# Patient Record
Sex: FEMALE | Race: WHITE | NOT HISPANIC OR LATINO | Employment: UNEMPLOYED | URBAN - NONMETROPOLITAN AREA
[De-identification: names, ages, dates, MRNs, and addresses within clinical notes are randomized per-mention and may not be internally consistent; named-entity substitution may affect disease eponyms.]

---

## 2017-01-12 ENCOUNTER — TRANSCRIBE ORDERS (OUTPATIENT)
Dept: LAB | Facility: MEDICAL CENTER | Age: 52
End: 2017-01-12

## 2017-01-12 ENCOUNTER — APPOINTMENT (OUTPATIENT)
Dept: LAB | Facility: MEDICAL CENTER | Age: 52
End: 2017-01-12
Payer: COMMERCIAL

## 2017-01-12 DIAGNOSIS — E11.9 DIABETES MELLITUS WITHOUT COMPLICATION (HCC): Primary | ICD-10-CM

## 2017-01-12 DIAGNOSIS — E11.9 TYPE 2 DIABETES MELLITUS WITHOUT COMPLICATIONS (HCC): ICD-10-CM

## 2017-01-12 DIAGNOSIS — F50.89 OTHER SPECIFIED EATING DISORDER: ICD-10-CM

## 2017-01-12 DIAGNOSIS — I10 ESSENTIAL (PRIMARY) HYPERTENSION: ICD-10-CM

## 2017-01-12 LAB
ALBUMIN SERPL BCP-MCNC: 3.2 G/DL (ref 3.5–5)
ALP SERPL-CCNC: 90 U/L (ref 46–116)
ALT SERPL W P-5'-P-CCNC: 22 U/L (ref 12–78)
ANION GAP SERPL CALCULATED.3IONS-SCNC: 6 MMOL/L (ref 4–13)
AST SERPL W P-5'-P-CCNC: 12 U/L (ref 5–45)
BASOPHILS # BLD AUTO: 0.04 THOUSANDS/ΜL (ref 0–0.1)
BASOPHILS NFR BLD AUTO: 1 % (ref 0–1)
BILIRUB SERPL-MCNC: 0.62 MG/DL (ref 0.2–1)
BUN SERPL-MCNC: 14 MG/DL (ref 5–25)
CALCIUM SERPL-MCNC: 8.8 MG/DL (ref 8.3–10.1)
CHLORIDE SERPL-SCNC: 100 MMOL/L (ref 100–108)
CHOLEST SERPL-MCNC: 274 MG/DL (ref 50–200)
CO2 SERPL-SCNC: 30 MMOL/L (ref 21–32)
CREAT SERPL-MCNC: 0.85 MG/DL (ref 0.6–1.3)
CREAT UR-MCNC: 226 MG/DL
EOSINOPHIL # BLD AUTO: 0.06 THOUSAND/ΜL (ref 0–0.61)
EOSINOPHIL NFR BLD AUTO: 1 % (ref 0–6)
ERYTHROCYTE [DISTWIDTH] IN BLOOD BY AUTOMATED COUNT: 16.7 % (ref 11.6–15.1)
GFR SERPL CREATININE-BSD FRML MDRD: >60 ML/MIN/1.73SQ M
GLUCOSE SERPL-MCNC: 173 MG/DL (ref 65–140)
HCT VFR BLD AUTO: 34.5 % (ref 34.8–46.1)
HDLC SERPL-MCNC: 74 MG/DL (ref 40–60)
HGB BLD-MCNC: 10 G/DL (ref 11.5–15.4)
IRON SERPL-MCNC: 24 UG/DL (ref 50–170)
LDLC SERPL CALC-MCNC: 170 MG/DL (ref 0–100)
LYMPHOCYTES # BLD AUTO: 1.41 THOUSANDS/ΜL (ref 0.6–4.47)
LYMPHOCYTES NFR BLD AUTO: 24 % (ref 14–44)
MCH RBC QN AUTO: 20.3 PG (ref 26.8–34.3)
MCHC RBC AUTO-ENTMCNC: 29 G/DL (ref 31.4–37.4)
MCV RBC AUTO: 70 FL (ref 82–98)
MICROALBUMIN UR-MCNC: 23 MG/L (ref 0–20)
MICROALBUMIN/CREAT 24H UR: 10 MG/G CREATININE (ref 0–30)
MONOCYTES # BLD AUTO: 0.38 THOUSAND/ΜL (ref 0.17–1.22)
MONOCYTES NFR BLD AUTO: 6 % (ref 4–12)
NEUTROPHILS # BLD AUTO: 4.02 THOUSANDS/ΜL (ref 1.85–7.62)
NEUTS SEG NFR BLD AUTO: 68 % (ref 43–75)
NRBC BLD AUTO-RTO: 0 /100 WBCS
PLATELET # BLD AUTO: 346 THOUSANDS/UL (ref 149–390)
PMV BLD AUTO: 9.1 FL (ref 8.9–12.7)
POTASSIUM SERPL-SCNC: 4.3 MMOL/L (ref 3.5–5.3)
PROT SERPL-MCNC: 7.2 G/DL (ref 6.4–8.2)
RBC # BLD AUTO: 4.93 MILLION/UL (ref 3.81–5.12)
SODIUM SERPL-SCNC: 136 MMOL/L (ref 136–145)
TRIGL SERPL-MCNC: 150 MG/DL
TSH SERPL DL<=0.05 MIU/L-ACNC: 1.45 UIU/ML (ref 0.36–3.74)
WBC # BLD AUTO: 5.92 THOUSAND/UL (ref 4.31–10.16)

## 2017-01-12 PROCEDURE — 36415 COLL VENOUS BLD VENIPUNCTURE: CPT

## 2017-01-12 PROCEDURE — 80053 COMPREHEN METABOLIC PANEL: CPT

## 2017-01-12 PROCEDURE — 85025 COMPLETE CBC W/AUTO DIFF WBC: CPT

## 2017-01-12 PROCEDURE — 82043 UR ALBUMIN QUANTITATIVE: CPT | Performed by: PHYSICIAN ASSISTANT

## 2017-01-12 PROCEDURE — 82570 ASSAY OF URINE CREATININE: CPT | Performed by: PHYSICIAN ASSISTANT

## 2017-01-12 PROCEDURE — 83540 ASSAY OF IRON: CPT

## 2017-01-12 PROCEDURE — 84443 ASSAY THYROID STIM HORMONE: CPT

## 2017-01-12 PROCEDURE — 80061 LIPID PANEL: CPT

## 2017-01-24 ENCOUNTER — APPOINTMENT (OUTPATIENT)
Dept: LAB | Facility: MEDICAL CENTER | Age: 52
End: 2017-01-24
Payer: COMMERCIAL

## 2017-01-24 DIAGNOSIS — D64.9 ANEMIA: ICD-10-CM

## 2017-01-24 LAB
IRON SERPL-MCNC: 28 UG/DL (ref 50–170)
VIT B12 SERPL-MCNC: 563 PG/ML (ref 100–900)

## 2017-01-24 PROCEDURE — 36415 COLL VENOUS BLD VENIPUNCTURE: CPT

## 2017-01-24 PROCEDURE — 82607 VITAMIN B-12: CPT

## 2017-01-24 PROCEDURE — 83540 ASSAY OF IRON: CPT

## 2017-02-01 ENCOUNTER — ALLSCRIPTS OFFICE VISIT (OUTPATIENT)
Dept: FAMILY MEDICINE CLINIC | Facility: CLINIC | Age: 52
End: 2017-02-01
Payer: COMMERCIAL

## 2017-02-01 LAB — HBA1C MFR BLD HPLC: 7.8 %

## 2017-02-01 PROCEDURE — 83036 HEMOGLOBIN GLYCOSYLATED A1C: CPT | Performed by: PHYSICIAN ASSISTANT

## 2017-02-01 PROCEDURE — 99214 OFFICE O/P EST MOD 30 MIN: CPT | Performed by: PHYSICIAN ASSISTANT

## 2017-05-04 ENCOUNTER — ALLSCRIPTS OFFICE VISIT (OUTPATIENT)
Dept: FAMILY MEDICINE CLINIC | Facility: CLINIC | Age: 52
End: 2017-05-04
Payer: COMMERCIAL

## 2017-05-04 LAB
GLUCOSE SERPL-MCNC: 191 MG/DL
GLUCOSE SERPL-MCNC: 191 MG/DL (ref 65–140)
HBA1C MFR BLD HPLC: 8.4 %

## 2017-05-04 PROCEDURE — 82948 REAGENT STRIP/BLOOD GLUCOSE: CPT | Performed by: PHYSICIAN ASSISTANT

## 2017-05-04 PROCEDURE — 83036 HEMOGLOBIN GLYCOSYLATED A1C: CPT | Performed by: FAMILY MEDICINE

## 2017-05-04 PROCEDURE — 99214 OFFICE O/P EST MOD 30 MIN: CPT | Performed by: FAMILY MEDICINE

## 2017-05-11 ENCOUNTER — GENERIC CONVERSION - ENCOUNTER (OUTPATIENT)
Dept: OTHER | Facility: OTHER | Age: 52
End: 2017-05-11

## 2017-05-11 ENCOUNTER — APPOINTMENT (OUTPATIENT)
Dept: FAMILY MEDICINE CLINIC | Facility: CLINIC | Age: 52
End: 2017-05-11
Payer: COMMERCIAL

## 2017-05-11 LAB — GLUCOSE SERPL-MCNC: 218 MG/DL

## 2017-05-11 PROCEDURE — 99213 OFFICE O/P EST LOW 20 MIN: CPT | Performed by: FAMILY MEDICINE

## 2017-05-11 PROCEDURE — 82948 REAGENT STRIP/BLOOD GLUCOSE: CPT | Performed by: PHYSICIAN ASSISTANT

## 2017-05-12 LAB — GLUCOSE SERPL-MCNC: 218 MG/DL (ref 65–140)

## 2017-07-07 ENCOUNTER — HOSPITAL ENCOUNTER (EMERGENCY)
Facility: HOSPITAL | Age: 52
Discharge: HOME | End: 2017-07-07
Attending: EMERGENCY MEDICINE | Admitting: EMERGENCY MEDICINE
Payer: COMMERCIAL

## 2017-07-07 ENCOUNTER — APPOINTMENT (EMERGENCY)
Dept: ULTRASOUND IMAGING | Facility: HOSPITAL | Age: 52
End: 2017-07-07
Payer: COMMERCIAL

## 2017-07-07 VITALS
TEMPERATURE: 97.5 F | SYSTOLIC BLOOD PRESSURE: 171 MMHG | DIASTOLIC BLOOD PRESSURE: 85 MMHG | OXYGEN SATURATION: 99 % | RESPIRATION RATE: 16 BRPM | HEART RATE: 67 BPM | HEIGHT: 65 IN | BODY MASS INDEX: 35.34 KG/M2 | WEIGHT: 212.08 LBS

## 2017-07-07 DIAGNOSIS — R10.2 PAIN, PELVIC, FEMALE: ICD-10-CM

## 2017-07-07 DIAGNOSIS — R10.2 PELVIC PAIN: Primary | ICD-10-CM

## 2017-07-07 LAB
BILIRUB UR QL STRIP: NEGATIVE
CLARITY UR: CLEAR
COLOR UR: YELLOW
GLUCOSE UR STRIP-MCNC: NEGATIVE MG/DL
HGB UR QL STRIP.AUTO: NEGATIVE
KETONES UR STRIP-MCNC: NEGATIVE MG/DL
LEUKOCYTE ESTERASE UR QL STRIP: NEGATIVE
NITRITE UR QL STRIP: NEGATIVE
PH UR STRIP.AUTO: 6 [PH] (ref 4.5–8)
PROT UR STRIP-MCNC: NEGATIVE MG/DL
SP GR UR STRIP.AUTO: 1.02 (ref 1–1.03)
UROBILINOGEN UR QL STRIP.AUTO: 0.2 E.U./DL

## 2017-07-07 PROCEDURE — 96372 THER/PROPH/DIAG INJ SC/IM: CPT

## 2017-07-07 PROCEDURE — 99284 EMERGENCY DEPT VISIT MOD MDM: CPT

## 2017-07-07 PROCEDURE — 76830 TRANSVAGINAL US NON-OB: CPT

## 2017-07-07 PROCEDURE — 81003 URINALYSIS AUTO W/O SCOPE: CPT | Performed by: EMERGENCY MEDICINE

## 2017-07-07 PROCEDURE — 76856 US EXAM PELVIC COMPLETE: CPT

## 2017-07-07 RX ORDER — KETOROLAC TROMETHAMINE 30 MG/ML
60 INJECTION, SOLUTION INTRAMUSCULAR; INTRAVENOUS ONCE
Status: COMPLETED | OUTPATIENT
Start: 2017-07-07 | End: 2017-07-07

## 2017-07-07 RX ORDER — NAPROXEN 500 MG/1
500 TABLET ORAL EVERY 8 HOURS PRN
Qty: 20 TABLET | Refills: 0 | Status: SHIPPED | OUTPATIENT
Start: 2017-07-07 | End: 2018-04-17

## 2017-07-07 RX ADMIN — KETOROLAC TROMETHAMINE 60 MG: 30 INJECTION, SOLUTION INTRAMUSCULAR at 17:09

## 2017-07-12 ENCOUNTER — ALLSCRIPTS OFFICE VISIT (OUTPATIENT)
Dept: FAMILY MEDICINE CLINIC | Facility: CLINIC | Age: 52
End: 2017-07-12
Payer: COMMERCIAL

## 2017-07-12 PROCEDURE — 99212 OFFICE O/P EST SF 10 MIN: CPT | Performed by: FAMILY MEDICINE

## 2017-08-10 ENCOUNTER — ALLSCRIPTS OFFICE VISIT (OUTPATIENT)
Dept: FAMILY MEDICINE CLINIC | Facility: CLINIC | Age: 52
End: 2017-08-10
Payer: COMMERCIAL

## 2017-08-10 LAB
GLUCOSE SERPL-MCNC: 265 MG/DL
GLUCOSE SERPL-MCNC: 265 MG/DL (ref 65–140)
HBA1C MFR BLD HPLC: 8.3 %

## 2017-08-10 PROCEDURE — 82948 REAGENT STRIP/BLOOD GLUCOSE: CPT | Performed by: PHYSICIAN ASSISTANT

## 2017-08-10 PROCEDURE — 83036 HEMOGLOBIN GLYCOSYLATED A1C: CPT | Performed by: FAMILY MEDICINE

## 2017-08-10 PROCEDURE — 99214 OFFICE O/P EST MOD 30 MIN: CPT | Performed by: FAMILY MEDICINE

## 2017-10-27 NOTE — PROGRESS NOTES
Assessment  1  Skin rash (782 1) (R21)    Plan   Skin rash    · Follow Up if Not Better Evaluation and Treatment  Follow-up  Status: Complete  Done:  [id number]    MethylPREDNISolone 4 MG Oral Tablet Therapy Pack; take as directed daily for 5 days; Therapy: 18MGA9989 to (Last Rx:99Azy0920)  Requested for: 38Ewq5394; Status: ACTIVE Ordered  Rx By: Chadwick Hudson; Dispense: 0 Days ; #:1 Tablet Therapy Pack; Refill: 0;   For: Skin rash; NELLY = N; Verified Transmission to [facility]; Last Updated By: [email or web address]; 8/10/2017 12:57:54 PM     Discussion/Summary    Doubt zoster at this time  Medrol dose pack x 5 days  She will call office in the AM to let us know if any changes  The treatment plan was reviewed with the patient/guardian  The patient/guardian understands and agrees with the treatment plan      Chief Complaint  Patient is here because since yesterday she has a rash on back and chest that she says itch and hurt  History of Present Illness  HPI: 55 yo female presents for above  Started with rash yesterday  Painful and itchy  She has had zoster in the past  Yesterday had rash on back but now gone  reports it maybe her nerves  There is a lot going on on her life right now  Review of Systems    Constitutional: No fever, no chills, feels well, no tiredness, no recent weight gain or loss  ENT: no ear ache, no loss of hearing, no nosebleeds or nasal discharge, no sore throat or hoarseness  Cardiovascular: no complaints of slow or fast heart rate, no chest pain, no palpitations, no leg claudication or lower extremity edema  Respiratory: no complaints of shortness of breath, no wheezing, no dyspnea on exertion, no orthopnea or PND  Integumentary: as noted in HPI  Active Problems   1  Ache in joint (719 40) (M25 50)   2  Acid reflux (530 81) (K21 9)   3  Allergic cough (786 2) (R05)   4  Breast cancer screening (V76 10) (Z12 39)   5  Depression (311) (F32 9)   6   Hydrocephalus (331 4) (G91 9)   7  Low hemoglobin (285 9) (D64 9)   8  Pain, foot, left, chronic (729 5,338 29) (M79 672,G89 29)   9  Pica in adults (307 52) (F50 89)    Diabetes mellitus (250 00) (E11 9)       Hypertension (401 9) (I10)       Iron deficiency (280 9) (E61 1)          Past Medical History  1  History of Asthmatic bronchitis (493 90) (J45 909)   2  History of Flank pain, acute (789 09,338 19) (R10 9)   3  History of acute sinusitis (V12 69) (Z87 09)   4  History of urinary tract infection (V13 02) (Z87 440)   5  History of Left ankle pain (719 47) (M25 572)   6  History of Sprain of left foot, initial encounter (845 10) (S93 602A)   7  History of Sprain of other ligament of left ankle, subsequent encounter (V58 89,845 09)   (G25 740L)  Active Problems And Past Medical History Reviewed: The active problems and past medical history were reviewed and updated today  Family History  Father    1  Family history of diabetes mellitus (V18 0) (Z83 3)  Family History Reviewed: The family history was reviewed and updated today  Social History   · No caffeine use   · Non-smoker (V49 89) (Z78 9)   · Social alcohol use (Z78 9)  The social history was reviewed and updated today  The social history was reviewed and is unchanged  Surgical History  Surgical History Reviewed: The surgical history was reviewed and updated today  Current Meds   1  Albuterol Sulfate (2 5 MG/3ML) 0 083% Inhalation Nebulization Solution; USE 1 UNIT   DOSE EVERY 4-6 HOURS AS NEEDED FOR WHEEZING ; Therapy: 91FAL6752 to (Last Rx:14Mar2016)  Requested for: 85UPZ9329 Ordered   2  FLUoxetine HCl - 40 MG Oral Capsule; take 1 capsule daily; Therapy: 32LGB4085 to (Last Rx:69Haq2113)  Requested for: 84GQF1542 Ordered   3  Glimepiride 4 MG Oral Tablet; take one tablet by mouth twice daily  Requested for:   10UZD0820; Last Rx:64Ffx8578 Ordered   4  HydroCHLOROthiazide 25 MG Oral Tablet; TAKE ONE TABLET BY MOUTH ONCE   DAILY;    Therapy: 97KUN7065 to (Evaluate:78Jla2079)  Requested for: 23LWK5211; Last   Rx:12Mar2017 Ordered   5  MetFORMIN HCl  MG Oral Tablet Extended Release 24 Hour; take one tablet by   mouth twice daily; Therapy: 83FVD0333 to (Evaluate:84Iso3919)  Requested for: 41Wku1446; Last   Rx:03Zdu0310 Ordered   6  Montelukast Sodium 10 MG Oral Tablet; TAKE 1 TABLET DAILY AS DIRECTED; Therapy: 47TTT9048 to (96 903574)  Requested for: 13KXZ6213; Last   Rx:88Hmc7478 Ordered   7  Toujeo SoloStar 300 UNIT/ML Subcutaneous Solution Pen-injector; INJECT 37 UNITS   DAILY  Requested for: 66TQQ2844; Last Rx:23Awt5475 Ordered    The medication list was reviewed and updated today  Allergies  1  No Known Drug Allergies  2  Chocolate   3  Peanuts   4  Seasonal   5  Shellfish   6  TOMATO    Vitals   Recorded: 12Jul2017 09:23AM   Temperature 97 6 F   Heart Rate 001   Systolic 095   Diastolic 84   Height 5 ft 5 in   Weight 214 lb    BMI Calculated 35 61   BSA Calculated 2 04   O2 Saturation 98     Physical Exam    Constitutional   General appearance: No acute distress, well appearing and well nourished  Eyes   Conjunctiva and lids: No swelling, erythema or discharge  Pupils and irises: Equal, round and reactive to light  Ears, Nose, Mouth, and Throat   External inspection of ears and nose: Normal     Skin   Skin and subcutaneous tissue: Normal without rashes or lesions  Examination of the skin for lesions: Abnormal  -- 3 red round lesions noted on R breast  No rash or lesions noted over the area of pain and itch  Future Appointments    Date/Time Provider Specialty Site   11/15/2017 03:00 PM [facility] [address]     Signatures   Electronically signed by :  Sheila Pantoja [facility]; Jul 12 2017  9:47AM EST                       (Author)    Electronically signed by : Rodrick Nunes MD; Oct  1 2017 10:43PM EST                       (Author)

## 2017-11-10 NOTE — PROGRESS NOTES
Assessment    1  Diabetes mellitus (250 00) (E11 9)  2  Hypertension (401 9) (I10)  3  Iron deficiency (280 9) (E61 1)  4  Anxiety (300 00) (F41 9)    Plan  Allergic cough    · Renew: Montelukast Sodium 10 MG Oral Tablet (Singulair); TAKE 1 TABLET DAILY ASDIRECTED  Anxiety    · Start: Promethazine HCl - 25 MG Oral Tablet; TAKE 1 TABLET 3 TIMES DAILY ASNEEDED  for nausea and anxiety  Depression    · Renew: FLUoxetine HCl - 40 MG Oral Capsule; take 1 capsule daily  Diabetes mellitus    · Renew: Glimepiride 4 MG Oral Tablet; take one tablet by mouth twice daily   · Renew: MetFORMIN HCl  MG Oral Tablet Extended Release 24 Hour(Glucophage XR); take one tablet by mouth twice daily   · Blood Glucose- POC; Status:Complete;   Done: 47VQL1024 12:58PM  () Fasting (Y/N) : No - N   · Hemoglobin A1c- POC; Status:Complete;   Done: 03LZW4229 01:01PM  Diabetes mellitus, Hypertension    · Renew: TraMADol HCl - 50 MG Oral Tablet; Take 1 tablet twice daily  Diabetes mellitus, Pain, foot, left, chronic, Skin rash    · Administered: Ketorolac Tromethamine 60 MG/2ML Injection Solution  Hypertension    · Renew: HydroCHLOROthiazide 25 MG Oral Tablet; TAKE ONE TABLET BY MOUTH ONCEDAILY    Discussion/Summary    Will add promethazine 25 mg q 4-6 hrs as needed discussed need for new job and stress relief  The patient was counseled regarding  Chief Complaint  Patient is here for a check up for her diabetes  She states her sugars are running high  Patient states she is very stressed, she feels nauseated, has migraines, and every time she smells food she feels like she is going to vomit  Patient states this has been going on since Sunday, but the stress has been within the past 3 months        History of Present Illness  pt is here and having a lot of stress and issues at work Appeite is ok and sleeps up and down and b are ok Nauseated will be having hysterectoy in few weeks      Review of Systems   Constitutional: No fever, no chills, feels well, no tiredness, no recent weight gain or weight loss  Eyes: No complaints of eye pain, no red eyes, no eyesight problems, no discharge, no dry eyes, no itching of eyes  ENT: no complaints of earache, no loss of hearing, no nose bleeds, no nasal discharge, no sore throat, no hoarseness  Cardiovascular: No complaints of slow heart rate, no fast heart rate, no chest pain, no palpitations, no leg claudication, no lower extremity edema  Respiratory: No complaints of shortness of breath, no wheezing, no cough, no SOB on exertion, no orthopnea, no PND  Gastrointestinal: nausea, but-- No complaints of abdominal pain, no constipation, no nausea or vomiting, no diarrhea, no bloody stools  Genitourinary: No complaints of dysuria, no incontinence, no pelvic pain, no dysmenorrhea, no vaginal discharge or bleeding  Musculoskeletal: No complaints of arthralgias, no myalgias, no joint swelling or stiffness, no limb pain or swelling  Integumentary: No complaints of skin rash or lesions, no itching, no skin wounds, no breast pain or lump  Neurological: headache, but-- No complaints of headache, no confusion, no convulsions, no numbness, no dizziness or fainting, no tingling, no limb weakness, no difficulty walking  Psychiatric: anxiety-- and-- depression, but-- Not suicidal, no sleep disturbance, no anxiety or depression, no change in personality, no emotional problems  Endocrine: No complaints of proptosis, no hot flashes, no muscle weakness, no deepening of the voice, no feelings of weakness  Hematologic/Lymphatic: No complaints of swollen glands, no swollen glands in the neck, does not bleed easily, does not bruise easily  ROS reviewed  Active Problems  1  Ache in joint (719 40) (M25 50)  2  Acid reflux (530 81) (K21 9)  3  Allergic cough (786 2) (R05)  4  Breast cancer screening (V76 10) (Z12 39)  5  Depression (311) (F32 9)  6  Diabetes mellitus (250 00) (E11 9)  7   Hydrocephalus (331 4) (G91 9)  8  Hypertension (401 9) (I10)  9  Iron deficiency (280 9) (E61 1)  10  Low hemoglobin (285 9) (D64 9)  11  Pain, foot, left, chronic (729 5,338 29) (M79 672,G89 29)  12  Pica in adults (307 52) (F50 89)  13  Skin rash (782 1) (R21)    Past Medical History    1  History of Asthmatic bronchitis (493 90) (J45 909)  2  History of Flank pain, acute (789 09,338 19) (R10 9)  3  History of acute sinusitis (V12 69) (Z87 09)  4  History of urinary tract infection (V13 02) (Z87 440)  5  History of Left ankle pain (719 47) (M25 572)  6  History of Sprain of left foot, initial encounter (845 10) (S93 602A)  7  History of Sprain of other ligament of left ankle, subsequent encounter (V58 89,845 09) (M49 470J)    The active problems and past medical history were reviewed and updated today  Family History  Father   1  Family history of diabetes mellitus (V18 0) (Z83 3)    The family history was reviewed and updated today  Social History     · No caffeine use   · Non-smoker (V49 89) (Z78 9)   · Social alcohol use (Z78 9)  The social history was reviewed and updated today  Current Meds  1  Albuterol Sulfate (2 5 MG/3ML) 0 083% Inhalation Nebulization Solution; USE 1 UNIT DOSE EVERY 4-6 HOURS AS NEEDED FOR WHEEZING ; Therapy: 28UPO1877 to (Last Rx:14Mar2016)  Requested for: 42AXD5546 Ordered  2  FLUoxetine HCl - 40 MG Oral Capsule; take 1 capsule daily; Therapy: 63SYB5519 to (Last Rx:62Vdm8132)  Requested for: 84ZCW6365 Ordered  3  Glimepiride 4 MG Oral Tablet; take one tablet by mouth twice daily  Requested for: 84ZLF8783; Last Rx:02Nqz4413 Ordered  4  HydroCHLOROthiazide 25 MG Oral Tablet; TAKE ONE TABLET BY MOUTH ONCE DAILY; Therapy: 27IQC0107 to (Otis Gonzalez)  Requested for: 06Lti5642; Last Rx:88Kwd1349 Ordered  5  MetFORMIN HCl  MG Oral Tablet Extended Release 24 Hour; take one tablet by mouth twice daily;  Therapy: 82KUI0266 to (Evaluate:48Vau2911)  Requested for: 12Dec2016; Last Rx:12Dec2016 Ordered  6  MethylPREDNISolone 4 MG Oral Tablet Therapy Pack; take as directed daily for 5 days; Therapy: 83GSN0837 to (Last Rx:01Iin9465)  Requested for: 87Gml7160 Ordered  7  Montelukast Sodium 10 MG Oral Tablet; TAKE 1 TABLET DAILY AS DIRECTED; Therapy: 81TOY3598 to (77 876 135)  Requested for: 43DVF1107; Last Rx:52Fcx8408 Ordered  8  Toujeo SoloStar 300 UNIT/ML Subcutaneous Solution Pen-injector; INJECT 37 UNITS DAILY  Requested for: 91AVD1924; Last Rx:08Pkj0997 Ordered  9  TraMADol HCl - 50 MG Oral Tablet; Take 1 tablet twice daily; Therapy: (Recorded:10Aug2017) to Recorded    The medication list was reviewed and updated today  Allergies  1  No Known Drug Allergies  2  Chocolate  3  Peanuts  4  Seasonal  5  Shellfish  6  TOMATO    Vitals  Vital Signs    Recorded: 10Aug2017 12:59PM   Temperature 99 3 F   Heart Rate 118   Respiration 17   Systolic 050   Diastolic 068   Height 5 ft 5 in   Weight 216 lb    BMI Calculated 35 94   BSA Calculated 2 04   O2 Saturation 95       Physical Exam   Constitutional  General appearance: No acute distress, well appearing and well nourished  Eyes  Conjunctiva and lids: No swelling, erythema or discharge  Pupils and irises: Equal, round and reactive to light  Ears, Nose, Mouth, and Throat  External inspection of ears and nose: Normal    Otoscopic examination: Tympanic membranes translucent with normal light reflex  Canals patent without erythema  Nasal mucosa, septum, and turbinates: Normal without edema or erythema  Oropharynx: Normal with no erythema, edema, exudate or lesions  Pulmonary  Respiratory effort: No increased work of breathing or signs of respiratory distress  Auscultation of lungs: Clear to auscultation  Cardiovascular  Palpation of heart: Normal PMI, no thrills  Auscultation of heart: Normal rate and rhythm, normal S1 and S2, without murmurs     Examination of extremities for edema and/or varicosities: Normal    Carotid pulses: Normal  Abdomen  Abdomen: Non-tender, no masses  Liver and spleen: No hepatomegaly or splenomegaly  Lymphatic  Palpation of lymph nodes in neck: No lymphadenopathy  Musculoskeletal  Gait and station: Normal    Digits and nails: Normal without clubbing or cyanosis  Inspection/palpation of joints, bones, and muscles: Normal    Skin  Skin and subcutaneous tissue: Normal without rashes or lesions  Neurologic  Cranial nerves: Cranial nerves 2-12 intact  Reflexes: 2+ and symmetric  Sensation: No sensory loss  Psychiatric  Orientation to person, place, and time: Normal    Mood and affect: Normal          Results/Data  Hemoglobin A1c- POC 22Jyk9738 01:01PM Corrinne Daubs     Test Name Result Flag Reference   HEMOGLOBIN A1C 8 3       Blood Glucose- POC 31UIR4127 12:58PM Corrinne Daubs     Test Name Result Flag Reference   Glucose Finger Stick 265         Future Appointments    Date/Time Provider Specialty Site   12/13/2017 10:45 AM Corrinne Daubs, [address] [place] [address]       Signatures   Electronically signed by : Cee Dela Cruz [facility]; Aug 10 2017  1:23PM EST                       (Author)    Electronically signed by : Cee Dela Cruz [facility]; Aug 10 2017  1:25PM EST                       (Author)    Electronically signed by : Cee Dela Cruz [facility];  Aug 10 2017  1:31PM EST                       (Author)    Electronically signed by : Patricia Layton MD; Nov 8 2017  7:15PM EST                       (Author)    Electronically signed by : Patricia Layton MD; Nov 8 2017  7:16PM EST                       (Author)

## 2017-12-13 ENCOUNTER — ALLSCRIPTS OFFICE VISIT (OUTPATIENT)
Dept: FAMILY MEDICINE CLINIC | Facility: CLINIC | Age: 52
End: 2017-12-13
Payer: COMMERCIAL

## 2017-12-13 DIAGNOSIS — E11.9 TYPE 2 DIABETES MELLITUS WITHOUT COMPLICATIONS (HCC): ICD-10-CM

## 2017-12-13 DIAGNOSIS — I10 ESSENTIAL (PRIMARY) HYPERTENSION: ICD-10-CM

## 2017-12-13 DIAGNOSIS — Z12.31 ENCOUNTER FOR SCREENING MAMMOGRAM FOR MALIGNANT NEOPLASM OF BREAST: ICD-10-CM

## 2017-12-13 DIAGNOSIS — Z12.11 ENCOUNTER FOR SCREENING FOR MALIGNANT NEOPLASM OF COLON: ICD-10-CM

## 2017-12-13 DIAGNOSIS — F41.9 ANXIETY DISORDER: ICD-10-CM

## 2017-12-13 LAB
GLUCOSE SERPL-MCNC: 218 MG/DL
GLUCOSE SERPL-MCNC: 218 MG/DL (ref 65–140)
HBA1C MFR BLD HPLC: 8.8 %

## 2017-12-13 PROCEDURE — 82948 REAGENT STRIP/BLOOD GLUCOSE: CPT | Performed by: PHYSICIAN ASSISTANT

## 2017-12-13 PROCEDURE — 99214 OFFICE O/P EST MOD 30 MIN: CPT | Performed by: FAMILY MEDICINE

## 2017-12-13 PROCEDURE — 83036 HEMOGLOBIN GLYCOSYLATED A1C: CPT | Performed by: FAMILY MEDICINE

## 2018-01-13 VITALS
HEART RATE: 113 BPM | BODY MASS INDEX: 35.65 KG/M2 | WEIGHT: 214 LBS | HEIGHT: 65 IN | DIASTOLIC BLOOD PRESSURE: 84 MMHG | OXYGEN SATURATION: 98 % | TEMPERATURE: 97.6 F | SYSTOLIC BLOOD PRESSURE: 120 MMHG

## 2018-01-13 VITALS
HEIGHT: 65 IN | RESPIRATION RATE: 16 BRPM | DIASTOLIC BLOOD PRESSURE: 88 MMHG | TEMPERATURE: 98.9 F | HEART RATE: 99 BPM | OXYGEN SATURATION: 97 % | BODY MASS INDEX: 35.16 KG/M2 | WEIGHT: 211 LBS | SYSTOLIC BLOOD PRESSURE: 130 MMHG

## 2018-01-14 VITALS
BODY MASS INDEX: 35.16 KG/M2 | HEART RATE: 115 BPM | RESPIRATION RATE: 18 BRPM | OXYGEN SATURATION: 98 % | WEIGHT: 211 LBS | DIASTOLIC BLOOD PRESSURE: 82 MMHG | HEIGHT: 65 IN | SYSTOLIC BLOOD PRESSURE: 162 MMHG

## 2018-01-14 VITALS
RESPIRATION RATE: 17 BRPM | SYSTOLIC BLOOD PRESSURE: 122 MMHG | TEMPERATURE: 98.8 F | BODY MASS INDEX: 35.65 KG/M2 | HEART RATE: 106 BPM | HEIGHT: 65 IN | OXYGEN SATURATION: 96 % | DIASTOLIC BLOOD PRESSURE: 84 MMHG | WEIGHT: 214 LBS

## 2018-01-14 VITALS
HEART RATE: 118 BPM | HEIGHT: 65 IN | DIASTOLIC BLOOD PRESSURE: 102 MMHG | OXYGEN SATURATION: 95 % | SYSTOLIC BLOOD PRESSURE: 142 MMHG | BODY MASS INDEX: 35.99 KG/M2 | WEIGHT: 216 LBS | RESPIRATION RATE: 17 BRPM | TEMPERATURE: 99.3 F

## 2018-01-23 VITALS
DIASTOLIC BLOOD PRESSURE: 64 MMHG | WEIGHT: 222 LBS | BODY MASS INDEX: 36.94 KG/M2 | SYSTOLIC BLOOD PRESSURE: 116 MMHG | HEART RATE: 118 BPM | TEMPERATURE: 98.9 F | OXYGEN SATURATION: 97 % | RESPIRATION RATE: 18 BRPM

## 2018-02-26 ENCOUNTER — TRANSCRIBE ORDERS (OUTPATIENT)
Dept: RADIOLOGY | Facility: MEDICAL CENTER | Age: 53
End: 2018-02-26

## 2018-02-26 ENCOUNTER — APPOINTMENT (OUTPATIENT)
Dept: LAB | Facility: MEDICAL CENTER | Age: 53
End: 2018-02-26
Payer: COMMERCIAL

## 2018-02-26 DIAGNOSIS — I10 ESSENTIAL (PRIMARY) HYPERTENSION: ICD-10-CM

## 2018-02-26 DIAGNOSIS — F41.9 ANXIETY DISORDER: ICD-10-CM

## 2018-02-26 DIAGNOSIS — E11.9 TYPE 2 DIABETES MELLITUS WITHOUT COMPLICATIONS (HCC): ICD-10-CM

## 2018-02-26 LAB
ALBUMIN SERPL BCP-MCNC: 3.4 G/DL (ref 3.5–5)
ALP SERPL-CCNC: 131 U/L (ref 46–116)
ALT SERPL W P-5'-P-CCNC: 29 U/L (ref 12–78)
ANION GAP SERPL CALCULATED.3IONS-SCNC: 10 MMOL/L (ref 4–13)
AST SERPL W P-5'-P-CCNC: 15 U/L (ref 5–45)
BASOPHILS # BLD AUTO: 0.04 THOUSANDS/ΜL (ref 0–0.1)
BASOPHILS NFR BLD AUTO: 1 % (ref 0–1)
BILIRUB SERPL-MCNC: 0.95 MG/DL (ref 0.2–1)
BUN SERPL-MCNC: 15 MG/DL (ref 5–25)
CALCIUM SERPL-MCNC: 8.9 MG/DL (ref 8.3–10.1)
CHLORIDE SERPL-SCNC: 94 MMOL/L (ref 100–108)
CHOLEST SERPL-MCNC: 354 MG/DL (ref 50–200)
CO2 SERPL-SCNC: 27 MMOL/L (ref 21–32)
CREAT SERPL-MCNC: 0.84 MG/DL (ref 0.6–1.3)
CREAT UR-MCNC: 89.5 MG/DL
EOSINOPHIL # BLD AUTO: 0.07 THOUSAND/ΜL (ref 0–0.61)
EOSINOPHIL NFR BLD AUTO: 1 % (ref 0–6)
ERYTHROCYTE [DISTWIDTH] IN BLOOD BY AUTOMATED COUNT: 13.5 % (ref 11.6–15.1)
GFR SERPL CREATININE-BSD FRML MDRD: 81 ML/MIN/1.73SQ M
GLUCOSE P FAST SERPL-MCNC: 331 MG/DL (ref 65–99)
HCT VFR BLD AUTO: 40 % (ref 34.8–46.1)
HDLC SERPL-MCNC: 59 MG/DL (ref 40–60)
HGB BLD-MCNC: 12.7 G/DL (ref 11.5–15.4)
LDLC SERPL CALC-MCNC: 246 MG/DL (ref 0–100)
LYMPHOCYTES # BLD AUTO: 1.33 THOUSANDS/ΜL (ref 0.6–4.47)
LYMPHOCYTES NFR BLD AUTO: 19 % (ref 14–44)
MCH RBC QN AUTO: 25 PG (ref 26.8–34.3)
MCHC RBC AUTO-ENTMCNC: 31.8 G/DL (ref 31.4–37.4)
MCV RBC AUTO: 79 FL (ref 82–98)
MICROALBUMIN UR-MCNC: 65.1 MG/L (ref 0–20)
MICROALBUMIN/CREAT 24H UR: 73 MG/G CREATININE (ref 0–30)
MONOCYTES # BLD AUTO: 0.52 THOUSAND/ΜL (ref 0.17–1.22)
MONOCYTES NFR BLD AUTO: 7 % (ref 4–12)
NEUTROPHILS # BLD AUTO: 5.08 THOUSANDS/ΜL (ref 1.85–7.62)
NEUTS SEG NFR BLD AUTO: 72 % (ref 43–75)
NRBC BLD AUTO-RTO: 0 /100 WBCS
PLATELET # BLD AUTO: 365 THOUSANDS/UL (ref 149–390)
PMV BLD AUTO: 9.1 FL (ref 8.9–12.7)
POTASSIUM SERPL-SCNC: 3.5 MMOL/L (ref 3.5–5.3)
PROT SERPL-MCNC: 8 G/DL (ref 6.4–8.2)
RBC # BLD AUTO: 5.07 MILLION/UL (ref 3.81–5.12)
SODIUM SERPL-SCNC: 131 MMOL/L (ref 136–145)
TRIGL SERPL-MCNC: 243 MG/DL
TSH SERPL DL<=0.05 MIU/L-ACNC: 1.68 UIU/ML (ref 0.36–3.74)
WBC # BLD AUTO: 7.06 THOUSAND/UL (ref 4.31–10.16)

## 2018-02-26 PROCEDURE — 82043 UR ALBUMIN QUANTITATIVE: CPT

## 2018-02-26 PROCEDURE — 80061 LIPID PANEL: CPT

## 2018-02-26 PROCEDURE — 84443 ASSAY THYROID STIM HORMONE: CPT

## 2018-02-26 PROCEDURE — 36415 COLL VENOUS BLD VENIPUNCTURE: CPT

## 2018-02-26 PROCEDURE — 85025 COMPLETE CBC W/AUTO DIFF WBC: CPT

## 2018-02-26 PROCEDURE — 82570 ASSAY OF URINE CREATININE: CPT

## 2018-02-26 PROCEDURE — 80053 COMPREHEN METABOLIC PANEL: CPT

## 2018-04-17 ENCOUNTER — HOSPITAL ENCOUNTER (EMERGENCY)
Facility: HOSPITAL | Age: 53
Discharge: HOME | End: 2018-04-17
Attending: EMERGENCY MEDICINE | Admitting: EMERGENCY MEDICINE
Payer: COMMERCIAL

## 2018-04-17 VITALS
HEIGHT: 65 IN | WEIGHT: 219.4 LBS | HEART RATE: 113 BPM | BODY MASS INDEX: 36.55 KG/M2 | DIASTOLIC BLOOD PRESSURE: 76 MMHG | TEMPERATURE: 97.6 F | RESPIRATION RATE: 18 BRPM | OXYGEN SATURATION: 99 % | SYSTOLIC BLOOD PRESSURE: 147 MMHG

## 2018-04-17 DIAGNOSIS — E11.65 HYPERGLYCEMIA DUE TO TYPE 2 DIABETES MELLITUS (HCC): ICD-10-CM

## 2018-04-17 DIAGNOSIS — G43.909 MIGRAINE HEADACHE: Primary | ICD-10-CM

## 2018-04-17 LAB
ALBUMIN SERPL BCP-MCNC: 3 G/DL (ref 3.5–5)
ALP SERPL-CCNC: 102 U/L (ref 46–116)
ALT SERPL W P-5'-P-CCNC: 39 U/L (ref 12–78)
ANION GAP BLD CALC-SCNC: 13 MMOL/L (ref 4–13)
ANION GAP SERPL CALCULATED.3IONS-SCNC: 11 MMOL/L (ref 4–13)
AST SERPL W P-5'-P-CCNC: 52 U/L (ref 5–45)
BASOPHILS # BLD AUTO: 0.04 THOUSANDS/ΜL (ref 0–0.1)
BASOPHILS NFR BLD AUTO: 1 % (ref 0–1)
BILIRUB SERPL-MCNC: 0.7 MG/DL (ref 0.2–1)
BILIRUB UR QL STRIP: NEGATIVE
BUN BLD-MCNC: 15 MG/DL (ref 5–25)
BUN SERPL-MCNC: 15 MG/DL (ref 5–25)
CA-I BLD-SCNC: 0.89 MMOL/L (ref 1.12–1.32)
CALCIUM SERPL-MCNC: 8.9 MG/DL (ref 8.3–10.1)
CHLORIDE BLD-SCNC: 99 MMOL/L (ref 100–108)
CHLORIDE SERPL-SCNC: 94 MMOL/L (ref 100–108)
CLARITY UR: CLEAR
CO2 SERPL-SCNC: 26 MMOL/L (ref 21–32)
COLOR UR: YELLOW
CREAT BLD-MCNC: 0.6 MG/DL (ref 0.6–1.3)
CREAT SERPL-MCNC: 0.79 MG/DL (ref 0.6–1.3)
EOSINOPHIL # BLD AUTO: 0.12 THOUSAND/ΜL (ref 0–0.61)
EOSINOPHIL NFR BLD AUTO: 2 % (ref 0–6)
ERYTHROCYTE [DISTWIDTH] IN BLOOD BY AUTOMATED COUNT: 15.1 % (ref 11.6–15.1)
GFR SERPL CREATININE-BSD FRML MDRD: 107 ML/MIN/1.73SQ M
GFR SERPL CREATININE-BSD FRML MDRD: 88 ML/MIN/1.73SQ M
GLUCOSE SERPL-MCNC: 177 MG/DL (ref 65–140)
GLUCOSE SERPL-MCNC: 221 MG/DL (ref 65–140)
GLUCOSE UR STRIP-MCNC: ABNORMAL MG/DL
HCT VFR BLD AUTO: 36.8 % (ref 34.8–46.1)
HCT VFR BLD CALC: 35 % (ref 34.8–46.1)
HGB BLD-MCNC: 11.8 G/DL (ref 11.5–15.4)
HGB BLDA-MCNC: 11.9 G/DL (ref 11.5–15.4)
HGB UR QL STRIP.AUTO: NEGATIVE
KETONES UR STRIP-MCNC: NEGATIVE MG/DL
LEUKOCYTE ESTERASE UR QL STRIP: NEGATIVE
LYMPHOCYTES # BLD AUTO: 1.72 THOUSANDS/ΜL (ref 0.6–4.47)
LYMPHOCYTES NFR BLD AUTO: 22 % (ref 14–44)
MCH RBC QN AUTO: 23.6 PG (ref 26.8–34.3)
MCHC RBC AUTO-ENTMCNC: 32.1 G/DL (ref 31.4–37.4)
MCV RBC AUTO: 74 FL (ref 82–98)
MONOCYTES # BLD AUTO: 0.51 THOUSAND/ΜL (ref 0.17–1.22)
MONOCYTES NFR BLD AUTO: 7 % (ref 4–12)
NEUTROPHILS # BLD AUTO: 5.41 THOUSANDS/ΜL (ref 1.85–7.62)
NEUTS SEG NFR BLD AUTO: 68 % (ref 43–75)
NITRITE UR QL STRIP: NEGATIVE
PCO2 BLD: 25 MMOL/L (ref 21–32)
PH UR STRIP.AUTO: 5.5 [PH] (ref 4.5–8)
PLATELET # BLD AUTO: 291 THOUSANDS/UL (ref 149–390)
PMV BLD AUTO: 9.5 FL (ref 8.9–12.7)
POTASSIUM BLD-SCNC: 3.3 MMOL/L (ref 3.5–5.3)
POTASSIUM SERPL-SCNC: 4.2 MMOL/L (ref 3.5–5.3)
PROT SERPL-MCNC: 7.2 G/DL (ref 6.4–8.2)
PROT UR STRIP-MCNC: NEGATIVE MG/DL
RBC # BLD AUTO: 5 MILLION/UL (ref 3.81–5.12)
SODIUM BLD-SCNC: 133 MMOL/L (ref 136–145)
SODIUM SERPL-SCNC: 131 MMOL/L (ref 136–145)
SP GR UR STRIP.AUTO: 1.01 (ref 1–1.03)
SPECIMEN SOURCE: ABNORMAL
UROBILINOGEN UR QL STRIP.AUTO: 0.2 E.U./DL
WBC # BLD AUTO: 7.8 THOUSAND/UL (ref 4.31–10.16)

## 2018-04-17 PROCEDURE — 99283 EMERGENCY DEPT VISIT LOW MDM: CPT

## 2018-04-17 PROCEDURE — 96361 HYDRATE IV INFUSION ADD-ON: CPT

## 2018-04-17 PROCEDURE — 85014 HEMATOCRIT: CPT

## 2018-04-17 PROCEDURE — 80053 COMPREHEN METABOLIC PANEL: CPT | Performed by: EMERGENCY MEDICINE

## 2018-04-17 PROCEDURE — 96375 TX/PRO/DX INJ NEW DRUG ADDON: CPT

## 2018-04-17 PROCEDURE — 36415 COLL VENOUS BLD VENIPUNCTURE: CPT | Performed by: EMERGENCY MEDICINE

## 2018-04-17 PROCEDURE — 81003 URINALYSIS AUTO W/O SCOPE: CPT | Performed by: EMERGENCY MEDICINE

## 2018-04-17 PROCEDURE — 85025 COMPLETE CBC W/AUTO DIFF WBC: CPT | Performed by: EMERGENCY MEDICINE

## 2018-04-17 PROCEDURE — 80047 BASIC METABLC PNL IONIZED CA: CPT

## 2018-04-17 PROCEDURE — 96374 THER/PROPH/DIAG INJ IV PUSH: CPT

## 2018-04-17 RX ORDER — METOCLOPRAMIDE HYDROCHLORIDE 5 MG/ML
10 INJECTION INTRAMUSCULAR; INTRAVENOUS ONCE
Status: COMPLETED | OUTPATIENT
Start: 2018-04-17 | End: 2018-04-17

## 2018-04-17 RX ORDER — FLUOXETINE HYDROCHLORIDE 40 MG/1
1 CAPSULE ORAL DAILY
COMMUNITY
Start: 2016-03-02 | End: 2018-08-09 | Stop reason: ALTCHOICE

## 2018-04-17 RX ORDER — GLIMEPIRIDE 4 MG/1
2 TABLET ORAL DAILY
COMMUNITY

## 2018-04-17 RX ORDER — METFORMIN HYDROCHLORIDE 750 MG/1
1 TABLET, EXTENDED RELEASE ORAL 2 TIMES DAILY
COMMUNITY
Start: 2016-05-16 | End: 2018-09-06

## 2018-04-17 RX ORDER — POTASSIUM CHLORIDE 20 MEQ/1
40 TABLET, EXTENDED RELEASE ORAL ONCE
Status: COMPLETED | OUTPATIENT
Start: 2018-04-17 | End: 2018-04-17

## 2018-04-17 RX ORDER — ONDANSETRON 2 MG/ML
4 INJECTION INTRAMUSCULAR; INTRAVENOUS ONCE
Status: DISCONTINUED | OUTPATIENT
Start: 2018-04-17 | End: 2018-04-17

## 2018-04-17 RX ORDER — DIPHENHYDRAMINE HYDROCHLORIDE 50 MG/ML
25 INJECTION INTRAMUSCULAR; INTRAVENOUS ONCE
Status: COMPLETED | OUTPATIENT
Start: 2018-04-17 | End: 2018-04-17

## 2018-04-17 RX ORDER — KETOROLAC TROMETHAMINE 30 MG/ML
30 INJECTION, SOLUTION INTRAMUSCULAR; INTRAVENOUS ONCE
Status: COMPLETED | OUTPATIENT
Start: 2018-04-17 | End: 2018-04-17

## 2018-04-17 RX ORDER — PROMETHAZINE HYDROCHLORIDE 25 MG/1
1 TABLET ORAL 3 TIMES DAILY PRN
COMMUNITY
Start: 2017-08-10 | End: 2018-08-09 | Stop reason: SDUPTHER

## 2018-04-17 RX ORDER — ACETAMINOPHEN 325 MG/1
975 TABLET ORAL ONCE
Status: COMPLETED | OUTPATIENT
Start: 2018-04-17 | End: 2018-04-17

## 2018-04-17 RX ADMIN — ACETAMINOPHEN 975 MG: 325 TABLET, FILM COATED ORAL at 20:34

## 2018-04-17 RX ADMIN — KETOROLAC TROMETHAMINE 30 MG: 30 INJECTION, SOLUTION INTRAMUSCULAR at 18:42

## 2018-04-17 RX ADMIN — DIPHENHYDRAMINE HYDROCHLORIDE 25 MG: 50 INJECTION, SOLUTION INTRAMUSCULAR; INTRAVENOUS at 18:42

## 2018-04-17 RX ADMIN — POTASSIUM CHLORIDE 40 MEQ: 1500 TABLET, EXTENDED RELEASE ORAL at 20:34

## 2018-04-17 RX ADMIN — METOCLOPRAMIDE 10 MG: 5 INJECTION, SOLUTION INTRAMUSCULAR; INTRAVENOUS at 18:41

## 2018-04-17 RX ADMIN — SODIUM CHLORIDE 2000 ML: 0.9 INJECTION, SOLUTION INTRAVENOUS at 18:38

## 2018-04-17 NOTE — ED PROVIDER NOTES
History  Chief Complaint   Patient presents with    Headache     Pt reports headache started last evening and is centralized  Pt reports she took Tylenol 1000mg at 2pm with no relief  Nausea and photophobia present  Hx of migraines     Pt gives hx of right top of head headache starting last PM-continues today  HAs assoc nausea and photophobia  Throbbing in nature  Tried tylenol at 2 PM with no help  Pt denies trauma  HA is similar to her migraines  Pt also relating increased thirst/urination and BS in 300s  Her PCP has been monitoring and increased her Metformin and Toujeo 3 weeks ago - pt to recheck next week  Pt denies CP or SOB  No abd pain  No vomiting or diarrhea  No perf edema  No fever/chills        History provided by:  Patient  Headache - Recurrent or Known Dx Migraines   Pain location:  R parietal  Radiates to:  Does not radiate  Timing:  Constant  Progression:  Unchanged  Chronicity:  Recurrent  Similar to prior headaches: yes    Context: activity and bright light    Ineffective treatments:  Acetaminophen  Associated symptoms: nausea and photophobia    Associated symptoms: no abdominal pain, no back pain, no blurred vision, no congestion, no cough, no diarrhea, no dizziness, no ear pain, no eye pain, no facial pain, no fever, no focal weakness, no hearing loss, no loss of balance, no near-syncope, no neck pain, no neck stiffness, no numbness, no paresthesias, no seizures, no sore throat, no syncope, no vomiting and no weakness        Prior to Admission Medications   Prescriptions Last Dose Informant Patient Reported? Taking?    FLUoxetine (PROzac) 40 MG capsule   Yes Yes   Sig: Take 1 capsule by mouth daily   Insulin Glargine (TOUJEO SOLOSTAR SC)   Yes Yes   Sig: Inject 60 Units under the skin daily at bedtime     glimepiride (AMARYL) 4 mg tablet   Yes Yes   Sig: Take 1 tablet by mouth 2 (two) times a day   hydrochlorothiazide (HYDRODIURIL) 25 mg tablet   Yes Yes   Sig: Take 25 mg by mouth daily metFORMIN (GLUCOPHAGE-XR) 750 mg 24 hr tablet   Yes Yes   Sig: Take 1 tablet by mouth 2 (two) times a day   promethazine (PHENERGAN) 25 mg tablet   Yes Yes   Sig: Take 1 tablet by mouth 3 (three) times a day as needed      Facility-Administered Medications: None       Past Medical History:   Diagnosis Date    Diabetes mellitus ([facility] [address] )     Hypertension     Migraine        Past Surgical History:   Procedure Laterality Date    TUBAL LIGATION         History reviewed  No pertinent family history  I have reviewed and agree with the history as documented  Social History   Substance Use Topics    Smoking status: Never Smoker    Smokeless tobacco: Never Used    Alcohol use No        Review of Systems   Constitutional: Positive for activity change  Negative for chills, diaphoresis, fever and unexpected weight change  HENT: Negative  Negative for congestion, drooling, ear pain, facial swelling, hearing loss, mouth sores, sore throat, trouble swallowing and voice change  Eyes: Positive for photophobia  Negative for blurred vision, pain and discharge  Respiratory: Negative  Negative for cough, choking, chest tightness, shortness of breath, wheezing and stridor  Cardiovascular: Negative for chest pain, palpitations, leg swelling, syncope and near-syncope  Gastrointestinal: Positive for nausea  Negative for abdominal distention, abdominal pain, blood in stool, diarrhea and vomiting  Endocrine: Positive for polydipsia and polyuria  Genitourinary: Negative  Negative for dysuria, flank pain, hematuria and pelvic pain  Musculoskeletal: Negative  Negative for back pain, gait problem, joint swelling, neck pain and neck stiffness  Skin: Negative  Negative for pallor, rash and wound  Neurological: Positive for headaches  Negative for dizziness, tremors, focal weakness, seizures, syncope, facial asymmetry, speech difficulty, weakness, numbness, paresthesias and loss of balance  Psychiatric/Behavioral: Negative  Negative for confusion, hallucinations and self-injury  The patient is not nervous/anxious  All other systems reviewed and are negative  Physical Exam  ED Triage Vitals [04/17/18 1728]   Temperature Pulse Respirations Blood Pressure SpO2   97 6 °F (36 4 °C) (!) 113 18 147/76 99 %      Temp Source Heart Rate Source Patient Position - Orthostatic VS BP Location FiO2 (%)   Temporal Monitor Sitting Left arm --      Pain Score       8           Orthostatic Vital Signs  Vitals:    04/17/18 1728   BP: 147/76   Pulse: (!) 113   Patient Position - Orthostatic VS: Sitting       Physical Exam   Constitutional: She is oriented to person, place, and time  She appears well-developed  She is active and cooperative  Non-toxic appearance  She does not have a sickly appearance  She does not appear ill  No distress  Obese, NAD   HENT:   Head: Normocephalic and atraumatic  Right Ear: Hearing, tympanic membrane and ear canal normal    Left Ear: Hearing, tympanic membrane and ear canal normal    Mouth/Throat: Oropharynx is clear and moist  Mucous membranes are not dry and not cyanotic  No oropharyngeal exudate, posterior oropharyngeal edema or posterior oropharyngeal erythema  Eyes: Conjunctivae and EOM are normal  Pupils are equal, round, and reactive to light  Neck: Normal range of motion and phonation normal  Neck supple  No JVD present  No spinous process tenderness and no muscular tenderness present  Cardiovascular: Regular rhythm, intact distal pulses and normal pulses  No extrasystoles are present  Tachycardia present  No perf edema or calf tenderness   Pulmonary/Chest: Effort normal  No accessory muscle usage or stridor  No tachypnea  No respiratory distress  She has no decreased breath sounds  She has no wheezes  She has no rhonchi  She has no rales  Abdominal: Soft  Bowel sounds are normal  She exhibits no distension  There is no tenderness   There is no rigidity, no guarding and no CVA tenderness  Neurological: She is alert and oriented to person, place, and time  She has normal strength and normal reflexes  She displays no tremor  No cranial nerve deficit or sensory deficit  She displays a negative Romberg sign  She displays no seizure activity  Skin: Skin is warm and dry  Capillary refill takes less than 2 seconds  No petechiae and no rash noted  She is not diaphoretic  No cyanosis  No pallor  Psychiatric: She has a normal mood and affect  Her speech is normal and behavior is normal  Judgment and thought content normal  Cognition and memory are normal    Vitals reviewed        ED Medications  Medications   sodium chloride 0 9 % bolus 2,000 mL (0 mL Intravenous Stopped 4/17/18 2034)   ketorolac (TORADOL) injection 30 mg (30 mg Intravenous Given 4/17/18 1842)   diphenhydrAMINE (BENADRYL) injection 25 mg (25 mg Intravenous Given 4/17/18 1842)   metoclopramide (REGLAN) injection 10 mg (10 mg Intravenous Given 4/17/18 1841)   potassium chloride (K-DUR,KLOR-CON) CR tablet 40 mEq (40 mEq Oral Given 4/17/18 2034)   acetaminophen (TYLENOL) tablet 975 mg (975 mg Oral Given 4/17/18 2034)       Diagnostic Studies  Results Reviewed     Procedure Component Value Units Date/Time    POCT Chem 8+ [[id number]]  (Abnormal) Collected:  04/17/18 2006    Lab Status:  Final result Updated:  04/17/18 2011     SODIUM, I-STAT 133 (L) mmol/l      Potassium, i-STAT 3 3 (L) mmol/L      Chloride, istat 99 (L) mmol/L      CO2, i-STAT 25 mmol/L      Anion Gap, Istat 13 mmol/L      Calcium, Ionized i-STAT 0 89 (L) mmol/L      BUN, I-STAT 15 mg/dl      Creatinine, i-STAT 0 6 mg/dl      eGFR 107 ml/min/1 73sq m      Glucose, i-STAT 177 (H) mg/dl      Hct, i-STAT 35 %      Hgb, i-STAT 11 9 g/dl      Specimen Type VENOUS    UA w Reflex to Microscopic w Reflex to Culture [[id number]]  (Abnormal) Collected:  04/17/18 2003    Lab Status:  Final result Specimen:  Urine from Urine, Clean Catch Updated:  04/17/18 2010     Color, UA Yellow     Clarity, UA Clear     Specific Gravity, UA 1 015     pH, UA 5 5     Leukocytes, UA Negative     Nitrite, UA Negative     Protein, UA Negative mg/dl      Glucose,  (1/2%) (A) mg/dl      Ketones, UA Negative mg/dl      Urobilinogen, UA 0 2 E U /dl      Bilirubin, UA Negative     Blood, UA Negative    Comprehensive metabolic panel [[id number]]  (Abnormal) Collected:  04/17/18 1838    Lab Status:  Final result Specimen:  Blood from Arm, Right Updated:  04/17/18 1910     Sodium 131 (L) mmol/L      Potassium 4 2 mmol/L      Chloride 94 (L) mmol/L      CO2 26 mmol/L      Anion Gap 11 mmol/L      BUN 15 mg/dL      Creatinine 0 79 mg/dL      Glucose 221 (H) mg/dL      Calcium 8 9 mg/dL      AST 52 (H) U/L      ALT 39 U/L      Alkaline Phosphatase 102 U/L      Total Protein 7 2 g/dL      Albumin 3 0 (L) g/dL      Total Bilirubin 0 70 mg/dL      eGFR 88 ml/min/1 73sq m     Narrative:         National Kidney Disease Education Program recommendations are as follows:  GFR calculation is accurate only with a steady state creatinine  Chronic Kidney disease less than 60 ml/min/1 73 sq  meters  Kidney failure less than 15 ml/min/1 73 sq  meters      CBC and differential [[id number]]  (Abnormal) Collected:  04/17/18 1838    Lab Status:  Final result Specimen:  Blood from Arm, Right Updated:  04/17/18 1852     WBC 7 80 Thousand/uL      RBC 5 00 Million/uL      Hemoglobin 11 8 g/dL      Hematocrit 36 8 %      MCV 74 (L) fL      MCH 23 6 (L) pg      MCHC 32 1 g/dL      RDW 15 1 %      MPV 9 5 fL      Platelets 773 Thousands/uL      Neutrophils Relative 68 %      Lymphocytes Relative 22 %      Monocytes Relative 7 %      Eosinophils Relative 2 %      Basophils Relative 1 %      Neutrophils Absolute 5 41 Thousands/µL      Lymphocytes Absolute 1 72 Thousands/µL      Monocytes Absolute 0 51 Thousand/µL      Eosinophils Absolute 0 12 Thousand/µL      Basophils Absolute 0 04 Thousands/µL                  No orders to display              Procedures  Procedures       Phone Contacts  ED Phone Contact    ED Course  ED Course as of Apr 18 1525   Tue Apr 17, 2018   1854 WBC: 7 80   1854 Hemoglobin: 11 8   1854 Hematocrit: 36 8   1854 Headache and nausea improved- waiting labs    1917 Sodium: (!) 131   1917 Glucose: (!) 221   1917 Discussed glucose and Na  Pt to follow closely with [facility] office for recheck  1940 Waiting UA and recheck     2015 Leukocytes, UA: Negative   2015 Nitrite, UA: Negative   2016 POTASSIUM,I-STAT: (!) 3 3   2016 SODIUM, I-STAT: (!) 133   2016 Again discussed follow up   Glucose, i-STAT: (!) 177             NIH Stroke Scale    Flowsheet Row Most Recent Value   Level of Consciousness (1a )  0 Filed at: 04/17/2018 1728   LOC Questions (1b )  0 Filed at: 04/17/2018 1728   LOC Commands (1c )  0 Filed at: 04/17/2018 1728   Best Gaze (2 )  0 Filed at: 04/17/2018 1728   Visual (3 )  0 Filed at: 04/17/2018 1728   Facial Palsy (4 )  0 Filed at: 04/17/2018 1728   Motor Arm, Left (5a )  0 Filed at: 04/17/2018 1728   Motor Arm, Right (5b )  0 Filed at: 04/17/2018 1728   Motor Leg, Left (6a )  0 Filed at: 04/17/2018 1728   Motor Leg, Right (6b )  0 Filed at: 04/17/2018 1728   Limb Ataxia (7 )  0 Filed at: 04/17/2018 1728   Sensory (8 )  0 Filed at: 04/17/2018 1728   Best Language (9 )  0 Filed at: 04/17/2018 1728   Dysarthria (10 )  0 Filed at: 04/17/2018 1728   Extinction and Inattention (11 ) (Formerly Neglect)  0 Filed at: 04/17/2018 1728   Total  0 Filed at: 04/17/2018 1728                        MDM  CritCare Time    Disposition  Final diagnoses:   Migraine headache   Hyperglycemia due to type 2 diabetes mellitus ([facility] [address] )     Time reflects when diagnosis was documented in both MDM as applicable and the Disposition within this note     Time User Action Codes Description Comment    4/17/2018  8:33 PM Leny Marks Add [G43 909] Migraine headache     4/17/2018  8:33 PM Leny Marks Add [E11 65] Hyperglycemia due to type 2 diabetes mellitus [facility])       ED Disposition     ED Disposition Condition Comment    Discharge  Candida Santa discharge to home/self care  Condition at discharge: Stable        Follow-up Information     Follow up With Specialties Details Why Camron Abebe [id number] Physician Assistant In 1 day  [address],  [place] [address] [phone]          Discharge Medication List as of 4/17/2018  8:35 PM      CONTINUE these medications which have NOT CHANGED    Details   FLUoxetine (PROzac) 40 MG capsule Take 1 capsule by mouth daily, Starting Wed 3/2/2016, Historical Med      glimepiride (AMARYL) 4 mg tablet Take 1 tablet by mouth 2 (two) times a day, Historical Med      hydrochlorothiazide (HYDRODIURIL) 25 mg tablet Take 25 mg by mouth daily  , Historical Med      Insulin Glargine (TOUJEO SOLOSTAR SC) Inject 60 Units under the skin daily at bedtime  , Historical Med      metFORMIN (GLUCOPHAGE-XR) 750 mg 24 hr tablet Take 1 tablet by mouth 2 (two) times a day, Starting Mon 5/16/2016, Historical Med      promethazine (PHENERGAN) 25 mg tablet Take 1 tablet by mouth 3 (three) times a day as needed, Starting Thu 8/10/2017, Historical Med           No discharge procedures on file      ED Provider  Electronically Signed by           Marco A Gates DO  04/18/18 3890

## 2018-04-18 NOTE — DISCHARGE INSTRUCTIONS
10% - bad control"> 10% - bad control,Hemoglobin A1c (HbA1c) greater than 10% indicating poor diabetic control,Haemoglobin A1c greater than 10% indicating poor diabetic control">   Diabetes Mellitus Type 2 in Adults, Ambulatory Care   GENERAL INFORMATION:   Diabetes mellitus type 2  is a disease that affects how your body uses glucose (sugar)  Insulin helps move sugar out of the blood so it can be used for energy  Normally, when the blood sugar level increases, the pancreas makes more insulin  Type 2 diabetes develops because either the body cannot make enough insulin, or it cannot use the insulin correctly  After many years, your pancreas may stop making insulin  Common symptoms include the following:   · More hunger or thirst than usual     · Frequent urination     · Weight loss without trying     · Blurred vision  Seek immediate care for the following symptoms:   · Severe abdominal pain, or pain that spreads to your back  You may also be vomiting  · Trouble staying awake or focusing    · Shaking or sweating    · Blurred or double vision    · Breath has a fruity, sweet smell    · Breathing is deep and labored, or rapid and shallow    · Heartbeat is fast and weak  Treatment for diabetes mellitus type 2  includes keeping your blood sugar at a normal level  You must eat the right foods, and exercise regularly  You may also need medicine if you cannot control your blood sugar level with nutrition and exercise  Manage diabetes mellitus type 2:   · Check your blood sugar level  You will be taught how to check a small drop of blood in a glucose monitor  Ask your healthcare provider when and how often to check during the day  Ask your healthcare provider what your blood sugar levels should be when you check them  · Keep track of carbohydrates (sugar and starchy foods)  Your blood sugar level can get too high if you eat too many carbohydrates   Your dietitian will help you plan meals and snacks that have the right amount of carbohydrates  · Eat low-fat foods  Some examples are skinless chicken and low-fat milk  · Eat less sodium (salt)  Some examples of high-sodium foods to limit are soy sauce, potato chips, and soup  Do not add salt to food you cook  Limit your use of table salt  · Eat high-fiber foods  Foods that are a good source of fiber include vegetables, whole grain bread, and beans  · Limit alcohol  Alcohol affects your blood sugar level and can make it harder to manage your diabetes  Women should limit alcohol to 1 drink a day  Men should limit alcohol to 2 drinks a day  A drink of alcohol is 12 ounces of beer, 5 ounces of wine, or 1½ ounces of liquor  · Get regular exercise  Exercise can help keep your blood sugar level steady, decrease your risk of heart disease, and help you lose weight  Exercise for at least 30 minutes, 5 days a week  Include muscle strengthening activities 2 days each week  Work with your healthcare provider to create an exercise plan  · Check your feet each day  for injuries or open sores  Ask your healthcare provider for activities you can do if you have an open sore  · Quit smoking  If you smoke, it is never too late to quit  Smoking can worsen the problems that may occur with diabetes  Ask your healthcare provider for information about how to stop smoking if you are having trouble quitting  · Ask about your weight:  Ask healthcare providers if you need to lose weight, and how much to lose  Ask them to help you with a weight loss program  Even a 10 to 15 pound weight loss can help you manage your blood sugar level  · Carry medical alert identification  Wear medical alert jewelry or carry a card that says you have diabetes  Ask your healthcare provider where to get these items  · Ask about vaccines  Diabetes puts you at risk of serious illness if you get the flu, pneumonia, or hepatitis   Ask your healthcare provider if you should get a flu, pneumonia, or hepatitis B vaccine, and when to get the vaccine  Follow up with your healthcare provider as directed:  Write down your questions so you remember to ask them during your visits  CARE AGREEMENT:   You have the right to help plan your care  Learn about your health condition and how it may be treated  Discuss treatment options with your caregivers to decide what care you want to receive  You always have the right to refuse treatment  The above information is an  only  It is not intended as medical advice for individual conditions or treatments  Talk to your doctor, nurse or pharmacist before following any medical regimen to see if it is safe and effective for you  © 2014 [address] is for End User's use only and may not be sold, redistributed or otherwise used for commercial purposes  All illustrations and images included in CareNotes® are the copyrighted property of [employer]  or [employer]  Migraine Headache   WHAT YOU SHOULD KNOW:   A migraine is a severe headache  The pain can be so severe that it interferes with your daily activities  A migraine can last a few hours up to several days  The exact cause of migraines is not known  It may be caused by changes in your body chemicals and extra sensitive nerves in your brain  AFTER YOU LEAVE:   Medicines:  Take medicine as soon as you feel a migraine begin  · Pain medicine: You may need medicine to take away or decrease pain  You may need a doctor's order for this medicine  Do not wait until the pain is severe before you take your medicine  · Migraine medicines: These are used to help prevent a migraine or stop it once it starts  · Antinausea medicine: This medicine may be given to calm your stomach and to help prevent vomiting  They can also help relieve pain  · Take your medicine as directed    Call your healthcare provider if you think your medicine is not helping or if you have side effects  Tell him if you are allergic to any medicine  Keep a list of the medicines, vitamins, and herbs you take  Include the amounts, and when and why you take them  Bring the list or the pill bottles to follow-up visits  Carry your medicine list with you in case of an emergency  Manage your symptoms:   · Rest:  Rest in a dark, quiet room  This will help decrease your pain  · Ice:  Ice helps decrease pain  Use an ice pack or put crushed ice in a plastic bag  Cover the ice pack with a towel and place it on your head where it hurts for 15 to 20 minutes every hour  · Heat:  Heat helps decrease pain and muscle spasms  Use a small towel dampened with warm water or a heating pad, or sit in a warm bath  Apply heat on the area for 20 to 30 minutes every 2 hours  You may alternate heat and ice  Keep a headache diary:  Write down when your migraines start and stop  Include your symptoms and what you were doing when a migraine began  Record what you ate or drank for 24 hours before the migraine started  Describe the pain and where it hurts  Keep track of what you did to treat your migraine and whether it worked  Follow up with your primary healthcare provider or neurologist as directed:  Bring your headache diary with you when you see your primary healthcare provider  Write down your questions so you remember to ask them during your visits  Prevent another migraine:   · Do not smoke: If you smoke, it is never too late to quit  Tobacco smoke can trigger a migraine  It can also cause heart disease, lung disease, cancer, and other health problems  Quitting smoking will improve your health and the health of those around you  If you smoke, ask for information about how to stop  · Do not drink alcohol:  Alcohol can trigger a migraine  It can also interfere with the medicines used to treat your migraine  · Get regular exercise:  Exercise may help prevent migraines   Talk to your primary healthcare provider about the best exercise plan for you  · Manage stress:  Stress may trigger a migraine  Learn new ways to relax, such as deep breathing  · Stick to a sleep schedule:  Go to bed and get up at the same time each day  · Eat regular meals:  Include healthy foods such as include fruit, vegetables, whole-grain breads, low-fat dairy products, beans, lean meat, and fish  Avoid trigger foods like chocolate, hard cheese, and red wine  Foods that contain gluten, nitrates, MSG, or artificial sweeteners may also trigger migraines  Caffeine, which is often used to treat migraines, can also trigger them  Contact your primary healthcare provider or neurologist if:   · You have a fever  · Your migraines interfere with your daily activities  · Your medicines or treatments stop working  · You have questions about your condition or care  Seek care immediately or call 911 if:   · You have a headache that seems different or much worse than your usual migraine headache  · You have a severe headache with a fever or a stiff neck  · You have new problems with speech, vision, balance, or movement  · You feel like you are going to faint, you become confused, or you have a seizure  © 2014 [address] is for End User's use only and may not be sold, redistributed or otherwise used for commercial purposes  All illustrations and images included in CareNotes® are the copyrighted property of [employer]  or [employer]  The above information is an  only  It is not intended as medical advice for individual conditions or treatments  Talk to your doctor, nurse or pharmacist before following any medical regimen to see if it is safe and effective for you  Diabetic Hyperglycemia, Ambulatory Care   GENERAL INFORMATION:   Diabetic hyperglycemia  is a blood glucose (sugar) level that is higher than your healthcare provider recommends   You may not have any signs and symptoms  You may have increased thirst and urinate more often than usual   Seek immediate care for the following symptoms:   · Shortness of breath    · Breath that smells fruity    · Nausea and vomiting    · Symptoms of dehydration, such as dark yellow urine, dry mouth and lips, and dry skin  Manage diabetic hyperglycemia:   · If you take diabetes medicine or insulin, take it as directed  Missed or wrong doses can cause your blood sugar to go up  · Tell your healthcare provider if you continue to have trouble managing your blood sugar  He may change the type, amount, or timing of your diabetes medicine or insulin  If you do not take diabetes medicine or insulin, you may need to start  · Work with your healthcare provider to develop a sick day plan  Illness can cause your blood sugar to rise  A sick day plan helps you control your blood sugar level when you are sick  Prevent diabetic hyperglycemia:   · Check your blood sugar levels regularly  Ask your healthcare provider how often to check your blood sugar and what your levels should be  · Follow your meal plan  Your blood sugar can go up if you eat a large meal or you eat more carbohydrates than recommended  Work with a dietitian to develop a meal plan that is right for you  · Exercise  can help lower your blood sugar when it is high  It can also keep your blood sugar levels steady over time  Exercise for at least 30 minutes, 5 days a week  Include muscle strengthening activities 2 days each week  Work with your healthcare provider to create an exercise plan  Children should get at least 60 minutes of physical activity each day  · Check your ketones before exercise  if your blood sugar level is above 240 mg/dl  Do not exercise if you have ketones in your urine, because your blood sugar level may rise even more  Ask your healthcare provider how to lower your blood sugar when you have ketones    Follow up with your healthcare provider as directed:  Write down your questions so you remember to ask them during your visits  CARE AGREEMENT:   You have the right to help plan your care  Learn about your health condition and how it may be treated  Discuss treatment options with your caregivers to decide what care you want to receive  You always have the right to refuse treatment  The above information is an  only  It is not intended as medical advice for individual conditions or treatments  Talk to your doctor, nurse or pharmacist before following any medical regimen to see if it is safe and effective for you  © 2014 [address] is for End User's use only and may not be sold, redistributed or otherwise used for commercial purposes  All illustrations and images included in CareNotes® are the copyrighted property of [employer]  or [employer]

## 2018-04-18 NOTE — ED NOTES
Pt laying in bed comfortably  No complaints at this time   Call bell in raquel Mayorga, MERLY  04/17/18 2041

## 2018-05-08 ENCOUNTER — OFFICE VISIT (OUTPATIENT)
Dept: FAMILY MEDICINE CLINIC | Facility: CLINIC | Age: 53
End: 2018-05-08
Payer: COMMERCIAL

## 2018-05-08 VITALS
TEMPERATURE: 97.9 F | WEIGHT: 216 LBS | HEART RATE: 113 BPM | BODY MASS INDEX: 35.99 KG/M2 | HEIGHT: 65 IN | OXYGEN SATURATION: 98 % | DIASTOLIC BLOOD PRESSURE: 90 MMHG | SYSTOLIC BLOOD PRESSURE: 150 MMHG

## 2018-05-08 DIAGNOSIS — Z12.11 SCREENING FOR COLON CANCER: ICD-10-CM

## 2018-05-08 DIAGNOSIS — R13.10 DYSPHAGIA, UNSPECIFIED TYPE: ICD-10-CM

## 2018-05-08 DIAGNOSIS — M54.16 ACUTE LEFT LUMBAR RADICULOPATHY: Primary | ICD-10-CM

## 2018-05-08 PROBLEM — F41.9 ANXIETY: Status: ACTIVE | Noted: 2017-08-10

## 2018-05-08 PROBLEM — E61.1 IRON DEFICIENCY: Status: ACTIVE | Noted: 2017-02-01

## 2018-05-08 PROCEDURE — 99213 OFFICE O/P EST LOW 20 MIN: CPT | Performed by: FAMILY MEDICINE

## 2018-05-08 RX ORDER — DOXEPIN HYDROCHLORIDE 50 MG/G
CREAM TOPICAL
Refills: 2 | COMMUNITY
Start: 2018-03-22 | End: 2018-05-08 | Stop reason: ALTCHOICE

## 2018-05-08 RX ORDER — NAPROXEN 500 MG/1
500 TABLET ORAL 2 TIMES DAILY WITH MEALS
Qty: 60 TABLET | Refills: 0 | Status: SHIPPED | OUTPATIENT
Start: 2018-05-08 | End: 2018-05-08 | Stop reason: SDUPTHER

## 2018-05-08 RX ORDER — FLUOCINONIDE 1 MG/G
CREAM TOPICAL
Refills: 2 | COMMUNITY
Start: 2018-03-22 | End: 2018-05-08 | Stop reason: ALTCHOICE

## 2018-05-08 RX ORDER — GABAPENTIN 100 MG/1
100 CAPSULE ORAL 2 TIMES DAILY
Qty: 60 CAPSULE | Refills: 0 | Status: SHIPPED | OUTPATIENT
Start: 2018-05-08 | End: 2018-06-07 | Stop reason: SDUPTHER

## 2018-05-08 RX ORDER — NAPROXEN 500 MG/1
500 TABLET ORAL 2 TIMES DAILY WITH MEALS
Qty: 60 TABLET | Refills: 0 | Status: ON HOLD | OUTPATIENT
Start: 2018-05-08 | End: 2018-10-23 | Stop reason: ALTCHOICE

## 2018-05-08 NOTE — PROGRESS NOTES
Assessment/Plan:     Diagnoses and all orders for this visit:    Acute left lumbar radiculopathy  -     MRI lumbar spine wo contrast; Future  -     Discontinue: naproxen (NAPROSYN) 500 mg tablet; Take 1 tablet (500 mg total) by mouth 2 (two) times a day with meals  -     naproxen (NAPROSYN) 500 mg tablet; Take 1 tablet (500 mg total) by mouth 2 (two) times a day with meals  -     gabapentin (NEURONTIN) 100 mg capsule; Take 1 capsule (100 mg total) by mouth 2 (two) times a day    Dysphagia, unspecified type  -     Ambulatory referral to Gastroenterology; Future    Screening for colon cancer  -     Ambulatory referral to Gastroenterology; Future    Other orders  -     Discontinue: Diclofenac Sodium 3 % GEL; APPLY 2-3 GRAMS TO AFFECTED AREA(S) 3-4 TIMES DAILY (1 GRAM = 1 DIME SIZE AMOUNT)  -     Discontinue: Doxepin HCl 5 % CREA; APPLY A THIN FILM (1-2 GRAMS) TO PAINFUL AREA THREE TIMES DAILY AS DIRECTED BY PHYSICIAN  WAIT AT LEAST 3-4 HOURS BETWEEN APPLICATIONS  (1 G  -     Discontinue: Fluocinonide 0 1 % CREA; APPLY A THIN LAYER (2-3 GRAMS) TO AFFECTED AREA(S) 3-4 TIMES DAILY AS DIRECTED  (1 GRAM = 1 DIME SIZE AMOUNT)       Discussion/Plan:    Acute lumbar radiculopathy - MRI lumbar spine ordered to r/o disc herniation/abnormality due to abnormal neuro exam  Naproxen and gabapentin sent to pharmacy  Advised to take NSAIDs with food, side effects reviewed  Continue heat/ice therapy of low back  Will consider PT/ specialist refer after MRI results available  Seek ED if symptoms acutely worsen  Dysphagia/Screening for colon cancer - referral to GI given  F/U in 1 month for evaluation or sooner if needed  Subjective:      Patient ID: Radha Ramirez is a 50 y o  female  Chief Complaint   Patient presents with    Numbness     left leg numbness from hip to toes, numb all the time, started about 1 week ago       Patient presents to the office today for acute visit   She reports that her left leg from hip to toes "feels numb," started 1 week ago  She does report weakness, feeling like she is going to fall/off balance  She states when she lays down at night to go to sleep, gets a "thumping pain" in the entire leg pain  She describes the numbness as pins and needles  Patient denies any falls, injuries, trauma  She reports mild left low back pain that occurs intermittently  Patient states symptoms are worse with prolonged sitting  Patient tried ice/heat of leg without relief  Patient denies neck pain/stiffness, head injury, dizziness, syncope, loss of bowel/bladder function  No ambulatory dysfunction/limp  She reports dysphagia x 1 month, states she chokes on foods easily  She has not had a colonoscopy  The following portions of the patient's history were reviewed and updated as appropriate: allergies, current medications, past family history, past medical history, past social history, past surgical history and problem list     Review of Systems   Constitutional: Negative for activity change, appetite change, chills, diaphoresis, fatigue, fever and unexpected weight change  Respiratory: Negative  Cardiovascular: Negative  Gastrointestinal: Negative  Genitourinary: Negative  Musculoskeletal: Positive for back pain  Negative for arthralgias, gait problem, myalgias, neck pain and neck stiffness  Neurological: Positive for weakness and numbness  Negative for dizziness, tremors, seizures, syncope, facial asymmetry, speech difficulty, light-headedness and headaches  Objective:      /90 (BP Location: Left arm, Patient Position: Sitting, Cuff Size: Standard)   Pulse (!) 113   Temp 97 9 °F (36 6 °C) (Tympanic)   Ht 5' 5" (1 651 m)   Wt 98 kg (216 lb)   LMP 04/10/2018   SpO2 98%   BMI 35 94 kg/m²          Physical Exam   Constitutional: She is oriented to person, place, and time  She appears well-developed and well-nourished  HENT:   Head: Normocephalic and atraumatic     Right Ear: External ear normal    Left Ear: External ear normal    Nose: Nose normal    Mouth/Throat: Oropharynx is clear and moist    Eyes: Conjunctivae and EOM are normal  Pupils are equal, round, and reactive to light  Neck: Normal range of motion  Neck supple  No thyromegaly present  Cardiovascular: Normal rate, regular rhythm, normal heart sounds and intact distal pulses  Pulmonary/Chest: Effort normal and breath sounds normal    Abdominal: Bowel sounds are normal  She exhibits no distension and no mass  There is no tenderness  There is no rebound and no guarding  Musculoskeletal:        Lumbar back: She exhibits tenderness  She exhibits normal range of motion and no bony tenderness  Back:    SLR (+) on left  Betsey (+) on left   Lymphadenopathy:     She has no cervical adenopathy  Neurological: She is alert and oriented to person, place, and time  She has normal strength  No cranial nerve deficit or sensory deficit  She displays a negative Romberg sign  Coordination and gait normal    Reflex Scores:       Patellar reflexes are 2+ on the right side and 3+ on the left side

## 2018-05-14 ENCOUNTER — HOSPITAL ENCOUNTER (OUTPATIENT)
Dept: MRI IMAGING | Facility: HOSPITAL | Age: 53
Discharge: HOME | End: 2018-05-14
Payer: COMMERCIAL

## 2018-05-14 DIAGNOSIS — M54.16 ACUTE LEFT LUMBAR RADICULOPATHY: ICD-10-CM

## 2018-05-14 PROCEDURE — 72148 MRI LUMBAR SPINE W/O DYE: CPT

## 2018-06-07 ENCOUNTER — OFFICE VISIT (OUTPATIENT)
Dept: FAMILY MEDICINE CLINIC | Facility: CLINIC | Age: 53
End: 2018-06-07
Payer: COMMERCIAL

## 2018-06-07 VITALS
TEMPERATURE: 98.3 F | SYSTOLIC BLOOD PRESSURE: 130 MMHG | DIASTOLIC BLOOD PRESSURE: 82 MMHG | OXYGEN SATURATION: 97 % | RESPIRATION RATE: 14 BRPM | WEIGHT: 213 LBS | HEIGHT: 65 IN | BODY MASS INDEX: 35.49 KG/M2 | HEART RATE: 108 BPM

## 2018-06-07 DIAGNOSIS — K21.9 GASTROESOPHAGEAL REFLUX DISEASE, ESOPHAGITIS PRESENCE NOT SPECIFIED: ICD-10-CM

## 2018-06-07 DIAGNOSIS — M54.16 ACUTE LEFT LUMBAR RADICULOPATHY: ICD-10-CM

## 2018-06-07 DIAGNOSIS — M51.16 HERNIATION OF LUMBAR INTERVERTEBRAL DISC WITH RADICULOPATHY: Primary | ICD-10-CM

## 2018-06-07 PROCEDURE — 99213 OFFICE O/P EST LOW 20 MIN: CPT | Performed by: FAMILY MEDICINE

## 2018-06-07 RX ORDER — PANTOPRAZOLE SODIUM 20 MG/1
20 TABLET, DELAYED RELEASE ORAL DAILY
Qty: 30 TABLET | Refills: 0 | Status: SHIPPED | OUTPATIENT
Start: 2018-06-07 | End: 2018-11-06 | Stop reason: SDUPTHER

## 2018-06-07 RX ORDER — GABAPENTIN 300 MG/1
300 CAPSULE ORAL 2 TIMES DAILY
Qty: 60 CAPSULE | Refills: 0 | Status: SHIPPED | OUTPATIENT
Start: 2018-06-07 | End: 2019-01-31

## 2018-06-07 NOTE — PROGRESS NOTES
Assessment/Plan:     Diagnoses and all orders for this visit:    Herniation of lumbar intervertebral disc with radiculopathy  -     Ambulatory referral to Orthopedic Surgery; Future    Gastroesophageal reflux disease, esophagitis presence not specified  -     pantoprazole (PROTONIX) 20 mg tablet; Take 1 tablet (20 mg total) by mouth daily    Acute left lumbar radiculopathy  -     gabapentin (NEURONTIN) 300 mg capsule; Take 1 capsule (300 mg total) by mouth 2 (two) times a day      Discussion/Plan:  Lumbar disc herniation with radiculopathy - will increase gabapentin to 300mg BID to help with pain  Referral to ortho for evaluation given  GERD/dysphagia - protonix sent to pharmacy to help with symptoms until she sees GI  F/U with GI as scheduled in July  Diabetes - pt has scheduled endocrinology appointment for diabetic management  F/U in 2 months for evaluation or sooner if needed  Subjective:      Patient ID: Jed Villalta is a 50 y o  female  Chief Complaint   Patient presents with    Follow-up     follow up from MRI of lumbar spine    Back Pain     gabapentin not helping       Patient presents to the office today for f/u from MRI lumbar spine and back pain  She states symptoms are unchanged from last visit  Patient states naproxen and gabapentin not helping  She has an appointment with GI in July for dysphagia  She states everything gets stuck  She states it took her 5 minutes to swallow a bite of a tootsie roll  Patient reports heartburn daily  She reports nausea/vomiting at times  She reports abdominal pain  She states she has had stomach issues all her life           The following portions of the patient's history were reviewed and updated as appropriate: allergies, current medications, past family history, past medical history, past social history, past surgical history and problem list     Patient Active Problem List   Diagnosis    Acid reflux    Allergic cough    Anxiety    Depression  Diabetes mellitus ([facility] [address] )    Hydrocephalus    Hypertension    Iron deficiency    Pain, foot, left, chronic    Pica in adults    Herniation of lumbar intervertebral disc with radiculopathy     Current Outpatient Prescriptions on File Prior to Visit   Medication Sig Dispense Refill    FLUoxetine (PROzac) 40 MG capsule Take 1 capsule by mouth daily      glimepiride (AMARYL) 4 mg tablet Take 1 tablet by mouth 2 (two) times a day      hydrochlorothiazide (HYDRODIURIL) 25 mg tablet Take 25 mg by mouth daily        Insulin Glargine (TOUJEO SOLOSTAR SC) Inject 60 Units under the skin daily at bedtime        metFORMIN (GLUCOPHAGE-XR) 750 mg 24 hr tablet Take 1 tablet by mouth 2 (two) times a day      naproxen (NAPROSYN) 500 mg tablet Take 1 tablet (500 mg total) by mouth 2 (two) times a day with meals 60 tablet 0    promethazine (PHENERGAN) 25 mg tablet Take 1 tablet by mouth 3 (three) times a day as needed      [DISCONTINUED] gabapentin (NEURONTIN) 100 mg capsule Take 1 capsule (100 mg total) by mouth 2 (two) times a day 60 capsule 0     No current facility-administered medications on file prior to visit  Review of Systems   HENT: Positive for trouble swallowing  Negative for sore throat  Respiratory: Negative  Cardiovascular: Negative  Gastrointestinal: Positive for abdominal pain, nausea and vomiting  Negative for blood in stool and diarrhea  Musculoskeletal: Positive for arthralgias, back pain and myalgias  Neurological: Positive for weakness and numbness  Objective:      /82 (BP Location: Left arm, Patient Position: Sitting, Cuff Size: Standard)   Pulse (!) 108   Temp 98 3 °F (36 8 °C) (Tympanic)   Resp 14   Ht 5' 5" (1 651 m)   Wt 96 6 kg (213 lb)   SpO2 97%   BMI 35 45 kg/m²          Physical Exam   Constitutional: She is oriented to person, place, and time  She appears well-developed and well-nourished  HENT:   Head: Normocephalic and atraumatic     Nose: Nose normal    Mouth/Throat: Oropharynx is clear and moist    Eyes: Pupils are equal, round, and reactive to light  Neck: Neck supple  Cardiovascular: Normal rate and regular rhythm  Pulmonary/Chest: Effort normal and breath sounds normal    Abdominal: Soft  Bowel sounds are normal  There is no tenderness  Musculoskeletal:        Lumbar back: She exhibits tenderness  She exhibits normal range of motion  Neurological: She is alert and oriented to person, place, and time  She has normal strength and normal reflexes  No sensory deficit  Skin: Skin is warm and dry  Psychiatric: She has a normal mood and affect   Her behavior is normal

## 2018-06-08 ENCOUNTER — OFFICE VISIT (OUTPATIENT)
Dept: OBGYN CLINIC | Facility: CLINIC | Age: 53
End: 2018-06-08
Payer: COMMERCIAL

## 2018-06-08 VITALS
SYSTOLIC BLOOD PRESSURE: 118 MMHG | BODY MASS INDEX: 35.32 KG/M2 | WEIGHT: 212 LBS | HEIGHT: 65 IN | DIASTOLIC BLOOD PRESSURE: 80 MMHG | HEART RATE: 81 BPM

## 2018-06-08 DIAGNOSIS — M48.061 NEUROFORAMINAL STENOSIS OF LUMBAR SPINE: Primary | ICD-10-CM

## 2018-06-08 DIAGNOSIS — G57.02 PIRIFORMIS SYNDROME OF LEFT SIDE: ICD-10-CM

## 2018-06-08 DIAGNOSIS — M53.3 CHRONIC LEFT SI JOINT PAIN: ICD-10-CM

## 2018-06-08 DIAGNOSIS — G89.29 CHRONIC LEFT SI JOINT PAIN: ICD-10-CM

## 2018-06-08 PROCEDURE — 99214 OFFICE O/P EST MOD 30 MIN: CPT | Performed by: FAMILY MEDICINE

## 2018-06-08 NOTE — PROGRESS NOTES
Assessment/Plan:  Assessment/Plan   Diagnoses and all orders for this visit:    Neuroforaminal stenosis of lumbar spine  -     Ambulatory referral to Physical Therapy; Future  -     Nerve Stimulator (TENS THERAPY PAIN RELIEF) BILLY; 1 Device by Does not apply route 2 (two) times a day as needed (pain)    Chronic left SI joint pain  -     Ambulatory referral to Physical Therapy; Future  -     Nerve Stimulator (TENS THERAPY PAIN RELIEF) BILLY; 1 Device by Does not apply route 2 (two) times a day as needed (pain)    Piriformis syndrome of left side  -     Ambulatory referral to Physical Therapy; Future  -     Nerve Stimulator (TENS THERAPY PAIN RELIEF) BILLY; 1 Device by Does not apply route 2 (two) times a day as needed (pain)         65-year-old female with low back pain  Discussed with patient physical exam, MRI findings, impression, and plan  MRI of the lumbar spine is noted for L4-L5 mild facet hypertrophy, tiny right foraminal protrusion disc herniation contacting the right L4 nerve root without nerve root displacement  Her physical exam is noted for left-sided SI and piriformis tenderness with decreased sensation on the left lower extremity in dermatomes of L3-L4, with normal strength and reflexes bilaterally  All I will refer her to formal physical therapy and pain management  I will also provide a prescription for TENS unit which she is to use twice daily as directed, and take with her to physical therapy for further instruction on proper use  Subjective:   Patient ID: Pa Russ is a 50 y o  female  Chief Complaint   Patient presents with    Spine - Pain       65-year-old female presents for evaluation of low back pain  Of more than 1 year duration  Pain is described as localized to the left low back, constant and fluctuating intensity, achy and sometimes sharp, and associated radiation of numbness/tingling into left lower extremity    She states that in the past few months numbness/ tingling has progressed and become more persistent  She denies any weakness or any bowel or bladder incontinence  She has been placed on gabapentin by primary care provider  She has not had any prior treatment for low back  She was sent for MRI lumbar spine  Back Pain   This is a chronic problem  The current episode started more than 1 year ago  The problem occurs constantly  The problem has been gradually worsening  Associated symptoms include myalgias and numbness  Pertinent negatives include no abdominal pain, chest pain, chills, fever, rash, sore throat or weakness  The symptoms are aggravated by standing and walking  The following portions of the patient's history were reviewed and updated as appropriate: She  has a past medical history of Diabetes mellitus ([facility] [address] ); Hypertension; and Migraine  She  has a past surgical history that includes Tubal ligation  Her family history is not on file  She  reports that she has never smoked  She has never used smokeless tobacco  She reports that she does not drink alcohol or use drugs  She is allergic to chocolate; nuts; shellfish allergy; and tomato       Review of Systems   Constitutional: Negative for chills and fever  HENT: Negative for sore throat  Eyes: Negative for visual disturbance  Respiratory: Negative for shortness of breath  Cardiovascular: Negative for chest pain  Gastrointestinal: Negative for abdominal pain  Genitourinary: Negative for difficulty urinating  Musculoskeletal: Positive for back pain and myalgias  Skin: Negative for rash and wound  Neurological: Positive for numbness  Negative for weakness  Hematological: Does not bruise/bleed easily  Psychiatric/Behavioral: Negative for self-injury  Objective:  Vitals:    06/08/18 0953   BP: 118/80   Pulse: 81   Weight: 96 2 kg (212 lb)   Height: 5' 5" (1 651 m)     Right Hip Exam     Range of Motion   The patient has normal right hip ROM      Tests   KRISHNA: negative    Comments:  Negative FADDIR      Left Hip Exam     Tenderness   Left hip tenderness location: Piriformis  Range of Motion   The patient has normal left hip ROM  Tests   KRISHNA: negative    Comments:  Negative FADDIR      Back Exam     Tenderness   The patient is experiencing tenderness in the sacroiliac  Range of Motion   The patient has normal back ROM  Muscle Strength   The patient has normal back strength  Tests   Straight leg raise right: negative  Straight leg raise left: negative    Reflexes   Patellar: 2/4    Other   Sensation: decreased  Gait: normal     Comments:    Decreased sensation left lower extremity L3-L4 dermatomes            Physical Exam   Constitutional: She is oriented to person, place, and time  She appears well-developed  No distress  HENT:   Head: Normocephalic  Eyes: Conjunctivae are normal    Neck: No tracheal deviation present  Cardiovascular: Normal rate  Pulmonary/Chest: Effort normal  No respiratory distress  Abdominal: She exhibits no distension  Neurological: She is alert and oriented to person, place, and time  Skin: Skin is warm and dry  Psychiatric: She has a normal mood and affect  Her behavior is normal    Nursing note and vitals reviewed  I have personally reviewed pertinent films in PACS

## 2018-06-11 ENCOUNTER — EVALUATION (OUTPATIENT)
Dept: PHYSICAL THERAPY | Facility: CLINIC | Age: 53
End: 2018-06-11
Payer: COMMERCIAL

## 2018-06-11 DIAGNOSIS — G57.02 PIRIFORMIS SYNDROME OF LEFT SIDE: ICD-10-CM

## 2018-06-11 DIAGNOSIS — M53.3 CHRONIC LEFT SI JOINT PAIN: ICD-10-CM

## 2018-06-11 DIAGNOSIS — M48.061 NEUROFORAMINAL STENOSIS OF LUMBAR SPINE: Primary | ICD-10-CM

## 2018-06-11 DIAGNOSIS — G89.29 CHRONIC LEFT SI JOINT PAIN: ICD-10-CM

## 2018-06-11 PROCEDURE — 97535 SELF CARE MNGMENT TRAINING: CPT | Performed by: PHYSICAL THERAPIST

## 2018-06-11 PROCEDURE — G8990 OTHER PT/OT CURRENT STATUS: HCPCS | Performed by: PHYSICAL THERAPIST

## 2018-06-11 PROCEDURE — 97014 ELECTRIC STIMULATION THERAPY: CPT | Performed by: PHYSICAL THERAPIST

## 2018-06-11 PROCEDURE — G8991 OTHER PT/OT GOAL STATUS: HCPCS | Performed by: PHYSICAL THERAPIST

## 2018-06-11 PROCEDURE — 97162 PT EVAL MOD COMPLEX 30 MIN: CPT | Performed by: PHYSICAL THERAPIST

## 2018-06-11 PROCEDURE — G0283 ELEC STIM OTHER THAN WOUND: HCPCS | Performed by: PHYSICAL THERAPIST

## 2018-06-11 NOTE — PROGRESS NOTES
PT Evaluation     Today's date: 2018  Patient name: Jed Villalta  : 1965  MRN: [id number]  Referring provider: Rupa Benavides DO  Dx:   Encounter Diagnosis     ICD-10-CM    1  Neuroforaminal stenosis of lumbar spine M99 83 Ambulatory referral to Physical Therapy   2  Chronic left SI joint pain M53 3 Ambulatory referral to Physical Therapy    G89 29    3  Piriformis syndrome of left side G57 02 Ambulatory referral to Physical Therapy                  Assessment  Impairments: abnormal or restricted ROM, activity intolerance, impaired balance, impaired physical strength, lacks appropriate home exercise program and pain with function    Assessment details: Patient demonstrates decreased strength, rom, flexibility, balance/gait and increased pain t/o LB and left LE limiting functional ability  Patient would benefit from PT intervention to address limitations in order to maximize functional independence  Understanding of Dx/Px/POC: good   Prognosis: good    Goals  ST  Initiate HEP  2  Patient to improve lumbar flexion rom by 20 degrees in 4 weeks  3  Patient to report decreased pain by 50% in 4 weeks    LT  Patient to increase lumbar rom to [facility] in 6 weeks  2  Patient to increase LE strength to 5/5 in 6 weeks  3  Patient to report decreased pain by 75% at worst in 6 weeks     4  Patient to be discharged to [facility] in 6 weeks    Plan  Patient would benefit from: skilled physical therapy and PT eval  Planned modality interventions: cryotherapy, electrical stimulation/Russian stimulation, thermotherapy: hydrocollator packs and ultrasound  Planned therapy interventions: abdominal trunk stabilization, joint mobilization, manual therapy, massage, balance, balance/weight bearing training, neuromuscular re-education, patient education, postural training, strengthening, stretching, therapeutic activities, therapeutic exercise, therapeutic training, functional ROM exercises, flexibility and home exercise program  Frequency: 2x week  Duration in visits: 8  Duration in weeks: 4  Treatment plan discussed with: patient        Subjective Evaluation    History of Present Illness  Date of onset: 2018  Mechanism of injury: Patient presents to PT with c/o pain in LB and left LE along with numbness in left LE  Patient reports her symptoms began about 2 months ago without known injury  Patient states if she is active and moving a lot, at night she gets "thumping" in her leg  Patient reports the numbness is intermittent but reports having the numbness 75% of the time compared to 25% not having it  She states bending causes increased pain  Patient had an MRI done which showed a disc bulge and pinched nerve on her right  She states the doctor thinks it is coming from her hip  Patient is now referred to oppt  Pain  Current pain ratin  At best pain ratin  At worst pain rating: 10  Quality: sharp and throbbing  Relieving factors: medications, ice and heat  Aggravating factors: lifting, sitting, stair climbing, standing and walking      Diagnostic Tests  MRI studies: abnormal  Patient Goals  Patient goals for therapy: decreased pain          Objective     Palpation     Additional Palpation Details  TTP noted B PSIS and left piriformis   No tenderness or muscle spasm noted in lumbar paraspinals or musculature    Neurological Testing     Sensation     Lumbar   Left   Intact: light touch    Right   Intact: light touch    Active Range of Motion     Lumbar   Flexion: 50 degrees   Extension: 25 degrees   Left lateral flexion: 30 degrees   Right lateral flexion: 30 degrees     Strength/Myotome Testing     Left Hip   Planes of Motion   Flexion: 4  Abduction: 4+  Adduction: 4+    Right Hip   Planes of Motion   Flexion: 4  Abduction: 4+  Adduction: 4+    Left Knee   Flexion: 4+  Extension: 4    Right Knee   Flexion: 4+  Extension: 4+    Left Ankle/Foot   Dorsiflexion: 4+    Right Ankle/Foot   Dorsiflexion: 4+    Tests     Lumbar Positive repeated flexion  Negative repeated extension  Left   Positive passive SLR  Right   Negative passive SLR  Left Pelvic Girdle/Sacrum   Negative: sacrum compression and gapping  Right Pelvic Girdle/Sacrum   Negative: sacrum compression and gapping       Additional Tests Details  (+) Piriformis test          Precautions: None    Daily Treatment Diary     Manual  6/11            B LE stretch                                                                     Exercise Diary  6/11            Nustep             TR/HR             Standing Trunk Extension             MTP/LTP             LTR             PPT             SLR             S/L SLr             Bridges                                                                                                                                                                Modalities  6/11            IFC 15 min

## 2018-06-11 NOTE — LETTER
2018    Timi Taptu, DO   [facility]  [address]    Patient: Mindy Cardona   YOB: 1965   Date of Visit: 2018     Encounter Diagnosis     ICD-10-CM    1  Neuroforaminal stenosis of lumbar spine M99 83 Ambulatory referral to Physical Therapy   2  Chronic left SI joint pain M53 3 Ambulatory referral to Physical Therapy    G89 29    3  Piriformis syndrome of left side G57 02 Ambulatory referral to Physical Therapy       Dear Dr Epperson Middleton:    Please review the attached Plan of Care from [facility] recent visit  Please verify that you agree therapy should continue by signing the attached document and sending it back to our office  If you have any questions or concerns, please don't hesitate to call  Sincerely,    Kim Connelly, PT      Referring Provider:      I certify that I have read the below Plan of Care and certify the need for these services furnished under this plan of treatment while under my care  [place] Taptu DO   [address]: [phone]          PT Evaluation     Today's date: 2018  Patient name: Mindy Cardona  : 1965  MRN: [id number]  Referring provider: Palak Otero DO  Dx:   Encounter Diagnosis     ICD-10-CM    1  Neuroforaminal stenosis of lumbar spine M99 83 Ambulatory referral to Physical Therapy   2  Chronic left SI joint pain M53 3 Ambulatory referral to Physical Therapy    G89 29    3  Piriformis syndrome of left side G57 02 Ambulatory referral to Physical Therapy                  Assessment  Impairments: abnormal or restricted ROM, activity intolerance, impaired balance, impaired physical strength, lacks appropriate home exercise program and pain with function    Assessment details: Patient demonstrates decreased strength, rom, flexibility, balance/gait and increased pain t/o LB and left LE limiting functional ability  Patient would benefit from PT intervention to address limitations in order to maximize functional independence  Understanding of Dx/Px/POC: good   Prognosis: good    Goals  ST  Initiate HEP  2  Patient to improve lumbar flexion rom by 20 degrees in 4 weeks  3  Patient to report decreased pain by 50% in 4 weeks    LT  Patient to increase lumbar rom to [facility] in 6 weeks  2  Patient to increase LE strength to 5/5 in 6 weeks  3  Patient to report decreased pain by 75% at worst in 6 weeks  4  Patient to be discharged to [facility] in 6 weeks    Plan  Patient would benefit from: skilled physical therapy and PT eval  Planned modality interventions: cryotherapy, electrical stimulation/Russian stimulation, thermotherapy: hydrocollator packs and ultrasound  Planned therapy interventions: abdominal trunk stabilization, joint mobilization, manual therapy, massage, balance, balance/weight bearing training, neuromuscular re-education, patient education, postural training, strengthening, stretching, therapeutic activities, therapeutic exercise, therapeutic training, functional ROM exercises, flexibility and home exercise program  Frequency: 2x week  Duration in visits: 8  Duration in weeks: 4  Treatment plan discussed with: patient        Subjective Evaluation    History of Present Illness  Date of onset: 2018  Mechanism of injury: Patient presents to PT with c/o pain in LB and left LE along with numbness in left LE  Patient reports her symptoms began about 2 months ago without known injury  Patient states if she is active and moving a lot, at night she gets "thumping" in her leg  Patient reports the numbness is intermittent but reports having the numbness 75% of the time compared to 25% not having it  She states bending causes increased pain  Patient had an MRI done which showed a disc bulge and pinched nerve on her right  She states the doctor thinks it is coming from her hip  Patient is now referred to oppt  Pain  Current pain ratin  At best pain ratin  At worst pain rating: 10  Quality: sharp and throbbing  Relieving factors: medications, ice and heat  Aggravating factors: lifting, sitting, stair climbing, standing and walking      Diagnostic Tests  MRI studies: abnormal  Patient Goals  Patient goals for therapy: decreased pain          Objective     Palpation     Additional Palpation Details  TTP noted B PSIS and left piriformis  No tenderness or muscle spasm noted in lumbar paraspinals or musculature    Neurological Testing     Sensation     Lumbar   Left   Intact: light touch    Right   Intact: light touch    Active Range of Motion     Lumbar   Flexion: 50 degrees   Extension: 25 degrees   Left lateral flexion: 30 degrees   Right lateral flexion: 30 degrees     Strength/Myotome Testing     Left Hip   Planes of Motion   Flexion: 4  Abduction: 4+  Adduction: 4+    Right Hip   Planes of Motion   Flexion: 4  Abduction: 4+  Adduction: 4+    Left Knee   Flexion: 4+  Extension: 4    Right Knee   Flexion: 4+  Extension: 4+    Left Ankle/Foot   Dorsiflexion: 4+    Right Ankle/Foot   Dorsiflexion: 4+    Tests     Lumbar   Positive repeated flexion  Negative repeated extension  Left   Positive passive SLR  Right   Negative passive SLR  Left Pelvic Girdle/Sacrum   Negative: sacrum compression and gapping  Right Pelvic Girdle/Sacrum   Negative: sacrum compression and gapping       Additional Tests Details  (+) Piriformis test          Precautions: None    Daily Treatment Diary     Manual              B LE stretch                                                                     Exercise Diary              Nustep             TR/HR             Standing Trunk Extension             MTP/LTP             LTR             PPT             SLR             S/L SLr             Integrate Modalities  6/11            IFC 15 min

## 2018-06-13 ENCOUNTER — TRANSCRIBE ORDERS (OUTPATIENT)
Dept: PHYSICAL THERAPY | Facility: CLINIC | Age: 53
End: 2018-06-13

## 2018-06-13 DIAGNOSIS — M48.061 NEURAL FORAMINAL STENOSIS OF LUMBAR SPINE: Primary | ICD-10-CM

## 2018-06-14 ENCOUNTER — OFFICE VISIT (OUTPATIENT)
Dept: PHYSICAL THERAPY | Facility: CLINIC | Age: 53
End: 2018-06-14
Payer: COMMERCIAL

## 2018-06-14 DIAGNOSIS — M48.061 NEUROFORAMINAL STENOSIS OF LUMBAR SPINE: Primary | ICD-10-CM

## 2018-06-14 DIAGNOSIS — M51.16 HERNIATION OF LUMBAR INTERVERTEBRAL DISC WITH RADICULOPATHY: ICD-10-CM

## 2018-06-14 PROCEDURE — G0283 ELEC STIM OTHER THAN WOUND: HCPCS

## 2018-06-14 PROCEDURE — 97530 THERAPEUTIC ACTIVITIES: CPT

## 2018-06-14 PROCEDURE — G8991 OTHER PT/OT GOAL STATUS: HCPCS | Performed by: PHYSICAL THERAPIST

## 2018-06-14 PROCEDURE — G8992 OTHER PT/OT  D/C STATUS: HCPCS | Performed by: PHYSICAL THERAPIST

## 2018-06-14 PROCEDURE — 97014 ELECTRIC STIMULATION THERAPY: CPT

## 2018-06-14 PROCEDURE — 64550 HB APPLY NEUROSTIMULATOR: CPT

## 2018-06-14 PROCEDURE — 97140 MANUAL THERAPY 1/> REGIONS: CPT

## 2018-06-14 NOTE — PROGRESS NOTES
Daily Note     Today's date: 2018  Patient name: Jagdish Agee  : 1965  MRN: [id number]  Referring provider: Kylie Ann DO  Dx:   Encounter Diagnosis     ICD-10-CM    1  Neuroforaminal stenosis of lumbar spine M99 83                   Subjective: Patient reports copay is too high to continue PT  Patient reports some improvement in pain post treatment  Objective: See treatment diary below  Precautions: None     Daily Treatment Diary      Manual                     B LE stretch    15 min                                                                                                                         Exercise Diary                     Nustep    10 min L3                   TR/HR                       Standing Trunk Extension                       MTP/LTP                       LTR                       PPT                       SLR                       S/L SLr                       Bridges                                                                                                                                                                                                                                                                                                     Modalities                       IFC 15 min                                                                           Assessment: Patient tolerated treatment well  Reviewed HEP and TENS setup/instruction today  MT to bilateral LE's  Tightness noted during MT in le's  Pain levels decreased post treatment  Plan: D/C to HEP and TENs secondary to insurance copay

## 2018-06-15 ENCOUNTER — APPOINTMENT (OUTPATIENT)
Dept: LAB | Facility: MEDICAL CENTER | Age: 53
End: 2018-06-15
Payer: COMMERCIAL

## 2018-06-15 ENCOUNTER — TRANSCRIBE ORDERS (OUTPATIENT)
Dept: RADIOLOGY | Facility: MEDICAL CENTER | Age: 53
End: 2018-06-15

## 2018-06-15 DIAGNOSIS — E13.8 DIABETES MELLITUS OF OTHER TYPE WITH COMPLICATION, UNSPECIFIED WHETHER LONG TERM INSULIN USE: ICD-10-CM

## 2018-06-15 DIAGNOSIS — Z79.4 ENCOUNTER FOR LONG-TERM (CURRENT) USE OF INSULIN (HCC): ICD-10-CM

## 2018-06-15 DIAGNOSIS — E11.8 DIABETIC COMPLICATION (HCC): ICD-10-CM

## 2018-06-15 DIAGNOSIS — E13.8 DIABETES MELLITUS OF OTHER TYPE WITH COMPLICATION, UNSPECIFIED WHETHER LONG TERM INSULIN USE: Primary | ICD-10-CM

## 2018-06-15 LAB
CHOLEST SERPL-MCNC: 337 MG/DL (ref 50–200)
ERYTHROCYTE [DISTWIDTH] IN BLOOD BY AUTOMATED COUNT: 15.9 % (ref 11.6–15.1)
EST. AVERAGE GLUCOSE BLD GHB EST-MCNC: 283 MG/DL
HBA1C MFR BLD: 11.5 % (ref 4.2–6.3)
HCT VFR BLD AUTO: 38.9 % (ref 34.8–46.1)
HDLC SERPL-MCNC: 64 MG/DL (ref 40–60)
HGB BLD-MCNC: 11.5 G/DL (ref 11.5–15.4)
LDLC SERPL CALC-MCNC: 233 MG/DL (ref 0–100)
MCH RBC QN AUTO: 22.1 PG (ref 26.8–34.3)
MCHC RBC AUTO-ENTMCNC: 29.6 G/DL (ref 31.4–37.4)
MCV RBC AUTO: 75 FL (ref 82–98)
NONHDLC SERPL-MCNC: 273 MG/DL
PLATELET # BLD AUTO: 274 THOUSANDS/UL (ref 149–390)
PMV BLD AUTO: 9.5 FL (ref 8.9–12.7)
RBC # BLD AUTO: 5.2 MILLION/UL (ref 3.81–5.12)
TRIGL SERPL-MCNC: 200 MG/DL
WBC # BLD AUTO: 4.86 THOUSAND/UL (ref 4.31–10.16)

## 2018-06-15 PROCEDURE — 80061 LIPID PANEL: CPT

## 2018-06-15 PROCEDURE — 36415 COLL VENOUS BLD VENIPUNCTURE: CPT

## 2018-06-15 PROCEDURE — 85027 COMPLETE CBC AUTOMATED: CPT

## 2018-06-15 PROCEDURE — 83036 HEMOGLOBIN GLYCOSYLATED A1C: CPT

## 2018-06-23 ENCOUNTER — HOSPITAL ENCOUNTER (EMERGENCY)
Facility: HOSPITAL | Age: 53
Discharge: HOME | End: 2018-06-23
Attending: EMERGENCY MEDICINE | Admitting: EMERGENCY MEDICINE
Payer: COMMERCIAL

## 2018-06-23 VITALS
BODY MASS INDEX: 35.34 KG/M2 | DIASTOLIC BLOOD PRESSURE: 94 MMHG | TEMPERATURE: 98.6 F | HEART RATE: 99 BPM | RESPIRATION RATE: 18 BRPM | HEIGHT: 65 IN | SYSTOLIC BLOOD PRESSURE: 175 MMHG | OXYGEN SATURATION: 99 % | WEIGHT: 212.08 LBS

## 2018-06-23 DIAGNOSIS — H65.90 SEROUS OTITIS MEDIA: Primary | ICD-10-CM

## 2018-06-23 DIAGNOSIS — H60.90 OTITIS EXTERNA: ICD-10-CM

## 2018-06-23 PROCEDURE — 99282 EMERGENCY DEPT VISIT SF MDM: CPT

## 2018-06-23 RX ORDER — OFLOXACIN 3 MG/ML
10 SOLUTION AURICULAR (OTIC) 2 TIMES DAILY
Qty: 5 ML | Refills: 0 | Status: SHIPPED | OUTPATIENT
Start: 2018-06-23 | End: 2018-06-30

## 2018-06-23 RX ORDER — METHYLPREDNISOLONE 4 MG/1
TABLET ORAL
Qty: 21 TABLET | Refills: 0 | Status: SHIPPED | OUTPATIENT
Start: 2018-06-23 | End: 2018-09-06

## 2018-06-23 RX ORDER — CIPROFLOXACIN 500 MG/1
500 TABLET, FILM COATED ORAL 2 TIMES DAILY
Qty: 14 TABLET | Refills: 0 | Status: SHIPPED | OUTPATIENT
Start: 2018-06-23 | End: 2018-06-30

## 2018-06-23 NOTE — DISCHARGE INSTRUCTIONS
Otitis Externa   WHAT YOU NEED TO KNOW:   Otitis externa, or swimmer's ear, is an infection in the outer ear canal  This canal goes from the outside of the ear to the eardrum  DISCHARGE INSTRUCTIONS:   Return to the emergency department if:   · You have severe ear pain  · You are suddenly unable to hear at all  · You have new swelling in your face, behind your ears, or in your neck  · You suddenly cannot move part of your face  · Your face suddenly feels numb  Contact your healthcare provider if:   · You have a fever  · Your signs and symptoms do not get better after 2 days of treatment  · Your signs and symptoms go away for a time, but then come back  · You have questions or concerns about your condition or care  Medicines:   · NSAIDs , such as ibuprofen, help decrease swelling, pain, and fever  This medicine is available with or without a doctor's order  NSAIDs can cause stomach bleeding or kidney problems in certain people  If you take blood thinner medicine, always ask if NSAIDs are safe for you  Always read the medicine label and follow directions  Do not give these medicines to children under 10months of age without direction from your child's healthcare provider  · Acetaminophen  decreases pain and fever  It is available without a doctor's order  Ask how much to take and how often to take it  Follow directions  Acetaminophen can cause liver damage if not taken correctly  · Ear drops  that contain an antibiotic may be given  The antibiotic helps treat a bacterial infection  You may also be given steroid medicine  The steroid helps decrease redness, swelling, and pain  · Take your medicine as directed  Contact your healthcare provider if you think your medicine is not helping or if you have side effects  Tell him or her if you are allergic to any medicine  Keep a list of the medicines, vitamins, and herbs you take  Include the amounts, and when and why you take them  Bring the list or the pill bottles to follow-up visits  Carry your medicine list with you in case of an emergency  Follow up with your healthcare provider as directed:  Write down your questions so you remember to ask them during your visits  How to use eardrops:   · Lie down on your side with your infected ear facing up  · Carefully drip the correct number of eardrops into your ear  Have another person help you if possible  · Gently move the outside part of your ear back and forth to help the medicine reach your ear canal      · Stay lying down in the same position (with your ear facing up) for 3 to 5 minutes  Prevent otitis externa:   · Do not put cotton swabs or foreign objects in your ears  · Wrap a clean moist washcloth around your finger, and use it to clean your outer ear and remove extra ear wax  · Use ear plugs when you swim  Dry your outer ears completely after you swim or bathe  © 2017 [address] [employer] Information is for End User's use only and may not be sold, redistributed or otherwise used for commercial purposes  All illustrations and images included in CareNotes® are the copyrighted property of [employer]  or [employer]  The above information is an  only  It is not intended as medical advice for individual conditions or treatments  Talk to your doctor, nurse or pharmacist before following any medical regimen to see if it is safe and effective for you  Serous Otitis Media   WHAT YOU NEED TO KNOW:   Serous otitis media is fluid trapped behind your tympanic membrane (eardrum), without an ear infection  Your eardrum is in your middle ear  Serous otitis media is also called otitis media with effusion  You may have fluid in your ear for months, but it usually goes away on its own  The fluid may be in one or both ears  The fluid may cause muffled sounds, and you may feel like your ears are full   Serous otitis media may be caused by an upper respiratory infection or allergies  It is most common in the fall and early spring  DISCHARGE INSTRUCTIONS:   Return to the emergency department if:   · You have a fever  · You have a sudden loss of hearing in your affected ear  · You develop a severe headache and stiff neck  · You have a seizure  Contact your healthcare provider if:   · You have fluid draining from your ear  · You have new symptoms  · You have questions or concerns about your condition or care  Follow up with your healthcare provider as directed: Your ears will need to be checked regularly  You may need to see a specialist  Write down your questions so you remember to ask them during your visits  © 2017 [address] [employer]  Information is for End User's use only and may not be sold, redistributed or otherwise used for commercial purposes  All illustrations and images included in CareNotes® are the copyrighted property of [employer]  or [employer]  The above information is an  only  It is not intended as medical advice for individual conditions or treatments  Talk to your doctor, nurse or pharmacist before following any medical regimen to see if it is safe and effective for you  Eustachian Tube Dysfunction   WHAT YOU NEED TO KNOW:   What is eustachian tube dysfunction? Eustachian tube dysfunction (ETD) is a condition that prevents your eustachian tubes from opening properly  It can also cause them to become blocked  Eustachian tubes connect your middle ear to the back of your nose and throat  These tubes open and allow air to flow in and out when you sneeze, swallow, or yawn  What causes or increases my risk of ETD? ETD may be caused by swelling or buildup of mucus in your eustachian tubes  Allergies, a cold, or sinus infection can increase your risk for ETD  Smoking also increases your risk for ETD  What are the signs and symptoms of ETD?    · Fullness or pressure in your ears    · Muffled hearing    · Pain in one or both ears    · Ringing in your ears    · Popping or clicking feeling in your ears    · Trouble keeping your balance  How is ETD diagnosed? Your healthcare provider will ask about your symptoms  He will examine your ears, your nose, and the back of your throat  He may also do a hearing test    How is ETD treated? Your ETD may get better on its own without any treatment  You may need any of the following:  · Exercises  such as swallowing, yawning, or chewing gum may help to open your eustachian tubes  Your healthcare provider may also recommend that you take a deep breath and then blow with your mouth shut and your nostrils pinched closed  · Air pressure devices  push air into your nose and eustachian tubes to help relieve air pressure in your ear  · Treatment for allergies  such as decongestants, antihistamines, and nasal steroids may improve ETD  They may help decrease swelling of the eustachian tubes  · Ear tubes  may help to keep your middle ear open  During this procedure, your healthcare provider will cut a small hole in your eardrum  · A myringotomy  is procedure to make a small cut in your eardrum and suction out fluid from your middle ear  · Tuboplasty  is a procedure to widen your eustachian tubes  When should I contact my healthcare provider? · Your symptoms do not improve or get worse  · You have a fever  · You have any hearing loss  · You have questions or concerns about your condition or care  CARE AGREEMENT:   You have the right to help plan your care  Learn about your health condition and how it may be treated  Discuss treatment options with your caregivers to decide what care you want to receive  You always have the right to refuse treatment  The above information is an  only  It is not intended as medical advice for individual conditions or treatments   Talk to your doctor, nurse or pharmacist before following any medical regimen to see if it is safe and effective for you  © 2017 [address] [employer]  Information is for End User's use only and may not be sold, redistributed or otherwise used for commercial purposes  All illustrations and images included in CareNotes® are the copyrighted property of [employer]  or [employer]  Try mucinex or coracidin OTC to help open eustachian tube  If does not help in 2-3 days start steriods  May increase blood sugars  Medrol Dose Kleber: take entire row of steriods for a particular day at one sitting  take with food  finish entire course    Ear drops twice daily if improved after 3 days may discontinue      Eat yogurt, take pro-biotic over the counter, or acidophilus tablet (in vitamin aisle) to prevent diarrhea from antibiotic    Return with swelling shut of ear canal, redness (like sunburn) of ear lobe or any new or worsening symptoms  Fever or any new or worsening symptoms

## 2018-06-23 NOTE — ED PROVIDER NOTES
History  Chief Complaint   Patient presents with   Cristal Newman     left sided earache since yesterday, woke up this morning and cannot hear out of left ear     29-year-old female presents with a left earache that started yesterday  She has noted no drainage from the ear she has had no upper respiratory complaints such as congestion fever sore throat no difficulties with balance no headache she has had no prior history of ear problems  She denies any chest pain shortness of breath no numbness paresthesias no nausea or vomiting no abdominal pain  Today she woke up and has noted diminished hearing from that side and presents for evaluation her blood sugars have been doing well on today they were 170 yesterday they were 141  Prior to Admission Medications   Prescriptions Last Dose Informant Patient Reported? Taking?    FLUoxetine (PROzac) 40 MG capsule   Yes No   Sig: Take 1 capsule by mouth daily   Insulin Glargine (TOUJEO SOLOSTAR SC)   Yes No   Sig: Inject 60 Units under the skin daily at bedtime     Nerve Stimulator (TENS THERAPY PAIN RELIEF) BILLY   No No   Si Device by Does not apply route 2 (two) times a day as needed (pain)   gabapentin (NEURONTIN) 300 mg capsule   No No   Sig: Take 1 capsule (300 mg total) by mouth 2 (two) times a day   glimepiride (AMARYL) 4 mg tablet   Yes No   Sig: Take 1 tablet by mouth 2 (two) times a day   hydrochlorothiazide (HYDRODIURIL) 25 mg tablet   Yes No   Sig: Take 25 mg by mouth daily     metFORMIN (GLUCOPHAGE-XR) 750 mg 24 hr tablet   Yes No   Sig: Take 1 tablet by mouth 2 (two) times a day   naproxen (NAPROSYN) 500 mg tablet   No No   Sig: Take 1 tablet (500 mg total) by mouth 2 (two) times a day with meals   pantoprazole (PROTONIX) 20 mg tablet   No No   Sig: Take 1 tablet (20 mg total) by mouth daily   promethazine (PHENERGAN) 25 mg tablet   Yes No   Sig: Take 1 tablet by mouth 3 (three) times a day as needed      Facility-Administered Medications: None Past Medical History:   Diagnosis Date    Diabetes mellitus ([facility] [address] )     Hypertension     Migraine        Past Surgical History:   Procedure Laterality Date    TUBAL LIGATION         History reviewed  No pertinent family history  I have reviewed and agree with the history as documented  Social History   Substance Use Topics    Smoking status: Never Smoker    Smokeless tobacco: Never Used    Alcohol use No        Review of Systems   Constitutional: Negative for activity change, appetite change, chills, diaphoresis and fever  HENT: Positive for ear pain (left) and hearing loss (left)  Negative for congestion, dental problem, ear discharge, facial swelling, mouth sores, nosebleeds, postnasal drip, rhinorrhea, sinus pain, sinus pressure, sneezing, sore throat, tinnitus, trouble swallowing and voice change  Eyes: Negative for discharge and visual disturbance  Respiratory: Negative for cough and shortness of breath  Cardiovascular: Negative for chest pain  Gastrointestinal: Negative for abdominal pain, diarrhea, nausea and vomiting  Genitourinary: Negative for difficulty urinating  Musculoskeletal: Negative for neck pain  Skin: Negative for rash  Neurological: Negative for dizziness, weakness, light-headedness, numbness and headaches  Psychiatric/Behavioral: Negative for confusion  All other systems reviewed and are negative  Physical Exam  Physical Exam   Constitutional: She is oriented to person, place, and time  She appears well-developed and well-nourished  No distress  HENT:   Head: Normocephalic and atraumatic  Right Ear: External ear normal    Left Ear: External ear normal    Nose: Nose normal    Mouth/Throat: Oropharynx is clear and moist  No oropharyngeal exudate  Rt TM pale with good light reflex  Left ear:  Left TM dull not bulging  No perf  Left auditory meatus widely patient but tenderness with traction on pinna  Use of speculum causes discomfort   No erythema of pinna or auditory canal  Mild tenderness to left mastoid region but no erythema o/w nml in appearance; no preauricular lymphadenopathy; no tenderness to sinuses  Posterior pharynx normal   Eyes: Conjunctivae and EOM are normal  Pupils are equal, round, and reactive to light  Right eye exhibits no discharge  Left eye exhibits discharge  Neck: Normal range of motion  Neck supple  Cardiovascular: Normal rate, regular rhythm and normal heart sounds  Pulmonary/Chest: Effort normal and breath sounds normal  No respiratory distress  She has no wheezes  Abdominal: Soft  Bowel sounds are normal  She exhibits no distension and no mass  There is no tenderness  There is no guarding  Musculoskeletal: Normal range of motion  She exhibits no edema or tenderness  Lymphadenopathy:     She has no cervical adenopathy  Neurological: She is alert and oriented to person, place, and time  She displays normal reflexes  No cranial nerve deficit or sensory deficit  She exhibits normal muscle tone  Coordination normal    Gait steady   Skin: Skin is warm and dry  Capillary refill takes less than 2 seconds  No rash noted  She is not diaphoretic  No facial erythema or cellulitis  Psychiatric: She has a normal mood and affect  Vitals reviewed        Vital Signs  ED Triage Vitals [06/23/18 0709]   Temperature Pulse Respirations Blood Pressure SpO2   98 6 °F (37 °C) 99 18 (!) 175/94 99 %      Temp Source Heart Rate Source Patient Position - Orthostatic VS BP Location FiO2 (%)   Temporal Monitor Sitting Left arm --      Pain Score       6           Vitals:    06/23/18 0709   BP: (!) 175/94   Pulse: 99   Patient Position - Orthostatic VS: Sitting       Visual Acuity      ED Medications  Medications - No data to display    Diagnostic Studies  Results Reviewed     None                 No orders to display              Procedures  Procedures       Phone Contacts  ED Phone Contact    ED Course MDM  Number of Diagnoses or Management Options  Otitis externa:   Serous otitis media:   Diagnosis management comments: Mdm: not c/w referred pain eg  Acs; reproducible sx traction on pinna c/w mild otitis externa will initate cipro and floxacin gtt  Recommending graduated approach to serous om/eustaian tube dysfx with OTC mucinex and coricidin decongestant may iniate medrol dosepak if no relief but reviewed may worsen blood sugars  Reviewed x/sx for return to recheck bp with PMD and any persistant sx f/u with ENT  CritCare Time    Disposition  Final diagnoses:   Serous otitis media - Left   Otitis externa - left, mild     Time reflects when diagnosis was documented in both MDM as applicable and the Disposition within this note     Time User Action Codes Description Comment    6/23/2018  7:32 AM Amparo Fareed Add [H65 90] Serous otitis media     6/23/2018  7:32 AM Amparo [place] Modify [H65 90] Serous otitis media Left    6/23/2018  7:33 AM Amparo Fareed Add [H60 90] Otitis externa     6/23/2018  7:33 AM Amparo Fareed Modify [H60 90] Otitis externa left, mild      ED Disposition     ED Disposition Condition Comment    Discharge  Jayla Couch discharge to home/self care  Condition at discharge: Good        Follow-up Information     Follow up With Specialties Details Why [id number] Physician Assistant  If symptoms worsen and to recheck blood pressure [address] [facility] [address] [address])      Vanessa Barbosa MD Otolaryngology, Plastic Surgery  persistant hearing or ear problems or contact provider for other referral to ear nose and throat [address] [place] [id number] [phone]            Discharge Medication List as of 6/23/2018  7:41 AM      START taking these medications    Details   ciprofloxacin (CIPRO) 500 mg tablet Take 1 tablet (500 mg total) by mouth 2 (two) times a day for 7 days, Starting Sat 6/23/2018, Until Sat 6/30/2018, Print      methylprednisolone (MEDROL) 4 mg tablet Take as directed with food, Print      ofloxacin (FLOXIN) 0 3 % otic solution Administer 10 drops into the left ear 2 (two) times a day for 7 days, Starting Sat 6/23/2018, Until Sat 6/30/2018, Print         CONTINUE these medications which have NOT CHANGED    Details   FLUoxetine (PROzac) 40 MG capsule Take 1 capsule by mouth daily, Starting Wed 3/2/2016, Historical Med      gabapentin (NEURONTIN) 300 mg capsule Take 1 capsule (300 mg total) by mouth 2 (two) times a day, Starting Thu 6/7/2018, Normal      glimepiride (AMARYL) 4 mg tablet Take 1 tablet by mouth 2 (two) times a day, Historical Med      hydrochlorothiazide (HYDRODIURIL) 25 mg tablet Take 25 mg by mouth daily  , Historical Med      Insulin Glargine (TOUJEO SOLOSTAR SC) Inject 60 Units under the skin daily at bedtime  , Historical Med      metFORMIN (GLUCOPHAGE-XR) 750 mg 24 hr tablet Take 1 tablet by mouth 2 (two) times a day, Starting Mon 5/16/2016, Historical Med      naproxen (NAPROSYN) 500 mg tablet Take 1 tablet (500 mg total) by mouth 2 (two) times a day with meals, Starting Tue 5/8/2018, Normal      Nerve Stimulator (TENS THERAPY PAIN RELIEF) BILLY 1 Device by Does not apply route 2 (two) times a day as needed (pain), Starting Fri 6/8/2018, Print      pantoprazole (PROTONIX) 20 mg tablet Take 1 tablet (20 mg total) by mouth daily, Starting Thu 6/7/2018, Normal      promethazine (PHENERGAN) 25 mg tablet Take 1 tablet by mouth 3 (three) times a day as needed, Starting Thu 8/10/2017, Historical Med           No discharge procedures on file      ED Provider  Electronically Signed by           Jessica Green MD  06/23/18 0800

## 2018-07-16 NOTE — PROGRESS NOTES
Patient seen for 2 total visits with last visit being 6/14/18  Patient was given HEP at last visit for patient to continue with treatment at home  Patient was given a TENS unit for pain relief at home  Patient was instructed to call PT clinic if symptoms continued  No further contact from patient  Patient to be discharged at this time due to script expiration

## 2018-08-09 ENCOUNTER — OFFICE VISIT (OUTPATIENT)
Dept: FAMILY MEDICINE CLINIC | Facility: CLINIC | Age: 53
End: 2018-08-09
Payer: COMMERCIAL

## 2018-08-09 VITALS
TEMPERATURE: 98 F | OXYGEN SATURATION: 98 % | WEIGHT: 207 LBS | HEART RATE: 110 BPM | DIASTOLIC BLOOD PRESSURE: 81 MMHG | HEIGHT: 65 IN | RESPIRATION RATE: 16 BRPM | BODY MASS INDEX: 34.49 KG/M2 | SYSTOLIC BLOOD PRESSURE: 126 MMHG

## 2018-08-09 DIAGNOSIS — E78.49 OTHER HYPERLIPIDEMIA: ICD-10-CM

## 2018-08-09 DIAGNOSIS — K21.9 GASTROESOPHAGEAL REFLUX DISEASE WITHOUT ESOPHAGITIS: ICD-10-CM

## 2018-08-09 DIAGNOSIS — F32.A DEPRESSION, UNSPECIFIED DEPRESSION TYPE: Primary | ICD-10-CM

## 2018-08-09 DIAGNOSIS — I10 HYPERTENSION, UNSPECIFIED TYPE: ICD-10-CM

## 2018-08-09 PROCEDURE — 99213 OFFICE O/P EST LOW 20 MIN: CPT | Performed by: FAMILY MEDICINE

## 2018-08-09 RX ORDER — HYDROCHLOROTHIAZIDE 25 MG/1
25 TABLET ORAL DAILY
Qty: 90 TABLET | Refills: 3 | Status: SHIPPED | OUTPATIENT
Start: 2018-08-09 | End: 2019-07-29 | Stop reason: ALTCHOICE

## 2018-08-09 RX ORDER — MIRTAZAPINE 7.5 MG/1
7.5 TABLET, FILM COATED ORAL
Qty: 30 TABLET | Refills: 0 | Status: SHIPPED | OUTPATIENT
Start: 2018-08-09 | End: 2018-09-06

## 2018-08-09 RX ORDER — FLUOXETINE 20 MG/1
20 TABLET, FILM COATED ORAL DAILY
Qty: 30 TABLET | Refills: 0 | Status: SHIPPED | OUTPATIENT
Start: 2018-08-09 | End: 2018-09-06 | Stop reason: ALTCHOICE

## 2018-08-09 RX ORDER — PROMETHAZINE HYDROCHLORIDE 25 MG/1
25 TABLET ORAL 3 TIMES DAILY PRN
Qty: 90 TABLET | Refills: 2 | Status: SHIPPED | OUTPATIENT
Start: 2018-08-09 | End: 2019-01-10 | Stop reason: SDUPTHER

## 2018-08-09 RX ORDER — ATORVASTATIN CALCIUM 20 MG/1
20 TABLET, FILM COATED ORAL DAILY
Qty: 90 TABLET | Refills: 2 | Status: SHIPPED | OUTPATIENT
Start: 2018-08-09 | End: 2019-01-31

## 2018-08-09 NOTE — PROGRESS NOTES
Assessment/Plan:     Diagnoses and all orders for this visit:    Depression, unspecified depression type  -     FLUoxetine (PROzac) 20 MG tablet; Take 1 tablet (20 mg total) by mouth daily TAKE 1 and 1/2 TABLETS DAILY X 1 WEEK, THEN 1 TABLET DAILY X 2 WEEKS, THEN 1/2 TABLET DAILY X 1 WEEK, THEN STOP  -     mirtazapine (REMERON) 7 5 MG tablet; Take 1 tablet (7 5 mg total) by mouth daily at bedtime    Other hyperlipidemia  -     atorvastatin (LIPITOR) 20 mg tablet; Take 1 tablet (20 mg total) by mouth daily      Discussion/Plan:    MDD - prozac tapering instructions given  Start low dose remeron while transitioning  Seek ED if SI/HI/AH/VH/manic sx  Hyperlipidemia - start lipitor 20 mg daily  RTC 1 month for evaluation  Subjective:      Patient ID: Roma Fiore is a 50 y o  female  Chief Complaint   Patient presents with    Well Check     routine check up    Medication Refill    Depression       Patient is a 50year old female who presents to the office today for routine f/u and depression  She is on prozac 40mg daily, feels as though this is no longer working for her  She states she has had trouble concentrating, feeling down, feeling irritable/anxious  She reports trouble falling and staying asleep, decreased appetite  She reports anhedonia, denies SI/HI/AH/VH/manic sx  She states she was on celexa in the past without relief  She has seen therapy in the past, which she states she had a bad experience with  She was scheduled to see GI in July, has to reschedule  She states lower back pain has greatly improved since last visit, seeing sports medicine and physicla therapy  She has stopped gabapentin and is using a tens unit  She reports back pain about once weekly  She had diabetic f/u with endocrinology, states sugars are getting better  She saw eye doctor is December, sees podiatry and checks feet once weekly            The following portions of the patient's history were reviewed and updated as appropriate: allergies, current medications, past family history, past medical history, past social history, past surgical history and problem list     Patient Active Problem List   Diagnosis    Acid reflux    Allergic cough    Anxiety    Depression    Diabetes mellitus ([address] )    Hydrocephalus    Hypertension    Iron deficiency    Pain, foot, left, chronic    Pica in adults    Herniation of lumbar intervertebral disc with radiculopathy    Other hyperlipidemia     Current Outpatient Prescriptions on File Prior to Visit   Medication Sig Dispense Refill    gabapentin (NEURONTIN) 300 mg capsule Take 1 capsule (300 mg total) by mouth 2 (two) times a day 60 capsule 0    glimepiride (AMARYL) 4 mg tablet Take 1 tablet by mouth 2 (two) times a day      hydrochlorothiazide (HYDRODIURIL) 25 mg tablet Take 25 mg by mouth daily        Insulin Glargine (TOUJEO SOLOSTAR SC) Inject 60 Units under the skin daily at bedtime        metFORMIN (GLUCOPHAGE-XR) 750 mg 24 hr tablet Take 1 tablet by mouth 2 (two) times a day      methylprednisolone (MEDROL) 4 mg tablet Take as directed with food 21 tablet 0    naproxen (NAPROSYN) 500 mg tablet Take 1 tablet (500 mg total) by mouth 2 (two) times a day with meals 60 tablet 0    Nerve Stimulator (TENS THERAPY PAIN RELIEF) BILLY 1 Device by Does not apply route 2 (two) times a day as needed (pain) 1 Device 0    pantoprazole (PROTONIX) 20 mg tablet Take 1 tablet (20 mg total) by mouth daily 30 tablet 0    promethazine (PHENERGAN) 25 mg tablet Take 1 tablet by mouth 3 (three) times a day as needed      [DISCONTINUED] FLUoxetine (PROzac) 40 MG capsule Take 1 capsule by mouth daily       No current facility-administered medications on file prior to visit  Review of Systems   Constitutional: Negative  Respiratory: Negative  Cardiovascular: Negative  Gastrointestinal: Negative  Genitourinary: Negative  Neurological: Negative  Psychiatric/Behavioral: Positive for dysphoric mood  Objective:      /81 (BP Location: Left arm, Patient Position: Sitting, Cuff Size: Large)   Pulse (!) 110   Temp 98 °F (36 7 °C) (Tympanic)   Resp 16   Ht 5' 5" (1 651 m)   Wt 93 9 kg (207 lb)   SpO2 98%   BMI 34 45 kg/m²          Physical Exam   Constitutional: She is oriented to person, place, and time  She appears well-developed and well-nourished  obese   HENT:   Head: Normocephalic and atraumatic  Right Ear: Tympanic membrane, external ear and ear canal normal    Left Ear: Tympanic membrane, external ear and ear canal normal    Nose: Nose normal    Mouth/Throat: Oropharynx is clear and moist    Eyes: Conjunctivae and EOM are normal  Pupils are equal, round, and reactive to light  Neck: Neck supple  No thyromegaly present  Cardiovascular: Normal rate, regular rhythm and normal heart sounds  Pulmonary/Chest: Effort normal and breath sounds normal    Abdominal: Soft  Bowel sounds are normal  She exhibits no distension and no mass  There is no tenderness  There is no rebound and no guarding  Lymphadenopathy:     She has no cervical adenopathy  Neurological: She is alert and oriented to person, place, and time  Skin: Skin is warm and dry  Psychiatric: Her behavior is normal  Judgment and thought content normal  She exhibits a depressed mood

## 2018-09-06 ENCOUNTER — OFFICE VISIT (OUTPATIENT)
Dept: FAMILY MEDICINE CLINIC | Facility: CLINIC | Age: 53
End: 2018-09-06
Payer: COMMERCIAL

## 2018-09-06 VITALS
SYSTOLIC BLOOD PRESSURE: 122 MMHG | DIASTOLIC BLOOD PRESSURE: 68 MMHG | BODY MASS INDEX: 33.82 KG/M2 | RESPIRATION RATE: 17 BRPM | HEART RATE: 110 BPM | WEIGHT: 203 LBS | TEMPERATURE: 96 F | HEIGHT: 65 IN | OXYGEN SATURATION: 98 %

## 2018-09-06 DIAGNOSIS — F32.A DEPRESSION, UNSPECIFIED DEPRESSION TYPE: Primary | ICD-10-CM

## 2018-09-06 DIAGNOSIS — G47.00 INSOMNIA, UNSPECIFIED TYPE: ICD-10-CM

## 2018-09-06 PROCEDURE — 99213 OFFICE O/P EST LOW 20 MIN: CPT | Performed by: FAMILY MEDICINE

## 2018-09-06 RX ORDER — VENLAFAXINE 37.5 MG/1
37.5 TABLET ORAL 2 TIMES DAILY
Qty: 60 TABLET | Refills: 0 | Status: ON HOLD | OUTPATIENT
Start: 2018-09-06 | End: 2018-10-23 | Stop reason: ALTCHOICE

## 2018-09-06 RX ORDER — HYDROXYZINE PAMOATE 25 MG/1
25 CAPSULE ORAL
Qty: 30 CAPSULE | Refills: 0 | Status: ON HOLD | OUTPATIENT
Start: 2018-09-06 | End: 2018-10-23 | Stop reason: ALTCHOICE

## 2018-09-06 NOTE — PROGRESS NOTES
Assessment/Plan:     Diagnoses and all orders for this visit:    Depression, unspecified depression type  -     venlafaxine (EFFEXOR) 37 5 mg tablet; Take 1 tablet (37 5 mg total) by mouth 2 (two) times a day    Insomnia, unspecified type  -     hydrOXYzine pamoate (VISTARIL) 25 mg capsule; Take 1 capsule (25 mg total) by mouth daily at bedtime as needed for itching    Other orders  -     Canagliflozin-Metformin HCl (INVOKAMET PO); Take by mouth        Discussion/Plan:  Depression/Anxiety - start effexor 37 5mg BID  She has been on celexa and prozac without relief  Insomnia - will try hydroxyzine 25mg at bedtime to help with insomnia  She has been on  trazodone without relief and experienced side effects with remeron Sleep hygiene, supportive measures discussed  Seek ED if SI/HI/AH/VH/manic sx  F/U with GI in October as scheduled  F/U with endocrinology for diabetes management as scheduled  RTC 1 month for evaluation or sooner if needed  Subjective:      Patient ID: Hever Lozoya is a 50 y o  female  Chief Complaint   Patient presents with    Follow-up    Insomnia       Patient is a 50year old female who presents to the office today for depression f/u  She is off of prozac currently, tapered off  She states remeron gave her chest pains, stopped medications  Currently, she reports trouble falling and staying asleep  She is sleeping about 3-4 hours/night  She is feeling depressed, no SI/HI/AH/VH/manic sx  She states she was on trazodone in the past without help in insomnia           The following portions of the patient's history were reviewed and updated as appropriate: allergies, current medications, past family history, past medical history, past social history, past surgical history and problem list     Patient Active Problem List   Diagnosis    Acid reflux    Allergic cough    Anxiety    Depression    Diabetes mellitus ([address] )    Hydrocephalus    Hypertension    Iron deficiency    Pain, foot, left, chronic    Pica in adults    Herniation of lumbar intervertebral disc with radiculopathy    Other hyperlipidemia     Current Outpatient Prescriptions on File Prior to Visit   Medication Sig Dispense Refill    atorvastatin (LIPITOR) 20 mg tablet Take 1 tablet (20 mg total) by mouth daily 90 tablet 2    gabapentin (NEURONTIN) 300 mg capsule Take 1 capsule (300 mg total) by mouth 2 (two) times a day 60 capsule 0    glimepiride (AMARYL) 4 mg tablet Take 1 tablet by mouth 2 (two) times a day      hydrochlorothiazide (HYDRODIURIL) 25 mg tablet Take 1 tablet (25 mg total) by mouth daily 90 tablet 3    Insulin Glargine (TOUJEO SOLOSTAR SC) Inject 60 Units under the skin daily at bedtime        naproxen (NAPROSYN) 500 mg tablet Take 1 tablet (500 mg total) by mouth 2 (two) times a day with meals 60 tablet 0    Nerve Stimulator (TENS THERAPY PAIN RELIEF) BILLY 1 Device by Does not apply route 2 (two) times a day as needed (pain) 1 Device 0    pantoprazole (PROTONIX) 20 mg tablet Take 1 tablet (20 mg total) by mouth daily 30 tablet 0    promethazine (PHENERGAN) 25 mg tablet Take 1 tablet (25 mg total) by mouth 3 (three) times a day as needed for nausea 90 tablet 2    [DISCONTINUED] FLUoxetine (PROzac) 20 MG tablet Take 1 tablet (20 mg total) by mouth daily TAKE 1 and 1/2 TABLETS DAILY X 1 WEEK, THEN 1 TABLET DAILY X 2 WEEKS, THEN 1/2 TABLET DAILY X 1 WEEK, THEN STOP 30 tablet 0    [DISCONTINUED] metFORMIN (GLUCOPHAGE-XR) 750 mg 24 hr tablet Take 1 tablet by mouth 2 (two) times a day      [DISCONTINUED] methylprednisolone (MEDROL) 4 mg tablet Take as directed with food 21 tablet 0    [DISCONTINUED] mirtazapine (REMERON) 7 5 MG tablet Take 1 tablet (7 5 mg total) by mouth daily at bedtime 30 tablet 0     No current facility-administered medications on file prior to visit  Review of Systems   Constitutional: Negative  Respiratory: Negative  Cardiovascular: Negative  Gastrointestinal: Negative  Genitourinary: Negative  Neurological: Negative  Hematological: Negative  Psychiatric/Behavioral: Positive for dysphoric mood and sleep disturbance  Negative for agitation, behavioral problems, confusion, decreased concentration, hallucinations, self-injury and suicidal ideas  The patient is nervous/anxious  The patient is not hyperactive  Objective:      /68   Pulse (!) 110   Temp (!) 96 °F (35 6 °C)   Resp 17   Ht 5' 5" (1 651 m)   Wt 92 1 kg (203 lb)   SpO2 98%   BMI 33 78 kg/m²          Physical Exam   Constitutional: She is oriented to person, place, and time  She appears well-developed and well-nourished  obese   HENT:   Mouth/Throat: Oropharynx is clear and moist    Eyes: Conjunctivae are normal  Pupils are equal, round, and reactive to light  Neck: Neck supple  Cardiovascular: Normal rate, regular rhythm and normal heart sounds  Pulmonary/Chest: Effort normal    Abdominal: Soft  Bowel sounds are normal  There is no tenderness  Neurological: She is alert and oriented to person, place, and time  Skin: Skin is warm and dry     Psychiatric: Her behavior is normal  Judgment and thought content normal

## 2018-10-10 ENCOUNTER — OFFICE VISIT (OUTPATIENT)
Dept: GASTROENTEROLOGY | Facility: HOSPITAL | Age: 53
End: 2018-10-10
Payer: COMMERCIAL

## 2018-10-10 VITALS
HEART RATE: 111 BPM | SYSTOLIC BLOOD PRESSURE: 160 MMHG | HEIGHT: 65 IN | DIASTOLIC BLOOD PRESSURE: 80 MMHG | TEMPERATURE: 96.9 F | WEIGHT: 209.2 LBS | BODY MASS INDEX: 34.85 KG/M2

## 2018-10-10 DIAGNOSIS — K21.9 GASTROESOPHAGEAL REFLUX DISEASE, ESOPHAGITIS PRESENCE NOT SPECIFIED: Primary | ICD-10-CM

## 2018-10-10 DIAGNOSIS — Z12.11 SCREENING FOR COLON CANCER: ICD-10-CM

## 2018-10-10 DIAGNOSIS — R79.89 ELEVATED LFTS: ICD-10-CM

## 2018-10-10 DIAGNOSIS — Z12.11 COLON CANCER SCREENING: ICD-10-CM

## 2018-10-10 DIAGNOSIS — K76.0 HEPATIC STEATOSIS: ICD-10-CM

## 2018-10-10 DIAGNOSIS — R13.12 OROPHARYNGEAL DYSPHAGIA: ICD-10-CM

## 2018-10-10 DIAGNOSIS — R13.10 DYSPHAGIA, UNSPECIFIED TYPE: ICD-10-CM

## 2018-10-10 DIAGNOSIS — E61.1 IRON DEFICIENCY: ICD-10-CM

## 2018-10-10 PROCEDURE — 99244 OFF/OP CNSLTJ NEW/EST MOD 40: CPT | Performed by: INTERNAL MEDICINE

## 2018-10-10 RX ORDER — SODIUM, POTASSIUM,MAG SULFATES 17.5-3.13G
1 SOLUTION, RECONSTITUTED, ORAL ORAL ONCE
Qty: 2 BOTTLE | Refills: 0 | Status: SHIPPED | OUTPATIENT
Start: 2018-10-10 | End: 2018-11-06

## 2018-10-10 NOTE — ASSESSMENT & PLAN NOTE
She does have occasional symptoms despite being on the protonix (she does not take it every day)  I would recommend doing an EGD for evaluation for esophagitis and Miles's esophagus

## 2018-10-10 NOTE — PATIENT INSTRUCTIONS
EGD Colonoscopy scheduled 10/23/18 with Dr Staci Sanderson at [facility] and Diabetic instructions given in office

## 2018-10-10 NOTE — PROGRESS NOTES
Assessment/Plan:    Acid reflux  She does have occasional symptoms despite being on the protonix (she does not take it every day)  I would recommend doing an EGD for evaluation for esophagitis and Miles's esophagus  Iron deficiency  Likely due to menorrhagia  Will evaluate for celiac as well with serology and duodenal biopsies at time of EGD  Colon cancer screening  Average risk for colon cancer screening  REcommend colonoscopy  The procedure was discussed with the patient at length and she is agreeable to proceed  Oropharyngeal dysphagia  She has some symptoms of oropharyngeal dysphagia  This is intermittent  Possible web? / Lonell Holes? Recommend EGD with possible dilation  Speech and swallow evaluation  Hepatic steatosis  Recommend weight loss of about 10% body weight  Exercise  LFT's are likely elevated due to the fatty liver  Elevated LFTs  Recheck levels  Diet and exercise for hepatic steatosis  Check viral hepatitis serologies          Diagnoses and all orders for this visit:    Gastroesophageal reflux disease, esophagitis presence not specified  -     Case request operating room: EGD AND COLONOSCOPY; Standing  -     Case request operating room: EGD AND COLONOSCOPY    Dysphagia, unspecified type  -     Ambulatory referral to Gastroenterology  -     Case request operating room: EGD AND COLONOSCOPY; Standing  -     Case request operating room: EGD AND COLONOSCOPY    Screening for colon cancer  -     Ambulatory referral to Gastroenterology  -     [facility] BOWEL PREP KIT 17 5-3 13-1 6 GM/180ML SOLN; Take 1 kit by mouth once for 1 dose Please follow instructions as per the office   -     Case request operating room: EGD AND COLONOSCOPY; Standing  -     Case request operating room: EGD AND COLONOSCOPY    Iron deficiency  -     Case request operating room: EGD AND COLONOSCOPY; Standing  -     Case request operating room: EGD AND COLONOSCOPY    Colon cancer screening    Oropharyngeal dysphagia  -     Case request operating room: EGD AND COLONOSCOPY; Standing  -     Case request operating room: EGD AND COLONOSCOPY  -     FL barium swallow video w speech; Future    Hepatic steatosis  -     Chronic Hepatitis Panel; Future  -     Hepatic function panel; Future  -     US right upper quadrant; Future    Elevated LFTs  -     Chronic Hepatitis Panel; Future  -     Hepatic function panel; Future  -     US right upper quadrant; Future    Other orders  -     Diet NPO; Sips with meds; Standing  -     Void on call to OR; Standing  -     Insert peripheral IV; Standing          Subjective:      Patient ID: Ramona Medina is a 50 y o  female  HPI    She has had symptoms of choking with food for many years and over the past 2 months has been worse  It could happen with anything including water  It is intermittent  She has had to cough up the food and has had a Heimlich maneuver done as well  She has had heartburn for about a year  She is on protonix OTC  It does not help as much  She has symptoms once a week  She has never had an EGD done  She has not had a colonoscopy done  She has issues with diarrhea / constipation and intermittent bleeding  There are times that she is normal and other times she may have the above symptoms  No changes in weight  She has lactose intolerant  No family history of colon polyps or colon cancer  Her father had cholangiocarcinoma  She does not smoke or drink  She did have anemia with low iron levels  Now Hb is normal  She has menorrhagia  US in 2015 which showed fatty liver  AST is 52/ ALT 39 and albumin 3 0  Plts are normal      The following portions of the patient's history were reviewed and updated as appropriate: allergies, current medications, past family history, past medical history, past social history, past surgical history and problem list     Review of Systems   Constitutional: Negative  HENT: Negative  Eyes: Negative  Respiratory: Negative  Cardiovascular: Negative  Gastrointestinal:        See HPI   Endocrine: Negative  Genitourinary: Negative  Musculoskeletal: Negative  Skin: Negative  Allergic/Immunologic: Negative  Neurological: Negative  Hematological: Negative  Psychiatric/Behavioral: Negative  All other systems reviewed and are negative  Objective:      /80 (BP Location: Left arm, Patient Position: Sitting, Cuff Size: Standard)   Pulse (!) 111   Temp (!) 96 9 °F (36 1 °C) (Tympanic)   Ht 5' 5" (1 651 m)   Wt 94 9 kg (209 lb 3 2 oz)   BMI 34 81 kg/m²          Physical Exam   Constitutional: She is oriented to person, place, and time  Vital signs are normal  She appears well-developed and well-nourished  HENT:   Head: Normocephalic and atraumatic  Eyes: Pupils are equal, round, and reactive to light  Conjunctivae are normal  No scleral icterus  Neck: Normal range of motion  Cardiovascular: Normal rate, regular rhythm and normal heart sounds  Pulmonary/Chest: Effort normal and breath sounds normal  No respiratory distress  Abdominal: Soft  Normal appearance and bowel sounds are normal  She exhibits no distension, no ascites and no mass  There is no hepatosplenomegaly  There is no tenderness  No hernia  Musculoskeletal: Normal range of motion  Lymphadenopathy:     She has no cervical adenopathy  Neurological: She is alert and oriented to person, place, and time  Skin: Skin is warm  Psychiatric: She has a normal mood and affect   Her behavior is normal  Thought content normal

## 2018-10-10 NOTE — ASSESSMENT & PLAN NOTE
She has some symptoms of oropharyngeal dysphagia  This is intermittent  Possible web? / Prudencio Estrada? Recommend EGD with possible dilation  Speech and swallow evaluation

## 2018-10-10 NOTE — ASSESSMENT & PLAN NOTE
Likely due to menorrhagia  Will evaluate for celiac as well with serology and duodenal biopsies at time of EGD

## 2018-10-10 NOTE — LETTER
October 10, 2018     Jolene Larios PA-C  [phone] [facility] [id number]    Patient: Gwendolyn Huitron   YOB: 1965   Date of Visit: 10/10/2018       Dear Dr Neda Vernon: Thank you for referring Gwendolyn Huitron to me for evaluation  Below are my notes for this consultation  If you have questions, please do not hesitate to call me  I look forward to following your patient along with you  Sincerely,        Jazmyn Andrade MD        CC: No Recipients  Jazmyn Andrade MD  10/10/2018  9:54 AM  Sign at close encounter  Assessment/Plan:    Acid reflux  She does have occasional symptoms despite being on the protonix (she does not take it every day)  I would recommend doing an EGD for evaluation for esophagitis and Miles's esophagus  Iron deficiency  Likely due to menorrhagia  Will evaluate for celiac as well with serology and duodenal biopsies at time of EGD  Colon cancer screening  Average risk for colon cancer screening  REcommend colonoscopy  The procedure was discussed with the patient at length and she is agreeable to proceed  Oropharyngeal dysphagia  She has some symptoms of oropharyngeal dysphagia  This is intermittent  Possible web? / Anne Marie Black? Recommend EGD with possible dilation  Speech and swallow evaluation  Hepatic steatosis  Recommend weight loss of about 10% body weight  Exercise  LFT's are likely elevated due to the fatty liver  Elevated LFTs  Recheck levels  Diet and exercise for hepatic steatosis  Check viral hepatitis serologies          Diagnoses and all orders for this visit:    Gastroesophageal reflux disease, esophagitis presence not specified  -     Case request operating room: EGD AND COLONOSCOPY; Standing    Dysphagia, unspecified type  -     Ambulatory referral to Gastroenterology  -     Case request operating room: EGD AND COLONOSCOPY; Standing    Screening for colon cancer  -     Ambulatory referral to Gastroenterology  - SUPREP BOWEL PREP KIT 17 5-3 13-1 6 GM/180ML SOLN; Take 1 kit by mouth once for 1 dose Please follow instructions as per the office   -     Case request operating room: EGD AND COLONOSCOPY; Standing    Iron deficiency  -     Case request operating room: EGD AND COLONOSCOPY; Standing    Colon cancer screening    Oropharyngeal dysphagia  -     Case request operating room: EGD AND COLONOSCOPY; Standing    Hepatic steatosis  -     Chronic Hepatitis Panel; Future  -     Hepatic function panel; Future  -     US right upper quadrant; Future    Elevated LFTs  -     Chronic Hepatitis Panel; Future  -     Hepatic function panel; Future  -     US right upper quadrant; Future    Other orders  -     Diet NPO; Sips with meds; Standing  -     Void on call to OR; Standing  -     Insert peripheral IV; Standing          Subjective:      Patient ID: Luis Teague is a 50 y o  female  HPI    She has had symptoms of choking with food for many years and over the past 2 months has been worse  It could happen with anything including water  It is intermittent  She has had to cough up the food and has had a Heimlich maneuver done as well  She has had heartburn for about a year  She is on protonix OTC  It does not help as much  She has symptoms once a week  She has never had an EGD done  She has not had a colonoscopy done  She has issues with diarrhea / constipation and intermittent bleeding  There are times that she is normal and other times she may have the above symptoms  No changes in weight  She has lactose intolerant  No family history of colon polyps or colon cancer  Her father had cholangiocarcinoma  She does not smoke or drink  She did have anemia with low iron levels  Now Hb is normal  She has menorrhagia  US in 2015 which showed fatty liver  AST is 52/ ALT 39 and albumin 3 0   Plts are normal      The following portions of the patient's history were reviewed and updated as appropriate: allergies, current medications, past family history, past medical history, past social history, past surgical history and problem list     Review of Systems   Constitutional: Negative  HENT: Negative  Eyes: Negative  Respiratory: Negative  Cardiovascular: Negative  Gastrointestinal:        See HPI   Endocrine: Negative  Genitourinary: Negative  Musculoskeletal: Negative  Skin: Negative  Allergic/Immunologic: Negative  Neurological: Negative  Hematological: Negative  Psychiatric/Behavioral: Negative  All other systems reviewed and are negative  Objective:      /80 (BP Location: Left arm, Patient Position: Sitting, Cuff Size: Standard)   Pulse (!) 111   Temp (!) 96 9 °F (36 1 °C) (Tympanic)   Ht 5' 5" (1 651 m)   Wt 94 9 kg (209 lb 3 2 oz)   BMI 34 81 kg/m²           Physical Exam   Constitutional: She is oriented to person, place, and time  Vital signs are normal  She appears well-developed and well-nourished  HENT:   Head: Normocephalic and atraumatic  Eyes: Pupils are equal, round, and reactive to light  Conjunctivae are normal  No scleral icterus  Neck: Normal range of motion  Cardiovascular: Normal rate, regular rhythm and normal heart sounds  Pulmonary/Chest: Effort normal and breath sounds normal  No respiratory distress  Abdominal: Soft  Normal appearance and bowel sounds are normal  She exhibits no distension, no ascites and no mass  There is no hepatosplenomegaly  There is no tenderness  No hernia  Musculoskeletal: Normal range of motion  Lymphadenopathy:     She has no cervical adenopathy  Neurological: She is alert and oriented to person, place, and time  Skin: Skin is warm  Psychiatric: She has a normal mood and affect   Her behavior is normal  Thought content normal

## 2018-10-10 NOTE — ASSESSMENT & PLAN NOTE
Average risk for colon cancer screening  REcommend colonoscopy  The procedure was discussed with the patient at length and she is agreeable to proceed

## 2018-10-10 NOTE — ASSESSMENT & PLAN NOTE
Recommend weight loss of about 10% body weight  Exercise  LFT's are likely elevated due to the fatty liver

## 2018-10-18 ENCOUNTER — HOSPITAL ENCOUNTER (OUTPATIENT)
Dept: ULTRASOUND IMAGING | Facility: HOSPITAL | Age: 53
Discharge: HOME | End: 2018-10-18
Attending: INTERNAL MEDICINE
Payer: COMMERCIAL

## 2018-10-18 ENCOUNTER — APPOINTMENT (OUTPATIENT)
Dept: LAB | Facility: HOSPITAL | Age: 53
End: 2018-10-18
Attending: INTERNAL MEDICINE
Payer: COMMERCIAL

## 2018-10-18 ENCOUNTER — HOSPITAL ENCOUNTER (OUTPATIENT)
Dept: RADIOLOGY | Facility: HOSPITAL | Age: 53
Discharge: HOME | End: 2018-10-18
Attending: INTERNAL MEDICINE
Payer: COMMERCIAL

## 2018-10-18 DIAGNOSIS — R79.89 ELEVATED LFTS: ICD-10-CM

## 2018-10-18 DIAGNOSIS — R13.12 OROPHARYNGEAL DYSPHAGIA: ICD-10-CM

## 2018-10-18 DIAGNOSIS — K76.0 HEPATIC STEATOSIS: ICD-10-CM

## 2018-10-18 LAB
ALBUMIN SERPL BCP-MCNC: 3.3 G/DL (ref 3.5–5)
ALP SERPL-CCNC: 114 U/L (ref 46–116)
ALT SERPL W P-5'-P-CCNC: 27 U/L (ref 12–78)
AST SERPL W P-5'-P-CCNC: 18 U/L (ref 5–45)
BILIRUB DIRECT SERPL-MCNC: 0.14 MG/DL (ref 0–0.2)
BILIRUB SERPL-MCNC: 0.7 MG/DL (ref 0.2–1)
HBV CORE AB SER QL: NORMAL
HBV CORE IGM SER QL: NORMAL
HBV SURFACE AG SER QL: NORMAL
HCV AB SER QL: NORMAL
PROT SERPL-MCNC: 7.4 G/DL (ref 6.4–8.2)

## 2018-10-18 PROCEDURE — 87340 HEPATITIS B SURFACE AG IA: CPT

## 2018-10-18 PROCEDURE — G8997 SWALLOW GOAL STATUS: HCPCS

## 2018-10-18 PROCEDURE — 36415 COLL VENOUS BLD VENIPUNCTURE: CPT

## 2018-10-18 PROCEDURE — 80076 HEPATIC FUNCTION PANEL: CPT

## 2018-10-18 PROCEDURE — 86803 HEPATITIS C AB TEST: CPT

## 2018-10-18 PROCEDURE — G8998 SWALLOW D/C STATUS: HCPCS

## 2018-10-18 PROCEDURE — 86705 HEP B CORE ANTIBODY IGM: CPT

## 2018-10-18 PROCEDURE — G8996 SWALLOW CURRENT STATUS: HCPCS

## 2018-10-18 PROCEDURE — 74230 X-RAY XM SWLNG FUNCJ C+: CPT

## 2018-10-18 PROCEDURE — 92611 MOTION FLUOROSCOPY/SWALLOW: CPT

## 2018-10-18 PROCEDURE — 76705 ECHO EXAM OF ABDOMEN: CPT

## 2018-10-18 PROCEDURE — 86704 HEP B CORE ANTIBODY TOTAL: CPT

## 2018-10-18 NOTE — PROCEDURES
Video Swallow Study      Patient Name: Bree Chen  Today's Date: 10/18/2018        Past Medical History  Past Medical History:   Diagnosis Date    Diabetes mellitus ([address] )     Hypertension     Migraine         Past Surgical History  Past Surgical History:   Procedure Laterality Date    TUBAL LIGATION           General Information:      Pt is a 51 y/o female referred for VBS d/t intermittent c/o choking on foods or liquids  She reports her symptome have progressed since the summer and that she has recently choked on popcorn chicken as well as potato chips and can even choke on water  She reports multiple food allergies  PMH  Per medicine: She has had heartburn for about a year  She is on protonix OTC  It does not help as much  She has symptoms once a week  She has never had an EGD done  She has not had a colonoscopy done  She has issues with diarrhea / constipation and intermittent bleeding  There are times that she is normal and other times she may have the above symptoms  No changes in weight  She has lactose intolerant  She does not smoke or drink  She did have anemia with low iron levels  Now Hb is normal  She has menorrhagia  US in 2015 which showed fatty liver  AST is 52/ ALT 39 and albumin 3 0  Plts are normal  Additional hx  Includes: anxiety/depression, DM,HTN, PICA, hydrocephalus  Pt was assessed in the lateral/upright position with  Moist puree, thick puree, soft and solid foods, thin liquids via cup and straw and a 13mm barium tablet    Oral Stage: WNL           Pharyngeal Stage: WNL                  Esophageal Stage: The esophagus was informally scanned while swallowing a barium tablet with thin water  The tablet passed into the stomach after two sips of liquid  Assessment Summary:  No evidence for oral-pharyngeal dysphagia today  Diagnosis/Prognosis: Normal oral-pharyngeal swallowing function              Recommendations:  1  Regular diet with thin liquids as tolerated  2  Ongoing medical management  for described symptoms/compliants                 Sariah Carvalho MA, CCC/SLP  [id number]  [email or web address]  Available via tiger text

## 2018-10-23 ENCOUNTER — ANESTHESIA EVENT (OUTPATIENT)
Dept: PERIOP | Facility: HOSPITAL | Age: 53
End: 2018-10-23
Payer: COMMERCIAL

## 2018-10-23 ENCOUNTER — HOSPITAL ENCOUNTER (OUTPATIENT)
Facility: HOSPITAL | Age: 53
Setting detail: OUTPATIENT SURGERY
Discharge: HOME | End: 2018-10-23
Attending: INTERNAL MEDICINE | Admitting: INTERNAL MEDICINE
Payer: COMMERCIAL

## 2018-10-23 ENCOUNTER — ANESTHESIA (OUTPATIENT)
Dept: PERIOP | Facility: HOSPITAL | Age: 53
End: 2018-10-23
Payer: COMMERCIAL

## 2018-10-23 VITALS
RESPIRATION RATE: 18 BRPM | WEIGHT: 200 LBS | HEIGHT: 65 IN | DIASTOLIC BLOOD PRESSURE: 77 MMHG | HEART RATE: 89 BPM | OXYGEN SATURATION: 99 % | SYSTOLIC BLOOD PRESSURE: 154 MMHG | TEMPERATURE: 98.4 F | BODY MASS INDEX: 33.32 KG/M2

## 2018-10-23 DIAGNOSIS — Z12.11 SCREENING FOR COLON CANCER: ICD-10-CM

## 2018-10-23 DIAGNOSIS — R13.10 DYSPHAGIA, UNSPECIFIED TYPE: ICD-10-CM

## 2018-10-23 DIAGNOSIS — E61.1 IRON DEFICIENCY: ICD-10-CM

## 2018-10-23 DIAGNOSIS — R13.12 OROPHARYNGEAL DYSPHAGIA: ICD-10-CM

## 2018-10-23 DIAGNOSIS — K21.9 GASTROESOPHAGEAL REFLUX DISEASE, ESOPHAGITIS PRESENCE NOT SPECIFIED: ICD-10-CM

## 2018-10-23 LAB — GLUCOSE SERPL-MCNC: 87 MG/DL (ref 65–140)

## 2018-10-23 PROCEDURE — 45385 COLONOSCOPY W/LESION REMOVAL: CPT | Performed by: INTERNAL MEDICINE

## 2018-10-23 PROCEDURE — 88342 IMHCHEM/IMCYTCHM 1ST ANTB: CPT | Performed by: PATHOLOGY

## 2018-10-23 PROCEDURE — 88305 TISSUE EXAM BY PATHOLOGIST: CPT | Performed by: PATHOLOGY

## 2018-10-23 PROCEDURE — 43239 EGD BIOPSY SINGLE/MULTIPLE: CPT | Performed by: INTERNAL MEDICINE

## 2018-10-23 PROCEDURE — 82948 REAGENT STRIP/BLOOD GLUCOSE: CPT

## 2018-10-23 RX ORDER — SODIUM CHLORIDE, SODIUM LACTATE, POTASSIUM CHLORIDE, CALCIUM CHLORIDE 600; 310; 30; 20 MG/100ML; MG/100ML; MG/100ML; MG/100ML
125 INJECTION, SOLUTION INTRAVENOUS CONTINUOUS
Status: DISCONTINUED | OUTPATIENT
Start: 2018-10-23 | End: 2018-10-23 | Stop reason: HOSPADM

## 2018-10-23 RX ORDER — PROPOFOL 10 MG/ML
INJECTION, EMULSION INTRAVENOUS CONTINUOUS PRN
Status: DISCONTINUED | OUTPATIENT
Start: 2018-10-23 | End: 2018-10-23 | Stop reason: SURG

## 2018-10-23 RX ORDER — ONDANSETRON 2 MG/ML
4 INJECTION INTRAMUSCULAR; INTRAVENOUS ONCE AS NEEDED
Status: DISCONTINUED | OUTPATIENT
Start: 2018-10-23 | End: 2018-10-23 | Stop reason: HOSPADM

## 2018-10-23 RX ORDER — LIDOCAINE HYDROCHLORIDE 10 MG/ML
0.5 INJECTION, SOLUTION INFILTRATION; PERINEURAL ONCE AS NEEDED
Status: DISCONTINUED | OUTPATIENT
Start: 2018-10-23 | End: 2018-10-23 | Stop reason: HOSPADM

## 2018-10-23 RX ORDER — SODIUM CHLORIDE, SODIUM LACTATE, POTASSIUM CHLORIDE, CALCIUM CHLORIDE 600; 310; 30; 20 MG/100ML; MG/100ML; MG/100ML; MG/100ML
50 INJECTION, SOLUTION INTRAVENOUS CONTINUOUS
Status: DISCONTINUED | OUTPATIENT
Start: 2018-10-23 | End: 2018-10-23 | Stop reason: HOSPADM

## 2018-10-23 RX ORDER — PROPOFOL 10 MG/ML
INJECTION, EMULSION INTRAVENOUS AS NEEDED
Status: DISCONTINUED | OUTPATIENT
Start: 2018-10-23 | End: 2018-10-23 | Stop reason: SURG

## 2018-10-23 RX ADMIN — LIDOCAINE HYDROCHLORIDE 100 MG: 20 INJECTION, SOLUTION INTRAVENOUS at 13:48

## 2018-10-23 RX ADMIN — PROPOFOL 50 MG: 10 INJECTION, EMULSION INTRAVENOUS at 13:50

## 2018-10-23 RX ADMIN — PROPOFOL 150 MCG/KG/MIN: 10 INJECTION, EMULSION INTRAVENOUS at 13:48

## 2018-10-23 RX ADMIN — PROPOFOL 100 MG: 10 INJECTION, EMULSION INTRAVENOUS at 13:48

## 2018-10-23 RX ADMIN — PROPOFOL 50 MG: 10 INJECTION, EMULSION INTRAVENOUS at 14:09

## 2018-10-23 RX ADMIN — SODIUM CHLORIDE, SODIUM LACTATE, POTASSIUM CHLORIDE, AND CALCIUM CHLORIDE: .6; .31; .03; .02 INJECTION, SOLUTION INTRAVENOUS at 14:31

## 2018-10-23 RX ADMIN — PROPOFOL 50 MG: 10 INJECTION, EMULSION INTRAVENOUS at 13:54

## 2018-10-23 RX ADMIN — PROPOFOL 50 MG: 10 INJECTION, EMULSION INTRAVENOUS at 14:00

## 2018-10-23 RX ADMIN — SODIUM CHLORIDE, SODIUM LACTATE, POTASSIUM CHLORIDE, AND CALCIUM CHLORIDE 50 ML/HR: .6; .31; .03; .02 INJECTION, SOLUTION INTRAVENOUS at 12:20

## 2018-10-23 NOTE — DISCHARGE INSTR - AVS FIRST PAGE
OPERATIVE REPORT  PATIENT NAME: Ventura Mcdonald    :  1965  MRN: [id number]  Pt Location: [facility] ROOM 03    SURGERY DATE: 10/23/2018    Surgeon(s) and Role:     Lacinda Denver, MD - Primary    Preop Diagnosis:  Dysphagia, unspecified type [R13 10]  Screening for colon cancer [Z12 11]  Gastroesophageal reflux disease, esophagitis presence not specified [K21 9]  Iron deficiency [E61 1]  Oropharyngeal dysphagia [R13 12]    Post-Op Diagnosis Codes: * Dysphagia, unspecified type [R13 10]     * Screening for colon cancer [Z12 11]     * Gastroesophageal reflux disease, esophagitis presence not specified [K21 9]     * Iron deficiency [E61 1]     * Oropharyngeal dysphagia [R13 12]    Procedure(s) (LRB):  EGD AND COLONOSCOPY (N/A)    Specimen(s):  ID Type Source Tests Collected by Time Destination   1 : biopsy retrieved by cold biopsy forceps, DX: anemia Tissue Duodenum TISSUE EXAM Sherwin Huitron MD 10/23/2018 1356    2 : gastric body biopsy, retrieved by cold biopsy forceps, R/O H  pylori Tissue Stomach TISSUE EXAM Sherwin Huitron MD 10/23/2018 1356    3 : random biopsies, retrieved by cold biopsy forceps,  R/O EOE Tissue Esophagus TISSUE EXAM Sherwin Huitron MD 10/23/2018 1408        ESOPHAGOGASTRODUODENOSCOPY    PROCEDURE: EGD/ Biopsy, Esophageal Dilation (Over Guidewire), Esophageal Dilation (Bougie)    INDICATIONS: Dysphagia and GERD    POST-OP DIAGNOSIS: See the impression below    SEDATION: Monitored anesthesia care, check anesthesia records    PHYSICAL EXAM:    Vitals:    10/23/18 1208   BP: 107/52   Pulse: 95   Resp: 18   Temp: 97 8 °F (36 6 °C)   SpO2: 98%    Body mass index is 33 28 kg/m²  General: NAD  Heart: S1 & S2 normal, RRR  Lungs: CTA, No rales or rhonchi  Abdomen: Soft, nontender, nondistended, good bowel sounds    CONSENT:  Informed consent was obtained for the procedure, including sedation after explaining the risks and benefits of the procedure   Risks including but not limited to bleeding, perforation, infection, aspiration were discussed in detail  Also explained about less than 100% sensitivity with the exam and other alternatives  PREPARATION:   EKG tracing, pulse oximetry, blood pressure were monitored throughout the procedure  Patient was identified by myself both verbally and by visual inspection of ID band  DESCRIPTION:   Patient was placed in the left lateral decubitus position and was sedated with the above medication  The gastroscope was introduced in to the oropharynx and the esophagus was intubated under direct visualization  Scope was passed down the esophagus up to 2nd part of the duodenum  A careful inspection was made as the gastroscope was withdrawn, including a retroflexed view of the stomach; findings and interventions are described below  FINDINGS:    #1  Esophagus and GEJ- normal esophagus  Dilation was done using a Savary dilator up to 60 [address]  No significant mucosal tear was seen  Random biopsies were done in the esophagus to rule out eosinophilic esophagitis  The GE junction was noted at 39 cm  #2  Stomach- mild erosive gastritis  Biopsies were done in the antrum and the body  #3  Duodenum- the duodenal bulb and the 2nd portion were normal   Random biopsies were done to rule out celiac disease due to her iron deficiency  IMPRESSIONS:      1  Normal esophagus  Biopsies were done  Dilation was done to 61 [address]  No mucosal tear was seen  2  Mild gastritis  Biopsies were done  3  Normal duodenum  Biopsies were done  RECOMMENDATIONS:     1  Follow up with the results of the biopsies with Dr Reyes Simas in 2 weeks  COMPLICATIONS:  None; patient tolerated the procedure well            DISPOSITION: PACU           CONDITION: Stable      OPERATIVE REPORT  PATIENT NAME: Tam Oh    :  1965  MRN: [id number]  Pt Location: [facility]    SURGERY DATE: 10/23/2018    Surgeon(s) and Role:     Laura Michaels MD - Primary    Preop Diagnosis:  Dysphagia, unspecified type [R13 10]  Screening for colon cancer [Z12 11]  Gastroesophageal reflux disease, esophagitis presence not specified [K21 9]  Iron deficiency [E61 1]  Oropharyngeal dysphagia [R13 12]    Post-Op Diagnosis Codes: * Dysphagia, unspecified type [R13 10]     * Screening for colon cancer [Z12 11]     * Gastroesophageal reflux disease, esophagitis presence not specified [K21 9]     * Iron deficiency [E61 1]     * Oropharyngeal dysphagia [R13 12]    Procedure(s) (LRB):  EGD with Savary dilation AND COLONOSCOPY (N/A)    Specimen(s):  ID Type Source Tests Collected by Time Destination   1 : biopsy retrieved by cold biopsy forceps, DX: anemia Tissue Duodenum TISSUE EXAM Chema Worthy MD 10/23/2018 1356    2 : gastric body biopsy, retrieved by cold biopsy forceps, R/O H  pylori Tissue Stomach TISSUE EXAM Chema Worthy MD 10/23/2018 1356    3 : random biopsies, retrieved by cold biopsy forceps,  R/O EOE Tissue Esophagus TISSUE EXAM Chema Worthy MD 10/23/2018 1408    4 : polyp retrieved by cold snare Tissue Large Intestine, Cecum TISSUE EXAM Chema Worthy MD 10/23/2018 1423        COLONOSCOPY    PROCEDURE: Colonoscopy/ Polypectomy (Cold Snare)    INDICATIONS: Screening for Colon Cancer    POST-OP DIAGNOSIS: See the impression below    SEDATION: Monitored anesthesia care, check anesthesia records    PHYSICAL EXAM:    /52   Pulse 95   Temp 97 8 °F (36 6 °C) (Tympanic)   Resp 18   Ht 5' 5" (1 651 m)   Wt 90 7 kg (200 lb)   LMP 09/23/2018 (Approximate)   SpO2 98%   BMI 33 28 kg/m²    Body mass index is 33 28 kg/m²  General: NAD  Heart: S1 & S2 normal, RRR  Lungs: CTA, No rales or rhonchi  Abdomen: Soft, nontender, nondistended, good bowel sounds    CONSENT:  Informed consent was obtained for the procedure, including sedation after explaining the risks and benefits of the procedure   Risks including but not limited to bleeding, perforation, infection, aspiration were discussed in detail  Also explained about less than 100%$ sensitivity with the exam and other alternatives  PREPARATION:   EKG tracing, pulse oximetry, blood pressure were monitored throughout the procedure  Patient was identified by myself both verbally and by visual inspection of ID band  DESCRIPTION:   Patient was placed in the left lateral decubitus position and was sedated with the above medication  Digital rectal examination was performed  The colonoscope was introduced in to the anal canal and advanced up to cecum, which was identified by the appendiceal orifice and IC valve  A careful inspection was made as the colonoscope was withdrawn, including a retroflexed view of the rectum; findings and interventions are described below  Appropriate photodocumentation was obtained  The quality of the colonic preparation was good  FINDINGS:    A small polyp was seen in the cecum  Removed by cold snare polypectomy  Mild diverticulosis  Otherwise normal colonoscopy  IMPRESSIONS:      1  Small polyp seen in the cecum  Removed by cold snare polypectomy  2  Mild diverticulosis  RECOMMENDATIONS:    1  Follow up with the results of the biopsies with Dr Mariposa Patel in 2 weeks  2  Repeat colonoscopy in 5 years  COMPLICATIONS:  None; patient tolerated the procedure well      DISPOSITION: PACU           CONDITION: Stable

## 2018-10-23 NOTE — OP NOTE
OPERATIVE REPORT  PATIENT NAME: Kassie Still    :  1965  MRN: [id number]  Pt Location: [facility]    SURGERY DATE: 10/23/2018    Surgeon(s) and Role:     Anais Zambrano MD - Primary    Preop Diagnosis:  Dysphagia, unspecified type [R13 10]  Screening for colon cancer [Z12 11]  Gastroesophageal reflux disease, esophagitis presence not specified [K21 9]  Iron deficiency [E61 1]  Oropharyngeal dysphagia [R13 12]    Post-Op Diagnosis Codes: * Dysphagia, unspecified type [R13 10]     * Screening for colon cancer [Z12 11]     * Gastroesophageal reflux disease, esophagitis presence not specified [K21 9]     * Iron deficiency [E61 1]     * Oropharyngeal dysphagia [R13 12]    Procedure(s) (LRB):  EGD with Savary dilation AND COLONOSCOPY (N/A)    Specimen(s):  ID Type Source Tests Collected by Time Destination   1 : biopsy retrieved by cold biopsy forceps, DX: anemia Tissue Duodenum TISSUE EXAM Brittney Belcher MD 10/23/2018 1356    2 : gastric body biopsy, retrieved by cold biopsy forceps, R/O H  pylori Tissue Stomach TISSUE EXAM Brittney Belcher MD 10/23/2018 1356    3 : random biopsies, retrieved by cold biopsy forceps,  R/O EOE Tissue Esophagus TISSUE EXAM Brittney Belcher MD 10/23/2018 1408    4 : polyp retrieved by cold snare Tissue Large Intestine, Cecum TISSUE EXAM Brittney Belcher MD 10/23/2018 1423        COLONOSCOPY    PROCEDURE: Colonoscopy/ Polypectomy (Cold Snare)    INDICATIONS: Screening for Colon Cancer    POST-OP DIAGNOSIS: See the impression below    SEDATION: Monitored anesthesia care, check anesthesia records    PHYSICAL EXAM:    /52   Pulse 95   Temp 97 8 °F (36 6 °C) (Tympanic)   Resp 18   Ht 5' 5" (1 651 m)   Wt 90 7 kg (200 lb)   LMP 2018 (Approximate)   SpO2 98%   BMI 33 28 kg/m²   Body mass index is 33 28 kg/m²      General: NAD  Heart: S1 & S2 normal, RRR  Lungs: CTA, No rales or rhonchi  Abdomen: Soft, nontender, nondistended, good bowel sounds    CONSENT:  Informed consent was obtained for the procedure, including sedation after explaining the risks and benefits of the procedure  Risks including but not limited to bleeding, perforation, infection, aspiration were discussed in detail  Also explained about less than 100%$ sensitivity with the exam and other alternatives  PREPARATION:   EKG tracing, pulse oximetry, blood pressure were monitored throughout the procedure  Patient was identified by myself both verbally and by visual inspection of ID band  DESCRIPTION:   Patient was placed in the left lateral decubitus position and was sedated with the above medication  Digital rectal examination was performed  The colonoscope was introduced in to the anal canal and advanced up to cecum, which was identified by the appendiceal orifice and IC valve  A careful inspection was made as the colonoscope was withdrawn, including a retroflexed view of the rectum; findings and interventions are described below  Appropriate photodocumentation was obtained  The quality of the colonic preparation was good  FINDINGS:    A small polyp was seen in the cecum  Removed by cold snare polypectomy  Mild diverticulosis  Otherwise normal colonoscopy  IMPRESSIONS:      1  Small polyp seen in the cecum  Removed by cold snare polypectomy  2  Mild diverticulosis  RECOMMENDATIONS:    1  Follow up with the results of the biopsies with Dr Jeovany Weston in 2 weeks  2  Repeat colonoscopy in 5 years  COMPLICATIONS:  None; patient tolerated the procedure well      DISPOSITION: PACU           CONDITION: Stable

## 2018-10-23 NOTE — OP NOTE
OPERATIVE REPORT  PATIENT NAME: Bere Chen    :  1965  MRN: [id number]  Pt Location: [facility]    SURGERY DATE: 10/23/2018    Surgeon(s) and Role:     Carmen Cano MD - Primary    Preop Diagnosis:  Dysphagia, unspecified type [R13 10]  Screening for colon cancer [Z12 11]  Gastroesophageal reflux disease, esophagitis presence not specified [K21 9]  Iron deficiency [E61 1]  Oropharyngeal dysphagia [R13 12]    Post-Op Diagnosis Codes: * Dysphagia, unspecified type [R13 10]     * Screening for colon cancer [Z12 11]     * Gastroesophageal reflux disease, esophagitis presence not specified [K21 9]     * Iron deficiency [E61 1]     * Oropharyngeal dysphagia [R13 12]    Procedure(s) (LRB):  EGD AND COLONOSCOPY (N/A)    Specimen(s):  ID Type Source Tests Collected by Time Destination   1 : biopsy retrieved by cold biopsy forceps, DX: anemia Tissue Duodenum TISSUE EXAM Roxanna Conteh MD 10/23/2018 1356    2 : gastric body biopsy, retrieved by cold biopsy forceps, R/O H  pylori Tissue Stomach TISSUE EXAM Roxanna Conteh MD 10/23/2018 1356    3 : random biopsies, retrieved by cold biopsy forceps,  R/O EOE Tissue Esophagus TISSUE EXAM Roxanna Conteh MD 10/23/2018 1408        ESOPHAGOGASTRODUODENOSCOPY    PROCEDURE: EGD/ Biopsy, Esophageal Dilation (Over Guidewire), Esophageal Dilation (Bougie)    INDICATIONS: Dysphagia and GERD    POST-OP DIAGNOSIS: See the impression below    SEDATION: Monitored anesthesia care, check anesthesia records    PHYSICAL EXAM:    Vitals:    10/23/18 1208   BP: 107/52   Pulse: 95   Resp: 18   Temp: 97 8 °F (36 6 °C)   SpO2: 98%    Body mass index is 33 28 kg/m²  General: NAD  Heart: S1 & S2 normal, RRR  Lungs: CTA, No rales or rhonchi  Abdomen: Soft, nontender, nondistended, good bowel sounds    CONSENT:  Informed consent was obtained for the procedure, including sedation after explaining the risks and benefits of the procedure   Risks including but not limited to bleeding, perforation, infection, aspiration were discussed in detail  Also explained about less than 100% sensitivity with the exam and other alternatives  PREPARATION:   EKG tracing, pulse oximetry, blood pressure were monitored throughout the procedure  Patient was identified by myself both verbally and by visual inspection of ID band  DESCRIPTION:   Patient was placed in the left lateral decubitus position and was sedated with the above medication  The gastroscope was introduced in to the oropharynx and the esophagus was intubated under direct visualization  Scope was passed down the esophagus up to 2nd part of the duodenum  A careful inspection was made as the gastroscope was withdrawn, including a retroflexed view of the stomach; findings and interventions are described below  FINDINGS:    #1  Esophagus and GEJ- normal esophagus  Dilation was done using a Savary dilator up to [address]  No significant mucosal tear was seen  Random biopsies were done in the esophagus to rule out eosinophilic esophagitis  The GE junction was noted at 39 cm  #2  Stomach- mild erosive gastritis  Biopsies were done in the antrum and the body  #3  Duodenum- the duodenal bulb and the 2nd portion were normal   Random biopsies were done to rule out celiac disease due to her iron deficiency  IMPRESSIONS:      1  Normal esophagus  Biopsies were done  Dilation was done to [address]  No mucosal tear was seen  2  Mild gastritis  Biopsies were done  3  Normal duodenum  Biopsies were done  RECOMMENDATIONS:     1  Follow up with the results of the biopsies with Dr Ralf Traylor in 2 weeks  COMPLICATIONS:  None; patient tolerated the procedure well            DISPOSITION: PACU           CONDITION: Stable

## 2018-10-23 NOTE — H&P (VIEW-ONLY)
Assessment/Plan:    Acid reflux  She does have occasional symptoms despite being on the protonix (she does not take it every day)  I would recommend doing an EGD for evaluation for esophagitis and Miles's esophagus  Iron deficiency  Likely due to menorrhagia  Will evaluate for celiac as well with serology and duodenal biopsies at time of EGD  Colon cancer screening  Average risk for colon cancer screening  REcommend colonoscopy  The procedure was discussed with the patient at length and she is agreeable to proceed  Oropharyngeal dysphagia  She has some symptoms of oropharyngeal dysphagia  This is intermittent  Possible web? / Rand Moment? Recommend EGD with possible dilation  Speech and swallow evaluation  Hepatic steatosis  Recommend weight loss of about 10% body weight  Exercise  LFT's are likely elevated due to the fatty liver  Elevated LFTs  Recheck levels  Diet and exercise for hepatic steatosis  Check viral hepatitis serologies          Diagnoses and all orders for this visit:    Gastroesophageal reflux disease, esophagitis presence not specified  -     Case request operating room: EGD AND COLONOSCOPY; Standing  -     Case request operating room: EGD AND COLONOSCOPY    Dysphagia, unspecified type  -     Ambulatory referral to Gastroenterology  -     Case request operating room: EGD AND COLONOSCOPY; Standing  -     Case request operating room: EGD AND COLONOSCOPY    Screening for colon cancer  -     Ambulatory referral to Gastroenterology  -     [facility] BOWEL PREP KIT 17 5-3 13-1 6 GM/180ML SOLN; Take 1 kit by mouth once for 1 dose Please follow instructions as per the office   -     Case request operating room: EGD AND COLONOSCOPY; Standing  -     Case request operating room: EGD AND COLONOSCOPY    Iron deficiency  -     Case request operating room: EGD AND COLONOSCOPY; Standing  -     Case request operating room: EGD AND COLONOSCOPY    Colon cancer screening    Oropharyngeal dysphagia  -     Case request operating room: EGD AND COLONOSCOPY; Standing  -     Case request operating room: EGD AND COLONOSCOPY  -     FL barium swallow video w speech; Future    Hepatic steatosis  -     Chronic Hepatitis Panel; Future  -     Hepatic function panel; Future  -     US right upper quadrant; Future    Elevated LFTs  -     Chronic Hepatitis Panel; Future  -     Hepatic function panel; Future  -     US right upper quadrant; Future    Other orders  -     Diet NPO; Sips with meds; Standing  -     Void on call to OR; Standing  -     Insert peripheral IV; Standing          Subjective:      Patient ID: Aj Stearns is a 50 y o  female  HPI    She has had symptoms of choking with food for many years and over the past 2 months has been worse  It could happen with anything including water  It is intermittent  She has had to cough up the food and has had a Heimlich maneuver done as well  She has had heartburn for about a year  She is on protonix OTC  It does not help as much  She has symptoms once a week  She has never had an EGD done  She has not had a colonoscopy done  She has issues with diarrhea / constipation and intermittent bleeding  There are times that she is normal and other times she may have the above symptoms  No changes in weight  She has lactose intolerant  No family history of colon polyps or colon cancer  Her father had cholangiocarcinoma  She does not smoke or drink  She did have anemia with low iron levels  Now Hb is normal  She has menorrhagia  US in 2015 which showed fatty liver  AST is 52/ ALT 39 and albumin 3 0  Plts are normal      The following portions of the patient's history were reviewed and updated as appropriate: allergies, current medications, past family history, past medical history, past social history, past surgical history and problem list     Review of Systems   Constitutional: Negative  HENT: Negative  Eyes: Negative  Respiratory: Negative  Cardiovascular: Negative  Gastrointestinal:        See HPI   Endocrine: Negative  Genitourinary: Negative  Musculoskeletal: Negative  Skin: Negative  Allergic/Immunologic: Negative  Neurological: Negative  Hematological: Negative  Psychiatric/Behavioral: Negative  All other systems reviewed and are negative  Objective:      /80 (BP Location: Left arm, Patient Position: Sitting, Cuff Size: Standard)   Pulse (!) 111   Temp (!) 96 9 °F (36 1 °C) (Tympanic)   Ht 5' 5" (1 651 m)   Wt 94 9 kg (209 lb 3 2 oz)   BMI 34 81 kg/m²          Physical Exam   Constitutional: She is oriented to person, place, and time  Vital signs are normal  She appears well-developed and well-nourished  HENT:   Head: Normocephalic and atraumatic  Eyes: Pupils are equal, round, and reactive to light  Conjunctivae are normal  No scleral icterus  Neck: Normal range of motion  Cardiovascular: Normal rate, regular rhythm and normal heart sounds  Pulmonary/Chest: Effort normal and breath sounds normal  No respiratory distress  Abdominal: Soft  Normal appearance and bowel sounds are normal  She exhibits no distension, no ascites and no mass  There is no hepatosplenomegaly  There is no tenderness  No hernia  Musculoskeletal: Normal range of motion  Lymphadenopathy:     She has no cervical adenopathy  Neurological: She is alert and oriented to person, place, and time  Skin: Skin is warm  Psychiatric: She has a normal mood and affect   Her behavior is normal  Thought content normal

## 2018-10-23 NOTE — ANESTHESIA PREPROCEDURE EVALUATION
Review of Systems/Medical History  Patient summary reviewed  Chart reviewed  No history of anesthetic complications     Cardiovascular  Exercise tolerance (METS): >4,  Hyperlipidemia, Hypertension ,    Pulmonary  Negative pulmonary ROS        GI/Hepatic    GERD ,        Negative  ROS        Endo/Other  Diabetes type 2 Insulin,      GYN       Hematology  Negative hematology ROS      Musculoskeletal  Negative musculoskeletal ROS        Neurology    Headaches (migraine),    Psychology   Anxiety, Depression ,              Physical Exam    Airway    Mallampati score: III  TM Distance: >3 FB  Neck ROM: full     Dental   No notable dental hx     Cardiovascular  Cardiovascular exam normal    Pulmonary  Pulmonary exam normal     Other Findings        Anesthesia Plan  ASA Score- 2     Anesthesia Type- IV sedation with anesthesia with ASA Monitors  Additional Monitors:   Airway Plan:     Comment: Discussed with pt the possibility under sedation to have recall or mild discomfort        Plan Factors-    Induction- intravenous  Postoperative Plan-     Informed Consent- Anesthetic plan and risks discussed with patient and spouse  I personally reviewed this patient with the CRNA  Discussed and agreed on the Anesthesia Plan with the CRNA  Debbie Smith

## 2018-10-26 ENCOUNTER — TELEPHONE (OUTPATIENT)
Dept: GASTROENTEROLOGY | Facility: CLINIC | Age: 53
End: 2018-10-26

## 2018-10-26 NOTE — TELEPHONE ENCOUNTER
martha patient      She had egd / colon on 10-23-18 and has tenderness  on her left jaw right below her ear and wants to know if this is normal     Call back # [phone]

## 2018-10-26 NOTE — TELEPHONE ENCOUNTER
I called her back, she states that she has pain and tenderness along her neck below her ear, she states the pain is not in her jaw  She feels that the area is swollen  Upon review of the chart, she did have dilation of her esophagus and I let her know that this could be a muscle strain or very rarely perforation of the esophagus and I recommended that she go to the ED for imaging to rule out the latter however she states that she is feeling better than she was after the procedure and she will consider going to the ED but right now she does feel improved  I let her know that going to the ED is the safest thing to do

## 2018-11-06 ENCOUNTER — TELEPHONE (OUTPATIENT)
Dept: GASTROENTEROLOGY | Facility: AMBULARY SURGERY CENTER | Age: 53
End: 2018-11-06

## 2018-11-06 ENCOUNTER — OFFICE VISIT (OUTPATIENT)
Dept: FAMILY MEDICINE CLINIC | Facility: CLINIC | Age: 53
End: 2018-11-06
Payer: COMMERCIAL

## 2018-11-06 VITALS
BODY MASS INDEX: 34.82 KG/M2 | TEMPERATURE: 98.1 F | HEART RATE: 113 BPM | SYSTOLIC BLOOD PRESSURE: 150 MMHG | RESPIRATION RATE: 16 BRPM | OXYGEN SATURATION: 98 % | WEIGHT: 209 LBS | HEIGHT: 65 IN | DIASTOLIC BLOOD PRESSURE: 80 MMHG

## 2018-11-06 DIAGNOSIS — F43.10 PTSD (POST-TRAUMATIC STRESS DISORDER): ICD-10-CM

## 2018-11-06 DIAGNOSIS — A04.8 H. PYLORI INFECTION: Primary | ICD-10-CM

## 2018-11-06 DIAGNOSIS — F32.A DEPRESSION, UNSPECIFIED DEPRESSION TYPE: ICD-10-CM

## 2018-11-06 DIAGNOSIS — J06.9 ACUTE URI: Primary | ICD-10-CM

## 2018-11-06 DIAGNOSIS — K21.9 GASTROESOPHAGEAL REFLUX DISEASE, ESOPHAGITIS PRESENCE NOT SPECIFIED: ICD-10-CM

## 2018-11-06 PROCEDURE — 99213 OFFICE O/P EST LOW 20 MIN: CPT | Performed by: FAMILY MEDICINE

## 2018-11-06 RX ORDER — PANTOPRAZOLE SODIUM 20 MG/1
20 TABLET, DELAYED RELEASE ORAL 2 TIMES DAILY
Qty: 28 TABLET | Refills: 0 | Status: SHIPPED | OUTPATIENT
Start: 2018-11-06 | End: 2018-11-06 | Stop reason: SDUPTHER

## 2018-11-06 RX ORDER — CLARITHROMYCIN 500 MG/1
500 TABLET, COATED ORAL EVERY 12 HOURS SCHEDULED
Qty: 28 TABLET | Refills: 0 | Status: SHIPPED | OUTPATIENT
Start: 2018-11-06 | End: 2018-11-20

## 2018-11-06 RX ORDER — FLUTICASONE PROPIONATE 50 MCG
1 SPRAY, SUSPENSION (ML) NASAL DAILY
Qty: 16 G | Refills: 0 | Status: SHIPPED | OUTPATIENT
Start: 2018-11-06 | End: 2018-11-06 | Stop reason: SDUPTHER

## 2018-11-06 RX ORDER — PANTOPRAZOLE SODIUM 20 MG/1
20 TABLET, DELAYED RELEASE ORAL 2 TIMES DAILY
Qty: 28 TABLET | Refills: 0 | Status: SHIPPED | OUTPATIENT
Start: 2018-11-06 | End: 2019-07-29

## 2018-11-06 RX ORDER — FLUTICASONE PROPIONATE 50 MCG
2 SPRAY, SUSPENSION (ML) NASAL DAILY
Qty: 16 G | Refills: 0 | Status: SHIPPED | OUTPATIENT
Start: 2018-11-06 | End: 2019-07-29

## 2018-11-06 RX ORDER — CLARITHROMYCIN 500 MG/1
500 TABLET, COATED ORAL EVERY 12 HOURS SCHEDULED
Qty: 28 TABLET | Refills: 0 | Status: SHIPPED | OUTPATIENT
Start: 2018-11-06 | End: 2018-11-06 | Stop reason: SDUPTHER

## 2018-11-06 RX ORDER — BUPROPION HYDROCHLORIDE 100 MG/1
100 TABLET ORAL 2 TIMES DAILY
Qty: 60 TABLET | Refills: 0 | Status: SHIPPED | OUTPATIENT
Start: 2018-11-06 | End: 2018-12-04 | Stop reason: SDUPTHER

## 2018-11-06 RX ORDER — AMOXICILLIN 500 MG/1
1000 CAPSULE ORAL EVERY 12 HOURS SCHEDULED
Qty: 56 CAPSULE | Refills: 0 | Status: SHIPPED | OUTPATIENT
Start: 2018-11-06 | End: 2018-11-06 | Stop reason: SDUPTHER

## 2018-11-06 RX ORDER — AMOXICILLIN 500 MG/1
1000 CAPSULE ORAL EVERY 12 HOURS SCHEDULED
Qty: 56 CAPSULE | Refills: 0 | Status: SHIPPED | OUTPATIENT
Start: 2018-11-06 | End: 2018-11-20

## 2018-11-06 NOTE — TELEPHONE ENCOUNTER
I placed the orders, please make sure she knows to get the h  Pylori stool test done and to hold her atorvastatin while taking the antibiotics  Thanks!

## 2018-11-06 NOTE — PROGRESS NOTES
Assessment/Plan:      Diagnoses and all orders for this visit:    Acute URI  -     Discontinue: fluticasone (FLONASE) 50 mcg/act nasal spray; 1 spray into each nostril daily  -     fluticasone (FLONASE) 50 mcg/act nasal spray; 2 sprays into each nostril daily    PTSD (post-traumatic stress disorder)  -     Ambulatory referral to [facility]; [facility]  -     buPROPion (WELLBUTRIN) 100 mg tablet; Take 1 tablet (100 mg total) by mouth 2 (two) times a day    Depression, unspecified depression type  -     buPROPion (WELLBUTRIN) 100 mg tablet; Take 1 tablet (100 mg total) by mouth 2 (two) times a day        Discussion/Plan:  Acute URI - rest, fluids, supportive measures  flonase for congestion  PTSD/Depression - will try wellbutrin 100mg BID  Referral for psychotherapy  Seek ED if SI/HI/AH/VH/manic sx  RTC 1 month or sooner if needed  Subjective:       Patient ID: Ramona Mdeina is a 50 y o  female  Chief Complaint   Patient presents with    Sore Throat    Cough    Earache    Abdominal Pain    Heartburn       Patient is a 50year old female who presents to the office today for acute visit  Patient reports sore throat, ear ache, and cough x 2 days  She reports feeling achy all over  Patient has been taking tylenol for symptoms             The following portions of the patient's history were reviewed and updated as appropriate: allergies, current medications, past family history, past medical history, past social history, past surgical history and problem list     Patient Active Problem List   Diagnosis    Acid reflux    Allergic cough    Anxiety    Depression    Diabetes mellitus ([facility] [address] )    Hydrocephalus    Hypertension    Iron deficiency    Pain, foot, left, chronic    Pica in adults    Herniation of lumbar intervertebral disc with radiculopathy    Other hyperlipidemia    Colon cancer screening    Oropharyngeal dysphagia    Hepatic steatosis    Elevated LFTs    Dysphagia    Screening for colon cancer    Gastroesophageal reflux disease     Current Outpatient Prescriptions on File Prior to Visit   Medication Sig Dispense Refill    atorvastatin (LIPITOR) 20 mg tablet Take 1 tablet (20 mg total) by mouth daily 90 tablet 2    gabapentin (NEURONTIN) 300 mg capsule Take 1 capsule (300 mg total) by mouth 2 (two) times a day 60 capsule 0    glimepiride (AMARYL) 4 mg tablet Take 1 tablet by mouth 2 (two) times a day      hydrochlorothiazide (HYDRODIURIL) 25 mg tablet Take 1 tablet (25 mg total) by mouth daily 90 tablet 3    Insulin Glargine-Lixisenatide (SOLIQUA) 100 units-33 mcg/mL injection pen Inject 35 Units under the skin daily      Nerve Stimulator (TENS THERAPY PAIN RELIEF) BILLY 1 Device by Does not apply route 2 (two) times a day as needed (pain) 1 Device 0    pantoprazole (PROTONIX) 20 mg tablet Take 1 tablet (20 mg total) by mouth daily 30 tablet 0    promethazine (PHENERGAN) 25 mg tablet Take 1 tablet (25 mg total) by mouth 3 (three) times a day as needed for nausea 90 tablet 2    [DISCONTINUED] SUPREP BOWEL PREP KIT 17 5-3 13-1 6 GM/180ML SOLN Take 1 kit by mouth once for 1 dose Please follow instructions as per the office  2 Bottle 0     No current facility-administered medications on file prior to visit  Review of Systems   Constitutional: Positive for fatigue  HENT: Positive for ear pain and sore throat  Negative for congestion, dental problem, drooling, ear discharge, facial swelling, hearing loss, mouth sores, nosebleeds, postnasal drip, rhinorrhea, sinus pain, sinus pressure, sneezing, tinnitus, trouble swallowing and voice change  Respiratory: Positive for cough  Negative for apnea, choking, chest tightness, shortness of breath, wheezing and stridor  Cardiovascular: Negative  Musculoskeletal: Positive for myalgias  Neurological: Negative            Objective:      /80 (BP Location: Right arm, Patient Position: Sitting, Cuff Size: Large) Pulse (!) 113   Temp 98 1 °F (36 7 °C) (Tympanic)   Resp 16   Ht 5' 5" (1 651 m)   Wt 94 8 kg (209 lb)   SpO2 98%   BMI 34 78 kg/m²           Physical Exam   Constitutional: She is oriented to person, place, and time  She appears well-developed and well-nourished  obese   HENT:   Head: Normocephalic and atraumatic  Right Ear: External ear normal    Left Ear: External ear normal    Nose: Nose normal    Mouth/Throat: Oropharynx is clear and moist    Eyes: Pupils are equal, round, and reactive to light  Conjunctivae are normal    Neck: Neck supple  No thyromegaly present  Cardiovascular: Normal rate, regular rhythm and normal heart sounds  Pulmonary/Chest: Effort normal and breath sounds normal    Abdominal: Soft  Bowel sounds are normal  There is no tenderness  Neurological: She is alert and oriented to person, place, and time  Skin: Skin is warm and dry  Psychiatric: Her speech is normal and behavior is normal  Her mood appears anxious  She exhibits a depressed mood     Tearful at times

## 2018-11-06 NOTE — TELEPHONE ENCOUNTER
Dr Schneider's pt called requesting to have her lab results that were done on 10/23/18  Please call pt to relay results

## 2018-11-06 NOTE — TELEPHONE ENCOUNTER
LMOM for patient to call office so I can relay this information to her and answer any questions she may have

## 2018-11-06 NOTE — TELEPHONE ENCOUNTER
Patient called back, relayed this information to her  Patient is requesting we said the antibiotics to [facility]oya in [facility]

## 2018-11-26 ENCOUNTER — TELEPHONE (OUTPATIENT)
Dept: GASTROENTEROLOGY | Facility: CLINIC | Age: 53
End: 2018-11-26

## 2018-11-26 NOTE — TELEPHONE ENCOUNTER
martha patient      She just finished her antibiotics for h pylori and now she has constipation---would like a call back

## 2018-11-26 NOTE — TELEPHONE ENCOUNTER
Dr Naik Minor patient -Hx reflux, esophagitis, positive H  Pylori    Symptom call for constipation after completing H  Pylori medication  Increasing periods of constipation with Last BM 11/24- hard stool  Used OTC stool softener without results  Denies pain, N/V, GI bleeding  Recommended Miralax twice a day until having normal bowel movements and then decrease to maintain normal BM and increase PO fluids  Last dose Protonix today  She is aware she needs to go for repeat stool study two weeks after stopping Protonix  Instructed to call if symptoms continue and to go to ER for severe pain/bleeding

## 2018-12-04 ENCOUNTER — OFFICE VISIT (OUTPATIENT)
Dept: FAMILY MEDICINE CLINIC | Facility: CLINIC | Age: 53
End: 2018-12-04
Payer: COMMERCIAL

## 2018-12-04 VITALS
HEART RATE: 87 BPM | BODY MASS INDEX: 35.32 KG/M2 | OXYGEN SATURATION: 98 % | WEIGHT: 212 LBS | RESPIRATION RATE: 16 BRPM | DIASTOLIC BLOOD PRESSURE: 62 MMHG | SYSTOLIC BLOOD PRESSURE: 144 MMHG | HEIGHT: 65 IN | TEMPERATURE: 98.6 F

## 2018-12-04 DIAGNOSIS — F32.A DEPRESSION, UNSPECIFIED DEPRESSION TYPE: ICD-10-CM

## 2018-12-04 DIAGNOSIS — M79.10 MYALGIA: ICD-10-CM

## 2018-12-04 DIAGNOSIS — F43.10 PTSD (POST-TRAUMATIC STRESS DISORDER): Primary | ICD-10-CM

## 2018-12-04 PROCEDURE — 99213 OFFICE O/P EST LOW 20 MIN: CPT | Performed by: FAMILY MEDICINE

## 2018-12-04 RX ORDER — BUPROPION HYDROCHLORIDE 100 MG/1
100 TABLET ORAL 2 TIMES DAILY
Qty: 60 TABLET | Refills: 2 | Status: SHIPPED | OUTPATIENT
Start: 2018-12-04 | End: 2019-01-31 | Stop reason: ALTCHOICE

## 2018-12-04 NOTE — PROGRESS NOTES
Assessment/Plan:     Diagnoses and all orders for this visit:    PTSD (post-traumatic stress disorder)  -     buPROPion (WELLBUTRIN) 100 mg tablet; Take 1 tablet (100 mg total) by mouth 2 (two) times a day    Depression, unspecified depression type  -     buPROPion (WELLBUTRIN) 100 mg tablet; Take 1 tablet (100 mg total) by mouth 2 (two) times a day    Myalgia  -     CBC and differential; Future  -     Comprehensive metabolic panel; Future  -     Sedimentation rate, automated; Future  -     Vitamin D 1,25 dihydroxy; Future  -     Vitamin B12/Folate, Serum Panel; Future  -     MARIA M Screen w/ Reflex to Titer/Pattern; Future  -     RF Screen w/ Reflex to Titer; Future  -     Lyme Antibody Profile with reflex to WB; Future      Discussion/Plan:  PTSD/Depression - symptoms improving  Continue wellbutrin BID  Continue psychotherapy  Myalgias - likely due to increase in physical activity  Advised heating pad, massage, gentle stretching, adequate hydration  Will check labs  RTC 1 month or sooner if needed  Subjective:      Patient ID: Adrian Rivera is a 50 y o  female  Chief Complaint   Patient presents with    Medication Refill    Generalized Body Aches       Patient is a 50year old female who presents to the office today for 1 month follow up  She states wellbutrin is helping her, feeling good  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, physical labor, and is overall feeling sore from the increase in physical activity  She also reports history of fibromyalgia, has generalized myalgias  She has tried OTC NSAID without relief            The following portions of the patient's history were reviewed and updated as appropriate: allergies, current medications, past family history, past medical history, past social history, past surgical history and problem list     Patient Active Problem List   Diagnosis    Acid reflux    Allergic cough    Anxiety    Depression    Diabetes mellitus ([facility] [address] )    Hydrocephalus    Hypertension    Iron deficiency    Pain, foot, left, chronic    Pica in adults    Herniation of lumbar intervertebral disc with radiculopathy    Other hyperlipidemia    Colon cancer screening    Oropharyngeal dysphagia    Hepatic steatosis    Elevated LFTs    Dysphagia    Screening for colon cancer    Gastroesophageal reflux disease     Current Outpatient Prescriptions on File Prior to Visit   Medication Sig Dispense Refill    atorvastatin (LIPITOR) 20 mg tablet Take 1 tablet (20 mg total) by mouth daily 90 tablet 2    fluticasone (FLONASE) 50 mcg/act nasal spray 2 sprays into each nostril daily 16 g 0    gabapentin (NEURONTIN) 300 mg capsule Take 1 capsule (300 mg total) by mouth 2 (two) times a day 60 capsule 0    glimepiride (AMARYL) 4 mg tablet Take 1 tablet by mouth 2 (two) times a day      hydrochlorothiazide (HYDRODIURIL) 25 mg tablet Take 1 tablet (25 mg total) by mouth daily 90 tablet 3    Insulin Glargine-Lixisenatide (SOLIQUA) 100 units-33 mcg/mL injection pen Inject 35 Units under the skin daily      Nerve Stimulator (TENS THERAPY PAIN RELIEF) BILLY 1 Device by Does not apply route 2 (two) times a day as needed (pain) 1 Device 0    pantoprazole (PROTONIX) 20 mg tablet Take 1 tablet (20 mg total) by mouth 2 (two) times a day 28 tablet 0    promethazine (PHENERGAN) 25 mg tablet Take 1 tablet (25 mg total) by mouth 3 (three) times a day as needed for nausea 90 tablet 2    [DISCONTINUED] buPROPion (WELLBUTRIN) 100 mg tablet Take 1 tablet (100 mg total) by mouth 2 (two) times a day 60 tablet 0     No current facility-administered medications on file prior to visit  Review of Systems   Constitutional: Negative  Respiratory: Negative  Cardiovascular: Negative  Gastrointestinal: Negative  Musculoskeletal: Positive for myalgias  Psychiatric/Behavioral: Negative           Symptoms improving         Objective:      /62 (BP Location: Left arm, Patient Position: Sitting, Cuff Size: Large)   Pulse 87   Temp 98 6 °F (37 °C) (Tympanic)   Resp 16   Ht 5' 5" (1 651 m)   Wt 96 2 kg (212 lb)   SpO2 98%   BMI 35 28 kg/m²          Physical Exam   Constitutional: She is oriented to person, place, and time  She appears well-developed and well-nourished  obese   HENT:   Head: Normocephalic and atraumatic  Right Ear: Tympanic membrane, external ear and ear canal normal    Left Ear: Tympanic membrane, external ear and ear canal normal    Nose: Nose normal    Mouth/Throat: Oropharynx is clear and moist    Eyes: Pupils are equal, round, and reactive to light  Neck: Neck supple  Cardiovascular: Normal rate, regular rhythm and normal heart sounds  Pulmonary/Chest: Effort normal and breath sounds normal    Abdominal: Soft  Bowel sounds are normal    Neurological: She is alert and oriented to person, place, and time  Skin: Skin is warm and dry  Psychiatric: She has a normal mood and affect   Her behavior is normal

## 2018-12-09 ENCOUNTER — APPOINTMENT (EMERGENCY)
Dept: RADIOLOGY | Facility: HOSPITAL | Age: 53
End: 2018-12-09
Payer: COMMERCIAL

## 2018-12-09 ENCOUNTER — HOSPITAL ENCOUNTER (EMERGENCY)
Facility: HOSPITAL | Age: 53
Discharge: HOME | End: 2018-12-09
Attending: EMERGENCY MEDICINE | Admitting: EMERGENCY MEDICINE
Payer: COMMERCIAL

## 2018-12-09 VITALS
DIASTOLIC BLOOD PRESSURE: 79 MMHG | HEART RATE: 98 BPM | SYSTOLIC BLOOD PRESSURE: 171 MMHG | TEMPERATURE: 99.2 F | RESPIRATION RATE: 18 BRPM | BODY MASS INDEX: 35.05 KG/M2 | OXYGEN SATURATION: 98 % | WEIGHT: 210.6 LBS

## 2018-12-09 DIAGNOSIS — S66.911A: ICD-10-CM

## 2018-12-09 DIAGNOSIS — S59.801A: Primary | ICD-10-CM

## 2018-12-09 LAB
ATRIAL RATE: 120 BPM
P AXIS: 52 DEGREES
PR INTERVAL: 150 MS
QRS AXIS: 49 DEGREES
QRSD INTERVAL: 90 MS
QT INTERVAL: 336 MS
QTC INTERVAL: 474 MS
T WAVE AXIS: 51 DEGREES
VENTRICULAR RATE: 120 BPM

## 2018-12-09 PROCEDURE — 99284 EMERGENCY DEPT VISIT MOD MDM: CPT

## 2018-12-09 PROCEDURE — 93010 ELECTROCARDIOGRAM REPORT: CPT | Performed by: INTERNAL MEDICINE

## 2018-12-09 PROCEDURE — 73080 X-RAY EXAM OF ELBOW: CPT

## 2018-12-09 PROCEDURE — 73110 X-RAY EXAM OF WRIST: CPT

## 2018-12-09 PROCEDURE — 93005 ELECTROCARDIOGRAM TRACING: CPT

## 2018-12-09 RX ORDER — ACETAMINOPHEN 325 MG/1
975 TABLET ORAL ONCE
Status: COMPLETED | OUTPATIENT
Start: 2018-12-09 | End: 2018-12-09

## 2018-12-09 RX ADMIN — ACETAMINOPHEN 975 MG: 325 TABLET, FILM COATED ORAL at 19:25

## 2018-12-10 NOTE — DISCHARGE INSTRUCTIONS
Elbow Sprain   WHAT YOU NEED TO KNOW:   An elbow sprain is caused by a stretched or torn ligament in the elbow joint  Ligaments are the strong tissues that connect bones  DISCHARGE INSTRUCTIONS:   Return to the emergency department if:   · The skin of your injured arm looks bluish or pale (less color than normal)  · You have new or increased numbness in your injured arm  Contact your healthcare provider if:   · You have increased swelling and pain in your elbow  · You have new or increased stiffness or trouble moving your injured arm  · You have questions or concerns about your condition or care  Medicines:   · Prescription pain medicine  may be given  Ask how to take this medicine safely  · Take your medicine as directed  Contact your healthcare provider if you think your medicine is not helping or if you have side effects  Tell him or her if you are allergic to any medicine  Keep a list of the medicines, vitamins, and herbs you take  Include the amounts, and when and why you take them  Bring the list or the pill bottles to follow-up visits  Carry your medicine list with you in case of an emergency  Rest your elbow: You will need to rest your elbow for 1 to 2 days after your injury  This will help decrease the risk of more damage to your elbow  Ice your elbow:  Apply ice on your elbow for 15 to 20 minutes every hour or as directed  Use an ice pack, or put crushed ice in a plastic bag  Cover it with a towel  Ice helps prevent tissue damage and decreases swelling and pain  Compress your elbow:  Compression provides support and helps decrease swelling and movement so your elbow can heal  You may be told to keep your elbow wrapped with a tight elastic bandage  Follow instructions about how to apply your bandage  Elevate your elbow:  Elevate your elbow above the level of your heart as often as you can  This will help decrease swelling and pain   Prop your elbow on pillows or blankets to keep it elevated comfortably  Exercise your elbow: You should begin to exercise your arm in a few days, once you are able to move your elbow without pain  Exercises will help decrease stiffness and improve the strength of your arm  Ask your healthcare provider what kind of exercises you should do  Prevent another elbow sprain:   · Make sure you warm up and stretch before you exercise  · Do not exercise when you feel pain or you are tired  · Wear equipment to protect yourself when you play sports  · Stop exercising and playing sports if your symptoms from a past injury return  Follow up with your healthcare provider within 1 week:  Write down any questions you have so you remember to ask them in your follow-up visits  © 2017 [employer]  Information is for End User's use only and may not be sold, redistributed or otherwise used for commercial purposes  All illustrations and images included in CareNotes® are the copyrighted property of [employer]  or [employer]  The above information is an  only  It is not intended as medical advice for individual conditions or treatments  Talk to your doctor, nurse or pharmacist before following any medical regimen to see if it is safe and effective for you  Wrist Sprain   WHAT YOU NEED TO KNOW:   A wrist sprain happens when one or more ligaments in your wrist stretch or tear  Ligaments are tough tissues that connect bones and keep them in place, and support your joints  DISCHARGE INSTRUCTIONS:   Return to the emergency department if:   · You have severe pain or swelling  · Your injured wrist is red or has red streaks spreading from the injured area  · You have new trouble moving your hands, fingers, or wrist     · Your wrist, hand, or fingers feel cold or numb  · Your fingernails turn blue or gray  Contact your healthcare provider if:   · Your symptoms get worse       · You have pain and swelling for more than 48 hours  · You have questions or concerns about your condition or care  Medicines:   · NSAIDs , such as ibuprofen, help decrease swelling, pain, and fever  NSAIDs can cause stomach bleeding or kidney problems in certain people  If you take blood thinner medicine, always ask your healthcare provider if NSAIDs are safe for you  Always read the medicine label and follow directions  · Acetaminophen  decreases pain and fever  It is available without a doctor's order  Ask how much to take and how often to take it  Follow directions  Read the labels of all other medicines you are using to see if they also contain acetaminophen, or ask your doctor or pharmacist  Acetaminophen can cause liver damage if not taken correctly  Do not use more than 4 grams (4,000 milligrams) total of acetaminophen in one day  · Take your medicine as directed  Contact your healthcare provider if you think your medicine is not helping or if you have side effects  Tell him or her if you are allergic to any medicine  Keep a list of the medicines, vitamins, and herbs you take  Include the amounts, and when and why you take them  Bring the list or the pill bottles to follow-up visits  Carry your medicine list with you in case of an emergency  Self-care:   · Rest  your wrist for at least 48 hours  Avoid activities that cause pain  · Ice  your wrist for 15 to 20 minutes every hour or as directed  Use an ice pack, or put crushed ice in a plastic bag  Cover it with a towel before you put it on your wrist  Ice helps prevent tissue damage and decreases swelling and pain  · Compress  your wrist with an elastic bandage  This will help decrease swelling, support your wrist, and help it heal  Wear your wrist wrap as directed  The elastic bandage should be snug but not tight  · Elevate  your wrist above the level of your heart as often as you can  This will help decrease swelling and pain   Prop your wrist on pillows or blankets to keep it elevated comfortably  Wrist support: You may need to wear a splint or cast to support your wrist and prevent more damage  Wear your splint as directed  Ask for instructions on how to bathe while you are wearing a splint or cast    Physical therapy:  Your healthcare provider may recommend that you go to physical therapy  A physical therapist teaches you exercises to help improve movement and strength, and to decrease pain  Follow up with your healthcare provider as directed:  Write down your questions so you remember to ask them during your visits  © 2017 [facility] [facility] Information is for End User's use only and may not be sold, redistributed or otherwise used for commercial purposes  All illustrations and images included in CareNotes® are the copyrighted property of [employer]  or [employer]  The above information is an  only  It is not intended as medical advice for individual conditions or treatments  Talk to your doctor, nurse or pharmacist before following any medical regimen to see if it is safe and effective for you

## 2018-12-10 NOTE — ED PROVIDER NOTES
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; she states that a 5 lb bag of sugar fell out of the cupboard above which she was working and that she caught it with the sugar landing in the antecubital fossa of her right elbow  She did not strike her elbow or and fact any portion of the right upper extremity against any other surface during this episode  She had some degree of mild pain in the right elbow at the time but noted significant worsening over this morning upon awakening with AROM of the elbow  Pain is present to a degree at rest and significantly worse with pronation/supination of the elbow as well as direct palpation of the medial/lateral epicondyles  She also notes pain to a lesser degree in the lateral aspect of the right wrist proximal to the joint itself  She denies any trauma or injury to this area in particular from the episode yesterday  Denies any other trauma or injury from this episode  Denies any paresthesias/weakness in the right upper extremity  Does not have any history of fracture/surgery to the right upper extremity  Incidentally during her triage, note was made of the patient's sinus tachycardia  She denies any associated chest pain/dyspnea/palpitations/lightheadedness/presyncope  She states that she has had ongoing tachycardia of unclear etiology for several years  Had previous hospital admission with cardiac stress testing in this facility in 2016  She has not had any cardiology follow-up since that time  She states she is not having any symptoms related to this and is apparently unchanged from her baseline          History provided by:  Patient and spouse  Arm Pain   Associated symptoms: no fatigue, no fever, no myalgias and no rash        Prior to Admission Medications   Prescriptions Last Dose Informant Patient Reported? Taking? Insulin Glargine-Lixisenatide (SOLIQUA) 100 units-33 mcg/mL injection pen   Yes No   Sig: Inject 35 Units under the skin daily   Nerve Stimulator (TENS THERAPY PAIN RELIEF) BILLY  Self No No   Si Device by Does not apply route 2 (two) times a day as needed (pain)   atorvastatin (LIPITOR) 20 mg tablet  Self No No   Sig: Take 1 tablet (20 mg total) by mouth daily   buPROPion (WELLBUTRIN) 100 mg tablet   No No   Sig: Take 1 tablet (100 mg total) by mouth 2 (two) times a day   fluticasone (FLONASE) 50 mcg/act nasal spray   No No   Si sprays into each nostril daily   gabapentin (NEURONTIN) 300 mg capsule  Self No No   Sig: Take 1 capsule (300 mg total) by mouth 2 (two) times a day   glimepiride (AMARYL) 4 mg tablet  Self Yes No   Sig: Take 1 tablet by mouth 2 (two) times a day   hydrochlorothiazide (HYDRODIURIL) 25 mg tablet  Self No No   Sig: Take 1 tablet (25 mg total) by mouth daily   pantoprazole (PROTONIX) 20 mg tablet   No No   Sig: Take 1 tablet (20 mg total) by mouth 2 (two) times a day   promethazine (PHENERGAN) 25 mg tablet  Self No No   Sig: Take 1 tablet (25 mg total) by mouth 3 (three) times a day as needed for nausea      Facility-Administered Medications: None       Past Medical History:   Diagnosis Date    Diabetes mellitus ([facility] [address] )     Hyperlipidemia     Hypertension     Migraine        Past Surgical History:   Procedure Laterality Date    CA ESOPHAGOGASTRODUODENOSCOPY TRANSORAL DIAGNOSTIC N/A 10/23/2018    Procedure: EGD with Savary dilation AND COLONOSCOPY;  Surgeon: Kev Valencia MD;  Location: [facility];  Service: Gastroenterology    TUBAL LIGATION         History reviewed  No pertinent family history  I have reviewed and agree with the history as documented      Social History   Substance Use Topics    Smoking status: Never Smoker    Smokeless tobacco: Never Used    Alcohol use No        Review of Systems   Constitutional: Negative for chills, diaphoresis, fatigue and fever  Musculoskeletal: Positive for arthralgias  Negative for joint swelling and myalgias  Skin: Negative for color change, pallor, rash and wound  Neurological: Negative for weakness and numbness  Hematological: Negative for adenopathy  Does not bruise/bleed easily  Physical Exam  Physical Exam   Constitutional: She is oriented to person, place, and time  Vital signs are normal  She appears well-developed and well-nourished  She is active and cooperative  No distress  HENT:   Head: Normocephalic and atraumatic  Neck: Trachea normal and phonation normal    Cardiovascular: Normal rate, regular rhythm, intact distal pulses and normal pulses  Pulses:       Radial pulses are 2+ on the right side, and 2+ on the left side  Ulnar pulses 2+ bilaterally   Pulmonary/Chest: Effort normal  No respiratory distress  Musculoskeletal:        Right shoulder: Normal         Left shoulder: Normal         Right elbow: She exhibits normal range of motion, no swelling, no effusion and no deformity  Tenderness (See comments) found  Medial epicondyle and lateral epicondyle tenderness noted  No radial head and no olecranon process tenderness noted  Left elbow: Normal         Right wrist: She exhibits tenderness (mild ttp of lateral aspect of wrist 1 5 cm proximal to anatomic snuffbox without crepitus/deformity/overlying skin changes  No snuffbox ttp or pain upon axial loading of thumb)  She exhibits normal range of motion, no bony tenderness, no swelling, no effusion, no crepitus, no deformity and no laceration          Left wrist: Normal         Right upper arm: Normal         Left upper arm: Normal         Right forearm: Normal         Left forearm: Normal         Arms:       Right hand: Normal         Left hand: Normal    R elbow:  No obvious external bony or skin abnormality  Moderate tenderness to palpation of both the medial and lateral epicondyles with particular exacerbation of pain in these locations with hyperpronation and to a lesser degree hypersupination  Patient is able to actively range the elbow in all planes of motion without difficulty  Neurological: She is alert and oriented to person, place, and time  She has normal strength  No sensory deficit  GCS eye subscore is 4  GCS verbal subscore is 5  GCS motor subscore is 6  Sensation is intact in sharp/dull in bilateral upper extremity in the C3-T2 distribution  Strength 5/5 bilateral upper extremity at shoulder/elbow/wrist in all planes of motion and in all MCP/PIP/DIP jointsbilaterally  Skin: Skin is warm, dry and intact  Capillary refill takes less than 2 seconds  Cap refill less than 2 seconds in all digits of bilateral upper extremity  She is not diaphoretic  Nursing note and vitals reviewed  Vital Signs  ED Triage Vitals [12/09/18 1806]   Temperature Pulse Respirations Blood Pressure SpO2   99 2 °F (37 3 °C) (!) 118 22 (!) 171/79 98 %      Temp Source Heart Rate Source Patient Position - Orthostatic VS BP Location FiO2 (%)   Temporal Monitor Lying Left arm --      Pain Score       5           Vitals:    12/09/18 1806 12/09/18 1931   BP: (!) 171/79    Pulse: (!) 118 98   Patient Position - Orthostatic VS: Lying        Visual Acuity      ED Medications  Medications   acetaminophen (TYLENOL) tablet 975 mg (975 mg Oral Given 12/9/18 1925)       Diagnostic Studies  Results Reviewed     None                 XR elbow 3+ views RIGHT   ED Interpretation by Teetee Novak DO (12/09 1858)   No acute fracture/dislocation  Joint spaces appear normal      XR wrist 3+ views RIGHT   ED Interpretation by Teetee Novak DO (12/09 1858)   No acute fracture/dislocation    Joint spaces appear normal                  Procedures  ECG 12 Lead Documentation  Date/Time: 12/9/2018 6:22 PM  Performed by: Mohsen Solorzano T  Authorized by: Da Mark     Indications / Diagnosis:  Tachycardia  ECG reviewed by me, the ED Provider: yes    Patient location:  ED  Previous ECG:     Previous ECG:  Compared to current    Comparison ECG info:  10 March 2016    Similarity:  No change    Comparison to cardiac monitor: Yes    Interpretation:     Interpretation: abnormal    Rate:     ECG rate:  120    ECG rate assessment: tachycardic    Rhythm:     Rhythm: sinus tachycardia    Ectopy:     Ectopy: none    QRS:     QRS axis:  Normal    QRS intervals:  Normal  Conduction:     Conduction: normal    ST segments:     ST segments:  Normal  T waves:     T waves: normal    Comments:      Pr 150 qrs 90 qtc 474           Phone Contacts  ED Phone Contact    ED Course       MDM  Number of Diagnoses or Management Options  Hyperextension injury of elbow, right, initial encounter: new and requires workup  Strain of wrist, right: new and requires workup     Amount and/or Complexity of Data Reviewed  Tests in the radiology section of CPT®: ordered and reviewed  Decide to obtain previous medical records or to obtain history from someone other than the patient: yes  Obtain history from someone other than the patient: yes  Review and summarize past medical records: yes  Independent visualization of images, tracings, or specimens: yes    Risk of Complications, Morbidity, and/or Mortality  Presenting problems: moderate  Diagnostic procedures: moderate  Management options: moderate  General comments: No evidence of radiographic abnormality and no clinical examination evidence of neurovascular injury  Patient has full use of the right upper extremity with exacerbation of symptoms upon direct palpation of affected areas and hyper supination/hyperpronation  The mechanism of injury is such that a hyperextension injury of the elbow is suspected with concomitant ligamentous strain of both the medial/lateral aspects of the elbow    I reviewed the expected course of such an injury with the patient and recommended consistent use of Ace wrap at the elbow as well as ice and acetaminophen/NSAID for symptom control  Should use the right upper extremity as tolerated  Re-evaluation by primary physician 1 week if she continues to have significant symptoms  All questions were answered prior to discharge  The patient and her  expressed understanding agreed to plan  Sinus tachycardia which is asymptomatic and not associated with clinical symptoms or EKG changes  Unchanged and c/w presentation 2 yr prior  Advised follow-up with primary physician versus Cardiology for non-emergent further evaluation  Patient Progress  Patient progress: improved    CritCare Time    Disposition  Final diagnoses:   Hyperextension injury of elbow, right, initial encounter   Strain of wrist, right     Time reflects when diagnosis was documented in both MDM as applicable and the Disposition within this note     Time User Action Codes Description Comment    12/9/2018  7:16 PM Cori Eng Add [S59 801A] Hyperextension injury of elbow, right, initial encounter     12/9/2018  7:16 PM Cori Eng Add [[id number] [id number]] Strain of wrist, right       ED Disposition     ED Disposition Condition Comment    Discharge  Simona Marissa discharge to home/self care  Condition at discharge: Good        Follow-up Information     Follow up With Specialties Details Why [id number] [facility] You Physician Assistant Schedule an appointment as soon as possible for a visit in 1 week If you continue to have significant pain in your elbow or wrist [address] [address]  [phone]            Discharge Medication List as of 12/9/2018  7:17 PM      CONTINUE these medications which have NOT CHANGED    Details   atorvastatin (LIPITOR) 20 mg tablet Take 1 tablet (20 mg total) by mouth daily, Starting u 8/9/2018, Normal      buPROPion (WELLBUTRIN) 100 mg tablet Take 1 tablet (100 mg total) by mouth 2 (two) times a day, Starting Tue 12/4/2018, Normal      fluticasone (FLONASE) 50 mcg/act nasal spray 2 sprays into each nostril daily, Starting Tue 11/6/2018, Normal      gabapentin (NEURONTIN) 300 mg capsule Take 1 capsule (300 mg total) by mouth 2 (two) times a day, Starting Thu 6/7/2018, Normal      glimepiride (AMARYL) 4 mg tablet Take 1 tablet by mouth 2 (two) times a day, Historical Med      hydrochlorothiazide (HYDRODIURIL) 25 mg tablet Take 1 tablet (25 mg total) by mouth daily, Starting Thu 8/9/2018, Normal      Insulin Glargine-Lixisenatide (SOLIQUA) 100 units-33 mcg/mL injection pen Inject 35 Units under the skin daily, Historical Med      Nerve Stimulator (TENS THERAPY PAIN RELIEF) BILLY 1 Device by Does not apply route 2 (two) times a day as needed (pain), Starting Fri 6/8/2018, Print      pantoprazole (PROTONIX) 20 mg tablet Take 1 tablet (20 mg total) by mouth 2 (two) times a day, Starting Tue 11/6/2018, Normal      promethazine (PHENERGAN) 25 mg tablet Take 1 tablet (25 mg total) by mouth 3 (three) times a day as needed for nausea, Starting Thu 8/9/2018, Normal           No discharge procedures on file      ED Provider  Electronically Signed by           Tonny Carrizales DO  12/09/18 2001

## 2018-12-11 ENCOUNTER — VBI (OUTPATIENT)
Dept: ADMINISTRATIVE | Facility: OTHER | Age: 53
End: 2018-12-11

## 2018-12-11 NOTE — TELEPHONE ENCOUNTER
Dheeraj Celaya    ED Visit Information     Ed visit date:12/9/18  Diagnosis Description: Hyperextension injury of elbow, right, initial encounter; Strain of wrist, right  In Network? Yes [address]  Discharge status: Home  Discharged with meds ? No  Number of ED visits to date: 2  ED Severity:3     Outreach Information    Outreach successful: Yes 1  Date letter mailed:0  Date Finalized:12/11/18    Care Coordination    Follow up appointment with pcp: no declined  Transportation issues ? No    [employer] type:  3 Day Outreach  Emergent necessity warranted by diagnosis:  No  [facility] PCP:  Yes  Transportation:  Friend/Family Transport  Called PCP first?:  Yes  Feels able to call PCP for urgent problems ?:  Yes  Understands what emergencies can be handled by PCP ?:  Yes  Ever any problems getting appointment with PCP for minor emergency/urgency problems?:  No  Practice Contacted Patient ?:  No  Pt had ED follow up with pcp/staff ?:  No    Reason Patient went to ED instead of Urgent Care or PCP?:  Perceived Severity of Illness  Urgent care Education?:  Yes  Spoke with Nori Pandey today she stated she did call the PCP prior to going to the ED but couldn't wait for the call back- she was in to much pain  Nori Pandey stated she is aware of [facility] urgent care facilities nearest to her but it was Sunday and they wouldn't have been open -( [facility] is closed on Sunday)    Pt did not receive any prescriptions told to take tylenol and rest

## 2018-12-15 ENCOUNTER — TRANSCRIBE ORDERS (OUTPATIENT)
Dept: ADMINISTRATIVE | Facility: HOSPITAL | Age: 53
End: 2018-12-15

## 2018-12-15 ENCOUNTER — APPOINTMENT (OUTPATIENT)
Dept: LAB | Facility: HOSPITAL | Age: 53
End: 2018-12-15
Payer: COMMERCIAL

## 2018-12-15 DIAGNOSIS — M79.10 MYALGIA: Primary | ICD-10-CM

## 2018-12-15 DIAGNOSIS — M79.10 MYALGIA: ICD-10-CM

## 2018-12-15 LAB
ALBUMIN SERPL BCP-MCNC: 3.8 G/DL (ref 3.5–5)
ALP SERPL-CCNC: 125 U/L (ref 46–116)
ALT SERPL W P-5'-P-CCNC: 28 U/L (ref 12–78)
ANION GAP SERPL CALCULATED.3IONS-SCNC: 11 MMOL/L (ref 4–13)
AST SERPL W P-5'-P-CCNC: 20 U/L (ref 5–45)
BASOPHILS # BLD AUTO: 0.04 THOUSANDS/ΜL (ref 0–0.1)
BASOPHILS NFR BLD AUTO: 1 % (ref 0–1)
BILIRUB SERPL-MCNC: 0.9 MG/DL (ref 0.2–1)
BUN SERPL-MCNC: 14 MG/DL (ref 5–25)
CALCIUM SERPL-MCNC: 8.9 MG/DL (ref 8.3–10.1)
CHLORIDE SERPL-SCNC: 98 MMOL/L (ref 100–108)
CO2 SERPL-SCNC: 29 MMOL/L (ref 21–32)
CREAT SERPL-MCNC: 0.83 MG/DL (ref 0.6–1.3)
EOSINOPHIL # BLD AUTO: 0.1 THOUSAND/ΜL (ref 0–0.61)
EOSINOPHIL NFR BLD AUTO: 2 % (ref 0–6)
ERYTHROCYTE [DISTWIDTH] IN BLOOD BY AUTOMATED COUNT: 17.6 % (ref 11.6–15.1)
ERYTHROCYTE [SEDIMENTATION RATE] IN BLOOD: 27 MM/HOUR (ref 0–20)
FOLATE SERPL-MCNC: 19.7 NG/ML (ref 3.1–17.5)
GFR SERPL CREATININE-BSD FRML MDRD: 82 ML/MIN/1.73SQ M
GLUCOSE P FAST SERPL-MCNC: 222 MG/DL (ref 65–99)
HCT VFR BLD AUTO: 33.3 % (ref 34.8–46.1)
HGB BLD-MCNC: 9.2 G/DL (ref 11.5–15.4)
IMM GRANULOCYTES # BLD AUTO: 0.01 THOUSAND/UL (ref 0–0.2)
IMM GRANULOCYTES NFR BLD AUTO: 0 % (ref 0–2)
LYMPHOCYTES # BLD AUTO: 1.21 THOUSANDS/ΜL (ref 0.6–4.47)
LYMPHOCYTES NFR BLD AUTO: 28 % (ref 14–44)
MCH RBC QN AUTO: 18.2 PG (ref 26.8–34.3)
MCHC RBC AUTO-ENTMCNC: 27.6 G/DL (ref 31.4–37.4)
MCV RBC AUTO: 66 FL (ref 82–98)
MONOCYTES # BLD AUTO: 0.36 THOUSAND/ΜL (ref 0.17–1.22)
MONOCYTES NFR BLD AUTO: 8 % (ref 4–12)
NEUTROPHILS # BLD AUTO: 2.61 THOUSANDS/ΜL (ref 1.85–7.62)
NEUTS SEG NFR BLD AUTO: 61 % (ref 43–75)
NRBC BLD AUTO-RTO: 0 /100 WBCS
PLATELET # BLD AUTO: 406 THOUSANDS/UL (ref 149–390)
PMV BLD AUTO: 8.4 FL (ref 8.9–12.7)
POTASSIUM SERPL-SCNC: 3.7 MMOL/L (ref 3.5–5.3)
PROT SERPL-MCNC: 7.5 G/DL (ref 6.4–8.2)
RBC # BLD AUTO: 5.05 MILLION/UL (ref 3.81–5.12)
RHEUMATOID FACT SER QL LA: NEGATIVE
SODIUM SERPL-SCNC: 138 MMOL/L (ref 136–145)
VIT B12 SERPL-MCNC: 371 PG/ML (ref 100–900)
WBC # BLD AUTO: 4.33 THOUSAND/UL (ref 4.31–10.16)

## 2018-12-15 PROCEDURE — 82746 ASSAY OF FOLIC ACID SERUM: CPT

## 2018-12-15 PROCEDURE — 82607 VITAMIN B-12: CPT

## 2018-12-15 PROCEDURE — 85025 COMPLETE CBC W/AUTO DIFF WBC: CPT

## 2018-12-15 PROCEDURE — 82652 VIT D 1 25-DIHYDROXY: CPT

## 2018-12-15 PROCEDURE — 36415 COLL VENOUS BLD VENIPUNCTURE: CPT

## 2018-12-15 PROCEDURE — 85652 RBC SED RATE AUTOMATED: CPT

## 2018-12-15 PROCEDURE — 86618 LYME DISEASE ANTIBODY: CPT

## 2018-12-15 PROCEDURE — 86430 RHEUMATOID FACTOR TEST QUAL: CPT

## 2018-12-15 PROCEDURE — 80053 COMPREHEN METABOLIC PANEL: CPT

## 2018-12-15 PROCEDURE — 86038 ANTINUCLEAR ANTIBODIES: CPT

## 2018-12-16 LAB
B BURGDOR IGG SER IA-ACNC: 0.14
B BURGDOR IGM SER IA-ACNC: 0.28

## 2018-12-17 LAB
1,25(OH)2D3 SERPL-MCNC: 54.2 PG/ML (ref 19.9–79.3)
RYE IGE QN: NEGATIVE

## 2018-12-21 DIAGNOSIS — D50.9 IRON DEFICIENCY ANEMIA, UNSPECIFIED IRON DEFICIENCY ANEMIA TYPE: Primary | ICD-10-CM

## 2018-12-27 ENCOUNTER — TELEPHONE (OUTPATIENT)
Dept: FAMILY MEDICINE CLINIC | Facility: CLINIC | Age: 53
End: 2018-12-27

## 2019-01-08 ENCOUNTER — OFFICE VISIT (OUTPATIENT)
Dept: FAMILY MEDICINE CLINIC | Facility: CLINIC | Age: 54
End: 2019-01-08
Payer: COMMERCIAL

## 2019-01-08 DIAGNOSIS — F41.9 ANXIETY: Primary | ICD-10-CM

## 2019-01-08 DIAGNOSIS — F43.10 POST-TRAUMATIC STRESS: ICD-10-CM

## 2019-01-08 DIAGNOSIS — F32.A DEPRESSION, UNSPECIFIED DEPRESSION TYPE: ICD-10-CM

## 2019-01-08 PROCEDURE — 90837 PSYTX W PT 60 MINUTES: CPT | Performed by: SOCIAL WORKER

## 2019-01-10 DIAGNOSIS — K21.9 GASTROESOPHAGEAL REFLUX DISEASE WITHOUT ESOPHAGITIS: ICD-10-CM
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
## 2019-01-11 RX ORDER — PROMETHAZINE HYDROCHLORIDE 25 MG/1
TABLET ORAL
Qty: 90 TABLET | Refills: 2 | Status: SHIPPED | OUTPATIENT
Start: 2019-01-11 | End: 2019-01-31

## 2019-01-12 PROBLEM — F40.01 AGORAPHOBIA WITH PANIC ATTACKS: Status: ACTIVE | Noted: 2019-01-12

## 2019-01-15 ENCOUNTER — OFFICE VISIT (OUTPATIENT)
Dept: FAMILY MEDICINE CLINIC | Facility: CLINIC | Age: 54
End: 2019-01-15
Payer: COMMERCIAL

## 2019-01-15 DIAGNOSIS — F43.10 POST-TRAUMATIC STRESS: Primary | ICD-10-CM

## 2019-01-15 DIAGNOSIS — F32.A DEPRESSION, UNSPECIFIED DEPRESSION TYPE: ICD-10-CM

## 2019-01-15 DIAGNOSIS — F40.01 AGORAPHOBIA WITH PANIC ATTACKS: ICD-10-CM

## 2019-01-15 PROCEDURE — 90837 PSYTX W PT 60 MINUTES: CPT | Performed by: SOCIAL WORKER
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
## 2019-01-22 ENCOUNTER — OFFICE VISIT (OUTPATIENT)
Dept: FAMILY MEDICINE CLINIC | Facility: CLINIC | Age: 54
End: 2019-01-22
Payer: COMMERCIAL

## 2019-01-22 DIAGNOSIS — F41.1 GENERALIZED ANXIETY DISORDER: ICD-10-CM

## 2019-01-22 DIAGNOSIS — F43.10 POST-TRAUMATIC STRESS: Primary | ICD-10-CM

## 2019-01-22 DIAGNOSIS — F33.2 SEVERE EPISODE OF RECURRENT MAJOR DEPRESSIVE DISORDER, WITHOUT PSYCHOTIC FEATURES (HCC): ICD-10-CM

## 2019-01-22 PROCEDURE — 90837 PSYTX W PT 60 MINUTES: CPT | Performed by: SOCIAL WORKER

## 2019-01-22 NOTE — PROGRESS NOTES
NAME:  Red Kussmaul  : 1965    Date of Initial Treatment Plan: 2019  Date of Current Treatment Plan: 2019      Treatment Plan Number : 1    Strengths/Personal Resources for Self Care: ABILITY TO MAKE OTHERS LAUGH, GREAT SENSE OF HUMOUR, FRIENDLY, KIND, RESOURCEFUL, SURVIVOR,  PERSEVERANT, STRONG PERSON, LOVE AND COMMITMENT TO FAMILY, GREAT COOK/RAMIREZ, NEGRITO, ALWAYS TRY TO SEE THE GOOD IN THINGS, LOVE TO HELP OTHERS    Diagnosis: PTSD, MAJOR DEPRESSIVE DISORDER, RECURRENT, SEVERE, GENERALIZED ANXIETY DISORDER WITH PANIC ATTACKS    Area of Needs:,  DEPRESSION, ANXIETY, INSOMNIA, FLASHBACKS, UNRESOLVED TRAUMA    LONG TERM GOALS:     GOAL 1:  FIND WAYS TO HAVE PEACE WHILE STAYING     Target Date:  2020  Completion Date:   ____________________________________________________________________    GOAL 2: DEPRESSION AND ANXIETY ARE MANAGEABLE    Target Date: 2020  Completion Date:   ____________________________________________________________________    GOAL 3:  "TO LEARN TO LIVE WITH MY PAST WITHOUT RELIVING IT"    Target Date: 2020  Completion Date:   ____________________________________________________________________    SHORT OBJECTIVES:     GOAL 1: "FIND HEALTHY WAYS TO STAND UP FOR MYSELF"    Target Date:2019  Completion Date:   Modality: Individual  4 x per month                             Person (s) responsible for carrying out plan: Sushila Guajardo Therapist    ____________________________________________________________________    GOAL 2: WORK WITH THERAPIST TO IMPROVE ON COPING SKILLS                  DEPRESSION AND ANXIETY SCALES REDUCE TO MODERATE LEVEL                 CONTINUE TO MEET WITH PCP FOR MEDICATION MANAGEMENT    Target Date: 2020  Completion Date:     Modality: Individual   4  x per month                             Person (s) responsible for carrying out plan:  Sushila Guajardo Therapist, PCP  ____________________________________________________________________    GOAL 3: LEARNING HOW TO COPE WITH TRIGGERS SO "I'M NOT RELIVING IT"  PREPARE FOR TRAUMA PROCESSING    Target Date: 5/18/2020  Completion Date:     Modality: Individual   4  x per month                             Person (s) responsible for carrying out plan: Jonathan Tellez, Therapist    The first scheduled review date is 5/18/2020    The expected length of service is     Behavioral Health Treatment Plan [facility]: Diagnosis and Treatment Plan explained to client  Client relates understanding diagnosis and is agreeable to Treatment Plan         CLIENT COMMENTS / Please share your thoughts, feelings, need and/or experiences regarding your treatment plan:       __________________________________________________________________    __________________________________________________________________      Patient Signature: _________________________________     Date/Time: ______________
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
## 2019-01-31 ENCOUNTER — OFFICE VISIT (OUTPATIENT)
Dept: FAMILY MEDICINE CLINIC | Facility: CLINIC | Age: 54
End: 2019-01-31
Payer: COMMERCIAL

## 2019-01-31 VITALS
WEIGHT: 212 LBS | BODY MASS INDEX: 35.32 KG/M2 | OXYGEN SATURATION: 99 % | HEIGHT: 65 IN | HEART RATE: 109 BPM | SYSTOLIC BLOOD PRESSURE: 148 MMHG | TEMPERATURE: 97.9 F | DIASTOLIC BLOOD PRESSURE: 86 MMHG | RESPIRATION RATE: 16 BRPM

## 2019-01-31 DIAGNOSIS — E78.49 OTHER HYPERLIPIDEMIA: ICD-10-CM

## 2019-01-31 DIAGNOSIS — F32.A DEPRESSION, UNSPECIFIED DEPRESSION TYPE: ICD-10-CM

## 2019-01-31 DIAGNOSIS — I10 ESSENTIAL HYPERTENSION: Primary | ICD-10-CM

## 2019-01-31 DIAGNOSIS — E61.1 IRON DEFICIENCY: ICD-10-CM

## 2019-01-31 DIAGNOSIS — F43.10 PTSD (POST-TRAUMATIC STRESS DISORDER): ICD-10-CM

## 2019-01-31 DIAGNOSIS — M79.601 MUSCULOSKELETAL PAIN OF RIGHT UPPER EXTREMITY: ICD-10-CM

## 2019-01-31 PROCEDURE — 99213 OFFICE O/P EST LOW 20 MIN: CPT | Performed by: FAMILY MEDICINE

## 2019-01-31 RX ORDER — METFORMIN HYDROCHLORIDE 500 MG/1
TABLET, EXTENDED RELEASE ORAL 2 TIMES DAILY
COMMUNITY
Start: 2019-01-15 | End: 2021-06-25 | Stop reason: ALTCHOICE

## 2019-01-31 RX ORDER — BUPROPION HYDROCHLORIDE 150 MG/1
150 TABLET, EXTENDED RELEASE ORAL 2 TIMES DAILY
Qty: 60 TABLET | Refills: 2 | Status: SHIPPED | OUTPATIENT
Start: 2019-01-31 | End: 2019-05-01 | Stop reason: SDUPTHER

## 2019-01-31 RX ORDER — ROSUVASTATIN CALCIUM 10 MG/1
10 TABLET, COATED ORAL DAILY
Qty: 30 TABLET | Refills: 2 | Status: SHIPPED | OUTPATIENT
Start: 2019-01-31 | End: 2019-07-29

## 2019-01-31 RX ORDER — LOSARTAN POTASSIUM 25 MG/1
25 TABLET ORAL DAILY
Qty: 30 TABLET | Refills: 1 | Status: SHIPPED | OUTPATIENT
Start: 2019-01-31 | End: 2019-07-29
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
## 2019-02-06 ENCOUNTER — OFFICE VISIT (OUTPATIENT)
Dept: FAMILY MEDICINE CLINIC | Facility: CLINIC | Age: 54
End: 2019-02-06
Payer: COMMERCIAL

## 2019-02-06 VITALS
SYSTOLIC BLOOD PRESSURE: 124 MMHG | TEMPERATURE: 97.8 F | RESPIRATION RATE: 16 BRPM | DIASTOLIC BLOOD PRESSURE: 78 MMHG | HEART RATE: 103 BPM | BODY MASS INDEX: 35.16 KG/M2 | WEIGHT: 211 LBS | HEIGHT: 65 IN | OXYGEN SATURATION: 99 %

## 2019-02-06 DIAGNOSIS — L23.9 ALLERGIC DERMATITIS: Primary | ICD-10-CM

## 2019-02-06 PROCEDURE — 99213 OFFICE O/P EST LOW 20 MIN: CPT | Performed by: FAMILY MEDICINE

## 2019-02-06 RX ORDER — LORATADINE 10 MG/1
10 TABLET ORAL DAILY
Qty: 30 TABLET | Refills: 0 | Status: SHIPPED | OUTPATIENT
Start: 2019-02-06 | End: 2019-07-29 | Stop reason: SDUPTHER

## 2019-02-06 RX ORDER — DIAPER,BRIEF,INFANT-TODD,DISP
EACH MISCELLANEOUS 4 TIMES DAILY PRN
Qty: 30 G | Refills: 0 | Status: SHIPPED | OUTPATIENT
Start: 2019-02-06 | End: 2019-07-29

## 2019-02-06 NOTE — LETTER
February 6, 2019     Patient: Ventura Mcdonald   YOB: 1965   Date of Visit: 2/6/2019       To Whom it May Concern:    Ventura Mcdonald is under my professional care  She was seen in my office on 2/6/2019  She may return to work on 2/7/19  If you have any questions or concerns, please don't hesitate to call           Sincerely,          Diann Hilton PA-C        CC: No Recipients

## 2019-02-06 NOTE — PROGRESS NOTES
Assessment/Plan:     Diagnoses and all orders for this visit:    Allergic dermatitis  -     hydrocortisone 1 % cream; Apply topically 4 (four) times a day as needed for rash  -     loratadine (CLARITIN) 10 mg tablet; Take 1 tablet (10 mg total) by mouth daily        Discussion/Plan:  Allergic/Contact dermatitis - claritin daily and hydrocortisone cream as needed  May use OTC benadryl PRN itching  Advised daily moisturizer without fragrances  Avoid skin irritants  Call or f/u if not improving  RTC for follow up as scheduled or sooner if needed  Subjective:      Patient ID: Yusef Ramirez is a 48 y o  female  Chief Complaint   Patient presents with    Rash     on right ankle- itchy & burns, just started today  had rash on right forearm two days ago but that went away  Patient is a 48year old female who presents to the office today for acute visit  She reports rash on right ankle that started today, itching and burning  She states she had rash on right forearm 2 days ago but resolved  She denies swelling of face, lips, tongue, throat, cough, sob, wheezing, chest pain, N/V/D, abdominal pain  She does have history of allergies, eczema  She denies any new foods, soaps, lotions, detergents           The following portions of the patient's history were reviewed and updated as appropriate: allergies, current medications, past family history, past medical history, past social history, past surgical history and problem list     Patient Active Problem List   Diagnosis    Acid reflux    Allergic cough    Anxiety    Severe episode of recurrent major depressive disorder ([address]    Diabetes mellitus ([address]    Hydrocephalus    Hypertension    Iron deficiency    Pica in adults    Herniation of lumbar intervertebral disc with radiculopathy    Other hyperlipidemia    Colon cancer screening    Oropharyngeal dysphagia    Hepatic steatosis    Elevated LFTs    Dysphagia    Screening for colon cancer    Gastroesophageal reflux disease    Post-traumatic stress    Agoraphobia with panic attacks    Generalized anxiety disorder     Current Outpatient Prescriptions on File Prior to Visit   Medication Sig Dispense Refill    buPROPion (WELLBUTRIN SR) 150 mg 12 hr tablet Take 1 tablet (150 mg total) by mouth 2 (two) times a day 60 tablet 2    fluticasone (FLONASE) 50 mcg/act nasal spray 2 sprays into each nostril daily 16 g 0    glimepiride (AMARYL) 4 mg tablet Take 1 tablet by mouth 2 (two) times a day      hydrochlorothiazide (HYDRODIURIL) 25 mg tablet Take 1 tablet (25 mg total) by mouth daily 90 tablet 3    Insulin Glargine-Lixisenatide (SOLIQUA) 100 units-33 mcg/mL injection pen Inject 45 Units under the skin daily        losartan (COZAAR) 25 mg tablet Take 1 tablet (25 mg total) by mouth daily 30 tablet 1    metFORMIN (GLUCOPHAGE-XR) 500 mg 24 hr tablet 2 (two) times a day      Nerve Stimulator (TENS THERAPY PAIN RELIEF) BILLY 1 Device by Does not apply route 2 (two) times a day as needed (pain) 1 Device 0    pantoprazole (PROTONIX) 20 mg tablet Take 1 tablet (20 mg total) by mouth 2 (two) times a day 28 tablet 0    rosuvastatin (CRESTOR) 10 MG tablet Take 1 tablet (10 mg total) by mouth daily 30 tablet 2     No current facility-administered medications on file prior to visit  Review of Systems   Constitutional: Negative  HENT: Negative for sore throat, trouble swallowing and voice change  Eyes: Negative for photophobia and visual disturbance  Respiratory: Negative for apnea, choking, chest tightness, shortness of breath, wheezing and stridor  Cardiovascular: Negative for chest pain, palpitations and leg swelling  Gastrointestinal: Negative  Genitourinary: Negative  Musculoskeletal: Negative  Skin: Positive for rash  Neurological: Negative  Hematological: Negative for adenopathy           Objective:      /78   Pulse 103   Temp 97 8 °F (36 6 °C)   Resp 16   Ht 5' 5" (1 651 m)   Wt 95 7 kg (211 lb)   SpO2 99%   BMI 35 11 kg/m²          Physical Exam   Constitutional: She is oriented to person, place, and time  She appears well-developed and well-nourished  HENT:   Head: Normocephalic and atraumatic  Mouth/Throat: Oropharynx is clear and moist    Eyes: Conjunctivae are normal    Neck: Neck supple  Cardiovascular: Normal rate, regular rhythm and normal heart sounds  Pulmonary/Chest: Effort normal and breath sounds normal    Neurological: She is alert and oriented to person, place, and time  Skin: Skin is warm and dry

## 2019-02-06 NOTE — PATIENT INSTRUCTIONS
Dermatitis   WHAT YOU NEED TO KNOW:   What is dermatitis? Dermatitis is skin inflammation  You may have an itchy rash, redness, or swelling  You may also have bumps or blisters that crust over or ooze clear fluid  What causes dermatitis? Dermatitis may be caused by allergens such as dust mites, pet dander, pollen, and certain foods  Dermatitis can also develop when something touches your skin and irritates it or causes an allergic reaction  Examples include soaps, chemicals, latex, and poison ivy  How is dermatitis diagnosed? Your healthcare provider will examine your skin  He will ask questions about your rash and any other symptoms you have  Tell him if you noticed anything trigger your rash, such as a certain food or activity  Tell him about any medicines you are taking or any allergies or medical conditions you have  How is dermatitis treated? Treatment depends on the cause of your rash  You may need medicines to help decrease itching and inflammation or treat a bacterial infection  They may be given as a topical cream, shot, or a pill  How can I manage my symptoms? · Apply a cool compress to your rash  This will help soothe your skin  · Keep your skin moist   Rub unscented cream or lotion on your skin to prevent dryness and itching  Do this right after a lukewarm bath or shower when your skin is still damp  · Avoid skin irritants  Do not use skin irritants, such as makeup, hair products, soaps, and cleansers  Use products that do not contain fragrances or dye  Call 911 if you have any of the following symptoms of anaphylaxis:   · Sudden trouble breathing    · Throat swelling and tightness    · Dizziness, lightheadedness, fainting, or confusion  When should I seek immediate care? · You develop a fever or have red streaks going up your arm or leg  · Your rash gets more swollen, red, or hot  When should I contact my healthcare provider?    · Your skin blisters, oozes white or yellow pus, or has a foul-smelling discharge  · Your rash spreads or does not get better, even after treatment  · You have questions or concerns about your condition or care  CARE AGREEMENT:   You have the right to help plan your care  Learn about your health condition and how it may be treated  Discuss treatment options with your caregivers to decide what care you want to receive  You always have the right to refuse treatment  The above information is an  only  It is not intended as medical advice for individual conditions or treatments  Talk to your doctor, nurse or pharmacist before following any medical regimen to see if it is safe and effective for you  © 2017 [employer]  Information is for End User's use only and may not be sold, redistributed or otherwise used for commercial purposes  All illustrations and images included in CareNotes® are the copyrighted property of [employer]  or [employer]

## 2019-02-07 ENCOUNTER — OFFICE VISIT (OUTPATIENT)
Dept: FAMILY MEDICINE CLINIC | Facility: CLINIC | Age: 54
End: 2019-02-07
Payer: COMMERCIAL

## 2019-02-07 DIAGNOSIS — F41.1 GENERALIZED ANXIETY DISORDER: ICD-10-CM

## 2019-02-07 DIAGNOSIS — F33.2 SEVERE EPISODE OF RECURRENT MAJOR DEPRESSIVE DISORDER, WITHOUT PSYCHOTIC FEATURES (HCC): ICD-10-CM

## 2019-02-07 DIAGNOSIS — F43.10 POST-TRAUMATIC STRESS: Primary | ICD-10-CM

## 2019-02-07 PROCEDURE — 90837 PSYTX W PT 60 MINUTES: CPT | Performed by: SOCIAL WORKER
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
## 2019-02-26 ENCOUNTER — TRANSCRIBE ORDERS (OUTPATIENT)
Dept: RADIOLOGY | Facility: MEDICAL CENTER | Age: 54
End: 2019-02-26

## 2019-02-26 ENCOUNTER — APPOINTMENT (OUTPATIENT)
Dept: LAB | Facility: MEDICAL CENTER | Age: 54
End: 2019-02-26
Payer: COMMERCIAL

## 2019-02-26 ENCOUNTER — OFFICE VISIT (OUTPATIENT)
Dept: FAMILY MEDICINE CLINIC | Facility: CLINIC | Age: 54
End: 2019-02-26
Payer: COMMERCIAL

## 2019-02-26 DIAGNOSIS — E78.49 OTHER HYPERLIPIDEMIA: ICD-10-CM

## 2019-02-26 DIAGNOSIS — M79.10 MYALGIA: Primary | ICD-10-CM

## 2019-02-26 DIAGNOSIS — M79.10 MYALGIA: ICD-10-CM

## 2019-02-26 DIAGNOSIS — F41.1 GENERALIZED ANXIETY DISORDER: Primary | ICD-10-CM

## 2019-02-26 DIAGNOSIS — I10 ESSENTIAL HYPERTENSION: ICD-10-CM

## 2019-02-26 DIAGNOSIS — E61.1 IRON DEFICIENCY: ICD-10-CM

## 2019-02-26 DIAGNOSIS — F43.10 POST-TRAUMATIC STRESS: ICD-10-CM

## 2019-02-26 LAB
BACTERIA UR QL AUTO: ABNORMAL /HPF
BASOPHILS # BLD AUTO: 0.04 THOUSANDS/ΜL (ref 0–0.1)
BASOPHILS NFR BLD AUTO: 1 % (ref 0–1)
BILIRUB UR QL STRIP: NEGATIVE
CHOLEST SERPL-MCNC: 151 MG/DL (ref 50–200)
CLARITY UR: CLEAR
COLOR UR: YELLOW
CREAT UR-MCNC: 61 MG/DL
EOSINOPHIL # BLD AUTO: 0.13 THOUSAND/ΜL (ref 0–0.61)
EOSINOPHIL NFR BLD AUTO: 2 % (ref 0–6)
ERYTHROCYTE [DISTWIDTH] IN BLOOD BY AUTOMATED COUNT: 22.9 % (ref 11.6–15.1)
FERRITIN SERPL-MCNC: 6 NG/ML (ref 8–388)
FOLATE SERPL-MCNC: 14.9 NG/ML (ref 3.1–17.5)
GLUCOSE UR STRIP-MCNC: ABNORMAL MG/DL
HCT VFR BLD AUTO: 37 % (ref 34.8–46.1)
HDLC SERPL-MCNC: 68 MG/DL (ref 40–60)
HGB BLD-MCNC: 11 G/DL (ref 11.5–15.4)
HGB UR QL STRIP.AUTO: NEGATIVE
HYALINE CASTS #/AREA URNS LPF: ABNORMAL /LPF
IMM GRANULOCYTES # BLD AUTO: 0.01 THOUSAND/UL (ref 0–0.2)
IMM GRANULOCYTES NFR BLD AUTO: 0 % (ref 0–2)
IRON SATN MFR SERPL: 22 %
IRON SERPL-MCNC: 92 UG/DL (ref 50–170)
KETONES UR STRIP-MCNC: NEGATIVE MG/DL
LDLC SERPL CALC-MCNC: 53 MG/DL (ref 0–100)
LEUKOCYTE ESTERASE UR QL STRIP: ABNORMAL
LYMPHOCYTES # BLD AUTO: 1.36 THOUSANDS/ΜL (ref 0.6–4.47)
LYMPHOCYTES NFR BLD AUTO: 23 % (ref 14–44)
MCH RBC QN AUTO: 22.9 PG (ref 26.8–34.3)
MCHC RBC AUTO-ENTMCNC: 29.7 G/DL (ref 31.4–37.4)
MCV RBC AUTO: 77 FL (ref 82–98)
MICROALBUMIN UR-MCNC: 9.1 MG/L (ref 0–20)
MICROALBUMIN/CREAT 24H UR: 15 MG/G CREATININE (ref 0–30)
MONOCYTES # BLD AUTO: 0.39 THOUSAND/ΜL (ref 0.17–1.22)
MONOCYTES NFR BLD AUTO: 7 % (ref 4–12)
NEUTROPHILS # BLD AUTO: 3.89 THOUSANDS/ΜL (ref 1.85–7.62)
NEUTS SEG NFR BLD AUTO: 67 % (ref 43–75)
NITRITE UR QL STRIP: NEGATIVE
NON-SQ EPI CELLS URNS QL MICRO: ABNORMAL /HPF
NRBC BLD AUTO-RTO: 0 /100 WBCS
PH UR STRIP.AUTO: 5.5 [PH] (ref 4.5–8)
PLATELET # BLD AUTO: 278 THOUSANDS/UL (ref 149–390)
PMV BLD AUTO: 9.5 FL (ref 8.9–12.7)
PROT UR STRIP-MCNC: NEGATIVE MG/DL
RBC # BLD AUTO: 4.81 MILLION/UL (ref 3.81–5.12)
RBC #/AREA URNS AUTO: ABNORMAL /HPF
SP GR UR STRIP.AUTO: 1.03 (ref 1–1.03)
TIBC SERPL-MCNC: 422 UG/DL (ref 250–450)
TRIGL SERPL-MCNC: 148 MG/DL
UROBILINOGEN UR QL STRIP.AUTO: 0.2 E.U./DL
VIT B12 SERPL-MCNC: 313 PG/ML (ref 100–900)
WBC # BLD AUTO: 5.82 THOUSAND/UL (ref 4.31–10.16)
WBC #/AREA URNS AUTO: ABNORMAL /HPF

## 2019-02-26 PROCEDURE — 36415 COLL VENOUS BLD VENIPUNCTURE: CPT

## 2019-02-26 PROCEDURE — 83540 ASSAY OF IRON: CPT

## 2019-02-26 PROCEDURE — 82728 ASSAY OF FERRITIN: CPT

## 2019-02-26 PROCEDURE — 81001 URINALYSIS AUTO W/SCOPE: CPT

## 2019-02-26 PROCEDURE — 82570 ASSAY OF URINE CREATININE: CPT

## 2019-02-26 PROCEDURE — 85025 COMPLETE CBC W/AUTO DIFF WBC: CPT

## 2019-02-26 PROCEDURE — 82746 ASSAY OF FOLIC ACID SERUM: CPT

## 2019-02-26 PROCEDURE — 82043 UR ALBUMIN QUANTITATIVE: CPT

## 2019-02-26 PROCEDURE — 82607 VITAMIN B-12: CPT

## 2019-02-26 PROCEDURE — 80061 LIPID PANEL: CPT

## 2019-02-26 PROCEDURE — 90837 PSYTX W PT 60 MINUTES: CPT | Performed by: SOCIAL WORKER

## 2019-02-26 PROCEDURE — 83550 IRON BINDING TEST: CPT
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
## 2019-03-04 ENCOUNTER — OFFICE VISIT (OUTPATIENT)
Dept: FAMILY MEDICINE CLINIC | Facility: CLINIC | Age: 54
End: 2019-03-04
Payer: COMMERCIAL

## 2019-03-04 VITALS
HEIGHT: 65 IN | DIASTOLIC BLOOD PRESSURE: 84 MMHG | OXYGEN SATURATION: 99 % | BODY MASS INDEX: 35.49 KG/M2 | RESPIRATION RATE: 16 BRPM | WEIGHT: 213 LBS | HEART RATE: 107 BPM | TEMPERATURE: 97.3 F | SYSTOLIC BLOOD PRESSURE: 122 MMHG

## 2019-03-04 DIAGNOSIS — R20.2 NUMBNESS AND TINGLING OF RIGHT ARM: ICD-10-CM

## 2019-03-04 DIAGNOSIS — R10.9 FLANK PAIN: Primary | ICD-10-CM

## 2019-03-04 DIAGNOSIS — R20.0 NUMBNESS AND TINGLING OF RIGHT ARM: ICD-10-CM

## 2019-03-04 LAB
SL AMB  POCT GLUCOSE, UA: ABNORMAL
SL AMB LEUKOCYTE ESTERASE,UA: 2
SL AMB POCT BILIRUBIN,UA: ABNORMAL
SL AMB POCT BLOOD,UA: 2
SL AMB POCT CLARITY,UA: ABNORMAL
SL AMB POCT COLOR,UA: ABNORMAL
SL AMB POCT KETONES,UA: ABNORMAL
SL AMB POCT NITRITE,UA: ABNORMAL
SL AMB POCT PH,UA: 5
SL AMB POCT SPECIFIC GRAVITY,UA: 1
SL AMB POCT URINE PROTEIN: ABNORMAL
SL AMB POCT UROBILINOGEN: 0.2

## 2019-03-04 PROCEDURE — 99213 OFFICE O/P EST LOW 20 MIN: CPT | Performed by: FAMILY MEDICINE

## 2019-03-04 PROCEDURE — 81001 URINALYSIS AUTO W/SCOPE: CPT | Performed by: PHYSICIAN ASSISTANT

## 2019-03-04 RX ORDER — METHOCARBAMOL 500 MG/1
500 TABLET, FILM COATED ORAL 3 TIMES DAILY
Qty: 90 TABLET | Refills: 0 | Status: SHIPPED | OUTPATIENT
Start: 2019-03-04 | End: 2019-07-29 | Stop reason: SDUPTHER

## 2019-03-04 NOTE — PROGRESS NOTES
History and Physical  Joseph Cagle 48 y o  female MRN: [id number]      Assessment:   RUE pain and numbness  R back pain    Plan:  Check EMG RUE  UA in office today  Robaxin  For back pain  RTC as scheduled        Chief Complaint   Patient presents with    Tingling     whole right side started friday   Pain     right side  HPI:  Joseph Cagle is a 48 y o  female who presents with above  It has been going on for several weeks  She was ordered compression sleeves but they have not yet come in  She has tingling sensation in back of the neck that radiates into the fingers  It becomes worse with any type of repetitive motions  Last night her R hand began to move for no reason  Today also c/o pain in R back that started on Saturday  It is stabbing and nonradiating  Area is tender to the touch  Denies trauma  Historical Information   Past Medical History:   Diagnosis Date    Diabetes mellitus [facility] [address] [facility]     Hyperlipidemia     Hypertension     Migraine      Past Surgical History:   Procedure Laterality Date    WI ESOPHAGOGASTRODUODENOSCOPY TRANSORAL DIAGNOSTIC N/A 10/23/2018    Procedure: EGD with Savary dilation AND COLONOSCOPY;  Surgeon: Mario Blevins MD;  Location: [facility];  Service: Gastroenterology    TUBAL LIGATION       Social History   Social History     Substance and Sexual Activity   Alcohol Use No     Social History     Substance and Sexual Activity   Drug Use No     Social History     Tobacco Use   Smoking Status Never Smoker   Smokeless Tobacco Never Used     No family history on file      Meds/Allergies   Allergies   Allergen Reactions    Nuts Anaphylaxis    Chocolate Cough    Other      LETTUCE    Shellfish Allergy Other (See Comments)     unknown    Tomato Cough       Meds:    Current Outpatient Medications:     buPROPion (WELLBUTRIN SR) 150 mg 12 hr tablet, Take 1 tablet (150 mg total) by mouth 2 (two) times a day, Disp: 60 tablet, Rfl: 2    fluticasone (FLONASE) 50 mcg/act nasal spray, 2 sprays into each nostril daily, Disp: 16 g, Rfl: 0    glimepiride (AMARYL) 4 mg tablet, Take 1 tablet by mouth 2 (two) times a day, Disp: , Rfl:     hydrochlorothiazide (HYDRODIURIL) 25 mg tablet, Take 1 tablet (25 mg total) by mouth daily, Disp: 90 tablet, Rfl: 3    hydrocortisone 1 % cream, Apply topically 4 (four) times a day as needed for rash, Disp: 30 g, Rfl: 0    Insulin Glargine-Lixisenatide (SOLIQUA) 100 units-33 mcg/mL injection pen, Inject 45 Units under the skin daily  , Disp: , Rfl:     loratadine (CLARITIN) 10 mg tablet, Take 1 tablet (10 mg total) by mouth daily, Disp: 30 tablet, Rfl: 0    losartan (COZAAR) 25 mg tablet, Take 1 tablet (25 mg total) by mouth daily, Disp: 30 tablet, Rfl: 1    metFORMIN (GLUCOPHAGE-XR) 500 mg 24 hr tablet, 2 (two) times a day, Disp: , Rfl:     Nerve Stimulator (TENS THERAPY PAIN RELIEF) BILLY, 1 Device by Does not apply route 2 (two) times a day as needed (pain), Disp: 1 Device, Rfl: 0    pantoprazole (PROTONIX) 20 mg tablet, Take 1 tablet (20 mg total) by mouth 2 (two) times a day, Disp: 28 tablet, Rfl: 0    rosuvastatin (CRESTOR) 10 MG tablet, Take 1 tablet (10 mg total) by mouth daily, Disp: 30 tablet, Rfl: 2      REVIEW OF SYSTEMS  Review of Systems   Constitutional: Negative  HENT: Negative  Eyes: Negative  Respiratory: Negative  Cardiovascular: Negative  Musculoskeletal: Positive for back pain and neck pain  Skin: Negative  Neurological: Positive for numbness  Hematological: Negative  Current Vitals:   Blood Pressure: 122/84 (03/04/19 1620)  Pulse: (!) 107 (03/04/19 1620)  Temperature: (!) 97 3 °F (36 3 °C) (03/04/19 1620)  Respirations: 16 (03/04/19 1620)  Height: 5' 5" (165 1 cm) (03/04/19 1620)  Weight - Scale: 96 6 kg (213 lb) (03/04/19 1620)  SpO2: 99 % (03/04/19 1620)  Body mass index is 35 45 kg/m²  PHYSICAL EXAMS:  Physical Exam   Constitutional: She is oriented to person, place, and time   She appears well-developed and well-nourished  HENT:   Head: Normocephalic and atraumatic  Right Ear: External ear normal    Left Ear: External ear normal    Eyes: Pupils are equal, round, and reactive to light  Conjunctivae and EOM are normal    Neck: Normal range of motion  Neck supple  No thyromegaly present  Cardiovascular: Normal rate and regular rhythm  Pulmonary/Chest: Effort normal and breath sounds normal    Musculoskeletal: She exhibits no edema  Point tenderness over r mid para thoracic area  No rash/lesions or ecchymosis noted  Lymphadenopathy:     She has no cervical adenopathy  Neurological: She is alert and oriented to person, place, and time  Skin: Skin is warm and dry  No rash noted  Psychiatric: She has a normal mood and affect  Nursing note and vitals reviewed  Lab Results:          Dmitri Capone PA-C  3/4/2019, 4:32 PM

## 2019-03-04 NOTE — LETTER
March 4, 2019     Patient: Luis Teague   YOB: 1965   Date of Visit: 3/4/2019       To Whom it May Concern:          Luis Teague is under my professional care  She was seen in my office on 3/4/2019  She may return to  work 3/6/19  If you have any questions or concerns, please don't hesitate to call           Sincerely,              Robi Rubalcava PA-C        CC: No Recipients

## 2019-03-06 LAB
BACTERIA UR QL AUTO: ABNORMAL /HPF
BILIRUB UR QL STRIP: NEGATIVE
CLARITY UR: ABNORMAL
COLOR UR: YELLOW
GLUCOSE UR STRIP-MCNC: ABNORMAL MG/DL
HGB UR QL STRIP.AUTO: ABNORMAL
HYALINE CASTS #/AREA URNS LPF: ABNORMAL /LPF
KETONES UR STRIP-MCNC: NEGATIVE MG/DL
LEUKOCYTE ESTERASE UR QL STRIP: ABNORMAL
NITRITE UR QL STRIP: NEGATIVE
NON-SQ EPI CELLS URNS QL MICRO: ABNORMAL /HPF
PH UR STRIP.AUTO: 5 [PH] (ref 4.5–8)
PROT UR STRIP-MCNC: NEGATIVE MG/DL
RBC #/AREA URNS AUTO: ABNORMAL /HPF
SP GR UR STRIP.AUTO: 1.01 (ref 1–1.03)
UROBILINOGEN UR QL STRIP.AUTO: 0.2 E.U./DL
WBC #/AREA URNS AUTO: ABNORMAL /HPF

## 2019-03-08 ENCOUNTER — OFFICE VISIT (OUTPATIENT)
Dept: FAMILY MEDICINE CLINIC | Facility: CLINIC | Age: 54
End: 2019-03-08
Payer: COMMERCIAL

## 2019-03-08 DIAGNOSIS — F43.10 POST-TRAUMATIC STRESS: ICD-10-CM

## 2019-03-08 DIAGNOSIS — F33.1 MODERATE EPISODE OF RECURRENT MAJOR DEPRESSIVE DISORDER (HCC): Primary | ICD-10-CM

## 2019-03-08 PROCEDURE — 90837 PSYTX W PT 60 MINUTES: CPT | Performed by: SOCIAL WORKER

## 2019-03-10 ENCOUNTER — APPOINTMENT (EMERGENCY)
Dept: CT IMAGING | Facility: HOSPITAL | Age: 54
End: 2019-03-10
Payer: COMMERCIAL

## 2019-03-10 ENCOUNTER — HOSPITAL ENCOUNTER (EMERGENCY)
Facility: HOSPITAL | Age: 54
Discharge: HOME | End: 2019-03-10
Attending: EMERGENCY MEDICINE | Admitting: EMERGENCY MEDICINE
Payer: COMMERCIAL

## 2019-03-10 VITALS
TEMPERATURE: 97.3 F | BODY MASS INDEX: 35.34 KG/M2 | WEIGHT: 212.08 LBS | SYSTOLIC BLOOD PRESSURE: 169 MMHG | OXYGEN SATURATION: 99 % | HEART RATE: 96 BPM | DIASTOLIC BLOOD PRESSURE: 85 MMHG | HEIGHT: 65 IN | RESPIRATION RATE: 16 BRPM

## 2019-03-10 DIAGNOSIS — R10.9 RIGHT FLANK PAIN: Primary | ICD-10-CM

## 2019-03-10 LAB
ALBUMIN SERPL BCP-MCNC: 3.2 G/DL (ref 3.5–5)
ALP SERPL-CCNC: 100 U/L (ref 46–116)
ALT SERPL W P-5'-P-CCNC: 26 U/L (ref 12–78)
ANION GAP SERPL CALCULATED.3IONS-SCNC: 7 MMOL/L (ref 4–13)
AST SERPL W P-5'-P-CCNC: 27 U/L (ref 5–45)
BACTERIA UR QL AUTO: ABNORMAL /HPF
BASOPHILS # BLD AUTO: 0.05 THOUSANDS/ΜL (ref 0–0.1)
BASOPHILS NFR BLD AUTO: 1 % (ref 0–1)
BILIRUB SERPL-MCNC: 0.6 MG/DL (ref 0.2–1)
BILIRUB UR QL STRIP: NEGATIVE
BUN SERPL-MCNC: 8 MG/DL (ref 5–25)
CALCIUM SERPL-MCNC: 9.4 MG/DL (ref 8.3–10.1)
CHLORIDE SERPL-SCNC: 99 MMOL/L (ref 100–108)
CLARITY UR: ABNORMAL
CO2 SERPL-SCNC: 26 MMOL/L (ref 21–32)
COLOR UR: YELLOW
CREAT SERPL-MCNC: 0.74 MG/DL (ref 0.6–1.3)
EOSINOPHIL # BLD AUTO: 0.15 THOUSAND/ΜL (ref 0–0.61)
EOSINOPHIL NFR BLD AUTO: 3 % (ref 0–6)
ERYTHROCYTE [DISTWIDTH] IN BLOOD BY AUTOMATED COUNT: 19.8 % (ref 11.6–15.1)
GFR SERPL CREATININE-BSD FRML MDRD: 94 ML/MIN/1.73SQ M
GLUCOSE SERPL-MCNC: 242 MG/DL (ref 65–140)
GLUCOSE UR STRIP-MCNC: ABNORMAL MG/DL
HCT VFR BLD AUTO: 36.4 % (ref 34.8–46.1)
HGB BLD-MCNC: 11.2 G/DL (ref 11.5–15.4)
HGB UR QL STRIP.AUTO: ABNORMAL
HOLD SPECIMEN: NORMAL
IMM GRANULOCYTES # BLD AUTO: 0.02 THOUSAND/UL (ref 0–0.2)
IMM GRANULOCYTES NFR BLD AUTO: 0 % (ref 0–2)
KETONES UR STRIP-MCNC: NEGATIVE MG/DL
LEUKOCYTE ESTERASE UR QL STRIP: ABNORMAL
LIPASE SERPL-CCNC: 171 U/L (ref 73–393)
LYMPHOCYTES # BLD AUTO: 1.35 THOUSANDS/ΜL (ref 0.6–4.47)
LYMPHOCYTES NFR BLD AUTO: 26 % (ref 14–44)
MCH RBC QN AUTO: 23.5 PG (ref 26.8–34.3)
MCHC RBC AUTO-ENTMCNC: 30.8 G/DL (ref 31.4–37.4)
MCV RBC AUTO: 76 FL (ref 82–98)
MONOCYTES # BLD AUTO: 0.33 THOUSAND/ΜL (ref 0.17–1.22)
MONOCYTES NFR BLD AUTO: 6 % (ref 4–12)
NEUTROPHILS # BLD AUTO: 3.26 THOUSANDS/ΜL (ref 1.85–7.62)
NEUTS SEG NFR BLD AUTO: 64 % (ref 43–75)
NITRITE UR QL STRIP: NEGATIVE
NON-SQ EPI CELLS URNS QL MICRO: ABNORMAL /HPF
NRBC BLD AUTO-RTO: 0 /100 WBCS
PH UR STRIP.AUTO: 6 [PH]
PLATELET # BLD AUTO: 279 THOUSANDS/UL (ref 149–390)
PMV BLD AUTO: 9.2 FL (ref 8.9–12.7)
POTASSIUM SERPL-SCNC: 4.5 MMOL/L (ref 3.5–5.3)
PROT SERPL-MCNC: 7.1 G/DL (ref 6.4–8.2)
PROT UR STRIP-MCNC: NEGATIVE MG/DL
RBC # BLD AUTO: 4.77 MILLION/UL (ref 3.81–5.12)
RBC #/AREA URNS AUTO: ABNORMAL /HPF
SODIUM SERPL-SCNC: 132 MMOL/L (ref 136–145)
SP GR UR STRIP.AUTO: 1.02 (ref 1–1.03)
UROBILINOGEN UR QL STRIP.AUTO: 0.2 E.U./DL
WBC # BLD AUTO: 5.16 THOUSAND/UL (ref 4.31–10.16)
WBC #/AREA URNS AUTO: ABNORMAL /HPF

## 2019-03-10 PROCEDURE — 96374 THER/PROPH/DIAG INJ IV PUSH: CPT

## 2019-03-10 PROCEDURE — 85025 COMPLETE CBC W/AUTO DIFF WBC: CPT | Performed by: PHYSICIAN ASSISTANT

## 2019-03-10 PROCEDURE — 36415 COLL VENOUS BLD VENIPUNCTURE: CPT | Performed by: PHYSICIAN ASSISTANT

## 2019-03-10 PROCEDURE — 99284 EMERGENCY DEPT VISIT MOD MDM: CPT

## 2019-03-10 PROCEDURE — 74177 CT ABD & PELVIS W/CONTRAST: CPT

## 2019-03-10 PROCEDURE — 81001 URINALYSIS AUTO W/SCOPE: CPT | Performed by: PHYSICIAN ASSISTANT

## 2019-03-10 PROCEDURE — 80053 COMPREHEN METABOLIC PANEL: CPT | Performed by: PHYSICIAN ASSISTANT

## 2019-03-10 PROCEDURE — 83690 ASSAY OF LIPASE: CPT | Performed by: PHYSICIAN ASSISTANT

## 2019-03-10 PROCEDURE — 96361 HYDRATE IV INFUSION ADD-ON: CPT

## 2019-03-10 RX ORDER — KETOROLAC TROMETHAMINE 30 MG/ML
15 INJECTION, SOLUTION INTRAMUSCULAR; INTRAVENOUS ONCE
Status: COMPLETED | OUTPATIENT
Start: 2019-03-10 | End: 2019-03-10

## 2019-03-10 RX ADMIN — KETOROLAC TROMETHAMINE 15 MG: 30 INJECTION, SOLUTION INTRAMUSCULAR at 13:17

## 2019-03-10 RX ADMIN — IOHEXOL 100 ML: 350 INJECTION, SOLUTION INTRAVENOUS at 11:59

## 2019-03-10 RX ADMIN — SODIUM CHLORIDE 1000 ML: 0.9 INJECTION, SOLUTION INTRAVENOUS at 11:14
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
## 2019-03-10 NOTE — ED PROVIDER NOTES
History  Chief Complaint   Patient presents with    Back Pain     right flank pain started 2 days ago  Patient presents to the emergency department today for evaluation of right lower back pain that she states been present for 2 days  She denies any lifting straining events  She denies any injury  She states pain radiates occasionally into the right lower abdomen  She states the pain does not radiate into the right leg  She denies weakness of the lower extremities  Denies incontinence of urine or stool  Denies radiculopathies of the lower extremities  She denies central back pain  Denies rash  Denies nausea vomiting or diarrhea  Prior to Admission Medications   Prescriptions Last Dose Informant Patient Reported? Taking?    Insulin Glargine-Lixisenatide (SOLIQUA) 100 units-33 mcg/mL injection pen   Yes No   Sig: Inject 45 Units under the skin daily     Nerve Stimulator (TENS THERAPY PAIN RELIEF) BILLY  Self No No   Si Device by Does not apply route 2 (two) times a day as needed (pain)   buPROPion (WELLBUTRIN SR) 150 mg 12 hr tablet   No No   Sig: Take 1 tablet (150 mg total) by mouth 2 (two) times a day   fluticasone (FLONASE) 50 mcg/act nasal spray   No No   Si sprays into each nostril daily   glimepiride (AMARYL) 4 mg tablet  Self Yes No   Sig: Take 1 tablet by mouth 2 (two) times a day   hydrochlorothiazide (HYDRODIURIL) 25 mg tablet  Self No No   Sig: Take 1 tablet (25 mg total) by mouth daily   hydrocortisone 1 % cream   No No   Sig: Apply topically 4 (four) times a day as needed for rash   loratadine (CLARITIN) 10 mg tablet   No No   Sig: Take 1 tablet (10 mg total) by mouth daily   losartan (COZAAR) 25 mg tablet   No No   Sig: Take 1 tablet (25 mg total) by mouth daily   metFORMIN (GLUCOPHAGE-XR) 500 mg 24 hr tablet   Yes No   Si (two) times a day   methocarbamol (ROBAXIN) 500 mg tablet   No No   Sig: Take 1 tablet (500 mg total) by mouth 3 (three) times a day   pantoprazole (PROTONIX) 20 mg tablet   No No   Sig: Take 1 tablet (20 mg total) by mouth 2 (two) times a day   rosuvastatin (CRESTOR) 10 MG tablet   No No   Sig: Take 1 tablet (10 mg total) by mouth daily      Facility-Administered Medications: None       Past Medical History:   Diagnosis Date    Diabetes mellitus ([facility] [address] )     Hyperlipidemia     Hypertension     Migraine        Past Surgical History:   Procedure Laterality Date    [place] ESOPHAGOGASTRODUODENOSCOPY TRANSORAL DIAGNOSTIC N/A 10/23/2018    Procedure: EGD with Savary dilation AND COLONOSCOPY;  Surgeon: Jose Kate MD;  Location: [facility];  Service: Gastroenterology    TUBAL LIGATION         History reviewed  No pertinent family history  I have reviewed and agree with the history as documented  Social History     Tobacco Use    Smoking status: Never Smoker    Smokeless tobacco: Never Used   Substance Use Topics    Alcohol use: No    Drug use: No        Review of Systems   Constitutional: Negative  HENT: Negative  Eyes: Negative  Respiratory: Negative  Cardiovascular: Negative  Gastrointestinal: Positive for abdominal pain and nausea  Negative for abdominal distention, anal bleeding, blood in stool, constipation, diarrhea, rectal pain and vomiting  Endocrine: Negative  Genitourinary: Positive for flank pain  Negative for decreased urine volume, difficulty urinating, dyspareunia, dysuria, enuresis, frequency, genital sores, hematuria, menstrual problem, pelvic pain, urgency, vaginal bleeding, vaginal discharge and vaginal pain  Musculoskeletal: Positive for back pain  Negative for arthralgias, gait problem, joint swelling, myalgias, neck pain and neck stiffness  Allergic/Immunologic: Negative  Neurological: Negative  Hematological: Negative  Psychiatric/Behavioral: Negative  All other systems reviewed and are negative  Physical Exam  Physical Exam   Constitutional: She is oriented to person, place, and time   She appears well-developed and well-nourished  No distress  HENT:   Head: Normocephalic  Right Ear: External ear normal    Left Ear: External ear normal    Nose: Nose normal    Mouth/Throat: Oropharynx is clear and moist  No oropharyngeal exudate  Eyes: Pupils are equal, round, and reactive to light  Conjunctivae are normal    Neck: Normal range of motion  Cardiovascular: Normal rate, regular rhythm, normal heart sounds and intact distal pulses  Exam reveals no gallop and no friction rub  No murmur heard  Pulmonary/Chest: Effort normal and breath sounds normal  No stridor  No respiratory distress  She has no wheezes  She has no rales  She exhibits no tenderness  Abdominal: Soft  Bowel sounds are normal  She exhibits no distension and no mass  There is tenderness  There is no rebound and no guarding  No hernia  Right lower quadrant tenderness to palpation   Musculoskeletal: Normal range of motion  Reproducible right lateral muscular point tenderness in the upper lumbar region  No evidence of rash swelling contusion abrasion  No central spinal tenderness   Neurological: She is alert and oriented to person, place, and time  Skin: Skin is warm  Capillary refill takes less than 2 seconds  She is not diaphoretic  Psychiatric: She has a normal mood and affect  Vitals reviewed        Vital Signs  ED Triage Vitals   Temperature Pulse Respirations Blood Pressure SpO2   03/10/19 1037 03/10/19 1039 03/10/19 1037 03/10/19 1039 03/10/19 1037   (!) 97 3 °F (36 3 °C) (!) 107 18 (!) 196/90 95 %      Temp Source Heart Rate Source Patient Position - Orthostatic VS BP Location FiO2 (%)   03/10/19 1037 03/10/19 1037 03/10/19 1037 03/10/19 1037 --   Temporal Monitor Lying Right arm       Pain Score       03/10/19 1037       Worst Possible Pain           Vitals:    03/10/19 1037 03/10/19 1039 03/10/19 1130   BP:  (!) 196/90 (!) 178/84   Pulse:  (!) 107 96   Patient Position - Orthostatic VS: Lying  Sitting       Visual Acuity      ED Medications  Medications   ketorolac (TORADOL) injection 15 mg (has no administration in time range)   sodium chloride 0 9 % bolus 1,000 mL (0 mL Intravenous Stopped 3/10/19 1204)   iohexol (OMNIPAQUE) 350 MG/ML injection (MULTI-DOSE) 100 mL (100 mL Intravenous Given 3/10/19 1159)       Diagnostic Studies  Results Reviewed     Procedure Component Value Units Date/Time    [place] draw [[id number]] Collected:  03/10/19 1104    Lab Status:  Final result Specimen:  Blood from Arm, Right Updated:  03/10/19 1301    Narrative: The following orders were created for panel order [place] draw  Procedure                               Abnormality         Status                     ---------                               -----------         ------                     Saldaña Fan Top on XQNW[[id number]]                           Final result               Green / Black tube on NOVP[[id number]]                       Final result                 Please view results for these tests on the individual orders  Comprehensive metabolic panel [[id number]]  (Abnormal) Collected:  03/10/19 1104    Lab Status:  Final result Specimen:  Blood from Arm, Right Updated:  03/10/19 1132     Sodium 132 mmol/L      Potassium 4 5 mmol/L      Chloride 99 mmol/L      CO2 26 mmol/L      ANION GAP 7 mmol/L      BUN 8 mg/dL      Creatinine 0 74 mg/dL      Glucose 242 mg/dL      Calcium 9 4 mg/dL      AST 27 U/L      ALT 26 U/L      Alkaline Phosphatase 100 U/L      Total Protein 7 1 g/dL      Albumin 3 2 g/dL      Total Bilirubin 0 60 mg/dL      eGFR 94 ml/min/1 73sq m     Narrative:       National Kidney Disease Education Program recommendations are as follows:  GFR calculation is accurate only with a steady state creatinine  Chronic Kidney disease less than 60 ml/min/1 73 sq  meters  Kidney failure less than 15 ml/min/1 73 sq  meters      Urine Microscopic [[id number]]  (Abnormal) Collected:  03/10/19 1104    Lab Status:  Final result Specimen:  Urine, Clean Catch Updated:  03/10/19 1132     RBC, UA 1-2 /hpf      WBC, UA 2-4 /hpf      Epithelial Cells Moderate /hpf      Bacteria, UA Occasional /hpf     Lipase [[id number]]  (Normal) Collected:  03/10/19 1104    Lab Status:  Final result Specimen:  Blood from Arm, Right Updated:  03/10/19 1126     Lipase 171 u/L     UA w Reflex to Microscopic [[id number]]  (Abnormal) Collected:  03/10/19 1104    Lab Status:  Final result Specimen:  Urine, Clean Catch Updated:  03/10/19 1119     Color, UA Yellow     Clarity, UA Slightly Cloudy     Specific Hurricane, UA 1 020     pH, UA 6 0     Leukocytes, UA Trace     Nitrite, UA Negative     Protein, UA Negative mg/dl      Glucose,  (1/2%) mg/dl      Ketones, UA Negative mg/dl      Urobilinogen, UA 0 2 E U /dl      Bilirubin, UA Negative     Blood, UA Large    CBC and differential [[id number]]  (Abnormal) Collected:  03/10/19 1104    Lab Status:  Final result Specimen:  Blood from Arm, Right Updated:  03/10/19 1115     WBC 5 16 Thousand/uL      RBC 4 77 Million/uL      Hemoglobin 11 2 g/dL      Hematocrit 36 4 %      MCV 76 fL      MCH 23 5 pg      MCHC 30 8 g/dL      RDW 19 8 %      MPV 9 2 fL      Platelets 819 Thousands/uL      nRBC 0 /100 WBCs      Neutrophils Relative 64 %      Immat GRANS % 0 %      Lymphocytes Relative 26 %      Monocytes Relative 6 %      Eosinophils Relative 3 %      Basophils Relative 1 %      Neutrophils Absolute 3 26 Thousands/µL      Immature Grans Absolute 0 02 Thousand/uL      Lymphocytes Absolute 1 35 Thousands/µL      Monocytes Absolute 0 33 Thousand/µL      Eosinophils Absolute 0 15 Thousand/µL      Basophils Absolute 0 05 Thousands/µL                  CT abdomen pelvis with contrast   Final Result by Nolan Fan MD (03/10 1219)         1  Dilated right ureter along its mid to distal portion to the level of the right external iliac vessels with normal caliber more distally  No ureteral or renal calculi identified  Findings could indicate recent passage of calculus  No calculi are    identified within the bladder  2   Uterine fibroid  Workstation performed: [id number]                    Procedures  Procedures       Phone Contacts  ED Phone Contact    ED Course  ED Course as of Mar 10 1311   Sun Mar 10, 2019   1050 Blood Pressure(!): 196/90   1050 Temperature(!): 97 3 °F (36 3 °C)   1050 Pulse(!): 107   1050 Respirations: 18   1050 SpO2: 95 %   1128 WBC: 5 16   1128 Hemoglobin(!): 11 2   1128 Platelet Count: 838   1128 Lipase: 171   1128 Blood, UA(!): Large   1128 Bilirubin, UA: Negative   1128 Leukocytes, UA(!): Trace   1128 Clarity, UA: Slightly Cloudy   1128 Color, UA: Yellow   1136 Bacteria, UA: Occasional   1136 Epithelial Cells(!): Moderate   1136 WBC, UA(!): 2-4   1136 RBC, UA(!): 1-2   1136 Sodium(!): 132   1136 CO2: 26   1136 Anion Gap: 7   1136 Glucose, Random(!): 242   1136 Glucose, Random(!): 242   1136 Total Protein: 7 1   1136 Anion Gap: 7   1210 Awaiting CT read      1252 Impression         1   Dilated right ureter along its mid to distal portion to the level of the right external iliac vessels with normal caliber more distally   No ureteral or renal calculi identified   Findings could indicate recent passage of calculus  No calculi are   identified within the bladder  2   Uterine fibroid  MDM    Disposition  Final diagnoses:   Right flank pain     Time reflects when diagnosis was documented in both MDM as applicable and the Disposition within this note     Time User Action Codes Description Comment    3/10/2019  1:10 PM Kely Agee Add [R10 9] Right flank pain       ED Disposition     ED Disposition Condition Date/Time Comment    Discharge Good Sun Mar 10, 2019  1:10 PM Ada Palomares discharge to home/self care              Follow-up Information     Follow up With Specialties Details Why [id number] [facility] Physician Assistant Schedule an appointment as soon as possible for a visit   [address]  [phone]            Patient's Medications   Discharge Prescriptions    No medications on file     No discharge procedures on file      ED Provider  Electronically Signed by           Nathaniel Drake PA-C  03/10/19 9335

## 2019-04-05 ENCOUNTER — OFFICE VISIT (OUTPATIENT)
Dept: BEHAVIORAL/MENTAL HEALTH CLINIC | Facility: CLINIC | Age: 54
End: 2019-04-05
Payer: COMMERCIAL

## 2019-04-05 DIAGNOSIS — F43.10 POST-TRAUMATIC STRESS: ICD-10-CM

## 2019-04-05 DIAGNOSIS — F33.1 MODERATE EPISODE OF RECURRENT MAJOR DEPRESSIVE DISORDER (HCC): ICD-10-CM

## 2019-04-05 DIAGNOSIS — F41.1 GENERALIZED ANXIETY DISORDER: Primary | ICD-10-CM

## 2019-04-05 PROCEDURE — 90837 PSYTX W PT 60 MINUTES: CPT | Performed by: SOCIAL WORKER

## 2019-04-08 DIAGNOSIS — K21.9 GASTROESOPHAGEAL REFLUX DISEASE WITHOUT ESOPHAGITIS: ICD-10-CM

## 2019-04-09 ENCOUNTER — TELEPHONE (OUTPATIENT)
Dept: FAMILY MEDICINE CLINIC | Facility: CLINIC | Age: 54
End: 2019-04-09

## 2019-04-09 RX ORDER — PROMETHAZINE HYDROCHLORIDE 25 MG/1
TABLET ORAL
Qty: 90 TABLET | Refills: 2 | OUTPATIENT
Start: 2019-04-09

## 2019-04-15 ENCOUNTER — OFFICE VISIT (OUTPATIENT)
Dept: BEHAVIORAL/MENTAL HEALTH CLINIC | Facility: CLINIC | Age: 54
End: 2019-04-15
Payer: COMMERCIAL

## 2019-04-15 DIAGNOSIS — F33.1 MODERATE EPISODE OF RECURRENT MAJOR DEPRESSIVE DISORDER (HCC): Primary | ICD-10-CM

## 2019-04-15 DIAGNOSIS — F50.89 PICA IN ADULTS: ICD-10-CM

## 2019-04-15 PROCEDURE — 90837 PSYTX W PT 60 MINUTES: CPT | Performed by: SOCIAL WORKER

## 2019-05-01 DIAGNOSIS — F32.A DEPRESSION, UNSPECIFIED DEPRESSION TYPE: ICD-10-CM

## 2019-05-01 RX ORDER — BUPROPION HYDROCHLORIDE 150 MG/1
TABLET, EXTENDED RELEASE ORAL
Qty: 60 TABLET | Refills: 2 | Status: SHIPPED | OUTPATIENT
Start: 2019-05-01 | End: 2019-07-29 | Stop reason: SDUPTHER

## 2019-05-15 ENCOUNTER — OFFICE VISIT (OUTPATIENT)
Dept: BEHAVIORAL/MENTAL HEALTH CLINIC | Facility: CLINIC | Age: 54
End: 2019-05-15
Payer: COMMERCIAL

## 2019-05-15 DIAGNOSIS — F40.01 AGORAPHOBIA WITH PANIC ATTACKS: ICD-10-CM

## 2019-05-15 DIAGNOSIS — F41.9 ANXIETY: ICD-10-CM

## 2019-05-15 DIAGNOSIS — F33.1 MODERATE EPISODE OF RECURRENT MAJOR DEPRESSIVE DISORDER (HCC): ICD-10-CM

## 2019-05-15 DIAGNOSIS — F43.10 POST-TRAUMATIC STRESS: Primary | ICD-10-CM

## 2019-05-15 PROCEDURE — T1015 CLINIC SERVICE: HCPCS | Performed by: SOCIAL WORKER

## 2019-06-06 ENCOUNTER — HOSPITAL ENCOUNTER (EMERGENCY)
Facility: HOSPITAL | Age: 54
Discharge: HOME | End: 2019-06-06
Attending: EMERGENCY MEDICINE
Payer: COMMERCIAL

## 2019-06-06 ENCOUNTER — APPOINTMENT (EMERGENCY)
Dept: CT IMAGING | Facility: HOSPITAL | Age: 54
End: 2019-06-06
Payer: COMMERCIAL

## 2019-06-06 VITALS
OXYGEN SATURATION: 96 % | HEIGHT: 65 IN | RESPIRATION RATE: 18 BRPM | DIASTOLIC BLOOD PRESSURE: 89 MMHG | HEART RATE: 108 BPM | BODY MASS INDEX: 35.81 KG/M2 | SYSTOLIC BLOOD PRESSURE: 155 MMHG | WEIGHT: 214.95 LBS | TEMPERATURE: 97.5 F

## 2019-06-06 DIAGNOSIS — H81.13 BPV (BENIGN POSITIONAL VERTIGO), BILATERAL: Primary | ICD-10-CM

## 2019-06-06 LAB
ANION GAP SERPL CALCULATED.3IONS-SCNC: 10 MMOL/L (ref 4–13)
APTT PPP: 28 SECONDS (ref 26–38)
BASOPHILS # BLD AUTO: 0.04 THOUSANDS/ΜL (ref 0–0.1)
BASOPHILS NFR BLD AUTO: 1 % (ref 0–1)
BUN SERPL-MCNC: 12 MG/DL (ref 5–25)
CALCIUM SERPL-MCNC: 8.8 MG/DL (ref 8.3–10.1)
CHLORIDE SERPL-SCNC: 100 MMOL/L (ref 100–108)
CO2 SERPL-SCNC: 27 MMOL/L (ref 21–32)
CREAT SERPL-MCNC: 0.85 MG/DL (ref 0.6–1.3)
EOSINOPHIL # BLD AUTO: 0.1 THOUSAND/ΜL (ref 0–0.61)
EOSINOPHIL NFR BLD AUTO: 2 % (ref 0–6)
ERYTHROCYTE [DISTWIDTH] IN BLOOD BY AUTOMATED COUNT: 15.4 % (ref 11.6–15.1)
GFR SERPL CREATININE-BSD FRML MDRD: 80 ML/MIN/1.73SQ M
GLUCOSE SERPL-MCNC: 195 MG/DL (ref 65–140)
HCT VFR BLD AUTO: 34.7 % (ref 34.8–46.1)
HGB BLD-MCNC: 9.8 G/DL (ref 11.5–15.4)
HOLD SPECIMEN: NORMAL
IMM GRANULOCYTES # BLD AUTO: 0.02 THOUSAND/UL (ref 0–0.2)
IMM GRANULOCYTES NFR BLD AUTO: 0 % (ref 0–2)
INR PPP: 1.02 (ref 0.86–1.17)
LYMPHOCYTES # BLD AUTO: 1.12 THOUSANDS/ΜL (ref 0.6–4.47)
LYMPHOCYTES NFR BLD AUTO: 23 % (ref 14–44)
MCH RBC QN AUTO: 21.3 PG (ref 26.8–34.3)
MCHC RBC AUTO-ENTMCNC: 28.2 G/DL (ref 31.4–37.4)
MCV RBC AUTO: 75 FL (ref 82–98)
MONOCYTES # BLD AUTO: 0.27 THOUSAND/ΜL (ref 0.17–1.22)
MONOCYTES NFR BLD AUTO: 6 % (ref 4–12)
NEUTROPHILS # BLD AUTO: 3.26 THOUSANDS/ΜL (ref 1.85–7.62)
NEUTS SEG NFR BLD AUTO: 68 % (ref 43–75)
NRBC BLD AUTO-RTO: 0 /100 WBCS
PLATELET # BLD AUTO: 340 THOUSANDS/UL (ref 149–390)
PMV BLD AUTO: 8.6 FL (ref 8.9–12.7)
POTASSIUM SERPL-SCNC: 4.2 MMOL/L (ref 3.5–5.3)
PROTHROMBIN TIME: 12.9 SECONDS (ref 11.8–14.2)
RBC # BLD AUTO: 4.61 MILLION/UL (ref 3.81–5.12)
SODIUM SERPL-SCNC: 137 MMOL/L (ref 136–145)
WBC # BLD AUTO: 4.81 THOUSAND/UL (ref 4.31–10.16)

## 2019-06-06 PROCEDURE — 99284 EMERGENCY DEPT VISIT MOD MDM: CPT | Performed by: PHYSICIAN ASSISTANT

## 2019-06-06 PROCEDURE — 96361 HYDRATE IV INFUSION ADD-ON: CPT

## 2019-06-06 PROCEDURE — 85730 THROMBOPLASTIN TIME PARTIAL: CPT | Performed by: PHYSICIAN ASSISTANT

## 2019-06-06 PROCEDURE — 36415 COLL VENOUS BLD VENIPUNCTURE: CPT | Performed by: PHYSICIAN ASSISTANT

## 2019-06-06 PROCEDURE — 93005 ELECTROCARDIOGRAM TRACING: CPT

## 2019-06-06 PROCEDURE — 80048 BASIC METABOLIC PNL TOTAL CA: CPT | Performed by: PHYSICIAN ASSISTANT

## 2019-06-06 PROCEDURE — 70450 CT HEAD/BRAIN W/O DYE: CPT

## 2019-06-06 PROCEDURE — 85025 COMPLETE CBC W/AUTO DIFF WBC: CPT | Performed by: PHYSICIAN ASSISTANT

## 2019-06-06 PROCEDURE — 96374 THER/PROPH/DIAG INJ IV PUSH: CPT

## 2019-06-06 PROCEDURE — 85610 PROTHROMBIN TIME: CPT | Performed by: PHYSICIAN ASSISTANT

## 2019-06-06 PROCEDURE — 99284 EMERGENCY DEPT VISIT MOD MDM: CPT

## 2019-06-06 RX ORDER — MECLIZINE HYDROCHLORIDE 25 MG/1
25 TABLET ORAL 3 TIMES DAILY PRN
Qty: 30 TABLET | Refills: 0 | Status: SHIPPED | OUTPATIENT
Start: 2019-06-06 | End: 2019-07-29

## 2019-06-06 RX ORDER — MECLIZINE HCL 12.5 MG/1
50 TABLET ORAL ONCE
Status: COMPLETED | OUTPATIENT
Start: 2019-06-06 | End: 2019-06-06

## 2019-06-06 RX ORDER — ONDANSETRON 2 MG/ML
4 INJECTION INTRAMUSCULAR; INTRAVENOUS ONCE
Status: COMPLETED | OUTPATIENT
Start: 2019-06-06 | End: 2019-06-06

## 2019-06-06 RX ORDER — ACETAMINOPHEN 325 MG/1
650 TABLET ORAL ONCE
Status: COMPLETED | OUTPATIENT
Start: 2019-06-06 | End: 2019-06-06

## 2019-06-06 RX ADMIN — ONDANSETRON 4 MG: 2 INJECTION INTRAMUSCULAR; INTRAVENOUS at 09:55

## 2019-06-06 RX ADMIN — MECLIZINE 50 MG: 12.5 TABLET ORAL at 09:55

## 2019-06-06 RX ADMIN — ACETAMINOPHEN 650 MG: 325 TABLET, FILM COATED ORAL at 09:55

## 2019-06-06 RX ADMIN — SODIUM CHLORIDE 1000 ML: 0.9 INJECTION, SOLUTION INTRAVENOUS at 09:55

## 2019-06-07 LAB
ATRIAL RATE: 105 BPM
P AXIS: 59 DEGREES
PR INTERVAL: 152 MS
QRS AXIS: 55 DEGREES
QRSD INTERVAL: 92 MS
QT INTERVAL: 366 MS
QTC INTERVAL: 483 MS
T WAVE AXIS: 58 DEGREES
VENTRICULAR RATE: 105 BPM

## 2019-06-07 PROCEDURE — 93010 ELECTROCARDIOGRAM REPORT: CPT | Performed by: INTERNAL MEDICINE

## 2019-07-18 ENCOUNTER — TELEPHONE (OUTPATIENT)
Dept: BEHAVIORAL/MENTAL HEALTH CLINIC | Facility: CLINIC | Age: 54
End: 2019-07-18

## 2019-07-29 ENCOUNTER — OFFICE VISIT (OUTPATIENT)
Dept: FAMILY MEDICINE CLINIC | Facility: CLINIC | Age: 54
End: 2019-07-29
Payer: COMMERCIAL

## 2019-07-29 VITALS
HEART RATE: 108 BPM | DIASTOLIC BLOOD PRESSURE: 80 MMHG | BODY MASS INDEX: 34.82 KG/M2 | RESPIRATION RATE: 16 BRPM | OXYGEN SATURATION: 98 % | TEMPERATURE: 97 F | WEIGHT: 209 LBS | HEIGHT: 65 IN | SYSTOLIC BLOOD PRESSURE: 142 MMHG

## 2019-07-29 DIAGNOSIS — I10 ESSENTIAL HYPERTENSION: Primary | ICD-10-CM

## 2019-07-29 DIAGNOSIS — M54.42 CHRONIC MIDLINE LOW BACK PAIN WITH BILATERAL SCIATICA: ICD-10-CM

## 2019-07-29 DIAGNOSIS — E61.1 IRON DEFICIENCY: ICD-10-CM

## 2019-07-29 DIAGNOSIS — E11.9 TYPE 2 DIABETES MELLITUS WITHOUT COMPLICATION, WITH LONG-TERM CURRENT USE OF INSULIN (HCC): ICD-10-CM

## 2019-07-29 DIAGNOSIS — L23.9 ALLERGIC DERMATITIS: ICD-10-CM

## 2019-07-29 DIAGNOSIS — Z79.4 TYPE 2 DIABETES MELLITUS WITHOUT COMPLICATION, WITH LONG-TERM CURRENT USE OF INSULIN (HCC): ICD-10-CM

## 2019-07-29 DIAGNOSIS — F33.1 MODERATE EPISODE OF RECURRENT MAJOR DEPRESSIVE DISORDER (HCC): ICD-10-CM

## 2019-07-29 DIAGNOSIS — G89.29 CHRONIC MIDLINE LOW BACK PAIN WITH BILATERAL SCIATICA: ICD-10-CM

## 2019-07-29 DIAGNOSIS — M54.41 CHRONIC MIDLINE LOW BACK PAIN WITH BILATERAL SCIATICA: ICD-10-CM

## 2019-07-29 DIAGNOSIS — E78.49 OTHER HYPERLIPIDEMIA: ICD-10-CM

## 2019-07-29 PROCEDURE — 99213 OFFICE O/P EST LOW 20 MIN: CPT | Performed by: FAMILY MEDICINE

## 2019-07-29 RX ORDER — OLMESARTAN MEDOXOMIL AND HYDROCHLOROTHIAZIDE 20/12.5 20; 12.5 MG/1; MG/1
1 TABLET ORAL DAILY
Qty: 30 TABLET | Refills: 1 | Status: SHIPPED | OUTPATIENT
Start: 2019-07-29 | End: 2020-03-24

## 2019-07-29 RX ORDER — LORATADINE 10 MG/1
10 TABLET ORAL DAILY
Qty: 30 TABLET | Refills: 3 | Status: SHIPPED | OUTPATIENT
Start: 2019-07-29 | End: 2020-03-24

## 2019-07-29 RX ORDER — BUPROPION HYDROCHLORIDE 150 MG/1
150 TABLET, EXTENDED RELEASE ORAL 2 TIMES DAILY
Qty: 60 TABLET | Refills: 3 | Status: SHIPPED | OUTPATIENT
Start: 2019-07-29 | End: 2020-03-24

## 2019-07-29 RX ORDER — METHOCARBAMOL 500 MG/1
500 TABLET, FILM COATED ORAL 3 TIMES DAILY PRN
Qty: 90 TABLET | Refills: 2 | Status: SHIPPED | OUTPATIENT
Start: 2019-07-29 | End: 2020-03-24

## 2019-07-29 NOTE — PROGRESS NOTES
Assessment/Plan:     Diagnoses and all orders for this visit:    Essential hypertension  Comments:  stopped losartan  reports side effects with lisinopril  Stop HCTZ  Start benicar-hctz 20-12 5mg daily  Healthy diet, exercise, weight loss  Orders:  -     olmesartan-hydrochlorothiazide (BENICAR HCT) 20-12 5 MG per tablet; Take 1 tablet by mouth daily  -     CBC and differential; Future  -     Comprehensive metabolic panel; Future  -     TSH, 3rd generation with Free T4 reflex; Future  -     UA w Reflex to Microscopic w Reflex to Culture -Lab Collect  -     Microalbumin / creatinine urine ratio; Future    Chronic midline low back pain with bilateral sciatica  Comments:  robaxin renewed for PRN use  Advised weight loss and continuing supportive measures  Orders:  -     methocarbamol (ROBAXIN) 500 mg tablet; Take 1 tablet (500 mg total) by mouth 3 (three) times a day as needed for muscle spasms    Moderate episode of recurrent major depressive disorder ([facility] [address] )  Comments:  symptoms well controlled  continue wellbutrin BID and psychotherapy through work  Orders:  -     buPROPion (WELLBUTRIN SR) 150 mg 12 hr tablet; Take 1 tablet (150 mg total) by mouth 2 (two) times a day    Other hyperlipidemia  Comments:  stopped crestor due to cost  check lipid panel and discuss further management options at follow up  Healthy diet, exercise, reduce saturated fats  Orders:  -     Lipid Panel with Direct LDL reflex; Future    Iron deficiency  Comments:  continue vitron C  check CBC/iron panel  continue f/u GI  Orders:  -     Iron Panel (Includes Iron Saturation, Iron, and TIBC); Future    Type 2 diabetes mellitus without complication, with long-term current use [facility] insulin [facility]  Comments:  continue follow up with gato, appointment in September  Start ARB  Will check lipid panel and start statin next month  Allergic dermatitis  Comments:  symptoms controlled  continue claritin    Orders:  -     loratadine (CLARITIN) 10 mg tablet; Take 1 tablet (10 mg total) by mouth daily        Denies mammogram today  Colonoscopy UTD - due 2022  Check routine labs    Return in about 4 weeks (around 8/26/2019) for Recheck of blood pressure and lab review  Subjective:        Patient ID: Homar Savage is a 48 y o  female  Chief Complaint   Patient presents with    Medication Refill       Patient is a 24-year-old female who presents to the office today for follow-up  She needs maintenance medication refills today  She is doing well without any complaints or concerns  She has history of hypertension, on hydrochlorothiazide daily  She was on losartan in the past and stopped due to medication recall  She was on lisinopril in the past and had tachycardia  She has history of hyperlipidemia, was started on Crestor  She states she stopped taking due to cost of medication  She is a type 2 diabetic, established with endocrinology with Angela Feliciano  She states sugars are running good, her next appointment is in September  She has history depression with anxiety, PTSD  Her symptoms are well controlled with Wellbutrin twice daily  She states moods are stable, good appetite and sleep  She was receiving psychotherapy and office, states she got a new job and now has therapist at work that she sees if needed  No SI/HI/AH/VH/manic sx  She is due for routine labs  She denies mammogram today  Her colonoscopy is up-to-date, due in 3 years         The following portions of the patient's history were reviewed and updated as appropriate: allergies, current medications, past family history, past medical history, past social history, past surgical history and problem list     Patient Active Problem List   Diagnosis    Acid reflux    Allergic cough    Anxiety    Moderate episode of recurrent major depressive disorder ([facility] [address] )    Diabetes mellitus ([facility] [address] [facility]    Hydrocephalus    Hypertension    Iron deficiency    Pica in adults    Herniation of lumbar intervertebral disc with radiculopathy    Other hyperlipidemia    Colon cancer screening    Oropharyngeal dysphagia    Hepatic steatosis    Elevated LFTs    Dysphagia    Screening for colon cancer    Gastroesophageal reflux disease    Post-traumatic stress    Agoraphobia with panic attacks    Generalized anxiety disorder    Chronic midline low back pain with bilateral sciatica    Allergic dermatitis       Current Outpatient Medications   Medication Sig Dispense Refill    glimepiride (AMARYL) 4 mg tablet Take 1 tablet by mouth 2 (two) times a day      Insulin Glargine-Lixisenatide (SOLIQUA) 100 units-33 mcg/mL injection pen Inject 45 Units under the skin daily        loratadine (CLARITIN) 10 mg tablet Take 1 tablet (10 mg total) by mouth daily 30 tablet 3    metFORMIN (GLUCOPHAGE-XR) 500 mg 24 hr tablet 2 (two) times a day      methocarbamol (ROBAXIN) 500 mg tablet Take 1 tablet (500 mg total) by mouth 3 (three) times a day as needed for muscle spasms 90 tablet 2    Nerve Stimulator (TENS THERAPY PAIN RELIEF) BILLY 1 Device by Does not apply route 2 (two) times a day as needed (pain) 1 Device 0    buPROPion (WELLBUTRIN SR) 150 mg 12 hr tablet Take 1 tablet (150 mg total) by mouth 2 (two) times a day 60 tablet 3    olmesartan-hydrochlorothiazide (BENICAR HCT) 20-12 5 MG per tablet Take 1 tablet by mouth daily 30 tablet 1     No current facility-administered medications for this visit           Past Medical History:   Diagnosis Date    Diabetes mellitus ([address] )     Hydrocephalus     Hyperlipidemia     Hypertension     Migraine     Vertigo         Past Surgical History:   Procedure Laterality Date    [place] ESOPHAGOGASTRODUODENOSCOPY TRANSORAL DIAGNOSTIC N/A 10/23/2018    Procedure: EGD with Savary dilation AND COLONOSCOPY;  Surgeon: Bobby Otto MD;  Location: [facility];  Service: Gastroenterology    TUBAL LIGATION          Social History     Socioeconomic History    Marital status: /Civil Union     Spouse name: Not on file    Number of children: Not on file    Years of education: Not on file    Highest education level: Not on file   Occupational History    Not on file   Social Needs    Financial resource strain: Not on file    Food insecurity:     Worry: Not on file     Inability: Not on file    Transportation needs:     Medical: Not on file     Non-medical: Not on file   Tobacco Use    Smoking status: Never Smoker    Smokeless tobacco: Never Used   Substance and Sexual Activity    Alcohol use: No    Drug use: No    Sexual activity: Not on file   Lifestyle    Physical activity:     Days per week: Not on file     Minutes per session: Not on file    Stress: Not on file   Relationships    Social connections:     Talks on phone: Not on file     Gets together: Not on file     Attends [employer] service: Not on file     Active member of club or organization: Not on file     Attends meetings of clubs or organizations: Not on file     Relationship status: Not on file    Intimate partner violence:     Fear of current or ex partner: Not on file     Emotionally abused: Not on file     Physically abused: Not on file     Forced sexual activity: Not on file   Other Topics Concern    Not on file   Social History Narrative    Not on file        Review of Systems   Constitutional: Negative  Respiratory: Negative  Cardiovascular: Negative  Gastrointestinal: Negative  Neurological: Negative  Psychiatric/Behavioral: Negative  Objective:      /80 (BP Location: Right arm, Patient Position: Sitting, Cuff Size: Large)   Pulse (!) 108   Temp (!) 97 °F (36 1 °C) (Tympanic)   Resp 16   Ht 5' 5" (1 651 m)   Wt 94 8 kg (209 lb)   SpO2 98%   BMI 34 78 kg/m²          Physical Exam   Constitutional: She is oriented to person, place, and time  She appears well-developed and well-nourished  obese   HENT:   Head: Normocephalic and atraumatic     Mouth/Throat: Oropharynx is clear and moist    Eyes: Pupils are equal, round, and reactive to light  Conjunctivae are normal    Neck: Neck supple  Cardiovascular: Normal rate, regular rhythm and normal heart sounds  No murmur heard  Pulmonary/Chest: Effort normal and breath sounds normal    Abdominal: Soft  Bowel sounds are normal  There is no tenderness  Musculoskeletal: She exhibits no edema  Neurological: She is alert and oriented to person, place, and time  Skin: Skin is warm and dry  Capillary refill takes less than 2 seconds  Psychiatric: She has a normal mood and affect

## 2019-09-11 DIAGNOSIS — I10 HYPERTENSION, UNSPECIFIED TYPE: ICD-10-CM

## 2019-09-17 RX ORDER — HYDROCHLOROTHIAZIDE 25 MG/1
TABLET ORAL
Qty: 30 TABLET | Refills: 11 | OUTPATIENT
Start: 2019-09-17

## 2019-10-21 ENCOUNTER — APPOINTMENT (EMERGENCY)
Dept: RADIOLOGY | Facility: HOSPITAL | Age: 54
End: 2019-10-21
Payer: COMMERCIAL

## 2019-10-21 ENCOUNTER — HOSPITAL ENCOUNTER (EMERGENCY)
Facility: HOSPITAL | Age: 54
Discharge: HOME | End: 2019-10-21
Attending: EMERGENCY MEDICINE | Admitting: EMERGENCY MEDICINE
Payer: COMMERCIAL

## 2019-10-21 VITALS
BODY MASS INDEX: 35.96 KG/M2 | DIASTOLIC BLOOD PRESSURE: 87 MMHG | HEART RATE: 113 BPM | RESPIRATION RATE: 16 BRPM | HEIGHT: 65 IN | SYSTOLIC BLOOD PRESSURE: 191 MMHG | WEIGHT: 215.83 LBS | OXYGEN SATURATION: 99 % | TEMPERATURE: 98.1 F

## 2019-10-21 DIAGNOSIS — S62.609A FINGER FRACTURE: Primary | ICD-10-CM

## 2019-10-21 PROCEDURE — 73140 X-RAY EXAM OF FINGER(S): CPT

## 2019-10-21 PROCEDURE — 99283 EMERGENCY DEPT VISIT LOW MDM: CPT

## 2019-10-21 PROCEDURE — 99282 EMERGENCY DEPT VISIT SF MDM: CPT | Performed by: EMERGENCY MEDICINE

## 2019-10-22 NOTE — ED PROCEDURE NOTE
PROCEDURE  Splint application  Date/Time: 10/21/2019 9:38 PM  Performed by: Jesse Ziegler DO  Authorized by: Jesse Ziegler DO     Patient location:  Bedside  Procedure performed by emergency physician: Yes    Other Assisting Provider: Yes (comment)    Consent:     Consent obtained:  Verbal    Consent given by:  Patient    Risks discussed:  Discoloration    Alternatives discussed:  No treatment  Universal protocol:     Patient identity confirmed:  Verbally with patient  Indication:     Indications: fracture    Procedure details:     Laterality:  Right    Location:  Finger    Finger:  R small finger    Splint type:  Finger splint, static    Supplies:  Aluminum splint  Post-procedure details:     Pain:  Improved    Sensation:  Normal    Neurovascular Exam: skin pink      Patient tolerance of procedure:   Tolerated well, no immediate complications         Jesse Ziegler DO  10/21/19 2138

## 2019-10-22 NOTE — ED PROVIDER NOTES
History  Chief Complaint   Patient presents with    Finger Injury     pt closed door on right pinky finger today  pt c/o pain and swelling     42-year-old female presents after slamming her right 5th digit in a car door today  Patient states she was closing door and accidentally closed her hand  Hand Pain   Location:  Patient has pain to the left 5th distal phalanx  Severity:  Mild  Onset quality:  Sudden  Timing:  Constant  Associated symptoms: no abdominal pain, no chest pain and no congestion        Prior to Admission Medications   Prescriptions Last Dose Informant Patient Reported? Taking?    Insulin Glargine-Lixisenatide (SOLIQUA) 100 units-33 mcg/mL injection pen 10/21/2019 at Unknown time  Yes Yes   Sig: Inject 45 Units under the skin daily     Nerve Stimulator (TENS THERAPY PAIN RELIEF) BILLY 10/21/2019 at Unknown time Self No Yes   Si Device by Does not apply route 2 (two) times a day as needed (pain)   buPROPion (WELLBUTRIN SR) 150 mg 12 hr tablet 10/21/2019 at Unknown time  No Yes   Sig: Take 1 tablet (150 mg total) by mouth 2 (two) times a day   glimepiride (AMARYL) 4 mg tablet 10/21/2019 at Unknown time Self Yes Yes   Sig: Take 1 tablet by mouth 2 (two) times a day   loratadine (CLARITIN) 10 mg tablet Not Taking at Unknown time  No No   Sig: Take 1 tablet (10 mg total) by mouth daily   Patient not taking: Reported on 10/21/2019   metFORMIN (GLUCOPHAGE-XR) 500 mg 24 hr tablet 10/21/2019 at Unknown time  Yes Yes   Si (two) times a day   methocarbamol (ROBAXIN) 500 mg tablet Not Taking at Unknown time  No No   Sig: Take 1 tablet (500 mg total) by mouth 3 (three) times a day as needed for muscle spasms   Patient not taking: Reported on 10/21/2019   olmesartan-hydrochlorothiazide (BENICAR HCT) 20-12 5 MG per tablet Not Taking at Unknown time  No No   Sig: Take 1 tablet by mouth daily   Patient not taking: Reported on 10/21/2019      Facility-Administered Medications: None       Past Medical History:   Diagnosis Date    [facility] [facility]     [facility]cephalus [facility] [facility]     Hyperlipidemia     Hypertension     Migraine     Vertigo        Past Surgical History:   Procedure Laterality Date    [place] ESOPHAGOGASTRODUODENOSCOPY TRANSORAL DIAGNOSTIC N/A 10/23/2018    Procedure: EGD with Savary dilation AND COLONOSCOPY;  Surgeon: Elfego Garcia MD;  Location: [facility];  Service: Gastroenterology    TUBAL LIGATION         History reviewed  No pertinent family history  I have reviewed and agree with the history as documented  Social History     Tobacco Use    Smoking status: Never Smoker    Smokeless tobacco: Never Used   Substance Use Topics    Alcohol use: No    Drug use: No        Review of Systems   Constitutional: Negative  HENT: Negative for congestion  Eyes: Negative  Respiratory: Negative  Cardiovascular: Negative for chest pain  Gastrointestinal: Negative for abdominal pain  Endocrine: Negative  Genitourinary: Negative  Musculoskeletal: Negative  Patient with pain to the left 5th distal phalanx   Skin: Negative  Allergic/Immunologic: Negative  Neurological: Negative  Hematological: Negative  Psychiatric/Behavioral: Negative  All other systems reviewed and are negative  Physical Exam  Physical Exam   Constitutional: She appears well-developed  HENT:   Head: Normocephalic  Right Ear: External ear normal    Left Ear: External ear normal    Eyes: Pupils are equal, round, and reactive to light  Right eye exhibits no discharge  Neck: Normal range of motion  No tracheal deviation present  No thyromegaly present  Cardiovascular: Normal rate, regular rhythm and normal heart sounds  Pulmonary/Chest: Effort normal  No stridor  No respiratory distress  She has no wheezes  Abdominal: Soft  She exhibits no distension  There is no tenderness  There is no guarding  Musculoskeletal: She exhibits edema     Pain and swelling to the left 5th distal phalanx   Neurological: She is alert  She displays normal reflexes  No cranial nerve deficit  She exhibits normal muscle tone  Coordination normal    Skin: Skin is warm  Capillary refill takes less than 2 seconds  Psychiatric: She has a normal mood and affect  Her behavior is normal    Vitals reviewed  Vital Signs  ED Triage Vitals [10/21/19 2112]   Temperature Pulse Respirations Blood Pressure SpO2   98 1 °F (36 7 °C) (!) 113 16 (!) 191/87 99 %      Temp Source Heart Rate Source Patient Position - Orthostatic VS BP Location FiO2 (%)   Temporal Monitor Lying Left arm --      Pain Score       7           Vitals:    10/21/19 2112   BP: (!) 191/87   Pulse: (!) 113   Patient Position - Orthostatic VS: Lying         Visual Acuity      ED Medications  Medications - No data to display    Diagnostic Studies  Results Reviewed     None                 XR finger fifth digit-pinkie RIGHT    (Results Pending)              Procedures  Procedures       ED Course                               [facility]  Number of Diagnoses or Management Options  Diagnosis management comments: Differential diagnosis 1  Sprain 2  Strain 3  Fracture      Disposition  Final diagnoses:   Finger fracture     Time reflects when diagnosis was documented in both MDM as applicable and the Disposition within this note     Time User Action Codes Description Comment    10/21/2019  9:39 PM Praneeth Pond Add [[id number] [id number]] Finger fracture       ED Disposition     ED Disposition Condition Date/Time Comment    Discharge Stable Mon Oct 21, 2019  9:39 PM Ladoris Diver discharge to home/self care  Follow-up Information     Follow up With Specialties Details Why [id number] You Physician Assistant   [address]  [phone]            Patient's Medications   Discharge Prescriptions    No medications on file     No discharge procedures on file      ED Provider  Electronically Signed by Hi Quarles DO  10/21/19 2132

## 2019-10-24 ENCOUNTER — VBI (OUTPATIENT)
Dept: FAMILY MEDICINE CLINIC | Facility: CLINIC | Age: 54
End: 2019-10-24

## 2019-12-09 ENCOUNTER — DOCUMENTATION (OUTPATIENT)
Dept: BEHAVIORAL/MENTAL HEALTH CLINIC | Facility: CLINIC | Age: 54
End: 2019-12-09
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
## 2019-12-26 ENCOUNTER — APPOINTMENT (EMERGENCY)
Dept: CT IMAGING | Facility: HOSPITAL | Age: 54
End: 2019-12-26
Payer: COMMERCIAL

## 2019-12-26 ENCOUNTER — HOSPITAL ENCOUNTER (EMERGENCY)
Facility: HOSPITAL | Age: 54
Discharge: HOME | End: 2019-12-26
Attending: EMERGENCY MEDICINE
Payer: COMMERCIAL

## 2019-12-26 VITALS
BODY MASS INDEX: 36.33 KG/M2 | HEIGHT: 65 IN | WEIGHT: 218.03 LBS | OXYGEN SATURATION: 97 % | DIASTOLIC BLOOD PRESSURE: 56 MMHG | TEMPERATURE: 97.4 F | HEART RATE: 99 BPM | RESPIRATION RATE: 18 BRPM | SYSTOLIC BLOOD PRESSURE: 115 MMHG

## 2019-12-26 DIAGNOSIS — G43.909 MIGRAINE: Primary | ICD-10-CM

## 2019-12-26 PROCEDURE — 99283 EMERGENCY DEPT VISIT LOW MDM: CPT

## 2019-12-26 PROCEDURE — 70450 CT HEAD/BRAIN W/O DYE: CPT

## 2019-12-26 PROCEDURE — 96365 THER/PROPH/DIAG IV INF INIT: CPT

## 2019-12-26 PROCEDURE — 96361 HYDRATE IV INFUSION ADD-ON: CPT

## 2019-12-26 PROCEDURE — 96375 TX/PRO/DX INJ NEW DRUG ADDON: CPT

## 2019-12-26 PROCEDURE — 99284 EMERGENCY DEPT VISIT MOD MDM: CPT | Performed by: EMERGENCY MEDICINE

## 2019-12-26 RX ORDER — MAGNESIUM SULFATE HEPTAHYDRATE 40 MG/ML
2 INJECTION, SOLUTION INTRAVENOUS ONCE
Status: COMPLETED | OUTPATIENT
Start: 2019-12-26 | End: 2019-12-26

## 2019-12-26 RX ORDER — DIPHENHYDRAMINE HYDROCHLORIDE 50 MG/ML
25 INJECTION INTRAMUSCULAR; INTRAVENOUS ONCE
Status: COMPLETED | OUTPATIENT
Start: 2019-12-26 | End: 2019-12-26

## 2019-12-26 RX ORDER — ONDANSETRON 4 MG/1
4 TABLET, ORALLY DISINTEGRATING ORAL EVERY 8 HOURS PRN
Qty: 20 TABLET | Refills: 0 | Status: SHIPPED | OUTPATIENT
Start: 2019-12-26 | End: 2020-03-24

## 2019-12-26 RX ORDER — METOCLOPRAMIDE HYDROCHLORIDE 5 MG/ML
10 INJECTION INTRAMUSCULAR; INTRAVENOUS ONCE
Status: COMPLETED | OUTPATIENT
Start: 2019-12-26 | End: 2019-12-26

## 2019-12-26 RX ADMIN — METOCLOPRAMIDE 10 MG: 5 INJECTION, SOLUTION INTRAMUSCULAR; INTRAVENOUS at 02:18

## 2019-12-26 RX ADMIN — MAGNESIUM SULFATE HEPTAHYDRATE 2 G: 40 INJECTION, SOLUTION INTRAVENOUS at 02:50

## 2019-12-26 RX ADMIN — DIPHENHYDRAMINE HYDROCHLORIDE 25 MG: 50 INJECTION, SOLUTION INTRAMUSCULAR; INTRAVENOUS at 02:18

## 2019-12-26 RX ADMIN — SODIUM CHLORIDE 1000 ML: 0.9 INJECTION, SOLUTION INTRAVENOUS at 02:16

## 2019-12-26 NOTE — ED PROVIDER NOTES
History  Chief Complaint   Patient presents with    Migraine     with nausea, migraine gets worse with moving head/laying down     74-year-old female presents with onset of an headache 2 hours prior to arrival she describes it as a typical migraine for her it has been awhile since she has had a  previous migraine  This headache  is located in the left posterior region the location of the migraine can vary  it is worse with lying down or turning her head side to side and that is typical of her prior migraines; she denies any photophobia but has had phonophobia she has been very nauseated but no vomiting she denies any fever chills cough or upper respiratory complaints no sore throat no difficulties with vision or speech no numbness or tingling she has dizziness on occasion denies any trauma or falls she denies any chest pain shortness of breath no one else in the household has headaches          Prior to Admission Medications   Prescriptions Last Dose Informant Patient Reported? Taking?    Insulin Glargine-Lixisenatide (SOLIQUA) 100 units-33 mcg/mL injection pen   Yes No   Sig: Inject 45 Units under the skin daily     Nerve Stimulator (TENS THERAPY PAIN RELIEF) BILLY  Self No No   Si Device by Does not apply route 2 (two) times a day as needed (pain)   buPROPion (WELLBUTRIN SR) 150 mg 12 hr tablet   No No   Sig: Take 1 tablet (150 mg total) by mouth 2 (two) times a day   glimepiride (AMARYL) 4 mg tablet  Self Yes No   Sig: Take 1 tablet by mouth 2 (two) times a day   loratadine (CLARITIN) 10 mg tablet   No No   Sig: Take 1 tablet (10 mg total) by mouth daily   Patient not taking: Reported on 10/21/2019   metFORMIN (GLUCOPHAGE-XR) 500 mg 24 hr tablet   Yes No   Si (two) times a day   methocarbamol (ROBAXIN) 500 mg tablet   No No   Sig: Take 1 tablet (500 mg total) by mouth 3 (three) times a day as needed for muscle spasms   Patient not taking: Reported on 10/21/2019   olmesartan-hydrochlorothiazide [facility] HCT) 20-12 5 MG per tablet   No No   Sig: Take 1 tablet by mouth daily   Patient not taking: Reported on 10/21/2019      Facility-Administered Medications: None       Past Medical History:   Diagnosis Date    [facility] mellitus [facility] [address] )     [facility] ([facility] )     Hyperlipidemia     Hypertension     Migraine     Vertigo        Past Surgical History:   Procedure Laterality Date    [place] ESOPHAGOGASTRODUODENOSCOPY TRANSORAL DIAGNOSTIC N/A 10/23/2018    Procedure: EGD with Savary dilation AND COLONOSCOPY;  Surgeon: Anamika Lin MD;  Location: [facility];  Service: Gastroenterology    TUBAL LIGATION         History reviewed  No pertinent family history  I have reviewed and agree with the history as documented  Social History     Tobacco Use    Smoking status: Never Smoker    Smokeless tobacco: Never Used   Substance Use Topics    Alcohol use: No    Drug use: No        Review of Systems   Constitutional: Positive for activity change  Negative for appetite change, chills, diaphoresis, fatigue and fever  HENT: Negative for congestion, ear pain, rhinorrhea, sneezing and sore throat  Eyes: Negative for photophobia, discharge and visual disturbance  Respiratory: Negative for cough and shortness of breath  Cardiovascular: Negative for chest pain and leg swelling  Gastrointestinal: Positive for nausea  Negative for abdominal pain, blood in stool, diarrhea and vomiting  Endocrine: Negative for polyuria  Genitourinary: Negative for difficulty urinating  Musculoskeletal: Negative for back pain, myalgias, neck pain and neck stiffness  Skin: Negative for rash  Neurological: Positive for dizziness and headaches  Negative for tremors, syncope, speech difficulty, weakness, light-headedness and numbness  Hematological: Negative for adenopathy  Psychiatric/Behavioral: Negative for confusion  All other systems reviewed and are negative        Physical Exam  Physical Exam   Constitutional: She is oriented to person, place, and time  She appears well-developed and well-nourished  She appears distressed  Prefers to sit upright on the bed   HENT:   Head: Normocephalic and atraumatic  Right Ear: External ear normal    Left Ear: External ear normal    Nose: Nose normal    Mouth/Throat: Oropharynx is clear and moist  No oropharyngeal exudate  No temporal tenderness   Eyes: Pupils are equal, round, and reactive to light  Conjunctivae and EOM are normal  Right eye exhibits no discharge  Left eye exhibits no discharge  Neck: Normal range of motion  Neck supple  No midline or paraspinous tenderness   Cardiovascular: Regular rhythm, normal heart sounds and intact distal pulses  tachy   Pulmonary/Chest: Effort normal and breath sounds normal  No respiratory distress  Abdominal: Soft  Bowel sounds are normal  She exhibits no distension and no mass  There is no tenderness  There is no rebound and no guarding  Back no midline or CVA tenderness   Musculoskeletal: Normal range of motion  She exhibits no edema, tenderness or deformity  Lymphadenopathy:     She has no cervical adenopathy  Neurological: She is alert and oriented to person, place, and time  She displays normal reflexes  No cranial nerve deficit or sensory deficit  She exhibits normal muscle tone  Coordination normal    GCS 15; no pronator drift  Gait steady  Skin: Skin is warm and dry  Capillary refill takes less than 2 seconds  No rash noted  She is not diaphoretic  Psychiatric: She has a normal mood and affect  Vitals reviewed        Vital Signs  ED Triage Vitals [12/26/19 0111]   Temperature Pulse Respirations Blood Pressure SpO2   (!) 97 4 °F (36 3 °C) (!) 111 18 (!) 183/84 98 %      Temp Source Heart Rate Source Patient Position - Orthostatic VS BP Location FiO2 (%)   Temporal Monitor Sitting Left arm --      Pain Score       8           Vitals:    12/26/19 0111 12/26/19 0200 12/26/19 0253 12/26/19 0330   BP: (!) 183/84 (!) 175/72 133/59 115/56   Pulse: (!) 111 (!) 122 95 99   Patient Position - Orthostatic VS: Sitting Lying Lying Lying         Visual Acuity      ED Medications  Medications   sodium chloride 0 9 % bolus 1,000 mL (0 mL Intravenous Stopped 12/26/19 0316)   metoclopramide (REGLAN) injection 10 mg (10 mg Intravenous Given 12/26/19 0218)   diphenhydrAMINE (BENADRYL) injection 25 mg (25 mg Intravenous Given 12/26/19 0218)   magnesium sulfate 2 g/50 mL IVPB (premix) 2 g (0 g Intravenous Stopped 12/26/19 0350)       Diagnostic Studies  Results Reviewed     None                 CT head without contrast   Final Result by Oral Dial MD (12/26 0300)      No acute intracranial abnormality  Stable isolated hydrocephalus of right lateral ventricle  Workstation performed: [id number]                    Procedures  Procedures         ED Course  ED Course as of Dec 26 0424   Thu Dec 26, 2019   0402 Patient feels improved headache resolved declines toradol  PIV d/ed with catheter intact  Able to like flat  Reviewed CT results no change  Stroke Assessment     Row Name 12/26/19 0130             NIH Stroke Scale    Interval  Baseline      Level of Consciousness (1a )  0      LOC Questions (1b )  0      LOC Commands (1c )  0      Best Gaze (2 )  0      Visual (3 )  0      Facial Palsy (4 )  0      Motor Arm, Left (5a )  0      Motor Arm, Right (5b )  0      Motor Leg, Left (6a )  0      Motor Leg, Right (6b )  0      Limb Ataxia (7 )  0      Sensory (8 )  0      Best Language (9 )  0      Dysarthria (10 )  0      Extinction and Inattention (11 ) (Formerly Neglect)  0      Total  0          First Filed Value   TPA Decision  Patient not a TPA candidate  Patient is not a candidate options  Symptoms resolved/clearly non disabling                          MDM  Number of Diagnoses or Management Options  Diagnosis management comments: Mdm: migraine headache; secondary to prior hx of hydrocephaleus will repeat CT head; results from 6/6/2019 CT scan reviewed  No c/w CO exposure as significant other is not symptomatic with headache  Neuro exam nonfocal will initiate symptomatic management and re-eval        Disposition  Final diagnoses:   Migraine     Time reflects when diagnosis was documented in both MDM as applicable and the Disposition within this note     Time User Action Codes Description Comment    12/26/2019  4:04 AM Francheska aVughan Khoa [G43 [id number]] Migraine       ED Disposition     ED Disposition Condition Date/Time Comment    Discharge Stable Thu Dec 26, 2019  4:04 AM Jaclyn Valle discharge to home/self care  Follow-up Information     Follow up With Specialties Details Why [address] You Physician Assistant  As needed [phone] [facility] [address] [phone]            Discharge Medication List as of 12/26/2019  4:06 AM      START taking these medications    Details   ondansetron (ZOFRAN-ODT) 4 mg disintegrating tablet Take 1 tablet (4 mg total) by mouth every 8 (eight) hours as needed for nausea or vomiting for up to 20 doses, Starting Thu 12/26/2019, Print         CONTINUE these medications which have NOT CHANGED    Details   buPROPion (WELLBUTRIN SR) 150 mg 12 hr tablet Take 1 tablet (150 mg total) by mouth 2 (two) times a day, Starting Mon 7/29/2019, Normal      glimepiride (AMARYL) 4 mg tablet Take 1 tablet by mouth 2 (two) times a day, Historical Med      Insulin Glargine-Lixisenatide (SOLIQUA) 100 units-33 mcg/mL injection pen Inject 45 Units under the skin daily  , Historical Med      loratadine (CLARITIN) 10 mg tablet Take 1 tablet (10 mg total) by mouth daily, Starting Mon 7/29/2019, Normal      metFORMIN (GLUCOPHAGE-XR) 500 mg 24 hr tablet 2 (two) times a day, Starting Tue 1/15/2019, Historical Med      methocarbamol (ROBAXIN) 500 mg tablet Take 1 tablet (500 mg total) by mouth 3 (three) times a day as needed for muscle spasms, Starting Mon 7/29/2019, Normal Nerve Stimulator (TENS THERAPY PAIN RELIEF) BILLY 1 Device by Does not apply route 2 (two) times a day as needed (pain), Starting Fri 6/8/2018, Print      olmesartan-hydrochlorothiazide (BENICAR HCT) 20-12 5 MG per tablet Take 1 tablet by mouth daily, Starting Mon 7/29/2019, Normal           No discharge procedures on file      ED Provider  Electronically Signed by           Toyin Leiva MD  12/26/19 6310

## 2019-12-26 NOTE — DISCHARGE INSTRUCTIONS
Rest with plenty of fliuds  Zofran as needed for nausea    Aleve 2 tabs twice daily with food OR ibuprofen 200-800mg every 8 hours with food recommend for next 24 hours then d/c  Tylenol 650mg every 6 hours as needed for pain, fever (max 3000mg in 24 hours)

## 2019-12-27 LAB
LEFT EYE DIABETIC RETINOPATHY: NORMAL
RIGHT EYE DIABETIC RETINOPATHY: NORMAL

## 2019-12-30 ENCOUNTER — VBI (OUTPATIENT)
Dept: INTERNAL MEDICINE CLINIC | Facility: CLINIC | Age: 54
End: 2019-12-30

## 2019-12-30 NOTE — TELEPHONE ENCOUNTER
[facility]    ED Visit Information     Ed visit date: 12/26/19   Diagnosis Description: Migraine  In Network? Yes Venkata Devine  Discharge status: Home  Discharged with meds ? Yes  Number of ED visits to date: 4  ED Severity:N/A     Outreach Information    Outreach successful: Yes 1  Date letter mailed:0  Date Finalized:12/30/19    Care Coordination    Follow up appointment with pcp: no patient declined  Transportation issues ? No    Value [employer] type:  3 Day Outreach  Emergent necessity warranted by diagnosis:  No  [facility] PCP:  Yes  Transportation:  Friend/Family Transport  Called PCP first?:  No  Feels able to call PCP for urgent problems ?:  No  Understands what emergencies can be handled by PCP ?:  Yes  Ever any problems getting appointment with PCP for minor emergency/urgency problems?:  Yes  Practice Contacted Patient ?:  No  Pt had ED follow up with pcp/staff ?:  No    Seen for follow-up out of network ?:  No  Reason Patient went to ED instead of Urgent Care or PCP?:  Perceived Severity of Illness  Urgent care Education?:  No  12/30/2019 09:54 AM Phone (Karli Laura Ear (Self) [phone] (M) Remove  Left Message - attempt 1   By [address] communication with patient regarding recent ED visit on 12/26/19  Patient stated that she is doing better  Patient declined PCP follow-up at this time  Patient does not meet OPCM criteria  Patient is unaware of PCP after hour's on-call service, education given  Patient was aware of her nearest [place] [address] urgent care facility, education not given  Patient does not feel comfortable contacting PCP office for medical advice and does have issues with getting a [facility] or next day appointment at times  Patient was seen in the ED on 12/26/19 for a Migraine  Medication provided at discharge Ondansetron 4 mg Oral Every 8 hours PRN  No CM needed

## 2020-02-13 DIAGNOSIS — Z12.31 ENCOUNTER FOR SCREENING MAMMOGRAM FOR BREAST CANCER: Primary | ICD-10-CM

## 2020-03-24 ENCOUNTER — OFFICE VISIT (OUTPATIENT)
Dept: FAMILY MEDICINE CLINIC | Facility: CLINIC | Age: 55
End: 2020-03-24
Payer: COMMERCIAL

## 2020-03-24 VITALS
WEIGHT: 216 LBS | TEMPERATURE: 98.3 F | OXYGEN SATURATION: 99 % | HEIGHT: 65 IN | RESPIRATION RATE: 18 BRPM | HEART RATE: 115 BPM | SYSTOLIC BLOOD PRESSURE: 164 MMHG | DIASTOLIC BLOOD PRESSURE: 84 MMHG | BODY MASS INDEX: 35.99 KG/M2

## 2020-03-24 DIAGNOSIS — M54.31 SCIATICA OF RIGHT SIDE: Primary | ICD-10-CM

## 2020-03-24 PROCEDURE — 99213 OFFICE O/P EST LOW 20 MIN: CPT | Performed by: FAMILY MEDICINE

## 2020-03-24 RX ORDER — NAPROXEN 500 MG/1
500 TABLET ORAL 2 TIMES DAILY WITH MEALS
Qty: 60 TABLET | Refills: 0 | Status: SHIPPED | OUTPATIENT
Start: 2020-03-24 | End: 2020-06-09

## 2020-03-24 RX ORDER — HYDROCHLOROTHIAZIDE 25 MG/1
25 TABLET ORAL DAILY
COMMUNITY
Start: 2020-03-21 | End: 2020-06-23 | Stop reason: SDUPTHER

## 2020-03-24 RX ORDER — CYCLOBENZAPRINE HCL 5 MG
5 TABLET ORAL 3 TIMES DAILY PRN
Qty: 30 TABLET | Refills: 0 | Status: SHIPPED | OUTPATIENT
Start: 2020-03-24 | End: 2020-06-09

## 2020-03-24 NOTE — PROGRESS NOTES
Assessment/Plan:    Su Sandoval is a 48year old female presents with 3-4 days duration of right sided lower back/hip pain most likely Sciatica  Advised NSAIDs, muscle relaxant, and physical therapy  Diagnoses and all orders for this visit:    Sciatica of right side  -     cyclobenzaprine (FLEXERIL) 5 mg tablet; Take 1 tablet (5 mg total) by mouth 3 (three) times a day as needed for muscle spasms  -     naproxen (NAPROSYN) 500 mg tablet; Take 1 tablet (500 mg total) by mouth 2 (two) times a day with meals  -     Ambulatory referral to Physical Therapy; Future    Other orders  -     hydrochlorothiazide (HYDRODIURIL) 25 mg tablet; Take 25 mg by mouth daily      Follow up visit in 1-2 months  Case d/w Dr Lucian Sahni  Subjective:      Patient ID: Laura Ortiz is a 48 y o  female  HPI   Patient is a 48years old female with history of GERD, diabetes, lumbar radiculopathy, hypertension presents with 3-4 days duration of right sided lower back/hip pain  Pain located at the right hip that radiates down to right inner thigh and medial knee  There is numbness and tingling in inner right thigh  Last Sunday patient felt a popping sensation at the right hip and pain has been constant since  Patient denies any recent trauma or fall  Patient was at rest sitting when the pain started  Denies any heavy lifting at work  Patient denies any urinary symptoms, fever, chills, weight loss  Patient tried taking Tylenol without relief  Patient does have history of sciatica she tried nerve stimulator in the past without much help  Patient denied any bladder or bowel dysfunction  The following portions of the patient's history were reviewed and updated as appropriate: allergies, current medications, past family history, past medical history, past social history, past surgical history and problem list     Review of Systems   Constitutional: Negative for chills and fever     HENT: Negative for congestion and rhinorrhea  Eyes: Negative for pain  Respiratory: Negative for cough and shortness of breath  Cardiovascular: Negative for chest pain and palpitations  Gastrointestinal: Negative for abdominal pain, diarrhea, nausea and vomiting  Genitourinary: Negative for dysuria  Musculoskeletal: Positive for back pain  Neurological: Positive for numbness  Negative for dizziness, weakness and headaches  Psychiatric/Behavioral: Negative for confusion  Objective:      /84   Pulse (!) 115   Temp 98 3 °F (36 8 °C)   Resp 18   Ht 5' 5" (1 651 m)   Wt 98 kg (216 lb)   SpO2 99%   BMI 35 94 kg/m²          Physical Exam   Constitutional: She is oriented to person, place, and time  She appears well-developed  No distress  Eyes: Pupils are equal, round, and reactive to light  Conjunctivae are normal  Right eye exhibits no discharge  Left eye exhibits no discharge  No scleral icterus  Cardiovascular: Normal rate, regular rhythm and normal heart sounds  No murmur heard  Pulmonary/Chest: Effort normal and breath sounds normal  No respiratory distress  She has no wheezes  Abdominal: Soft  Bowel sounds are normal  She exhibits no distension  There is no tenderness  Musculoskeletal: She exhibits tenderness  She exhibits no edema  Right lower extremity exam:   Positive straight leg test on right  Pain elicited on internal rotation of right hip  Negative anterior and posterior drawer sign  No patellar effusion  Negative Amador's test    Neurological: She is alert and oriented to person, place, and time  Skin: Skin is warm  Nursing note and vitals reviewed

## 2020-03-25 ENCOUNTER — HOSPITAL ENCOUNTER (EMERGENCY)
Facility: HOSPITAL | Age: 55
Discharge: HOME | End: 2020-03-25
Attending: EMERGENCY MEDICINE | Admitting: EMERGENCY MEDICINE
Payer: COMMERCIAL

## 2020-03-25 ENCOUNTER — TELEPHONE (OUTPATIENT)
Dept: PHYSICAL THERAPY | Facility: OTHER | Age: 55
End: 2020-03-25

## 2020-03-25 ENCOUNTER — APPOINTMENT (EMERGENCY)
Dept: RADIOLOGY | Facility: HOSPITAL | Age: 55
End: 2020-03-25
Payer: COMMERCIAL

## 2020-03-25 VITALS
HEART RATE: 100 BPM | TEMPERATURE: 97.6 F | SYSTOLIC BLOOD PRESSURE: 197 MMHG | BODY MASS INDEX: 35.94 KG/M2 | WEIGHT: 216 LBS | DIASTOLIC BLOOD PRESSURE: 91 MMHG | OXYGEN SATURATION: 100 % | RESPIRATION RATE: 18 BRPM

## 2020-03-25 DIAGNOSIS — M54.16 LUMBAR RADICULOPATHY, ACUTE: Primary | ICD-10-CM

## 2020-03-25 LAB
BILIRUB UR QL STRIP: NEGATIVE
CLARITY UR: CLEAR
COLOR UR: YELLOW
GLUCOSE UR STRIP-MCNC: NEGATIVE MG/DL
HGB UR QL STRIP.AUTO: NEGATIVE
KETONES UR STRIP-MCNC: NEGATIVE MG/DL
LEUKOCYTE ESTERASE UR QL STRIP: NEGATIVE
NITRITE UR QL STRIP: NEGATIVE
PH UR STRIP.AUTO: 6 [PH]
PROT UR STRIP-MCNC: NEGATIVE MG/DL
SP GR UR STRIP.AUTO: <=1.005 (ref 1–1.03)
UROBILINOGEN UR QL STRIP.AUTO: 0.2 E.U./DL

## 2020-03-25 PROCEDURE — 81003 URINALYSIS AUTO W/O SCOPE: CPT | Performed by: EMERGENCY MEDICINE

## 2020-03-25 PROCEDURE — 99283 EMERGENCY DEPT VISIT LOW MDM: CPT

## 2020-03-25 PROCEDURE — 99285 EMERGENCY DEPT VISIT HI MDM: CPT | Performed by: EMERGENCY MEDICINE

## 2020-03-25 PROCEDURE — 72100 X-RAY EXAM L-S SPINE 2/3 VWS: CPT

## 2020-03-25 RX ORDER — METOCLOPRAMIDE 10 MG/1
10 TABLET ORAL ONCE
Status: COMPLETED | OUTPATIENT
Start: 2020-03-25 | End: 2020-03-25

## 2020-03-25 RX ORDER — NAPROXEN 500 MG/1
500 TABLET ORAL ONCE
Status: COMPLETED | OUTPATIENT
Start: 2020-03-25 | End: 2020-03-25

## 2020-03-25 RX ORDER — OXYCODONE HYDROCHLORIDE AND ACETAMINOPHEN 5; 325 MG/1; MG/1
1 TABLET ORAL ONCE
Status: COMPLETED | OUTPATIENT
Start: 2020-03-25 | End: 2020-03-25

## 2020-03-25 RX ORDER — NAPROXEN 500 MG/1
500 TABLET ORAL 2 TIMES DAILY WITH MEALS
Qty: 10 TABLET | Refills: 0 | Status: SHIPPED | OUTPATIENT
Start: 2020-03-25 | End: 2020-06-09

## 2020-03-25 RX ORDER — CYCLOBENZAPRINE HCL 10 MG
10 TABLET ORAL 3 TIMES DAILY PRN
Qty: 30 TABLET | Refills: 0 | Status: SHIPPED | OUTPATIENT
Start: 2020-03-25 | End: 2020-06-07 | Stop reason: ALTCHOICE

## 2020-03-25 RX ORDER — OXYCODONE HYDROCHLORIDE AND ACETAMINOPHEN 5; 325 MG/1; MG/1
1 TABLET ORAL EVERY 4 HOURS PRN
Qty: 12 TABLET | Refills: 0 | Status: SHIPPED | OUTPATIENT
Start: 2020-03-25 | End: 2020-06-09

## 2020-03-25 RX ORDER — METOCLOPRAMIDE 10 MG/1
10 TABLET ORAL EVERY 6 HOURS PRN
Qty: 30 TABLET | Refills: 0 | Status: SHIPPED | OUTPATIENT
Start: 2020-03-25 | End: 2020-04-24

## 2020-03-25 RX ORDER — LORAZEPAM 1 MG/1
2 TABLET ORAL ONCE
Status: COMPLETED | OUTPATIENT
Start: 2020-03-25 | End: 2020-03-25

## 2020-03-25 RX ADMIN — METOCLOPRAMIDE 10 MG: 10 TABLET ORAL at 01:51

## 2020-03-25 RX ADMIN — NAPROXEN 500 MG: 500 TABLET ORAL at 01:50

## 2020-03-25 RX ADMIN — LORAZEPAM 2 MG: 1 TABLET ORAL at 01:51

## 2020-03-25 RX ADMIN — OXYCODONE HYDROCHLORIDE AND ACETAMINOPHEN 1 TABLET: 5; 325 TABLET ORAL at 01:51

## 2020-03-25 NOTE — ED PROVIDER NOTES
History  Chief Complaint   Patient presents with    Back Pain     spasm like, lower back to rt leg  seen by pmd yesterday, placed on flexeril , no relief     HPI     Pt presents from home, hx of DM, HTN, HLD, prior hx of sciatica in the past and lumbar radiculopathy, c/o right sided low back pain that radiates into her right buttock, lateral leg down to her knee  Pt denies any bowel/bladder incontinence/retenion  No saddle anesthesia  Pt denies any injuries  Pt denies ha, fevers, cough, cp, sob, n/v/d/c, abd pain, dysuria, focal def or syncope  Prior to Admission Medications   Prescriptions Last Dose Informant Patient Reported? Taking?    Insulin Glargine-Lixisenatide (SOLIQUA) 100 units-33 mcg/mL injection pen   Yes No   Sig: Inject 45 Units under the skin daily     Nerve Stimulator (TENS THERAPY PAIN RELIEF) BILLY  Self No No   Si Device by Does not apply route 2 (two) times a day as needed (pain)   cyclobenzaprine (FLEXERIL) 5 mg tablet   No No   Sig: Take 1 tablet (5 mg total) by mouth 3 (three) times a day as needed for muscle spasms   glimepiride (AMARYL) 4 mg tablet  Self Yes No   Sig: Take 1 tablet by mouth 2 (two) times a day   hydrochlorothiazide (HYDRODIURIL) 25 mg tablet   Yes No   Sig: Take 25 mg by mouth daily   metFORMIN (GLUCOPHAGE-XR) 500 mg 24 hr tablet   Yes No   Si (two) times a day   naproxen (NAPROSYN) 500 mg tablet   No No   Sig: Take 1 tablet (500 mg total) by mouth 2 (two) times a day with meals      Facility-Administered Medications: None       Past Medical History:   Diagnosis Date    Diabetes mellitus ([facility] [address] )     Hydrocephalus (HCC)     Hyperlipidemia     Hypertension     Migraine     Vertigo        Past Surgical History:   Procedure Laterality Date    AR ESOPHAGOGASTRODUODENOSCOPY TRANSORAL DIAGNOSTIC N/A 10/23/2018    Procedure: EGD with Savary dilation AND COLONOSCOPY;  Surgeon: Marian Moscoso MD;  Location: [facility];  Service: Gastroenterology    TUBAL LIGATION Family History   Problem Relation Age of Onset    Diabetes Father     Cancer Father      I have reviewed and agree with the history as documented  E-Cigarette/Vaping    E-Cigarette Use Never User      E-Cigarette/Vaping Substances     Social History     Tobacco Use    Smoking status: Never Smoker    Smokeless tobacco: Never Used   Substance Use Topics    Alcohol use: No    Drug use: No       Review of Systems   Constitutional: Negative for activity change, appetite change, diaphoresis, fatigue and fever  HENT: Negative for congestion, facial swelling, mouth sores and trouble swallowing  Eyes: Negative for photophobia, discharge and visual disturbance  Respiratory: Negative for apnea, cough, shortness of breath and wheezing  Cardiovascular: Negative for chest pain and leg swelling  Gastrointestinal: Negative for abdominal pain, constipation, diarrhea, nausea and vomiting  Endocrine: Negative for heat intolerance and polydipsia  Genitourinary: Negative for dysuria, flank pain, frequency and hematuria  Musculoskeletal: Positive for arthralgias, back pain and myalgias  Negative for gait problem and neck pain  Skin: Negative for rash and wound  Allergic/Immunologic: Negative for immunocompromised state  Neurological: Negative for dizziness, syncope, weakness, light-headedness and headaches  Hematological: Negative for adenopathy  Psychiatric/Behavioral: Negative for agitation, confusion and self-injury  The patient is not nervous/anxious  Physical Exam  Physical Exam   Constitutional: She is oriented to person, place, and time  She appears well-developed and well-nourished  No distress  HENT:   Head: Normocephalic and atraumatic  Right Ear: External ear normal    Left Ear: External ear normal    Nose: Nose normal    Mouth/Throat: Oropharynx is clear and moist  No oropharyngeal exudate  Eyes: Pupils are equal, round, and reactive to light   Conjunctivae and EOM are normal  Right eye exhibits no discharge  Left eye exhibits no discharge  No scleral icterus  Neck: Normal range of motion  Neck supple  No JVD present  No tracheal deviation present  No thyromegaly present  Cardiovascular: Normal rate, regular rhythm and normal heart sounds  No murmur heard  Pulmonary/Chest: Effort normal and breath sounds normal  No stridor  No respiratory distress  She has no wheezes  She has no rales  She exhibits no tenderness  Abdominal: Soft  Bowel sounds are normal  She exhibits no distension and no mass  There is no tenderness  There is no rebound and no guarding  Musculoskeletal: Normal range of motion  She exhibits no edema, tenderness or deformity  Back:    Lymphadenopathy:     She has no cervical adenopathy  Neurological: She is alert and oriented to person, place, and time  She has normal reflexes  She displays normal reflexes  No cranial nerve deficit  She exhibits normal muscle tone  Coordination normal    Skin: Skin is warm and dry  No rash noted  She is not diaphoretic  No erythema  No pallor  Psychiatric: She has a normal mood and affect  Her behavior is normal  Judgment and thought content normal    Nursing note and vitals reviewed        Vital Signs  ED Triage Vitals   Temperature Pulse Respirations Blood Pressure SpO2   03/25/20 0024 03/25/20 0024 03/25/20 0024 03/25/20 0024 03/25/20 0024   97 6 °F (36 4 °C) 100 18 (!) 197/91 100 %      Temp Source Heart Rate Source Patient Position - Orthostatic VS BP Location FiO2 (%)   03/25/20 0024 03/25/20 0024 03/25/20 0024 03/25/20 0024 --   Temporal Monitor Sitting Left arm       Pain Score       03/25/20 0150       Worst Possible Pain           Vitals:    03/25/20 0024 03/25/20 0255   BP: (!) 197/91 (!) 197/91   Pulse: 100 100   Patient Position - Orthostatic VS: Sitting          Visual Acuity      ED Medications  Medications   oxyCODONE-acetaminophen (PERCOCET) 5-325 mg per tablet 1 tablet (1 tablet Oral Given 3/25/20 0151)   metoclopramide (REGLAN) tablet 10 mg (10 mg Oral Given 3/25/20 0151)   naproxen (NAPROSYN) tablet 500 mg (500 mg Oral Given 3/25/20 0150)   LORazepam (ATIVAN) tablet 2 mg (2 mg Oral Given 3/25/20 0151)       Diagnostic Studies  Results Reviewed     Procedure Component Value Units Date/Time    UA w Reflex to Microscopic w Reflex to Culture [[id number]] Collected:  03/25/20 0124    Lab Status:  Final result Specimen:  Urine, Clean Catch Updated:  03/25/20 0134     Color, UA Yellow     Clarity, UA Clear     Specific Gravity, UA <=1 005     pH, UA 6 0     Leukocytes, UA Negative     Nitrite, UA Negative     Protein, UA Negative mg/dl      Glucose, UA Negative mg/dl      Ketones, UA Negative mg/dl      Urobilinogen, UA 0 2 E U /dl      Bilirubin, UA Negative     Blood, UA Negative                 XR spine lumbar 2 or 3 views injury   Final Result by Darya Oliva MD (03/25 0515)      No acute osseous abnormality  Minimal degenerative changes  Workstation performed: [id number]                    Procedures  Procedures         ED Course                                 MDM  Number of Diagnoses or Management Options  Lumbar radiculopathy, acute:   Diagnosis management comments: IMP: low back strain versus lumbar radiculopathy with sciatica  Doubt cauda equina  Doubt acs, pe, dissection, tamponade, cva, bacteremia, surgical abd process  Plan: check lumbar spine x-ray, urinalysis, give po benzo for muscle relaxant and po narcotic pain medication  - xray lumbar spine  - Pt with likely sciatica with lumbar radiculopathy  Pt is improved and will f/up w/ her pcp         Amount and/or Complexity of Data Reviewed  Clinical lab tests: ordered and reviewed  Tests in the radiology section of CPT®: ordered and reviewed  Tests in the medicine section of CPT®: ordered and reviewed  Decide to obtain previous medical records or to obtain history from someone other than the patient: yes  Review and summarize past medical records: yes  Independent visualization of images, tracings, or specimens: yes    Risk of Complications, Morbidity, and/or Mortality  Presenting problems: high  Diagnostic procedures: low  Management options: low    Patient Progress  Patient progress: improved        Disposition  Final diagnoses:   Lumbar radiculopathy, acute     Time reflects when diagnosis was documented in both MDM as applicable and the Disposition within this note     Time User Action Codes Description Comment    3/25/2020  2:47 AM Selin Couch [M54 16] Lumbar radiculopathy, acute       ED Disposition     ED Disposition Condition Date/Time Comment    Discharge Stable Wed Mar 25, 2020  2:47 AM Gabbi Fisher discharge to home/self care  Follow-up Information     Follow up With Specialties Details Why [id number] [facility] Physician Assistant Schedule an appointment as soon as possible for a visit in 1 day Return immediately, If symptoms worsen [address]  [phone]            Discharge Medication List as of 3/25/2020  2:51 AM      START taking these medications    Details   !! cyclobenzaprine (FLEXERIL) 10 mg tablet Take 1 tablet (10 mg total) by mouth 3 (three) times a day as needed for muscle spasms, Starting Wed 3/25/2020, Until Fri 4/24/2020, Normal      metoclopramide (REGLAN) 10 mg tablet Take 1 tablet (10 mg total) by mouth every 6 (six) hours as needed (n/v), Starting Wed 3/25/2020, Until Fri 4/24/2020, Normal      !! naproxen (NAPROSYN) 500 mg tablet Take 1 tablet (500 mg total) by mouth 2 (two) times a day with meals for 10 doses, Starting Wed 3/25/2020, Until Mon 3/30/2020, Normal      oxyCODONE-acetaminophen (PERCOCET) 5-325 mg per tablet Take 1 tablet by mouth every 4 (four) hours as needed for moderate pain for up to 12 dosesMax Daily Amount: 6 tablets, Starting Wed 3/25/2020, Normal       !! - Potential duplicate medications found   Please discuss with provider  CONTINUE these medications which have NOT CHANGED    Details   !! cyclobenzaprine (FLEXERIL) 5 mg tablet Take 1 tablet (5 mg total) by mouth 3 (three) times a day as needed for muscle spasms, Starting Tue 3/24/2020, Normal      glimepiride (AMARYL) 4 mg tablet Take 1 tablet by mouth 2 (two) times a day, Historical Med      hydrochlorothiazide (HYDRODIURIL) 25 mg tablet Take 25 mg by mouth daily, Starting Sat 3/21/2020, Historical Med      Insulin Glargine-Lixisenatide (SOLIQUA) 100 units-33 mcg/mL injection pen Inject 45 Units under the skin daily  , Historical Med      metFORMIN (GLUCOPHAGE-XR) 500 mg 24 hr tablet 2 (two) times a day, Starting Tue 1/15/2019, Historical Med      !! naproxen (NAPROSYN) 500 mg tablet Take 1 tablet (500 mg total) by mouth 2 (two) times a day with meals, Starting Tue 3/24/2020, Normal      Nerve Stimulator (TENS THERAPY PAIN RELIEF) BILLY 1 Device by Does not apply route 2 (two) times a day as needed (pain), Starting Fri 6/8/2018, Print       !! - Potential duplicate medications found  Please discuss with provider              PDMP Review     None          ED Provider  Electronically Signed by           Tracy Lock DO  03/26/20 5986

## 2020-03-27 ENCOUNTER — TELEPHONE (OUTPATIENT)
Dept: PHYSICAL THERAPY | Facility: OTHER | Age: 55
End: 2020-03-27

## 2020-03-27 NOTE — TELEPHONE ENCOUNTER
Nurse returned VM left by patient  Discussed [facility] offerings  Patient does not wish to participate at this time d/t her insurance co-pays and her s/s have improved  Nurse reviewed s/s that warrant contacting her provider etc  Encouraged to continue at home interventions and to call our office back if she should change her mind about evaluation and treatment  Agreed and appreciative of return call  Referral closed

## 2020-03-30 ENCOUNTER — TELEMEDICINE (OUTPATIENT)
Dept: FAMILY MEDICINE CLINIC | Facility: CLINIC | Age: 55
End: 2020-03-30
Payer: COMMERCIAL

## 2020-03-30 DIAGNOSIS — M54.31 SCIATICA OF RIGHT SIDE: Primary | ICD-10-CM

## 2020-03-30 PROCEDURE — G0071 COMM SVCS BY RHC/FQHC 5 MIN: HCPCS | Performed by: FAMILY MEDICINE

## 2020-03-30 RX ORDER — METHYLPREDNISOLONE 4 MG/1
TABLET ORAL
Qty: 21 TABLET | Refills: 0 | Status: SHIPPED | OUTPATIENT
Start: 2020-03-30 | End: 2020-06-07 | Stop reason: ALTCHOICE

## 2020-03-30 RX ORDER — GABAPENTIN 300 MG/1
300 CAPSULE ORAL
Qty: 30 CAPSULE | Refills: 0 | Status: SHIPPED | OUTPATIENT
Start: 2020-03-30 | End: 2020-06-09

## 2020-03-30 NOTE — PROGRESS NOTES
Virtual Brief Visit     Problem List Items Addressed This Visit     None      Visit Diagnoses     Sciatica of right side    -  Primary    Relevant Medications    methylPREDNISolone 4 MG tablet therapy pack    gabapentin (NEURONTIN) 300 mg capsule                Reason for visit is Back pain/Sciatica     Encounter provider Arianne Wilder MD    Provider located at [place] [address]  [address] [facility] [address]      Recent Visits  Date Type Provider Dept   03/24/20 Office Visit Jos Guy MD [facility]   Showing recent visits within past 7 days and meeting all other requirements     Today's Visits  Date Type Provider Dept   03/30/20 Telemedicine Arianne Wilder  [address] today's visits and meeting all other requirements     Future Appointments  Date Type Provider Dept   03/30/20 Telemedicine Arianne Wilder MD [facility]   Showing future appointments within next 150 days and meeting all other requirements        After connecting through telephone, the patient was identified by name and date of birth  Arminda Rogers was informed that this is a telemedicine visit and that the visit is being conducted through telephone  My office door was closed  No one else was in the room  She acknowledged consent and understanding of privacy and security of the video platform  The patient has agreed to participate and understands they can discontinue the visit at any time  Patient is aware this is a billable service  Subjective    Arminda Rogers is a 48 y o  female presenting for back pain  Patient was seen by Dr Patrizia Ashraf approximately 1 week ago and diagnosed with acute low back pain and sciatica and given flexeril and naproxen with no relief of systems  Patient states pain was so severe she presented to the ED and was given several days of percocet with moderate relief of symptoms, at that time imaging was negative   States she continues to have pain in the right lower back with radiation  No red flag symptoms, no urinary incontinence, or paraesthesias          Past Medical History:   Diagnosis Date    Diabetes mellitus ([address] )     Hydrocephalus ([facility] [address] )     Hyperlipidemia     Hypertension     Migraine     Vertigo        Past Surgical History:   Procedure Laterality Date    ND ESOPHAGOGASTRODUODENOSCOPY TRANSORAL DIAGNOSTIC N/A 10/23/2018    Procedure: EGD with Savary dilation AND COLONOSCOPY;  Surgeon: Cheyenne Ch MD;  Location: [facility];  Service: Gastroenterology    TUBAL LIGATION         Current Outpatient Medications   Medication Sig Dispense Refill    cyclobenzaprine (FLEXERIL) 10 mg tablet Take 1 tablet (10 mg total) by mouth 3 (three) times a day as needed for muscle spasms 30 tablet 0    cyclobenzaprine (FLEXERIL) 5 mg tablet Take 1 tablet (5 mg total) by mouth 3 (three) times a day as needed for muscle spasms 30 tablet 0    glimepiride (AMARYL) 4 mg tablet Take 1 tablet by mouth 2 (two) times a day      hydrochlorothiazide (HYDRODIURIL) 25 mg tablet Take 25 mg by mouth daily      Insulin Glargine-Lixisenatide (SOLIQUA) 100 units-33 mcg/mL injection pen Inject 45 Units under the skin daily        metFORMIN (GLUCOPHAGE-XR) 500 mg 24 hr tablet 2 (two) times a day      naproxen (NAPROSYN) 500 mg tablet Take 1 tablet (500 mg total) by mouth 2 (two) times a day with meals for 10 doses 10 tablet 0    Nerve Stimulator (TENS THERAPY PAIN RELIEF) BILLY 1 Device by Does not apply route 2 (two) times a day as needed (pain) 1 Device 0    oxyCODONE-acetaminophen (PERCOCET) 5-325 mg per tablet Take 1 tablet by mouth every 4 (four) hours as needed for moderate pain for up to 12 dosesMax Daily Amount: 6 tablets 12 tablet 0    gabapentin (NEURONTIN) 300 mg capsule Take 1 capsule (300 mg total) by mouth daily at bedtime 30 capsule 0    methylPREDNISolone 4 MG tablet therapy pack Use as directed on package 21 tablet 0    metoclopramide (REGLAN) 10 mg tablet Take 1 tablet (10 mg total) by mouth every 6 (six) hours as needed (n/v) (Patient not taking: Reported on 3/30/2020) 30 tablet 0    naproxen (NAPROSYN) 500 mg tablet Take 1 tablet (500 mg total) by mouth 2 (two) times a day with meals (Patient not taking: Reported on 3/30/2020) 60 tablet 0     No current facility-administered medications for this visit  Allergies   Allergen Reactions    Nuts Anaphylaxis    Chocolate Cough    Other      LETTUCE    Shellfish Allergy Other (See Comments)     unknown    Tomato Cough       Review of Systems   Constitutional: Negative for activity change, appetite change, chills, diaphoresis, fatigue, fever and unexpected weight change  HENT: Negative for congestion, dental problem, drooling, ear discharge, ear pain, facial swelling, postnasal drip, rhinorrhea and sinus pain  Eyes: Negative for photophobia, pain, discharge and itching  Respiratory: Negative for apnea, cough, chest tightness and shortness of breath  Cardiovascular: Negative for chest pain and leg swelling  Gastrointestinal: Negative for abdominal distention, abdominal pain, anal bleeding, constipation, diarrhea and nausea  Endocrine: Negative for cold intolerance, heat intolerance and polydipsia  Genitourinary: Negative for difficulty urinating  Musculoskeletal: Positive for back pain and gait problem  Negative for arthralgias, joint swelling and myalgias  Skin: Negative for color change and pallor  Allergic/Immunologic: Negative for immunocompromised state  Neurological: Negative for dizziness, seizures, facial asymmetry, weakness, light-headedness, numbness and headaches  Psychiatric/Behavioral: Negative for agitation, behavioral problems, confusion, decreased concentration and dysphoric mood  I spent 20 minutes with the patient during this visit

## 2020-04-07 ENCOUNTER — TELEPHONE (OUTPATIENT)
Dept: FAMILY MEDICINE CLINIC | Facility: CLINIC | Age: 55
End: 2020-04-07

## 2020-04-07 DIAGNOSIS — M54.31 SCIATICA OF RIGHT SIDE: Primary | ICD-10-CM

## 2020-04-07 RX ORDER — TRAMADOL HYDROCHLORIDE 50 MG/1
50 TABLET ORAL EVERY 6 HOURS PRN
Qty: 30 TABLET | Refills: 0 | Status: SHIPPED | OUTPATIENT
Start: 2020-04-07 | End: 2020-06-09

## 2020-06-06 PROCEDURE — 99284 EMERGENCY DEPT VISIT MOD MDM: CPT

## 2020-06-07 ENCOUNTER — HOSPITAL ENCOUNTER (EMERGENCY)
Facility: HOSPITAL | Age: 55
Discharge: HOME | End: 2020-06-07
Attending: EMERGENCY MEDICINE | Admitting: EMERGENCY MEDICINE
Payer: COMMERCIAL

## 2020-06-07 ENCOUNTER — APPOINTMENT (EMERGENCY)
Dept: RADIOLOGY | Facility: HOSPITAL | Age: 55
End: 2020-06-07
Payer: COMMERCIAL

## 2020-06-07 VITALS
TEMPERATURE: 97.5 F | RESPIRATION RATE: 18 BRPM | WEIGHT: 218.48 LBS | SYSTOLIC BLOOD PRESSURE: 198 MMHG | DIASTOLIC BLOOD PRESSURE: 81 MMHG | HEART RATE: 100 BPM | BODY MASS INDEX: 36.36 KG/M2 | OXYGEN SATURATION: 98 %

## 2020-06-07 DIAGNOSIS — G89.29 ACUTE EXACERBATION OF CHRONIC LOW BACK PAIN: Primary | ICD-10-CM

## 2020-06-07 DIAGNOSIS — M54.50 ACUTE EXACERBATION OF CHRONIC LOW BACK PAIN: Primary | ICD-10-CM

## 2020-06-07 LAB
ANION GAP SERPL CALCULATED.3IONS-SCNC: 11 MMOL/L (ref 4–13)
BILIRUB UR QL STRIP: NEGATIVE
BUN SERPL-MCNC: 10 MG/DL (ref 5–25)
CALCIUM SERPL-MCNC: 9.2 MG/DL (ref 8.3–10.1)
CHLORIDE SERPL-SCNC: 98 MMOL/L (ref 100–108)
CLARITY UR: CLEAR
CO2 SERPL-SCNC: 27 MMOL/L (ref 21–32)
COLOR UR: ABNORMAL
CREAT SERPL-MCNC: 0.67 MG/DL (ref 0.6–1.3)
GFR SERPL CREATININE-BSD FRML MDRD: 101 ML/MIN/1.73SQ M
GLUCOSE SERPL-MCNC: 152 MG/DL (ref 65–140)
GLUCOSE UR STRIP-MCNC: ABNORMAL MG/DL
HGB UR QL STRIP.AUTO: NEGATIVE
KETONES UR STRIP-MCNC: NEGATIVE MG/DL
LEUKOCYTE ESTERASE UR QL STRIP: NEGATIVE
NITRITE UR QL STRIP: NEGATIVE
PH UR STRIP.AUTO: 6 [PH]
POTASSIUM SERPL-SCNC: 3.5 MMOL/L (ref 3.5–5.3)
PROT UR STRIP-MCNC: NEGATIVE MG/DL
SODIUM SERPL-SCNC: 136 MMOL/L (ref 136–145)
SP GR UR STRIP.AUTO: <=1.005 (ref 1–1.03)
UROBILINOGEN UR QL STRIP.AUTO: 0.2 E.U./DL

## 2020-06-07 PROCEDURE — 72100 X-RAY EXAM L-S SPINE 2/3 VWS: CPT

## 2020-06-07 PROCEDURE — 96374 THER/PROPH/DIAG INJ IV PUSH: CPT

## 2020-06-07 PROCEDURE — 36415 COLL VENOUS BLD VENIPUNCTURE: CPT | Performed by: EMERGENCY MEDICINE

## 2020-06-07 PROCEDURE — 99284 EMERGENCY DEPT VISIT MOD MDM: CPT | Performed by: EMERGENCY MEDICINE

## 2020-06-07 PROCEDURE — 80048 BASIC METABOLIC PNL TOTAL CA: CPT | Performed by: EMERGENCY MEDICINE

## 2020-06-07 PROCEDURE — 81003 URINALYSIS AUTO W/O SCOPE: CPT | Performed by: EMERGENCY MEDICINE

## 2020-06-07 RX ORDER — PREDNISONE 20 MG/1
40 TABLET ORAL DAILY
Qty: 8 TABLET | Refills: 0 | Status: SHIPPED | OUTPATIENT
Start: 2020-06-07 | End: 2020-06-12

## 2020-06-07 RX ORDER — ACETAMINOPHEN 325 MG/1
650 TABLET ORAL ONCE
Status: COMPLETED | OUTPATIENT
Start: 2020-06-07 | End: 2020-06-07

## 2020-06-07 RX ORDER — LIDOCAINE 50 MG/G
1 PATCH TOPICAL ONCE
Status: DISCONTINUED | OUTPATIENT
Start: 2020-06-07 | End: 2020-06-07 | Stop reason: HOSPADM

## 2020-06-07 RX ORDER — METHOCARBAMOL 500 MG/1
500 TABLET, FILM COATED ORAL ONCE
Status: COMPLETED | OUTPATIENT
Start: 2020-06-07 | End: 2020-06-07

## 2020-06-07 RX ORDER — METHOCARBAMOL 500 MG/1
500 TABLET, FILM COATED ORAL 2 TIMES DAILY
Qty: 20 TABLET | Refills: 0 | Status: ON HOLD | OUTPATIENT
Start: 2020-06-07 | End: 2020-07-02

## 2020-06-07 RX ORDER — PREDNISONE 20 MG/1
40 TABLET ORAL ONCE
Status: COMPLETED | OUTPATIENT
Start: 2020-06-07 | End: 2020-06-07

## 2020-06-07 RX ORDER — KETOROLAC TROMETHAMINE 30 MG/ML
15 INJECTION, SOLUTION INTRAMUSCULAR; INTRAVENOUS ONCE
Status: COMPLETED | OUTPATIENT
Start: 2020-06-07 | End: 2020-06-07

## 2020-06-07 RX ADMIN — KETOROLAC TROMETHAMINE 15 MG: 30 INJECTION, SOLUTION INTRAMUSCULAR at 02:52

## 2020-06-07 RX ADMIN — METHOCARBAMOL TABLETS 500 MG: 500 TABLET, COATED ORAL at 04:03

## 2020-06-07 RX ADMIN — ACETAMINOPHEN 650 MG: 325 TABLET, FILM COATED ORAL at 02:51

## 2020-06-07 RX ADMIN — LIDOCAINE 1 PATCH: 50 PATCH TOPICAL at 01:46

## 2020-06-07 RX ADMIN — PREDNISONE 40 MG: 20 TABLET ORAL at 02:51

## 2020-06-09 ENCOUNTER — OFFICE VISIT (OUTPATIENT)
Dept: FAMILY MEDICINE CLINIC | Facility: CLINIC | Age: 55
End: 2020-06-09
Payer: COMMERCIAL

## 2020-06-09 VITALS
DIASTOLIC BLOOD PRESSURE: 82 MMHG | OXYGEN SATURATION: 99 % | HEIGHT: 65 IN | BODY MASS INDEX: 35.32 KG/M2 | RESPIRATION RATE: 18 BRPM | SYSTOLIC BLOOD PRESSURE: 174 MMHG | TEMPERATURE: 98.7 F | WEIGHT: 212 LBS | HEART RATE: 113 BPM

## 2020-06-09 DIAGNOSIS — M54.31 SCIATICA OF RIGHT SIDE: ICD-10-CM

## 2020-06-09 DIAGNOSIS — R00.0 TACHYCARDIA: ICD-10-CM

## 2020-06-09 DIAGNOSIS — G89.29 CHRONIC MIDLINE LOW BACK PAIN WITH BILATERAL SCIATICA: ICD-10-CM

## 2020-06-09 DIAGNOSIS — Z79.4 TYPE 2 DIABETES MELLITUS WITHOUT COMPLICATION, WITH LONG-TERM CURRENT USE OF INSULIN (HCC): Primary | ICD-10-CM

## 2020-06-09 DIAGNOSIS — I10 ESSENTIAL HYPERTENSION: ICD-10-CM

## 2020-06-09 DIAGNOSIS — M54.42 CHRONIC MIDLINE LOW BACK PAIN WITH BILATERAL SCIATICA: ICD-10-CM

## 2020-06-09 DIAGNOSIS — M51.16 HERNIATION OF LUMBAR INTERVERTEBRAL DISC WITH RADICULOPATHY: ICD-10-CM

## 2020-06-09 DIAGNOSIS — M79.661 RIGHT CALF PAIN: ICD-10-CM

## 2020-06-09 DIAGNOSIS — R60.0 LEG EDEMA, RIGHT: ICD-10-CM

## 2020-06-09 DIAGNOSIS — E11.9 TYPE 2 DIABETES MELLITUS WITHOUT COMPLICATION, WITH LONG-TERM CURRENT USE OF INSULIN (HCC): Primary | ICD-10-CM

## 2020-06-09 DIAGNOSIS — M54.41 CHRONIC MIDLINE LOW BACK PAIN WITH BILATERAL SCIATICA: ICD-10-CM

## 2020-06-09 PROCEDURE — 99213 OFFICE O/P EST LOW 20 MIN: CPT | Performed by: FAMILY MEDICINE

## 2020-06-09 RX ORDER — LISINOPRIL 5 MG/1
5 TABLET ORAL DAILY
Qty: 30 TABLET | Refills: 0 | Status: CANCELLED | OUTPATIENT
Start: 2020-06-09

## 2020-06-09 RX ORDER — GABAPENTIN 300 MG/1
300 CAPSULE ORAL 3 TIMES DAILY
Qty: 30 CAPSULE | Refills: 0 | Status: SHIPPED | OUTPATIENT
Start: 2020-06-09 | End: 2020-06-23 | Stop reason: SDUPTHER

## 2020-06-09 RX ORDER — LOSARTAN POTASSIUM 25 MG/1
25 TABLET ORAL DAILY
Qty: 30 TABLET | Refills: 0 | Status: SHIPPED | OUTPATIENT
Start: 2020-06-09 | End: 2020-07-07 | Stop reason: SDUPTHER

## 2020-06-09 RX ORDER — TRAMADOL HYDROCHLORIDE 50 MG/1
50 TABLET ORAL EVERY 6 HOURS PRN
Qty: 30 TABLET | Refills: 0 | Status: SHIPPED | OUTPATIENT
Start: 2020-06-09 | End: 2020-06-23 | Stop reason: SDUPTHER

## 2020-06-12 ENCOUNTER — APPOINTMENT (EMERGENCY)
Dept: CT IMAGING | Facility: HOSPITAL | Age: 55
End: 2020-06-12
Payer: COMMERCIAL

## 2020-06-12 ENCOUNTER — HOSPITAL ENCOUNTER (EMERGENCY)
Facility: HOSPITAL | Age: 55
Discharge: HOME | End: 2020-06-12
Attending: EMERGENCY MEDICINE | Admitting: EMERGENCY MEDICINE
Payer: COMMERCIAL

## 2020-06-12 ENCOUNTER — NURSE TRIAGE (OUTPATIENT)
Dept: PHYSICAL THERAPY | Facility: OTHER | Age: 55
End: 2020-06-12

## 2020-06-12 ENCOUNTER — APPOINTMENT (EMERGENCY)
Dept: NON INVASIVE DIAGNOSTICS | Facility: HOSPITAL | Age: 55
End: 2020-06-12
Payer: COMMERCIAL

## 2020-06-12 VITALS
WEIGHT: 212 LBS | OXYGEN SATURATION: 97 % | TEMPERATURE: 97 F | SYSTOLIC BLOOD PRESSURE: 156 MMHG | HEART RATE: 89 BPM | DIASTOLIC BLOOD PRESSURE: 67 MMHG | BODY MASS INDEX: 35.28 KG/M2 | RESPIRATION RATE: 18 BRPM

## 2020-06-12 DIAGNOSIS — M54.41 ACUTE RIGHT-SIDED LOW BACK PAIN WITH RIGHT-SIDED SCIATICA: Primary | ICD-10-CM

## 2020-06-12 DIAGNOSIS — M79.89 PAIN AND SWELLING OF RIGHT LOWER LEG: ICD-10-CM

## 2020-06-12 DIAGNOSIS — M79.661 PAIN AND SWELLING OF RIGHT LOWER LEG: ICD-10-CM

## 2020-06-12 DIAGNOSIS — M51.26 LUMBAR DISC HERNIATION: Primary | ICD-10-CM

## 2020-06-12 LAB
ANION GAP SERPL CALCULATED.3IONS-SCNC: 10 MMOL/L (ref 4–13)
BACTERIA UR QL AUTO: ABNORMAL /HPF
BASOPHILS # BLD AUTO: 0.06 THOUSANDS/ΜL (ref 0–0.1)
BASOPHILS NFR BLD AUTO: 1 % (ref 0–1)
BILIRUB UR QL STRIP: NEGATIVE
BUN SERPL-MCNC: 19 MG/DL (ref 5–25)
CALCIUM SERPL-MCNC: 8.7 MG/DL (ref 8.3–10.1)
CAOX CRY URNS QL MICRO: ABNORMAL /HPF
CHLORIDE SERPL-SCNC: 96 MMOL/L (ref 100–108)
CLARITY UR: CLEAR
CO2 SERPL-SCNC: 26 MMOL/L (ref 21–32)
COLOR UR: YELLOW
CREAT SERPL-MCNC: 0.79 MG/DL (ref 0.6–1.3)
EOSINOPHIL # BLD AUTO: 0.14 THOUSAND/ΜL (ref 0–0.61)
EOSINOPHIL NFR BLD AUTO: 2 % (ref 0–6)
ERYTHROCYTE [DISTWIDTH] IN BLOOD BY AUTOMATED COUNT: 18.6 % (ref 11.6–15.1)
EXT PREG TEST URINE: NEGATIVE
EXT. CONTROL ED NAV: NORMAL
GFR SERPL CREATININE-BSD FRML MDRD: 86 ML/MIN/1.73SQ M
GLUCOSE SERPL-MCNC: 153 MG/DL (ref 65–140)
GLUCOSE UR STRIP-MCNC: NEGATIVE MG/DL
HCT VFR BLD AUTO: 31.4 % (ref 34.8–46.1)
HGB BLD-MCNC: 8.8 G/DL (ref 11.5–15.4)
HGB UR QL STRIP.AUTO: ABNORMAL
IMM GRANULOCYTES # BLD AUTO: 0.04 THOUSAND/UL (ref 0–0.2)
IMM GRANULOCYTES NFR BLD AUTO: 0 % (ref 0–2)
KETONES UR STRIP-MCNC: NEGATIVE MG/DL
LEUKOCYTE ESTERASE UR QL STRIP: NEGATIVE
LYMPHOCYTES # BLD AUTO: 2.5 THOUSANDS/ΜL (ref 0.6–4.47)
LYMPHOCYTES NFR BLD AUTO: 28 % (ref 14–44)
MCH RBC QN AUTO: 19.2 PG (ref 26.8–34.3)
MCHC RBC AUTO-ENTMCNC: 28 G/DL (ref 31.4–37.4)
MCV RBC AUTO: 69 FL (ref 82–98)
MONOCYTES # BLD AUTO: 0.63 THOUSAND/ΜL (ref 0.17–1.22)
MONOCYTES NFR BLD AUTO: 7 % (ref 4–12)
NEUTROPHILS # BLD AUTO: 5.69 THOUSANDS/ΜL (ref 1.85–7.62)
NEUTS SEG NFR BLD AUTO: 62 % (ref 43–75)
NITRITE UR QL STRIP: NEGATIVE
NON-SQ EPI CELLS URNS QL MICRO: ABNORMAL /HPF
NRBC BLD AUTO-RTO: 0 /100 WBCS
PH UR STRIP.AUTO: 5.5 [PH]
PLATELET # BLD AUTO: 318 THOUSANDS/UL (ref 149–390)
PMV BLD AUTO: 8.5 FL (ref 8.9–12.7)
POTASSIUM SERPL-SCNC: 3.4 MMOL/L (ref 3.5–5.3)
PROT UR STRIP-MCNC: NEGATIVE MG/DL
RBC # BLD AUTO: 4.58 MILLION/UL (ref 3.81–5.12)
RBC #/AREA URNS AUTO: ABNORMAL /HPF
SODIUM SERPL-SCNC: 132 MMOL/L (ref 136–145)
SP GR UR STRIP.AUTO: >=1.03 (ref 1–1.03)
UROBILINOGEN UR QL STRIP.AUTO: 0.2 E.U./DL
WBC # BLD AUTO: 9.06 THOUSAND/UL (ref 4.31–10.16)
WBC #/AREA URNS AUTO: ABNORMAL /HPF

## 2020-06-12 PROCEDURE — 96372 THER/PROPH/DIAG INJ SC/IM: CPT

## 2020-06-12 PROCEDURE — 99285 EMERGENCY DEPT VISIT HI MDM: CPT | Performed by: PHYSICIAN ASSISTANT

## 2020-06-12 PROCEDURE — 99284 EMERGENCY DEPT VISIT MOD MDM: CPT

## 2020-06-12 PROCEDURE — 81001 URINALYSIS AUTO W/SCOPE: CPT | Performed by: PHYSICIAN ASSISTANT

## 2020-06-12 PROCEDURE — 80048 BASIC METABOLIC PNL TOTAL CA: CPT | Performed by: PHYSICIAN ASSISTANT

## 2020-06-12 PROCEDURE — 81025 URINE PREGNANCY TEST: CPT | Performed by: PHYSICIAN ASSISTANT

## 2020-06-12 PROCEDURE — 85025 COMPLETE CBC W/AUTO DIFF WBC: CPT | Performed by: PHYSICIAN ASSISTANT

## 2020-06-12 PROCEDURE — 93971 EXTREMITY STUDY: CPT | Performed by: SURGERY

## 2020-06-12 PROCEDURE — 72131 CT LUMBAR SPINE W/O DYE: CPT

## 2020-06-12 PROCEDURE — 96375 TX/PRO/DX INJ NEW DRUG ADDON: CPT

## 2020-06-12 PROCEDURE — 93971 EXTREMITY STUDY: CPT

## 2020-06-12 PROCEDURE — 36415 COLL VENOUS BLD VENIPUNCTURE: CPT | Performed by: PHYSICIAN ASSISTANT

## 2020-06-12 PROCEDURE — 96374 THER/PROPH/DIAG INJ IV PUSH: CPT

## 2020-06-12 RX ORDER — HALOPERIDOL 5 MG/ML
5 INJECTION INTRAMUSCULAR ONCE
Status: COMPLETED | OUTPATIENT
Start: 2020-06-12 | End: 2020-06-12

## 2020-06-12 RX ORDER — METHYLPREDNISOLONE SODIUM SUCCINATE 125 MG/2ML
125 INJECTION, POWDER, LYOPHILIZED, FOR SOLUTION INTRAMUSCULAR; INTRAVENOUS ONCE
Status: COMPLETED | OUTPATIENT
Start: 2020-06-12 | End: 2020-06-12

## 2020-06-12 RX ORDER — HALOPERIDOL 5 MG/ML
5 INJECTION INTRAMUSCULAR ONCE
Status: DISCONTINUED | OUTPATIENT
Start: 2020-06-12 | End: 2020-06-12

## 2020-06-12 RX ORDER — KETOROLAC TROMETHAMINE 30 MG/ML
15 INJECTION, SOLUTION INTRAMUSCULAR; INTRAVENOUS ONCE
Status: COMPLETED | OUTPATIENT
Start: 2020-06-12 | End: 2020-06-12

## 2020-06-12 RX ADMIN — KETOROLAC TROMETHAMINE 15 MG: 30 INJECTION, SOLUTION INTRAMUSCULAR at 09:51

## 2020-06-12 RX ADMIN — HALOPERIDOL LACTATE 5 MG: 5 INJECTION, SOLUTION INTRAMUSCULAR at 09:56

## 2020-06-12 RX ADMIN — METHYLPREDNISOLONE SODIUM SUCCINATE 125 MG: 125 INJECTION, POWDER, FOR SOLUTION INTRAMUSCULAR; INTRAVENOUS at 09:53

## 2020-06-16 ENCOUNTER — EVALUATION (OUTPATIENT)
Dept: PHYSICAL THERAPY | Facility: CLINIC | Age: 55
End: 2020-06-16
Payer: COMMERCIAL

## 2020-06-16 DIAGNOSIS — M54.41 ACUTE RIGHT-SIDED LOW BACK PAIN WITH RIGHT-SIDED SCIATICA: Primary | ICD-10-CM

## 2020-06-16 PROCEDURE — 97161 PT EVAL LOW COMPLEX 20 MIN: CPT | Performed by: PHYSICAL THERAPIST

## 2020-06-16 PROCEDURE — 97535 SELF CARE MNGMENT TRAINING: CPT | Performed by: PHYSICAL THERAPIST

## 2020-06-16 PROCEDURE — 97140 MANUAL THERAPY 1/> REGIONS: CPT | Performed by: PHYSICAL THERAPIST

## 2020-06-16 PROCEDURE — 97110 THERAPEUTIC EXERCISES: CPT | Performed by: PHYSICAL THERAPIST

## 2020-06-22 ENCOUNTER — CONSULT (OUTPATIENT)
Dept: PAIN MEDICINE | Facility: CLINIC | Age: 55
End: 2020-06-22
Payer: COMMERCIAL

## 2020-06-22 VITALS
BODY MASS INDEX: 35.62 KG/M2 | WEIGHT: 213.8 LBS | SYSTOLIC BLOOD PRESSURE: 138 MMHG | HEIGHT: 65 IN | DIASTOLIC BLOOD PRESSURE: 72 MMHG | TEMPERATURE: 98.5 F

## 2020-06-22 DIAGNOSIS — G89.4 CHRONIC PAIN SYNDROME: ICD-10-CM

## 2020-06-22 DIAGNOSIS — M54.16 LUMBAR RADICULOPATHY: Primary | ICD-10-CM

## 2020-06-22 DIAGNOSIS — M51.26 LUMBAR DISC HERNIATION: ICD-10-CM

## 2020-06-22 PROCEDURE — 99244 OFF/OP CNSLTJ NEW/EST MOD 40: CPT | Performed by: ANESTHESIOLOGY

## 2020-06-22 NOTE — H&P (VIEW-ONLY)
Assessment:  1  Lumbar radiculopathy    2  Lumbar disc herniation    3  Chronic pain syndrome        Plan:  Patient is a 71-year-old female with complaints of low back pain and right leg pain with chronic pain syndrome secondary to lumbar degenerative disc disease, lumbar disc herniation, lumbar radiculopathy presents to office for initial consultation  CT lumbar spine was reviewed with patient which showed a right-sided disc herniation at L4-5 and L5-S1 which correlate patient's symptoms of right lower extremity radiculopathy  Patient has attempted physical therapy per reported significant worsening of symptoms after her 1st session  1  We will schedule patient for a right L4-5 and L5-S1 transforaminal epidural steroid injection  2  Patient has significant relief will then have her continue physical therapy  3  We will titrate gabapentin to 400 mg p o  T i d  4  Follow-up 1 month after injection      There are risks associated with opioid medications, including dependence, addiction and tolerance  The patient understands and agrees to use these medications only as prescribed  Potential side effects of the medications include, but are not limited to, constipation, drowsiness, addiction, impaired judgment and risk of fatal overdose if not taken as prescribed  The patient was warned against driving while taking sedation medications  Sharing medications is a felony  At this point in time, the patient is showing no signs of addiction, abuse, diversion or suicidal ideation  [place] Prescription Drug Monitoring Program report was reviewed and was appropriate     Complete risks and benefits including bleeding, infection, tissue reaction, nerve injury and allergic reaction were discussed  The approach was demonstrated using models and literature was provided  Verbal and written consent was obtained  History of Present Illness:     The patient is a 48 y o  female who presents for consultation in regards to Knee Pain and Leg Pain  Symptoms have been present for 4 months  Symptoms began without any precipitating injury or trauma  Pain is reported to be 8 on the numeric rating scale  Symptoms are felt nearly constantly and worst in the no typical pattern  Symptoms are characterized as cramping, shooting, sharp, dull/aching, numbing, tingling and throbbing  Symptoms are associated with right leg weakness  Aggravating factors include lying down, standing, bending, leaning forward, leaning bckward, sitting, walking, turning the head, coughing/sneezing and bowel movements  Relieving factors include nothing  No change in symptoms with kneeling and turning the head  Treatments that have been helpful include heat/ice  physical therapy and TENS unit have provided no relief  Medications to relieve symptoms include tramadol, gabapentin which provided no relief  Review of Systems:    Review of Systems   All other systems reviewed and are negative          Past Medical History:   Diagnosis Date    Diabetes mellitus ([address] )     Hydrocephalus (HCC)     Hyperlipidemia     Hypertension     Migraine     Sciatica     Vertigo        Past Surgical History:   Procedure Laterality Date    [place] ESOPHAGOGASTRODUODENOSCOPY TRANSORAL DIAGNOSTIC N/A 10/23/2018    Procedure: EGD with Savary dilation AND COLONOSCOPY;  Surgeon: Jaron Connell MD;  Location: [facility];  Service: Gastroenterology    TUBAL LIGATION         Family History   Problem Relation Age of Onset    Diabetes Father     Cancer Father        Social History     Occupational History    Not on file   Tobacco Use    Smoking status: Never Smoker    Smokeless tobacco: Never Used   Substance and Sexual Activity    Alcohol use: No    Drug use: No    Sexual activity: Not on file         Current Outpatient Medications:     gabapentin (NEURONTIN) 300 mg capsule, Take 1 capsule (300 mg total) by mouth 3 (three) times a day, Disp: 30 capsule, Rfl: 0    glimepiride (AMARYL) 4 mg tablet, Take 1 tablet by mouth 2 (two) times a day, Disp: , Rfl:     hydrochlorothiazide (HYDRODIURIL) 25 mg tablet, Take 25 mg by mouth daily, Disp: , Rfl:     Insulin Glargine-Lixisenatide (SOLIQUA) 100 units-33 mcg/mL injection pen, Inject 45 Units under the skin daily  , Disp: , Rfl:     losartan (COZAAR) 25 mg tablet, Take 1 tablet (25 mg total) by mouth daily, Disp: 30 tablet, Rfl: 0    metFORMIN (GLUCOPHAGE-XR) 500 mg 24 hr tablet, 2 (two) times a day, Disp: , Rfl:     methocarbamol (ROBAXIN) 500 mg tablet, Take 1 tablet (500 mg total) by mouth 2 (two) times a day, Disp: 20 tablet, Rfl: 0    Nerve Stimulator (TENS THERAPY PAIN RELIEF) BILLY, 1 Device by Does not apply route 2 (two) times a day as needed (pain), Disp: 1 Device, Rfl: 0    traMADol (ULTRAM) 50 mg tablet, Take 1 tablet (50 mg total) by mouth every 6 (six) hours as needed for moderate pain, Disp: 30 tablet, Rfl: 0    Allergies   Allergen Reactions    Nuts Anaphylaxis    Chocolate Cough    Other      LETTUCE    Shellfish Allergy Other (See Comments)     unknown    Tomato Cough       Physical Exam:    /72 (BP Location: Left arm, Patient Position: Sitting, Cuff Size: Large)   Temp 98 5 °F (36 9 °C)   Ht 5' 5" (1 651 m)   Wt 97 kg (213 lb 12 8 oz)   LMP 05/24/2020   BMI 35 58 kg/m²     Constitutional: normal, well developed, well nourished, alert, in no distress and non-toxic and no overt pain behavior  and obese  Eyes: anicteric  HEENT: grossly intact  Neck: supple, symmetric, trachea midline and no masses   Pulmonary:even and unlabored  Cardiovascular:No edema or pitting edema present  Skin:Normal without rashes or lesions and well hydrated  Psychiatric:Mood and affect appropriate  Neurologic:Cranial Nerves II-XII grossly intact  Musculoskeletal:antalgic     Lumbar/Sacral Spine examination demonstrates    Decreased range of motion lumbar spine with pain upon: flexion, lateral rotation to the left/right, and bending to the left/right  Bilateral lumbar paraspinals tender to palpation  Muscle spasms noted in the lumbar area bilaterally  4/5 right lower extremity strength in all muscle groups  Positive seated straight leg raise for right lower extremity  Sensitivity to light touch intact bilateral lower extremities  2+ reflexes in the patella and Achilles  No ankle clonus     Imaging  CT LUMBAR SPINE     INDICATION:   Back pain, progressive neurologic deficit      COMPARISON: X-rays dated 6/7/2020     TECHNIQUE:  Contiguous axial images through the lumbar spine were obtained  Sagittal and coronal reconstructions were performed        Radiation dose length product (DLP) for this visit:  1064 93 mGy-cm   This examination, like all CT scans performed in the [facility], was performed utilizing techniques to minimize radiation dose exposure, including the use of   iterative reconstruction and automated exposure control        IMAGE QUALITY:  Diagnostic      FINDINGS:     ALIGNMENT:  There are 5 lumbar type vertebral bodies  Normal alignment of the lumbar spine  No spondylolisthesis or spondylolysis      VERTEBRAL BODIES:  No fracture  No lytic or blastic lesion      DEGENERATIVE CHANGES:     Lower Thoracic spine:  Normal lower thoracic disc spaces  ]     L1-2:  Normal disc height  No herniation  Normal facet joints  No canal or foraminal stenosis      L2-3:  Normal disc height  No herniation  Normal facet joints  No canal or foraminal stenosis      L3-4:  Normal disc height  No herniation  Normal facet joints  No canal or foraminal stenosis      L4-5:  Slight annular bulging with a small broad-based right foraminal disc protrusion  Mild right-sided facet degenerative change with vacuum phenomenon within the facet joint  No significant canal stenosis or left foraminal narrowing    Mild right   foraminal narrowing      L5-S1:  Small right foraminal disc protrusion abutting but not compressing or displacing the exiting nerve      PARASPINAL SOFT TISSUES:   Normal      IMPRESSION:     Small right-sided disc herniations at the L4-5 and L5-S1 levels with mild foraminal narrowing  I have personally reviewed pertinent films in PACS  No orders of the defined types were placed in this encounter

## 2020-06-23 ENCOUNTER — DOCUMENTATION (OUTPATIENT)
Dept: FAMILY MEDICINE CLINIC | Facility: CLINIC | Age: 55
End: 2020-06-23

## 2020-06-23 ENCOUNTER — APPOINTMENT (OUTPATIENT)
Dept: LAB | Facility: MEDICAL CENTER | Age: 55
End: 2020-06-23
Payer: COMMERCIAL

## 2020-06-23 ENCOUNTER — CLINICAL SUPPORT (OUTPATIENT)
Dept: FAMILY MEDICINE CLINIC | Facility: CLINIC | Age: 55
End: 2020-06-23

## 2020-06-23 ENCOUNTER — TRANSCRIBE ORDERS (OUTPATIENT)
Dept: ADMINISTRATIVE | Facility: HOSPITAL | Age: 55
End: 2020-06-23

## 2020-06-23 ENCOUNTER — TELEPHONE (OUTPATIENT)
Dept: PAIN MEDICINE | Facility: CLINIC | Age: 55
End: 2020-06-23

## 2020-06-23 VITALS — SYSTOLIC BLOOD PRESSURE: 156 MMHG | HEART RATE: 88 BPM | DIASTOLIC BLOOD PRESSURE: 74 MMHG

## 2020-06-23 DIAGNOSIS — E11.8 DIABETIC COMPLICATION (HCC): ICD-10-CM

## 2020-06-23 DIAGNOSIS — M51.16 HERNIATION OF LUMBAR INTERVERTEBRAL DISC WITH RADICULOPATHY: ICD-10-CM

## 2020-06-23 DIAGNOSIS — M54.31 SCIATICA OF RIGHT SIDE: ICD-10-CM

## 2020-06-23 DIAGNOSIS — I10 ESSENTIAL HYPERTENSION: Primary | ICD-10-CM

## 2020-06-23 DIAGNOSIS — M54.42 CHRONIC MIDLINE LOW BACK PAIN WITH BILATERAL SCIATICA: ICD-10-CM

## 2020-06-23 DIAGNOSIS — G89.29 CHRONIC MIDLINE LOW BACK PAIN WITH BILATERAL SCIATICA: ICD-10-CM

## 2020-06-23 DIAGNOSIS — M54.41 CHRONIC MIDLINE LOW BACK PAIN WITH BILATERAL SCIATICA: ICD-10-CM

## 2020-06-23 DIAGNOSIS — E78.5 HYPERLIPIDEMIA, UNSPECIFIED HYPERLIPIDEMIA TYPE: ICD-10-CM

## 2020-06-23 DIAGNOSIS — E78.5 HYPERLIPIDEMIA, UNSPECIFIED HYPERLIPIDEMIA TYPE: Primary | ICD-10-CM

## 2020-06-23 LAB
25(OH)D3 SERPL-MCNC: 21.9 NG/ML (ref 30–100)
ALBUMIN SERPL BCP-MCNC: 3.3 G/DL (ref 3.5–5)
ALP SERPL-CCNC: 94 U/L (ref 46–116)
ALT SERPL W P-5'-P-CCNC: 23 U/L (ref 12–78)
ANION GAP SERPL CALCULATED.3IONS-SCNC: 6 MMOL/L (ref 4–13)
AST SERPL W P-5'-P-CCNC: 10 U/L (ref 5–45)
BASOPHILS # BLD AUTO: 0.07 THOUSANDS/ΜL (ref 0–0.1)
BASOPHILS NFR BLD AUTO: 1 % (ref 0–1)
BILIRUB SERPL-MCNC: 0.69 MG/DL (ref 0.2–1)
BUN SERPL-MCNC: 12 MG/DL (ref 5–25)
CALCIUM SERPL-MCNC: 9.1 MG/DL (ref 8.3–10.1)
CHLORIDE SERPL-SCNC: 101 MMOL/L (ref 100–108)
CHOLEST SERPL-MCNC: 280 MG/DL (ref 50–200)
CO2 SERPL-SCNC: 28 MMOL/L (ref 21–32)
CREAT SERPL-MCNC: 0.76 MG/DL (ref 0.6–1.3)
EOSINOPHIL # BLD AUTO: 0.11 THOUSAND/ΜL (ref 0–0.61)
EOSINOPHIL NFR BLD AUTO: 2 % (ref 0–6)
ERYTHROCYTE [DISTWIDTH] IN BLOOD BY AUTOMATED COUNT: 18.6 % (ref 11.6–15.1)
EST. AVERAGE GLUCOSE BLD GHB EST-MCNC: 174 MG/DL
GFR SERPL CREATININE-BSD FRML MDRD: 90 ML/MIN/1.73SQ M
GLUCOSE P FAST SERPL-MCNC: 149 MG/DL (ref 65–99)
HBA1C MFR BLD: 7.7 %
HCT VFR BLD AUTO: 31.6 % (ref 34.8–46.1)
HDLC SERPL-MCNC: 61 MG/DL
HGB BLD-MCNC: 8.6 G/DL (ref 11.5–15.4)
IMM GRANULOCYTES # BLD AUTO: 0.03 THOUSAND/UL (ref 0–0.2)
IMM GRANULOCYTES NFR BLD AUTO: 1 % (ref 0–2)
LDLC SERPL CALC-MCNC: 182 MG/DL (ref 0–100)
LYMPHOCYTES # BLD AUTO: 1.26 THOUSANDS/ΜL (ref 0.6–4.47)
LYMPHOCYTES NFR BLD AUTO: 20 % (ref 14–44)
MCH RBC QN AUTO: 19.2 PG (ref 26.8–34.3)
MCHC RBC AUTO-ENTMCNC: 27.2 G/DL (ref 31.4–37.4)
MCV RBC AUTO: 71 FL (ref 82–98)
MONOCYTES # BLD AUTO: 0.48 THOUSAND/ΜL (ref 0.17–1.22)
MONOCYTES NFR BLD AUTO: 8 % (ref 4–12)
NEUTROPHILS # BLD AUTO: 4.29 THOUSANDS/ΜL (ref 1.85–7.62)
NEUTS SEG NFR BLD AUTO: 68 % (ref 43–75)
NONHDLC SERPL-MCNC: 219 MG/DL
NRBC BLD AUTO-RTO: 0 /100 WBCS
PLATELET # BLD AUTO: 293 THOUSANDS/UL (ref 149–390)
PMV BLD AUTO: 8.9 FL (ref 8.9–12.7)
POTASSIUM SERPL-SCNC: 3.8 MMOL/L (ref 3.5–5.3)
PROT SERPL-MCNC: 6.9 G/DL (ref 6.4–8.2)
RBC # BLD AUTO: 4.48 MILLION/UL (ref 3.81–5.12)
SODIUM SERPL-SCNC: 135 MMOL/L (ref 136–145)
TRIGL SERPL-MCNC: 185 MG/DL
TSH SERPL DL<=0.05 MIU/L-ACNC: 2.73 UIU/ML (ref 0.36–3.74)
WBC # BLD AUTO: 6.24 THOUSAND/UL (ref 4.31–10.16)

## 2020-06-23 PROCEDURE — 80053 COMPREHEN METABOLIC PANEL: CPT

## 2020-06-23 PROCEDURE — 85025 COMPLETE CBC W/AUTO DIFF WBC: CPT

## 2020-06-23 PROCEDURE — 82306 VITAMIN D 25 HYDROXY: CPT

## 2020-06-23 PROCEDURE — 84443 ASSAY THYROID STIM HORMONE: CPT

## 2020-06-23 PROCEDURE — 80061 LIPID PANEL: CPT

## 2020-06-23 PROCEDURE — 3051F HG A1C>EQUAL 7.0%<8.0%: CPT | Performed by: NURSE PRACTITIONER

## 2020-06-23 PROCEDURE — 83036 HEMOGLOBIN GLYCOSYLATED A1C: CPT

## 2020-06-23 PROCEDURE — 36415 COLL VENOUS BLD VENIPUNCTURE: CPT

## 2020-06-23 RX ORDER — HYDROCHLOROTHIAZIDE 50 MG/1
50 TABLET ORAL DAILY
Qty: 30 TABLET | Refills: 0 | Status: SHIPPED | OUTPATIENT
Start: 2020-06-23 | End: 2020-07-07 | Stop reason: SDUPTHER

## 2020-06-23 RX ORDER — GABAPENTIN 400 MG/1
400 CAPSULE ORAL 3 TIMES DAILY
Qty: 90 CAPSULE | Refills: 1 | Status: SHIPPED | OUTPATIENT
Start: 2020-06-23 | End: 2020-07-30 | Stop reason: SDUPTHER

## 2020-06-23 RX ORDER — TRAMADOL HYDROCHLORIDE 50 MG/1
50 TABLET ORAL EVERY 6 HOURS PRN
Qty: 30 TABLET | Refills: 0 | Status: SHIPPED | OUTPATIENT
Start: 2020-06-23 | End: 2020-06-30

## 2020-06-24 ENCOUNTER — TRANSCRIBE ORDERS (OUTPATIENT)
Dept: LAB | Facility: HOSPITAL | Age: 55
End: 2020-06-24

## 2020-06-24 ENCOUNTER — TELEPHONE (OUTPATIENT)
Dept: PAIN MEDICINE | Facility: CLINIC | Age: 55
End: 2020-06-24

## 2020-06-24 DIAGNOSIS — E78.5 HYPERLIPIDEMIA, UNSPECIFIED HYPERLIPIDEMIA TYPE: Primary | ICD-10-CM

## 2020-06-26 DIAGNOSIS — Z01.818 PREOP TESTING: ICD-10-CM

## 2020-06-26 PROCEDURE — U0003 INFECTIOUS AGENT DETECTION BY NUCLEIC ACID (DNA OR RNA); SEVERE ACUTE RESPIRATORY SYNDROME CORONAVIRUS 2 (SARS-COV-2) (CORONAVIRUS DISEASE [COVID-19]), AMPLIFIED PROBE TECHNIQUE, MAKING USE OF HIGH THROUGHPUT TECHNOLOGIES AS DESCRIBED BY CMS-2020-01-R: HCPCS

## 2020-06-29 LAB — SARS-COV-2 RNA SPEC QL NAA+PROBE: NOT DETECTED

## 2020-07-02 ENCOUNTER — APPOINTMENT (OUTPATIENT)
Dept: RADIOLOGY | Facility: HOSPITAL | Age: 55
End: 2020-07-02
Payer: COMMERCIAL

## 2020-07-02 ENCOUNTER — HOSPITAL ENCOUNTER (OUTPATIENT)
Facility: HOSPITAL | Age: 55
Setting detail: OUTPATIENT SURGERY
Discharge: HOME | End: 2020-07-02
Attending: ANESTHESIOLOGY | Admitting: ANESTHESIOLOGY
Payer: COMMERCIAL

## 2020-07-02 VITALS
OXYGEN SATURATION: 97 % | DIASTOLIC BLOOD PRESSURE: 61 MMHG | TEMPERATURE: 98 F | SYSTOLIC BLOOD PRESSURE: 140 MMHG | RESPIRATION RATE: 18 BRPM | HEART RATE: 95 BPM

## 2020-07-02 DIAGNOSIS — M54.16 LUMBAR RADICULOPATHY, ACUTE: Primary | ICD-10-CM

## 2020-07-02 PROCEDURE — A9585 GADOBUTROL INJECTION: HCPCS | Performed by: ANESTHESIOLOGY

## 2020-07-02 PROCEDURE — 64484 NJX AA&/STRD TFRM EPI L/S EA: CPT | Performed by: ANESTHESIOLOGY

## 2020-07-02 PROCEDURE — 64483 NJX AA&/STRD TFRM EPI L/S 1: CPT | Performed by: ANESTHESIOLOGY

## 2020-07-02 RX ORDER — LIDOCAINE HYDROCHLORIDE 10 MG/ML
INJECTION, SOLUTION EPIDURAL; INFILTRATION; INTRACAUDAL; PERINEURAL AS NEEDED
Status: DISCONTINUED | OUTPATIENT
Start: 2020-07-02 | End: 2020-07-02 | Stop reason: HOSPADM

## 2020-07-02 RX ORDER — DEXAMETHASONE SODIUM PHOSPHATE 10 MG/ML
INJECTION, SOLUTION INTRAMUSCULAR; INTRAVENOUS AS NEEDED
Status: DISCONTINUED | OUTPATIENT
Start: 2020-07-02 | End: 2020-07-02 | Stop reason: HOSPADM

## 2020-07-02 NOTE — INTERVAL H&P NOTE
H&P reviewed  After examining the patient I find no changes in the patients condition since the H&P had been written      Vitals:    07/02/20 1250   BP: 147/66   Pulse: (!) 119   Resp: 18   Temp: 98 °F (36 7 °C)   SpO2: 98%

## 2020-07-02 NOTE — DISCHARGE INSTRUCTIONS
Epidural Steroid Injection   WHAT YOU NEED TO KNOW:   An epidural steroid injection (CECI) is a procedure to inject steroid medicine into the epidural space  The epidural space is between your spinal cord and vertebrae  Steroids reduce inflammation and fluid buildup in your spine that may be causing pain  You may be given pain medicine along with the steroids  ACTIVITY  · Do not drive or operate machinery today  · No strenuous activity today - bending, lifting, etc   · You may resume normal activites starting tomorrow - start slowly and as tolerated  · You may shower today, but no tub baths or hot tubs  · You may have numbness for several hours from the local anesthetic  Please use caution and common sense, especially with weight-bearing activities  CARE OF THE INJECTION SITE  · If you have soreness or pain, apply ice to the area today (20 minutes on/20 minutes off)  · Starting tomorrow, you may use warm, moist heat or ice if needed  · You may have an increase or change in your discomfort for 36-48 hours after your treatment  · Apply ice and continue with any pain medication you have been prescribed  · Notify the [facility] if you have any of the following: redness, drainage, swelling, headache, stiff neck or fever above 100°F     SPECIAL INSTRUCTIONS  · Our office will contact you in approximately 7 days for a progress report  MEDICATIONS  · Continue to take all routine medications  · Our office may have instructed you to hold some medications  If you have a problem specifically related to your procedure, please call our office at [phone]  Problems not related to your procedure should be directed to your primary care physician

## 2020-07-02 NOTE — OP NOTE
OPERATIVE REPORT  PATIENT NAME: Andrez Cueto    :  1965  MRN: [id number]  Pt Location: [facility] ROOM 01    SURGERY DATE: 2020    Surgeon(s) and Role:     * Karin Trejo MD - Primary    Preop Diagnosis:  Lumbar radiculopathy [M54 16]    Post-Op Diagnosis Codes:     * Lumbar radiculopathy [M54 16]    Procedure(s) (LRB):  Right L4-5 and L5-S1 transforaminal epidural steroid injection ([id number]) (Right)    Specimen(s):  * No specimens in log *    Estimated Blood Loss:   Minimal    Drains:  * No LDAs found *    Anesthesia Type:   Local    Operative Indications:  Lumbar radiculopathy [M54 16]      Operative Findings:  same    Complications:   None    Procedure and Technique:  Fluoroscopically-guided right L4-5 and L5-S1 transforaminal epidural steroid injection under fluoroscopy      After discussing the risks, benefits, and alternatives to the procedure, the patient expressed understanding and wished to proceed  The patient was brought to the fluoroscopy suite and placed in the prone position  A procedural pause was conducted to verify:  correct patient identity, procedure to be performed and as applicable, correct side and site, correct patient position, and availability of implants, special equipment and special requirements  After identifying the right L4 and L5 pedicles fluoroscopically with an oblique view, the skin was sterilely prepped and draped in the usual fashion using Chloraprep skin prep  The skin and subcutaneous tissue were anesthetized with 0 5% lidocaine  A 5 inch 22 gauge spinal needle was then advanced under fluoroscopic guidance to the posterior aspect of the right L4-5 and L5-S1 neural foramens  Appropriate foraminal depth was determined with a lateral fluoroscopic view, and AP visualization confirmed needle positioning at approximately the 6 oclock position relative to the pedicles    After negative aspiration, 0 5 mL of Gadavist contrast was injected using live fluoroscopy/digital subtraction angiography, confirming appropriate transforaminal spread without evidence of intravascular or intrathecal uptake  Next, a local anesthetic test dose consisting of 1 mL of 2% lidocaine was injected through the needle at each level  After an appropriate period of observation, a directed neurological exam was performed which revealed no new neurologic deficits  Next, a 1 5 ml solution consisting of 7 5 mg of dexamethasone in sterile saline was injected slowly and incrementally into the epidural space at each level  Following the injection the needles were withdrawn slightly and flushed with lidocaine as they were fully extracted  The patient tolerated the procedure well and there were no apparent complications  The patient did not develop any new neurologic deficits  After appropriate observation, the patient was dismissed from the clinic in good condition under their own power  COMMENTS   The patient received a total steroid dose of 15 mg of dexamethasone     I was present for the entire procedure    Patient Disposition:  hemodynamically stable    SIGNATURE: Katerina Solis MD  DATE: July 2, 2020  TIME: 2:07 PM

## 2020-07-06 ENCOUNTER — OFFICE VISIT (OUTPATIENT)
Dept: PHYSICAL THERAPY | Facility: CLINIC | Age: 55
End: 2020-07-06
Payer: COMMERCIAL

## 2020-07-06 DIAGNOSIS — M54.41 ACUTE RIGHT-SIDED LOW BACK PAIN WITH RIGHT-SIDED SCIATICA: Primary | ICD-10-CM

## 2020-07-06 PROCEDURE — 97110 THERAPEUTIC EXERCISES: CPT | Performed by: PHYSICAL THERAPIST

## 2020-07-06 PROCEDURE — 97140 MANUAL THERAPY 1/> REGIONS: CPT | Performed by: PHYSICAL THERAPIST

## 2020-07-06 PROCEDURE — 97112 NEUROMUSCULAR REEDUCATION: CPT | Performed by: PHYSICAL THERAPIST

## 2020-07-06 NOTE — PROGRESS NOTES
Daily Note     Today's date: 2020  Patient name: Melchor Evangelista  : 1965  MRN: [id number]  Referring provider: Nayeli Velez, PT  Dx:   Encounter Diagnosis     ICD-10-CM    1  Acute right-sided low back pain with right-sided sciatica M54 41                   Subjective: Pt reports mild sx improvement since IE and injection  Reports her sx into her R LE are frustrating  Hoping to get centralization and relief from today's session  Objective: See treatment diary below      Assessment: Tolerated treatment well  Sx centralized by end of session  Patient demonstrated fatigue post treatment, exhibited good technique with therapeutic exercises and would benefit from continued PT      Plan: Continue per plan of care  Progress treatment as tolerated           Precautions: Clarence principle for R side posterior/lateral lumbar derangement    Daily Treatment Diary    HEP: Handout provided and discussed      Manuals 20           ART x10' x15'           PROM/Stretch             IASTM             STM/Triggerpoint             JM x5' prone P to A grade I, II lumbar x10'                        Neuro Re-Ed             Prone Wedge x15' x15'                                                                                         Ther Ex             PPU 3x10 3x10           Prone R Hip ext 3x10 3x10           Prone Hip IR 3x10 L3 3x10 L3           Prone Hip ER 3x10 3x10           Cat Backs             QKB                                                    Ther Activity                                       Gait Training                                       Modalities             MHP             CP x10' Prone Wedge x10' Prone Wedge           US/Stim

## 2020-07-07 ENCOUNTER — TELEPHONE (OUTPATIENT)
Dept: ADMINISTRATIVE | Facility: OTHER | Age: 55
End: 2020-07-07

## 2020-07-07 ENCOUNTER — OFFICE VISIT (OUTPATIENT)
Dept: FAMILY MEDICINE CLINIC | Facility: CLINIC | Age: 55
End: 2020-07-07
Payer: COMMERCIAL

## 2020-07-07 VITALS
DIASTOLIC BLOOD PRESSURE: 70 MMHG | OXYGEN SATURATION: 98 % | RESPIRATION RATE: 18 BRPM | WEIGHT: 213 LBS | TEMPERATURE: 96.1 F | HEART RATE: 100 BPM | SYSTOLIC BLOOD PRESSURE: 158 MMHG | BODY MASS INDEX: 35.45 KG/M2

## 2020-07-07 DIAGNOSIS — R79.89 ELEVATED LFTS: ICD-10-CM

## 2020-07-07 DIAGNOSIS — Z79.4 TYPE 2 DIABETES MELLITUS WITHOUT COMPLICATION, WITH LONG-TERM CURRENT USE OF INSULIN (HCC): ICD-10-CM

## 2020-07-07 DIAGNOSIS — E78.49 OTHER HYPERLIPIDEMIA: ICD-10-CM

## 2020-07-07 DIAGNOSIS — E11.9 TYPE 2 DIABETES MELLITUS WITHOUT COMPLICATION, WITH LONG-TERM CURRENT USE OF INSULIN (HCC): ICD-10-CM

## 2020-07-07 DIAGNOSIS — M54.31 SCIATICA OF RIGHT SIDE: ICD-10-CM

## 2020-07-07 DIAGNOSIS — Z11.4 SCREENING FOR HIV WITHOUT PRESENCE OF RISK FACTORS: ICD-10-CM

## 2020-07-07 DIAGNOSIS — I10 ESSENTIAL HYPERTENSION: Primary | ICD-10-CM

## 2020-07-07 DIAGNOSIS — Z23 NEED FOR TDAP VACCINATION: ICD-10-CM

## 2020-07-07 DIAGNOSIS — E61.1 IRON DEFICIENCY: ICD-10-CM

## 2020-07-07 DIAGNOSIS — Z23 NEED FOR PNEUMOCOCCAL VACCINATION: ICD-10-CM

## 2020-07-07 DIAGNOSIS — E55.9 VITAMIN D DEFICIENCY: ICD-10-CM

## 2020-07-07 PROCEDURE — 99214 OFFICE O/P EST MOD 30 MIN: CPT | Performed by: FAMILY MEDICINE

## 2020-07-07 RX ORDER — LOSARTAN POTASSIUM 50 MG/1
50 TABLET ORAL DAILY
Qty: 30 TABLET | Refills: 0 | Status: SHIPPED | OUTPATIENT
Start: 2020-07-07 | End: 2020-08-04

## 2020-07-07 RX ORDER — HYDROCHLOROTHIAZIDE 50 MG/1
50 TABLET ORAL DAILY
Qty: 90 TABLET | Refills: 1 | Status: SHIPPED | OUTPATIENT
Start: 2020-07-07 | End: 2021-01-11

## 2020-07-07 RX ORDER — LOSARTAN POTASSIUM 25 MG/1
25 TABLET ORAL DAILY
Qty: 90 TABLET | Refills: 1 | Status: SHIPPED | OUTPATIENT
Start: 2020-07-07 | End: 2020-07-07 | Stop reason: DRUGHIGH

## 2020-07-07 NOTE — TELEPHONE ENCOUNTER
Upon review of the In Basket request we were able to locate, review, and update the patient chart as requested for Pap Smear (HPV) aka Cervical Cancer Screening  Any additional questions or concerns should be emailed to the Practice Liaisons via [email or web address] email, please do not reply via In Basket      Thank you  Reena Prieto

## 2020-07-07 NOTE — PROGRESS NOTES
Assessment/Plan:    No problem-specific Assessment & Plan notes found for this encounter  Diagnoses and all orders for this visit:    Essential hypertension  -     Discontinue: losartan (COZAAR) 25 mg tablet; Take 1 tablet (25 mg total) by mouth daily  -     hydrochlorothiazide (HYDRODIURIL) 50 mg tablet; Take 1 tablet (50 mg total) by mouth daily  -     losartan (COZAAR) 50 mg tablet; Take 1 tablet (50 mg total) by mouth daily    Type 2 diabetes mellitus without complication, with long-term current use of insulin (HCC)  -     Discontinue: losartan (COZAAR) 25 mg tablet; Take 1 tablet (25 mg total) by mouth daily    Need for Tdap vaccination  -     Tdap vaccine greater than or equal to 8yo IM    Need for pneumococcal vaccination  -     Pneumococcal polysaccharide vaccine 23-valent greater than or equal to 1yo subcutaneous/IM    Screening for HIV without presence of risk factors  -     HIV 1/2 Antigen/Antibody (4th Generation) w Reflex [facility]; Future    Sciatica of right side    Iron deficiency    Other hyperlipidemia    Elevated LFTs    Vitamin D deficiency    Other orders  -     Cancel: Ambulatory referral to Obstetrics / Gynecology; Future        -will increase losartan to 50mg  Patient will have BP checked at work and call in 2 weeks  -follow up with pain management as scheduled  -DM care per [facility]  Recommend high intensity statin therapy, however, patient declines due to cost    -her LFTs are now in normal range  Re-check in 3 months   -vit d low, patient recommended supplement, which is states she takes on and off  -recommend daily iron for known deficiency, which patient states she also takes intermittently  -RTC 3 months or sooner if needed    Subjective:      Patient ID: Jason Jim is a 48 y o  female who presents for follow up  Her BP continues to be elevated  She is taking her HCTZ 50mg and losartan 25mg as prescribed   She has been having it taken at work and notes values in the 150s    She received spinal injections and PT per pain management recommendations  She notes significant improvement in her sciatic pain  She is scheduled to follow with them at the end of the month  She follows with Bobby Ziegler for her DM  She has an established GYN  Per patient her next Pap due is 2022  Reviewed recent lab work with patient  Her cholesterol is elevated  She is not on high intensity statin therapy  No other complaints today  HPI    The following portions of the patient's history were reviewed and updated as appropriate: allergies, current medications, past family history, past medical history, past social history, past surgical history and problem list     Review of Systems   Constitutional: Negative  HENT: Negative  Eyes: Negative  Respiratory: Negative  Cardiovascular: Negative  Gastrointestinal: Negative  Endocrine: Negative  Genitourinary: Negative  Musculoskeletal: Positive for back pain  Skin: Negative  Allergic/Immunologic: Negative  Neurological: Negative  Hematological: Negative  Psychiatric/Behavioral: Negative  Objective:      /70 (BP Location: Right arm, Patient Position: Sitting, Cuff Size: Large)   Pulse 100   Temp (!) 96 1 °F (35 6 °C) (Tympanic)   Resp 18   Wt 96 6 kg (213 lb)   SpO2 98%   BMI 35 45 kg/m²          Physical Exam   Constitutional: She is oriented to person, place, and time  She appears well-developed and well-nourished  No distress  HENT:   Head: Normocephalic and atraumatic  Eyes: Pupils are equal, round, and reactive to light  Conjunctivae and EOM are normal    Neck: Normal range of motion  Neck supple  No thyromegaly present  Cardiovascular: Normal rate, regular rhythm, normal heart sounds and intact distal pulses  No murmur heard  Pulmonary/Chest: Effort normal and breath sounds normal  No respiratory distress  She has no wheezes  Abdominal: Soft   Normal appearance and bowel sounds are normal  She exhibits no distension  There is no tenderness  Musculoskeletal: Normal range of motion  Neurological: She is alert and oriented to person, place, and time  Skin: Skin is warm and dry  Capillary refill takes less than 2 seconds  Psychiatric: She has a normal mood and affect  Her behavior is normal  Judgment and thought content normal    Vitals reviewed

## 2020-07-07 NOTE — TELEPHONE ENCOUNTER
----- Message from Ramsey Lennon MA sent at 7/7/2020  8:21 AM EDT -----  07/07/20 8:21 AM    Hello, our patient Dheeraj Celaya has had Pap Smear (HPV) aka Cervical Cancer Screening completed/performed  Please assist in updating the patient chart by pulling the Care Everywhere (CE) document  The date of service 05/31/2017       Thank you,  Ramsey Lennon MA  [address]

## 2020-07-09 ENCOUNTER — TELEPHONE (OUTPATIENT)
Dept: PAIN MEDICINE | Facility: CLINIC | Age: 55
End: 2020-07-09

## 2020-07-30 ENCOUNTER — OFFICE VISIT (OUTPATIENT)
Dept: PAIN MEDICINE | Facility: CLINIC | Age: 55
End: 2020-07-30
Payer: COMMERCIAL

## 2020-07-30 VITALS
TEMPERATURE: 97.6 F | WEIGHT: 213.4 LBS | BODY MASS INDEX: 35.56 KG/M2 | HEIGHT: 65 IN | SYSTOLIC BLOOD PRESSURE: 136 MMHG | DIASTOLIC BLOOD PRESSURE: 82 MMHG

## 2020-07-30 DIAGNOSIS — G89.29 CHRONIC MIDLINE LOW BACK PAIN WITH RIGHT-SIDED SCIATICA: ICD-10-CM

## 2020-07-30 DIAGNOSIS — M54.16 LUMBAR RADICULOPATHY: ICD-10-CM

## 2020-07-30 DIAGNOSIS — M47.816 LUMBAR SPONDYLOSIS: ICD-10-CM

## 2020-07-30 DIAGNOSIS — G89.4 CHRONIC PAIN SYNDROME: Primary | ICD-10-CM

## 2020-07-30 DIAGNOSIS — M54.31 SCIATICA OF RIGHT SIDE: ICD-10-CM

## 2020-07-30 DIAGNOSIS — M54.41 CHRONIC MIDLINE LOW BACK PAIN WITH RIGHT-SIDED SCIATICA: ICD-10-CM

## 2020-07-30 DIAGNOSIS — M79.18 MYOFASCIAL PAIN SYNDROME: ICD-10-CM

## 2020-07-30 PROCEDURE — 99213 OFFICE O/P EST LOW 20 MIN: CPT | Performed by: NURSE PRACTITIONER

## 2020-07-30 PROCEDURE — 3075F SYST BP GE 130 - 139MM HG: CPT | Performed by: NURSE PRACTITIONER

## 2020-07-30 PROCEDURE — 3079F DIAST BP 80-89 MM HG: CPT | Performed by: NURSE PRACTITIONER

## 2020-07-30 PROCEDURE — 3051F HG A1C>EQUAL 7.0%<8.0%: CPT | Performed by: NURSE PRACTITIONER

## 2020-07-30 PROCEDURE — 1036F TOBACCO NON-USER: CPT | Performed by: NURSE PRACTITIONER

## 2020-07-30 PROCEDURE — 3008F BODY MASS INDEX DOCD: CPT | Performed by: NURSE PRACTITIONER

## 2020-07-30 RX ORDER — TIZANIDINE 2 MG/1
2 TABLET ORAL EVERY 8 HOURS PRN
Qty: 90 TABLET | Refills: 1 | Status: SHIPPED | OUTPATIENT
Start: 2020-07-30 | End: 2020-12-08 | Stop reason: SDUPTHER

## 2020-07-30 RX ORDER — GABAPENTIN 400 MG/1
400 CAPSULE ORAL 3 TIMES DAILY
Qty: 90 CAPSULE | Refills: 2 | Status: SHIPPED | OUTPATIENT
Start: 2020-07-30 | End: 2020-09-18

## 2020-07-30 NOTE — PATIENT INSTRUCTIONS
Tizanidine (By mouth)   Tizanidine (jop-ALN-u-marina)  Treats muscle spasticity  Brand Name(s): Comfort Pac w/tiZANidine, Zanaflex, Zanaflex Capsule   There may be other brand names for this medicine  When This Medicine Should Not Be Used: This medicine is not right for everyone  Do not use if you had an allergic reaction to tizanidine  How to Use This Medicine:   Capsule, Tablet  · Take your medicine as directed  Your dose may need to be changed several times to find what works best for you  · You may take this medicine with or without food, but always take it the same way every time  Tizanidine works differently depending on whether you take it on an empty stomach or a full stomach  Talk to your doctor if you have any questions about this  · Missed dose: Take a dose as soon as you remember  If it is almost time for your next dose, wait until then and take a regular dose  Do not take extra medicine to make up for a missed dose  · Store the medicine in a closed container at room temperature, away from heat, moisture, and direct light  Drugs and Foods to Avoid:   Ask your doctor or pharmacist before using any other medicine, including over-the-counter medicines, vitamins, and herbal products  · Do not use this medicine together with ciprofloxacin or fluvoxamine  · Some foods and medicines can affect how tizanidine works  Tell your doctor if you are using any of the following:  ¨ Acyclovir, baclofen, cimetidine, famotidine, ticlopidine, verapamil, zileuton  ¨ Birth control pills, blood pressure medicine, medicine for heart rhythm problems (such as amiodarone, mexiletine, propafenone), or medicine to treat an infection (such as levofloxacin, moxifloxacin)  · Do not drink alcohol while you are using this medicine  · Tell your doctor if you use anything else that makes you sleepy  Some examples are allergy medicine, narcotic pain medicine, and alcohol    Warnings While Using This Medicine:   · Tell your doctor if you are pregnant or breastfeeding, or if you have kidney disease or liver disease  · This medicine may cause the following problems:  ¨ Low blood pressure  ¨ Liver damage  · This medicine may make you dizzy or drowsy  Do not drive or do anything else that could be dangerous until you know how this medicine affects you  Stand or sit up slowly if you are dizzy  · Do not stop using this medicine suddenly  Your doctor will need to slowly decrease your dose before you stop it completely  · Your doctor will do lab tests at regular visits to check on the effects of this medicine  Keep all appointments  · Keep all medicine out of the reach of children  Never share your medicine with anyone  Possible Side Effects While Using This Medicine:   Call your doctor right away if you notice any of these side effects:  · Allergic reaction: Itching or hives, swelling in your face or hands, swelling or tingling in your mouth or throat, chest tightness, trouble breathing  · Dark urine or pale stools, nausea, vomiting, loss of appetite, stomach pain, yellow skin or eyes  · Lightheadedness, dizziness, or fainting  · Seeing or hearing things that are not really there  · Slow heartbeat  If you notice these less serious side effects, talk with your doctor:   · Dry mouth  · Drowsiness or sleepiness  · Weakness  If you notice other side effects that you think are caused by this medicine, tell your doctor  Call your doctor for medical advice about side effects  You may report side effects to FDA at 7-271-FDA-7046  © 2017 [address] [employer] Information is for End User's use only and may not be sold, redistributed or otherwise used for commercial purposes  The above information is an  only  It is not intended as medical advice for individual conditions or treatments  Talk to your doctor, nurse or pharmacist before following any medical regimen to see if it is safe and effective for you

## 2020-07-30 NOTE — PROGRESS NOTES
Assessment:  1  Chronic pain syndrome    2  Chronic midline low back pain with right-sided sciatica    3  Sciatica of right side    4  Lumbar radiculopathy    5  Lumbar spondylosis    6  Myofascial pain syndrome        Plan:   While the patient was in the office today, I did have a thorough conversation regarding their chronic pain syndrome, medication management, and treatment plan options  Patient is a 74-year-old female with chronic pain syndrome related to chronic low back pain, lumbar spondylosis, lumbar radiculopathy, lumbar disc herniation  She underwent a right L4-5 and L5-S1 transforaminal epidural steroid injection on 07/02/2020 and reports that her right leg pain is 80-90% improved  She continues with midline low back pain  Patient started physical therapy, but only attended 2 sessions  She states that her pain seemed to increase after physical therapy  Today, I provided her with handouts with home lumbar stretching/ strengthening exercises  We discussed the importance of a lifelong commitment to daily exercises geared toward lumbar core stretching and strengthening  The patient was agreeable and verbalized an understanding  Since the patient is having myofascial pain and/or spasms, I advised the patient that starting on a muscle relaxer could help decrease some of the myofascial pain and/or spasms  I advised patient that they should not drive or operate heavy machinery while on this medication as it could cause lethargy  I advised the patient to call our office if they experience any side effects  The patient verbalized understanding  Start Zanaflex 2 mg every 8 hours as needed for muscle spasms  Prescription was sent electronically to her pharmacy  Continue gabapentin 400 mg 3 times daily to address the neuropathic component of her pain  Prescription was sent electronically to her pharmacy with refills        We will consider diagnostic lumbar facet medial branch blocks in the future if her pain persists  I provided her with brochures regarding the medial branch block as well as radiofrequency ablation  The patient will follow-up in 1 month for medication prescription refill and reevaluation  The patient was advised to contact the office should their symptoms worsen in the interim  The patient was agreeable and verbalized an understanding  History of Present Illness: The patient is a 48 y o  female who presents for a follow up office visit in regards to Back Pain  The patients current symptoms include Midline low back pain and intermittent right lower extremity pain  Current pain level is a 3/10  Pain is described as throbbing  Current pain medications includes:  Gabapentin 400 mg 3 times daily    The patient reports that this regimen is providing 85% pain relief  The patient is reporting no side effects from this pain medication regimen  I have personally reviewed and/or updated the patient's past medical history, past surgical history, family history, social history, current medications, allergies, and vital signs today  Review of Systems  Review of Systems   Musculoskeletal: Positive for gait problem  Joint stiffness   All other systems reviewed and are negative  Past Medical History:   Diagnosis Date    [facility] [address] [facility]     [facility]     Hyperlipidemia     Hypertension     Migraine     Sciatica     Vertigo        Past Surgical History:   Procedure Laterality Date    EPIDURAL BLOCK INJECTION Right 7/2/2020    Procedure: Right L4-5 and L5-S1 transforaminal epidural steroid injection ([id number]);   Surgeon: Ulises Santos MD;  Location: [facility];  Service: Pain Management     FL GUIDED NEEDLE PLAC BX/ASP/INJ  7/2/2020    [place] ESOPHAGOGASTRODUODENOSCOPY TRANSORAL DIAGNOSTIC N/A 10/23/2018    Procedure: EGD with Savary dilation AND COLONOSCOPY;  Surgeon: Ayesha Hurtado MD;  Location: [facility];  Service: Gastroenterology    TUBAL LIGATION         Family History   Problem Relation Age of Onset    Diabetes Father     Cancer Father        Social History     Occupational History    Not on file   Tobacco Use    Smoking status: Never Smoker    Smokeless tobacco: Never Used   Substance and Sexual Activity    Alcohol use: No    Drug use: No    Sexual activity: Not on file         Current Outpatient Medications:     gabapentin (NEURONTIN) 400 mg capsule, Take 1 capsule (400 mg total) by mouth 3 (three) times a day, Disp: 90 capsule, Rfl: 2    glimepiride (AMARYL) 4 mg tablet, Take 1 tablet by mouth 2 (two) times a day, Disp: , Rfl:     hydrochlorothiazide (HYDRODIURIL) 50 mg tablet, Take 1 tablet (50 mg total) by mouth daily, Disp: 90 tablet, Rfl: 1    Insulin Glargine-Lixisenatide (SOLIQUA) 100 units-33 mcg/mL injection pen, Inject 45 Units under the skin daily  , Disp: , Rfl:     losartan (COZAAR) 50 mg tablet, Take 1 tablet (50 mg total) by mouth daily, Disp: 30 tablet, Rfl: 0    metFORMIN (GLUCOPHAGE-XR) 500 mg 24 hr tablet, 2 (two) times a day, Disp: , Rfl:     Nerve Stimulator (TENS THERAPY PAIN RELIEF) BILLY, 1 Device by Does not apply route 2 (two) times a day as needed (pain), Disp: 1 Device, Rfl: 0    tiZANidine (ZANAFLEX) 2 mg tablet, Take 1 tablet (2 mg total) by mouth every 8 (eight) hours as needed for muscle spasms, Disp: 90 tablet, Rfl: 1    Allergies   Allergen Reactions    Nuts Anaphylaxis    Chocolate Cough    Eggs Or Egg-Derived Products Other (See Comments)     Cough    Other      LETTUCE    Shellfish Allergy Other (See Comments)     unknown    Tomato Cough       Physical Exam:    /82   Temp 97 6 °F (36 4 °C)   Ht 5' 5" (1 651 m)   Wt 96 8 kg (213 lb 6 4 oz)   BMI 35 51 kg/m²     Constitutional:overweight  Eyes:anicteric  HEENT:grossly intact  Neck:supple, symmetric, trachea midline and no masses   Pulmonary:even and unlabored  Cardiovascular:No edema or pitting edema present  Skin:Normal without rashes or lesions and well hydrated  Psychiatric:Mood and affect appropriate  Neurologic:Cranial Nerves II-XII grossly intact  Musculoskeletal: gait is slow and guarded  Range of motion of her lumbar spine is limited in all planes  There are significant bilateral lumbar paraspinal muscle spasms present and tenderness to palpation over her lower lumbar area  Imaging  No orders to display       No orders of the defined types were placed in this encounter

## 2020-07-30 NOTE — PROGRESS NOTES
PT Discharge    Today's date: 2020  Patient name: Ada Palomares  : 1965  MRN: [id number]  Referring provider: Cesar Roca, PT  Dx:   Encounter Diagnosis     ICD-10-CM    1  Acute right-sided low back pain with right-sided sciatica M54 41        Start Time: 1455  Stop Time: 1625  Total time in clinic (min): 90 minutes      Goals  STGs: To be complete within 4 weeks (Not Met)  - Decrease/centralize pain to < 2/10 at worst  - Increase lumbar extension ROM to WNL  - Increase bilateral hip flexor flexibility to WNL  - Increase core stability and posterior lateral LE chain strength to > 4+/5  - Improve postural awareness capacity to > 60min before deficit    LTGs: To be complete within 6 weeks (Not Met)  - Able to sit for any extended amount of time without pain or limitation for increased safety and functional capacity with ADLs and work-related duty  - Able to repetitively bend over at trunk without pain or limitation for increased safety and functional capacity with ADLs and and work-related duty  - Able to complete all lifting/carrying activity without pain or limitation for increased safety and functional capacity with ADLs and work-related duty  - Able to complete transfers repetitively without pain or limitation for increased safety and functional capacity with ADLs and work-related duty         Pt will be D/C from physical therapy, as she has not reached out to schedule since   Goals not met          Subjective Evaluation    Pain  At best pain ratin  At worst pain ratin  Location: LBP with R LE radicular sx               Objective Pain level ranges 2-8/10  AROM: Lumbar extension 50% decreased, R Hip extension and IR 50% decreased  Strength: Core stability and post/lat LE chain strength 3+/5  Postural Awareness: Poor (rounded shoulders, forward head)  Repeated movement testing: (+) for R Side posterior/lat lumbar derangement  Hip flexor flexibility: (+) Darren Morris bilaterally  FOTO: 48; GOAL 64  Unable sit without pain and limitation  Unable to bend over at trunk without pain and limitation  Unable to complete lifting/carrying activity without pain and limitation  Unable to complete transfers without pain and limitation

## 2020-08-04 DIAGNOSIS — I10 ESSENTIAL HYPERTENSION: ICD-10-CM

## 2020-08-04 PROCEDURE — 4010F ACE/ARB THERAPY RXD/TAKEN: CPT | Performed by: NURSE PRACTITIONER

## 2020-08-04 RX ORDER — LOSARTAN POTASSIUM 50 MG/1
TABLET ORAL
Qty: 30 TABLET | Refills: 0 | Status: SHIPPED | OUTPATIENT
Start: 2020-08-04 | End: 2020-09-03

## 2020-08-11 ENCOUNTER — OFFICE VISIT (OUTPATIENT)
Dept: FAMILY MEDICINE CLINIC | Facility: CLINIC | Age: 55
End: 2020-08-11
Payer: COMMERCIAL

## 2020-08-11 VITALS
SYSTOLIC BLOOD PRESSURE: 152 MMHG | HEART RATE: 121 BPM | DIASTOLIC BLOOD PRESSURE: 72 MMHG | HEIGHT: 65 IN | WEIGHT: 214 LBS | OXYGEN SATURATION: 98 % | RESPIRATION RATE: 18 BRPM | BODY MASS INDEX: 35.65 KG/M2 | TEMPERATURE: 98 F

## 2020-08-11 DIAGNOSIS — N92.1 MENORRHAGIA WITH IRREGULAR CYCLE: Primary | ICD-10-CM

## 2020-08-11 DIAGNOSIS — L30.9 ECZEMA OF BOTH HANDS: ICD-10-CM

## 2020-08-11 DIAGNOSIS — D25.9 UTERINE LEIOMYOMA, UNSPECIFIED LOCATION: ICD-10-CM

## 2020-08-11 DIAGNOSIS — G89.4 CHRONIC PAIN SYNDROME: ICD-10-CM

## 2020-08-11 DIAGNOSIS — E61.1 IRON DEFICIENCY: ICD-10-CM

## 2020-08-11 DIAGNOSIS — M77.01 MEDIAL EPICONDYLITIS OF RIGHT ELBOW: ICD-10-CM

## 2020-08-11 DIAGNOSIS — I10 ESSENTIAL HYPERTENSION: ICD-10-CM

## 2020-08-11 PROCEDURE — 99213 OFFICE O/P EST LOW 20 MIN: CPT | Performed by: FAMILY MEDICINE

## 2020-08-11 RX ORDER — PRAVASTATIN SODIUM 40 MG
40 TABLET ORAL DAILY
COMMUNITY
Start: 2020-07-15 | End: 2022-03-02

## 2020-08-11 RX ORDER — METHYLPREDNISOLONE 4 MG/1
TABLET ORAL
Qty: 21 EACH | Refills: 0 | Status: SHIPPED | OUTPATIENT
Start: 2020-08-11 | End: 2020-09-18 | Stop reason: ALTCHOICE

## 2020-08-11 RX ORDER — DIAPER,BRIEF,INFANT-TODD,DISP
EACH MISCELLANEOUS 4 TIMES DAILY PRN
Qty: 45 G | Refills: 0 | Status: SHIPPED | OUTPATIENT
Start: 2020-08-11 | End: 2021-06-25

## 2020-08-11 NOTE — PATIENT INSTRUCTIONS
Tennis Elbow   WHAT YOU NEED TO KNOW:   Tennis elbow is inflammation of the tendons in your elbow  Tendons are strong tissues that connect muscle to bone  DISCHARGE INSTRUCTIONS:   Return to the emergency department if:   · You suddenly have no feeling in your arm, hand, or fingers  · You suddenly cannot move your arm, wrist, hand, or fingers  Contact your healthcare provider if:   · You have a fever  · You have more pain or weakness in your arm, wrist, hand, or fingers  · You have new numbness or tingling in your arm, hand, or fingers  · You have questions or concerns about your condition or care  Medicines:   · Acetaminophen  decreases pain and fever  It is available without a doctor's order  Ask how much to take and how often to take it  Follow directions  Read the labels of all other medicines you are using to see if they also contain acetaminophen, or ask your doctor or pharmacist  Acetaminophen can cause liver damage if not taken correctly  Do not use more than 4 grams (4,000 milligrams) total of acetaminophen in one day  · NSAIDs , such as ibuprofen, help decrease swelling, pain, and fever  This medicine is available with or without a doctor's order  NSAIDs can cause stomach bleeding or kidney problems in certain people  If you take blood thinner medicine, always ask your healthcare provider if NSAIDs are safe for you  Always read the medicine label and follow directions  · Take your medicine as directed  Contact your healthcare provider if you think your medicine is not helping or if you have side effects  Tell him or her if you are allergic to any medicine  Keep a list of the medicines, vitamins, and herbs you take  Include the amounts, and when and why you take them  Bring the list or the pill bottles to follow-up visits  Carry your medicine list with you in case of an emergency    Physical therapy:  A physical therapist teaches you exercises to help improve movement and strength, and to decrease pain  Self-care:   · Wear your support device as directed  Devices, such as an arm strap, brace, or splint, help limit your arm movement  They also decrease pain and help prevent more damage to your tendon  Ask your healthcare provider how to care for your arm while you wear a brace or splint  · Rest your injured arm  and avoid activities that cause pain  This will help your tendons heal     · Apply ice on your elbow  for 15 to 20 minutes every hour or as directed  Use an ice pack, or put crushed ice in a plastic bag  Cover it with a towel before you apply it to your skin  Ice helps prevent tissue damage and decreases swelling and pain  · Elevate your elbow  above the level of your heart as often as you can  This will help decrease swelling and pain  Prop your elbow on pillows or blankets to keep it elevated comfortably  Follow up with your healthcare provider as directed:  Write down your questions so you remember to ask them during your visits  © 2017 [facility] Information is for End User's use only and may not be sold, redistributed or otherwise used for commercial purposes  All illustrations and images included in CareNotes® are the copyrighted property of A D A M , Inc  or [employer]  The above information is an  only  It is not intended as medical advice for individual conditions or treatments  Talk to your doctor, nurse or pharmacist before following any medical regimen to see if it is safe and effective for you

## 2020-08-11 NOTE — PROGRESS NOTES
Assessment/Plan:    No problem-specific Assessment & Plan notes found for this encounter  Diagnoses and all orders for this visit:    Menorrhagia with irregular cycle  -     CBC and differential; Future  -     US pelvis complete w transvaginal; Future  -     Ambulatory referral to Gynecology; Future    Chronic pain syndrome    Iron deficiency  -     CBC and differential; Future    Essential hypertension    Uterine leiomyoma, unspecified location    Eczema of both hands  -     hydrocortisone 1 % cream; Apply topically 4 (four) times a day as needed for irritation or rash    Medial epicondylitis of right elbow  -     methylPREDNISolone 4 MG tablet therapy pack; Use as directed on package    Other orders  -     pravastatin (PRAVACHOL) 40 mg tablet; Take 40 mg by mouth daily        -will check CBC due to patient's history of HILARIO and chronic blood loss  She is taking an iron supplement  Encouraged iron rich diet  pelvic U/S and GYN follow up for further evaluation and treatment of menorrhagia  It is likely caused by her known history of fibroids  Patient would like to be seen at [address] [place] [address]   -medrol dose pack of medial epicondyliis  Encourages ice, rest and avoidance of irritating movements   -hand rash consistent with eczema  Encouraged liberal use of moisturizer and emolites  She is will start the hydrocortisone cream once she completes the medrol dose pack  Advised patient the oral steroids will also help  -RTC October for routine follow up as scheduled     Subjective:      Patient ID: Mindy Mejia is a 48 y o  female who presents for evaluation of heavy menstrual bleeding  Her LMP was 8/1/2020 and she continues to bleed  She is changed a super bad every 2 hours  +clots and cramping  She has a hx of irregular periods and uterine fibroids that were previously treated with D&C, ablation  She was recommended a hysterectomy but at that time could not take off time from work   She is not sexually active and had a tubal ligation 18 years ago  She as a history of iron deficiency anemia and current feels very fatigued  No lightheadedness or dizziness  She also complaints of right medial elbow pain, medial that hurts with supination  No injury or falls  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  She complains of bilateral hand rash and skin breakdown on her palms and inbetween the webbing of her fingers  It is itchy and irritating  HPI    The following portions of the patient's history were reviewed and updated as appropriate: allergies, current medications, past family history, past medical history, past social history, past surgical history and problem list     Review of Systems   Constitutional: Negative  HENT: Negative  Eyes: Negative  Respiratory: Negative  Cardiovascular: Negative  Gastrointestinal: Negative  Endocrine: Negative  Genitourinary: Positive for menstrual problem, pelvic pain, vaginal bleeding and vaginal pain  Negative for vaginal discharge  Musculoskeletal: Negative for joint swelling and myalgias  Right elbow pain   Skin: Positive for rash  Neurological: Negative for dizziness, weakness and light-headedness  Objective:      /72   Pulse (!) 121   Temp 98 °F (36 7 °C)   Resp 18   Ht 5' 5" (1 651 m)   Wt 97 1 kg (214 lb)   SpO2 98%   BMI 35 61 kg/m²          Physical Exam  Constitutional:       General: She is not in acute distress  Appearance: Normal appearance  She is well-developed  She is obese  She is not ill-appearing or diaphoretic  HENT:      Head: Normocephalic and atraumatic  Eyes:      Pupils: Pupils are equal, round, and reactive to light  Cardiovascular:      Rate and Rhythm: Regular rhythm  Tachycardia present  Pulses: Normal pulses  Heart sounds: Normal heart sounds  No murmur  Pulmonary:      Effort: Pulmonary effort is normal  No respiratory distress        Breath sounds: Normal breath sounds  No wheezing  Abdominal:      General: Bowel sounds are normal  There is no distension  Palpations: Abdomen is soft  Tenderness: There is no abdominal tenderness  Genitourinary:     Comments: GYN exam deferred due to heavy bleeding  Musculoskeletal:      Right elbow: She exhibits normal range of motion, no swelling, no effusion, no deformity and no laceration  Tenderness found  Medial epicondyle tenderness noted  No radial head, no lateral epicondyle and no olecranon process tenderness noted  Skin:     General: Skin is warm and dry  Capillary Refill: Capillary refill takes less than 2 seconds  Comments: Scaling lesions noted to right palm and webbing between 4/5th digit - consistent with eczema    Lesion on left hand between 2/3 and 4/5 digits consistent with eczema    Neurological:      General: No focal deficit present  Mental Status: She is alert and oriented to person, place, and time  Mental status is at baseline  Psychiatric:         Mood and Affect: Mood normal          Behavior: Behavior normal          Thought Content:  Thought content normal          Judgment: Judgment normal

## 2020-08-13 ENCOUNTER — APPOINTMENT (OUTPATIENT)
Dept: LAB | Facility: HOSPITAL | Age: 55
End: 2020-08-13
Payer: COMMERCIAL

## 2020-08-13 ENCOUNTER — HOSPITAL ENCOUNTER (OUTPATIENT)
Dept: ULTRASOUND IMAGING | Facility: HOSPITAL | Age: 55
Discharge: HOME | End: 2020-08-13
Payer: COMMERCIAL

## 2020-08-13 DIAGNOSIS — N92.1 MENORRHAGIA WITH IRREGULAR CYCLE: ICD-10-CM

## 2020-08-13 DIAGNOSIS — E61.1 IRON DEFICIENCY: ICD-10-CM

## 2020-08-13 DIAGNOSIS — Z11.4 SCREENING FOR HIV WITHOUT PRESENCE OF RISK FACTORS: ICD-10-CM

## 2020-08-13 LAB
BASOPHILS # BLD AUTO: 0.04 THOUSANDS/ΜL (ref 0–0.1)
BASOPHILS NFR BLD AUTO: 1 % (ref 0–1)
EOSINOPHIL # BLD AUTO: 0.02 THOUSAND/ΜL (ref 0–0.61)
EOSINOPHIL NFR BLD AUTO: 0 % (ref 0–6)
ERYTHROCYTE [DISTWIDTH] IN BLOOD BY AUTOMATED COUNT: 17.6 % (ref 11.6–15.1)
HCT VFR BLD AUTO: 32.8 % (ref 34.8–46.1)
HGB BLD-MCNC: 9 G/DL (ref 11.5–15.4)
IMM GRANULOCYTES # BLD AUTO: 0.07 THOUSAND/UL (ref 0–0.2)
IMM GRANULOCYTES NFR BLD AUTO: 1 % (ref 0–2)
LYMPHOCYTES # BLD AUTO: 0.99 THOUSANDS/ΜL (ref 0.6–4.47)
LYMPHOCYTES NFR BLD AUTO: 12 % (ref 14–44)
MCH RBC QN AUTO: 19.4 PG (ref 26.8–34.3)
MCHC RBC AUTO-ENTMCNC: 27.4 G/DL (ref 31.4–37.4)
MCV RBC AUTO: 71 FL (ref 82–98)
MONOCYTES # BLD AUTO: 0.22 THOUSAND/ΜL (ref 0.17–1.22)
MONOCYTES NFR BLD AUTO: 3 % (ref 4–12)
NEUTROPHILS # BLD AUTO: 7.25 THOUSANDS/ΜL (ref 1.85–7.62)
NEUTS SEG NFR BLD AUTO: 83 % (ref 43–75)
NRBC BLD AUTO-RTO: 0 /100 WBCS
PLATELET # BLD AUTO: 399 THOUSANDS/UL (ref 149–390)
PMV BLD AUTO: 8.8 FL (ref 8.9–12.7)
RBC # BLD AUTO: 4.65 MILLION/UL (ref 3.81–5.12)
WBC # BLD AUTO: 8.59 THOUSAND/UL (ref 4.31–10.16)

## 2020-08-13 PROCEDURE — 87389 HIV-1 AG W/HIV-1&-2 AB AG IA: CPT

## 2020-08-13 PROCEDURE — 76856 US EXAM PELVIC COMPLETE: CPT

## 2020-08-13 PROCEDURE — 36415 COLL VENOUS BLD VENIPUNCTURE: CPT

## 2020-08-13 PROCEDURE — 76830 TRANSVAGINAL US NON-OB: CPT

## 2020-08-13 PROCEDURE — 85025 COMPLETE CBC W/AUTO DIFF WBC: CPT

## 2020-08-14 ENCOUNTER — TELEPHONE (OUTPATIENT)
Dept: FAMILY MEDICINE CLINIC | Facility: CLINIC | Age: 55
End: 2020-08-14

## 2020-08-14 LAB — HIV 1+2 AB+HIV1 P24 AG SERPL QL IA: NORMAL

## 2020-08-14 NOTE — TELEPHONE ENCOUNTER
Patient made aware of results    ----- Message from Filippo Segovia PA-C sent at 8/14/2020 11:39 AM EDT -----  Please let pt know her HIV screen was negative  Her hemoglobin is low but stable  Cont iron supplement and follow up with GYN  Thanks!

## 2020-08-17 ENCOUNTER — TELEPHONE (OUTPATIENT)
Dept: OTHER | Facility: OTHER | Age: 55
End: 2020-08-17

## 2020-08-17 ENCOUNTER — TELEPHONE (OUTPATIENT)
Dept: FAMILY MEDICINE CLINIC | Facility: CLINIC | Age: 55
End: 2020-08-17

## 2020-08-17 NOTE — TELEPHONE ENCOUNTER
Alpa @ [facility] Radiology/PT:  Jaleel Ferrell/ 1965/[phone]/[employer] Significant findings/ Patient of GALO Landers

## 2020-08-17 NOTE — TELEPHONE ENCOUNTER
Patient made aware-      ---- Message from Mc Rose PA-C sent at 8/17/2020  9:10 AM EDT -----  Please let patient know her uterine fibroid is still presents  She also had a cyst next to her right ovary  She should follow up with GYN as scheduled as they are recommending another ultrasound in 3 months to follow the cyst  Thanks!

## 2020-08-20 ENCOUNTER — TELEPHONE (OUTPATIENT)
Dept: PAIN MEDICINE | Facility: CLINIC | Age: 55
End: 2020-08-20

## 2020-08-20 NOTE — TELEPHONE ENCOUNTER
It is fine for her to hold it for a few days to see if her symptoms improved  The tizanidine is only prescribed p r n  anyway  So, okay to hold for a few days and see if she improves  If her symptoms do not improve, she should contact her PCP to make them aware

## 2020-08-20 NOTE — TELEPHONE ENCOUNTER
Patient has been experiencing for the past week tiZANidine (ZANAFLEX) 2 mg tablet     Is making her really dizzy, she thought it was her vertigo at first but she now believes it is this medication       Please advise,    Thank you       [phone]

## 2020-08-27 ENCOUNTER — OFFICE VISIT (OUTPATIENT)
Dept: PAIN MEDICINE | Facility: CLINIC | Age: 55
End: 2020-08-27
Payer: COMMERCIAL

## 2020-08-27 VITALS
SYSTOLIC BLOOD PRESSURE: 122 MMHG | HEIGHT: 65 IN | DIASTOLIC BLOOD PRESSURE: 68 MMHG | TEMPERATURE: 97 F | BODY MASS INDEX: 35.09 KG/M2 | WEIGHT: 210.6 LBS

## 2020-08-27 DIAGNOSIS — G89.4 CHRONIC PAIN SYNDROME: Primary | ICD-10-CM

## 2020-08-27 DIAGNOSIS — M54.50 CHRONIC MIDLINE LOW BACK PAIN WITHOUT SCIATICA: ICD-10-CM

## 2020-08-27 DIAGNOSIS — M79.18 MYOFASCIAL PAIN SYNDROME: ICD-10-CM

## 2020-08-27 DIAGNOSIS — G89.29 CHRONIC MIDLINE LOW BACK PAIN WITHOUT SCIATICA: ICD-10-CM

## 2020-08-27 DIAGNOSIS — M47.816 LUMBAR SPONDYLOSIS: ICD-10-CM

## 2020-08-27 PROCEDURE — 3051F HG A1C>EQUAL 7.0%<8.0%: CPT | Performed by: NURSE PRACTITIONER

## 2020-08-27 PROCEDURE — 3074F SYST BP LT 130 MM HG: CPT | Performed by: NURSE PRACTITIONER

## 2020-08-27 PROCEDURE — 3008F BODY MASS INDEX DOCD: CPT | Performed by: NURSE PRACTITIONER

## 2020-08-27 PROCEDURE — 3078F DIAST BP <80 MM HG: CPT | Performed by: NURSE PRACTITIONER

## 2020-08-27 PROCEDURE — 1036F TOBACCO NON-USER: CPT | Performed by: NURSE PRACTITIONER

## 2020-08-27 PROCEDURE — 99213 OFFICE O/P EST LOW 20 MIN: CPT | Performed by: NURSE PRACTITIONER

## 2020-08-27 NOTE — PROGRESS NOTES
Assessment:  1  Chronic pain syndrome    2  Lumbar spondylosis    3  Myofascial pain syndrome        Plan:    While the patient was in the office today, I did have a thorough conversation regarding their chronic pain syndrome, medication management, and treatment plan options  Patient is a 37-year-old female with chronic pain syndrome related to chronic low back pain, lumbar spondylosis, myofascial pain syndrome  She was last seen here on 07/30/2020 which time she was started on tizanidine 2 mg every 8 hours as needed for muscle spasms  At this point, she is using the tizanidine mainly at night and it has reduced her pain by about 90%  We will continue with tizanidine to address the myofascial component of her pain  She did not require refill this medication for during today's visit  The patient will follow-up in 3 months for medication prescription refill and reevaluation  The patient was advised to contact the office should their symptoms worsen in the interim  The patient was agreeable and verbalized an understanding  History of Present Illness: The patient is a 48 y o  female who presents for a follow up office visit in regards to Back Pain  The patients current symptoms include Low back pain, nonradiating  Current pain level is a 2/10  Pain is described as dull and aching  Current pain medications includes:   Tizanidine 2 mg as needed for muscle spasms     The patient reports that this regimen is providing 90% pain relief  The patient is reporting no side effects from this pain medication regimen  I have personally reviewed and/or updated the patient's past medical history, past surgical history, family history, social history, current medications, allergies, and vital signs today  Review of Systems  Review of Systems   Musculoskeletal: Positive for gait problem  All other systems reviewed and are negative          Past Medical History:   Diagnosis Date    [facility] [address] [facility]     [facility]us [facility]     Hyperlipidemia     Hypertension     Migraine     Sciatica     Vertigo        Past Surgical History:   Procedure Laterality Date    EPIDURAL BLOCK INJECTION Right 7/2/2020    Procedure: Right L4-5 and L5-S1 transforaminal epidural steroid injection ([id number]);   Surgeon: Claudean Gambles, MD;  Location: [facility];  Service: Pain Management     FL GUIDED NEEDLE PLAC BX/ASP/INJ  7/2/2020    [place] ESOPHAGOGASTRODUODENOSCOPY TRANSORAL DIAGNOSTIC N/A 10/23/2018    Procedure: EGD with Savary dilation AND COLONOSCOPY;  Surgeon: Brittney Belcher MD;  Location: [facility];  Service: Gastroenterology    TUBAL LIGATION         Family History   Problem Relation Age of Onset    Diabetes Father     Cancer Father        Social History     Occupational History    Not on file   Tobacco Use    Smoking status: Never Smoker    Smokeless tobacco: Never Used   Substance and Sexual Activity    Alcohol use: No    Drug use: No    Sexual activity: Not on file         Current Outpatient Medications:     gabapentin (NEURONTIN) 400 mg capsule, Take 1 capsule (400 mg total) by mouth 3 (three) times a day, Disp: 90 capsule, Rfl: 2    glimepiride (AMARYL) 4 mg tablet, Take 1 tablet by mouth 2 (two) times a day, Disp: , Rfl:     hydrochlorothiazide (HYDRODIURIL) 50 mg tablet, Take 1 tablet (50 mg total) by mouth daily, Disp: 90 tablet, Rfl: 1    hydrocortisone 1 % cream, Apply topically 4 (four) times a day as needed for irritation or rash, Disp: 45 g, Rfl: 0    Insulin Glargine-Lixisenatide (SOLIQUA) 100 units-33 mcg/mL injection pen, Inject 60 Units under the skin daily , Disp: , Rfl:     losartan (COZAAR) 50 mg tablet, Take 1 tablet by mouth once daily, Disp: 30 tablet, Rfl: 0    metFORMIN (GLUCOPHAGE-XR) 500 mg 24 hr tablet, 2 (two) times a day, Disp: , Rfl:     methylPREDNISolone 4 MG tablet therapy pack, Use as directed on package, Disp: 21 each, Rfl: 0    Nerve Stimulator (TENS THERAPY PAIN RELIEF) BILLY, 1 Device by Does not apply route 2 (two) times a day as needed (pain), Disp: 1 Device, Rfl: 0    pravastatin (PRAVACHOL) 40 mg tablet, Take 40 mg by mouth daily, Disp: , Rfl:     tiZANidine (ZANAFLEX) 2 mg tablet, Take 1 tablet (2 mg total) by mouth every 8 (eight) hours as needed for muscle spasms, Disp: 90 tablet, Rfl: 1    Allergies   Allergen Reactions    Nuts Anaphylaxis    Chocolate Cough    Eggs Or Egg-Derived Products Other (See Comments)     Cough    Other      LETTUCE    Shellfish Allergy Other (See Comments)     unknown    Tomato Cough       Physical Exam:    Ht 5' 5" (1 651 m)   BMI 35 61 kg/m²     Constitutional:normal, well developed, well nourished, alert, in no distress and non-toxic and no overt pain behavior  Eyes:anicteric  HEENT:grossly intact  Neck:supple, symmetric, trachea midline and no masses   Pulmonary:even and unlabored  Cardiovascular:No edema or pitting edema present  Skin:Normal without rashes or lesions and well hydrated  Psychiatric:Mood and affect appropriate  Neurologic:Cranial Nerves II-XII grossly intact  Musculoskeletal:normal    Imaging  No orders to display       No orders of the defined types were placed in this encounter

## 2020-09-03 DIAGNOSIS — I10 ESSENTIAL HYPERTENSION: ICD-10-CM

## 2020-09-03 PROCEDURE — 4010F ACE/ARB THERAPY RXD/TAKEN: CPT | Performed by: ORTHOPAEDIC SURGERY

## 2020-09-03 RX ORDER — LOSARTAN POTASSIUM 50 MG/1
TABLET ORAL
Qty: 30 TABLET | Refills: 3 | Status: SHIPPED | OUTPATIENT
Start: 2020-09-03 | End: 2020-12-23

## 2020-09-18 ENCOUNTER — OFFICE VISIT (OUTPATIENT)
Dept: FAMILY MEDICINE CLINIC | Facility: CLINIC | Age: 55
End: 2020-09-18
Payer: COMMERCIAL

## 2020-09-18 VITALS
DIASTOLIC BLOOD PRESSURE: 64 MMHG | HEART RATE: 108 BPM | TEMPERATURE: 98 F | HEIGHT: 65 IN | WEIGHT: 210 LBS | BODY MASS INDEX: 34.99 KG/M2 | RESPIRATION RATE: 18 BRPM | OXYGEN SATURATION: 99 % | SYSTOLIC BLOOD PRESSURE: 114 MMHG

## 2020-09-18 DIAGNOSIS — F33.2 SEVERE EPISODE OF RECURRENT MAJOR DEPRESSIVE DISORDER, WITHOUT PSYCHOTIC FEATURES (HCC): ICD-10-CM

## 2020-09-18 DIAGNOSIS — M54.31 SCIATICA OF RIGHT SIDE: ICD-10-CM

## 2020-09-18 DIAGNOSIS — J01.00 ACUTE NON-RECURRENT MAXILLARY SINUSITIS: Primary | ICD-10-CM

## 2020-09-18 PROCEDURE — 99213 OFFICE O/P EST LOW 20 MIN: CPT | Performed by: PHYSICIAN ASSISTANT

## 2020-09-18 RX ORDER — GABAPENTIN 400 MG/1
400 CAPSULE ORAL 2 TIMES DAILY
Start: 2020-09-18 | End: 2020-12-08 | Stop reason: SDUPTHER

## 2020-09-18 RX ORDER — AMOXICILLIN 500 MG/1
500 CAPSULE ORAL EVERY 8 HOURS SCHEDULED
Qty: 21 CAPSULE | Refills: 0 | Status: SHIPPED | OUTPATIENT
Start: 2020-09-18 | End: 2020-09-25 | Stop reason: ALTCHOICE

## 2020-09-18 RX ORDER — FLUOXETINE HYDROCHLORIDE 20 MG/1
20 CAPSULE ORAL DAILY
Qty: 30 CAPSULE | Refills: 2 | Status: SHIPPED | OUTPATIENT
Start: 2020-09-18 | End: 2020-12-07 | Stop reason: SDUPTHER

## 2020-09-18 NOTE — PROGRESS NOTES
Assessment/Plan:    No problem-specific Assessment & Plan notes found for this encounter  Diagnoses and all orders for this visit:    Acute non-recurrent maxillary sinusitis  -     amoxicillin (AMOXIL) 500 mg capsule; Take 1 capsule (500 mg total) by mouth every 8 (eight) hours for 7 days    Sciatica of right side  -     gabapentin (NEURONTIN) 400 mg capsule; Take 1 capsule (400 mg total) by mouth 2 (two) times a day    Severe episode of recurrent major depressive disorder, without psychotic features (HCC)  -     FLUoxetine (PROzac) 20 mg capsule; Take 1 capsule (20 mg total) by mouth daily        -reviewed symptomatic treatment with patient along with antibiotic  -advised to give prozac 4-6 weeks to notice an effect  -RTC as scheduled in October     Subjective:      Patient ID: Dheeraj Celaya is a 48 y o  female who presents for evaluation of URI like symptoms  She states about 1 5 weeks ago she developed sinus pain/pressure which he attributed to her allergies  Since then it has progressed and is associtated with a productive cough with associated SOB and wheezing, ear pain, and sore throat  She also states she feels nausea from PND but has not vomited  She has mild loose stools that are not bloody  She has been taking an OTC anti-histamine without relief  She is concerned because she has a history of pneumonia  Denies fevers, chills, abdominal pain, vomiting, myalgia  No sick contacts  She complains of increasing depression, which she has a history of  She notes feeling down and depressed and cries over "everything " She attributes a portion of this to being isolated at home due to COVID-19  In the past she has responded well to prozac and wishes to resume that medication  Denies SI/HI     HPI    The following portions of the patient's history were reviewed and updated as appropriate: allergies, current medications, past family history, past medical history, past social history, past surgical history and problem list     Review of Systems   Constitutional: Positive for appetite change and fatigue  Negative for chills and fever  HENT: Positive for congestion, ear pain, postnasal drip, sinus pressure, sinus pain and sore throat  Negative for ear discharge, rhinorrhea, sneezing and trouble swallowing  Respiratory: Positive for cough, chest tightness, shortness of breath and wheezing  Negative for apnea  Cardiovascular: Negative for chest pain and palpitations  Gastrointestinal: Positive for diarrhea and nausea  Negative for abdominal pain, constipation and vomiting  Musculoskeletal: Negative for arthralgias and myalgias  Skin: Negative for rash  Neurological: Negative for headaches  Psychiatric/Behavioral: Positive for agitation, decreased concentration and dysphoric mood  Negative for self-injury, sleep disturbance and suicidal ideas  The patient is not nervous/anxious and is not hyperactive  Objective:      /64 (BP Location: Left arm, Patient Position: Sitting, Cuff Size: Large)   Pulse (!) 108   Temp 98 °F (36 7 °C) (Tympanic)   Resp 18   Ht 5' 5" (1 651 m)   Wt 95 3 kg (210 lb)   SpO2 99%   BMI 34 95 kg/m²          Physical Exam  Vitals signs reviewed  Constitutional:       General: She is not in acute distress  Appearance: She is well-developed  She is obese  She is not ill-appearing or toxic-appearing  HENT:      Head: Normocephalic and atraumatic  Right Ear: A middle ear effusion is present  Tympanic membrane is not injected, scarred, perforated or erythematous  Left Ear: A middle ear effusion is present  Tympanic membrane is erythematous  Tympanic membrane is not injected, scarred or perforated  Nose: Mucosal edema and congestion present  Right Sinus: Maxillary sinus tenderness present  No frontal sinus tenderness  Left Sinus: Maxillary sinus tenderness present  No frontal sinus tenderness  Mouth/Throat:      Lips: Pink  Mouth: Mucous membranes are moist  No oral lesions  Pharynx: Oropharynx is clear  No pharyngeal swelling, oropharyngeal exudate, posterior oropharyngeal erythema or uvula swelling  Tonsils: No tonsillar exudate or tonsillar abscesses  Eyes:      Extraocular Movements: Extraocular movements intact  Conjunctiva/sclera: Conjunctivae normal       Pupils: Pupils are equal, round, and reactive to light  Neck:      Musculoskeletal: Normal range of motion  Cardiovascular:      Rate and Rhythm: Regular rhythm  Tachycardia present  Pulses: Normal pulses  Heart sounds: No murmur  Pulmonary:      Effort: Pulmonary effort is normal  No respiratory distress  Breath sounds: Normal breath sounds  No stridor  No wheezing, rhonchi or rales  Chest:      Chest wall: No tenderness  Abdominal:      General: Bowel sounds are normal  There is no distension  Palpations: Abdomen is soft  Tenderness: There is no abdominal tenderness  Musculoskeletal: Normal range of motion  Lymphadenopathy:      Cervical: No cervical adenopathy  Skin:     General: Skin is warm and dry  Capillary Refill: Capillary refill takes less than 2 seconds  Neurological:      General: No focal deficit present  Mental Status: She is oriented to person, place, and time  Mental status is at baseline  Psychiatric:         Mood and Affect: Mood normal          Behavior: Behavior normal          Thought Content:  Thought content normal          Judgment: Judgment normal

## 2020-09-25 ENCOUNTER — OFFICE VISIT (OUTPATIENT)
Dept: FAMILY MEDICINE CLINIC | Facility: CLINIC | Age: 55
End: 2020-09-25
Payer: COMMERCIAL

## 2020-09-25 VITALS
HEIGHT: 65 IN | HEART RATE: 104 BPM | SYSTOLIC BLOOD PRESSURE: 128 MMHG | OXYGEN SATURATION: 98 % | WEIGHT: 209.2 LBS | DIASTOLIC BLOOD PRESSURE: 68 MMHG | RESPIRATION RATE: 18 BRPM | TEMPERATURE: 98.2 F | BODY MASS INDEX: 34.85 KG/M2

## 2020-09-25 DIAGNOSIS — J01.01 ACUTE RECURRENT MAXILLARY SINUSITIS: Primary | ICD-10-CM

## 2020-09-25 DIAGNOSIS — R06.02 SHORTNESS OF BREATH: ICD-10-CM

## 2020-09-25 DIAGNOSIS — M25.521 RIGHT ELBOW PAIN: ICD-10-CM

## 2020-09-25 PROCEDURE — 99213 OFFICE O/P EST LOW 20 MIN: CPT | Performed by: PHYSICIAN ASSISTANT

## 2020-09-25 RX ORDER — ALBUTEROL SULFATE 90 UG/1
2 AEROSOL, METERED RESPIRATORY (INHALATION) EVERY 6 HOURS PRN
Qty: 1 INHALER | Refills: 0 | Status: SHIPPED | OUTPATIENT
Start: 2020-09-25 | End: 2020-10-05

## 2020-09-25 RX ORDER — ALBUTEROL SULFATE 2.5 MG/3ML
2.5 SOLUTION RESPIRATORY (INHALATION) EVERY 6 HOURS PRN
Qty: 30 VIAL | Refills: 0 | Status: SHIPPED | OUTPATIENT
Start: 2020-09-25 | End: 2020-10-05

## 2020-09-25 RX ORDER — AMOXICILLIN AND CLAVULANATE POTASSIUM 875; 125 MG/1; MG/1
1 TABLET, FILM COATED ORAL EVERY 12 HOURS SCHEDULED
Qty: 20 TABLET | Refills: 0 | Status: SHIPPED | OUTPATIENT
Start: 2020-09-25 | End: 2020-10-05

## 2020-09-25 NOTE — PROGRESS NOTES
Assessment/Plan:    No problem-specific Assessment & Plan notes found for this encounter  Diagnoses and all orders for this visit:    Acute recurrent maxillary sinusitis  -     amoxicillin-clavulanate (AUGMENTIN) 875-125 mg per tablet; Take 1 tablet by mouth every 12 (twelve) hours for 10 days    Right elbow pain  -     XR elbow 3+ vw right; Future    Shortness of breath  -     albuterol (Ventolin HFA) 90 mcg/act inhaler; Inhale 2 puffs every 6 (six) hours as needed for wheezing or shortness of breath for up to 10 days  -     albuterol (2 5 mg/3 mL) 0 083 % nebulizer solution; Take 1 vial (2 5 mg total) by nebulization every 6 (six) hours as needed for wheezing or shortness of breath for up to 10 days        -will switch to augmentin given amox failure  Recommend continues supportive treatment   -albuterol inhaler and neb PRN for SOB  -patient requests elbow x-ray  Discussed likely tendonitis  Recommend ice/rest    -RTC as scheduled in October or sooner if needed    Subjective:      Patient ID: Luis Teague is a 48 y o  female presents for re-evaluation of sinusitis symptoms  She continues with cough, congestion, sore throat, and nausea  She has been taking amoxicillin with little relief of her sinus pain/pressure  She notes increasing SOB due to congestion  She has a nebulizer at home but does not have solution for it  Denies fever, chills  She has right elbow pain ongoing for several weeks  She works in  and frequently pronates/supinates her hands, which contribute to her pain  No associated numbness or tingling  No mass  HPI    The following portions of the patient's history were reviewed and updated as appropriate: allergies, current medications, past family history, past medical history, past social history, past surgical history and problem list     Review of Systems   Constitutional: Negative      HENT: Positive for congestion, ear pain, mouth sores, postnasal drip, sinus pressure, sinus pain and sore throat  Negative for ear discharge, rhinorrhea and sneezing  Eyes: Negative  Respiratory: Positive for shortness of breath and wheezing  Negative for cough and choking  Cardiovascular: Negative for chest pain and palpitations  Gastrointestinal: Positive for nausea  Negative for abdominal pain, constipation and vomiting  Endocrine: Negative  Genitourinary: Negative  Musculoskeletal: Positive for arthralgias  Negative for joint swelling  Skin: Negative  Allergic/Immunologic: Negative  Neurological: Negative  Hematological: Negative  Psychiatric/Behavioral: Negative  Objective:      /68 (BP Location: Left arm, Patient Position: Sitting, Cuff Size: Adult)   Pulse 104   Temp 98 2 °F (36 8 °C) (Temporal)   Resp 18   Ht 5' 5" (1 651 m)   Wt 94 9 kg (209 lb 3 2 oz)   SpO2 98%   BMI 34 81 kg/m²          Physical Exam  Constitutional:       General: She is not in acute distress  Appearance: Normal appearance  She is well-developed  She is obese  She is not ill-appearing or toxic-appearing  HENT:      Head: Normocephalic and atraumatic  Right Ear: Tympanic membrane and ear canal normal  No drainage or swelling  No middle ear effusion  Tympanic membrane is not erythematous  Left Ear: Tympanic membrane and ear canal normal  No drainage or swelling  No middle ear effusion  Tympanic membrane is not erythematous  Nose: Congestion and rhinorrhea present  Mouth/Throat:      Mouth: Mucous membranes are moist  No oral lesions  Pharynx: Oropharynx is clear  No pharyngeal swelling, oropharyngeal exudate or posterior oropharyngeal erythema  Eyes:      Extraocular Movements: Extraocular movements intact  Conjunctiva/sclera: Conjunctivae normal       Pupils: Pupils are equal, round, and reactive to light  Neck:      Musculoskeletal: Normal range of motion     Cardiovascular:      Rate and Rhythm: Normal rate and regular rhythm  Heart sounds: Normal heart sounds  No murmur  Pulmonary:      Effort: Pulmonary effort is normal  No respiratory distress  Breath sounds: Normal breath sounds  No stridor  No wheezing, rhonchi or rales  Chest:      Chest wall: No tenderness  Abdominal:      General: Abdomen is flat  Bowel sounds are normal  There is no distension  Palpations: Abdomen is soft  Tenderness: There is no abdominal tenderness  Musculoskeletal: Normal range of motion  Comments: No mass or tenderness to R elbow, no crepitus    Lymphadenopathy:      Cervical: No cervical adenopathy  Skin:     General: Skin is warm and dry  Capillary Refill: Capillary refill takes less than 2 seconds  Neurological:      General: No focal deficit present  Mental Status: She is alert and oriented to person, place, and time  Mental status is at baseline  Psychiatric:         Mood and Affect: Mood normal          Behavior: Behavior normal          Thought Content:  Thought content normal          Judgment: Judgment normal

## 2020-09-29 ENCOUNTER — HOSPITAL ENCOUNTER (OUTPATIENT)
Dept: RADIOLOGY | Facility: HOSPITAL | Age: 55
Discharge: HOME | End: 2020-09-29
Payer: COMMERCIAL

## 2020-09-29 DIAGNOSIS — M25.521 RIGHT ELBOW PAIN: ICD-10-CM

## 2020-09-29 PROCEDURE — 73080 X-RAY EXAM OF ELBOW: CPT

## 2020-10-08 ENCOUNTER — TELEPHONE (OUTPATIENT)
Dept: ADMINISTRATIVE | Facility: OTHER | Age: 55
End: 2020-10-08

## 2020-10-08 ENCOUNTER — APPOINTMENT (OUTPATIENT)
Dept: RADIOLOGY | Facility: MEDICAL CENTER | Age: 55
End: 2020-10-08
Payer: COMMERCIAL

## 2020-10-08 ENCOUNTER — OFFICE VISIT (OUTPATIENT)
Dept: FAMILY MEDICINE CLINIC | Facility: CLINIC | Age: 55
End: 2020-10-08
Payer: COMMERCIAL

## 2020-10-08 VITALS
BODY MASS INDEX: 35.65 KG/M2 | HEIGHT: 65 IN | HEART RATE: 93 BPM | DIASTOLIC BLOOD PRESSURE: 70 MMHG | SYSTOLIC BLOOD PRESSURE: 112 MMHG | WEIGHT: 214 LBS | TEMPERATURE: 98.7 F | OXYGEN SATURATION: 95 %

## 2020-10-08 DIAGNOSIS — R05.9 COUGH: ICD-10-CM

## 2020-10-08 DIAGNOSIS — R06.02 SHORTNESS OF BREATH: ICD-10-CM

## 2020-10-08 DIAGNOSIS — S53.431D RADIAL COLLATERAL LIGAMENT SPRAIN OF RIGHT ELBOW, SUBSEQUENT ENCOUNTER: Primary | ICD-10-CM

## 2020-10-08 PROBLEM — S53.431A: Status: ACTIVE | Noted: 2020-10-08

## 2020-10-08 PROCEDURE — 71046 X-RAY EXAM CHEST 2 VIEWS: CPT

## 2020-10-08 PROCEDURE — 99213 OFFICE O/P EST LOW 20 MIN: CPT | Performed by: FAMILY MEDICINE

## 2020-10-08 RX ORDER — LORATADINE 10 MG/1
10 TABLET ORAL DAILY
COMMUNITY
End: 2021-06-25 | Stop reason: SDUPTHER

## 2020-10-08 RX ORDER — METHYLPREDNISOLONE 4 MG/1
TABLET ORAL
Qty: 21 EACH | Refills: 0 | Status: SHIPPED | OUTPATIENT
Start: 2020-10-08 | End: 2021-06-25

## 2020-10-09 ENCOUNTER — OFFICE VISIT (OUTPATIENT)
Dept: OBGYN CLINIC | Facility: CLINIC | Age: 55
End: 2020-10-09
Payer: COMMERCIAL

## 2020-10-09 VITALS
SYSTOLIC BLOOD PRESSURE: 112 MMHG | BODY MASS INDEX: 35.65 KG/M2 | HEIGHT: 65 IN | WEIGHT: 214 LBS | HEART RATE: 97 BPM | DIASTOLIC BLOOD PRESSURE: 72 MMHG | TEMPERATURE: 96.9 F

## 2020-10-09 DIAGNOSIS — M77.01 GOLFER'S ELBOW, RIGHT: Primary | ICD-10-CM

## 2020-10-09 PROCEDURE — 99203 OFFICE O/P NEW LOW 30 MIN: CPT | Performed by: ORTHOPAEDIC SURGERY

## 2020-10-09 PROCEDURE — 3074F SYST BP LT 130 MM HG: CPT | Performed by: ORTHOPAEDIC SURGERY

## 2020-10-09 PROCEDURE — 3078F DIAST BP <80 MM HG: CPT | Performed by: ORTHOPAEDIC SURGERY

## 2020-10-09 PROCEDURE — 1036F TOBACCO NON-USER: CPT | Performed by: ORTHOPAEDIC SURGERY

## 2020-10-12 ENCOUNTER — OFFICE VISIT (OUTPATIENT)
Dept: OBGYN CLINIC | Facility: CLINIC | Age: 55
End: 2020-10-12
Payer: COMMERCIAL

## 2020-10-12 VITALS
SYSTOLIC BLOOD PRESSURE: 148 MMHG | HEIGHT: 65 IN | BODY MASS INDEX: 34.82 KG/M2 | DIASTOLIC BLOOD PRESSURE: 70 MMHG | WEIGHT: 209 LBS | TEMPERATURE: 97.3 F

## 2020-10-12 DIAGNOSIS — N93.9 ABNORMAL UTERINE BLEEDING (AUB): ICD-10-CM

## 2020-10-12 DIAGNOSIS — N83.201 RIGHT OVARIAN CYST: Primary | ICD-10-CM

## 2020-10-12 PROCEDURE — 99204 OFFICE O/P NEW MOD 45 MIN: CPT | Performed by: NURSE PRACTITIONER

## 2020-12-07 DIAGNOSIS — F33.2 SEVERE EPISODE OF RECURRENT MAJOR DEPRESSIVE DISORDER, WITHOUT PSYCHOTIC FEATURES (HCC): ICD-10-CM

## 2020-12-07 RX ORDER — FLUOXETINE HYDROCHLORIDE 40 MG/1
40 CAPSULE ORAL DAILY
Qty: 30 CAPSULE | Refills: 2 | Status: SHIPPED | OUTPATIENT
Start: 2020-12-07 | End: 2021-02-26

## 2020-12-08 ENCOUNTER — OFFICE VISIT (OUTPATIENT)
Dept: PAIN MEDICINE | Facility: CLINIC | Age: 55
End: 2020-12-08
Payer: COMMERCIAL

## 2020-12-08 VITALS
BODY MASS INDEX: 37.15 KG/M2 | WEIGHT: 223 LBS | SYSTOLIC BLOOD PRESSURE: 142 MMHG | DIASTOLIC BLOOD PRESSURE: 76 MMHG | HEIGHT: 65 IN

## 2020-12-08 DIAGNOSIS — G89.29 CHRONIC MIDLINE LOW BACK PAIN WITH RIGHT-SIDED SCIATICA: ICD-10-CM

## 2020-12-08 DIAGNOSIS — G89.4 CHRONIC PAIN SYNDROME: Primary | ICD-10-CM

## 2020-12-08 DIAGNOSIS — M54.16 LUMBAR RADICULOPATHY: ICD-10-CM

## 2020-12-08 DIAGNOSIS — M51.16 HERNIATION OF LUMBAR INTERVERTEBRAL DISC WITH RADICULOPATHY: ICD-10-CM

## 2020-12-08 DIAGNOSIS — M47.816 LUMBAR SPONDYLOSIS: ICD-10-CM

## 2020-12-08 DIAGNOSIS — M54.31 SCIATICA OF RIGHT SIDE: ICD-10-CM

## 2020-12-08 DIAGNOSIS — M54.41 CHRONIC MIDLINE LOW BACK PAIN WITH RIGHT-SIDED SCIATICA: ICD-10-CM

## 2020-12-08 PROCEDURE — 99213 OFFICE O/P EST LOW 20 MIN: CPT | Performed by: NURSE PRACTITIONER

## 2020-12-08 PROCEDURE — 3008F BODY MASS INDEX DOCD: CPT | Performed by: NURSE PRACTITIONER

## 2020-12-08 RX ORDER — TIZANIDINE 2 MG/1
2 TABLET ORAL EVERY 8 HOURS PRN
Qty: 90 TABLET | Refills: 5 | Status: SHIPPED | OUTPATIENT
Start: 2020-12-08 | End: 2021-06-08 | Stop reason: SDUPTHER

## 2020-12-08 RX ORDER — GABAPENTIN 400 MG/1
400 CAPSULE ORAL 2 TIMES DAILY
Qty: 60 CAPSULE | Refills: 5
Start: 2020-12-08 | End: 2020-12-18

## 2020-12-14 ENCOUNTER — TELEPHONE (OUTPATIENT)
Dept: PAIN MEDICINE | Facility: CLINIC | Age: 55
End: 2020-12-14

## 2020-12-14 DIAGNOSIS — M54.31 SCIATICA OF RIGHT SIDE: ICD-10-CM

## 2020-12-18 RX ORDER — GABAPENTIN 400 MG/1
CAPSULE ORAL
Qty: 90 CAPSULE | Refills: 0 | Status: SHIPPED | OUTPATIENT
Start: 2020-12-18 | End: 2020-12-22 | Stop reason: SDUPTHER

## 2020-12-22 DIAGNOSIS — M54.31 SCIATICA OF RIGHT SIDE: ICD-10-CM

## 2020-12-22 RX ORDER — GABAPENTIN 400 MG/1
400 CAPSULE ORAL 3 TIMES DAILY
Qty: 90 CAPSULE | Refills: 0 | Status: SHIPPED | OUTPATIENT
Start: 2020-12-22 | End: 2021-02-04 | Stop reason: SDUPTHER

## 2020-12-23 DIAGNOSIS — I10 ESSENTIAL HYPERTENSION: ICD-10-CM

## 2020-12-23 RX ORDER — LOSARTAN POTASSIUM 50 MG/1
TABLET ORAL
Qty: 30 TABLET | Refills: 0 | Status: SHIPPED | OUTPATIENT
Start: 2020-12-23 | End: 2021-01-25

## 2021-01-09 DIAGNOSIS — I10 ESSENTIAL HYPERTENSION: ICD-10-CM

## 2021-01-11 RX ORDER — HYDROCHLOROTHIAZIDE 50 MG/1
TABLET ORAL
Qty: 90 TABLET | Refills: 0 | Status: SHIPPED | OUTPATIENT
Start: 2021-01-11 | End: 2021-04-09

## 2021-01-20 ENCOUNTER — IMMUNIZATIONS (OUTPATIENT)
Dept: FAMILY MEDICINE CLINIC | Facility: HOSPITAL | Age: 56
End: 2021-01-20

## 2021-01-20 DIAGNOSIS — Z23 ENCOUNTER FOR IMMUNIZATION: Primary | ICD-10-CM

## 2021-01-20 PROCEDURE — 0011A SARS-COV-2 / COVID-19 MRNA VACCINE (MODERNA) 100 MCG: CPT

## 2021-01-20 PROCEDURE — 91301 SARS-COV-2 / COVID-19 MRNA VACCINE (MODERNA) 100 MCG: CPT

## 2021-01-25 DIAGNOSIS — I10 ESSENTIAL HYPERTENSION: ICD-10-CM

## 2021-01-25 PROCEDURE — 4010F ACE/ARB THERAPY RXD/TAKEN: CPT | Performed by: NURSE PRACTITIONER

## 2021-01-25 RX ORDER — LOSARTAN POTASSIUM 50 MG/1
TABLET ORAL
Qty: 30 TABLET | Refills: 0 | Status: SHIPPED | OUTPATIENT
Start: 2021-01-25 | End: 2021-02-26

## 2021-01-26 ENCOUNTER — DOCUMENTATION (OUTPATIENT)
Dept: OBGYN CLINIC | Facility: MEDICAL CENTER | Age: 56
End: 2021-01-26

## 2021-02-04 ENCOUNTER — OFFICE VISIT (OUTPATIENT)
Dept: PAIN MEDICINE | Facility: CLINIC | Age: 56
End: 2021-02-04
Payer: COMMERCIAL

## 2021-02-04 VITALS
BODY MASS INDEX: 37.52 KG/M2 | WEIGHT: 225.2 LBS | HEIGHT: 65 IN | DIASTOLIC BLOOD PRESSURE: 72 MMHG | SYSTOLIC BLOOD PRESSURE: 122 MMHG

## 2021-02-04 DIAGNOSIS — G89.4 CHRONIC PAIN SYNDROME: Primary | ICD-10-CM

## 2021-02-04 DIAGNOSIS — M54.16 LUMBAR RADICULOPATHY: ICD-10-CM

## 2021-02-04 DIAGNOSIS — M54.50 CHRONIC MIDLINE LOW BACK PAIN WITHOUT SCIATICA: ICD-10-CM

## 2021-02-04 DIAGNOSIS — M79.18 MYOFASCIAL PAIN SYNDROME: ICD-10-CM

## 2021-02-04 DIAGNOSIS — M47.816 LUMBAR SPONDYLOSIS: ICD-10-CM

## 2021-02-04 DIAGNOSIS — M54.31 SCIATICA OF RIGHT SIDE: ICD-10-CM

## 2021-02-04 DIAGNOSIS — G89.29 CHRONIC MIDLINE LOW BACK PAIN WITHOUT SCIATICA: ICD-10-CM

## 2021-02-04 PROCEDURE — 99214 OFFICE O/P EST MOD 30 MIN: CPT | Performed by: NURSE PRACTITIONER

## 2021-02-04 PROCEDURE — 3008F BODY MASS INDEX DOCD: CPT | Performed by: NURSE PRACTITIONER

## 2021-02-04 PROCEDURE — 1036F TOBACCO NON-USER: CPT | Performed by: NURSE PRACTITIONER

## 2021-02-04 RX ORDER — GABAPENTIN 400 MG/1
400 CAPSULE ORAL 3 TIMES DAILY
Qty: 90 CAPSULE | Refills: 2 | Status: SHIPPED | OUTPATIENT
Start: 2021-02-04 | End: 2021-06-08

## 2021-02-04 NOTE — PROGRESS NOTES
Assessment:  1  Chronic pain syndrome    2  Chronic midline low back pain without sciatica    3  Lumbar radiculopathy    4  Lumbar spondylosis    5  Myofascial pain syndrome        Plan:  While the patient was in the office today, I did have a thorough conversation regarding their chronic pain syndrome, medication management, and treatment plan options  Patient is a 28-year-old female with chronic pain syndrome related to chronic low back pain, lumbar radiculopathy, lumbar spondylosis, myofascial pain syndrome  Patient was seen today due to with a new symptom of pain and numbness in the left thigh as well as increased low back pain for the past week  There was no inciting cause  Recommended physical therapy  Unfortunately, patient cannot afford high out-of-pocket co-pay  Provided her with a home exercise program for lumbar core strengthening/ stretching  Continue tizanidine 2 mg every 8 hours if needed for spasms to address the myofascial component of her pain  She did not require refill this medication during today's visit  Continue gabapentin 400 mg 3 times daily to address the neuropathic component of her pain  Prescription was sent electronically to her pharmacy with refills  The patient will follow-up in 6 weeks for medication prescription refill and reevaluation  The patient was advised to contact the office should their symptoms worsen in the interim  The patient was agreeable and verbalized an understanding  History of Present Illness: The patient is a 50 y o  female who presents for a follow up office visit in regards to Back Pain and Leg Pain  The patients current symptoms include Complaints of low back pain and chronic mild pain down the posterior aspect of her right leg  She is being seen today for new complaints of pain and numbness in the left thigh which has been present for about a week  There is no inciting cause  She denies any lower extremity weakness    Current pain level is a 5/10  Pain is described as numb  Current pain medications includes:   Gabapentin 400 mg 3 times daily, tizanidine 2 mg every 8 hours as needed for spasms   The patient reports that this regimen is providing 50% pain relief  The patient is reporting no side effects from this pain medication regimen  I have personally reviewed and/or updated the patient's past medical history, past surgical history, family history, social history, current medications, allergies, and vital signs today  Review of Systems  Review of Systems   All other systems reviewed and are negative  Past Medical History:   Diagnosis Date    [facility] mellitus [facility] [address] [facility]     Hydrocephalus [facility] [address] [facility]     Hyperlipidemia     Hypertension     Migraine     Sciatica     Vertigo        Past Surgical History:   Procedure Laterality Date    EPIDURAL BLOCK INJECTION Right 7/2/2020    Procedure: Right L4-5 and L5-S1 transforaminal epidural steroid injection ([id number]);   Surgeon: Norbert Seay MD;  Location: [facility];  Service: Pain Management     FL GUIDED NEEDLE PLAC BX/ASP/INJ  7/2/2020    NM ESOPHAGOGASTRODUODENOSCOPY TRANSORAL DIAGNOSTIC N/A 10/23/2018    Procedure: EGD with Savary dilation AND COLONOSCOPY;  Surgeon: Lissette Fisher MD;  Location: [facility];  Service: Gastroenterology    TUBAL LIGATION         Family History   Problem Relation Age of Onset    Diabetes Father     Cancer Father        Social History     Occupational History    Not on file   Tobacco Use    Smoking status: Never Smoker    Smokeless tobacco: Never Used   Substance and Sexual Activity    Alcohol use: No    Drug use: No    Sexual activity: Not Currently     Comment:          Current Outpatient Medications:     FLUoxetine (PROzac) 40 MG capsule, Take 1 capsule (40 mg total) by mouth daily, Disp: 30 capsule, Rfl: 2    gabapentin (NEURONTIN) 400 mg capsule, Take 1 capsule (400 mg total) by mouth 3 (three) times a day, Disp: 90 capsule, Rfl: 0    glimepiride (AMARYL) 4 mg tablet, Take 1 tablet by mouth 2 (two) times a day, Disp: , Rfl:     hydrochlorothiazide (HYDRODIURIL) 50 mg tablet, Take 1 tablet by mouth once daily, Disp: 90 tablet, Rfl: 0    hydrocortisone 1 % cream, Apply topically 4 (four) times a day as needed for irritation or rash (Patient not taking: Reported on 10/9/2020), Disp: 45 g, Rfl: 0    Insulin Glargine-Lixisenatide (SOLIQUA) 100 units-33 mcg/mL injection pen, Inject 60 Units under the skin daily , Disp: , Rfl:     loratadine (CLARITIN) 10 mg tablet, Take 10 mg by mouth daily, Disp: , Rfl:     losartan (COZAAR) 50 mg tablet, Take 1 tablet by mouth once daily, Disp: 30 tablet, Rfl: 0    metFORMIN (GLUCOPHAGE-XR) 500 mg 24 hr tablet, 2 (two) times a day, Disp: , Rfl:     methylPREDNISolone 4 MG tablet therapy pack, Use as directed on package, Disp: 21 each, Rfl: 0    Nerve Stimulator (TENS THERAPY PAIN RELIEF) BILLY, 1 Device by Does not apply route 2 (two) times a day as needed (pain), Disp: 1 Device, Rfl: 0    pravastatin (PRAVACHOL) 40 mg tablet, Take 40 mg by mouth daily, Disp: , Rfl:     tiZANidine (ZANAFLEX) 2 mg tablet, Take 1 tablet (2 mg total) by mouth every 8 (eight) hours as needed for muscle spasms, Disp: 90 tablet, Rfl: 5    Allergies   Allergen Reactions    Nuts Anaphylaxis    Chocolate Cough    Eggs Or Egg-Derived Products Other (See Comments)     Cough    Other      LETTUCE    Shellfish Allergy Other (See Comments)     unknown    Tomato Cough       Physical Exam:    Ht 5' 5" (1 651 m)   BMI 37 11 kg/m²     Constitutional:overweight  Eyes:anicteric  HEENT:grossly intact  Neck:supple, symmetric, trachea midline and no masses   Pulmonary:even and unlabored  Cardiovascular:No edema or pitting edema present  Skin:Normal without rashes or lesions and well hydrated  Psychiatric:Mood and affect appropriate  Neurologic:Cranial Nerves II-XII grossly intact  Motor strength is intact in both lower extremities at +5 out of +5  She was able to heel and toe walk without difficulties  Deep tendon reflexes are intact in both knees and ankles at +2  Straight leg raise is negative bilaterally  Musculoskeletal: gait is slow and guarded  Range of motion of lumbar spine is limited in all planes, especially extension  There are lumbar paraspinal muscle spasms present, bilaterally  Imaging  No orders to display       No orders of the defined types were placed in this encounter

## 2021-02-17 ENCOUNTER — IMMUNIZATIONS (OUTPATIENT)
Dept: FAMILY MEDICINE CLINIC | Facility: HOSPITAL | Age: 56
End: 2021-02-17

## 2021-02-17 DIAGNOSIS — Z23 ENCOUNTER FOR IMMUNIZATION: Primary | ICD-10-CM

## 2021-02-17 PROCEDURE — 0012A SARS-COV-2 / COVID-19 MRNA VACCINE (MODERNA) 100 MCG: CPT

## 2021-02-17 PROCEDURE — 91301 SARS-COV-2 / COVID-19 MRNA VACCINE (MODERNA) 100 MCG: CPT

## 2021-02-26 DIAGNOSIS — F33.2 SEVERE EPISODE OF RECURRENT MAJOR DEPRESSIVE DISORDER, WITHOUT PSYCHOTIC FEATURES (HCC): ICD-10-CM

## 2021-02-26 DIAGNOSIS — I10 ESSENTIAL HYPERTENSION: ICD-10-CM

## 2021-02-26 RX ORDER — FLUOXETINE HYDROCHLORIDE 40 MG/1
CAPSULE ORAL
Qty: 30 CAPSULE | Refills: 0 | Status: SHIPPED | OUTPATIENT
Start: 2021-02-26 | End: 2021-03-24

## 2021-02-26 RX ORDER — LOSARTAN POTASSIUM 50 MG/1
TABLET ORAL
Qty: 30 TABLET | Refills: 0 | Status: SHIPPED | OUTPATIENT
Start: 2021-02-26 | End: 2021-03-24

## 2021-03-03 ENCOUNTER — TRANSCRIBE ORDERS (OUTPATIENT)
Dept: LAB | Facility: MEDICAL CENTER | Age: 56
End: 2021-03-03

## 2021-03-03 ENCOUNTER — LAB (OUTPATIENT)
Dept: LAB | Facility: MEDICAL CENTER | Age: 56
End: 2021-03-03
Payer: COMMERCIAL

## 2021-03-03 DIAGNOSIS — E11.69 TYPE 2 DIABETES MELLITUS WITH OTHER SPECIFIED COMPLICATION, UNSPECIFIED WHETHER LONG TERM INSULIN USE (HCC): ICD-10-CM

## 2021-03-03 DIAGNOSIS — E11.69 TYPE 2 DIABETES MELLITUS WITH OTHER SPECIFIED COMPLICATION, UNSPECIFIED WHETHER LONG TERM INSULIN USE (HCC): Primary | ICD-10-CM

## 2021-03-03 DIAGNOSIS — E78.5 HYPERLIPIDEMIA, UNSPECIFIED HYPERLIPIDEMIA TYPE: ICD-10-CM

## 2021-03-03 DIAGNOSIS — E16.2 HYPOGLYCEMIA: ICD-10-CM

## 2021-03-03 LAB
25(OH)D3 SERPL-MCNC: 12.6 NG/ML (ref 30–100)
ALBUMIN SERPL BCP-MCNC: 3.5 G/DL (ref 3.5–5)
ALP SERPL-CCNC: 86 U/L (ref 46–116)
ALT SERPL W P-5'-P-CCNC: 24 U/L (ref 12–78)
ANION GAP SERPL CALCULATED.3IONS-SCNC: 6 MMOL/L (ref 4–13)
AST SERPL W P-5'-P-CCNC: 19 U/L (ref 5–45)
BILIRUB SERPL-MCNC: 0.78 MG/DL (ref 0.2–1)
BUN SERPL-MCNC: 10 MG/DL (ref 5–25)
CALCIUM SERPL-MCNC: 9.1 MG/DL (ref 8.3–10.1)
CHLORIDE SERPL-SCNC: 99 MMOL/L (ref 100–108)
CHOLEST SERPL-MCNC: 219 MG/DL (ref 50–200)
CO2 SERPL-SCNC: 30 MMOL/L (ref 21–32)
CREAT SERPL-MCNC: 0.8 MG/DL (ref 0.6–1.3)
ERYTHROCYTE [DISTWIDTH] IN BLOOD BY AUTOMATED COUNT: 17.6 % (ref 11.6–15.1)
EST. AVERAGE GLUCOSE BLD GHB EST-MCNC: 126 MG/DL
GFR SERPL CREATININE-BSD FRML MDRD: 84 ML/MIN/1.73SQ M
GLUCOSE P FAST SERPL-MCNC: 83 MG/DL (ref 65–99)
HBA1C MFR BLD: 6 %
HCT VFR BLD AUTO: 32.2 % (ref 34.8–46.1)
HDLC SERPL-MCNC: 78 MG/DL
HGB BLD-MCNC: 8.9 G/DL (ref 11.5–15.4)
IRON SERPL-MCNC: 20 UG/DL (ref 50–170)
LDLC SERPL CALC-MCNC: 112 MG/DL (ref 0–100)
MAGNESIUM SERPL-MCNC: 2.4 MG/DL (ref 1.6–2.6)
MCH RBC QN AUTO: 19.4 PG (ref 26.8–34.3)
MCHC RBC AUTO-ENTMCNC: 27.6 G/DL (ref 31.4–37.4)
MCV RBC AUTO: 70 FL (ref 82–98)
PLATELET # BLD AUTO: 396 THOUSANDS/UL (ref 149–390)
PMV BLD AUTO: 8.9 FL (ref 8.9–12.7)
POTASSIUM SERPL-SCNC: 3.8 MMOL/L (ref 3.5–5.3)
PROT SERPL-MCNC: 7.6 G/DL (ref 6.4–8.2)
RBC # BLD AUTO: 4.58 MILLION/UL (ref 3.81–5.12)
SODIUM SERPL-SCNC: 135 MMOL/L (ref 136–145)
TRIGL SERPL-MCNC: 144 MG/DL
TSH SERPL DL<=0.05 MIU/L-ACNC: 2.88 UIU/ML (ref 0.36–3.74)
VIT B12 SERPL-MCNC: 275 PG/ML (ref 100–900)
WBC # BLD AUTO: 6.36 THOUSAND/UL (ref 4.31–10.16)

## 2021-03-03 PROCEDURE — 3044F HG A1C LEVEL LT 7.0%: CPT | Performed by: NURSE PRACTITIONER

## 2021-03-03 PROCEDURE — 85027 COMPLETE CBC AUTOMATED: CPT

## 2021-03-03 PROCEDURE — 80061 LIPID PANEL: CPT

## 2021-03-03 PROCEDURE — 82607 VITAMIN B-12: CPT

## 2021-03-03 PROCEDURE — 84443 ASSAY THYROID STIM HORMONE: CPT

## 2021-03-03 PROCEDURE — 83036 HEMOGLOBIN GLYCOSYLATED A1C: CPT

## 2021-03-03 PROCEDURE — 83540 ASSAY OF IRON: CPT

## 2021-03-03 PROCEDURE — 36415 COLL VENOUS BLD VENIPUNCTURE: CPT

## 2021-03-03 PROCEDURE — 83735 ASSAY OF MAGNESIUM: CPT

## 2021-03-03 PROCEDURE — 80053 COMPREHEN METABOLIC PANEL: CPT

## 2021-03-03 PROCEDURE — 82306 VITAMIN D 25 HYDROXY: CPT

## 2021-03-23 DIAGNOSIS — F33.2 SEVERE EPISODE OF RECURRENT MAJOR DEPRESSIVE DISORDER, WITHOUT PSYCHOTIC FEATURES (HCC): ICD-10-CM

## 2021-03-23 DIAGNOSIS — I10 ESSENTIAL HYPERTENSION: ICD-10-CM

## 2021-03-24 PROCEDURE — 4010F ACE/ARB THERAPY RXD/TAKEN: CPT | Performed by: NURSE PRACTITIONER

## 2021-03-24 RX ORDER — LOSARTAN POTASSIUM 50 MG/1
TABLET ORAL
Qty: 30 TABLET | Refills: 2 | Status: SHIPPED | OUTPATIENT
Start: 2021-03-24 | End: 2021-06-25 | Stop reason: SDUPTHER

## 2021-03-24 RX ORDER — FLUOXETINE HYDROCHLORIDE 40 MG/1
CAPSULE ORAL
Qty: 30 CAPSULE | Refills: 2 | Status: SHIPPED | OUTPATIENT
Start: 2021-03-24 | End: 2021-06-25 | Stop reason: SDUPTHER

## 2021-04-08 DIAGNOSIS — I10 ESSENTIAL HYPERTENSION: ICD-10-CM

## 2021-04-09 RX ORDER — HYDROCHLOROTHIAZIDE 50 MG/1
TABLET ORAL
Qty: 90 TABLET | Refills: 0 | Status: SHIPPED | OUTPATIENT
Start: 2021-04-09 | End: 2021-06-25 | Stop reason: SDUPTHER

## 2021-05-04 ENCOUNTER — TRANSCRIBE ORDERS (OUTPATIENT)
Dept: LAB | Facility: MEDICAL CENTER | Age: 56
End: 2021-05-04

## 2021-05-04 ENCOUNTER — APPOINTMENT (OUTPATIENT)
Dept: LAB | Facility: MEDICAL CENTER | Age: 56
End: 2021-05-04
Payer: COMMERCIAL

## 2021-05-04 DIAGNOSIS — E11.8 TYPE 2 DIABETES MELLITUS WITH UNSPECIFIED COMPLICATIONS (HCC): ICD-10-CM

## 2021-05-04 DIAGNOSIS — D50.9 IRON DEFICIENCY ANEMIA, UNSPECIFIED IRON DEFICIENCY ANEMIA TYPE: Primary | ICD-10-CM

## 2021-05-04 DIAGNOSIS — D50.8 OTHER IRON DEFICIENCY ANEMIA: ICD-10-CM

## 2021-05-04 DIAGNOSIS — D50.9 IRON DEFICIENCY ANEMIA, UNSPECIFIED IRON DEFICIENCY ANEMIA TYPE: ICD-10-CM

## 2021-05-04 LAB
ALBUMIN SERPL BCP-MCNC: 3.3 G/DL (ref 3.5–5)
ALP SERPL-CCNC: 79 U/L (ref 46–116)
ALT SERPL W P-5'-P-CCNC: 31 U/L (ref 12–78)
ANION GAP SERPL CALCULATED.3IONS-SCNC: 6 MMOL/L (ref 4–13)
AST SERPL W P-5'-P-CCNC: 24 U/L (ref 5–45)
BASOPHILS # BLD AUTO: 0.05 THOUSANDS/ΜL (ref 0–0.1)
BASOPHILS NFR BLD AUTO: 1 % (ref 0–1)
BILIRUB SERPL-MCNC: 0.76 MG/DL (ref 0.2–1)
BUN SERPL-MCNC: 13 MG/DL (ref 5–25)
CALCIUM ALBUM COR SERPL-MCNC: 9.8 MG/DL (ref 8.3–10.1)
CALCIUM SERPL-MCNC: 9.2 MG/DL (ref 8.3–10.1)
CHLORIDE SERPL-SCNC: 99 MMOL/L (ref 100–108)
CO2 SERPL-SCNC: 28 MMOL/L (ref 21–32)
CREAT SERPL-MCNC: 0.82 MG/DL (ref 0.6–1.3)
EOSINOPHIL # BLD AUTO: 0.09 THOUSAND/ΜL (ref 0–0.61)
EOSINOPHIL NFR BLD AUTO: 2 % (ref 0–6)
ERYTHROCYTE [DISTWIDTH] IN BLOOD BY AUTOMATED COUNT: 19.6 % (ref 11.6–15.1)
FOLATE SERPL-MCNC: 11.8 NG/ML (ref 3.1–17.5)
GFR SERPL CREATININE-BSD FRML MDRD: 82 ML/MIN/1.73SQ M
GLUCOSE P FAST SERPL-MCNC: 117 MG/DL (ref 65–99)
HCT VFR BLD AUTO: 31.1 % (ref 34.8–46.1)
HGB BLD-MCNC: 8.7 G/DL (ref 11.5–15.4)
IMM GRANULOCYTES # BLD AUTO: 0.01 THOUSAND/UL (ref 0–0.2)
IMM GRANULOCYTES NFR BLD AUTO: 0 % (ref 0–2)
IRON SERPL-MCNC: 13 UG/DL (ref 50–170)
LYMPHOCYTES # BLD AUTO: 1.01 THOUSANDS/ΜL (ref 0.6–4.47)
LYMPHOCYTES NFR BLD AUTO: 23 % (ref 14–44)
MCH RBC QN AUTO: 20.5 PG (ref 26.8–34.3)
MCHC RBC AUTO-ENTMCNC: 28 G/DL (ref 31.4–37.4)
MCV RBC AUTO: 73 FL (ref 82–98)
MONOCYTES # BLD AUTO: 0.3 THOUSAND/ΜL (ref 0.17–1.22)
MONOCYTES NFR BLD AUTO: 7 % (ref 4–12)
NEUTROPHILS # BLD AUTO: 3 THOUSANDS/ΜL (ref 1.85–7.62)
NEUTS SEG NFR BLD AUTO: 67 % (ref 43–75)
NRBC BLD AUTO-RTO: 0 /100 WBCS
PLATELET # BLD AUTO: 353 THOUSANDS/UL (ref 149–390)
PMV BLD AUTO: 8.9 FL (ref 8.9–12.7)
POTASSIUM SERPL-SCNC: 3.9 MMOL/L (ref 3.5–5.3)
PROT SERPL-MCNC: 7.1 G/DL (ref 6.4–8.2)
RBC # BLD AUTO: 4.25 MILLION/UL (ref 3.81–5.12)
SODIUM SERPL-SCNC: 133 MMOL/L (ref 136–145)
WBC # BLD AUTO: 4.46 THOUSAND/UL (ref 4.31–10.16)

## 2021-05-04 PROCEDURE — 82746 ASSAY OF FOLIC ACID SERUM: CPT

## 2021-05-04 PROCEDURE — 82728 ASSAY OF FERRITIN: CPT

## 2021-05-04 PROCEDURE — 85025 COMPLETE CBC W/AUTO DIFF WBC: CPT

## 2021-05-04 PROCEDURE — 80053 COMPREHEN METABOLIC PANEL: CPT

## 2021-05-04 PROCEDURE — 83540 ASSAY OF IRON: CPT

## 2021-05-04 PROCEDURE — 83550 IRON BINDING TEST: CPT

## 2021-05-04 PROCEDURE — 36415 COLL VENOUS BLD VENIPUNCTURE: CPT

## 2021-05-05 ENCOUNTER — TELEPHONE (OUTPATIENT)
Dept: HEMATOLOGY ONCOLOGY | Facility: CLINIC | Age: 56
End: 2021-05-05

## 2021-05-05 NOTE — TELEPHONE ENCOUNTER
New Patient Encounter    New Patient Intake Form   Patient Details:  Bree Chen  1965  [id number]    Background Information:  [address] starts by opening a telephone encounter and gathering the following information   Who is calling to schedule? If not self, relationship to patient? self   Referring Provider lalita   What is the diagnosis? fe def anemia   Is this diagnosis confirmed? Yes   When was the diagnosis? Many years   Is there a confirmed diagnosis from a biopsy/tissue reviewed by pathology? Were outside slides requested? Is patient aware of diagnosis? Yes   Is there a personal history and what kind? Is there a family history and what kind? Reason for visit? History Of   Have you had any imaging or labs done? If so: when, where? yes  [facility]   Are records in [facility]? yes   If patient has a prior history of breast cancer were old records obtained? Was the patient told to bring a disk? Does the patient smoke or Vape? No   If yes, how many packs or cartridges per day? Scheduling Information:   Preferred [place]:  [address] [place] [address]     Are there any dates/time the patient cannot be seen? Miscellaneous:    After completing the above information, please route to Financial Counselor and the appropriate Nurse Navigator for review

## 2021-05-06 ENCOUNTER — CONSULT (OUTPATIENT)
Dept: HEMATOLOGY ONCOLOGY | Facility: CLINIC | Age: 56
End: 2021-05-06
Payer: COMMERCIAL

## 2021-05-06 VITALS
OXYGEN SATURATION: 97 % | HEIGHT: 65 IN | HEART RATE: 108 BPM | BODY MASS INDEX: 35.85 KG/M2 | DIASTOLIC BLOOD PRESSURE: 70 MMHG | WEIGHT: 215.2 LBS | TEMPERATURE: 97.9 F | RESPIRATION RATE: 16 BRPM | SYSTOLIC BLOOD PRESSURE: 138 MMHG

## 2021-05-06 DIAGNOSIS — E53.8 B12 DEFICIENCY: ICD-10-CM

## 2021-05-06 DIAGNOSIS — D50.0 IRON DEFICIENCY ANEMIA DUE TO CHRONIC BLOOD LOSS: ICD-10-CM

## 2021-05-06 DIAGNOSIS — D50.8 OTHER IRON DEFICIENCY ANEMIA: Primary | ICD-10-CM

## 2021-05-06 DIAGNOSIS — D50.9 MICROCYTIC ANEMIA: Primary | ICD-10-CM

## 2021-05-06 LAB
FERRITIN SERPL-MCNC: 5 NG/ML (ref 8–388)
IRON SATN MFR SERPL: 3 %
IRON SERPL-MCNC: 16 UG/DL (ref 50–170)
TIBC SERPL-MCNC: 488 UG/DL (ref 250–450)

## 2021-05-06 PROCEDURE — 99204 OFFICE O/P NEW MOD 45 MIN: CPT | Performed by: NURSE PRACTITIONER

## 2021-05-06 RX ORDER — CYANOCOBALAMIN 1000 UG/ML
1000 INJECTION INTRAMUSCULAR; SUBCUTANEOUS ONCE
Status: CANCELLED | OUTPATIENT
Start: 2021-05-13

## 2021-05-06 RX ORDER — SODIUM CHLORIDE 9 MG/ML
20 INJECTION, SOLUTION INTRAVENOUS ONCE
Status: CANCELLED | OUTPATIENT
Start: 2021-05-13

## 2021-05-06 NOTE — PROGRESS NOTES
Hematology/Oncology Outpatient Consultation  Onesimo Mayen 50 y o  female 1965 [id number]    Date:  5/6/2021      Assessment and Plan:  1  Microcytic anemia  Patient noted to have microcytic anemia hemoglobin 8 7 with iron deficiency ferritin of 5  Etiology most likely due to her ongoing menorrhagia  Did encourage her to follow-up with her gynecology team and contact them regarding the Mirena for her menorrhagia since she has not heard anything back thus far  We will correct the iron deficiency with IV iron Injectafer 750 mg weekly for 2 sessions total; she does have multiple allergies so we will premedicate her with Pepcid IV  Patient educated about the iron product, administration and common adverse effects including but not limited to: Anaphylaxis/allergic reaction, arthralgias/myalgias, nausea, blood pressure changes and phlebitis; agrees to proceed with treatment  The patient will be back for follow-up again in about 3 months with repeat laboratory studies prior  We will also pursue additional anemia workup to rule out other etiologies of microcytic anemia prior to follow-up  - CBC and differential; Future  - Comprehensive metabolic panel; Future  - Celiac Disease Antibody Profile; Future  - C-reactive protein; Future  - Sedimentation rate, automated; Future  - Direct antiglobulin test; Future  - Ferritin; Future  - Folate; Future  - Haptoglobin; Future  - IgG, IgA, IgM; Future  - Immunoglobulin free LT chains blood; Future  - Iron Panel (Includes Ferritin, Iron Sat%, Iron, and TIBC); Future  - LD,Blood; Future  - Occult Blood, Fecal Immunochemical; Future  - Protein electrophoresis, serum; Future  - Reticulocytes; Future  - Vitamin B12; Future    2  Iron deficiency anemia due to chronic blood loss  As above  - CBC and differential; Future  - Comprehensive metabolic panel; Future  - Celiac Disease Antibody Profile; Future  - C-reactive protein;  Future  - Sedimentation rate, automated; Future  - Direct antiglobulin test; Future  - Ferritin; Future  - Folate; Future  - Haptoglobin; Future  - IgG, IgA, IgM; Future  - Immunoglobulin free LT chains blood; Future  - Iron Panel (Includes Ferritin, Iron Sat%, Iron, and TIBC); Future  - LD,Blood; Future  - Occult Blood, Fecal Immunochemical; Future  - Protein electrophoresis, serum; Future  - Reticulocytes; Future  - Vitamin B12; Future    3  B12 deficiency  Realized after visit that patient had low vitamin B12 in March  We will give her a vitamin B12 injection with each session of iron x2 an ask her to start over-the-counter oral iron on a daily basis  Will have office staff contact patient to make her aware  Repeat vitamin B12 level before her next office visit  HPI:  Patient is a pleasant 53-year-old female who presents for further evaluation and treatment of her microcytic anemia/iron deficiency  She has past medical history of diabetes, hypertension, migraines, hyperlipidemia, hydrocephalus, PTSD, depression, anxiety disorder, chronic back pain, GERD, esophageal stricture and menorrhagia due to fibroids  She states that she has had D&C and ablation in the past   The ablation helped her menorrhagia for some time but more recently her heavy menstrual cycles resumed  Her gynecology team was considering Mirena IUD but she is still awaiting a call back from them to see if it is covered  She states that she is had issues with iron deficiency anemia in the past which was treated with oral iron  Is due for her repeat colonoscopy and upper endoscopy with esophageal stretching later this year  She is reporting worsening fatigue, occasional dizziness, restless legs and PICA for ice; sometimes large bags at a time  Her most recent laboratory studies from 2 days ago 05/04/2021 showed normal white cells and platelets, she has microcytic hypochromic anemia H&H 8 7/31 1, MCV 73, MCH 20 5    Sodium is decreased 133, glucose 117 remaining metabolic panel is appropriate for patient  Folic acid 45 7  She is iron deficient iron saturation 3%, TIBC increased 488, serum iron decreased 16 with low ferritin 5  Also noted vitamin B12 from 2 months ago was also low 275  ROS: Review of Systems   Constitutional: Positive for appetite change and fatigue  Negative for activity change, chills, fever and unexpected weight change  HENT: Negative for congestion, mouth sores, nosebleeds, sore throat and trouble swallowing  Eyes: Negative  Respiratory: Negative for cough, chest tightness and shortness of breath  Cardiovascular: Negative for chest pain, palpitations and leg swelling  Gastrointestinal: Positive for nausea  Negative for abdominal distention, abdominal pain, blood in stool, constipation, diarrhea and vomiting  Genitourinary: Positive for menstrual problem  Negative for difficulty urinating, dysuria, frequency, hematuria and urgency  Musculoskeletal: Positive for myalgias  Negative for arthralgias, back pain, gait problem and joint swelling  Skin: Negative for color change, pallor and rash  Neurological: Positive for dizziness  Negative for weakness, light-headedness, numbness and headaches  Hematological: Negative for adenopathy  Does not bruise/bleed easily  Psychiatric/Behavioral: Negative for dysphoric mood and sleep disturbance  The patient is not nervous/anxious  Past Medical History:   Diagnosis Date    [facility] [address] [facility]     Hydrocephalus ([facility] [address] [facility]     Hyperlipidemia     Hypertension     Migraine     Sciatica     Vertigo        Past Surgical History:   Procedure Laterality Date    EPIDURAL BLOCK INJECTION Right 7/2/2020    Procedure: Right L4-5 and L5-S1 transforaminal epidural steroid injection ([id number]);   Surgeon: Abhay Joseph MD;  [facility]: [facility];  Service: Pain Management     FL GUIDED NEEDLE PLAC BX/ASP/INJ  7/2/2020    [place] ESOPHAGOGASTRODUODENOSCOPY TRANSORAL DIAGNOSTIC N/A 10/23/2018 Procedure: EGD with Savary dilation AND COLONOSCOPY;  Surgeon: Jaron Connell MD;  [facility]: [facility];  Service: Gastroenterology    TUBAL LIGATION         Social History     Socioeconomic History    Marital status: /Civil Union     Spouse name: None    Number of children: None    Years of education: None    Highest education level: None   Occupational History    None   Social Needs    Financial resource strain: None    Food insecurity     Worry: None     Inability: None    Transportation needs     Medical: None     Non-medical: None   Tobacco Use    Smoking status: Never Smoker    Smokeless tobacco: Never Used   Substance and Sexual Activity    Alcohol use: No    Drug use: No    Sexual activity: Not Currently     Comment:    Lifestyle    Physical activity     Days per week: None     Minutes per session: None    Stress: None   Relationships    Social connections     Talks on phone: None     Gets together: None     Attends Jain service: None     Active member of club or organization: None     Attends meetings of clubs or organizations: None     Relationship status: None    Intimate partner violence     Fear of current or ex partner: None     Emotionally abused: None     Physically abused: None     Forced sexual activity: None   Other Topics Concern    None   Social History Narrative    None       Family History   Problem Relation Age of Onset    Diabetes Father     Cancer Father        Allergies   Allergen Reactions    Nuts - Food Allergy Anaphylaxis    Chocolate - Food Allergy Cough    Eggs Or Egg-Derived Products - Food Allergy Other (See Comments)     Cough    Other      LETTUCE    Shellfish Allergy - Food Allergy Other (See Comments)     unknown    Tomato - Food Allergy Cough         Current Outpatient Medications:     FLUoxetine (PROzac) 40 MG capsule, Take 1 capsule by mouth once daily, Disp: 30 capsule, Rfl: 2    gabapentin (NEURONTIN) 400 mg capsule, Take 1 capsule (400 mg total) by mouth 3 (three) times a day, Disp: 90 capsule, Rfl: 2    glimepiride (AMARYL) 4 mg tablet, Take 2 mg by mouth daily At night, Disp: , Rfl:     hydrochlorothiazide (HYDRODIURIL) 50 mg tablet, Take 1 tablet by mouth once daily, Disp: 90 tablet, Rfl: 0    Insulin Glargine-Lixisenatide (SOLIQUA) 100 units-33 mcg/mL injection pen, Inject 60 Units under the skin daily , Disp: , Rfl:     loratadine (CLARITIN) 10 mg tablet, Take 10 mg by mouth daily, Disp: , Rfl:     losartan (COZAAR) 50 mg tablet, Take 1 tablet by mouth once daily, Disp: 30 tablet, Rfl: 2    metFORMIN (GLUCOPHAGE-XR) 500 mg 24 hr tablet, 2 (two) times a day, Disp: , Rfl:     Nerve Stimulator (TENS THERAPY PAIN RELIEF) BILLY, 1 Device by Does not apply route 2 (two) times a day as needed (pain), Disp: 1 Device, Rfl: 0    pravastatin (PRAVACHOL) 40 mg tablet, Take 40 mg by mouth daily, Disp: , Rfl:     tiZANidine (ZANAFLEX) 2 mg tablet, Take 1 tablet (2 mg total) by mouth every 8 (eight) hours as needed for muscle spasms, Disp: 90 tablet, Rfl: 5    hydrocortisone 1 % cream, Apply topically 4 (four) times a day as needed for irritation or rash (Patient not taking: Reported on 10/9/2020), Disp: 45 g, Rfl: 0    methylPREDNISolone 4 MG tablet therapy pack, Use as directed on package (Patient not taking: Reported on 5/6/2021), Disp: 21 each, Rfl: 0      Physical Exam:  /70 (BP Location: Left arm, Patient Position: Sitting, Cuff Size: Large)   Pulse (!) 108   Temp 97 9 °F (36 6 °C) (Tympanic)   Resp 16   Ht 5' 5" (1 651 m)   Wt 97 6 kg (215 lb 3 2 oz)   SpO2 97%   BMI 35 81 kg/m²     Physical Exam  Vitals signs reviewed  Constitutional:       General: She is not in acute distress  Appearance: She is well-developed  She is not diaphoretic  HENT:      Head: Normocephalic and atraumatic  Eyes:      General: No scleral icterus       Conjunctiva/sclera: Conjunctivae normal       Pupils: Pupils are equal, round, and reactive to light  Neck:      Musculoskeletal: Normal range of motion and neck supple  Thyroid: No thyromegaly  Cardiovascular:      Rate and Rhythm: Normal rate and regular rhythm  Heart sounds: Normal heart sounds  No murmur  Pulmonary:      Effort: Pulmonary effort is normal  No respiratory distress  Breath sounds: Normal breath sounds  Abdominal:      General: There is no distension  Palpations: Abdomen is soft  There is no hepatomegaly or splenomegaly  Tenderness: There is no abdominal tenderness  Musculoskeletal: Normal range of motion  General: No swelling  Lymphadenopathy:      Cervical: No cervical adenopathy  Upper Body:      Right upper body: No axillary adenopathy  Left upper body: No axillary adenopathy  Skin:     General: Skin is warm and dry  Findings: No erythema or rash  Neurological:      General: No focal deficit present  Mental Status: She is alert and oriented to person, place, and time  Psychiatric:         Mood and Affect: Mood and affect normal          Behavior: Behavior normal  Behavior is cooperative  Thought Content: Thought content normal          Judgment: Judgment normal            Labs:  Lab Results   Component Value Date    WBC 4 46 05/04/2021    HGB 8 7 (L) 05/04/2021    HCT 31 1 (L) 05/04/2021    MCV 73 (L) 05/04/2021     05/04/2021     Lab Results   Component Value Date     (L) 01/03/2016    K 3 9 05/04/2021    CL 99 (L) 05/04/2021    CO2 28 05/04/2021    ANIONGAP 10 01/03/2016    BUN 13 05/04/2021    CREATININE 0 82 05/04/2021    GLUCOSE 177 (H) 04/17/2018    GLUF 117 (H) 05/04/2021    CALCIUM 9 2 05/04/2021    CORRECTEDCA 9 8 05/04/2021    AST 24 05/04/2021    ALT 31 05/04/2021    ALKPHOS 79 05/04/2021    PROT 7 6 10/06/2015    BILITOT 0 63 10/06/2015    EGFR 82 05/04/2021       Patient voiced understanding and agreement in the above discussion   Aware to contact our office with questions/symptoms in the interim  This note has been generated by voice recognition software system  Therefore, there may be spelling, grammar, and or syntax errors  Please contact if questions arise

## 2021-05-06 NOTE — LETTER
May 6, 2021     Patient: Melchor Evangelista   YOB: 1965   Date of Visit: 5/6/2021       To Whom it May Concern:    Melchor Evangelista is under my professional care  She was seen in my office on 5/6/2021  She will need to take off work for her upcoming IV iron treatments which will be once a week for 2 sessions  If you have any questions or concerns, please don't hesitate to call           Sincerely,          ERNIE Amin        CC: No Recipients

## 2021-05-07 ENCOUNTER — TELEPHONE (OUTPATIENT)
Dept: HEMATOLOGY ONCOLOGY | Facility: CLINIC | Age: 56
End: 2021-05-07

## 2021-05-07 NOTE — TELEPHONE ENCOUNTER
----- Message from Talya Postal, [address] sent at 5/6/2021  4:28 PM EDT -----  Can you please call the patient at your convenience and let her know I realized after the visit that her B12 was also low in March  I added 2 B12 injections with each of her upcoming iron treatments  She should also start over-the-counter vitamin B12 supplement at least 500 mcg or more on a daily basis

## 2021-05-11 ENCOUNTER — HOSPITAL ENCOUNTER (OUTPATIENT)
Dept: INFUSION CENTER | Facility: HOSPITAL | Age: 56
Discharge: HOME | End: 2021-05-11
Attending: INTERNAL MEDICINE
Payer: COMMERCIAL

## 2021-05-11 VITALS
TEMPERATURE: 96.9 F | HEART RATE: 80 BPM | SYSTOLIC BLOOD PRESSURE: 139 MMHG | DIASTOLIC BLOOD PRESSURE: 65 MMHG | RESPIRATION RATE: 16 BRPM | OXYGEN SATURATION: 100 %

## 2021-05-11 DIAGNOSIS — E53.8 B12 DEFICIENCY: ICD-10-CM

## 2021-05-11 DIAGNOSIS — D50.0 IRON DEFICIENCY ANEMIA DUE TO CHRONIC BLOOD LOSS: Primary | ICD-10-CM

## 2021-05-11 PROCEDURE — 96365 THER/PROPH/DIAG IV INF INIT: CPT

## 2021-05-11 PROCEDURE — 96375 TX/PRO/DX INJ NEW DRUG ADDON: CPT

## 2021-05-11 PROCEDURE — 96372 THER/PROPH/DIAG INJ SC/IM: CPT

## 2021-05-11 RX ORDER — CYANOCOBALAMIN 1000 UG/ML
1000 INJECTION INTRAMUSCULAR; SUBCUTANEOUS ONCE
Status: CANCELLED | OUTPATIENT
Start: 2021-05-18

## 2021-05-11 RX ORDER — CYANOCOBALAMIN 1000 UG/ML
1000 INJECTION INTRAMUSCULAR; SUBCUTANEOUS ONCE
Status: COMPLETED | OUTPATIENT
Start: 2021-05-11 | End: 2021-05-11

## 2021-05-11 RX ORDER — SODIUM CHLORIDE 9 MG/ML
20 INJECTION, SOLUTION INTRAVENOUS ONCE
Status: COMPLETED | OUTPATIENT
Start: 2021-05-11 | End: 2021-05-11

## 2021-05-11 RX ORDER — SODIUM CHLORIDE 9 MG/ML
20 INJECTION, SOLUTION INTRAVENOUS ONCE
Status: CANCELLED | OUTPATIENT
Start: 2021-05-18

## 2021-05-11 RX ADMIN — CYANOCOBALAMIN 1000 MCG: 1000 INJECTION, SOLUTION INTRAMUSCULAR at 12:34

## 2021-05-11 RX ADMIN — FERRIC CARBOXYMALTOSE INJECTION 750 MG: 50 INJECTION, SOLUTION INTRAVENOUS at 12:03

## 2021-05-11 RX ADMIN — FAMOTIDINE 20 MG: 10 INJECTION INTRAVENOUS at 11:36

## 2021-05-11 RX ADMIN — SODIUM CHLORIDE 20 ML/HR: 0.9 INJECTION, SOLUTION INTRAVENOUS at 11:30

## 2021-05-11 NOTE — PROGRESS NOTES
injectafer and vitamin B12 given without incident  No S/S of adverse reaction  Pt offers no complaints on D/C  AVS given

## 2021-05-11 NOTE — PLAN OF CARE
Problem: Potential for Falls  Goal: Patient will remain free of falls  Description: INTERVENTIONS:  - Assess patient frequently for physical needs  -  Identify cognitive and physical deficits and behaviors that affect risk of falls    -  [place] fall precautions as indicated by assessment   - Educate patient/family on patient safety including physical limitations  - Instruct patient to call for assistance with activity based on assessment  - Modify environment to reduce risk of injury  - Consider OT/PT consult to assist with strengthening/mobility  Outcome: Progressing

## 2021-05-19 DIAGNOSIS — D50.0 IRON DEFICIENCY ANEMIA DUE TO CHRONIC BLOOD LOSS: Primary | ICD-10-CM

## 2021-05-19 DIAGNOSIS — E53.8 B12 DEFICIENCY: ICD-10-CM

## 2021-05-19 RX ORDER — CYANOCOBALAMIN 1000 UG/ML
1000 INJECTION INTRAMUSCULAR; SUBCUTANEOUS ONCE
Status: CANCELLED | OUTPATIENT
Start: 2021-05-21

## 2021-05-19 RX ORDER — SODIUM CHLORIDE 9 MG/ML
20 INJECTION, SOLUTION INTRAVENOUS ONCE
Status: CANCELLED | OUTPATIENT
Start: 2021-05-21

## 2021-05-21 ENCOUNTER — HOSPITAL ENCOUNTER (OUTPATIENT)
Dept: INFUSION CENTER | Facility: HOSPITAL | Age: 56
Discharge: HOME | End: 2021-05-21
Attending: INTERNAL MEDICINE
Payer: COMMERCIAL

## 2021-05-21 VITALS
DIASTOLIC BLOOD PRESSURE: 59 MMHG | SYSTOLIC BLOOD PRESSURE: 129 MMHG | TEMPERATURE: 97.3 F | RESPIRATION RATE: 18 BRPM | HEART RATE: 85 BPM

## 2021-05-21 DIAGNOSIS — E53.8 B12 DEFICIENCY: ICD-10-CM

## 2021-05-21 DIAGNOSIS — D50.0 IRON DEFICIENCY ANEMIA DUE TO CHRONIC BLOOD LOSS: Primary | ICD-10-CM

## 2021-05-21 PROCEDURE — 96375 TX/PRO/DX INJ NEW DRUG ADDON: CPT

## 2021-05-21 PROCEDURE — 96372 THER/PROPH/DIAG INJ SC/IM: CPT

## 2021-05-21 PROCEDURE — 96365 THER/PROPH/DIAG IV INF INIT: CPT

## 2021-05-21 RX ORDER — CYANOCOBALAMIN 1000 UG/ML
1000 INJECTION INTRAMUSCULAR; SUBCUTANEOUS ONCE
Status: CANCELLED | OUTPATIENT
Start: 2021-05-28

## 2021-05-21 RX ORDER — CYANOCOBALAMIN 1000 UG/ML
1000 INJECTION INTRAMUSCULAR; SUBCUTANEOUS ONCE
Status: COMPLETED | OUTPATIENT
Start: 2021-05-21 | End: 2021-05-21

## 2021-05-21 RX ORDER — SODIUM CHLORIDE 9 MG/ML
20 INJECTION, SOLUTION INTRAVENOUS ONCE
Status: CANCELLED | OUTPATIENT
Start: 2021-05-28

## 2021-05-21 RX ORDER — SODIUM CHLORIDE 9 MG/ML
20 INJECTION, SOLUTION INTRAVENOUS ONCE
Status: COMPLETED | OUTPATIENT
Start: 2021-05-21 | End: 2021-05-21

## 2021-05-21 RX ADMIN — SODIUM CHLORIDE 20 ML/HR: 0.9 INJECTION, SOLUTION INTRAVENOUS at 14:49

## 2021-05-21 RX ADMIN — FERRIC CARBOXYMALTOSE INJECTION 750 MG: 50 INJECTION, SOLUTION INTRAVENOUS at 15:14

## 2021-05-21 RX ADMIN — FAMOTIDINE 20 MG: 10 INJECTION INTRAVENOUS at 14:51

## 2021-05-21 RX ADMIN — CYANOCOBALAMIN 1000 MCG: 1000 INJECTION, SOLUTION INTRAMUSCULAR at 15:41

## 2021-05-21 NOTE — PROGRESS NOTES
Pt tolerated injectofer andvitamin b12 injections well  No adverse reactions noted  Peripheral iv discontinued jelco intact   Discharged ambulatory with avs

## 2021-05-21 NOTE — PLAN OF CARE
Problem: Potential for Falls  Goal: Patient will remain free of falls  Description: INTERVENTIONS:  - Assess patient frequently for physical needs  -  Identify cognitive and physical deficits and behaviors that affect risk of falls    -  [place] fall precautions as indicated by assessment   - Educate patient/family on patient safety including physical limitations  - Instruct patient to call for assistance with activity based on assessment  - Modify environment to reduce risk of injury  - Consider OT/PT consult to assist with strengthening/mobility  Outcome: Progressing

## 2021-06-08 ENCOUNTER — OFFICE VISIT (OUTPATIENT)
Dept: PAIN MEDICINE | Facility: CLINIC | Age: 56
End: 2021-06-08
Payer: COMMERCIAL

## 2021-06-08 VITALS — HEIGHT: 65 IN | WEIGHT: 216.8 LBS | BODY MASS INDEX: 36.12 KG/M2

## 2021-06-08 DIAGNOSIS — M54.31 SCIATICA OF RIGHT SIDE: ICD-10-CM

## 2021-06-08 DIAGNOSIS — M54.50 CHRONIC MIDLINE LOW BACK PAIN WITHOUT SCIATICA: ICD-10-CM

## 2021-06-08 DIAGNOSIS — G89.29 CHRONIC MIDLINE LOW BACK PAIN WITHOUT SCIATICA: ICD-10-CM

## 2021-06-08 DIAGNOSIS — G89.29 CHRONIC MIDLINE LOW BACK PAIN WITH RIGHT-SIDED SCIATICA: ICD-10-CM

## 2021-06-08 DIAGNOSIS — M47.816 LUMBAR SPONDYLOSIS: ICD-10-CM

## 2021-06-08 DIAGNOSIS — M54.41 CHRONIC MIDLINE LOW BACK PAIN WITH RIGHT-SIDED SCIATICA: ICD-10-CM

## 2021-06-08 DIAGNOSIS — M54.16 LUMBAR RADICULOPATHY: Primary | ICD-10-CM

## 2021-06-08 PROBLEM — F33.2 SEVERE EPISODE OF RECURRENT MAJOR DEPRESSIVE DISORDER, WITHOUT PSYCHOTIC FEATURES (HCC): Status: ACTIVE | Noted: 2021-06-08

## 2021-06-08 PROCEDURE — 3008F BODY MASS INDEX DOCD: CPT | Performed by: NURSE PRACTITIONER

## 2021-06-08 PROCEDURE — 1036F TOBACCO NON-USER: CPT | Performed by: NURSE PRACTITIONER

## 2021-06-08 PROCEDURE — 99214 OFFICE O/P EST MOD 30 MIN: CPT | Performed by: NURSE PRACTITIONER

## 2021-06-08 RX ORDER — TIZANIDINE 2 MG/1
2 TABLET ORAL EVERY 8 HOURS PRN
Qty: 90 TABLET | Refills: 5 | Status: SHIPPED | OUTPATIENT
Start: 2021-06-08 | End: 2021-11-16 | Stop reason: SDUPTHER

## 2021-06-08 RX ORDER — GABAPENTIN 600 MG/1
600 TABLET ORAL 3 TIMES DAILY
Qty: 90 TABLET | Refills: 1 | Status: SHIPPED | OUTPATIENT
Start: 2021-06-08 | End: 2021-08-17 | Stop reason: SDUPTHER

## 2021-06-08 NOTE — PROGRESS NOTES
Assessment:  1  Lumbar radiculopathy    2  Lumbar spondylosis    3  Chronic midline low back pain without sciatica    4  Chronic midline low back pain with right-sided sciatica    5  Sciatica of right side        Plan:    While the patient was in the office today, I did have a thorough conversation regarding their chronic pain syndrome, medication management, and treatment plan options  Patient is being seen for a follow-up visit today with complaints of increasing low back and left leg pain  She was last seen here on 02/04/2021 at which time I provided her with a home exercise program   She reports that she has been doing the exercises almost daily with out significant improvement  At this point, we will order an MRI of her lumbar spine to determine if there is any intraspinal pathology that would contribute to her current symptoms  Patient is aware that we will call her with the results of the MRI soon as they are available  Increase gabapentin to 600 mg 3 times daily  A prescription was sent electronically to her pharmacy  Continue tizanidine 2 mg every 8 hours as needed for spasms  Prescription was sent electronically to her pharmacy  History of Present Illness: The patient is a 50 y o  female who presents for a follow up office visit in regards to Back Pain and Leg Pain  The patients current symptoms include   Complaints low back and left leg pain  Pain radiates predominantly down the posterior aspect of her left leg to her foot  Current pain level is a 5/10  Quality of pain is described as sharp, shooting and numb  Current pain medications includes:  Gabapentin 400 mg 3 times daily, tizanidine 2 mg 3 times daily as needed for spasms     The patient reports that this regimen is providing 50% pain relief  The patient is reporting no side effects from this pain medication regimen      I have personally reviewed and/or updated the patient's past medical history, past surgical history, family history, social history, current medications, allergies, and vital signs today  Review of Systems  Review of Systems   Constitutional: Negative for fatigue and unexpected weight change  HENT: Negative for dental problem, ear pain, hearing loss and sneezing  Eyes: Negative for visual disturbance  Respiratory: Negative for cough and chest tightness  Cardiovascular: Negative for leg swelling  Gastrointestinal: Negative for anal bleeding  Endocrine: Negative for heat intolerance  Genitourinary: Negative for flank pain and genital sores  Musculoskeletal: Positive for gait problem  Joint stiffness  Pain in extremity: left leg, ankle, and foot   Skin: Negative for wound  Allergic/Immunologic: Negative for immunocompromised state  Neurological: Negative for speech difficulty and light-headedness  Hematological: Negative for adenopathy  Psychiatric/Behavioral: Negative for confusion  The patient is not hyperactive  All other systems reviewed and are negative  Past Medical History:   Diagnosis Date    [facility] [address] [facility]     [facility]     Hyperlipidemia     Hypertension     Migraine     Sciatica     Vertigo        Past Surgical History:   Procedure Laterality Date    EPIDURAL BLOCK INJECTION Right 7/2/2020    Procedure: Right L4-5 and L5-S1 transforaminal epidural steroid injection ([id number]);   Surgeon: Thierno Schmitt MD;  [facility];  Service: Pain Management     FL GUIDED NEEDLE PLAC BX/ASP/INJ  7/2/2020    RI ESOPHAGOGASTRODUODENOSCOPY TRANSORAL DIAGNOSTIC N/A 10/23/2018    Procedure: EGD with Savary dilation AND COLONOSCOPY;  Surgeon: Aniyah Santos MD;  [facility];  Service: Gastroenterology    TUBAL LIGATION         Family History   Problem Relation Age of Onset    Diabetes Father     Cancer Father        Social History     Occupational History    Not on file   Tobacco Use    Smoking status: Never Smoker    Smokeless tobacco: Never Used   Substance and Sexual Activity    Alcohol use: No    Drug use: No    Sexual activity: Not Currently     Comment:          Current Outpatient Medications:     FLUoxetine (PROzac) 40 MG capsule, Take 1 capsule by mouth once daily, Disp: 30 capsule, Rfl: 2    gabapentin (NEURONTIN) 600 MG tablet, Take 1 tablet (600 mg total) by mouth 3 (three) times a day, Disp: 90 tablet, Rfl: 1    glimepiride (AMARYL) 4 mg tablet, Take 2 mg by mouth daily At night, Disp: , Rfl:     hydrochlorothiazide (HYDRODIURIL) 50 mg tablet, Take 1 tablet by mouth once daily, Disp: 90 tablet, Rfl: 0    hydrocortisone 1 % cream, Apply topically 4 (four) times a day as needed for irritation or rash (Patient not taking: Reported on 10/9/2020), Disp: 45 g, Rfl: 0    Insulin Glargine-Lixisenatide (SOLIQUA) 100 units-33 mcg/mL injection pen, Inject 60 Units under the skin daily , Disp: , Rfl:     loratadine (CLARITIN) 10 mg tablet, Take 10 mg by mouth daily, Disp: , Rfl:     losartan (COZAAR) 50 mg tablet, Take 1 tablet by mouth once daily, Disp: 30 tablet, Rfl: 2    metFORMIN (GLUCOPHAGE-XR) 500 mg 24 hr tablet, 2 (two) times a day, Disp: , Rfl:     methylPREDNISolone 4 MG tablet therapy pack, Use as directed on package (Patient not taking: Reported on 5/6/2021), Disp: 21 each, Rfl: 0    Nerve Stimulator (TENS THERAPY PAIN RELIEF) BILLY, 1 Device by Does not apply route 2 (two) times a day as needed (pain), Disp: 1 Device, Rfl: 0    pravastatin (PRAVACHOL) 40 mg tablet, Take 40 mg by mouth daily, Disp: , Rfl:     tiZANidine (ZANAFLEX) 2 mg tablet, Take 1 tablet (2 mg total) by mouth every 8 (eight) hours as needed for muscle spasms, Disp: 90 tablet, Rfl: 5    Allergies   Allergen Reactions    Nuts - Food Allergy Anaphylaxis    Chocolate - Food Allergy Cough    Eggs Or Egg-Derived Products - Food Allergy Other (See Comments)     Cough    Other      LETTUCE    Shellfish Allergy - Food Allergy Other (See Comments)     unknown    Tomato - Food Allergy Cough       Physical Exam:    Ht 5' 5" (1 651 m)   Wt 98 3 kg (216 lb 12 8 oz)   BMI 36 08 kg/m²     Constitutional:normal, well developed, well nourished, alert, in no distress and non-toxic and no overt pain behavior  Eyes:anicteric  HEENT:grossly intact  Neck:supple, symmetric, trachea midline and no masses   Pulmonary:even and unlabored  Cardiovascular:No edema or pitting edema present  Skin:Normal without rashes or lesions and well hydrated  Psychiatric:Mood and affect appropriate  Neurologic:Cranial Nerves II-XII grossly intact  Musculoskeletal:antalgic  Straight leg raise is positive on the left at about 30-40 degrees  Motor strength is intact in both lower extremities at +5 out of +5  Sensory exam is normal both lower extremities without deficits      Imaging  MRI lumbar spine without contrast    (Results Pending)       Orders Placed This Encounter   Procedures    MRI lumbar spine without contrast

## 2021-06-15 ENCOUNTER — HOSPITAL ENCOUNTER (OUTPATIENT)
Dept: MRI IMAGING | Facility: HOSPITAL | Age: 56
Discharge: HOME | End: 2021-06-15
Payer: COMMERCIAL

## 2021-06-15 DIAGNOSIS — M54.50 CHRONIC MIDLINE LOW BACK PAIN WITHOUT SCIATICA: ICD-10-CM

## 2021-06-15 DIAGNOSIS — M47.816 LUMBAR SPONDYLOSIS: ICD-10-CM

## 2021-06-15 DIAGNOSIS — M54.16 LUMBAR RADICULOPATHY: ICD-10-CM

## 2021-06-15 DIAGNOSIS — G89.29 CHRONIC MIDLINE LOW BACK PAIN WITHOUT SCIATICA: ICD-10-CM

## 2021-06-15 PROCEDURE — 72148 MRI LUMBAR SPINE W/O DYE: CPT

## 2021-06-17 ENCOUNTER — TELEPHONE (OUTPATIENT)
Dept: RADIOLOGY | Facility: CLINIC | Age: 56
End: 2021-06-17

## 2021-06-25 ENCOUNTER — OFFICE VISIT (OUTPATIENT)
Dept: FAMILY MEDICINE CLINIC | Facility: CLINIC | Age: 56
End: 2021-06-25
Payer: COMMERCIAL

## 2021-06-25 VITALS
TEMPERATURE: 98.1 F | HEART RATE: 94 BPM | OXYGEN SATURATION: 97 % | WEIGHT: 222 LBS | SYSTOLIC BLOOD PRESSURE: 138 MMHG | RESPIRATION RATE: 18 BRPM | DIASTOLIC BLOOD PRESSURE: 64 MMHG | BODY MASS INDEX: 36.99 KG/M2 | HEIGHT: 65 IN

## 2021-06-25 DIAGNOSIS — I10 ESSENTIAL HYPERTENSION: ICD-10-CM

## 2021-06-25 DIAGNOSIS — R51.9 SINUS HEADACHE: ICD-10-CM

## 2021-06-25 DIAGNOSIS — F33.2 SEVERE EPISODE OF RECURRENT MAJOR DEPRESSIVE DISORDER, WITHOUT PSYCHOTIC FEATURES (HCC): ICD-10-CM

## 2021-06-25 DIAGNOSIS — J30.2 SEASONAL ALLERGIC RHINITIS, UNSPECIFIED TRIGGER: Primary | ICD-10-CM

## 2021-06-25 LAB — HBA1C MFR BLD HPLC: 5.6 %

## 2021-06-25 PROCEDURE — 4010F ACE/ARB THERAPY RXD/TAKEN: CPT | Performed by: NURSE PRACTITIONER

## 2021-06-25 PROCEDURE — 3008F BODY MASS INDEX DOCD: CPT | Performed by: NURSE PRACTITIONER

## 2021-06-25 PROCEDURE — 99213 OFFICE O/P EST LOW 20 MIN: CPT | Performed by: FAMILY MEDICINE

## 2021-06-25 RX ORDER — HYDROCHLOROTHIAZIDE 50 MG/1
50 TABLET ORAL DAILY
Qty: 90 TABLET | Refills: 0 | Status: SHIPPED | OUTPATIENT
Start: 2021-06-25 | End: 2021-11-17

## 2021-06-25 RX ORDER — FLUOXETINE HYDROCHLORIDE 40 MG/1
40 CAPSULE ORAL DAILY
Qty: 30 CAPSULE | Refills: 2 | Status: SHIPPED | OUTPATIENT
Start: 2021-06-25 | End: 2021-09-28

## 2021-06-25 RX ORDER — LORATADINE 10 MG/1
10 TABLET ORAL DAILY
Qty: 90 TABLET | Refills: 0 | Status: SHIPPED | OUTPATIENT
Start: 2021-06-25 | End: 2021-11-12

## 2021-06-25 RX ORDER — SUMATRIPTAN 50 MG/1
TABLET, FILM COATED ORAL
Qty: 9 TABLET | Refills: 0 | Status: SHIPPED | OUTPATIENT
Start: 2021-06-25 | End: 2021-06-30 | Stop reason: ALTCHOICE

## 2021-06-25 RX ORDER — LOSARTAN POTASSIUM 50 MG/1
50 TABLET ORAL DAILY
Qty: 30 TABLET | Refills: 2 | Status: SHIPPED | OUTPATIENT
Start: 2021-06-25 | End: 2021-11-17 | Stop reason: ALTCHOICE

## 2021-06-25 NOTE — PROGRESS NOTES
Assessment/Plan:    No problem-specific Assessment & Plan notes found for this encounter  Diagnoses and all orders for this visit:    Seasonal allergic rhinitis, unspecified trigger  -     loratadine (CLARITIN) 10 mg tablet; Take 1 tablet (10 mg total) by mouth daily    Severe episode of recurrent major depressive disorder, without psychotic features (HCC)  -     FLUoxetine (PROzac) 40 MG capsule; Take 1 capsule (40 mg total) by mouth daily    Essential hypertension  -     hydrochlorothiazide (HYDRODIURIL) 50 mg tablet; Take 1 tablet (50 mg total) by mouth daily  -     losartan (COZAAR) 50 mg tablet; Take 1 tablet (50 mg total) by mouth daily    Sinus headache  -     SUMAtriptan (IMITREX) 50 mg tablet; Take 1 pill as needed at onset of headache  If no relief after 2 hours, repeat the dose           -symptoms likely allergy related  Advised patient to take claritin daily for 2 weeks  She is not interested in using flonase or nasal sprays due to a previous poor experience  Will provide imitrix for short term headache relief  She will call if her symptoms do not improve in 2 weeks   -she is stating she needs refills on her mediations  -she has an endocrinology appointment today  -RTC 6 months or sooner if concerns arise  Subjective:      Patient ID: Ladoris Phoenix is a 50 y o  female who presents for evaluation of headaches  She states she has had a constant headache over the last 4 days  It is located bilaterally and extends to the top of her head  She has associated nasal congestion , sinus pain/pressure, and bilateral ear pain  She notes nasal discharge after showering  At times she has episdoes of dizziness that resolve spontaneously  Denies fever, chills  She wears glasses and has an upcoming eye appointment  No changes in vision, mild photophobia  Her sugars have remained well controlled  She is prescribed claritin but only takes it intermittently         HPI    The following portions of the patient's history were reviewed and updated as appropriate: allergies, current medications, past family history, past medical history, past social history, past surgical history and problem list     Review of Systems   Constitutional: Negative for fever  HENT: Positive for congestion, ear pain, rhinorrhea, sinus pressure and sinus pain  Negative for ear discharge, sneezing and sore throat  Eyes: Negative for photophobia, pain, discharge, redness and itching  Neurological: Positive for dizziness and headaches  Objective:      /64   Pulse 94   Temp 98 1 °F (36 7 °C)   Resp 18   Ht 5' 5" (1 651 m)   Wt 101 kg (222 lb)   SpO2 97%   BMI 36 94 kg/m²          Physical Exam  Vitals reviewed  Constitutional:       General: She is not in acute distress  Appearance: Normal appearance  She is obese  She is not ill-appearing or toxic-appearing  HENT:      Head: Normocephalic and atraumatic  Nose: Mucosal edema, congestion and rhinorrhea present  Right Turbinates: Enlarged  Left Turbinates: Enlarged  Right Sinus: Maxillary sinus tenderness and frontal sinus tenderness present  Left Sinus: Maxillary sinus tenderness and frontal sinus tenderness present  Pulmonary:      Effort: Pulmonary effort is normal       Breath sounds: Normal breath sounds  Neurological:      Mental Status: She is alert  Psychiatric:         Mood and Affect: Mood normal          Behavior: Behavior normal          Thought Content:  Thought content normal          Judgment: Judgment normal

## 2021-06-28 ENCOUNTER — TELEPHONE (OUTPATIENT)
Dept: ADMINISTRATIVE | Facility: OTHER | Age: 56
End: 2021-06-28

## 2021-06-28 NOTE — LETTER
Diabetic Foot Exam Form    Date Requested: 21  Patient: Homar Savage  Patient : 1965   Referring Provider: Ronnell Michelle PA-C    Diabetic Foot Exam Performed with shoes and socks removed        Yes         No     Date of Diabetic Foot Exam ______________________________  Risk Score ____________________________________________    Left Foot       Visual Inspection         Monofilament Testing Sensory Exam        Pedal Pulses         Additional Comments         Right Foot      Visual Inspection         Monofilament Testing Sensory Exam       Pedal Pulses         Additional Comments         Comments __last we have is form 2020 looking for  ________________________________________________________    Practice Providing Exam ______________________________________________    Exam Performed By (print name) _______________________________________      Provider Signature ___________________________________________________      These reports are needed for  compliance    Please fax this completed form and a copy of the Diabetic Foot Exam report to our office located at [place] [address] as soon as possible to [phone] josue Fernando: Phone [phone]    We thank you for your assistance in treating our mutual patient

## 2021-06-28 NOTE — TELEPHONE ENCOUNTER
----- Message from Meena Norwood MA sent at 6/25/2021  1:02 PM EDT -----  06/25/21 1:02 PM    Hello, our patient Homar Savage has had Diabetic Foot Exam completed/performed  Please assist in updating the patient chart by making an External outreach to   [address] [facility] located in [facility]  The date of service is WITHIN THE PAST YEAR      Thank you,  Meena Norwood MA  [facility] [address]

## 2021-06-28 NOTE — TELEPHONE ENCOUNTER
Upon review of the In Basket request and the patient's chart, initial outreach has been made via fax, please see Contacts section for details        Foot 2021    Thank you  Tad King

## 2021-06-28 NOTE — LETTER
Diabetic Foot Exam Form    Date Requested: 21  Patient: Candida Santa  Patient : 1965   Referring Provider: Faustino Kaufman PA-C    Diabetic Foot Exam Performed with shoes and socks removed        Yes         No     Date of Diabetic Foot Exam ______________________________  Risk Score ____________________________________________    Left Foot       Visual Inspection         Monofilament Testing Sensory Exam        Pedal Pulses         Additional Comments         Right Foot      Visual Inspection         Monofilament Testing Sensory Exam       Pedal Pulses         Additional Comments         Comments _____This 2021? Last report 2020  _____________________________________________________    Practice Providing Exam ______________________________________________    Exam Performed By (print name) _______________________________________      Provider Signature ___________________________________________________      These reports are needed for  compliance    Please fax this completed form and a copy of the Diabetic Foot Exam report to our office located at [place] [address] as soon as possible to [phone] josue Fernando: Phone [phone]    We thank you for your assistance in treating our mutual patient

## 2021-06-29 ENCOUNTER — APPOINTMENT (OUTPATIENT)
Dept: LAB | Facility: MEDICAL CENTER | Age: 56
End: 2021-06-29
Payer: COMMERCIAL

## 2021-06-29 DIAGNOSIS — G99.0 LOCOMOTOR ATAXIA DUE TO SECONDARY DIABETES (HCC): ICD-10-CM

## 2021-06-29 DIAGNOSIS — D50.9 IRON DEFICIENCY ANEMIA, UNSPECIFIED IRON DEFICIENCY ANEMIA TYPE: ICD-10-CM

## 2021-06-29 DIAGNOSIS — E13.40 LOCOMOTOR ATAXIA DUE TO SECONDARY DIABETES (HCC): ICD-10-CM

## 2021-06-29 LAB
ERYTHROCYTE [DISTWIDTH] IN BLOOD BY AUTOMATED COUNT: 22.7 % (ref 11.6–15.1)
HCT VFR BLD AUTO: 42.1 % (ref 34.8–46.1)
HGB BLD-MCNC: 13.2 G/DL (ref 11.5–15.4)
IRON SERPL-MCNC: 60 UG/DL (ref 50–170)
MCH RBC QN AUTO: 26.9 PG (ref 26.8–34.3)
MCHC RBC AUTO-ENTMCNC: 31.4 G/DL (ref 31.4–37.4)
MCV RBC AUTO: 86 FL (ref 82–98)
PLATELET # BLD AUTO: 266 THOUSANDS/UL (ref 149–390)
PMV BLD AUTO: 9.3 FL (ref 8.9–12.7)
RBC # BLD AUTO: 4.91 MILLION/UL (ref 3.81–5.12)
WBC # BLD AUTO: 4.88 THOUSAND/UL (ref 4.31–10.16)

## 2021-06-29 PROCEDURE — 85027 COMPLETE CBC AUTOMATED: CPT

## 2021-06-29 PROCEDURE — 36415 COLL VENOUS BLD VENIPUNCTURE: CPT

## 2021-06-29 PROCEDURE — 83540 ASSAY OF IRON: CPT

## 2021-06-30 ENCOUNTER — TELEMEDICINE (OUTPATIENT)
Dept: FAMILY MEDICINE CLINIC | Facility: CLINIC | Age: 56
End: 2021-06-30
Payer: COMMERCIAL

## 2021-06-30 DIAGNOSIS — R51.9 SINUS HEADACHE: Primary | ICD-10-CM

## 2021-06-30 PROCEDURE — G0071 COMM SVCS BY RHC/FQHC 5 MIN: HCPCS | Performed by: FAMILY MEDICINE

## 2021-06-30 RX ORDER — PREDNISONE 20 MG/1
TABLET ORAL
Qty: 18 TABLET | Refills: 0 | Status: SHIPPED | OUTPATIENT
Start: 2021-06-30 | End: 2021-07-08

## 2021-06-30 NOTE — PROGRESS NOTES
Virtual Brief Visit    Assessment/Plan:    Problem List Items Addressed This Visit     None      Visit Diagnoses     Sinus headache    -  Primary    Relevant Medications    predniSONE 20 mg tablet        -will add prednisone, advised patient to continue claritin but d/c imitrix  Reviewed with patient possibility of transient glucose elevatations  Her DM is well controlled, last A1C 5 8%  -RTC as scheduled for routine F/U (6 months) or sooner if concerns arise         Reason for visit is   Chief Complaint   Patient presents with    Headache     patient states that she is taking the imitrex for her headaches but it only helps for the first ten minutes    Virtual Brief Visit        Encounter provider Cassandra Taylor PA-C    Provider located at [address] [place] [id number]    Recent Visits  Date Type Provider Dept   06/25/21 Office Visit Madison Boyd PA-C [facility] recent visits within past 7 days and meeting all other requirements  Today's Visits  Date Type Provider Dept   06/30/21 Telemedicine Madison Boyd PA-C [facility] today's visits and meeting all other requirements  Future Appointments  No visits were found meeting these conditions  Showing future appointments within next 150 days and meeting all other requirements       After connecting through telephone, the patient was identified by name and date of birth  Swetha Gomez was informed that this is a telemedicine visit and that the visit is being conducted through [employer] and patient was informed that this is a secure, HIPAA-compliant platform  She agrees to proceed     My office door was closed  No one else was in the room  She acknowledged consent and understanding of privacy and security of the platform  The patient has agreed to participate and understands she can discontinue the visit at any time      Patient is aware this is a billable service  Subjective    Nicola Gilliam is a 50 y o  female who presents via telemedicine for continued headache  She state her congestion, sinus pain/pressure, and ear pain have resolved with initiation of claritin, however, she continues with headaches  They have improved slightly but are still located in the posterior aspect of her head and her neck  She has no associated dizziness but does admit to mild photophobia  She denies double or blurry vision, eye tearing, fever, chills  She states the initial dose of imitrix helped for approx 10 minutes  The repeat dose did not help  HPI     Past Medical History:   Diagnosis Date    Diabetes mellitus ([facility] [address] )     Hydrocephalus ([facility] [address] )     Hyperlipidemia     Hypertension     Migraine     Sciatica     Vertigo        Past Surgical History:   Procedure Laterality Date    EPIDURAL BLOCK INJECTION Right 7/2/2020    Procedure: Right L4-5 and L5-S1 transforaminal epidural steroid injection ([id number]);   Surgeon: Marely Farias MD;  Location: [facility];  Service: Pain Management     FL GUIDED NEEDLE PLAC BX/ASP/INJ  7/2/2020    IL ESOPHAGOGASTRODUODENOSCOPY TRANSORAL DIAGNOSTIC N/A 10/23/2018    Procedure: EGD with Savary dilation AND COLONOSCOPY;  Surgeon: Vidya Artis MD;  Location: [facility];  Service: Gastroenterology    TUBAL LIGATION         Current Outpatient Medications   Medication Sig Dispense Refill    FLUoxetine (PROzac) 40 MG capsule Take 1 capsule (40 mg total) by mouth daily 30 capsule 2    gabapentin (NEURONTIN) 600 MG tablet Take 1 tablet (600 mg total) by mouth 3 (three) times a day 90 tablet 1    glimepiride (AMARYL) 4 mg tablet Take 2 mg by mouth daily At night      hydrochlorothiazide (HYDRODIURIL) 50 mg tablet Take 1 tablet (50 mg total) by mouth daily 90 tablet 0    Insulin Glargine-Lixisenatide (SOLIQUA) 100 units-33 mcg/mL injection pen Inject 60 Units under the skin daily       loratadine (CLARITIN) 10 mg tablet Take 1 tablet (10 mg total) by mouth daily 90 tablet 0    losartan (COZAAR) 50 mg tablet Take 1 tablet (50 mg total) by mouth daily 30 tablet 2    Nerve Stimulator (TENS THERAPY PAIN RELIEF) BILLY 1 Device by Does not apply route 2 (two) times a day as needed (pain) 1 Device 0    pravastatin (PRAVACHOL) 40 mg tablet Take 40 mg by mouth daily      predniSONE 20 mg tablet Take 3 tabs for 3 days followed by 2 tabs for 3 days followed by 1 tab for 3 days  18 tablet 0    tiZANidine (ZANAFLEX) 2 mg tablet Take 1 tablet (2 mg total) by mouth every 8 (eight) hours as needed for muscle spasms 90 tablet 5     No current facility-administered medications for this visit  Allergies   Allergen Reactions    Nuts - Food Allergy Anaphylaxis    Chocolate - Food Allergy Cough    Eggs Or Egg-Derived Products - Food Allergy Other (See Comments)     Cough    Other      LETTUCE    Shellfish Allergy - Food Allergy Other (See Comments)     unknown    Tomato - Food Allergy Cough       Review of Systems   Constitutional: Negative for activity change, appetite change, chills, diaphoresis, fatigue and fever  HENT: Negative for congestion, ear pain, postnasal drip, rhinorrhea, sinus pressure, sinus pain, sneezing and sore throat  Eyes: Positive for photophobia  Negative for pain, discharge, redness, itching and visual disturbance  Respiratory: Negative for cough, shortness of breath and wheezing  Cardiovascular: Negative for chest pain  Neurological: Positive for headaches  Negative for dizziness, seizures, syncope, facial asymmetry, speech difficulty, weakness and light-headedness  There were no vitals filed for this visit  I spent 10 minutes with patient today in which greater than 50% of the time was spent in counseling/coordination of care regarding assessment and plan of care    VIRTUAL VISIT DISCLAIMER    Ada Palomares acknowledges that she has consented to an online visit or consultation  She understands that the online visit is based solely on information provided by her, and that, in the absence of a face-to-face physical evaluation by the physician, the diagnosis she receives is both limited and provisional in terms of accuracy and completeness  This is not intended to replace a full medical face-to-face evaluation by the physician  Luis Teague understands and accepts these terms

## 2021-07-06 NOTE — TELEPHONE ENCOUNTER
As a follow-up, a second attempt has been made for outreach via fax, please see Contacts section for details       Att x2    Thank you  Alka Garcia

## 2021-07-08 ENCOUNTER — TELEPHONE (OUTPATIENT)
Dept: FAMILY MEDICINE CLINIC | Facility: CLINIC | Age: 56
End: 2021-07-08

## 2021-07-08 NOTE — TELEPHONE ENCOUNTER
Left message for patient to call office back  Patient is due for another pelvic u/s that was ordered in October for her

## 2021-07-08 NOTE — TELEPHONE ENCOUNTER
Patient called back and refuses to have u/s done at this time  Patient states he abnormal bleeding has stopped and will call us back if she changes her mind

## 2021-07-12 ENCOUNTER — TELEPHONE (OUTPATIENT)
Dept: HEMATOLOGY ONCOLOGY | Facility: CLINIC | Age: 56
End: 2021-07-12

## 2021-07-12 NOTE — TELEPHONE ENCOUNTER
Left message we had to  rs her appt from Aug 10th  To Sept 9th  Stated if that  Is not good to myah back and  Rs

## 2021-07-19 ENCOUNTER — TELEPHONE (OUTPATIENT)
Dept: FAMILY MEDICINE CLINIC | Facility: CLINIC | Age: 56
End: 2021-07-19

## 2021-07-19 NOTE — TELEPHONE ENCOUNTER
Patient called stating both of her ears are still blocked  I offered appointment today for patient to come in, but she is at work  She wants to know if something can be called in for her ears?

## 2021-07-19 NOTE — TELEPHONE ENCOUNTER
Flonase or a nasal saline spray would likely be very beneficial, however, she declines using that in the past  She can try an OTC nasal decongestant for 5 days and see if that helps  Thanks!

## 2021-07-21 NOTE — TELEPHONE ENCOUNTER
Patient called back and left message for me to return her call  Called patient and got her voicemail   Left message for patient to call office back

## 2021-08-11 ENCOUNTER — OFFICE VISIT (OUTPATIENT)
Dept: FAMILY MEDICINE CLINIC | Facility: CLINIC | Age: 56
End: 2021-08-11
Payer: COMMERCIAL

## 2021-08-11 ENCOUNTER — TELEPHONE (OUTPATIENT)
Dept: ADMINISTRATIVE | Facility: OTHER | Age: 56
End: 2021-08-11

## 2021-08-11 VITALS
HEIGHT: 65 IN | DIASTOLIC BLOOD PRESSURE: 78 MMHG | BODY MASS INDEX: 36.99 KG/M2 | HEART RATE: 93 BPM | RESPIRATION RATE: 18 BRPM | OXYGEN SATURATION: 95 % | SYSTOLIC BLOOD PRESSURE: 120 MMHG | WEIGHT: 222 LBS

## 2021-08-11 DIAGNOSIS — G44.229 CHRONIC TENSION-TYPE HEADACHE, NOT INTRACTABLE: ICD-10-CM

## 2021-08-11 DIAGNOSIS — R05.9 COUGH: ICD-10-CM

## 2021-08-11 DIAGNOSIS — F41.0 ANXIETY ATTACK: Primary | ICD-10-CM

## 2021-08-11 PROCEDURE — 99213 OFFICE O/P EST LOW 20 MIN: CPT | Performed by: FAMILY MEDICINE

## 2021-08-11 RX ORDER — HYDROXYZINE PAMOATE 25 MG/1
25 CAPSULE ORAL 3 TIMES DAILY PRN
Qty: 30 CAPSULE | Refills: 0 | Status: SHIPPED | OUTPATIENT
Start: 2021-08-11 | End: 2021-11-08 | Stop reason: ALTCHOICE

## 2021-08-11 RX ORDER — PREDNISONE 10 MG/1
TABLET ORAL
COMMUNITY
Start: 2021-08-06 | End: 2021-11-12

## 2021-08-11 NOTE — TELEPHONE ENCOUNTER
----- Message from Tana Witt MA sent at 8/11/2021  8:49 AM EDT -----  08/11/21 8:49 AM    Hello, our patient Burdette Sandifer has had Hemoglobin A1c completed/performed  Please assist in updating the patient chart by making an External outreach to [address] located in [facility]  The date of service is WITHIN THE PAST 3 MONTHS      Thank you,  Tana Witt MA  [facility] [address]

## 2021-08-11 NOTE — TELEPHONE ENCOUNTER
Upon review of the In Basket request and the patient's chart, initial outreach has been made via fax, please see Contacts section for details       Thank you  Charis Putnam MA

## 2021-08-11 NOTE — TELEPHONE ENCOUNTER
Upon review of the In Basket request we were able to locate, review, and update the patient chart as requested for Hemoglobin A1c  Any additional questions or concerns should be emailed to the Practice Liaisons via [email or web address] email, please do not reply via In Basket      Thank you  Nino Boas, MA

## 2021-08-11 NOTE — LETTER
Lab Result(s) Request Form: Hemoglobin A1c      Date Requested: 21  Patient: Marky Angle  Patient : 1965   Referring Provider: Juana Giron PA-C        Date of Lab Collection ______________________________       The above patient has informed us that they have completed their   most recent Hemoglobin A1c at your facility  Please complete   this form and attach all corresponding procedure reports/results  Comments __________________________________________________________  ____________________________________________________________________  ____________________________________________________________________  ____________________________________________________________________    Collecting/Resulting Facility  ___________________________________________  Form Completed By (print name) ________________________________________    Signature ___________________________________________________________      These reports are needed for  compliance    Please fax this completed form and a copy of the lab results/report to our office located at [place] [address] as soon as possible to [phone] [facility]: Phone [phone]    We thank you for your assistance in treating our mutual patient

## 2021-08-11 NOTE — PROGRESS NOTES
Assessment/Plan:    No problem-specific Assessment & Plan notes found for this encounter  Diagnoses and all orders for this visit:    Anxiety attack  -     hydrOXYzine pamoate (VISTARIL) 25 mg capsule; Take 1 capsule (25 mg total) by mouth 3 (three) times a day as needed for itching    Chronic tension-type headache, not intractable  -     CT head wo contrast; Future    Cough  -     XR chest pa & lateral; Future    Other orders  -     predniSONE 10 mg tablet; TAKE 4 TABLETS BY MOUTH DAILY FOR 3 DAYS THEN 3 TABLETS DAILY FOR 3 DAYS THEN 2 TABLET DAILY FOR 3 DAYS AND THEN 1 TABLET DAILY FOR 3 DAYS THEN STOP          -will check head CT given age and headaches unresponsive to traditional therapy   -CXR for cough  Continue claratin and prednisone   -will initiate vistaril 25 mg TID for anxiety and co- morbid allergic rhinitus  -RTC 6 weeks for re check or sooner if concerns arise  We will call with CT and CXR results and further recommendations  Venkata agudelo verbalized understanding    Subjective:      Patient ID: Sowmya Vallecillo is a 50 y o  female who presents for evaluation of anxiety, headaches, and cough  West allis reports increased anxiety secondary to her 's medical conditions  She notes working long hours and inability to make time to herself  She is currently taking prozac 40mg  She wishes to start something temporary for bouts of anxiety symptoms  She notes difficulty sleeping secondary to this  West allis continues with ongoing tension and sinus like headaches sine June 2021  She trialed multiple courses of prednisone, imitrex, abx without relief of symptoms  She has associated bilateral ear fullness  She is taking an antihistamine  She has associated night time non productive cough       HPI    The following portions of the patient's history were reviewed and updated as appropriate: allergies, current medications, past family history, past medical history, past social history, past surgical history and problem list     Review of Systems   Constitutional: Negative for activity change, appetite change, chills, fatigue and fever  HENT: Positive for rhinorrhea and sinus pressure  Negative for congestion, postnasal drip, sinus pain, sneezing and sore throat  Bilat ear pressure   Eyes: Negative for photophobia and visual disturbance  Respiratory: Positive for cough  Negative for shortness of breath and wheezing  Cardiovascular: Negative for chest pain, palpitations and leg swelling  Neurological: Positive for headaches  Negative for dizziness, syncope, weakness, light-headedness and numbness  Psychiatric/Behavioral: Positive for agitation and sleep disturbance  Negative for behavioral problems, decreased concentration, dysphoric mood, self-injury and suicidal ideas  The patient is nervous/anxious  The patient is not hyperactive  Objective:      /78   Pulse 93   Resp 18   Ht 5' 5" (1 651 m)   Wt 101 kg (222 lb)   SpO2 95%   BMI 36 94 kg/m²          Physical Exam  Constitutional:       General: She is not in acute distress  Appearance: Normal appearance  She is well-developed  She is obese  She is not ill-appearing or toxic-appearing  HENT:      Head: Normocephalic and atraumatic  Eyes:      Pupils: Pupils are equal, round, and reactive to light  Cardiovascular:      Rate and Rhythm: Normal rate and regular rhythm  Pulses: Normal pulses  Heart sounds: Normal heart sounds  No murmur heard  Pulmonary:      Effort: Pulmonary effort is normal  No respiratory distress  Breath sounds: Normal breath sounds  No wheezing  Abdominal:      General: Abdomen is flat  Bowel sounds are normal  There is no distension  Palpations: Abdomen is soft  There is no mass  Tenderness: There is no abdominal tenderness  Hernia: No hernia is present  Skin:     General: Skin is warm and dry  Capillary Refill: Capillary refill takes less than 2 seconds  Neurological:      General: No focal deficit present  Mental Status: She is alert and oriented to person, place, and time  Mental status is at baseline  Cranial Nerves: No cranial nerve deficit  Sensory: No sensory deficit  Motor: Motor function is intact  Coordination: Coordination is intact  Gait: Gait is intact  Psychiatric:         Mood and Affect: Mood normal          Behavior: Behavior normal          Thought Content:  Thought content normal          Judgment: Judgment normal

## 2021-08-11 NOTE — PROGRESS NOTES
Patient's shoes and socks removed  Right Foot/Ankle   Right Foot Inspection  Skin Exam: dry skin                            Sensory       Monofilament testing: intact  Vascular  Capillary refills: < 3 seconds      Left Foot/Ankle  Left Foot Inspection  Skin Exam: dry skin                                         Sensory       Monofilament: intact  Vascular  Capillary refills: < 3 seconds      Assign Risk Category:  No deformity present; ;        Risk: 0

## 2021-08-16 ENCOUNTER — TELEPHONE (OUTPATIENT)
Dept: FAMILY MEDICINE CLINIC | Facility: CLINIC | Age: 56
End: 2021-08-16

## 2021-08-16 NOTE — TELEPHONE ENCOUNTER
LMOM for patient to call office back   CT scan was approved for head/brain w/out contrast       Valid 8/13/21-10/12/21  Ashu Cook8 # [id number]

## 2021-08-17 ENCOUNTER — OFFICE VISIT (OUTPATIENT)
Dept: PAIN MEDICINE | Facility: CLINIC | Age: 56
End: 2021-08-17
Payer: COMMERCIAL

## 2021-08-17 ENCOUNTER — VBI (OUTPATIENT)
Dept: ADMINISTRATIVE | Facility: OTHER | Age: 56
End: 2021-08-17

## 2021-08-17 VITALS
BODY MASS INDEX: 36.92 KG/M2 | DIASTOLIC BLOOD PRESSURE: 76 MMHG | HEART RATE: 98 BPM | SYSTOLIC BLOOD PRESSURE: 112 MMHG | HEIGHT: 65 IN | WEIGHT: 221.6 LBS | TEMPERATURE: 97.8 F

## 2021-08-17 DIAGNOSIS — M54.41 CHRONIC MIDLINE LOW BACK PAIN WITH RIGHT-SIDED SCIATICA: ICD-10-CM

## 2021-08-17 DIAGNOSIS — M54.31 SCIATICA OF RIGHT SIDE: ICD-10-CM

## 2021-08-17 DIAGNOSIS — M79.18 MYOFASCIAL PAIN SYNDROME: ICD-10-CM

## 2021-08-17 DIAGNOSIS — G89.4 CHRONIC PAIN SYNDROME: Primary | ICD-10-CM

## 2021-08-17 DIAGNOSIS — G89.29 CHRONIC MIDLINE LOW BACK PAIN WITHOUT SCIATICA: ICD-10-CM

## 2021-08-17 DIAGNOSIS — G89.29 CHRONIC MIDLINE LOW BACK PAIN WITH RIGHT-SIDED SCIATICA: ICD-10-CM

## 2021-08-17 DIAGNOSIS — M47.816 LUMBAR SPONDYLOSIS: ICD-10-CM

## 2021-08-17 DIAGNOSIS — M54.16 LUMBAR RADICULOPATHY: ICD-10-CM

## 2021-08-17 DIAGNOSIS — M54.50 CHRONIC MIDLINE LOW BACK PAIN WITHOUT SCIATICA: ICD-10-CM

## 2021-08-17 PROCEDURE — 1036F TOBACCO NON-USER: CPT | Performed by: NURSE PRACTITIONER

## 2021-08-17 PROCEDURE — 3008F BODY MASS INDEX DOCD: CPT | Performed by: NURSE PRACTITIONER

## 2021-08-17 PROCEDURE — 99214 OFFICE O/P EST MOD 30 MIN: CPT | Performed by: NURSE PRACTITIONER

## 2021-08-17 RX ORDER — GABAPENTIN 600 MG/1
600 TABLET ORAL 3 TIMES DAILY
Qty: 90 TABLET | Refills: 2 | Status: SHIPPED | OUTPATIENT
Start: 2021-08-17 | End: 2021-11-11

## 2021-08-17 NOTE — PROGRESS NOTES
Assessment:  1  Chronic pain syndrome    2  Chronic midline low back pain without sciatica    3  Lumbar radiculopathy    4  Lumbar spondylosis    5  Myofascial pain syndrome    6  Chronic midline low back pain with right-sided sciatica    7  Sciatica of right side        Plan:   While the patient was in the office today, I did have a thorough conversation regarding their chronic pain syndrome, medication management, and treatment plan options  Patient is being seen for follow-up visit  She was last seen here on 06/08/2021 at which time an MRI of her lumbar spine was ordered  MRI revealed minimal age- appropriate degenerative changes at L4-5 which appears similar to 2018 without high-grade neural impingement  There is no progressive spondylosis or acute findings  There are no significant changes from prior MRI  There is facet arthritis at L3-4, L4-5, L5-S1  There is a mild disc bulge to the right at L4-5  MRI results were reviewed with patient during today's visit  MRI results were reviewed with patient during today's visit  Will schedule an L5-S1 interlaminar epidural steroid injection in the near future  Complete risks and benefits including bleeding, infection, tissue reaction, nerve injury and allergic reaction were discussed  The approach was demonstrated using models and literature was provided  Verbal and written consent was obtained  Renewed  Gabapentin 600 mg 3 times daily  Prescription was sent electronically to her pharmacy with refills  Follow-up 4 weeks after the injection  History of Present Illness: The patient is a 50 y o  female who presents for a follow up office visit in regards to Back Pain and Leg Pain  The patients current symptoms include   Complaints of low back pain that radiates down the posterior aspect of both legs to her knees  Current pain level 6/10  Quality pain is described as dull, aching, sharp, shooting, numb, pins and needles      Current pain medications includes:   Gabapentin 600 mg 3 times daily   The patient reports that this regimen is providing  50% pain relief  The patient is reporting no side effects from this pain medication regimen  I have personally reviewed and/or updated the patient's past medical history, past surgical history, family history, social history, current medications, allergies, and vital signs today  Review of Systems  Review of Systems   Respiratory: Negative for shortness of breath  Cardiovascular: Negative for chest pain  Gastrointestinal: Negative for constipation, diarrhea, nausea and vomiting  Musculoskeletal: Positive for gait problem  Negative for arthralgias, joint swelling and myalgias  Neurological: Negative for dizziness, seizures and weakness  All other systems reviewed and are negative  Past Medical History:   Diagnosis Date    Diabetes mellitus ([facility] [address] )     [facility]cephalus ([facility] )     Hyperlipidemia     Hypertension     Migraine     Sciatica     Vertigo        Past Surgical History:   Procedure Laterality Date    EPIDURAL BLOCK INJECTION Right 7/2/2020    Procedure: Right L4-5 and L5-S1 transforaminal epidural steroid injection ([id number]);   Surgeon: Sara Boyd MD;  Location: [facility];  Service: Pain Management     FL GUIDED NEEDLE PLAC BX/ASP/INJ  7/2/2020    NV ESOPHAGOGASTRODUODENOSCOPY TRANSORAL DIAGNOSTIC N/A 10/23/2018    Procedure: EGD with Savary dilation AND COLONOSCOPY;  Surgeon: Mary Gonzales MD;  Location: [facility];  Service: Gastroenterology    TUBAL LIGATION         Family History   Problem Relation Age of Onset    Diabetes Father     Cancer Father        Social History     Occupational History    Not on file   Tobacco Use    Smoking status: Never Smoker    Smokeless tobacco: Never Used   Vaping Use    Vaping Use: Never used   Substance and Sexual Activity    Alcohol use: No    Drug use: No    Sexual activity: Not Currently     Comment:          Current Outpatient Medications:     FLUoxetine (PROzac) 40 MG capsule, Take 1 capsule (40 mg total) by mouth daily, Disp: 30 capsule, Rfl: 2    gabapentin (NEURONTIN) 600 MG tablet, Take 1 tablet (600 mg total) by mouth 3 (three) times a day, Disp: 90 tablet, Rfl: 2    glimepiride (AMARYL) 4 mg tablet, Take 2 mg by mouth daily At night, Disp: , Rfl:     hydrochlorothiazide (HYDRODIURIL) 50 mg tablet, Take 1 tablet (50 mg total) by mouth daily, Disp: 90 tablet, Rfl: 0    hydrOXYzine pamoate (VISTARIL) 25 mg capsule, Take 1 capsule (25 mg total) by mouth 3 (three) times a day as needed for itching, Disp: 30 capsule, Rfl: 0    Insulin Glargine-Lixisenatide (SOLIQUA) 100 units-33 mcg/mL injection pen, Inject 30 Units under the skin daily , Disp: , Rfl:     loratadine (CLARITIN) 10 mg tablet, Take 1 tablet (10 mg total) by mouth daily, Disp: 90 tablet, Rfl: 0    losartan (COZAAR) 50 mg tablet, Take 1 tablet (50 mg total) by mouth daily, Disp: 30 tablet, Rfl: 2    Nerve Stimulator (TENS THERAPY PAIN RELIEF) BILLY, 1 Device by Does not apply route 2 (two) times a day as needed (pain), Disp: 1 Device, Rfl: 0    pravastatin (PRAVACHOL) 40 mg tablet, Take 40 mg by mouth daily, Disp: , Rfl:     tiZANidine (ZANAFLEX) 2 mg tablet, Take 1 tablet (2 mg total) by mouth every 8 (eight) hours as needed for muscle spasms, Disp: 90 tablet, Rfl: 5    predniSONE 10 mg tablet, TAKE 4 TABLETS BY MOUTH DAILY FOR 3 DAYS THEN 3 TABLETS DAILY FOR 3 DAYS THEN 2 TABLET DAILY FOR 3 DAYS AND THEN 1 TABLET DAILY FOR 3 DAYS THEN STOP (Patient not taking: Reported on 8/17/2021), Disp: , Rfl:     Allergies   Allergen Reactions    Nuts - Food Allergy Anaphylaxis    Chocolate - Food Allergy Cough    Eggs Or Egg-Derived Products - Food Allergy Other (See Comments)     Cough    Other      LETTUCE    Shellfish Allergy - Food Allergy Other (See Comments)     unknown    Tomato - Food Allergy Cough       Physical Exam:    BP 112/76 (BP Location: Left arm, Patient Position: Sitting, Cuff Size: Large)   Pulse 98   Temp 97 8 °F (36 6 °C)   Ht 5' 5" (1 651 m)   Wt 101 kg (221 lb 9 6 oz)   BMI 36 88 kg/m²     Constitutional:normal, well developed, well nourished, alert, in no distress and non-toxic and no overt pain behavior  Eyes:anicteric  HEENT:grossly intact  Neck:supple, symmetric, trachea midline and no masses   Pulmonary:even and unlabored  Cardiovascular:No edema or pitting edema present  Skin:Normal without rashes or lesions and well hydrated  Psychiatric:Mood and affect appropriate  Neurologic:Cranial Nerves II-XII grossly intact  Musculoskeletal:normal    Imaging  MRI LUMBAR SPINE WITHOUT CONTRAST     INDICATION: Low back pain with numbness, tingling, and left leg weakness      COMPARISON:  CT of the lumbar spine dated 6/12/2020  MRI dated 5/15/2018      TECHNIQUE:  Sagittal T1, sagittal T2, sagittal inversion recovery, axial T1 and axial T2, coronal T2        IMAGE QUALITY:  Diagnostic     FINDINGS:     ALIGNMENT: Broad-based thoracolumbar levocurvature appears similar to priors, not qualifying as missy scoliosis  Normal lordosis without listhesis  No acute subluxation      BONES: Benign intraosseous hemangioma in the L1 vertebral body  No concerning destructive lesions  No acute fractures  No pathologic marrow infiltration      SACRUM:  Normal signal within the sacrum   No evidence of insufficiency or stress fracture      DISTAL CORD AND CONUS:  Normal size and signal within the distal cord and conus      PARASPINAL SOFT TISSUES:  Paraspinal soft tissues are unremarkable      LOWER THORACIC DISC SPACES:  Asymmetric right facet arthropathy and T10-11 results in minimal impingement on the posterior thecal sac, though there are no evident areas of significant neural compression in the visualized thoracic spine      LUMBAR DISC SPACES:     L1-L2:  No significant spondylosis      L2-L3:  No significant spondylosis      L3-L4:  Minimal facet arthropathy and ligamentum flavum thickening without neural impingement      L4-L5:  Mild eccentric right bulge  Mild to moderate facet arthropathy and ligamentum flavum thickening  Flattening of the ventral thecal sac  No significant lateral recess stenosis  Trace bilateral foraminal stenoses      L5-S1:  Mild to moderate facet arthropathy and ligamentum flavum thickening  Tiny eccentric right protrusion  Trace right foraminal stenosis      IMPRESSION:     Minimal, age-appropriate degenerative changes at L4-5 appear similar to 2018 without high-grade neural impingement    No progressive spondylosis, acute findings, or significant change from priors

## 2021-08-17 NOTE — H&P (VIEW-ONLY)
Assessment:  1  Chronic pain syndrome    2  Chronic midline low back pain without sciatica    3  Lumbar radiculopathy    4  Lumbar spondylosis    5  Myofascial pain syndrome    6  Chronic midline low back pain with right-sided sciatica    7  Sciatica of right side        Plan:   While the patient was in the office today, I did have a thorough conversation regarding their chronic pain syndrome, medication management, and treatment plan options  Patient is being seen for follow-up visit  She was last seen here on 06/08/2021 at which time an MRI of her lumbar spine was ordered  MRI revealed minimal age- appropriate degenerative changes at L4-5 which appears similar to 2018 without high-grade neural impingement  There is no progressive spondylosis or acute findings  There are no significant changes from prior MRI  There is facet arthritis at L3-4, L4-5, L5-S1  There is a mild disc bulge to the right at L4-5  MRI results were reviewed with patient during today's visit  MRI results were reviewed with patient during today's visit  Will schedule an L5-S1 interlaminar epidural steroid injection in the near future  Complete risks and benefits including bleeding, infection, tissue reaction, nerve injury and allergic reaction were discussed  The approach was demonstrated using models and literature was provided  Verbal and written consent was obtained  Renewed  Gabapentin 600 mg 3 times daily  Prescription was sent electronically to her pharmacy with refills  Follow-up 4 weeks after the injection  History of Present Illness: The patient is a 50 y o  female who presents for a follow up office visit in regards to Back Pain and Leg Pain  The patients current symptoms include   Complaints of low back pain that radiates down the posterior aspect of both legs to her knees  Current pain level 6/10  Quality pain is described as dull, aching, sharp, shooting, numb, pins and needles      Current pain medications includes:   Gabapentin 600 mg 3 times daily   The patient reports that this regimen is providing  50% pain relief  The patient is reporting no side effects from this pain medication regimen  I have personally reviewed and/or updated the patient's past medical history, past surgical history, family history, social history, current medications, allergies, and vital signs today  Review of Systems  Review of Systems   Respiratory: Negative for shortness of breath  Cardiovascular: Negative for chest pain  Gastrointestinal: Negative for constipation, diarrhea, nausea and vomiting  Musculoskeletal: Positive for gait problem  Negative for arthralgias, joint swelling and myalgias  Neurological: Negative for dizziness, seizures and weakness  All other systems reviewed and are negative  Past Medical History:   Diagnosis Date    Diabetes mellitus ([facility] [address] )     Hydrocephalus ([facility] )     Hyperlipidemia     Hypertension     Migraine     Sciatica     Vertigo        Past Surgical History:   Procedure Laterality Date    EPIDURAL BLOCK INJECTION Right 7/2/2020    Procedure: Right L4-5 and L5-S1 transforaminal epidural steroid injection ([id number]);   Surgeon: Manish Shine MD;  Location: [facility];  Service: Pain Management     FL GUIDED NEEDLE PLAC BX/ASP/INJ  7/2/2020    DC ESOPHAGOGASTRODUODENOSCOPY TRANSORAL DIAGNOSTIC N/A 10/23/2018    Procedure: EGD with Savary dilation AND COLONOSCOPY;  Surgeon: Herbert Hendricks MD;  [facility]: [facility];  Service: Gastroenterology    TUBAL LIGATION         Family History   Problem Relation Age of Onset    Diabetes Father     Cancer Father        Social History     Occupational History    Not on file   Tobacco Use    Smoking status: Never Smoker    Smokeless tobacco: Never Used   Vaping Use    Vaping Use: Never used   Substance and Sexual Activity    Alcohol use: No    Drug use: No    Sexual activity: Not Currently     Comment:          Current Outpatient Medications:     FLUoxetine (PROzac) 40 MG capsule, Take 1 capsule (40 mg total) by mouth daily, Disp: 30 capsule, Rfl: 2    gabapentin (NEURONTIN) 600 MG tablet, Take 1 tablet (600 mg total) by mouth 3 (three) times a day, Disp: 90 tablet, Rfl: 2    glimepiride (AMARYL) 4 mg tablet, Take 2 mg by mouth daily At night, Disp: , Rfl:     hydrochlorothiazide (HYDRODIURIL) 50 mg tablet, Take 1 tablet (50 mg total) by mouth daily, Disp: 90 tablet, Rfl: 0    hydrOXYzine pamoate (VISTARIL) 25 mg capsule, Take 1 capsule (25 mg total) by mouth 3 (three) times a day as needed for itching, Disp: 30 capsule, Rfl: 0    Insulin Glargine-Lixisenatide (SOLIQUA) 100 units-33 mcg/mL injection pen, Inject 30 Units under the skin daily , Disp: , Rfl:     loratadine (CLARITIN) 10 mg tablet, Take 1 tablet (10 mg total) by mouth daily, Disp: 90 tablet, Rfl: 0    losartan (COZAAR) 50 mg tablet, Take 1 tablet (50 mg total) by mouth daily, Disp: 30 tablet, Rfl: 2    Nerve Stimulator (TENS THERAPY PAIN RELIEF) BILLY, 1 Device by Does not apply route 2 (two) times a day as needed (pain), Disp: 1 Device, Rfl: 0    pravastatin (PRAVACHOL) 40 mg tablet, Take 40 mg by mouth daily, Disp: , Rfl:     tiZANidine (ZANAFLEX) 2 mg tablet, Take 1 tablet (2 mg total) by mouth every 8 (eight) hours as needed for muscle spasms, Disp: 90 tablet, Rfl: 5    predniSONE 10 mg tablet, TAKE 4 TABLETS BY MOUTH DAILY FOR 3 DAYS THEN 3 TABLETS DAILY FOR 3 DAYS THEN 2 TABLET DAILY FOR 3 DAYS AND THEN 1 TABLET DAILY FOR 3 DAYS THEN STOP (Patient not taking: Reported on 8/17/2021), Disp: , Rfl:     Allergies   Allergen Reactions    Nuts - Food Allergy Anaphylaxis    Chocolate - Food Allergy Cough    Eggs Or Egg-Derived Products - Food Allergy Other (See Comments)     Cough    Other      LETTUCE    Shellfish Allergy - Food Allergy Other (See Comments)     unknown    Tomato - Food Allergy Cough       Physical Exam:    BP 112/76 (BP Location: Left arm, Patient Position: Sitting, Cuff Size: Large)   Pulse 98   Temp 97 8 °F (36 6 °C)   Ht 5' 5" (1 651 m)   Wt 101 kg (221 lb 9 6 oz)   BMI 36 88 kg/m²     Constitutional:normal, well developed, well nourished, alert, in no distress and non-toxic and no overt pain behavior  Eyes:anicteric  HEENT:grossly intact  Neck:supple, symmetric, trachea midline and no masses   Pulmonary:even and unlabored  Cardiovascular:No edema or pitting edema present  Skin:Normal without rashes or lesions and well hydrated  Psychiatric:Mood and affect appropriate  Neurologic:Cranial Nerves II-XII grossly intact  Musculoskeletal:normal    Imaging  MRI LUMBAR SPINE WITHOUT CONTRAST     INDICATION: Low back pain with numbness, tingling, and left leg weakness      COMPARISON:  CT of the lumbar spine dated 6/12/2020  MRI dated 5/15/2018      TECHNIQUE:  Sagittal T1, sagittal T2, sagittal inversion recovery, axial T1 and axial T2, coronal T2        IMAGE QUALITY:  Diagnostic     FINDINGS:     ALIGNMENT: Broad-based thoracolumbar levocurvature appears similar to priors, not qualifying as missy scoliosis  Normal lordosis without listhesis  No acute subluxation      BONES: Benign intraosseous hemangioma in the L1 vertebral body  No concerning destructive lesions  No acute fractures  No pathologic marrow infiltration      SACRUM:  Normal signal within the sacrum   No evidence of insufficiency or stress fracture      DISTAL CORD AND CONUS:  Normal size and signal within the distal cord and conus      PARASPINAL SOFT TISSUES:  Paraspinal soft tissues are unremarkable      LOWER THORACIC DISC SPACES:  Asymmetric right facet arthropathy and T10-11 results in minimal impingement on the posterior thecal sac, though there are no evident areas of significant neural compression in the visualized thoracic spine      LUMBAR DISC SPACES:     L1-L2:  No significant spondylosis      L2-L3:  No significant spondylosis      L3-L4:  Minimal facet arthropathy and ligamentum flavum thickening without neural impingement      L4-L5:  Mild eccentric right bulge  Mild to moderate facet arthropathy and ligamentum flavum thickening  Flattening of the ventral thecal sac  No significant lateral recess stenosis  Trace bilateral foraminal stenoses      L5-S1:  Mild to moderate facet arthropathy and ligamentum flavum thickening  Tiny eccentric right protrusion  Trace right foraminal stenosis      IMPRESSION:     Minimal, age-appropriate degenerative changes at L4-5 appear similar to 2018 without high-grade neural impingement    No progressive spondylosis, acute findings, or significant change from priors

## 2021-08-25 ENCOUNTER — TELEPHONE (OUTPATIENT)
Dept: PAIN MEDICINE | Facility: CLINIC | Age: 56
End: 2021-08-25

## 2021-08-30 ENCOUNTER — HOSPITAL ENCOUNTER (OUTPATIENT)
Dept: CT IMAGING | Facility: HOSPITAL | Age: 56
Discharge: HOME | End: 2021-08-30
Payer: COMMERCIAL

## 2021-08-30 ENCOUNTER — HOSPITAL ENCOUNTER (OUTPATIENT)
Dept: RADIOLOGY | Facility: HOSPITAL | Age: 56
Discharge: HOME | End: 2021-08-30
Payer: COMMERCIAL

## 2021-08-30 ENCOUNTER — APPOINTMENT (OUTPATIENT)
Dept: LAB | Facility: HOSPITAL | Age: 56
End: 2021-08-30
Payer: COMMERCIAL

## 2021-08-30 DIAGNOSIS — D50.0 IRON DEFICIENCY ANEMIA DUE TO CHRONIC BLOOD LOSS: ICD-10-CM

## 2021-08-30 DIAGNOSIS — D50.9 MICROCYTIC ANEMIA: ICD-10-CM

## 2021-08-30 DIAGNOSIS — G44.229 CHRONIC TENSION-TYPE HEADACHE, NOT INTRACTABLE: ICD-10-CM

## 2021-08-30 DIAGNOSIS — R05.9 COUGH: ICD-10-CM

## 2021-08-30 LAB
ALBUMIN SERPL BCP-MCNC: 3.6 G/DL (ref 3.5–5)
ALP SERPL-CCNC: 128 U/L (ref 46–116)
ALT SERPL W P-5'-P-CCNC: 38 U/L (ref 12–78)
ANION GAP SERPL CALCULATED.3IONS-SCNC: 10 MMOL/L (ref 4–13)
AST SERPL W P-5'-P-CCNC: 17 U/L (ref 5–45)
BASOPHILS # BLD AUTO: 0.04 THOUSANDS/ΜL (ref 0–0.1)
BASOPHILS NFR BLD AUTO: 1 % (ref 0–1)
BILIRUB SERPL-MCNC: 1.1 MG/DL (ref 0.2–1)
BUN SERPL-MCNC: 25 MG/DL (ref 5–25)
CALCIUM SERPL-MCNC: 8.8 MG/DL (ref 8.3–10.1)
CHLORIDE SERPL-SCNC: 100 MMOL/L (ref 100–108)
CO2 SERPL-SCNC: 29 MMOL/L (ref 21–32)
CREAT SERPL-MCNC: 1.02 MG/DL (ref 0.6–1.3)
CRP SERPL QL: 1.4 MG/L
DAT POLY-SP REAG RBC QL: NEGATIVE
EOSINOPHIL # BLD AUTO: 0.08 THOUSAND/ΜL (ref 0–0.61)
EOSINOPHIL NFR BLD AUTO: 2 % (ref 0–6)
ERYTHROCYTE [DISTWIDTH] IN BLOOD BY AUTOMATED COUNT: 13.7 % (ref 11.6–15.1)
ERYTHROCYTE [SEDIMENTATION RATE] IN BLOOD: 23 MM/HOUR (ref 0–29)
FERRITIN SERPL-MCNC: 28 NG/ML (ref 8–388)
FOLATE SERPL-MCNC: 13.4 NG/ML (ref 3.1–17.5)
GFR SERPL CREATININE-BSD FRML MDRD: 63 ML/MIN/1.73SQ M
GLUCOSE SERPL-MCNC: 351 MG/DL (ref 65–140)
HCT VFR BLD AUTO: 41.6 % (ref 34.8–46.1)
HGB BLD-MCNC: 13.3 G/DL (ref 11.5–15.4)
IGA SERPL-MCNC: 253 MG/DL (ref 70–400)
IGG SERPL-MCNC: 772 MG/DL (ref 700–1600)
IGM SERPL-MCNC: 145 MG/DL (ref 40–230)
IMM GRANULOCYTES # BLD AUTO: 0.03 THOUSAND/UL (ref 0–0.2)
IMM GRANULOCYTES NFR BLD AUTO: 1 % (ref 0–2)
IRON SATN MFR SERPL: 20 %
IRON SERPL-MCNC: 69 UG/DL (ref 50–170)
LDH SERPL-CCNC: 177 U/L (ref 81–234)
LYMPHOCYTES # BLD AUTO: 0.98 THOUSANDS/ΜL (ref 0.6–4.47)
LYMPHOCYTES NFR BLD AUTO: 21 % (ref 14–44)
MCH RBC QN AUTO: 28.9 PG (ref 26.8–34.3)
MCHC RBC AUTO-ENTMCNC: 32 G/DL (ref 31.4–37.4)
MCV RBC AUTO: 90 FL (ref 82–98)
MONOCYTES # BLD AUTO: 0.35 THOUSAND/ΜL (ref 0.17–1.22)
MONOCYTES NFR BLD AUTO: 7 % (ref 4–12)
NEUTROPHILS # BLD AUTO: 3.26 THOUSANDS/ΜL (ref 1.85–7.62)
NEUTS SEG NFR BLD AUTO: 68 % (ref 43–75)
NRBC BLD AUTO-RTO: 0 /100 WBCS
PLATELET # BLD AUTO: 238 THOUSANDS/UL (ref 149–390)
PMV BLD AUTO: 9.7 FL (ref 8.9–12.7)
POTASSIUM SERPL-SCNC: 3.9 MMOL/L (ref 3.5–5.3)
PROT SERPL-MCNC: 7.2 G/DL (ref 6.4–8.2)
RBC # BLD AUTO: 4.61 MILLION/UL (ref 3.81–5.12)
RETICS # AUTO: ABNORMAL 10*3/UL (ref 14097–95744)
RETICS # CALC: 2.41 % (ref 0.37–1.87)
SODIUM SERPL-SCNC: 139 MMOL/L (ref 136–145)
TIBC SERPL-MCNC: 353 UG/DL (ref 250–450)
VIT B12 SERPL-MCNC: 2194 PG/ML (ref 100–900)
WBC # BLD AUTO: 4.74 THOUSAND/UL (ref 4.31–10.16)

## 2021-08-30 PROCEDURE — 84165 PROTEIN E-PHORESIS SERUM: CPT

## 2021-08-30 PROCEDURE — 83550 IRON BINDING TEST: CPT

## 2021-08-30 PROCEDURE — 85652 RBC SED RATE AUTOMATED: CPT

## 2021-08-30 PROCEDURE — 82784 ASSAY IGA/IGD/IGG/IGM EACH: CPT

## 2021-08-30 PROCEDURE — 82746 ASSAY OF FOLIC ACID SERUM: CPT

## 2021-08-30 PROCEDURE — 83883 ASSAY NEPHELOMETRY NOT SPEC: CPT

## 2021-08-30 PROCEDURE — 80053 COMPREHEN METABOLIC PANEL: CPT

## 2021-08-30 PROCEDURE — 83615 LACTATE (LD) (LDH) ENZYME: CPT

## 2021-08-30 PROCEDURE — 83540 ASSAY OF IRON: CPT

## 2021-08-30 PROCEDURE — 36415 COLL VENOUS BLD VENIPUNCTURE: CPT

## 2021-08-30 PROCEDURE — 85045 AUTOMATED RETICULOCYTE COUNT: CPT

## 2021-08-30 PROCEDURE — 82607 VITAMIN B-12: CPT

## 2021-08-30 PROCEDURE — 70450 CT HEAD/BRAIN W/O DYE: CPT

## 2021-08-30 PROCEDURE — 85025 COMPLETE CBC W/AUTO DIFF WBC: CPT

## 2021-08-30 PROCEDURE — 82728 ASSAY OF FERRITIN: CPT

## 2021-08-30 PROCEDURE — 86140 C-REACTIVE PROTEIN: CPT

## 2021-08-30 PROCEDURE — 86255 FLUORESCENT ANTIBODY SCREEN: CPT

## 2021-08-30 PROCEDURE — 86880 COOMBS TEST DIRECT: CPT

## 2021-08-30 PROCEDURE — 83010 ASSAY OF HAPTOGLOBIN QUANT: CPT

## 2021-08-30 PROCEDURE — 71046 X-RAY EXAM CHEST 2 VIEWS: CPT

## 2021-08-30 PROCEDURE — 83516 IMMUNOASSAY NONANTIBODY: CPT

## 2021-08-30 PROCEDURE — 84165 PROTEIN E-PHORESIS SERUM: CPT | Performed by: PATHOLOGY

## 2021-08-30 PROCEDURE — G1004 CDSM NDSC: HCPCS

## 2021-08-31 LAB
ALBUMIN SERPL ELPH-MCNC: 4.13 G/DL (ref 3.5–5)
ALBUMIN SERPL ELPH-MCNC: 59 % (ref 52–65)
ALPHA1 GLOB SERPL ELPH-MCNC: 0.29 G/DL (ref 0.1–0.4)
ALPHA1 GLOB SERPL ELPH-MCNC: 4.2 % (ref 2.5–5)
ALPHA2 GLOB SERPL ELPH-MCNC: 0.87 G/DL (ref 0.4–1.2)
ALPHA2 GLOB SERPL ELPH-MCNC: 12.4 % (ref 7–13)
BETA GLOB ABNORMAL SERPL ELPH-MCNC: 0.43 G/DL (ref 0.4–0.8)
BETA1 GLOB SERPL ELPH-MCNC: 6.2 % (ref 5–13)
BETA2 GLOB SERPL ELPH-MCNC: 6 % (ref 2–8)
BETA2+GAMMA GLOB SERPL ELPH-MCNC: 0.42 G/DL (ref 0.2–0.5)
GAMMA GLOB ABNORMAL SERPL ELPH-MCNC: 0.85 G/DL (ref 0.5–1.6)
GAMMA GLOB SERPL ELPH-MCNC: 12.2 % (ref 12–22)
HAPTOGLOB SERPL-MCNC: 153 MG/DL (ref 33–346)
IGG/ALB SER: 1.44 {RATIO} (ref 1.1–1.8)
KAPPA LC FREE SER-MCNC: 24.8 MG/L (ref 3.3–19.4)
KAPPA LC FREE/LAMBDA FREE SER: 1.39 {RATIO} (ref 0.26–1.65)
LAMBDA LC FREE SERPL-MCNC: 17.8 MG/L (ref 5.7–26.3)
PROT PATTERN SERPL ELPH-IMP: NORMAL
PROT SERPL-MCNC: 7 G/DL (ref 6.4–8.2)

## 2021-09-01 LAB
ENDOMYSIUM IGA SER QL: NEGATIVE
GLIADIN PEPTIDE IGA SER-ACNC: 3 UNITS (ref 0–19)
GLIADIN PEPTIDE IGG SER-ACNC: 2 UNITS (ref 0–19)
IGA SERPL-MCNC: 271 MG/DL (ref 87–352)
TTG IGA SER-ACNC: <2 U/ML (ref 0–3)
TTG IGG SER-ACNC: <2 U/ML (ref 0–5)

## 2021-09-02 ENCOUNTER — HOSPITAL ENCOUNTER (OUTPATIENT)
Facility: HOSPITAL | Age: 56
Setting detail: OUTPATIENT SURGERY
Discharge: HOME | End: 2021-09-02
Attending: ANESTHESIOLOGY | Admitting: ANESTHESIOLOGY
Payer: COMMERCIAL

## 2021-09-02 ENCOUNTER — APPOINTMENT (OUTPATIENT)
Dept: RADIOLOGY | Facility: HOSPITAL | Age: 56
End: 2021-09-02
Payer: COMMERCIAL

## 2021-09-02 VITALS
SYSTOLIC BLOOD PRESSURE: 122 MMHG | HEART RATE: 103 BPM | HEIGHT: 65 IN | TEMPERATURE: 98 F | OXYGEN SATURATION: 96 % | DIASTOLIC BLOOD PRESSURE: 58 MMHG | BODY MASS INDEX: 36.82 KG/M2 | RESPIRATION RATE: 20 BRPM | WEIGHT: 221 LBS

## 2021-09-02 DIAGNOSIS — M54.50 CHRONIC MIDLINE LOW BACK PAIN WITHOUT SCIATICA: ICD-10-CM

## 2021-09-02 DIAGNOSIS — M47.816 LUMBAR SPONDYLOSIS: ICD-10-CM

## 2021-09-02 DIAGNOSIS — M54.16 LUMBAR RADICULOPATHY: ICD-10-CM

## 2021-09-02 DIAGNOSIS — G89.29 CHRONIC MIDLINE LOW BACK PAIN WITHOUT SCIATICA: ICD-10-CM

## 2021-09-02 PROCEDURE — A9585 GADOBUTROL INJECTION: HCPCS | Performed by: ANESTHESIOLOGY

## 2021-09-02 PROCEDURE — 62323 NJX INTERLAMINAR LMBR/SAC: CPT | Performed by: ANESTHESIOLOGY

## 2021-09-02 RX ORDER — METHYLPREDNISOLONE ACETATE 40 MG/ML
INJECTION, SUSPENSION INTRA-ARTICULAR; INTRALESIONAL; INTRAMUSCULAR; SOFT TISSUE AS NEEDED
Status: DISCONTINUED | OUTPATIENT
Start: 2021-09-02 | End: 2021-09-02 | Stop reason: HOSPADM

## 2021-09-02 RX ORDER — LIDOCAINE HYDROCHLORIDE 10 MG/ML
INJECTION, SOLUTION EPIDURAL; INFILTRATION; INTRACAUDAL; PERINEURAL AS NEEDED
Status: DISCONTINUED | OUTPATIENT
Start: 2021-09-02 | End: 2021-09-02 | Stop reason: HOSPADM

## 2021-09-02 NOTE — OP NOTE
OPERATIVE REPORT  PATIENT NAME: Susy Landrum    :  1965  MRN: [id number]  Pt Location: [facility] OR ROOM 01    SURGERY DATE: 2021    Surgeon(s) and Role:     * Ranjan Courtney MD - Primary    Preop Diagnosis:  Chronic pain syndrome [G89 4]  Chronic midline low back pain without sciatica [M54 5, G89 29]  Lumbar radiculopathy [M54 16]  Lumbar spondylosis [M47 816]    Post-Op Diagnosis Codes:     * Chronic pain syndrome [G89 4]     * Chronic midline low back pain without sciatica [M54 5, G89 29]     * Lumbar radiculopathy [M54 16]     * Lumbar spondylosis [M47 816]    Procedure(s) (LRB):  BLOCK / INJECTION EPIDURAL STEROID LUMBAR  L5-S1 IESI (N/A)    Specimen(s):  * No specimens in log *    Estimated Blood Loss:   Minimal    Drains:  * No LDAs found *    Anesthesia Type:   Local    Operative Indications:  Chronic pain syndrome [G89 4]  Chronic midline low back pain without sciatica [M54 5, G89 29]  Lumbar radiculopathy [M54 16]  Lumbar spondylosis [M47 816]      Operative Findings:  same    Complications:   None    Procedure and Technique:  Fluoroscopically-guided  L5-S1 interlaminar epidural steroid injection under fluoroscopy      After discussing the risks, benefits, and alternatives to the procedure, the patient expressed understanding and wished to proceed  The patient was brought to the fluoroscopy suite and placed in the prone position  A procedural pause was conducted to verify:  correct patient identity, procedure to be performed and as applicable, correct side and site, correct patient position, and availability of implants, special equipment and special requirements  After identifying the L5-S1 space fluoroscopically, the skin was sterilely prepped and draped in the usual fashion using Chloraprep skin prep  The skin and subcutaneous tissue were anesthetized with 0 5 % lidocaine    Utilizing a loss of resistance technique and intermittent fluoroscopic guidance, a 3 5 20 gauge Tuohy needle was advanced into the epidural space  Proper needle positioning was confirmed using multiple fluoroscopic views  After negative aspiration, Gadovist was injected confirming epidural spread without evidence of intravascular or intrathecal spread  A 4 ml solution consisting of 80 mg Depo-Medrol in sterile saline was injected slowly and incrementally into the epidural space  Following the injection the needle was withdrawn slightly and flushed with 0 5 % lidocaine as it was fully extracted  The patient tolerated the procedure well and there were no apparent complications  After appropriate observation, the patient was dismissed from the clinic in good condition under their own power     I was present for the entire procedure    Patient Disposition:  hemodynamically stable    SIGNATURE: Ranjan Courtney MD  DATE: September 2, 2021  TIME: 2:38 PM

## 2021-09-02 NOTE — DISCHARGE INSTRUCTIONS
Epidural Steroid Injection   WHAT YOU NEED TO KNOW:   An epidural steroid injection (CECI) is a procedure to inject steroid medicine into the epidural space  The epidural space is between your spinal cord and vertebrae  Steroids reduce inflammation and fluid buildup in your spine that may be causing pain  You may be given pain medicine along with the steroids  ACTIVITY  · Do not drive or operate machinery today  · No strenuous activity today - bending, lifting, etc   · You may resume normal activites starting tomorrow - start slowly and as tolerated  · You may shower today, but no tub baths or hot tubs  · You may have numbness for several hours from the local anesthetic  Please use caution and common sense, especially with weight-bearing activities  CARE OF THE INJECTION SITE  · If you have soreness or pain, apply ice to the area today (20 minutes on/20 minutes off)  · Starting tomorrow, you may use warm, moist heat or ice if needed  · You may have an increase or change in your discomfort for 36-48 hours after your treatment  · Apply ice and continue with any pain medication you have been prescribed  · Notify [facility] if you have any of the following: redness, drainage, swelling, headache, stiff neck or fever above 100°F     SPECIAL INSTRUCTIONS  · Our office will contact you in approximately 7 days for a progress report  MEDICATIONS  · Continue to take all routine medications  · Our office may have instructed you to hold some medications  As no general anesthesia was used in today's procedure, you should not experience any side effects related to anesthesia  If you have a problem specifically related to your procedure, please call our office at [phone]  Problems not related to your procedure should be directed to your primary care physician

## 2021-09-02 NOTE — INTERVAL H&P NOTE
H&P reviewed  After examining the patient I find no changes in the patients condition since the H&P had been written      Vitals:    09/02/21 1433   BP: 146/72   Pulse: 84   Resp: 20   Temp: 98 °F (36 7 °C)   SpO2: 97%

## 2021-09-08 ENCOUNTER — TELEPHONE (OUTPATIENT)
Dept: FAMILY MEDICINE CLINIC | Facility: CLINIC | Age: 56
End: 2021-09-08

## 2021-09-08 DIAGNOSIS — H93.8X3 EAR FULLNESS, BILATERAL: ICD-10-CM

## 2021-09-08 DIAGNOSIS — H91.93 DECREASED HEARING OF BOTH EARS: Primary | ICD-10-CM

## 2021-09-08 NOTE — TELEPHONE ENCOUNTER
Patient made aware of CT results and xray results  Patient wants to know what she should do since her ears are still blocked and she can barely hear  Patient denies any pain or other symptoms  ----- Message from Jose Alfredo An PA-C sent at 9/7/2021  8:17 AM EDT -----  Please let Nieves Powers know her head scan showed a stable but enlarged right ventricle (portion of brain that holds spinal fluid)  This is likely something she as always had   The rest of the scan was normal

## 2021-09-08 NOTE — TELEPHONE ENCOUNTER
There is not much more I can do except have her trial the flonase as previously recommended or follow up with ENT

## 2021-09-09 ENCOUNTER — OFFICE VISIT (OUTPATIENT)
Dept: HEMATOLOGY ONCOLOGY | Facility: CLINIC | Age: 56
End: 2021-09-09
Payer: COMMERCIAL

## 2021-09-09 VITALS
DIASTOLIC BLOOD PRESSURE: 70 MMHG | HEART RATE: 96 BPM | HEIGHT: 65 IN | SYSTOLIC BLOOD PRESSURE: 122 MMHG | OXYGEN SATURATION: 95 % | RESPIRATION RATE: 18 BRPM | TEMPERATURE: 97.3 F | BODY MASS INDEX: 36.78 KG/M2

## 2021-09-09 DIAGNOSIS — E53.8 B12 DEFICIENCY: ICD-10-CM

## 2021-09-09 DIAGNOSIS — D50.0 IRON DEFICIENCY ANEMIA DUE TO CHRONIC BLOOD LOSS: Primary | ICD-10-CM

## 2021-09-09 PROCEDURE — 1036F TOBACCO NON-USER: CPT | Performed by: NURSE PRACTITIONER

## 2021-09-09 PROCEDURE — 99214 OFFICE O/P EST MOD 30 MIN: CPT | Performed by: NURSE PRACTITIONER

## 2021-09-09 RX ORDER — SODIUM CHLORIDE 9 MG/ML
20 INJECTION, SOLUTION INTRAVENOUS ONCE
Status: CANCELLED | OUTPATIENT
Start: 2021-09-21

## 2021-09-09 NOTE — PROGRESS NOTES
Hematology/Oncology Outpatient Follow-up  Karol Zhang 50 y o  female 1965 [id number]    Date:  9/9/2021      Assessment and Plan:  1  Iron deficiency anemia due to chronic blood loss  Patient is no longer anemic hemoglobin 13 3 however she continues to be iron deficient with ferritin of 28  Her iron stores have improved after completing 2 doses of IV Injectafer may 2021 suggesting at least partial correction  Etiology of her iron deficiency was felt to be due to her menorrhagia which fortunately has stopped recently most likely due to perimenopause/menopause  We will treat her with another course of IV iron Injectafer x1 treatment with pre-medication pepcid as she received prior  She will be back for follow-up again with repeat labs in about 4 months  If she continues to have persistent iron deficiency going forward may need to consider repeat GI evaluation     - CBC and differential; Future  - Comprehensive metabolic panel; Future  - C-reactive protein; Future  - Sedimentation rate, automated; Future  - Vitamin B12; Future  - Iron Panel (Includes Ferritin, Iron Sat%, Iron, and TIBC); Future  - Ferritin; Future    2  B12 deficiency  Patient is no longer vitamin B12 deficient  She was advised to continue her oral B12 supplement perhaps decrease frequency to every other day/3 times a week since her B12 is greater than 2000  - Vitamin B12; Future      HPI:  Patient is a pleasant 51-year-old female who originally presented for further evaluation and treatment of her microcytic anemia/iron deficiency  She has past medical history of diabetes, hypertension, migraines, hyperlipidemia, hydrocephalus, PTSD, depression, anxiety disorder, chronic back pain, GERD, esophageal stricture and menorrhagia due to fibroids  She states that she has had D&C and ablation in the past   The ablation helped her menorrhagia for some time but more recently her heavy menstrual cycles resumed    Her gynecology team was considering Mirena IUD  She states that she is had issues with iron deficiency anemia in the past which was treated with oral iron  Is due for her repeat colonoscopy and upper endoscopy with esophageal stretching later this year       Her laboratory studies 05/04/2021 showed microcytic anemia H&H 8 7/31 1, MCV 73  Folic acid 90 3, iron deficient iron saturation 3% with low ferritin 5, vitamin B12 deficient 275  Patient was treated with IV iron Injectafer 750 mg x 2 treatment along with to vitamin B12 injections May 2021  She was also started on oral vitamin B12  Interval history:  Patient presents today for follow-up visit  She states that she felt better after she received the IV iron however shortly afterward she started to become fatigued again  She denies bleeding from any site and mentions that her menstrual cycles have completely stopped for the past several months  She does admit to leg cramps and restless legs  Also admits to PICA for ice  She has chronic back pain and did just recently received an injection  She also reports mild headache and dizziness but states that this is not new for her and felt to be related to her known hydrocephalus  Patient's most recent laboratory studies from 08/30/2021 showed normal CBC hemoglobin 13 3  Her glucose nonfasting was 351, alk-phos 128, total bili slightly above average 1 1 otherwise normal CMP  Inflammatory markers are not elevated  Celiac disease antibody profile came back normal   She did not have any obvious hint of hemolysis or monoclonal gammopathy  Folic acid is appropriate  B12 is appropriate on supplementation 2194  Her iron stores have improved however she continues to be iron deficient iron saturation 20% with ferritin 28  ROS: Review of Systems   Constitutional: Positive for fatigue  Negative for activity change, appetite change, chills, fever and unexpected weight change     HENT: Negative for congestion, mouth sores, nosebleeds, sore throat and trouble swallowing  Eyes: Negative  Respiratory: Negative for cough, chest tightness and shortness of breath  Cardiovascular: Negative for chest pain, palpitations and leg swelling  Gastrointestinal: Negative for abdominal distention, abdominal pain, blood in stool, constipation, diarrhea, nausea and vomiting  Genitourinary: Negative for difficulty urinating, dysuria, frequency, hematuria and urgency  Musculoskeletal: Positive for back pain  Negative for arthralgias, gait problem, joint swelling and myalgias  Skin: Negative for color change, pallor and rash  Neurological: Positive for dizziness and headaches  Negative for weakness and numbness  Hematological: Negative for adenopathy  Does not bruise/bleed easily  Psychiatric/Behavioral: Positive for sleep disturbance  Negative for dysphoric mood  The patient is not nervous/anxious  Past Medical History:   Diagnosis Date    Diabetes [facility] )     Hydrocephalus ([facility] )     Hyperlipidemia     Hypertension     Migraine     Sciatica     Vertigo        Past Surgical History:   Procedure Laterality Date    EPIDURAL BLOCK INJECTION Right 7/2/2020    Procedure: Right L4-5 and L5-S1 transforaminal epidural steroid injection ([id number]);   Surgeon: Lucila Valero MD;  [facility];  Service: Pain Management     EPIDURAL BLOCK INJECTION N/A 9/2/2021    Procedure: BLOCK / INJECTION EPIDURAL STEROID LUMBAR  L5-S1 IESI;  Surgeon: Lucila Valero MD;  [facility];  Service: Pain Management     FL GUIDED NEEDLE PLAC BX/ASP/INJ  7/2/2020    [place] ESOPHAGOGASTRODUODENOSCOPY TRANSORAL DIAGNOSTIC N/A 10/23/2018    Procedure: EGD with Savary dilation AND COLONOSCOPY;  Surgeon: Angelito Garrido MD;  [facility];  Service: Gastroenterology    TUBAL LIGATION         Social History     Socioeconomic History    Marital status: /Civil Union     Spouse name: None    Number of children: None    Years of education: None    Highest education level: None   Occupational History    None   Tobacco Use    Smoking status: Never Smoker    Smokeless tobacco: Never Used   Vaping Use    Vaping Use: Never used   Substance and Sexual Activity    Alcohol use: No    Drug use: No    Sexual activity: Not Currently     Comment:    Other Topics Concern    None   Social History Narrative    None     Social Determinants of Health     Financial Resource Strain:     Difficulty of Paying Living Expenses:    Food Insecurity:     Worried About Running Out of Food in the Last Year:     Ran Out of Food in the Last Year:    Transportation Needs:     Lack of Transportation (Medical):      Lack of Transportation (Non-Medical):    Physical Activity:     Days of Exercise per Week:     Minutes of Exercise per Session:    Stress:     Feeling of Stress :    Social Connections:     Frequency of Communication with Friends and Family:     Frequency of Social Gatherings with Friends and Family:     Attends Mu-ism Services:     Active Member of Clubs or Organizations:     Attends Club or Organization Meetings:     Marital Status:    Intimate Partner Violence:     Fear of Current or Ex-Partner:     Emotionally Abused:     Physically Abused:     Sexually Abused:        Family History   Problem Relation Age of Onset    Diabetes Father     Cancer Father        Allergies   Allergen Reactions    Nuts - Food Allergy Anaphylaxis    Chocolate - Food Allergy Cough    Eggs Or Egg-Derived Products - Food Allergy Other (See Comments)     Cough    Other      LETTUCE    Shellfish Allergy - Food Allergy Other (See Comments)     unknown    Tomato - Food Allergy Cough         Current Outpatient Medications:     FLUoxetine (PROzac) 40 MG capsule, Take 1 capsule (40 mg total) by mouth daily, Disp: 30 capsule, Rfl: 2    gabapentin (NEURONTIN) 600 MG tablet, Take 1 tablet (600 mg total) by mouth 3 (three) times a day, Disp: 90 tablet, Rfl: 2    glimepiride (AMARYL) 4 mg tablet, Take 2 mg by mouth daily At night, Disp: , Rfl:     hydrochlorothiazide (HYDRODIURIL) 50 mg tablet, Take 1 tablet (50 mg total) by mouth daily, Disp: 90 tablet, Rfl: 0    hydrOXYzine pamoate (VISTARIL) 25 mg capsule, Take 1 capsule (25 mg total) by mouth 3 (three) times a day as needed for itching, Disp: 30 capsule, Rfl: 0    Insulin Glargine-Lixisenatide (SOLIQUA) 100 units-33 mcg/mL injection pen, Inject 30 Units under the skin daily , Disp: , Rfl:     loratadine (CLARITIN) 10 mg tablet, Take 1 tablet (10 mg total) by mouth daily, Disp: 90 tablet, Rfl: 0    losartan (COZAAR) 50 mg tablet, Take 1 tablet (50 mg total) by mouth daily, Disp: 30 tablet, Rfl: 2    Nerve Stimulator (TENS THERAPY PAIN RELIEF) BILLY, 1 Device by Does not apply route 2 (two) times a day as needed (pain), Disp: 1 Device, Rfl: 0    pravastatin (PRAVACHOL) 40 mg tablet, Take 40 mg by mouth daily, Disp: , Rfl:     tiZANidine (ZANAFLEX) 2 mg tablet, Take 1 tablet (2 mg total) by mouth every 8 (eight) hours as needed for muscle spasms, Disp: 90 tablet, Rfl: 5    predniSONE 10 mg tablet, TAKE 4 TABLETS BY MOUTH DAILY FOR 3 DAYS THEN 3 TABLETS DAILY FOR 3 DAYS THEN 2 TABLET DAILY FOR 3 DAYS AND THEN 1 TABLET DAILY FOR 3 DAYS THEN STOP (Patient not taking: Reported on 8/17/2021), Disp: , Rfl:       Physical Exam:  /70 (BP Location: Left arm, Patient Position: Sitting, Cuff Size: Large)   Pulse 96   Temp (!) 97 3 °F (36 3 °C) (Tympanic)   Resp 18   Ht 5' 5" (1 651 m)   LMP  (LMP Unknown)   SpO2 95%   BMI 36 78 kg/m²     Physical Exam  Vitals reviewed  Constitutional:       General: She is not in acute distress  Appearance: She is well-developed  She is not diaphoretic  HENT:      Head: Normocephalic and atraumatic  Eyes:      General: No scleral icterus  Conjunctiva/sclera: Conjunctivae normal       Pupils: Pupils are equal, round, and reactive to light  Neck:      Thyroid: No thyromegaly  Cardiovascular:      Rate and Rhythm: Normal rate and regular rhythm  Heart sounds: Normal heart sounds  No murmur heard  Pulmonary:      Effort: Pulmonary effort is normal  No respiratory distress  Breath sounds: Normal breath sounds  Abdominal:      General: There is no distension  Palpations: Abdomen is soft  There is no hepatomegaly or splenomegaly  Tenderness: There is no abdominal tenderness  Musculoskeletal:         General: No swelling  Normal range of motion  Cervical back: Normal range of motion and neck supple  Lymphadenopathy:      Cervical: No cervical adenopathy  Upper Body:      Right upper body: No axillary adenopathy  Left upper body: No axillary adenopathy  Skin:     General: Skin is warm and dry  Findings: No erythema or rash  Neurological:      General: No focal deficit present  Mental Status: She is alert and oriented to person, place, and time  Psychiatric:         Mood and Affect: Mood and affect normal          Behavior: Behavior normal  Behavior is cooperative  Thought Content: Thought content normal          Judgment: Judgment normal            Labs:  Lab Results   Component Value Date    WBC 4 74 08/30/2021    HGB 13 3 08/30/2021    HCT 41 6 08/30/2021    MCV 90 08/30/2021     08/30/2021     Lab Results   Component Value Date     (L) 01/03/2016    K 3 9 08/30/2021     08/30/2021    CO2 29 08/30/2021    ANIONGAP 10 01/03/2016    BUN 25 08/30/2021    CREATININE 1 02 08/30/2021    GLUCOSE 177 (H) 04/17/2018    GLUF 117 (H) 05/04/2021    CALCIUM 8 8 08/30/2021    CORRECTEDCA 9 8 05/04/2021    AST 17 08/30/2021    ALT 38 08/30/2021    ALKPHOS 128 (H) 08/30/2021    PROT 7 6 10/06/2015    BILITOT 0 63 10/06/2015    EGFR 63 08/30/2021       Patient voiced understanding and agreement in the above discussion   Aware to contact our office with questions/symptoms in the interim  This note has been generated by voice recognition software system  Therefore, there may be spelling, grammar, and or syntax errors  Please contact if questions arise

## 2021-09-19 PROCEDURE — 4010F ACE/ARB THERAPY RXD/TAKEN: CPT | Performed by: NURSE PRACTITIONER

## 2021-09-21 ENCOUNTER — HOSPITAL ENCOUNTER (OUTPATIENT)
Dept: INFUSION CENTER | Facility: HOSPITAL | Age: 56
Discharge: HOME | End: 2021-09-21
Payer: COMMERCIAL

## 2021-09-21 VITALS
DIASTOLIC BLOOD PRESSURE: 55 MMHG | RESPIRATION RATE: 18 BRPM | HEART RATE: 87 BPM | TEMPERATURE: 97.6 F | SYSTOLIC BLOOD PRESSURE: 118 MMHG

## 2021-09-21 DIAGNOSIS — D50.0 IRON DEFICIENCY ANEMIA DUE TO CHRONIC BLOOD LOSS: Primary | ICD-10-CM

## 2021-09-21 DIAGNOSIS — E53.8 B12 DEFICIENCY: ICD-10-CM

## 2021-09-21 PROCEDURE — 96367 TX/PROPH/DG ADDL SEQ IV INF: CPT

## 2021-09-21 PROCEDURE — 96365 THER/PROPH/DIAG IV INF INIT: CPT

## 2021-09-21 RX ORDER — SODIUM CHLORIDE 9 MG/ML
20 INJECTION, SOLUTION INTRAVENOUS ONCE
Status: COMPLETED | OUTPATIENT
Start: 2021-09-21 | End: 2021-09-21

## 2021-09-21 RX ORDER — SODIUM CHLORIDE 9 MG/ML
20 INJECTION, SOLUTION INTRAVENOUS ONCE
Status: CANCELLED | OUTPATIENT
Start: 2021-09-21

## 2021-09-21 RX ADMIN — FAMOTIDINE 20 MG: 10 INJECTION INTRAVENOUS at 10:23

## 2021-09-21 RX ADMIN — SODIUM CHLORIDE 20 ML/HR: 0.9 INJECTION, SOLUTION INTRAVENOUS at 10:15

## 2021-09-21 RX ADMIN — FERRIC CARBOXYMALTOSE INJECTION 750 MG: 50 INJECTION, SOLUTION INTRAVENOUS at 10:48

## 2021-09-28 ENCOUNTER — OFFICE VISIT (OUTPATIENT)
Dept: PAIN MEDICINE | Facility: CLINIC | Age: 56
End: 2021-09-28
Payer: COMMERCIAL

## 2021-09-28 VITALS
HEART RATE: 88 BPM | HEIGHT: 65 IN | BODY MASS INDEX: 36.49 KG/M2 | SYSTOLIC BLOOD PRESSURE: 135 MMHG | DIASTOLIC BLOOD PRESSURE: 83 MMHG | WEIGHT: 219 LBS

## 2021-09-28 DIAGNOSIS — M46.1 SACROILIITIS (HCC): ICD-10-CM

## 2021-09-28 DIAGNOSIS — M54.16 LUMBAR RADICULOPATHY: ICD-10-CM

## 2021-09-28 DIAGNOSIS — G89.29 CHRONIC MIDLINE LOW BACK PAIN WITHOUT SCIATICA: ICD-10-CM

## 2021-09-28 DIAGNOSIS — M47.816 LUMBAR SPONDYLOSIS: ICD-10-CM

## 2021-09-28 DIAGNOSIS — G89.4 CHRONIC PAIN SYNDROME: Primary | ICD-10-CM

## 2021-09-28 DIAGNOSIS — M54.50 CHRONIC MIDLINE LOW BACK PAIN WITHOUT SCIATICA: ICD-10-CM

## 2021-09-28 PROCEDURE — 3008F BODY MASS INDEX DOCD: CPT | Performed by: NURSE PRACTITIONER

## 2021-09-28 PROCEDURE — 99214 OFFICE O/P EST MOD 30 MIN: CPT | Performed by: NURSE PRACTITIONER

## 2021-09-28 RX ORDER — DULOXETIN HYDROCHLORIDE 30 MG/1
30 CAPSULE, DELAYED RELEASE ORAL
Qty: 30 CAPSULE | Refills: 1 | Status: SHIPPED | OUTPATIENT
Start: 2021-09-28 | End: 2021-11-16 | Stop reason: SDUPTHER

## 2021-09-28 NOTE — PROGRESS NOTES
Assessment:  1  Chronic pain syndrome    2  Chronic midline low back pain without sciatica    3  Lumbar radiculopathy    4  Lumbar spondylosis    5  Sacroiliitis ([address] )        Plan:      While the patient was in the office today, I did have a thorough conversation regarding their chronic pain syndrome, medication management, and treatment plan options  Patient is being seen for follow-up visit  She recently underwent an L5-S1 interlaminar epidural steroid injection on 09/02/2021  She reports 70% improvement in her right leg pain  She continues with low back pain that can radiate to the left buttock and left groin area  During today's visit she demonstrates  physical exam findings consistent with sacroiliitis  As such, she will be scheduled for bilateral sacroiliac joint injections in the near future  Complete risks and benefits including bleeding, infection, tissue reaction, nerve injury and allergic reaction were discussed  The approach was demonstrated using models and literature was provided  Verbal and written consent was obtained  I discussed with the patient that since they have a neuropathic component of pain that it would be beneficial to start him on a medication such as Cymbalta  The patient denied being prescribed any antidepressant and/or psychiatric medications  I reviewed with the patient that it may take 3-4 weeks for the medications effects to be noticed and that it should never be abruptly stopped  Possible side effects include, but are not limited to:  Vertigo, lethargy, nausea, and edema of the extremities  Advised patient to call our office if they experience any side effects  The patient verbalized understanding  Start Cymbalta 30 mg at bedtime  Prescription was sent to her pharmacy  Follow-up 1 month after the injection  History of Present Illness: The patient is a 50 y o  female who presents for a follow up office visit in regards to Back Pain and Leg Pain  The patients current symptoms include   Complaints of low back pain radiating to the left buttock and left groin area  Current pain level is a 9/10  Quality pain is described as throbbing, shooting, numb, pins and needles  Current pain medications includes:   Gabapentin 600 mg 3 times daily, tizanidine 2 mg every 8 hours as needed for spasms   The patient reports that this regimen is providing  Up to 25% pain relief  The patient is reporting no side effects from this pain medication regimen  I have personally reviewed and/or updated the patient's past medical history, past surgical history, family history, social history, current medications, allergies, and vital signs today  Review of Systems  Review of Systems   Musculoskeletal: Positive for gait problem  All other systems reviewed and are negative  Past Medical History:   Diagnosis Date    [facility]     [facility]     Hyperlipidemia     Hypertension     Migraine     Sciatica     Vertigo        Past Surgical History:   Procedure Laterality Date    EPIDURAL BLOCK INJECTION Right 7/2/2020    Procedure: Right L4-5 and L5-S1 transforaminal epidural steroid injection ([id number]);   Surgeon: George Powers MD;  [facility];  Service: Pain Management     EPIDURAL BLOCK INJECTION N/A 9/2/2021    Procedure: BLOCK / INJECTION EPIDURAL STEROID LUMBAR  L5-S1 IESI;  Surgeon: George Powers MD;  [facility];  Service: Pain Management     FL GUIDED NEEDLE PLAC BX/ASP/INJ  7/2/2020    FL GUIDED NEEDLE PLAC BX/ASP/INJ  9/2/2021    [place] ESOPHAGOGASTRODUODENOSCOPY TRANSORAL DIAGNOSTIC N/A 10/23/2018    Procedure: EGD with Savary dilation AND COLONOSCOPY;  Surgeon: Raffi Gutierrez MD;  [facility];  Service: Gastroenterology    TUBAL LIGATION         Family History   Problem Relation Age of Onset    Diabetes Father     Cancer Father        Social History     Occupational History    Not on file   Tobacco Use    Smoking status: Never Smoker    Smokeless tobacco: Never Used   Vaping Use    Vaping Use: Never used   Substance and Sexual Activity    Alcohol use: No    Drug use: No    Sexual activity: Not Currently     Comment:          Current Outpatient Medications:     gabapentin (NEURONTIN) 600 MG tablet, Take 1 tablet (600 mg total) by mouth 3 (three) times a day, Disp: 90 tablet, Rfl: 2    glimepiride (AMARYL) 4 mg tablet, Take 2 mg by mouth daily At night, Disp: , Rfl:     hydrochlorothiazide (HYDRODIURIL) 50 mg tablet, Take 1 tablet (50 mg total) by mouth daily, Disp: 90 tablet, Rfl: 0    hydrOXYzine pamoate (VISTARIL) 25 mg capsule, Take 1 capsule (25 mg total) by mouth 3 (three) times a day as needed for itching, Disp: 30 capsule, Rfl: 0    Insulin Glargine-Lixisenatide (SOLIQUA) 100 units-33 mcg/mL injection pen, Inject 30 Units under the skin daily , Disp: , Rfl:     losartan (COZAAR) 50 mg tablet, Take 1 tablet (50 mg total) by mouth daily, Disp: 30 tablet, Rfl: 2    Nerve Stimulator (TENS THERAPY PAIN RELIEF) BILLY, 1 Device by Does not apply route 2 (two) times a day as needed (pain), Disp: 1 Device, Rfl: 0    pravastatin (PRAVACHOL) 40 mg tablet, Take 40 mg by mouth daily, Disp: , Rfl:     tiZANidine (ZANAFLEX) 2 mg tablet, Take 1 tablet (2 mg total) by mouth every 8 (eight) hours as needed for muscle spasms, Disp: 90 tablet, Rfl: 5    DULoxetine (CYMBALTA) 30 mg delayed release capsule, Take 1 capsule (30 mg total) by mouth daily at bedtime, Disp: 30 capsule, Rfl: 1    loratadine (CLARITIN) 10 mg tablet, Take 1 tablet (10 mg total) by mouth daily, Disp: 90 tablet, Rfl: 0    predniSONE 10 mg tablet, TAKE 4 TABLETS BY MOUTH DAILY FOR 3 DAYS THEN 3 TABLETS DAILY FOR 3 DAYS THEN 2 TABLET DAILY FOR 3 DAYS AND THEN 1 TABLET DAILY FOR 3 DAYS THEN STOP (Patient not taking: Reported on 8/17/2021), Disp: , Rfl:     Allergies   Allergen Reactions    Nuts - Food Allergy Anaphylaxis    Chocolate - Food Allergy Cough    Eggs Or Egg-Derived Products - Food Allergy Other (See Comments)     Cough    Other      LETTUCE    Shellfish Allergy - Food Allergy Other (See Comments)     unknown    Tomato - Food Allergy Cough       Physical Exam:    /83   Pulse 88   Ht 5' 5" (1 651 m)   Wt 99 3 kg (219 lb)   LMP  (LMP Unknown)   BMI 36 44 kg/m²     Constitutional:normal, well developed, well nourished, alert, in no distress and non-toxic and no overt pain behavior  Eyes:anicteric  HEENT:grossly intact  Neck:supple, symmetric, trachea midline and no masses   Pulmonary:even and unlabored  Cardiovascular:No edema or pitting edema present  Skin:Normal without rashes or lesions and well hydrated  Psychiatric:Mood and affect appropriate  Neurologic:Cranial Nerves II-XII grossly intact  Musculoskeletal: range of motion of lumbar spine is limited in all planes  There is tenderness over bilateral sacroiliac joints  Michael's maneuver and compression test are positive bilaterally  Imaging  No orders to display       No orders of the defined types were placed in this encounter

## 2021-09-28 NOTE — PATIENT INSTRUCTIONS
Duloxetine (By mouth)   Duloxetine (doo-LOX-e-teen)  Treats depression, anxiety, diabetic peripheral neuropathy, fibromyalgia, and chronic muscle or bone pain  This medicine is an SSNRI  Brand Name(s): Cymbalta, izalma Sprinkle, Aileenka   There may be other brand names for this medicine  When This Medicine Should Not Be Used: This medicine is not right for everyone  Do not use it if you had an allergic reaction to duloxetine  How to Use This Medicine:   Capsule, Delayed Release Capsule  · Take your medicine as directed  Your dose may need to be changed several times to find what works best for you  · Delayed-release capsule: Swallow the capsule whole  Do not crush, chew, break, or open it  Do not open the Cymbalta® delayed-release capsule and sprinkle the contents on food or in liquids  · If you have trouble swallowing the Gasper  delayed release capsule:   ? You may open the capsule and sprinkle the contents over one tablespoon (15 mL) of applesauce  Swallow the mixture right away and do not save any of the mixture to use later  ? You may open the capsule and pour the contents to an all plastic catheter tip syringe and add 50 mL of water  Do not use other liquids  Gently shake it for 10 seconds, and then use it through a nasogastric tube  Rinse with additional water (about 15 mL) if needed  · This medicine should come with a Medication Guide  Ask your pharmacist for a copy if you do not have one  · Missed dose: Take a dose as soon as you remember  If it is almost time for your next dose, wait until then and take a regular dose  Do not take extra medicine to make up for a missed dose  · Store the medicine in a closed container at room temperature, away from heat, moisture, and direct light  Drugs and Foods to Avoid:   Ask your doctor or pharmacist before using any other medicine, including over-the-counter medicines, vitamins, and herbal products    · Do not take duloxetine if you have used an MAO inhibitor (MAOI) within the past 14 days  Do not start taking an MAO inhibitor within 5 days of stopping duloxetine  · Some medicines can affect how duloxetine works  Tell your doctor if you are using any of the following:  ? Buspirone, cimetidine, ciprofloxacin, enoxacin, fentanyl, lithium, Marck's wort, theophylline, tramadol, tryptophan, or warfarin  ? Amphetamines  ? Blood pressure medicine  ? Diuretic (water pill)  ? Medicine for heart rhythm problems (including flecainide, propafenone, quinidine)  ? Medicine to treat migraine headaches (including triptans)  ? NSAID pain or arthritis medicine (including aspirin, celecoxib, diclofenac, ibuprofen, naproxen)  ? Other medicine to treat depression or mood disorders (including amitriptyline, desipramine, fluoxetine, imipramine, nortriptyline, paroxetine)  ? Phenothiazine medicine (including thioridazine)  · Tell your doctor if you use anything else that makes you sleepy  Some examples are allergy medicine, narcotic pain medicine, and alcohol  · Do not drink alcohol while you are using this medicine  Warnings While Using This Medicine:   · Tell your doctor if you are pregnant or breastfeeding, or if you have kidney disease, liver disease, diabetes, digestion problems, glaucoma, heart disease, high or low blood pressure, or problems with urination  Tell your doctor if you smoke or you have a history of seizures, or drug or alcohol addiction  · This medicine may cause the following problems:   ? Serious liver problems  ? Serotonin syndrome (more likely when used with certain other medicines)  ? Increased risk of bleeding problems  ? Serious skin reactions  ? Low sodium levels in the blood  · This medicine can increase thoughts of suicide  Tell your doctor right away if you start to feel depressed and have thoughts about hurting yourself  · This medicine can cause changes in your blood pressure  This may make you dizzy or drowsy   Do not drive or do anything that could be dangerous until you know how this medicine affects you  Stand up slowly to avoid falls  · Do not stop using this medicine suddenly  Your doctor will need to slowly decrease your dose before you stop it completely  · Your doctor will check your progress and the effects of this medicine at regular visits  Keep all appointments  · Keep all medicine out of the reach of children  Never share your medicine with anyone  Possible Side Effects While Using This Medicine:   Call your doctor right away if you notice any of these side effects:  · Allergic reaction: Itching or hives, swelling in your face or hands, swelling or tingling in your mouth or throat, chest tightness, trouble breathing  · Anxiety, restlessness, fever, fast heartbeat, sweating, muscle spasms, diarrhea, seeing or hearing things that are not there  · Blistering, peeling, red skin rash  · Confusion, weakness, muscle twitching  · Dark urine or pale stools, nausea, vomiting, loss of appetite, stomach pain, yellow skin or eyes  · Decrease in how much or how often you urinate  · Eye pain, vision changes, seeing halos around lights  · Feeling more energetic than usual  · Lightheadedness, dizziness, fainting  · Unusual moods or behaviors, worsening depression, thoughts about hurting yourself, trouble sleeping  · Unusual bleeding or bruising  If you notice these less serious side effects, talk with your doctor:   · Decrease in appetite or weight  · Dry mouth, constipation, mild nausea  · Headache  · Unusual drowsiness, sleepiness, or tiredness  If you notice other side effects that you think are caused by this medicine, tell your doctor  Call your doctor for medical advice about side effects  You may report side effects to FDA at 1-045-FDA-8495  © Copyright [employer] 2021 Information is for End User's use only and may not be sold, redistributed or otherwise used for commercial purposes  The above information is an  only   It is not intended as medical advice for individual conditions or treatments  Talk to your doctor, nurse or pharmacist before following any medical regimen to see if it is safe and effective for you

## 2021-10-04 ENCOUNTER — APPOINTMENT (EMERGENCY)
Dept: RADIOLOGY | Facility: HOSPITAL | Age: 56
End: 2021-10-04
Payer: COMMERCIAL

## 2021-10-04 ENCOUNTER — HOSPITAL ENCOUNTER (EMERGENCY)
Facility: HOSPITAL | Age: 56
Discharge: HOME | End: 2021-10-04
Attending: EMERGENCY MEDICINE
Payer: COMMERCIAL

## 2021-10-04 VITALS
OXYGEN SATURATION: 97 % | DIASTOLIC BLOOD PRESSURE: 81 MMHG | HEART RATE: 97 BPM | TEMPERATURE: 98.2 F | RESPIRATION RATE: 20 BRPM | SYSTOLIC BLOOD PRESSURE: 183 MMHG

## 2021-10-04 DIAGNOSIS — M54.16 ACUTE LEFT LUMBAR RADICULOPATHY: Primary | ICD-10-CM

## 2021-10-04 PROCEDURE — 99285 EMERGENCY DEPT VISIT HI MDM: CPT | Performed by: EMERGENCY MEDICINE

## 2021-10-04 PROCEDURE — 99283 EMERGENCY DEPT VISIT LOW MDM: CPT

## 2021-10-04 PROCEDURE — 73610 X-RAY EXAM OF ANKLE: CPT

## 2021-10-04 RX ORDER — ACETAMINOPHEN 325 MG/1
650 TABLET ORAL ONCE
Status: COMPLETED | OUTPATIENT
Start: 2021-10-04 | End: 2021-10-04

## 2021-10-04 RX ORDER — PREDNISONE 10 MG/1
TABLET ORAL
Qty: 30 TABLET | Refills: 0 | Status: SHIPPED | OUTPATIENT
Start: 2021-10-04 | End: 2021-10-16

## 2021-10-04 RX ORDER — MORPHINE SULFATE 15 MG/1
7.5 TABLET ORAL EVERY 6 HOURS PRN
Qty: 7 TABLET | Refills: 0 | Status: SHIPPED | OUTPATIENT
Start: 2021-10-04 | End: 2021-10-14

## 2021-10-04 RX ORDER — LIDOCAINE 50 MG/G
1 PATCH TOPICAL ONCE
Status: DISCONTINUED | OUTPATIENT
Start: 2021-10-04 | End: 2021-10-04 | Stop reason: HOSPADM

## 2021-10-04 RX ORDER — HYDROCODONE BITARTRATE AND ACETAMINOPHEN 5; 325 MG/1; MG/1
1 TABLET ORAL ONCE
Status: COMPLETED | OUTPATIENT
Start: 2021-10-04 | End: 2021-10-04

## 2021-10-04 RX ADMIN — HYDROCODONE BITARTRATE AND ACETAMINOPHEN 1 TABLET: 5; 325 TABLET ORAL at 05:42

## 2021-10-04 RX ADMIN — ACETAMINOPHEN 650 MG: 325 TABLET, FILM COATED ORAL at 05:42

## 2021-10-04 RX ADMIN — LIDOCAINE 5% 1 PATCH: 700 PATCH TOPICAL at 05:40

## 2021-10-08 ENCOUNTER — TELEPHONE (OUTPATIENT)
Dept: PAIN MEDICINE | Facility: MEDICAL CENTER | Age: 56
End: 2021-10-08

## 2021-10-08 NOTE — TELEPHONE ENCOUNTER
Pt called stating that she does not have control over her bladder and would like to make Dr Naima Barnard aware of this     Pt can be reached at [phone]
S/w pt  Pt states she has no control of her bladder and has been having incontinent episodes for the past 3 weeks  Per pt she went to the ER the other night because she was in so much pain and the Dr there asked her if she has control of her bladder and she said no but states she should have said yes  Per pt she had an incontinent episode at work Pt had a L5-S1 LESI on 9/2 and is scheduled for Vikas SIJ injections on 9/22  Per pt she does have control of her bowels  Pt continues to have numbness in her LLE        Please advise-
S/w pt and advised of same  Pt verbalized understanding and plans to go to ER 
She needs to go to the er
bathing/dressing

## 2021-10-12 ENCOUNTER — TELEPHONE (OUTPATIENT)
Dept: PAIN MEDICINE | Facility: MEDICAL CENTER | Age: 56
End: 2021-10-12

## 2021-10-12 NOTE — TELEPHONE ENCOUNTER
Pt called stating she would like to cancel  her procedure on    10/21 she has to much coming up       Pt will call back to reschedule

## 2021-10-15 PROCEDURE — 4010F ACE/ARB THERAPY RXD/TAKEN: CPT | Performed by: NURSE PRACTITIONER

## 2021-11-02 ENCOUNTER — TELEPHONE (OUTPATIENT)
Dept: SURGICAL ONCOLOGY | Facility: CLINIC | Age: 56
End: 2021-11-02

## 2021-11-08 ENCOUNTER — APPOINTMENT (OUTPATIENT)
Dept: LAB | Facility: CLINIC | Age: 56
End: 2021-11-08
Payer: COMMERCIAL

## 2021-11-08 ENCOUNTER — OFFICE VISIT (OUTPATIENT)
Dept: URGENT CARE | Facility: CLINIC | Age: 56
End: 2021-11-08
Payer: COMMERCIAL

## 2021-11-08 ENCOUNTER — TELEPHONE (OUTPATIENT)
Dept: PAIN MEDICINE | Facility: CLINIC | Age: 56
End: 2021-11-08

## 2021-11-08 VITALS
TEMPERATURE: 97.7 F | OXYGEN SATURATION: 97 % | HEIGHT: 65 IN | DIASTOLIC BLOOD PRESSURE: 88 MMHG | WEIGHT: 222 LBS | HEART RATE: 105 BPM | BODY MASS INDEX: 36.99 KG/M2 | SYSTOLIC BLOOD PRESSURE: 144 MMHG

## 2021-11-08 DIAGNOSIS — M47.816 LUMBAR SPONDYLOSIS: ICD-10-CM

## 2021-11-08 DIAGNOSIS — G89.29 CHRONIC MIDLINE LOW BACK PAIN WITHOUT SCIATICA: ICD-10-CM

## 2021-11-08 DIAGNOSIS — M54.41 CHRONIC MIDLINE LOW BACK PAIN WITH RIGHT-SIDED SCIATICA: ICD-10-CM

## 2021-11-08 DIAGNOSIS — M54.50 CHRONIC MIDLINE LOW BACK PAIN WITHOUT SCIATICA: ICD-10-CM

## 2021-11-08 DIAGNOSIS — R21 RASH: Primary | ICD-10-CM

## 2021-11-08 DIAGNOSIS — G89.29 CHRONIC MIDLINE LOW BACK PAIN WITH RIGHT-SIDED SCIATICA: ICD-10-CM

## 2021-11-08 DIAGNOSIS — E61.1 IRON DEFICIENCY: ICD-10-CM

## 2021-11-08 DIAGNOSIS — F41.9 ANXIETY: ICD-10-CM

## 2021-11-08 DIAGNOSIS — M54.16 LUMBAR RADICULOPATHY: ICD-10-CM

## 2021-11-08 DIAGNOSIS — M54.31 SCIATICA OF RIGHT SIDE: ICD-10-CM

## 2021-11-08 LAB
BASOPHILS # BLD AUTO: 0.06 THOUSANDS/ΜL (ref 0–0.1)
BASOPHILS NFR BLD AUTO: 1 % (ref 0–1)
EOSINOPHIL # BLD AUTO: 0.08 THOUSAND/ΜL (ref 0–0.61)
EOSINOPHIL NFR BLD AUTO: 1 % (ref 0–6)
ERYTHROCYTE [DISTWIDTH] IN BLOOD BY AUTOMATED COUNT: 12.7 % (ref 11.6–15.1)
FERRITIN SERPL-MCNC: 178 NG/ML (ref 8–388)
HCT VFR BLD AUTO: 43.8 % (ref 34.8–46.1)
HGB BLD-MCNC: 14.6 G/DL (ref 11.5–15.4)
IMM GRANULOCYTES # BLD AUTO: 0.06 THOUSAND/UL (ref 0–0.2)
IMM GRANULOCYTES NFR BLD AUTO: 1 % (ref 0–2)
IRON SATN MFR SERPL: 23 % (ref 15–50)
IRON SERPL-MCNC: 84 UG/DL (ref 50–170)
LYMPHOCYTES # BLD AUTO: 1.3 THOUSANDS/ΜL (ref 0.6–4.47)
LYMPHOCYTES NFR BLD AUTO: 16 % (ref 14–44)
MCH RBC QN AUTO: 30.9 PG (ref 26.8–34.3)
MCHC RBC AUTO-ENTMCNC: 33.3 G/DL (ref 31.4–37.4)
MCV RBC AUTO: 93 FL (ref 82–98)
MONOCYTES # BLD AUTO: 0.49 THOUSAND/ΜL (ref 0.17–1.22)
MONOCYTES NFR BLD AUTO: 6 % (ref 4–12)
NEUTROPHILS # BLD AUTO: 6.08 THOUSANDS/ΜL (ref 1.85–7.62)
NEUTS SEG NFR BLD AUTO: 75 % (ref 43–75)
NRBC BLD AUTO-RTO: 0 /100 WBCS
PLATELET # BLD AUTO: 299 THOUSANDS/UL (ref 149–390)
PMV BLD AUTO: 10 FL (ref 8.9–12.7)
RBC # BLD AUTO: 4.72 MILLION/UL (ref 3.81–5.12)
TIBC SERPL-MCNC: 372 UG/DL (ref 250–450)
WBC # BLD AUTO: 8.07 THOUSAND/UL (ref 4.31–10.16)

## 2021-11-08 PROCEDURE — 85025 COMPLETE CBC W/AUTO DIFF WBC: CPT

## 2021-11-08 PROCEDURE — 83540 ASSAY OF IRON: CPT

## 2021-11-08 PROCEDURE — 83550 IRON BINDING TEST: CPT

## 2021-11-08 PROCEDURE — 36415 COLL VENOUS BLD VENIPUNCTURE: CPT

## 2021-11-08 PROCEDURE — 99202 OFFICE O/P NEW SF 15 MIN: CPT | Performed by: PHYSICIAN ASSISTANT

## 2021-11-08 PROCEDURE — 82728 ASSAY OF FERRITIN: CPT

## 2021-11-08 RX ORDER — HYDROXYZINE HYDROCHLORIDE 25 MG/1
25 TABLET, FILM COATED ORAL EVERY 6 HOURS PRN
Qty: 30 TABLET | Refills: 0 | Status: SHIPPED | OUTPATIENT
Start: 2021-11-08 | End: 2021-11-29 | Stop reason: SDUPTHER

## 2021-11-11 RX ORDER — GABAPENTIN 600 MG/1
TABLET ORAL
Qty: 90 TABLET | Refills: 0 | Status: SHIPPED | OUTPATIENT
Start: 2021-11-11 | End: 2021-11-16 | Stop reason: SDUPTHER

## 2021-11-12 ENCOUNTER — APPOINTMENT (EMERGENCY)
Dept: ULTRASOUND IMAGING | Facility: HOSPITAL | Age: 56
End: 2021-11-12
Payer: COMMERCIAL

## 2021-11-12 ENCOUNTER — HOSPITAL ENCOUNTER (INPATIENT)
Facility: HOSPITAL | Age: 56
LOS: 1 days | Discharge: HOME | DRG: 744 | End: 2021-11-13
Attending: OBSTETRICS & GYNECOLOGY | Admitting: OBSTETRICS & GYNECOLOGY
Payer: COMMERCIAL

## 2021-11-12 ENCOUNTER — HOSPITAL ENCOUNTER (EMERGENCY)
Facility: HOSPITAL | Age: 56
Discharge: STILL ADMITTED OR ADMINISTRATIVE | End: 2021-11-12
Attending: EMERGENCY MEDICINE | Admitting: EMERGENCY MEDICINE
Payer: COMMERCIAL

## 2021-11-12 ENCOUNTER — APPOINTMENT (EMERGENCY)
Dept: CT IMAGING | Facility: HOSPITAL | Age: 56
End: 2021-11-12
Payer: COMMERCIAL

## 2021-11-12 VITALS
SYSTOLIC BLOOD PRESSURE: 184 MMHG | HEART RATE: 105 BPM | OXYGEN SATURATION: 94 % | RESPIRATION RATE: 18 BRPM | DIASTOLIC BLOOD PRESSURE: 88 MMHG | TEMPERATURE: 97.4 F

## 2021-11-12 DIAGNOSIS — N83.201 RIGHT OVARIAN CYST: ICD-10-CM

## 2021-11-12 DIAGNOSIS — N93.8 DYSFUNCTIONAL UTERINE BLEEDING: Primary | ICD-10-CM

## 2021-11-12 DIAGNOSIS — D64.9 ANEMIA: ICD-10-CM

## 2021-11-12 DIAGNOSIS — N93.9 VAGINAL BLEEDING: Primary | ICD-10-CM

## 2021-11-12 DIAGNOSIS — R93.89 ABNORMAL PELVIC ULTRASOUND: ICD-10-CM

## 2021-11-12 DIAGNOSIS — D21.9 FIBROID: ICD-10-CM

## 2021-11-12 LAB
ABO GROUP BLD: NORMAL
ABO GROUP BLD: NORMAL
ALBUMIN SERPL BCP-MCNC: 3.1 G/DL (ref 3.5–5)
ALBUMIN SERPL BCP-MCNC: 3.4 G/DL (ref 3.5–5)
ALP SERPL-CCNC: 73 U/L (ref 46–116)
ALP SERPL-CCNC: 85 U/L (ref 46–116)
ALT SERPL W P-5'-P-CCNC: 21 U/L (ref 12–78)
ALT SERPL W P-5'-P-CCNC: 23 U/L (ref 12–78)
ANION GAP SERPL CALCULATED.3IONS-SCNC: 9 MMOL/L (ref 4–13)
ANION GAP SERPL CALCULATED.3IONS-SCNC: 9 MMOL/L (ref 4–13)
APTT PPP: 21 SECONDS (ref 23–37)
APTT PPP: 27 SECONDS (ref 23–37)
AST SERPL W P-5'-P-CCNC: 10 U/L (ref 5–45)
AST SERPL W P-5'-P-CCNC: 12 U/L (ref 5–45)
B-HCG SERPL-ACNC: <2 MIU/ML
BACTERIA UR QL AUTO: ABNORMAL /HPF
BASOPHILS # BLD AUTO: 0.05 THOUSANDS/ΜL (ref 0–0.1)
BASOPHILS # BLD AUTO: 0.1 THOUSANDS/ΜL (ref 0–0.1)
BASOPHILS NFR BLD AUTO: 1 % (ref 0–1)
BASOPHILS NFR BLD AUTO: 1 % (ref 0–1)
BILIRUB SERPL-MCNC: 0.69 MG/DL (ref 0.2–1)
BILIRUB SERPL-MCNC: 1.74 MG/DL (ref 0.2–1)
BILIRUB UR QL STRIP: NEGATIVE
BLD GP AB SCN SERPL QL: NEGATIVE
BUN SERPL-MCNC: 12 MG/DL (ref 5–25)
BUN SERPL-MCNC: 9 MG/DL (ref 5–25)
CALCIUM ALBUM COR SERPL-MCNC: 8.8 MG/DL (ref 8.3–10.1)
CALCIUM ALBUM COR SERPL-MCNC: 9 MG/DL (ref 8.3–10.1)
CALCIUM SERPL-MCNC: 8.3 MG/DL (ref 8.3–10.1)
CALCIUM SERPL-MCNC: 8.3 MG/DL (ref 8.3–10.1)
CHLORIDE SERPL-SCNC: 101 MMOL/L (ref 100–108)
CHLORIDE SERPL-SCNC: 96 MMOL/L (ref 100–108)
CLARITY UR: ABNORMAL
CO2 SERPL-SCNC: 28 MMOL/L (ref 21–32)
CO2 SERPL-SCNC: 29 MMOL/L (ref 21–32)
COLOR UR: ABNORMAL
CREAT SERPL-MCNC: 0.77 MG/DL (ref 0.6–1.3)
CREAT SERPL-MCNC: 0.99 MG/DL (ref 0.6–1.3)
EOSINOPHIL # BLD AUTO: 0.09 THOUSAND/ΜL (ref 0–0.61)
EOSINOPHIL # BLD AUTO: 0.09 THOUSAND/ΜL (ref 0–0.61)
EOSINOPHIL NFR BLD AUTO: 1 % (ref 0–6)
EOSINOPHIL NFR BLD AUTO: 1 % (ref 0–6)
ERYTHROCYTE [DISTWIDTH] IN BLOOD BY AUTOMATED COUNT: 12.9 % (ref 11.6–15.1)
ERYTHROCYTE [DISTWIDTH] IN BLOOD BY AUTOMATED COUNT: 13.6 % (ref 11.6–15.1)
GFR SERPL CREATININE-BSD FRML MDRD: 65 ML/MIN/1.73SQ M
GFR SERPL CREATININE-BSD FRML MDRD: 88 ML/MIN/1.73SQ M
GLUCOSE SERPL-MCNC: 180 MG/DL (ref 65–140)
GLUCOSE SERPL-MCNC: 193 MG/DL (ref 65–140)
GLUCOSE SERPL-MCNC: 287 MG/DL (ref 65–140)
GLUCOSE SERPL-MCNC: 320 MG/DL (ref 65–140)
GLUCOSE UR STRIP-MCNC: ABNORMAL MG/DL
HCT VFR BLD AUTO: 22.2 % (ref 34.8–46.1)
HCT VFR BLD AUTO: 32.8 % (ref 34.8–46.1)
HCT VFR BLD AUTO: 32.9 % (ref 34.8–46.1)
HGB BLD-MCNC: 10.9 G/DL (ref 11.5–15.4)
HGB BLD-MCNC: 11 G/DL (ref 11.5–15.4)
HGB BLD-MCNC: 7.2 G/DL (ref 11.5–15.4)
HGB UR QL STRIP.AUTO: ABNORMAL
HOLD SPECIMEN: NORMAL
IMM GRANULOCYTES # BLD AUTO: 0.05 THOUSAND/UL (ref 0–0.2)
IMM GRANULOCYTES # BLD AUTO: 0.07 THOUSAND/UL (ref 0–0.2)
IMM GRANULOCYTES NFR BLD AUTO: 1 % (ref 0–2)
IMM GRANULOCYTES NFR BLD AUTO: 1 % (ref 0–2)
INR PPP: 1.03 (ref 0.84–1.19)
INR PPP: 1.1 (ref 0.84–1.19)
KETONES UR STRIP-MCNC: NEGATIVE MG/DL
LEUKOCYTE ESTERASE UR QL STRIP: ABNORMAL
LIPASE SERPL-CCNC: 103 U/L (ref 73–393)
LYMPHOCYTES # BLD AUTO: 1.31 THOUSANDS/ΜL (ref 0.6–4.47)
LYMPHOCYTES # BLD AUTO: 1.68 THOUSANDS/ΜL (ref 0.6–4.47)
LYMPHOCYTES NFR BLD AUTO: 13 % (ref 14–44)
LYMPHOCYTES NFR BLD AUTO: 14 % (ref 14–44)
MCH RBC QN AUTO: 31 PG (ref 26.8–34.3)
MCH RBC QN AUTO: 31.6 PG (ref 26.8–34.3)
MCHC RBC AUTO-ENTMCNC: 33.2 G/DL (ref 31.4–37.4)
MCHC RBC AUTO-ENTMCNC: 33.4 G/DL (ref 31.4–37.4)
MCV RBC AUTO: 93 FL (ref 82–98)
MCV RBC AUTO: 95 FL (ref 82–98)
MONOCYTES # BLD AUTO: 0.47 THOUSAND/ΜL (ref 0.17–1.22)
MONOCYTES # BLD AUTO: 0.82 THOUSAND/ΜL (ref 0.17–1.22)
MONOCYTES NFR BLD AUTO: 5 % (ref 4–12)
MONOCYTES NFR BLD AUTO: 6 % (ref 4–12)
NEUTROPHILS # BLD AUTO: 10.55 THOUSANDS/ΜL (ref 1.85–7.62)
NEUTROPHILS # BLD AUTO: 7.4 THOUSANDS/ΜL (ref 1.85–7.62)
NEUTS SEG NFR BLD AUTO: 78 % (ref 43–75)
NEUTS SEG NFR BLD AUTO: 78 % (ref 43–75)
NITRITE UR QL STRIP: POSITIVE
NON-SQ EPI CELLS URNS QL MICRO: ABNORMAL /HPF
NRBC BLD AUTO-RTO: 0 /100 WBCS
NRBC BLD AUTO-RTO: 0 /100 WBCS
PH UR STRIP.AUTO: 8 [PH]
PLATELET # BLD AUTO: 233 THOUSANDS/UL (ref 149–390)
PLATELET # BLD AUTO: 274 THOUSANDS/UL (ref 149–390)
PMV BLD AUTO: 9.4 FL (ref 8.9–12.7)
PMV BLD AUTO: 9.7 FL (ref 8.9–12.7)
POTASSIUM SERPL-SCNC: 3.1 MMOL/L (ref 3.5–5.3)
POTASSIUM SERPL-SCNC: 3.5 MMOL/L (ref 3.5–5.3)
PROT SERPL-MCNC: 6.3 G/DL (ref 6.4–8.2)
PROT SERPL-MCNC: 6.6 G/DL (ref 6.4–8.2)
PROT UR STRIP-MCNC: >=300 MG/DL
PROTHROMBIN TIME: 13 SECONDS (ref 11.6–14.5)
PROTHROMBIN TIME: 14 SECONDS (ref 11.6–14.5)
RBC # BLD AUTO: 3.48 MILLION/UL (ref 3.81–5.12)
RBC # BLD AUTO: 3.52 MILLION/UL (ref 3.81–5.12)
RBC #/AREA URNS AUTO: ABNORMAL /HPF
RH BLD: NEGATIVE
RH BLD: NEGATIVE
SODIUM SERPL-SCNC: 133 MMOL/L (ref 136–145)
SODIUM SERPL-SCNC: 139 MMOL/L (ref 136–145)
SP GR UR STRIP.AUTO: 1.02 (ref 1–1.03)
SPECIMEN EXPIRATION DATE: NORMAL
UROBILINOGEN UR QL STRIP.AUTO: 1 E.U./DL
WBC # BLD AUTO: 13.31 THOUSAND/UL (ref 4.31–10.16)
WBC # BLD AUTO: 9.37 THOUSAND/UL (ref 4.31–10.16)
WBC #/AREA URNS AUTO: ABNORMAL /HPF

## 2021-11-12 PROCEDURE — 87624 HPV HI-RISK TYP POOLED RSLT: CPT | Performed by: OBSTETRICS & GYNECOLOGY

## 2021-11-12 PROCEDURE — 36415 COLL VENOUS BLD VENIPUNCTURE: CPT

## 2021-11-12 PROCEDURE — 85018 HEMOGLOBIN: CPT | Performed by: EMERGENCY MEDICINE

## 2021-11-12 PROCEDURE — 88305 TISSUE EXAM BY PATHOLOGIST: CPT | Performed by: PATHOLOGY

## 2021-11-12 PROCEDURE — 80053 COMPREHEN METABOLIC PANEL: CPT | Performed by: OBSTETRICS & GYNECOLOGY

## 2021-11-12 PROCEDURE — 82948 REAGENT STRIP/BLOOD GLUCOSE: CPT

## 2021-11-12 PROCEDURE — 58100 BIOPSY OF UTERUS LINING: CPT | Performed by: OBSTETRICS & GYNECOLOGY

## 2021-11-12 PROCEDURE — 85610 PROTHROMBIN TIME: CPT | Performed by: OBSTETRICS & GYNECOLOGY

## 2021-11-12 PROCEDURE — 86920 COMPATIBILITY TEST SPIN: CPT

## 2021-11-12 PROCEDURE — 80053 COMPREHEN METABOLIC PANEL: CPT | Performed by: EMERGENCY MEDICINE

## 2021-11-12 PROCEDURE — 96375 TX/PRO/DX INJ NEW DRUG ADDON: CPT

## 2021-11-12 PROCEDURE — 76856 US EXAM PELVIC COMPLETE: CPT

## 2021-11-12 PROCEDURE — 83036 HEMOGLOBIN GLYCOSYLATED A1C: CPT | Performed by: OBSTETRICS & GYNECOLOGY

## 2021-11-12 PROCEDURE — 86901 BLOOD TYPING SEROLOGIC RH(D): CPT | Performed by: EMERGENCY MEDICINE

## 2021-11-12 PROCEDURE — 85014 HEMATOCRIT: CPT | Performed by: EMERGENCY MEDICINE

## 2021-11-12 PROCEDURE — 0UDB7ZX EXTRACTION OF ENDOMETRIUM, VIA NATURAL OR ARTIFICIAL OPENING, DIAGNOSTIC: ICD-10-PCS | Performed by: OBSTETRICS & GYNECOLOGY

## 2021-11-12 PROCEDURE — 99285 EMERGENCY DEPT VISIT HI MDM: CPT

## 2021-11-12 PROCEDURE — 74177 CT ABD & PELVIS W/CONTRAST: CPT

## 2021-11-12 PROCEDURE — 96376 TX/PRO/DX INJ SAME DRUG ADON: CPT

## 2021-11-12 PROCEDURE — 85610 PROTHROMBIN TIME: CPT | Performed by: EMERGENCY MEDICINE

## 2021-11-12 PROCEDURE — 99291 CRITICAL CARE FIRST HOUR: CPT | Performed by: EMERGENCY MEDICINE

## 2021-11-12 PROCEDURE — 84702 CHORIONIC GONADOTROPIN TEST: CPT | Performed by: EMERGENCY MEDICINE

## 2021-11-12 PROCEDURE — G1004 CDSM NDSC: HCPCS

## 2021-11-12 PROCEDURE — 87086 URINE CULTURE/COLONY COUNT: CPT

## 2021-11-12 PROCEDURE — 85730 THROMBOPLASTIN TIME PARTIAL: CPT | Performed by: OBSTETRICS & GYNECOLOGY

## 2021-11-12 PROCEDURE — 36430 TRANSFUSION BLD/BLD COMPNT: CPT

## 2021-11-12 PROCEDURE — 96374 THER/PROPH/DIAG INJ IV PUSH: CPT

## 2021-11-12 PROCEDURE — 86900 BLOOD TYPING SEROLOGIC ABO: CPT | Performed by: EMERGENCY MEDICINE

## 2021-11-12 PROCEDURE — 85730 THROMBOPLASTIN TIME PARTIAL: CPT | Performed by: EMERGENCY MEDICINE

## 2021-11-12 PROCEDURE — 99222 1ST HOSP IP/OBS MODERATE 55: CPT | Performed by: OBSTETRICS & GYNECOLOGY

## 2021-11-12 PROCEDURE — 96365 THER/PROPH/DIAG IV INF INIT: CPT

## 2021-11-12 PROCEDURE — 85025 COMPLETE CBC W/AUTO DIFF WBC: CPT | Performed by: EMERGENCY MEDICINE

## 2021-11-12 PROCEDURE — 83690 ASSAY OF LIPASE: CPT | Performed by: EMERGENCY MEDICINE

## 2021-11-12 PROCEDURE — NC001 PR NO CHARGE: Performed by: OBSTETRICS & GYNECOLOGY

## 2021-11-12 PROCEDURE — 81001 URINALYSIS AUTO W/SCOPE: CPT

## 2021-11-12 PROCEDURE — 86850 RBC ANTIBODY SCREEN: CPT | Performed by: EMERGENCY MEDICINE

## 2021-11-12 PROCEDURE — 76830 TRANSVAGINAL US NON-OB: CPT

## 2021-11-12 PROCEDURE — 88175 CYTOPATH C/V AUTO FLUID REDO: CPT | Performed by: OBSTETRICS & GYNECOLOGY

## 2021-11-12 PROCEDURE — P9040 RBC LEUKOREDUCED IRRADIATED: HCPCS

## 2021-11-12 PROCEDURE — 85025 COMPLETE CBC W/AUTO DIFF WBC: CPT | Performed by: OBSTETRICS & GYNECOLOGY

## 2021-11-12 RX ORDER — ACETAMINOPHEN 325 MG/1
650 TABLET ORAL EVERY 6 HOURS PRN
Status: DISCONTINUED | OUTPATIENT
Start: 2021-11-12 | End: 2021-11-13 | Stop reason: HOSPADM

## 2021-11-12 RX ORDER — FENTANYL CITRATE 50 UG/ML
50 INJECTION, SOLUTION INTRAMUSCULAR; INTRAVENOUS ONCE
Status: COMPLETED | OUTPATIENT
Start: 2021-11-12 | End: 2021-11-12

## 2021-11-12 RX ORDER — LOSARTAN POTASSIUM 50 MG/1
50 TABLET ORAL DAILY
Status: DISCONTINUED | OUTPATIENT
Start: 2021-11-13 | End: 2021-11-13 | Stop reason: HOSPADM

## 2021-11-12 RX ORDER — DULOXETIN HYDROCHLORIDE 30 MG/1
30 CAPSULE, DELAYED RELEASE ORAL
Status: DISCONTINUED | OUTPATIENT
Start: 2021-11-12 | End: 2021-11-13 | Stop reason: HOSPADM

## 2021-11-12 RX ORDER — ONDANSETRON 2 MG/ML
4 INJECTION INTRAMUSCULAR; INTRAVENOUS ONCE
Status: COMPLETED | OUTPATIENT
Start: 2021-11-12 | End: 2021-11-12

## 2021-11-12 RX ORDER — HYDROXYZINE HYDROCHLORIDE 25 MG/1
25 TABLET, FILM COATED ORAL EVERY 6 HOURS PRN
Status: DISCONTINUED | OUTPATIENT
Start: 2021-11-12 | End: 2021-11-13 | Stop reason: HOSPADM

## 2021-11-12 RX ORDER — FENTANYL CITRATE 50 UG/ML
25 INJECTION, SOLUTION INTRAMUSCULAR; INTRAVENOUS ONCE
Status: COMPLETED | OUTPATIENT
Start: 2021-11-12 | End: 2021-11-12

## 2021-11-12 RX ORDER — PRAVASTATIN SODIUM 40 MG
40 TABLET ORAL DAILY
Status: DISCONTINUED | OUTPATIENT
Start: 2021-11-13 | End: 2021-11-13 | Stop reason: HOSPADM

## 2021-11-12 RX ORDER — SODIUM CHLORIDE, SODIUM GLUCONATE, SODIUM ACETATE, POTASSIUM CHLORIDE, MAGNESIUM CHLORIDE, SODIUM PHOSPHATE, DIBASIC, AND POTASSIUM PHOSPHATE .53; .5; .37; .037; .03; .012; .00082 G/100ML; G/100ML; G/100ML; G/100ML; G/100ML; G/100ML; G/100ML
75 INJECTION, SOLUTION INTRAVENOUS CONTINUOUS
Status: DISCONTINUED | OUTPATIENT
Start: 2021-11-12 | End: 2021-11-13 | Stop reason: HOSPADM

## 2021-11-12 RX ORDER — ONDANSETRON 2 MG/ML
4 INJECTION INTRAMUSCULAR; INTRAVENOUS EVERY 6 HOURS PRN
Status: DISCONTINUED | OUTPATIENT
Start: 2021-11-12 | End: 2021-11-13 | Stop reason: HOSPADM

## 2021-11-12 RX ORDER — HYDROCHLOROTHIAZIDE 25 MG/1
50 TABLET ORAL DAILY
Status: DISCONTINUED | OUTPATIENT
Start: 2021-11-13 | End: 2021-11-13 | Stop reason: HOSPADM

## 2021-11-12 RX ORDER — GABAPENTIN 300 MG/1
600 CAPSULE ORAL 3 TIMES DAILY
Status: DISCONTINUED | OUTPATIENT
Start: 2021-11-12 | End: 2021-11-13 | Stop reason: HOSPADM

## 2021-11-12 RX ORDER — FAMOTIDINE 20 MG/1
20 TABLET, FILM COATED ORAL 2 TIMES DAILY PRN
Status: DISCONTINUED | OUTPATIENT
Start: 2021-11-12 | End: 2021-11-13 | Stop reason: HOSPADM

## 2021-11-12 RX ADMIN — ONDANSETRON 4 MG: 2 INJECTION INTRAMUSCULAR; INTRAVENOUS at 04:43

## 2021-11-12 RX ADMIN — FENTANYL CITRATE 25 MCG: 50 INJECTION INTRAMUSCULAR; INTRAVENOUS at 04:43

## 2021-11-12 RX ADMIN — TRANEXAMIC ACID 600 MG: 1 INJECTION, SOLUTION INTRAVENOUS at 12:11

## 2021-11-12 RX ADMIN — FENTANYL CITRATE 50 MCG: 50 INJECTION INTRAMUSCULAR; INTRAVENOUS at 09:31

## 2021-11-12 RX ADMIN — DULOXETINE HYDROCHLORIDE 30 MG: 30 CAPSULE, DELAYED RELEASE ORAL at 23:09

## 2021-11-12 RX ADMIN — SODIUM CHLORIDE, SODIUM GLUCONATE, SODIUM ACETATE, POTASSIUM CHLORIDE, MAGNESIUM CHLORIDE, SODIUM PHOSPHATE, DIBASIC, AND POTASSIUM PHOSPHATE 75 ML/HR: .53; .5; .37; .037; .03; .012; .00082 INJECTION, SOLUTION INTRAVENOUS at 18:48

## 2021-11-12 RX ADMIN — ACETAMINOPHEN 650 MG: 325 TABLET, FILM COATED ORAL at 23:54

## 2021-11-12 RX ADMIN — SODIUM CHLORIDE, SODIUM LACTATE, POTASSIUM CHLORIDE, AND CALCIUM CHLORIDE 1000 ML: .6; .31; .03; .02 INJECTION, SOLUTION INTRAVENOUS at 04:43

## 2021-11-12 RX ADMIN — INSULIN LISPRO 1 UNITS: 100 INJECTION, SOLUTION INTRAVENOUS; SUBCUTANEOUS at 19:13

## 2021-11-12 RX ADMIN — GABAPENTIN 600 MG: 300 CAPSULE ORAL at 23:09

## 2021-11-12 RX ADMIN — IOHEXOL 100 ML: 350 INJECTION, SOLUTION INTRAVENOUS at 05:11

## 2021-11-12 RX ADMIN — NORETHINDRONE ACETATE 20 MG: 5 TABLET ORAL at 19:36

## 2021-11-13 VITALS
BODY MASS INDEX: 36.65 KG/M2 | HEIGHT: 65 IN | HEART RATE: 98 BPM | RESPIRATION RATE: 20 BRPM | WEIGHT: 220 LBS | OXYGEN SATURATION: 96 % | DIASTOLIC BLOOD PRESSURE: 75 MMHG | TEMPERATURE: 97.4 F | SYSTOLIC BLOOD PRESSURE: 150 MMHG

## 2021-11-13 LAB
ABO GROUP BLD BPU: NORMAL
ALBUMIN SERPL BCP-MCNC: 2.7 G/DL (ref 3.5–5)
ALP SERPL-CCNC: 63 U/L (ref 46–116)
ALT SERPL W P-5'-P-CCNC: 17 U/L (ref 12–78)
ANION GAP SERPL CALCULATED.3IONS-SCNC: 10 MMOL/L (ref 4–13)
AST SERPL W P-5'-P-CCNC: 11 U/L (ref 5–45)
BACTERIA UR CULT: NORMAL
BASOPHILS # BLD AUTO: 0.05 THOUSANDS/ΜL (ref 0–0.1)
BASOPHILS NFR BLD AUTO: 1 % (ref 0–1)
BILIRUB SERPL-MCNC: 0.78 MG/DL (ref 0.2–1)
BPU ID: NORMAL
BUN SERPL-MCNC: 9 MG/DL (ref 5–25)
CALCIUM ALBUM COR SERPL-MCNC: 9 MG/DL (ref 8.3–10.1)
CALCIUM SERPL-MCNC: 8 MG/DL (ref 8.3–10.1)
CHLORIDE SERPL-SCNC: 101 MMOL/L (ref 100–108)
CO2 SERPL-SCNC: 26 MMOL/L (ref 21–32)
CREAT SERPL-MCNC: 0.6 MG/DL (ref 0.6–1.3)
CROSSMATCH: NORMAL
EOSINOPHIL # BLD AUTO: 0.14 THOUSAND/ΜL (ref 0–0.61)
EOSINOPHIL NFR BLD AUTO: 2 % (ref 0–6)
ERYTHROCYTE [DISTWIDTH] IN BLOOD BY AUTOMATED COUNT: 14.1 % (ref 11.6–15.1)
EST. AVERAGE GLUCOSE BLD GHB EST-MCNC: 209 MG/DL
GFR SERPL CREATININE-BSD FRML MDRD: 104 ML/MIN/1.73SQ M
GLUCOSE SERPL-MCNC: 237 MG/DL (ref 65–140)
GLUCOSE SERPL-MCNC: 250 MG/DL (ref 65–140)
GLUCOSE SERPL-MCNC: 298 MG/DL (ref 65–140)
HBA1C MFR BLD: 8.9 %
HCT VFR BLD AUTO: 31 % (ref 34.8–46.1)
HGB BLD-MCNC: 10 G/DL (ref 11.5–15.4)
IMM GRANULOCYTES # BLD AUTO: 0.07 THOUSAND/UL (ref 0–0.2)
IMM GRANULOCYTES NFR BLD AUTO: 1 % (ref 0–2)
LYMPHOCYTES # BLD AUTO: 1.5 THOUSANDS/ΜL (ref 0.6–4.47)
LYMPHOCYTES NFR BLD AUTO: 20 % (ref 14–44)
MCH RBC QN AUTO: 30.1 PG (ref 26.8–34.3)
MCHC RBC AUTO-ENTMCNC: 32.3 G/DL (ref 31.4–37.4)
MCV RBC AUTO: 93 FL (ref 82–98)
MONOCYTES # BLD AUTO: 0.5 THOUSAND/ΜL (ref 0.17–1.22)
MONOCYTES NFR BLD AUTO: 7 % (ref 4–12)
NEUTROPHILS # BLD AUTO: 5.37 THOUSANDS/ΜL (ref 1.85–7.62)
NEUTS SEG NFR BLD AUTO: 69 % (ref 43–75)
NRBC BLD AUTO-RTO: 0 /100 WBCS
PLATELET # BLD AUTO: 223 THOUSANDS/UL (ref 149–390)
PMV BLD AUTO: 9.5 FL (ref 8.9–12.7)
POTASSIUM SERPL-SCNC: 3.4 MMOL/L (ref 3.5–5.3)
PROT SERPL-MCNC: 5.8 G/DL (ref 6.4–8.2)
RBC # BLD AUTO: 3.32 MILLION/UL (ref 3.81–5.12)
SODIUM SERPL-SCNC: 137 MMOL/L (ref 136–145)
UNIT DISPENSE STATUS: NORMAL
UNIT PRODUCT CODE: NORMAL
UNIT PRODUCT VOLUME: 350 ML
UNIT RH: NORMAL
WBC # BLD AUTO: 7.63 THOUSAND/UL (ref 4.31–10.16)

## 2021-11-13 PROCEDURE — 99238 HOSP IP/OBS DSCHRG MGMT 30/<: CPT | Performed by: OBSTETRICS & GYNECOLOGY

## 2021-11-13 PROCEDURE — 85025 COMPLETE CBC W/AUTO DIFF WBC: CPT | Performed by: OBSTETRICS & GYNECOLOGY

## 2021-11-13 PROCEDURE — 82948 REAGENT STRIP/BLOOD GLUCOSE: CPT

## 2021-11-13 PROCEDURE — 80053 COMPREHEN METABOLIC PANEL: CPT | Performed by: OBSTETRICS & GYNECOLOGY

## 2021-11-13 PROCEDURE — 3052F HG A1C>EQUAL 8.0%<EQUAL 9.0%: CPT | Performed by: NURSE PRACTITIONER

## 2021-11-13 RX ADMIN — HYDROCHLOROTHIAZIDE 50 MG: 25 TABLET ORAL at 08:48

## 2021-11-13 RX ADMIN — INSULIN LISPRO 4 UNITS: 100 INJECTION, SOLUTION INTRAVENOUS; SUBCUTANEOUS at 11:43

## 2021-11-13 RX ADMIN — INSULIN LISPRO 3 UNITS: 100 INJECTION, SOLUTION INTRAVENOUS; SUBCUTANEOUS at 08:48

## 2021-11-13 RX ADMIN — PRAVASTATIN SODIUM 40 MG: 40 TABLET ORAL at 08:48

## 2021-11-13 RX ADMIN — SODIUM CHLORIDE, SODIUM GLUCONATE, SODIUM ACETATE, POTASSIUM CHLORIDE, MAGNESIUM CHLORIDE, SODIUM PHOSPHATE, DIBASIC, AND POTASSIUM PHOSPHATE 75 ML/HR: .53; .5; .37; .037; .03; .012; .00082 INJECTION, SOLUTION INTRAVENOUS at 11:42

## 2021-11-13 RX ADMIN — LOSARTAN POTASSIUM 50 MG: 50 TABLET, FILM COATED ORAL at 08:48

## 2021-11-13 RX ADMIN — GABAPENTIN 600 MG: 300 CAPSULE ORAL at 08:48

## 2021-11-15 ENCOUNTER — TRANSITIONAL CARE MANAGEMENT (OUTPATIENT)
Dept: FAMILY MEDICINE CLINIC | Facility: CLINIC | Age: 56
End: 2021-11-15

## 2021-11-15 LAB
HPV HR 12 DNA CVX QL NAA+PROBE: NEGATIVE
HPV16 DNA CVX QL NAA+PROBE: NEGATIVE
HPV18 DNA CVX QL NAA+PROBE: NEGATIVE

## 2021-11-16 ENCOUNTER — OFFICE VISIT (OUTPATIENT)
Dept: PAIN MEDICINE | Facility: CLINIC | Age: 56
End: 2021-11-16
Payer: COMMERCIAL

## 2021-11-16 VITALS
BODY MASS INDEX: 36.92 KG/M2 | SYSTOLIC BLOOD PRESSURE: 126 MMHG | WEIGHT: 221.6 LBS | HEART RATE: 93 BPM | DIASTOLIC BLOOD PRESSURE: 81 MMHG | HEIGHT: 65 IN

## 2021-11-16 DIAGNOSIS — M54.41 CHRONIC MIDLINE LOW BACK PAIN WITH RIGHT-SIDED SCIATICA: ICD-10-CM

## 2021-11-16 DIAGNOSIS — M54.50 CHRONIC MIDLINE LOW BACK PAIN WITHOUT SCIATICA: ICD-10-CM

## 2021-11-16 DIAGNOSIS — M54.16 LUMBAR RADICULOPATHY: ICD-10-CM

## 2021-11-16 DIAGNOSIS — G89.29 CHRONIC MIDLINE LOW BACK PAIN WITHOUT SCIATICA: ICD-10-CM

## 2021-11-16 DIAGNOSIS — M46.1 SACROILIITIS (HCC): ICD-10-CM

## 2021-11-16 DIAGNOSIS — M54.31 SCIATICA OF RIGHT SIDE: ICD-10-CM

## 2021-11-16 DIAGNOSIS — G89.29 CHRONIC MIDLINE LOW BACK PAIN WITH RIGHT-SIDED SCIATICA: ICD-10-CM

## 2021-11-16 DIAGNOSIS — M47.816 LUMBAR SPONDYLOSIS: ICD-10-CM

## 2021-11-16 DIAGNOSIS — G89.4 CHRONIC PAIN SYNDROME: Primary | ICD-10-CM

## 2021-11-16 PROCEDURE — 99214 OFFICE O/P EST MOD 30 MIN: CPT | Performed by: NURSE PRACTITIONER

## 2021-11-16 PROCEDURE — 1111F DSCHRG MED/CURRENT MED MERGE: CPT | Performed by: NURSE PRACTITIONER

## 2021-11-16 RX ORDER — DULOXETIN HYDROCHLORIDE 60 MG/1
60 CAPSULE, DELAYED RELEASE ORAL
Qty: 30 CAPSULE | Refills: 5 | Status: SHIPPED | OUTPATIENT
Start: 2021-11-16 | End: 2022-06-14

## 2021-11-16 RX ORDER — GABAPENTIN 600 MG/1
600 TABLET ORAL 3 TIMES DAILY
Qty: 90 TABLET | Refills: 5 | Status: SHIPPED | OUTPATIENT
Start: 2021-11-16 | End: 2021-11-29

## 2021-11-16 RX ORDER — TIZANIDINE 2 MG/1
2 TABLET ORAL EVERY 8 HOURS PRN
Qty: 90 TABLET | Refills: 5 | Status: SHIPPED | OUTPATIENT
Start: 2021-11-16

## 2021-11-17 ENCOUNTER — OFFICE VISIT (OUTPATIENT)
Dept: FAMILY MEDICINE CLINIC | Facility: CLINIC | Age: 56
End: 2021-11-17
Payer: COMMERCIAL

## 2021-11-17 VITALS
OXYGEN SATURATION: 98 % | HEART RATE: 105 BPM | HEIGHT: 65 IN | RESPIRATION RATE: 16 BRPM | BODY MASS INDEX: 37.32 KG/M2 | TEMPERATURE: 97.7 F | DIASTOLIC BLOOD PRESSURE: 72 MMHG | SYSTOLIC BLOOD PRESSURE: 124 MMHG | WEIGHT: 224 LBS

## 2021-11-17 DIAGNOSIS — N92.1 MENORRHAGIA WITH IRREGULAR CYCLE: Primary | ICD-10-CM

## 2021-11-17 DIAGNOSIS — Z23 NEED FOR INFLUENZA VACCINATION: ICD-10-CM

## 2021-11-17 DIAGNOSIS — I10 ESSENTIAL HYPERTENSION: ICD-10-CM

## 2021-11-17 DIAGNOSIS — D50.0 IRON DEFICIENCY ANEMIA DUE TO CHRONIC BLOOD LOSS: ICD-10-CM

## 2021-11-17 LAB
LAB AP GYN PRIMARY INTERPRETATION: NORMAL
Lab: NORMAL

## 2021-11-17 PROCEDURE — 99496 TRANSJ CARE MGMT HIGH F2F 7D: CPT | Performed by: FAMILY MEDICINE

## 2021-11-17 PROCEDURE — 4010F ACE/ARB THERAPY RXD/TAKEN: CPT | Performed by: NURSE PRACTITIONER

## 2021-11-17 RX ORDER — DOCUSATE SODIUM 100 MG/1
100 CAPSULE, LIQUID FILLED ORAL 2 TIMES DAILY
Qty: 60 CAPSULE | Refills: 1 | Status: SHIPPED | OUTPATIENT
Start: 2021-11-17 | End: 2022-06-14

## 2021-11-17 RX ORDER — FERROUS SULFATE 325(65) MG
325 TABLET ORAL
Qty: 30 TABLET | Refills: 0 | Status: SHIPPED | OUTPATIENT
Start: 2021-11-17 | End: 2022-07-21

## 2021-11-17 RX ORDER — LOSARTAN POTASSIUM 50 MG/1
50 TABLET ORAL DAILY
Qty: 30 TABLET | Refills: 2 | Status: SHIPPED | OUTPATIENT
Start: 2021-11-17 | End: 2022-02-08

## 2021-11-18 ENCOUNTER — TELEPHONE (OUTPATIENT)
Dept: FAMILY MEDICINE CLINIC | Facility: CLINIC | Age: 56
End: 2021-11-18

## 2021-11-18 ENCOUNTER — APPOINTMENT (OUTPATIENT)
Dept: LAB | Facility: CLINIC | Age: 56
End: 2021-11-18
Payer: COMMERCIAL

## 2021-11-18 DIAGNOSIS — N92.1 MENORRHAGIA WITH IRREGULAR CYCLE: ICD-10-CM

## 2021-11-18 LAB
BASOPHILS # BLD AUTO: 0.06 THOUSANDS/ΜL (ref 0–0.1)
BASOPHILS NFR BLD AUTO: 1 % (ref 0–1)
EOSINOPHIL # BLD AUTO: 0.09 THOUSAND/ΜL (ref 0–0.61)
EOSINOPHIL NFR BLD AUTO: 1 % (ref 0–6)
ERYTHROCYTE [DISTWIDTH] IN BLOOD BY AUTOMATED COUNT: 13.6 % (ref 11.6–15.1)
HCT VFR BLD AUTO: 35.6 % (ref 34.8–46.1)
HGB BLD-MCNC: 11.7 G/DL (ref 11.5–15.4)
IMM GRANULOCYTES # BLD AUTO: 0.04 THOUSAND/UL (ref 0–0.2)
IMM GRANULOCYTES NFR BLD AUTO: 1 % (ref 0–2)
LYMPHOCYTES # BLD AUTO: 1.32 THOUSANDS/ΜL (ref 0.6–4.47)
LYMPHOCYTES NFR BLD AUTO: 18 % (ref 14–44)
MCH RBC QN AUTO: 30.9 PG (ref 26.8–34.3)
MCHC RBC AUTO-ENTMCNC: 32.9 G/DL (ref 31.4–37.4)
MCV RBC AUTO: 94 FL (ref 82–98)
MONOCYTES # BLD AUTO: 0.42 THOUSAND/ΜL (ref 0.17–1.22)
MONOCYTES NFR BLD AUTO: 6 % (ref 4–12)
NEUTROPHILS # BLD AUTO: 5.5 THOUSANDS/ΜL (ref 1.85–7.62)
NEUTS SEG NFR BLD AUTO: 73 % (ref 43–75)
NRBC BLD AUTO-RTO: 0 /100 WBCS
PLATELET # BLD AUTO: 324 THOUSANDS/UL (ref 149–390)
PMV BLD AUTO: 9.7 FL (ref 8.9–12.7)
RBC # BLD AUTO: 3.79 MILLION/UL (ref 3.81–5.12)
WBC # BLD AUTO: 7.43 THOUSAND/UL (ref 4.31–10.16)

## 2021-11-18 PROCEDURE — 85025 COMPLETE CBC W/AUTO DIFF WBC: CPT

## 2021-11-18 PROCEDURE — 36415 COLL VENOUS BLD VENIPUNCTURE: CPT

## 2021-11-19 ENCOUNTER — OFFICE VISIT (OUTPATIENT)
Dept: OBGYN CLINIC | Facility: MEDICAL CENTER | Age: 56
End: 2021-11-19
Payer: COMMERCIAL

## 2021-11-19 VITALS
DIASTOLIC BLOOD PRESSURE: 80 MMHG | SYSTOLIC BLOOD PRESSURE: 130 MMHG | HEIGHT: 65 IN | WEIGHT: 221 LBS | BODY MASS INDEX: 36.82 KG/M2

## 2021-11-19 DIAGNOSIS — D25.9 UTERINE LEIOMYOMA, UNSPECIFIED LOCATION: Primary | ICD-10-CM

## 2021-11-19 PROCEDURE — 99215 OFFICE O/P EST HI 40 MIN: CPT | Performed by: OBSTETRICS & GYNECOLOGY

## 2021-11-19 PROCEDURE — 3008F BODY MASS INDEX DOCD: CPT | Performed by: NURSE PRACTITIONER

## 2021-11-22 ENCOUNTER — VBI (OUTPATIENT)
Dept: ADMINISTRATIVE | Facility: OTHER | Age: 56
End: 2021-11-22

## 2021-11-26 ENCOUNTER — NURSE TRIAGE (OUTPATIENT)
Dept: OTHER | Facility: OTHER | Age: 56
End: 2021-11-26

## 2021-11-26 ENCOUNTER — TELEPHONE (OUTPATIENT)
Dept: OBGYN CLINIC | Facility: MEDICAL CENTER | Age: 56
End: 2021-11-26

## 2021-11-26 DIAGNOSIS — G89.29 CHRONIC MIDLINE LOW BACK PAIN WITH RIGHT-SIDED SCIATICA: ICD-10-CM

## 2021-11-26 DIAGNOSIS — G89.29 CHRONIC MIDLINE LOW BACK PAIN WITHOUT SCIATICA: ICD-10-CM

## 2021-11-26 DIAGNOSIS — M54.50 CHRONIC MIDLINE LOW BACK PAIN WITHOUT SCIATICA: ICD-10-CM

## 2021-11-26 DIAGNOSIS — M54.31 SCIATICA OF RIGHT SIDE: ICD-10-CM

## 2021-11-26 DIAGNOSIS — M54.41 CHRONIC MIDLINE LOW BACK PAIN WITH RIGHT-SIDED SCIATICA: ICD-10-CM

## 2021-11-26 DIAGNOSIS — M54.16 LUMBAR RADICULOPATHY: ICD-10-CM

## 2021-11-26 DIAGNOSIS — M47.816 LUMBAR SPONDYLOSIS: ICD-10-CM

## 2021-11-27 ENCOUNTER — APPOINTMENT (EMERGENCY)
Dept: ULTRASOUND IMAGING | Facility: HOSPITAL | Age: 56
End: 2021-11-27
Payer: COMMERCIAL

## 2021-11-27 ENCOUNTER — HOSPITAL ENCOUNTER (EMERGENCY)
Facility: HOSPITAL | Age: 56
Discharge: HOME | End: 2021-11-27
Attending: EMERGENCY MEDICINE | Admitting: EMERGENCY MEDICINE
Payer: COMMERCIAL

## 2021-11-27 VITALS
RESPIRATION RATE: 18 BRPM | HEART RATE: 104 BPM | TEMPERATURE: 99 F | OXYGEN SATURATION: 95 % | HEIGHT: 65 IN | SYSTOLIC BLOOD PRESSURE: 160 MMHG | BODY MASS INDEX: 36.99 KG/M2 | DIASTOLIC BLOOD PRESSURE: 72 MMHG | WEIGHT: 222 LBS

## 2021-11-27 DIAGNOSIS — N93.8 DYSFUNCTIONAL UTERINE BLEEDING: Primary | ICD-10-CM

## 2021-11-27 LAB
ABO GROUP BLD: NORMAL
ALBUMIN SERPL BCP-MCNC: 3.9 G/DL (ref 3.5–5)
ALP SERPL-CCNC: 75 U/L (ref 34–104)
ALT SERPL W P-5'-P-CCNC: 10 U/L (ref 7–52)
ANION GAP SERPL CALCULATED.3IONS-SCNC: 9 MMOL/L (ref 4–13)
APTT PPP: 26 SECONDS (ref 23–37)
AST SERPL W P-5'-P-CCNC: 10 U/L (ref 13–39)
BASOPHILS # BLD AUTO: 0.05 THOUSANDS/ΜL (ref 0–0.1)
BASOPHILS NFR BLD AUTO: 0 % (ref 0–1)
BILIRUB SERPL-MCNC: 0.69 MG/DL (ref 0.2–1)
BLD GP AB SCN SERPL QL: NEGATIVE
BUN SERPL-MCNC: 14 MG/DL (ref 5–25)
CALCIUM SERPL-MCNC: 8.7 MG/DL (ref 8.4–10.2)
CHLORIDE SERPL-SCNC: 96 MMOL/L (ref 96–108)
CO2 SERPL-SCNC: 23 MMOL/L (ref 21–32)
CREAT SERPL-MCNC: 0.72 MG/DL (ref 0.6–1.3)
EOSINOPHIL # BLD AUTO: 0.15 THOUSAND/ΜL (ref 0–0.61)
EOSINOPHIL NFR BLD AUTO: 1 % (ref 0–6)
ERYTHROCYTE [DISTWIDTH] IN BLOOD BY AUTOMATED COUNT: 13.3 % (ref 11.6–15.1)
GFR SERPL CREATININE-BSD FRML MDRD: 96 ML/MIN/1.73SQ M
GLUCOSE SERPL-MCNC: 325 MG/DL (ref 65–140)
HCT VFR BLD AUTO: 34 % (ref 34.8–46.1)
HCT VFR BLD AUTO: 35.6 % (ref 34.8–46.1)
HGB BLD-MCNC: 11.1 G/DL (ref 11.5–15.4)
HGB BLD-MCNC: 11.5 G/DL (ref 11.5–15.4)
IMM GRANULOCYTES # BLD AUTO: 0.05 THOUSAND/UL (ref 0–0.2)
IMM GRANULOCYTES NFR BLD AUTO: 0 % (ref 0–2)
INR PPP: 0.98 (ref 0.84–1.19)
LACTATE SERPL-SCNC: 1.1 MMOL/L (ref 0.5–2)
LYMPHOCYTES # BLD AUTO: 0.8 THOUSANDS/ΜL (ref 0.6–4.47)
LYMPHOCYTES NFR BLD AUTO: 6 % (ref 14–44)
MCH RBC QN AUTO: 30.2 PG (ref 26.8–34.3)
MCHC RBC AUTO-ENTMCNC: 32.3 G/DL (ref 31.4–37.4)
MCV RBC AUTO: 93 FL (ref 82–98)
MONOCYTES # BLD AUTO: 0.66 THOUSAND/ΜL (ref 0.17–1.22)
MONOCYTES NFR BLD AUTO: 5 % (ref 4–12)
NEUTROPHILS # BLD AUTO: 11.89 THOUSANDS/ΜL (ref 1.85–7.62)
NEUTS SEG NFR BLD AUTO: 88 % (ref 43–75)
NRBC BLD AUTO-RTO: 0 /100 WBCS
PLATELET # BLD AUTO: 260 THOUSANDS/UL (ref 149–390)
PMV BLD AUTO: 9.1 FL (ref 8.9–12.7)
POTASSIUM SERPL-SCNC: 3.8 MMOL/L (ref 3.5–5.3)
PROCALCITONIN SERPL-MCNC: 0.05 NG/ML
PROT SERPL-MCNC: 6.7 G/DL (ref 6.4–8.4)
PROTHROMBIN TIME: 12.9 SECONDS (ref 11.6–14.5)
RBC # BLD AUTO: 3.81 MILLION/UL (ref 3.81–5.12)
RH BLD: NEGATIVE
SODIUM SERPL-SCNC: 128 MMOL/L (ref 135–147)
SPECIMEN EXPIRATION DATE: NORMAL
WBC # BLD AUTO: 13.6 THOUSAND/UL (ref 4.31–10.16)

## 2021-11-27 PROCEDURE — 76830 TRANSVAGINAL US NON-OB: CPT

## 2021-11-27 PROCEDURE — 36415 COLL VENOUS BLD VENIPUNCTURE: CPT | Performed by: EMERGENCY MEDICINE

## 2021-11-27 PROCEDURE — 84145 PROCALCITONIN (PCT): CPT | Performed by: EMERGENCY MEDICINE

## 2021-11-27 PROCEDURE — 80053 COMPREHEN METABOLIC PANEL: CPT | Performed by: EMERGENCY MEDICINE

## 2021-11-27 PROCEDURE — 86850 RBC ANTIBODY SCREEN: CPT | Performed by: EMERGENCY MEDICINE

## 2021-11-27 PROCEDURE — 96361 HYDRATE IV INFUSION ADD-ON: CPT

## 2021-11-27 PROCEDURE — 96375 TX/PRO/DX INJ NEW DRUG ADDON: CPT

## 2021-11-27 PROCEDURE — 87040 BLOOD CULTURE FOR BACTERIA: CPT | Performed by: EMERGENCY MEDICINE

## 2021-11-27 PROCEDURE — 99284 EMERGENCY DEPT VISIT MOD MDM: CPT

## 2021-11-27 PROCEDURE — 96374 THER/PROPH/DIAG INJ IV PUSH: CPT

## 2021-11-27 PROCEDURE — 85014 HEMATOCRIT: CPT | Performed by: EMERGENCY MEDICINE

## 2021-11-27 PROCEDURE — 85025 COMPLETE CBC W/AUTO DIFF WBC: CPT | Performed by: EMERGENCY MEDICINE

## 2021-11-27 PROCEDURE — 85610 PROTHROMBIN TIME: CPT | Performed by: EMERGENCY MEDICINE

## 2021-11-27 PROCEDURE — 76856 US EXAM PELVIC COMPLETE: CPT

## 2021-11-27 PROCEDURE — 86900 BLOOD TYPING SEROLOGIC ABO: CPT | Performed by: EMERGENCY MEDICINE

## 2021-11-27 PROCEDURE — 99285 EMERGENCY DEPT VISIT HI MDM: CPT | Performed by: EMERGENCY MEDICINE

## 2021-11-27 PROCEDURE — 85730 THROMBOPLASTIN TIME PARTIAL: CPT | Performed by: EMERGENCY MEDICINE

## 2021-11-27 PROCEDURE — 86901 BLOOD TYPING SEROLOGIC RH(D): CPT | Performed by: EMERGENCY MEDICINE

## 2021-11-27 PROCEDURE — 85018 HEMOGLOBIN: CPT | Performed by: EMERGENCY MEDICINE

## 2021-11-27 PROCEDURE — 83605 ASSAY OF LACTIC ACID: CPT | Performed by: EMERGENCY MEDICINE

## 2021-11-27 RX ORDER — HYDROMORPHONE HCL/PF 1 MG/ML
1 SYRINGE (ML) INJECTION ONCE
Status: COMPLETED | OUTPATIENT
Start: 2021-11-27 | End: 2021-11-27

## 2021-11-27 RX ORDER — TRANEXAMIC ACID 100 MG/ML
INJECTION, SOLUTION INTRAVENOUS
Status: COMPLETED
Start: 2021-11-27 | End: 2021-11-27

## 2021-11-27 RX ORDER — ONDANSETRON 2 MG/ML
4 INJECTION INTRAMUSCULAR; INTRAVENOUS ONCE
Status: COMPLETED | OUTPATIENT
Start: 2021-11-27 | End: 2021-11-27

## 2021-11-27 RX ORDER — OXYCODONE HYDROCHLORIDE 5 MG/1
5 TABLET ORAL EVERY 6 HOURS PRN
Qty: 5 TABLET | Refills: 0 | Status: SHIPPED | OUTPATIENT
Start: 2021-11-27 | End: 2021-12-06 | Stop reason: ALTCHOICE

## 2021-11-27 RX ADMIN — HYDROMORPHONE HYDROCHLORIDE 1 MG: 1 INJECTION, SOLUTION INTRAMUSCULAR; INTRAVENOUS; SUBCUTANEOUS at 04:44

## 2021-11-27 RX ADMIN — SODIUM CHLORIDE 1000 ML: 0.9 INJECTION, SOLUTION INTRAVENOUS at 02:49

## 2021-11-27 RX ADMIN — TRANEXAMIC ACID 1000 MG: 1 INJECTION, SOLUTION INTRAVENOUS at 04:19

## 2021-11-27 RX ADMIN — ONDANSETRON 4 MG: 2 INJECTION INTRAMUSCULAR; INTRAVENOUS at 04:44

## 2021-11-29 ENCOUNTER — TELEPHONE (OUTPATIENT)
Dept: OBGYN CLINIC | Facility: MEDICAL CENTER | Age: 56
End: 2021-11-29

## 2021-11-29 ENCOUNTER — TELEPHONE (OUTPATIENT)
Dept: OTHER | Facility: OTHER | Age: 56
End: 2021-11-29

## 2021-11-29 DIAGNOSIS — R21 RASH: ICD-10-CM

## 2021-11-29 RX ORDER — HYDROXYZINE HYDROCHLORIDE 25 MG/1
25 TABLET, FILM COATED ORAL EVERY 6 HOURS PRN
Qty: 30 TABLET | Refills: 0 | Status: SHIPPED | OUTPATIENT
Start: 2021-11-29 | End: 2022-01-25

## 2021-11-29 RX ORDER — GABAPENTIN 600 MG/1
TABLET ORAL
Qty: 90 TABLET | Refills: 2 | Status: SHIPPED | OUTPATIENT
Start: 2021-11-29

## 2021-11-30 ENCOUNTER — TELEPHONE (OUTPATIENT)
Dept: OBGYN CLINIC | Facility: MEDICAL CENTER | Age: 56
End: 2021-11-30

## 2021-12-01 ENCOUNTER — TELEPHONE (OUTPATIENT)
Dept: OBGYN CLINIC | Facility: MEDICAL CENTER | Age: 56
End: 2021-12-01

## 2021-12-01 ENCOUNTER — PREP FOR PROCEDURE (OUTPATIENT)
Dept: OBGYN CLINIC | Facility: MEDICAL CENTER | Age: 56
End: 2021-12-01

## 2021-12-01 DIAGNOSIS — N93.9 ABNORMAL UTERINE BLEEDING: Primary | ICD-10-CM

## 2021-12-02 LAB
BACTERIA BLD CULT: NORMAL
BACTERIA BLD CULT: NORMAL

## 2021-12-06 ENCOUNTER — TELEMEDICINE (OUTPATIENT)
Dept: OBGYN CLINIC | Facility: MEDICAL CENTER | Age: 56
End: 2021-12-06

## 2021-12-06 ENCOUNTER — ANESTHESIA EVENT (OUTPATIENT)
Dept: PERIOP | Facility: HOSPITAL | Age: 56
DRG: 742 | End: 2021-12-06
Payer: COMMERCIAL

## 2021-12-06 DIAGNOSIS — D21.9 FIBROIDS: ICD-10-CM

## 2021-12-06 DIAGNOSIS — N93.9 ABNORMAL UTERINE BLEEDING (AUB): Primary | ICD-10-CM

## 2021-12-06 PROCEDURE — PREOP: Performed by: OBSTETRICS & GYNECOLOGY

## 2021-12-06 NOTE — PRE-PROCEDURE INSTRUCTIONS
Pre-Surgery Instructions:   Medication Instructions   • docusate sodium (COLACE) 100 mg capsule Instructed to take per normal schedule except DOS   • DULoxetine (CYMBALTA) 60 mg delayed release capsule Instructed to take per normal schedule-evening med   • ferrous sulfate 325 (65 Fe) mg tablet Instructed to take per normal schedule except DOS   • gabapentin (NEURONTIN) 600 MG tablet Instructed to take per normal schedule including DOS with sips water   • glimepiride (AMARYL) 4 mg tablet Instructed to take per normal schedule except DOS   • hydrOXYzine HCL (ATARAX) 25 mg tablet Instructed to take as needed including DOS with sips water   • Insulin Glargine-Lixisenatide (SOLIQUA) 100 units-33 mcg/mL injection pen Instructed to take per normal schedule-evening med   • losartan (COZAAR) 50 mg tablet Instructed to take per normal schedule except DOS   • norethindrone (Aygestin) 5 mg tablet states medicine will be finished by 12/7 before DOS   • pravastatin (PRAVACHOL) 40 mg tablet Instructed to take per normal schedule-evening med   • tiZANidine (ZANAFLEX) 2 mg tablet Instructed to take as needed including DOS with sips water    All pre-op instructions reviewed as per [facility] Experience w/ pt verb understanding  Inst NPO post MN night before sx except sips water w/ am meds ( states will be taking her gabapentin)  Inst no losartan or amaryl on am of sx per anes guidelines  Inst ok to take long acting insulin night before procedure  Received no specific pre-op showering instructions from surgoen office, inst to get CHG at either [facility] or local pharmacy, reviewed process at time of call  Current hospital visitor & masking policy reviewed  States has no awareness of need for carb drink preop, but has phone appt w/ Dr today & will be discussing & following their instructions r/t same    Inst will receive phone call w/ time to report on DOS btwn 2-8 pm afternoon/evening before surgery from hospital nursing staff  Pt  Verbalized an understanding of all instructions reviewed and offers no concerns at this time

## 2021-12-06 NOTE — H&P (VIEW-ONLY)
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, possible oophorectomy  All risks of the procedure were discussed with Shaunna Ho in detail  The risks include but are not limited to infection, bleeding, transfusion, damage to adjacent organs/nerves requiring immediate and/or delayed repair, clots inside blood vessels, need to complete procedure using alternative approach, procedural failure, worsening of pre-exisiting medical condition, and death were reviewed with patient  All alternatives to the surgery were discussed  Shaunna Ho desires to proceed with above procedure at [facility] on 2021   Consent was reviewed in detail and signed today  Surgery folder reviewed with patient in detail  Preoperative testing and antibiotics were discussed and ordered  Postoperative course discussed and post op medications were reviewed  A prescription for medication for postoperative pain was not given today  Chorhexidine body wash given and instructions reviewed with patient  CC: "Abnormal bleeding"    HPI:  Pt is a 50 y o  [id number] with abnormal uterine bleeding presenting for total laparoscopic hysterectomy bilateral salpingectomy with possible abdominal approach and possible oophorectomy  Patient presented to the emergency room in  with abnormal bleeding  She states her prior stone cycle was 6 months prior to her presentation  She reported increased clots and heavy menstrual bleeding  She did receive 1 unit of packed red blood cells and tranexamic acid  She was started on Aygestin help control bleeding  She states the bleeding worsened despite taking the medication 4 times a day  We discussed the images from her transvaginal ultrasound as well as options  Appears she may have a prolapsing uterine fibroid within her endocervical canal   We reviewed hysteroscopic removal of the fibroid versus hysterectomy    Patient requesting definitive treatment hysterectomy  PMHx:   Past Medical History:   Diagnosis Date   • Anemia     iron deficiency anemia   • Depression    • Diabetes mellitus ([facility] [address] )    • History of transfusion    • Hydrocephalus ([facility] [address] )    • Hyperlipidemia    • Hypertension    • Migraine    • PONV (postoperative nausea and vomiting)    • Sciatica    • Vertigo        PSHx:   Past Surgical History:   Procedure Laterality Date   • COLONOSCOPY     • EPIDURAL BLOCK INJECTION Right 7/2/2020    Procedure: Right L4-5 and L5-S1 transforaminal epidural steroid injection ([id number]);   Surgeon: Ravinder Pringle MD;  Location: [facility];  Service: Pain Management    • EPIDURAL BLOCK INJECTION N/A 9/2/2021    Procedure: BLOCK / INJECTION EPIDURAL STEROID LUMBAR  L5-S1 IESI;  Surgeon: Ravinder Pringle MD;  Location: [facility];  Service: Pain Management    • FL GUIDED NEEDLE PLAC BX/ASP/INJ  7/2/2020   • FL GUIDED NEEDLE PLAC BX/ASP/INJ  9/2/2021   • ME ESOPHAGOGASTRODUODENOSCOPY TRANSORAL DIAGNOSTIC N/A 10/23/2018    Procedure: EGD with Savary dilation AND COLONOSCOPY;  Surgeon: Kole Dozier MD;  Location: [facility];  Service: Gastroenterology   • TUBAL LIGATION         Meds:    Current Outpatient Medications:   •  diclofenac sodium (VOLTAREN) 50 mg EC tablet, Take 1 tablet (50 mg total) by mouth 2 (two) times a day as needed (pain) Do not use with any other NSAIDs (ibuprofen, naproxen, Aleve, Advil, Motrin, Naprosyn) (Patient not taking: Reported on 11/12/2021 ), Disp: 24 tablet, Rfl: 0  •  docusate sodium (COLACE) 100 mg capsule, Take 1 capsule (100 mg total) by mouth 2 (two) times a day, Disp: 60 capsule, Rfl: 1  •  DULoxetine (CYMBALTA) 60 mg delayed release capsule, Take 1 capsule (60 mg total) by mouth daily at bedtime, Disp: 30 capsule, Rfl: 5  •  ferrous sulfate 325 (65 Fe) mg tablet, Take 1 tablet (325 mg total) by mouth daily with breakfast, Disp: 30 tablet, Rfl: 0  •  gabapentin (NEURONTIN) 600 MG tablet, TAKE 1 TABLET BY MOUTH THREE TIMES DAILY, Disp: 90 tablet, Rfl: 2  •  glimepiride (AMARYL) 4 mg tablet, Take 2 mg by mouth daily At night, Disp: , Rfl:   •  hydrOXYzine HCL (ATARAX) 25 mg tablet, Take 1 tablet (25 mg total) by mouth every 6 (six) hours as needed for itching, Disp: 30 tablet, Rfl: 0  •  Insulin Glargine-Lixisenatide (SOLIQUA) 100 units-33 mcg/mL injection pen, Inject 30 Units under the skin daily , Disp: , Rfl:   •  losartan (COZAAR) 50 mg tablet, Take 1 tablet (50 mg total) by mouth daily, Disp: 30 tablet, Rfl: 2  •  norethindrone (Aygestin) 5 mg tablet, Take 4 tablets (20 mg total) by mouth daily for 10 days, Disp: 40 tablet, Rfl: 0  •  pravastatin (PRAVACHOL) 40 mg tablet, Take 40 mg by mouth daily, Disp: , Rfl:   •  tiZANidine (ZANAFLEX) 2 mg tablet, Take 1 tablet (2 mg total) by mouth every 8 (eight) hours as needed for muscle spasms, Disp: 90 tablet, Rfl: 5  Allergies:    Allergies   Allergen Reactions   • Nuts - Food Allergy Anaphylaxis   • Chocolate - Food Allergy Cough   • Eggs Or Egg-Derived Products - Food Allergy Other (See Comments)     Cough   • Other      LETTUCE   • Shellfish Allergy - Food Allergy Other (See Comments)     unknown   • Tomato - Food Allergy Cough       Social Hx:    Social History     Socioeconomic History   • Marital status: /Civil Union     Spouse name: Not on file   • Number of children: Not on file   • Years of education: Not on file   • Highest education level: Not on file   Occupational History   • Not on file   Tobacco Use   • Smoking status: Never Smoker   • Smokeless tobacco: Never Used   Vaping Use   • Vaping Use: Never used   Substance and Sexual Activity   • Alcohol use: No   • Drug use: No   • Sexual activity: Not Currently     Comment:    Other Topics Concern   • Not on file   Social History Narrative   • Not on file     Social Determinants of Health     Financial Resource Strain: Not on file   Food Insecurity: Not on file   Transportation Needs: Not on file   Physical Activity: Not on file   Stress: Not on file   Social Connections: Not on file   Intimate Partner Violence: Not on file   Housing Stability: Not on file       Ob Hx:   OB History    Para Term  AB Living   3 3 3 0   3   SAB IAB Ectopic Multiple Live Births   0 0 0 0 3      # Outcome Date GA Lbr Ish/2nd Weight Sex Delivery Anes PTL Lv   3 Term      Vag-Spont      2 Term      Vag-Spont      1 Term      Vag-Spont          Gyn HX:  denies STD, abnormal pap, significant dysmenorrhea or irregular menses    Fm Hx:   Family History   Problem Relation Age of Onset   • Diabetes Father    • Cancer Father    • Varicose Veins Mother    • Drug abuse Mother        Review of Systems:  Constitutional :no fever, feels well, no tiredness, no recent weight gain or loss  ENT: no ear ache, no loss of hearing, no nosebleeds or nasal discharge, no sore throat or hoarseness  Cardiovascular: no complaints of slow or fast heart beat, no chest pain, no palpitations, no leg claudication or lower extremity edema  Respiratory: no complaints of shortness of shortness of breath, no OCONNOR  Breasts:no complaints of breast pain, breast lump, or nipple discharge  Gastrointestinal: no complaints of abdominal pain, constipation,nausea, vomiting, or diarrhea or bloody stools  Genitourinary : as noted in HPI  Integumentary: no complaints of skin rash or lesion, itching or dry skin  Neurological: no complaints of headache, no confusion, no numbness or tingling, no dizziness or fainting    Objective        LMP  (LMP Unknown)     General:   appears stated age, cooperative, alert normal mood and affect   Neck: Neck: normal, supple,trachea midline, no masses   Heart: regular rate and rhythm, S1, S2 normal, no murmur, click, rub or gallop   Lungs: clear to auscultation bilaterally   Abdomen: soft, non-tender, without masses or organomegaly     FINDINGS:     UTERUS:  The uterus is anteverted in position, measuring 17 0 x 9 0 x 10 4 cm     3 1 x 2 6 x 2 2 cm submucosal leiomyoma  The cervix shows no suspicious abnormality      ENDOMETRIUM:    Thickened AP caliber of 22 mm  Homogeneous and normal in appearance      OVARIES/ADNEXA:  Right ovary:  2 1 x 1 8 x 1 point cm  A 2 1 x 1 8 x 1 point CM cystic structure is appreciated in the right ovary; its internal contents are not well seen secondary to patient body habitus  Doppler flow within normal limits      Left ovary: Not seen      There is no free fluid      The 5 1 x 5 x 5 4 cm cystic structure with complex internal contents is seen on the cul-de-sac      IMPRESSION:  Exam limited by patient body habitus  A 3 1 x 2 6 x 2 2 cm soft tissue structure is seen in the lower uterine segment/cervical canal likely a prolapsed pedunculated fibroid versus clot  Innumerable additional fibroids are seen within the uterus      Endometrial stripe is thickened measuring 22 mm      2 1 x 1 8 x 1 8 cm cystic structure is seen within the right ovary  A 5 cm cystic structure with complex internal contents is seen in the cul-de-sac      Given the age of the patient, additional evaluation with MRI or follow-up ultrasound are recommended in 6 or 10 weeks; alternatively, gynecologic consultation and evaluation can be obtained for further management decision making  A  Endometrium, biopsy:  -  Benign inactive endometrium with extensive breakdown/menstrual change, negative for hyperplasia or carcinoma

## 2021-12-08 ENCOUNTER — HOSPITAL ENCOUNTER (INPATIENT)
Facility: HOSPITAL | Age: 56
LOS: 1 days | Discharge: HOME | DRG: 742 | End: 2021-12-10
Attending: OBSTETRICS & GYNECOLOGY | Admitting: OBSTETRICS & GYNECOLOGY
Payer: COMMERCIAL

## 2021-12-08 ENCOUNTER — ANESTHESIA (OUTPATIENT)
Dept: PERIOP | Facility: HOSPITAL | Age: 56
DRG: 742 | End: 2021-12-08
Payer: COMMERCIAL

## 2021-12-08 DIAGNOSIS — N93.9 ABNORMAL UTERINE BLEEDING: ICD-10-CM

## 2021-12-08 DIAGNOSIS — Z79.4 TYPE 2 DIABETES MELLITUS WITHOUT COMPLICATION, WITH LONG-TERM CURRENT USE OF INSULIN (HCC): Primary | ICD-10-CM

## 2021-12-08 DIAGNOSIS — E11.9 TYPE 2 DIABETES MELLITUS WITHOUT COMPLICATION, WITH LONG-TERM CURRENT USE OF INSULIN (HCC): Primary | ICD-10-CM

## 2021-12-08 DIAGNOSIS — Z90.710 S/P LAPAROSCOPIC HYSTERECTOMY: ICD-10-CM

## 2021-12-08 PROBLEM — E66.01 MORBID OBESITY DUE TO EXCESS CALORIES (HCC): Status: ACTIVE | Noted: 2021-12-08

## 2021-12-08 PROBLEM — Z90.79 STATUS POST BILATERAL SALPINGECTOMY: Status: ACTIVE | Noted: 2021-12-08

## 2021-12-08 LAB
ABO GROUP BLD BPU: NORMAL
ABO GROUP BLD BPU: NORMAL
ABO GROUP BLD: NORMAL
BASE EXCESS BLDA CALC-SCNC: -5 MMOL/L (ref -2–3)
BASOPHILS # BLD AUTO: 0.04 THOUSANDS/ÂΜL (ref 0–0.1)
BASOPHILS NFR BLD AUTO: 1 % (ref 0–1)
BLD GP AB SCN SERPL QL: NEGATIVE
BPU ID: NORMAL
BPU ID: NORMAL
CROSSMATCH: NORMAL
CROSSMATCH: NORMAL
EOSINOPHIL # BLD AUTO: 0.06 THOUSAND/ÂΜL (ref 0–0.61)
EOSINOPHIL NFR BLD AUTO: 1 % (ref 0–6)
ERYTHROCYTE [DISTWIDTH] IN BLOOD BY AUTOMATED COUNT: 13.1 % (ref 11.6–15.1)
ERYTHROCYTE [DISTWIDTH] IN BLOOD BY AUTOMATED COUNT: 13.3 % (ref 11.6–15.1)
GLUCOSE SERPL-MCNC: 136 MG/DL (ref 65–140)
GLUCOSE SERPL-MCNC: 155 MG/DL (ref 65–140)
GLUCOSE SERPL-MCNC: 162 MG/DL (ref 65–140)
GLUCOSE SERPL-MCNC: 163 MG/DL (ref 65–140)
GLUCOSE SERPL-MCNC: 178 MG/DL (ref 65–140)
GLUCOSE SERPL-MCNC: 195 MG/DL (ref 65–140)
GLUCOSE SERPL-MCNC: 308 MG/DL (ref 65–140)
HCG SERPL QL: NEGATIVE
HCO3 BLDA-SCNC: 22.1 MMOL/L (ref 24–30)
HCT VFR BLD AUTO: 29.6 % (ref 34.8–46.1)
HCT VFR BLD AUTO: 33.2 % (ref 34.8–46.1)
HCT VFR BLD CALC: 25 % (ref 34.8–46.1)
HGB BLD-MCNC: 10.5 G/DL (ref 11.5–15.4)
HGB BLD-MCNC: 9 G/DL (ref 11.5–15.4)
HGB BLDA-MCNC: 8.5 G/DL (ref 11.5–15.4)
IMM GRANULOCYTES # BLD AUTO: 0.02 THOUSAND/UL (ref 0–0.2)
IMM GRANULOCYTES NFR BLD AUTO: 0 % (ref 0–2)
LYMPHOCYTES # BLD AUTO: 0.86 THOUSANDS/ÂΜL (ref 0.6–4.47)
LYMPHOCYTES NFR BLD AUTO: 18 % (ref 14–44)
MCH RBC QN AUTO: 28.7 PG (ref 26.8–34.3)
MCH RBC QN AUTO: 28.8 PG (ref 26.8–34.3)
MCHC RBC AUTO-ENTMCNC: 30.4 G/DL (ref 31.4–37.4)
MCHC RBC AUTO-ENTMCNC: 31.6 G/DL (ref 31.4–37.4)
MCV RBC AUTO: 91 FL (ref 82–98)
MCV RBC AUTO: 95 FL (ref 82–98)
MONOCYTES # BLD AUTO: 0.27 THOUSAND/ÂΜL (ref 0.17–1.22)
MONOCYTES NFR BLD AUTO: 6 % (ref 4–12)
NEUTROPHILS # BLD AUTO: 3.5 THOUSANDS/ÂΜL (ref 1.85–7.62)
NEUTS SEG NFR BLD AUTO: 74 % (ref 43–75)
NRBC BLD AUTO-RTO: 0 /100 WBCS
PCO2 BLD: 24 MMOL/L (ref 21–32)
PCO2 BLD: 49.4 MM HG (ref 42–50)
PH BLD: 7.26 [PH] (ref 7.3–7.4)
PLATELET # BLD AUTO: 303 THOUSANDS/UL (ref 149–390)
PLATELET # BLD AUTO: 315 THOUSANDS/UL (ref 149–390)
PMV BLD AUTO: 8.9 FL (ref 8.9–12.7)
PMV BLD AUTO: 9.1 FL (ref 8.9–12.7)
PO2 BLD: 114 MM HG (ref 35–45)
POTASSIUM BLD-SCNC: 4.7 MMOL/L (ref 3.5–5.3)
RBC # BLD AUTO: 3.12 MILLION/UL (ref 3.81–5.12)
RBC # BLD AUTO: 3.66 MILLION/UL (ref 3.81–5.12)
RH BLD: NEGATIVE
SAO2 % BLD FROM PO2: 98 % (ref 60–85)
SODIUM BLD-SCNC: 141 MMOL/L (ref 136–145)
SPECIMEN EXPIRATION DATE: NORMAL
SPECIMEN SOURCE: ABNORMAL
UNIT DISPENSE STATUS: NORMAL
UNIT DISPENSE STATUS: NORMAL
UNIT PRODUCT CODE: NORMAL
UNIT PRODUCT CODE: NORMAL
UNIT PRODUCT VOLUME: 350 ML
UNIT PRODUCT VOLUME: 350 ML
UNIT RH: NORMAL
UNIT RH: NORMAL
WBC # BLD AUTO: 15.42 THOUSAND/UL (ref 4.31–10.16)
WBC # BLD AUTO: 4.75 THOUSAND/UL (ref 4.31–10.16)

## 2021-12-08 PROCEDURE — 86900 BLOOD TYPING SEROLOGIC ABO: CPT | Performed by: OBSTETRICS & GYNECOLOGY

## 2021-12-08 PROCEDURE — 82948 REAGENT STRIP/BLOOD GLUCOSE: CPT

## 2021-12-08 PROCEDURE — 88173 CYTOPATH EVAL FNA REPORT: CPT | Performed by: PATHOLOGY

## 2021-12-08 PROCEDURE — 82947 ASSAY GLUCOSE BLOOD QUANT: CPT

## 2021-12-08 PROCEDURE — 86920 COMPATIBILITY TEST SPIN: CPT

## 2021-12-08 PROCEDURE — 58573 TLH W/T/O UTERUS OVER 250 G: CPT | Performed by: OBSTETRICS & GYNECOLOGY

## 2021-12-08 PROCEDURE — 84703 CHORIONIC GONADOTROPIN ASSAY: CPT | Performed by: OBSTETRICS & GYNECOLOGY

## 2021-12-08 PROCEDURE — 85014 HEMATOCRIT: CPT

## 2021-12-08 PROCEDURE — 88305 TISSUE EXAM BY PATHOLOGIST: CPT | Performed by: PATHOLOGY

## 2021-12-08 PROCEDURE — 84132 ASSAY OF SERUM POTASSIUM: CPT

## 2021-12-08 PROCEDURE — C1782 MORCELLATOR: HCPCS | Performed by: OBSTETRICS & GYNECOLOGY

## 2021-12-08 PROCEDURE — 82803 BLOOD GASES ANY COMBINATION: CPT

## 2021-12-08 PROCEDURE — 86850 RBC ANTIBODY SCREEN: CPT | Performed by: OBSTETRICS & GYNECOLOGY

## 2021-12-08 PROCEDURE — 85027 COMPLETE CBC AUTOMATED: CPT | Performed by: OBSTETRICS & GYNECOLOGY

## 2021-12-08 PROCEDURE — 84295 ASSAY OF SERUM SODIUM: CPT

## 2021-12-08 PROCEDURE — 88172 CYTP DX EVAL FNA 1ST EA SITE: CPT | Performed by: PATHOLOGY

## 2021-12-08 PROCEDURE — 86901 BLOOD TYPING SEROLOGIC RH(D): CPT | Performed by: OBSTETRICS & GYNECOLOGY

## 2021-12-08 PROCEDURE — 85025 COMPLETE CBC W/AUTO DIFF WBC: CPT | Performed by: OBSTETRICS & GYNECOLOGY

## 2021-12-08 PROCEDURE — 88307 TISSUE EXAM BY PATHOLOGIST: CPT | Performed by: PATHOLOGY

## 2021-12-08 RX ORDER — LABETALOL 20 MG/4 ML (5 MG/ML) INTRAVENOUS SYRINGE
AS NEEDED
Status: DISCONTINUED | OUTPATIENT
Start: 2021-12-08 | End: 2021-12-08

## 2021-12-08 RX ORDER — GLYCOPYRROLATE 0.2 MG/ML
INJECTION INTRAMUSCULAR; INTRAVENOUS AS NEEDED
Status: DISCONTINUED | OUTPATIENT
Start: 2021-12-08 | End: 2021-12-08

## 2021-12-08 RX ORDER — MEPERIDINE HYDROCHLORIDE 25 MG/ML
12.5 INJECTION INTRAMUSCULAR; INTRAVENOUS; SUBCUTANEOUS ONCE AS NEEDED
Status: DISCONTINUED | OUTPATIENT
Start: 2021-12-08 | End: 2021-12-08 | Stop reason: HOSPADM

## 2021-12-08 RX ORDER — ONDANSETRON 2 MG/ML
4 INJECTION INTRAMUSCULAR; INTRAVENOUS ONCE AS NEEDED
Status: DISCONTINUED | OUTPATIENT
Start: 2021-12-08 | End: 2021-12-08 | Stop reason: HOSPADM

## 2021-12-08 RX ORDER — LIDOCAINE HYDROCHLORIDE 20 MG/ML
INJECTION, SOLUTION EPIDURAL; INFILTRATION; INTRACAUDAL; PERINEURAL AS NEEDED
Status: DISCONTINUED | OUTPATIENT
Start: 2021-12-08 | End: 2021-12-08

## 2021-12-08 RX ORDER — NEOSTIGMINE METHYLSULFATE 1 MG/ML
INJECTION INTRAVENOUS AS NEEDED
Status: DISCONTINUED | OUTPATIENT
Start: 2021-12-08 | End: 2021-12-08

## 2021-12-08 RX ORDER — MIDAZOLAM HYDROCHLORIDE 2 MG/2ML
INJECTION, SOLUTION INTRAMUSCULAR; INTRAVENOUS AS NEEDED
Status: DISCONTINUED | OUTPATIENT
Start: 2021-12-08 | End: 2021-12-08

## 2021-12-08 RX ORDER — PROPOFOL 10 MG/ML
INJECTION, EMULSION INTRAVENOUS AS NEEDED
Status: DISCONTINUED | OUTPATIENT
Start: 2021-12-08 | End: 2021-12-08

## 2021-12-08 RX ORDER — CEFAZOLIN SODIUM 2 G/50ML
2000 SOLUTION INTRAVENOUS ONCE
Status: COMPLETED | OUTPATIENT
Start: 2021-12-08 | End: 2021-12-08

## 2021-12-08 RX ORDER — SCOLOPAMINE TRANSDERMAL SYSTEM 1 MG/1
1 PATCH, EXTENDED RELEASE TRANSDERMAL ONCE AS NEEDED
Status: DISCONTINUED | OUTPATIENT
Start: 2021-12-08 | End: 2021-12-08

## 2021-12-08 RX ORDER — ROCURONIUM BROMIDE 10 MG/ML
INJECTION, SOLUTION INTRAVENOUS AS NEEDED
Status: DISCONTINUED | OUTPATIENT
Start: 2021-12-08 | End: 2021-12-08

## 2021-12-08 RX ORDER — DULOXETIN HYDROCHLORIDE 60 MG/1
60 CAPSULE, DELAYED RELEASE ORAL
Status: DISCONTINUED | OUTPATIENT
Start: 2021-12-08 | End: 2021-12-10 | Stop reason: HOSPADM

## 2021-12-08 RX ORDER — FENTANYL CITRATE 50 UG/ML
INJECTION, SOLUTION INTRAMUSCULAR; INTRAVENOUS AS NEEDED
Status: DISCONTINUED | OUTPATIENT
Start: 2021-12-08 | End: 2021-12-08

## 2021-12-08 RX ORDER — SODIUM CHLORIDE 9 MG/ML
125 INJECTION, SOLUTION INTRAVENOUS CONTINUOUS
Status: DISCONTINUED | OUTPATIENT
Start: 2021-12-08 | End: 2021-12-08

## 2021-12-08 RX ORDER — SODIUM CHLORIDE 9 MG/ML
75 INJECTION, SOLUTION INTRAVENOUS CONTINUOUS
Status: DISCONTINUED | OUTPATIENT
Start: 2021-12-08 | End: 2021-12-10 | Stop reason: HOSPADM

## 2021-12-08 RX ORDER — ACETAMINOPHEN 325 MG/1
650 TABLET ORAL EVERY 4 HOURS PRN
Status: DISCONTINUED | OUTPATIENT
Start: 2021-12-08 | End: 2021-12-10 | Stop reason: HOSPADM

## 2021-12-08 RX ORDER — IBUPROFEN 600 MG/1
600 TABLET ORAL EVERY 6 HOURS PRN
Status: DISCONTINUED | OUTPATIENT
Start: 2021-12-08 | End: 2021-12-10 | Stop reason: HOSPADM

## 2021-12-08 RX ORDER — OXYCODONE HYDROCHLORIDE 5 MG/1
10 TABLET ORAL EVERY 4 HOURS PRN
Status: DISCONTINUED | OUTPATIENT
Start: 2021-12-08 | End: 2021-12-09

## 2021-12-08 RX ORDER — BUPIVACAINE HYDROCHLORIDE 2.5 MG/ML
INJECTION, SOLUTION EPIDURAL; INFILTRATION; INTRACAUDAL AS NEEDED
Status: DISCONTINUED | OUTPATIENT
Start: 2021-12-08 | End: 2021-12-08 | Stop reason: HOSPADM

## 2021-12-08 RX ORDER — GLIMEPIRIDE 2 MG/1
2 TABLET ORAL
Status: DISCONTINUED | OUTPATIENT
Start: 2021-12-08 | End: 2021-12-08

## 2021-12-08 RX ORDER — HYDROMORPHONE HCL/PF 1 MG/ML
0.5 SYRINGE (ML) INJECTION
Status: DISCONTINUED | OUTPATIENT
Start: 2021-12-08 | End: 2021-12-08 | Stop reason: HOSPADM

## 2021-12-08 RX ORDER — HYDROMORPHONE HCL 110MG/55ML
PATIENT CONTROLLED ANALGESIA SYRINGE INTRAVENOUS AS NEEDED
Status: DISCONTINUED | OUTPATIENT
Start: 2021-12-08 | End: 2021-12-08

## 2021-12-08 RX ORDER — ONDANSETRON 2 MG/ML
INJECTION INTRAMUSCULAR; INTRAVENOUS AS NEEDED
Status: DISCONTINUED | OUTPATIENT
Start: 2021-12-08 | End: 2021-12-08

## 2021-12-08 RX ORDER — SODIUM CHLORIDE 9 MG/ML
INJECTION, SOLUTION INTRAVENOUS CONTINUOUS PRN
Status: DISCONTINUED | OUTPATIENT
Start: 2021-12-08 | End: 2021-12-08

## 2021-12-08 RX ORDER — DEXAMETHASONE SODIUM PHOSPHATE 4 MG/ML
INJECTION, SOLUTION INTRA-ARTICULAR; INTRALESIONAL; INTRAMUSCULAR; INTRAVENOUS; SOFT TISSUE AS NEEDED
Status: DISCONTINUED | OUTPATIENT
Start: 2021-12-08 | End: 2021-12-08

## 2021-12-08 RX ORDER — PRAVASTATIN SODIUM 40 MG
40 TABLET ORAL DAILY
Status: DISCONTINUED | OUTPATIENT
Start: 2021-12-09 | End: 2021-12-10 | Stop reason: HOSPADM

## 2021-12-08 RX ORDER — PROMETHAZINE HYDROCHLORIDE 25 MG/ML
6.25 INJECTION, SOLUTION INTRAMUSCULAR; INTRAVENOUS ONCE AS NEEDED
Status: DISCONTINUED | OUTPATIENT
Start: 2021-12-08 | End: 2021-12-08 | Stop reason: HOSPADM

## 2021-12-08 RX ORDER — FENTANYL CITRATE/PF 50 MCG/ML
50 SYRINGE (ML) INJECTION
Status: COMPLETED | OUTPATIENT
Start: 2021-12-08 | End: 2021-12-08

## 2021-12-08 RX ORDER — DOCUSATE SODIUM 100 MG/1
100 CAPSULE, LIQUID FILLED ORAL 2 TIMES DAILY
Status: DISCONTINUED | OUTPATIENT
Start: 2021-12-09 | End: 2021-12-10 | Stop reason: HOSPADM

## 2021-12-08 RX ORDER — HYDROMORPHONE HCL/PF 1 MG/ML
0.5 SYRINGE (ML) INJECTION
Status: DISCONTINUED | OUTPATIENT
Start: 2021-12-08 | End: 2021-12-10 | Stop reason: HOSPADM

## 2021-12-08 RX ORDER — ONDANSETRON 2 MG/ML
4 INJECTION INTRAMUSCULAR; INTRAVENOUS EVERY 6 HOURS PRN
Status: DISCONTINUED | OUTPATIENT
Start: 2021-12-08 | End: 2021-12-10 | Stop reason: HOSPADM

## 2021-12-08 RX ORDER — OXYCODONE HYDROCHLORIDE 5 MG/1
5 TABLET ORAL EVERY 4 HOURS PRN
Status: DISCONTINUED | OUTPATIENT
Start: 2021-12-08 | End: 2021-12-09

## 2021-12-08 RX ADMIN — FENTANYL CITRATE 50 MCG: 50 INJECTION INTRAMUSCULAR; INTRAVENOUS at 20:08

## 2021-12-08 RX ADMIN — ROCURONIUM BROMIDE 10 MG: 50 INJECTION, SOLUTION INTRAVENOUS at 15:58

## 2021-12-08 RX ADMIN — FENTANYL CITRATE 50 MCG: 50 INJECTION INTRAMUSCULAR; INTRAVENOUS at 20:18

## 2021-12-08 RX ADMIN — FENTANYL CITRATE 100 MCG: 50 INJECTION INTRAMUSCULAR; INTRAVENOUS at 15:40

## 2021-12-08 RX ADMIN — LABETALOL 20 MG/4 ML (5 MG/ML) INTRAVENOUS SYRINGE 10 MG: at 18:56

## 2021-12-08 RX ADMIN — HYDROMORPHONE HYDROCHLORIDE 2 MG: 2 INJECTION INTRAMUSCULAR; INTRAVENOUS; SUBCUTANEOUS at 17:05

## 2021-12-08 RX ADMIN — NEOSTIGMINE METHYLSULFATE 3 MG: 1 INJECTION INTRAVENOUS at 19:20

## 2021-12-08 RX ADMIN — SODIUM CHLORIDE: 0.9 INJECTION, SOLUTION INTRAVENOUS at 15:11

## 2021-12-08 RX ADMIN — ROCURONIUM BROMIDE 30 MG: 50 INJECTION, SOLUTION INTRAVENOUS at 16:41

## 2021-12-08 RX ADMIN — SODIUM CHLORIDE 8 UNITS/HR: 9 INJECTION, SOLUTION INTRAVENOUS at 11:41

## 2021-12-08 RX ADMIN — FENTANYL CITRATE 50 MCG: 50 INJECTION INTRAMUSCULAR; INTRAVENOUS at 15:21

## 2021-12-08 RX ADMIN — FENTANYL CITRATE 50 MCG: 50 INJECTION INTRAMUSCULAR; INTRAVENOUS at 14:53

## 2021-12-08 RX ADMIN — CEFAZOLIN SODIUM 2000 MG: 2 SOLUTION INTRAVENOUS at 14:28

## 2021-12-08 RX ADMIN — MIDAZOLAM 2 MG: 1 INJECTION INTRAMUSCULAR; INTRAVENOUS at 14:33

## 2021-12-08 RX ADMIN — LIDOCAINE HYDROCHLORIDE 60 MG: 20 INJECTION, SOLUTION EPIDURAL; INFILTRATION; INTRACAUDAL; PERINEURAL at 14:41

## 2021-12-08 RX ADMIN — FENTANYL CITRATE 50 MCG: 50 INJECTION INTRAMUSCULAR; INTRAVENOUS at 20:35

## 2021-12-08 RX ADMIN — FENTANYL CITRATE 50 MCG: 50 INJECTION INTRAMUSCULAR; INTRAVENOUS at 15:29

## 2021-12-08 RX ADMIN — ONDANSETRON 4 MG: 2 INJECTION INTRAMUSCULAR; INTRAVENOUS at 19:17

## 2021-12-08 RX ADMIN — FENTANYL CITRATE 50 MCG: 50 INJECTION INTRAMUSCULAR; INTRAVENOUS at 14:41

## 2021-12-08 RX ADMIN — SODIUM CHLORIDE 125 ML/HR: 0.9 INJECTION, SOLUTION INTRAVENOUS at 11:40

## 2021-12-08 RX ADMIN — DEXAMETHASONE SODIUM PHOSPHATE 4 MG: 4 INJECTION INTRA-ARTICULAR; INTRALESIONAL; INTRAMUSCULAR; INTRAVENOUS; SOFT TISSUE at 14:41

## 2021-12-08 RX ADMIN — ROCURONIUM BROMIDE 50 MG: 50 INJECTION, SOLUTION INTRAVENOUS at 14:41

## 2021-12-08 RX ADMIN — SODIUM CHLORIDE 75 ML/HR: 0.9 INJECTION, SOLUTION INTRAVENOUS at 21:03

## 2021-12-08 RX ADMIN — DULOXETINE HYDROCHLORIDE 60 MG: 60 CAPSULE, DELAYED RELEASE ORAL at 22:40

## 2021-12-08 RX ADMIN — CEFAZOLIN SODIUM 2000 MG: 2 SOLUTION INTRAVENOUS at 18:23

## 2021-12-08 RX ADMIN — LABETALOL 20 MG/4 ML (5 MG/ML) INTRAVENOUS SYRINGE 5 MG: at 16:16

## 2021-12-08 RX ADMIN — ACETAMINOPHEN 650 MG: 325 TABLET, FILM COATED ORAL at 22:42

## 2021-12-08 RX ADMIN — SODIUM CHLORIDE: 0.9 INJECTION, SOLUTION INTRAVENOUS at 14:49

## 2021-12-08 RX ADMIN — FENTANYL CITRATE 50 MCG: 50 INJECTION INTRAMUSCULAR; INTRAVENOUS at 20:40

## 2021-12-08 RX ADMIN — ROCURONIUM BROMIDE 10 MG: 50 INJECTION, SOLUTION INTRAVENOUS at 15:29

## 2021-12-08 RX ADMIN — GLYCOPYRROLATE 0.4 MG: 0.2 INJECTION, SOLUTION INTRAMUSCULAR; INTRAVENOUS at 19:20

## 2021-12-08 RX ADMIN — SCOPALAMINE 1 PATCH: 1 PATCH, EXTENDED RELEASE TRANSDERMAL at 12:07

## 2021-12-08 RX ADMIN — PROPOFOL 200 MG: 10 INJECTION, EMULSION INTRAVENOUS at 14:41

## 2021-12-08 NOTE — PROGRESS NOTES
Following [facility] [address] per Dr Silva Shrestha ,present BS result 198-  Dr Silva Shrestha aware of this , IV insulin drip reduced to 4 units perhour  Recheck now due per policy due at 7459, OR holding [address] aware   Pt remains asymptomatic

## 2021-12-08 NOTE — INTERVAL H&P NOTE
H&P reviewed  After examining the patient I find no changes in the patients condition since the H&P had been written      Vitals:    12/08/21 1306   BP: 151/69   Pulse:    Resp:    Temp:    SpO2:

## 2021-12-08 NOTE — PROGRESS NOTES
Pt arrived with BS of 308-  Initiated insuln IV protocol after confering with Dr Sania Yadav  Pt [facility], for repeat bs at 1340 today  OR arlin made aware  Tiger texted Dr Maude Vora of this occurrence

## 2021-12-09 LAB
ANION GAP SERPL CALCULATED.3IONS-SCNC: 10 MMOL/L (ref 4–13)
ATRIAL RATE: 121 BPM
BASOPHILS # BLD AUTO: 0.04 THOUSANDS/ÂΜL (ref 0–0.1)
BASOPHILS NFR BLD AUTO: 0 % (ref 0–1)
BUN SERPL-MCNC: 10 MG/DL (ref 5–25)
CALCIUM SERPL-MCNC: 7.3 MG/DL (ref 8.3–10.1)
CHLORIDE SERPL-SCNC: 105 MMOL/L (ref 100–108)
CO2 SERPL-SCNC: 22 MMOL/L (ref 21–32)
CREAT SERPL-MCNC: 0.82 MG/DL (ref 0.6–1.3)
EOSINOPHIL # BLD AUTO: 0.02 THOUSAND/ÂΜL (ref 0–0.61)
EOSINOPHIL NFR BLD AUTO: 0 % (ref 0–6)
ERYTHROCYTE [DISTWIDTH] IN BLOOD BY AUTOMATED COUNT: 13.4 % (ref 11.6–15.1)
ERYTHROCYTE [DISTWIDTH] IN BLOOD BY AUTOMATED COUNT: 13.5 % (ref 11.6–15.1)
GFR SERPL CREATININE-BSD FRML MDRD: 82 ML/MIN/1.73SQ M
GLUCOSE P FAST SERPL-MCNC: 154 MG/DL (ref 65–99)
GLUCOSE SERPL-MCNC: 121 MG/DL (ref 65–140)
GLUCOSE SERPL-MCNC: 131 MG/DL (ref 65–140)
GLUCOSE SERPL-MCNC: 140 MG/DL (ref 65–140)
GLUCOSE SERPL-MCNC: 142 MG/DL (ref 65–140)
GLUCOSE SERPL-MCNC: 144 MG/DL (ref 65–140)
GLUCOSE SERPL-MCNC: 146 MG/DL (ref 65–140)
GLUCOSE SERPL-MCNC: 154 MG/DL (ref 65–140)
GLUCOSE SERPL-MCNC: 162 MG/DL (ref 65–140)
GLUCOSE SERPL-MCNC: 171 MG/DL (ref 65–140)
GLUCOSE SERPL-MCNC: 177 MG/DL (ref 65–140)
GLUCOSE SERPL-MCNC: 187 MG/DL (ref 65–140)
GLUCOSE SERPL-MCNC: 192 MG/DL (ref 65–140)
GLUCOSE SERPL-MCNC: 192 MG/DL (ref 65–140)
GLUCOSE SERPL-MCNC: 205 MG/DL (ref 65–140)
GLUCOSE SERPL-MCNC: 212 MG/DL (ref 65–140)
GLUCOSE SERPL-MCNC: 226 MG/DL (ref 65–140)
GLUCOSE SERPL-MCNC: 269 MG/DL (ref 65–140)
HCT VFR BLD AUTO: 24.5 % (ref 34.8–46.1)
HCT VFR BLD AUTO: 24.8 % (ref 34.8–46.1)
HGB BLD-MCNC: 7.4 G/DL (ref 11.5–15.4)
HGB BLD-MCNC: 7.7 G/DL (ref 11.5–15.4)
IMM GRANULOCYTES # BLD AUTO: 0.06 THOUSAND/UL (ref 0–0.2)
IMM GRANULOCYTES NFR BLD AUTO: 1 % (ref 0–2)
LYMPHOCYTES # BLD AUTO: 0.99 THOUSANDS/ÂΜL (ref 0.6–4.47)
LYMPHOCYTES NFR BLD AUTO: 9 % (ref 14–44)
MCH RBC QN AUTO: 28.6 PG (ref 26.8–34.3)
MCH RBC QN AUTO: 29.3 PG (ref 26.8–34.3)
MCHC RBC AUTO-ENTMCNC: 30.2 G/DL (ref 31.4–37.4)
MCHC RBC AUTO-ENTMCNC: 31 G/DL (ref 31.4–37.4)
MCV RBC AUTO: 94 FL (ref 82–98)
MCV RBC AUTO: 95 FL (ref 82–98)
MONOCYTES # BLD AUTO: 0.55 THOUSAND/ÂΜL (ref 0.17–1.22)
MONOCYTES NFR BLD AUTO: 5 % (ref 4–12)
NEUTROPHILS # BLD AUTO: 9.7 THOUSANDS/ÂΜL (ref 1.85–7.62)
NEUTS SEG NFR BLD AUTO: 85 % (ref 43–75)
NRBC BLD AUTO-RTO: 0 /100 WBCS
P AXIS: 61 DEGREES
PLATELET # BLD AUTO: 263 THOUSANDS/UL (ref 149–390)
PLATELET # BLD AUTO: 270 THOUSANDS/UL (ref 149–390)
PMV BLD AUTO: 8.8 FL (ref 8.9–12.7)
PMV BLD AUTO: 8.8 FL (ref 8.9–12.7)
POTASSIUM SERPL-SCNC: 4 MMOL/L (ref 3.5–5.3)
PR INTERVAL: 166 MS
QRS AXIS: 46 DEGREES
QRSD INTERVAL: 82 MS
QT INTERVAL: 308 MS
QTC INTERVAL: 437 MS
RBC # BLD AUTO: 2.59 MILLION/UL (ref 3.81–5.12)
RBC # BLD AUTO: 2.63 MILLION/UL (ref 3.81–5.12)
SODIUM SERPL-SCNC: 137 MMOL/L (ref 136–145)
T WAVE AXIS: 40 DEGREES
VENTRICULAR RATE: 121 BPM
WBC # BLD AUTO: 10.92 THOUSAND/UL (ref 4.31–10.16)
WBC # BLD AUTO: 11.36 THOUSAND/UL (ref 4.31–10.16)

## 2021-12-09 PROCEDURE — 82948 REAGENT STRIP/BLOOD GLUCOSE: CPT

## 2021-12-09 PROCEDURE — 85025 COMPLETE CBC W/AUTO DIFF WBC: CPT | Performed by: OBSTETRICS & GYNECOLOGY

## 2021-12-09 PROCEDURE — 93005 ELECTROCARDIOGRAM TRACING: CPT

## 2021-12-09 PROCEDURE — 99232 SBSQ HOSP IP/OBS MODERATE 35: CPT | Performed by: INTERNAL MEDICINE

## 2021-12-09 PROCEDURE — 80048 BASIC METABOLIC PNL TOTAL CA: CPT | Performed by: OBSTETRICS & GYNECOLOGY

## 2021-12-09 PROCEDURE — P9040 RBC LEUKOREDUCED IRRADIATED: HCPCS

## 2021-12-09 PROCEDURE — 93010 ELECTROCARDIOGRAM REPORT: CPT | Performed by: INTERNAL MEDICINE

## 2021-12-09 PROCEDURE — 85027 COMPLETE CBC AUTOMATED: CPT | Performed by: OBSTETRICS & GYNECOLOGY

## 2021-12-09 PROCEDURE — 99024 POSTOP FOLLOW-UP VISIT: CPT | Performed by: OBSTETRICS & GYNECOLOGY

## 2021-12-09 RX ORDER — PREGABALIN 50 MG/1
50 CAPSULE ORAL 2 TIMES DAILY
Status: DISCONTINUED | OUTPATIENT
Start: 2021-12-09 | End: 2021-12-10 | Stop reason: HOSPADM

## 2021-12-09 RX ORDER — KETOROLAC TROMETHAMINE 30 MG/ML
15 INJECTION, SOLUTION INTRAMUSCULAR; INTRAVENOUS ONCE
Status: COMPLETED | OUTPATIENT
Start: 2021-12-09 | End: 2021-12-09

## 2021-12-09 RX ORDER — INSULIN GLARGINE 100 [IU]/ML
30 INJECTION, SOLUTION SUBCUTANEOUS
Status: DISCONTINUED | OUTPATIENT
Start: 2021-12-09 | End: 2021-12-10 | Stop reason: HOSPADM

## 2021-12-09 RX ADMIN — HYDROMORPHONE HYDROCHLORIDE 0.5 MG: 1 INJECTION, SOLUTION INTRAMUSCULAR; INTRAVENOUS; SUBCUTANEOUS at 13:19

## 2021-12-09 RX ADMIN — INSULIN GLARGINE 30 UNITS: 100 INJECTION, SOLUTION SUBCUTANEOUS at 22:33

## 2021-12-09 RX ADMIN — HYDROMORPHONE HYDROCHLORIDE 0.5 MG: 1 INJECTION, SOLUTION INTRAMUSCULAR; INTRAVENOUS; SUBCUTANEOUS at 16:59

## 2021-12-09 RX ADMIN — PREGABALIN 50 MG: 50 CAPSULE ORAL at 17:01

## 2021-12-09 RX ADMIN — SODIUM CHLORIDE 3 UNITS/HR: 9 INJECTION, SOLUTION INTRAVENOUS at 06:40

## 2021-12-09 RX ADMIN — HYDROMORPHONE HYDROCHLORIDE 0.5 MG: 1 INJECTION, SOLUTION INTRAMUSCULAR; INTRAVENOUS; SUBCUTANEOUS at 07:53

## 2021-12-09 RX ADMIN — SODIUM CHLORIDE 125 ML/HR: 0.9 INJECTION, SOLUTION INTRAVENOUS at 06:44

## 2021-12-09 RX ADMIN — DULOXETINE HYDROCHLORIDE 60 MG: 60 CAPSULE, DELAYED RELEASE ORAL at 22:32

## 2021-12-09 RX ADMIN — HYDROMORPHONE HYDROCHLORIDE 0.5 MG: 1 INJECTION, SOLUTION INTRAMUSCULAR; INTRAVENOUS; SUBCUTANEOUS at 20:25

## 2021-12-09 RX ADMIN — ONDANSETRON 4 MG: 2 INJECTION INTRAMUSCULAR; INTRAVENOUS at 13:28

## 2021-12-09 RX ADMIN — DOCUSATE SODIUM 100 MG: 100 CAPSULE ORAL at 17:02

## 2021-12-09 RX ADMIN — IRON SUCROSE 200 MG: 20 INJECTION, SOLUTION INTRAVENOUS at 09:15

## 2021-12-09 RX ADMIN — HYDROMORPHONE HYDROCHLORIDE 0.5 MG: 1 INJECTION, SOLUTION INTRAMUSCULAR; INTRAVENOUS; SUBCUTANEOUS at 23:48

## 2021-12-09 RX ADMIN — PREGABALIN 50 MG: 50 CAPSULE ORAL at 09:14

## 2021-12-09 RX ADMIN — KETOROLAC TROMETHAMINE 15 MG: 30 INJECTION, SOLUTION INTRAMUSCULAR; INTRAVENOUS at 13:18

## 2021-12-09 RX ADMIN — SODIUM CHLORIDE 125 ML/HR: 0.9 INJECTION, SOLUTION INTRAVENOUS at 23:49

## 2021-12-09 RX ADMIN — ACETAMINOPHEN 650 MG: 325 TABLET, FILM COATED ORAL at 03:47

## 2021-12-09 RX ADMIN — ONDANSETRON 4 MG: 2 INJECTION INTRAMUSCULAR; INTRAVENOUS at 03:57

## 2021-12-09 RX ADMIN — DOCUSATE SODIUM 100 MG: 100 CAPSULE ORAL at 09:14

## 2021-12-09 RX ADMIN — PRAVASTATIN SODIUM 40 MG: 40 TABLET ORAL at 09:14

## 2021-12-09 NOTE — PROGRESS NOTES
Progress Note - OB/GYN  Vanessa Hunt 50 y o  female MRN: [id number]  Unit/Bed#: E5 -01 Encounter: [id number]    Chief complaint: "I'm doing okay"    SUBJECTIVE:    Pain currently 5/10, recently had Tylenol  She is tolerating PO intake  Status post 1 void of 215mL since leaving PACU, bladder scan <200mL, feels like she will have to void again soon  No return of bowel function as yet  Minimal vaginal bleeding  Denies chest pain, shortness of breath or calf pain    OBJECTIVE:  Vitals:   /72   Pulse 102   Temp 98 9 °F (37 2 °C)   Resp 18   Ht 5' 5" (1 651 m)   Wt 97 8 kg (215 lb 9 8 oz) Comment: with clothing  LMP  (LMP Unknown)   SpO2 93%   BMI 35 88 kg/m²       Intake/Output Summary (Last 24 hours) at 12/9/2021 0117  Last data filed at 12/8/2021 2200  Gross per 24 hour   Intake 4000 ml   Output 1495 ml   Net 2505 ml       Invasive Devices  Report    Peripheral Intravenous Line            Peripheral IV 12/08/21 Dorsal (posterior); Left Hand <1 day    Peripheral IV 12/08/21 Right Hand <1 day                Physical Exam:   GEN: appears well, alert and oriented x 3, pleasant and cooperative   PULMONARY: normal effort, no respiratory distress  ABDOMEN: soft, no tenderness, no distention, laparoscopic port site incisions clean, dry and intact  GENITOURINARY: no bleeding   EXTREMITIES: SCDs on and pumping, nontender    ASSESSMENT/PLAN:  Postop Day #1 s/p total laparoscopic hysterectomy, bilateral salpingectomy and excision of peritoneal cyst  Stable  1  Continue routine postoperative care:  -Out of bed/ ambulation as tolerated   -Pain control with Tylenol, Motrin, Roxicodone, Dilaudid  -SCDs for DVT prophylaxis   -IV fluids increased to 125mL/hr, FU voiding status    2   Acute on chronic anemia  -Preoperative hemoglobin 10 5  -istat intraoperatively 8 5  -2-hr post-procedure hemoglobin 9  -Typed and crossed for 2 units packed red blood cells  -FU am CBC  -Monitor for signs of acute blood loss anemia    3  Type 2 Diabetes:  -Home medications currently held  -On insulin drip, diabetic diet  -Follow up Endocrinology consultation    4  HTN  -Blood pressures 130s-140s/60s-70s most recently  -Home medications held  -Continue routine vital signs    5  Hyperlipidemia  -Home mediations held    6  Depression  -Continue home dose of Cymbalta    Anticipate discharge POD1-2  Emily Landon MD  PGY II, OB/GYN  12/9/2021, 1:17 AM

## 2021-12-09 NOTE — PLAN OF CARE
Problem: PAIN - ADULT  Goal: Verbalizes/displays adequate comfort level or baseline comfort level  Description: Interventions:  - Encourage patient to monitor pain and request assistance  - Assess pain using appropriate pain scale  - Administer analgesics based on type and severity of pain and evaluate response  - Implement non-pharmacological measures as appropriate and evaluate response  - Consider cultural and social influences on pain and pain management  - Notify physician/advanced practitioner if interventions unsuccessful or patient reports new pain  Outcome: Progressing     Problem: INFECTION - ADULT  Goal: Absence or prevention of progression during hospitalization  Description: INTERVENTIONS:  - Assess and monitor for signs and symptoms of infection  - Monitor lab/diagnostic results  - Monitor all insertion sites, i e  indwelling lines, tubes, and drains  - Monitor endotracheal if appropriate and nasal secretions for changes in amount and color  - [place] appropriate cooling/warming therapies per order  - Administer medications as ordered  - Instruct and encourage patient and family to use good hand hygiene technique  - Identify and instruct in appropriate isolation precautions for identified infection/condition  Outcome: Progressing  Goal: Absence of fever/infection during neutropenic period  Description: INTERVENTIONS:  - Monitor WBC    Outcome: Progressing     Problem: SAFETY ADULT  Goal: Patient will remain free of falls  Description: INTERVENTIONS:  - Educate patient/family on patient safety including physical limitations  - Instruct patient to call for assistance with activity   - Consult OT/PT to assist with strengthening/mobility   - Keep Call bell within reach  - Keep bed low and locked with side rails adjusted as appropriate  - Keep care items and personal belongings within reach  - Initiate and maintain comfort rounds  - Make Fall Risk Sign visible to staff  - Offer Toileting every  Hours, in advance of need  - Initiate/Maintain alarm  - Obtain necessary fall risk management equipment:   - Apply yellow socks and bracelet for high fall risk patients  - Consider moving patient to room near nurses station  Outcome: Progressing  Goal: Maintain or return to baseline ADL function  Description: INTERVENTIONS:  -  Assess patient's ability to carry out ADLs; assess patient's baseline for ADL function and identify physical deficits which impact ability to perform ADLs (bathing, care of mouth/teeth, toileting, grooming, dressing, etc )  - Assess/evaluate cause of self-care deficits   - Assess range of motion  - Assess patient's mobility; develop plan if impaired  - Assess patient's need for assistive devices and provide as appropriate  - Encourage maximum independence but intervene and supervise when necessary  - Involve family in performance of ADLs  - Assess for home care needs following discharge   - Consider OT consult to assist with ADL evaluation and planning for discharge  - Provide patient education as appropriate  Outcome: Progressing  Goal: Maintains/Returns to pre admission functional level  Description: INTERVENTIONS:  - Perform BMAT or MOVE assessment daily    - Set and communicate daily mobility goal to care team and patient/family/caregiver  - Collaborate with rehabilitation services on mobility goals if consulted  - Perform Range of Motion  times a day  - Reposition patient every  hours    - Dangle patient  times a day  - Stand patient  times a day  - Ambulate patient  times a day  - Out of bed to chair  times a day   - Out of bed for meal 3 times a day  - Out of bed for toileting  - Record patient progress and toleration of activity level   Outcome: Progressing     Problem: DISCHARGE PLANNING  Goal: Discharge to home or other facility with appropriate resources  Description: INTERVENTIONS:  - Identify barriers to discharge w/patient and caregiver  - Arrange for needed discharge resources and transportation as appropriate  - Identify discharge learning needs (meds, wound care, etc )  - Arrange for interpretive services to assist at discharge as needed  - Refer to Case Management Department for coordinating discharge planning if the patient needs post-hospital services based on physician/advanced practitioner order or complex needs related to functional status, cognitive ability, or social support system  Outcome: Progressing     Problem: Knowledge Deficit  Goal: Patient/family/caregiver demonstrates understanding of disease process, treatment plan, medications, and discharge instructions  Description: Complete learning assessment and assess knowledge base    Interventions:  - Provide teaching at level of understanding  - Provide teaching via preferred learning methods  Outcome: Progressing

## 2021-12-09 NOTE — PLAN OF CARE
Problem: PAIN - ADULT  Goal: Verbalizes/displays adequate comfort level or baseline comfort level  Description: Interventions:  - Encourage patient to monitor pain and request assistance  - Assess pain using appropriate pain scale  - Administer analgesics based on type and severity of pain and evaluate response  - Implement non-pharmacological measures as appropriate and evaluate response  - Consider cultural and social influences on pain and pain management  - Notify physician/advanced practitioner if interventions unsuccessful or patient reports new pain  Outcome: Progressing     Problem: INFECTION - ADULT  Goal: Absence or prevention of progression during hospitalization  Description: INTERVENTIONS:  - Assess and monitor for signs and symptoms of infection  - Monitor lab/diagnostic results  - Monitor all insertion sites, i e  indwelling lines, tubes, and drains  - Monitor endotracheal if appropriate and nasal secretions for changes in amount and color  - [place] appropriate cooling/warming therapies per order  - Administer medications as ordered  - Instruct and encourage patient and family to use good hand hygiene technique  - Identify and instruct in appropriate isolation precautions for identified infection/condition  Outcome: Progressing  Goal: Absence of fever/infection during neutropenic period  Description: INTERVENTIONS:  - Monitor WBC    Outcome: Progressing     Problem: SAFETY ADULT  Goal: Patient will remain free of falls  Description: INTERVENTIONS:  - Educate patient/family on patient safety including physical limitations  - Instruct patient to call for assistance with activity   - Consult OT/PT to assist with strengthening/mobility   - Keep Call bell within reach  - Keep bed low and locked with side rails adjusted as appropriate  - Keep care items and personal belongings within reach  - Initiate and maintain comfort rounds  - Make Fall Risk Sign visible to staff  - Offer Toileting every 2 Hours, in advance of need  - Initiate/Maintain NA alarm  - Obtain necessary fall risk management equipment: socks  - Apply yellow socks and bracelet for high fall risk patients  - Consider moving patient to room near nurses station  Outcome: Progressing  Goal: Maintain or return to baseline ADL function  Description: INTERVENTIONS:  -  Assess patient's ability to carry out ADLs; assess patient's baseline for ADL function and identify physical deficits which impact ability to perform ADLs (bathing, care of mouth/teeth, toileting, grooming, dressing, etc )  - Assess/evaluate cause of self-care deficits   - Assess range of motion  - Assess patient's mobility; develop plan if impaired  - Assess patient's need for assistive devices and provide as appropriate  - Encourage maximum independence but intervene and supervise when necessary  - Involve family in performance of ADLs  - Assess for home care needs following discharge   - Consider OT consult to assist with ADL evaluation and planning for discharge  - Provide patient education as appropriate  Outcome: Progressing  Goal: Maintains/Returns to pre admission functional level  Description: INTERVENTIONS:  - Perform BMAT or MOVE assessment daily    - Set and communicate daily mobility goal to care team and patient/family/caregiver  - Collaborate with rehabilitation services on mobility goals if consulted  - Perform Range of Motion 3 times a day  - Reposition patient every 3 hours    - Dangle patient 3 times a day  - Stand patient 3 times a day  - Ambulate patient 3 times a day  - Out of bed to chair 3 times a day   - Out of bed for meals 3 times a day  - Out of bed for toileting  - Record patient  and toleration of activity level   Outcome: Progressing     Problem: DISCHARGE PLANNING  Goal: Discharge to home or other facility with appropriate resources  Description: INTERVENTIONS:  - Identify barriers to discharge w/patient and caregiver  - Arrange for needed discharge resources and transportation as appropriate  - Identify discharge learning needs (meds, wound care, etc )  - Arrange for interpretive services to assist at discharge as needed  - Refer to Case Management Department for coordinating discharge planning if the patient needs post-hospital services based on physician/advanced practitioner order or complex needs related to functional status, cognitive ability, or social support system  Outcome: Progressing     Problem: Knowledge Deficit  Goal: Patient/family/caregiver demonstrates understanding of disease process, treatment plan, medications, and discharge instructions  Description: Complete learning assessment and assess knowledge base    Interventions:  - Provide teaching at level of understanding  - Provide teaching via preferred learning methods  Outcome: Progressing

## 2021-12-09 NOTE — PROGRESS NOTES
Gyn Progress note   Chencho Castillo 50 y o  female MRN: [id number]  Unit/Bed#: E5 -01 Encounter: [id number]    Assessment: 50 y o  POD# 1 from [facility] peritoneal cyst removal     Plan:    1) Postsurgical Management   · Encourage ambulation  · Voiding without complications  · Toleration VO/ No Passing gas yet     2) Pain control   · Tylenol, Jelena PRN  , dilaudid BT     3) DVT prophylaxis   · SCDs     4) Acute loss anemia   · EBL 1 2L   · Presurgical Hgb 10 5 post-surgical 8 5--> 7 4 today--> venofer ordered  · Asymptomatic,HR fluctuating      5) CHTN   · [place] has been held after surgery   · Systolic (53OHT), NIV:975 , Min:137 , IUX:875   · Diastolic (70WLE), UYZ:70, Min:59, Max:87    6) DM  · Currently on insuline drip   · fingerstick's 140-180s  · Endocrine consult pending   · Diabetic diet     7) Hx Depression  · On home cymbalta  · adequate mood modulation today     8) HLD  · On home statin     5) Disposition   · Possible discharge to home today if adequate sugar control and PO tolerance          Subjective:    Chencho Castillo has no current complaints  Pain is well controlled  Patient is currently voiding  She is ambulating  Patient is not currently passing flatus and has had no bowel movement  She is tolerating PO, and denies nausea or vomitting  Patient denies fever, chills, chest pain, shortness of breath, or calf tenderness  /68   Pulse 102   Temp 98 7 °F (37 1 °C)   Resp 18   Ht 5' 5" (1 651 m)   Wt 97 8 kg (215 lb 9 8 oz) Comment: with clothing  LMP  (LMP Unknown)   SpO2 93%   BMI 35 88 kg/m²     I/O last 3 completed shifts:   In: 3000 [I V :3000]  Out: 1280 [Urine:180; Blood:1100]  I/O this shift:  In: 1000 [I V :1000]  Out: 615 [Urine:615]    Lab Results   Component Value Date    WBC 15 42 (H) 12/08/2021    HGB 9 0 (L) 12/08/2021    HCT 29 6 (L) 12/08/2021    MCV 95 12/08/2021     12/08/2021       Lab Results   Component Value Date    GLUCOSE 136 12/08/2021 CALCIUM 8 7 11/27/2021     (L) 01/03/2016    K 3 8 11/27/2021    CO2 24 12/08/2021    CL 96 11/27/2021    BUN 14 11/27/2021    CREATININE 0 72 11/27/2021           Physical Exam  Constitutional:       Appearance: She is well-developed  HENT:      Head: Normocephalic and atraumatic  Eyes:      Conjunctiva/sclera: Conjunctivae normal       Pupils: Pupils are equal, round, and reactive to light  Cardiovascular:      Rate and Rhythm: Normal rate and regular rhythm  Heart sounds: Normal heart sounds  Pulmonary:      Effort: Pulmonary effort is normal       Breath sounds: Normal breath sounds  Abdominal:      General: Bowel sounds are normal       Palpations: Abdomen is soft  Comments: Negative peritoneal irritative signs  surgical incision, is out peripheral edema oosing or hyperthermia   Genitourinary:     Vagina: Normal    Musculoskeletal:         General: Normal range of motion  Cervical back: Normal range of motion and neck supple  Skin:     General: Skin is warm  Neurological:      Mental Status: She is alert and oriented to person, place, and time             Grant Campbell MD  12/9/2021  6:43 AM

## 2021-12-09 NOTE — OP NOTE
OPERATIVE REPORT  PATIENT NAME: Sarita Mcdonald    :  1965  MRN: [id number]  Pt Location: [facility] OR ROOM 01    SURGERY DATE: 2021    Surgeon(s) and Role:     * Daya Montes MD - Primary     * Norma Ferrari MD - [address] [place] [address], DO - Assisting    Preop Diagnosis:  Abnormal uterine bleeding [N93 9]    Post-Op Diagnosis Codes:     * Abnormal uterine bleeding [N93 9]    Procedure(s) (LRB):  LTH; BILAT SALPINGECTOMY; EXCISION PERITONEAL CYST (N/A)    Specimen(s):  ID Type Source Tests Collected by Time Destination   1 : UTERUS, CERVIX AND FALLOPIAN TUBES  Tissue Uterus TISSUE EXAM Daya Montes MD 2021 181    2 : RETRO PERITONEAL CYST FLUID Body Fluid Cyst NON-GYNECOLOGIC CYTOLOGY Daya Montes MD 2021 185    3 : CYST WALL Tissue Cyst TISSUE EXAM Daya Montes MD 2021 1905        Estimated Blood Loss:   1100 mL    Drains:  Urethral Catheter Non-latex 16 Fr  (Active)   Number of days: 0       Anesthesia Type:   General    Operative Indications:  Abnormal uterine bleeding [N93 9]    Operative Findings:  1  External genitalia grossly normal in appearance  No ulcerations, no lacerations, no lesions  Bimanual exam revealed enlargered uterus to 3cm below the umbilicus with normal contours and freely mobile  No adnexal masses palpated bilaterally  Hardened mass felt in the posterior culdesac through the vagina  2   Vagina and cervix were grossly normal in appearance without any lacerations or lesions  Rectocele noted  3  Uterus sounded to 17 cm  4   Laparoscopic evaluation revealed no injury to bowel or bladder vasculature upon entrance  Uterus was large but mobile with normal contour  Left adnexa was adhered to the uterus  Bilateral ovaries were grossly normal in appearance with simple appearing cyst on the right ovary  Bilateral fallopian tubes were grossly normal in appearance with evidence of prior tubal ligation   Approximately 6cm cystic structure in the posterior culdesac adhered to the bowel and peritoneum  5   Upper abdominal organs were visualized and grossly normal in appearance  These include liver, stomach, and omentum  Complications:   Surgical hemorrhage    Procedure and Technique:    DESCRIPTION OF PROCEDURE: The patient was taken to the operating room where she was properly identified and general anesthesia was obtained without difficulty  The patient was given prophylactic intravenous antibiotics  The patient was prepped and draped in the usual sterile manner in the dorsal lithotomy position using the stirrups  Care was taken to avoid excessive flexion or extension of the patient's hips and knees and pressure on her extremities  A time out was performed and the above information confirmed  A fishman catheter was inserted into the bladder and a weighted speculum was placed into the vagina  The BOLANOS retractor was placed into the vagina, and the anterior portion of the cervix was grasped with a single tooth tenaculum  The uterus sounded to 17cm  A stay suture was placed on the cervix on the lateral aspect, and the Cristiana manipulator was placed  The single tooth tenaculum and the weighted speculum were then removed from the vagina  The Cristiana tip and vaginal cuff occluder were inflated  Surgeons gloves were then changed, and attention was then turned to the abdomen, where 5cc of local was injected infraumbilically  A 5mm incision was made for introduction of the 5mm trocar with laparoscope under direct visualization  Laparoscopic visualization confirmed the intraperitoneal insertion of the port  Pneumoperitoneum was then maintained using carbon dioxide to a max of 15mmHg  Subsequently, after infiltrating the areas with local, two additional small incisions were made in both the right and left lower quadrants, approximately 3 cm above and 3 cm medial to the anterior superior iliac spine  Two ports (5mm, 5mm) were introduced under direct visualization  The patient was placed in trendelenburg, and attention was then turned to the uterine fundus  The Enseal dissecting instrument was then used to coagulate and cut the left round ligament  The broad ligament was then coagulated and cut to the level of the uterine arteries  A bladder flap was then created and dissected approximately half of the way across the uterus at the level of the lower uterine segment  The bladder was pushed down  The instruments were then switched in the ports and using the Enseal  The right round ligament was coagulated and cut  The broad ligament was then coagulated and cut to the level of the uterine arteries  The Enseal was used to coagulate the uterine artery on the right  A bladder flap was then completed using the Enseal coming across the uterus to meet the incision from the left side  The right utero-ovarian ligament was coagulated and cut along with the fallopian tube at the level of the cornua  Attention was turned back to the left adnexa where the utero-ovarian ligament was coagulated and cut along with the fallopian tube at the uterine cornua  The adnexa on the left side was then freed from the uterus  The left uterine arteries were coagulated and cut with the Enseal  Bleeding was noted from the uterine arteries and hemostasis was achieved with the Kleppinger  The right uterine arteries were then coagulated and cut with the Enseal  The cardinal ligaments and uterosacral ligaments were then cauterized and cut used the Enseal dissecting instrument  At this point, the uterus was visualized and noted to be blanched  The colpotomy was performed with the monopolar spatula and Enseal  Once the colpotomy was completed, the attention was then turned back to the perineum where the vaginal cuff occluder was deflated, and the uterus and cervix were unable to be removed vaginally  A sterile glove with a lap sponge was placed in the vagina to maintain pneumoperitoneum  Attention was then returned to the abdomen  Next, the right lateral trocar incision was extended to 10mm and the right lateral trocar incisions was extended to 15 mm  A 15mm specimen bag was placed into the abdomen through the left lateral incision  Next, the 15 mm Marky morcellator was carefully introduced into the abdomen through the right lateral port, and a tenaculum was introduced through this Marky trocar to grasp the uterine fundus  The specimen was then placed into the bag  The specimen was then morcellated mechanically within the bag, without difficulty  The bag was then removed once the specimen was removed  We then returned to the adnexa for salpingectomy  The Enseal was used to coagulate along the mesosalpinx bilaterally for removal of the fallopian tubes from the ovaries  The fallopian tubes were then removed from the body  At this point, attention was then turned back to the pelvis where a stratifix suture was placed into the abdomen through the 10 port, and the vaginal cuff was closed laparoscopically in a running fashion with 2 back stitches placed  The suture was cut, and the needle and suture was removed from the body  Airtight closure and excellent hemostasis was obtained  The cystic structure in the posterior culdesac was noted  Intraoperative discussion was had with GYN oncologist who recommended aspiration for cytology and then cyst removal  The cyst was aspirated for clear fluid and the upper half of the cyst was truncated with the Enseal  The remaining cyst wall was removed using traction and counter traction  The final laparoscopic survey of the pelvic cavity confirmed good hemostasis  The 10mm and 15 mm fascial inciscions were then closed with 0 Vicryl suture using a facial closure device  The abdomen and pelvis was visualized, irrigated, and good hemostasis was again noted  Pneumoperitoneum was then evacuated and the trocars were removed   The trocar incisions were closed using 4-0 monocryl and exofin was placed  The fishman was removed and cystoscopy was performed  Bilateral ureteral jets were visualized, as was the bladder dome  No damage to the bladder was visualized on cystoscopy  The bladder was drained and the cystoscope was removed  All instruments were then removed from the vagina  The patient tolerated the procedure well and was transferred to the recovery room in stable condition  All needle, sponge, and instrument counts were noted to be correct x 2 at the end of the procedure       Patient Disposition: PACU    SIGNATURE: Chloe Pizarro MD  DATE: December 8, 2021  TIME: 7:41 PM

## 2021-12-09 NOTE — PHYSICIAN ADVISOR
Current patient class: Outpatient Surgery  The patient is currently on Hospital Day: 2 at [address]      This patient was originally admitted to the hospital under observation status  After admission the patient was reevaluated and determined to require further hospitalization  The patient is now documented to require at least a 2nd midnight in the hospital  As such the patient is now expected to satisfy the 2 midnight benchmark and is therefore eligible for inpatient admission  After review of the relevant documentation, labs, vital signs and test results, the patient is appropriate for INPATIENT ADMISSION  Admission to the hospital as an inpatient is a complex decision making process which requires the practitioner to consider the patient’s presenting complaint, history and physical examination and all relevant testing  With this in mind, in this case, the patient was deemed appropriate for INPATIENT ADMISSION  After review of the documentation and testing available at the time of the admission I concur with this clinical determination of medical necessity  Rationale is as follows:     the patient is a 53-year-old female who presented to the hospital for planned outpatient laparoscopic hysterectomy and bilateral salpingectomy   Due to history of abnormal uterine bleeding on 12/08/2021  Intraoperatively and postoperatively patient was noted to have hemoglobin drop  From 10 5 to  8 5  Patient received IV fluids  Today patient's hemoglobin trended down to 7 4   2 units of red blood cells were ordered and 1st 1 is being transfused  Intravenous Venofer is also  Ordered  Patient also noted to be tachycardic with ambulation   With heart rates around 120,along with nausea  Patient is receiving IV Zofran  For pain control she is also receiving intravenous Dilaudid  Oxygen levels today are hovering 94-95% on room air     Due to elevated blood glucose levels in light of diabetes she was placed on insulin infusion which continued through today  Endocrinology consult was done today and insulin  Protocol is being changed and monitored  Patient will require ongoing hospitalization for close monitoring of her hemoglobin levels, management of tachycardia, nausea, uncontrolled glucose levels  Inpatient level of care is appropriate  The patient’s vitals on arrival were   ED Triage Vitals   Temperature Pulse Respirations Blood Pressure SpO2   12/08/21 1106 12/08/21 1106 12/08/21 1106 12/08/21 1106 12/08/21 1106   97 8 °F (36 6 °C) 96 16 (!) 178/84 99 %      Temp Source Heart Rate Source Patient Position - Orthostatic VS BP Location FiO2 (%)   12/08/21 1106 12/08/21 1106 -- -- --   Temporal Monitor         Pain Score       12/08/21 1212       No Pain           Past Medical History:   Diagnosis Date   • Anemia     iron deficiency anemia   • Depression    • Diabetes mellitus ([facility] [address] )    • Fibroid     LTH   today 12/8/2021   • History of transfusion    • Hydrocephalus (HCC)    • Hyperlipidemia    • Hypertension    • Migraine    • PONV (postoperative nausea and vomiting)    • Sciatica    • Seasonal allergies    • Vertigo    • Wears glasses      Past Surgical History:   Procedure Laterality Date   • COLONOSCOPY     • EPIDURAL BLOCK INJECTION Right 7/2/2020    Procedure: Right L4-5 and L5-S1 transforaminal epidural steroid injection ([id number]);   Surgeon: Татьяна Gupta MD;  [facility]: [facility];  Service: Pain Management    • EPIDURAL BLOCK INJECTION N/A 9/2/2021    Procedure: BLOCK / INJECTION EPIDURAL STEROID LUMBAR  L5-S1 IESI;  Surgeon: Татьяна Gupta MD;  [facility]: [facility];  Service: Pain Management    • FL GUIDED NEEDLE PLAC BX/ASP/INJ  7/2/2020   • FL GUIDED NEEDLE PLAC BX/ASP/INJ  9/2/2021   • SD ESOPHAGOGASTRODUODENOSCOPY TRANSORAL DIAGNOSTIC N/A 10/23/2018    Procedure: EGD with Savary dilation AND COLONOSCOPY;  Surgeon: Sera Swan MD;  Location: [facility]; Service: Gastroenterology   • WA LAPAROSCOPY W TOT HYSTERECT UTERUS 250 GRAM OR LESS N/A 12/8/2021    Procedure: [address]; BILAT SALPINGECTOMY; EXCISION PERITONEAL CYST;  Surgeon: Kaylie Bernabe MD;  [facility]: [facility];  Service: Gynecology   • TUBAL LIGATION         The patient was admitted to the hospital at N/A on N/A for the following diagnosis:  Abnormal uterine bleeding [N93 9]    Consults have been placed to:   IP CONSULT TO ENDOCRINOLOGY    Vitals:    12/09/21 1631 12/09/21 1651 12/09/21 1824 12/09/21 1829   BP: 139/69 126/65 117/61    Pulse: (!) 117 (!) 121 (!) 119    Resp: 16   20   Temp: 98 6 °F (37 °C) 98 5 °F (36 9 °C) 98 2 °F (36 8 °C)    TempSrc:       SpO2:  94% 95%    Weight:       Height:           Most recent labs:    Recent Labs     12/09/21  0628 12/09/21  0628 12/09/21  1302   WBC 10 92*   < > 11 36*   HGB 7 4*   < > 7 7*   HCT 24 5*   < > 24 8*      < > 270   K 4 0  --   --    CALCIUM 7 3*  --   --    BUN 10  --   --    CREATININE 0 82  --   --     < > = values in this interval not displayed         Scheduled Meds:  Current Facility-Administered Medications   Medication Dose Route Frequency Provider Last Rate   • acetaminophen  650 mg Oral Q4H PRN Clive Gastelum MD     • docusate sodium  100 mg Oral BID Clive Gastelum MD     • DULoxetine  60 mg Oral HS Clive Gastelum MD     • HYDROmorphone  0 5 mg Intravenous Q3H PRN Clive Gastelum MD     • ibuprofen  600 mg Oral Q6H PRN Clive Gastelum MD     • insulin glargine  30 Units Subcutaneous HS Jaja Queen DO     • [START ON 12/10/2021] insulin lispro  1-6 Units Subcutaneous TID AC Jaja Queen DO     • [START ON 12/10/2021] insulin lispro  10 Units Subcutaneous TID With Meals Jaja Queen DO     • insulin regular (HumuLIN R,NovoLIN R) infusion  0 3-21 Units/hr Intravenous Titrated Jaja Queen DO 4 Units/hr (12/09/21 1432)   • ondansetron  4 mg Intravenous Q6H PRN Clive Gastelum MD     • pravastatin  40 mg Oral Daily Sharif Carbajal MD     • pregabalin  50 mg Oral BID Sulma Osorio MD     • sodium chloride  75 mL/hr Intravenous Continuous Sharif Carbajal  mL/hr (12/09/21 3984)     Continuous Infusions:insulin regular (HumuLIN R,NovoLIN R) infusion, 0 3-21 Units/hr, Last Rate: 4 Units/hr (12/09/21 1823)  sodium chloride, 75 mL/hr, Last Rate: 125 mL/hr (12/09/21 0644)      PRN Meds: •  acetaminophen  •  HYDROmorphone  •  ibuprofen  •  ondansetron    Surgical procedures (if appropriate):  Procedure(s):  LTH; BILAT SALPINGECTOMY; EXCISION PERITONEAL CYST

## 2021-12-09 NOTE — CONSULTS
Consultation - Hailee Mackey 50 y o  female MRN: [id number]    Unit/Bed#: E5 -01 Encounter: [id number]      Assessment/Plan     Assessment/Plan:  1  Type 2 diabetes with hyperglycemia:  Transition off of insulin drip to  Lantus 30 units q h s  And Humalog 10 units with each meal plus scale for correction  Will continue to follow and make changes as needed  Will monitor trends and off for discharge recommendations if appropriate tomorrow  Monitor closely for hypoglycemia and treat per protocol  CC: Diabetes Consult    History of Present Illness     HPI: Hailee Mackey is a 50y o  year old female with type 2 diabetes since age 38 who is admitted to [facility]  She is on oral agents and insulin at home  She takes Soliqua 40 units daily and glimepiride 2 mg daily  She denies any polyuria, polydipsia, nocturia and blurry vision  She denies nephropathy and retinopathy but does admit to neuropathy  She denies any hypoglycemia  She is admitted to the hospital for hysterectomy and is postop day 1  Yesterday she was noted to have significant hyperglycemia prompting insulin drip to start an endocrinology consultation  She reports blood sugars have been running higher lately as she received corticosteroids as outpatient but otherwise prior to that diabetes has been controlled  Consults    Review of Systems   Constitutional: Positive for appetite change and fatigue  HENT: Negative for congestion and trouble swallowing  Eyes: Negative for visual disturbance  Respiratory: Negative for shortness of breath  Cardiovascular: Negative for palpitations and leg swelling  Gastrointestinal: Positive for abdominal pain  Negative for nausea and vomiting  Endocrine: Negative for polydipsia and polyuria  Genitourinary: Negative for difficulty urinating and frequency  Musculoskeletal: Negative for arthralgias  Skin: Negative for rash  Neurological: Negative for dizziness and weakness  Psychiatric/Behavioral: Negative for agitation and confusion  Historical Information   Past Medical History:   Diagnosis Date   • Anemia     iron deficiency anemia   • Depression    • Diabetes mellitus ([facility] [address] )    • Fibroid     LTH   today 12/8/2021   • History of transfusion    • Hydrocephalus (HCC)    • Hyperlipidemia    • Hypertension    • Migraine    • PONV (postoperative nausea and vomiting)    • Sciatica    • Seasonal allergies    • Vertigo    • Wears glasses      Past Surgical History:   Procedure Laterality Date   • COLONOSCOPY     • EPIDURAL BLOCK INJECTION Right 7/2/2020    Procedure: Right L4-5 and L5-S1 transforaminal epidural steroid injection ([id number]);   Surgeon: Patricia Reich MD;  [facility];  Service: Pain Management    • EPIDURAL BLOCK INJECTION N/A 9/2/2021    Procedure: BLOCK / INJECTION EPIDURAL STEROID LUMBAR  L5-S1 IESI;  Surgeon: Patricia Reich MD;  [facility];  Service: Pain Management    • FL GUIDED NEEDLE PLAC BX/ASP/INJ  7/2/2020   • FL GUIDED NEEDLE PLAC BX/ASP/INJ  9/2/2021   • ID ESOPHAGOGASTRODUODENOSCOPY TRANSORAL DIAGNOSTIC N/A 10/23/2018    Procedure: EGD with Savary dilation AND COLONOSCOPY;  Surgeon: Leti Askew MD;  [facility];  Service: Gastroenterology   • ID LAPAROSCOPY W TOT HYSTERECT UTERUS 250 GRAM OR LESS N/A 12/8/2021    Procedure: Stef Eleazar; BILAT SALPINGECTOMY; EXCISION PERITONEAL CYST;  Surgeon: Shonda Moreno MD;  [facility];  Service: Gynecology   • TUBAL LIGATION       Social History   Social History     Substance and Sexual Activity   Alcohol Use No     Social History     Substance and Sexual Activity   Drug Use No     Social History     Tobacco Use   Smoking Status Never Smoker   Smokeless Tobacco Never Used     Family History:   Family History   Problem Relation Age of Onset   • Diabetes Father    • Cancer Father    • Varicose Veins Mother    • Drug abuse Mother        Meds/Allergies   Current Facility-Administered Medications   Medication Dose Route Frequency Provider Last Rate Last Admin   • acetaminophen (TYLENOL) tablet 650 mg  650 mg Oral Q4H PRN Maryann Chase MD   650 mg at 12/09/21 0347   • docusate sodium (COLACE) capsule 100 mg  100 mg Oral BID Maryann Chase MD   100 mg at 12/09/21 3862   • DULoxetine (CYMBALTA) delayed release capsule 60 mg  60 mg Oral HS Maryann Chase MD   60 mg at 12/08/21 2240   • HYDROmorphone (DILAUDID) injection 0 5 mg  0 5 mg Intravenous Q3H PRN Maryann Chase MD   0 5 mg at 12/09/21 1319   • ibuprofen (MOTRIN) tablet 600 mg  600 mg Oral Q6H PRN Maryann Chase MD       • insulin regular (HumuLIN R,NovoLIN R) 1 Units/mL in sodium chloride 0 9 % 100 mL infusion  0 3-21 Units/hr Intravenous Titrated Maryann Chase MD 6 mL/hr at 12/09/21 1430 6 Units/hr at 12/09/21 1430   • ondansetron (ZOFRAN) injection 4 mg  4 mg Intravenous Q6H PRN Maryann Chase MD   4 mg at 12/09/21 1328   • pravastatin (PRAVACHOL) tablet 40 mg  40 mg Oral Daily Maryann Chase MD   40 mg at 12/09/21 0914   • pregabalin (LYRICA) capsule 50 mg  50 mg Oral BID Arsenio Funes MD   50 mg at 12/09/21 8560   • sodium chloride 0 9 % infusion  75 mL/hr Intravenous Continuous Maryann Chase  mL/hr at 12/09/21 0644 125 mL/hr at 12/09/21 0644     Allergies   Allergen Reactions   • Nuts - Food Allergy Anaphylaxis   • Chocolate - Food Allergy Cough   • Eggs Or Egg-Derived Products - Food Allergy Other (See Comments)     Cough   • Other      LETTUCE   • Shellfish Allergy - Food Allergy Other (See Comments)     unknown   • Tomato - Food Allergy Cough       Objective   Vitals: Blood pressure 167/86, pulse (!) 124, temperature 98 4 °F (36 9 °C), resp  rate 18, height 5' 5" (1 651 m), weight 97 8 kg (215 lb 9 8 oz), SpO2 94 %      Intake/Output Summary (Last 24 hours) at 12/9/2021 1518  Last data filed at 12/9/2021 1050  Gross per 24 hour   Intake 3950 ml   Output 2495 ml   Net 1458 ml     Invasive Devices  Report    Peripheral Intravenous Line            Peripheral IV 12/08/21 Dorsal (posterior); Left Hand 1 day    Peripheral IV 12/08/21 Right Hand 1 day                Physical Exam  Vitals reviewed  Physical Exam   Constitutional: Oriented to person, place, and time  Appears well-developed and well-nourished  No distress  HENT: Head: Normocephalic and atraumatic  Neck: Normal range of motion  Pulmonary/Chest: Effort normal    Musculoskeletal: Normal range of motion  Neurological: Alert and oriented to person, place, and time  Skin: No diaphoresis  Psychiatric: Normal mood and affect  Behavior is normal          The history was obtained from the review of the chart, patient  Lab Results:       Lab Results   Component Value Date    WBC 11 36 (H) 12/09/2021    HGB 7 7 (L) 12/09/2021    HCT 24 8 (L) 12/09/2021    MCV 94 12/09/2021     12/09/2021     Lab Results   Component Value Date/Time    BUN 10 12/09/2021 06:28 AM    BUN 10 01/03/2016 03:20 PM     (L) 01/03/2016 03:20 PM    K 4 0 12/09/2021 06:28 AM    K 3 2 (L) 01/03/2016 03:20 PM     12/09/2021 06:28 AM    CL 95 (L) 01/03/2016 03:20 PM    CO2 22 12/09/2021 06:28 AM    CO2 24 12/08/2021 07:26 PM    CREATININE 0 82 12/09/2021 06:28 AM    CREATININE 0 89 01/03/2016 03:20 PM    AST 10 (L) 11/27/2021 02:37 AM    AST 21 10/06/2015 11:14 AM    ALT 10 11/27/2021 02:37 AM    ALT 40 10/06/2015 11:14 AM    ALB 3 9 11/27/2021 02:37 AM    ALB 3 7 10/06/2015 11:14 AM     No results for input(s): CHOL, HDL, LDL, TRIG, VLDL in the last 72 hours    No results found for: Octavio Steinberg  POC Glucose   Date Value   12/09/2021 269 mg/dl (H)   12/09/2021 177 mg/dl (H)   12/09/2021 171 mg/dl (H)   12/09/2021 144 mg/dl (H)   12/09/2021 162 mg/dl (H)   12/09/2021 140 mg/dl   12/09/2021 187 mg/dl (H)   12/08/2021 163 mg/dl (H)   12/08/2021 178 mg/dl (H)   12/08/2021 162 mg/dl (H)   12/13/2017 218   08/10/2017 265   05/11/2017 218   05/04/2017 191   10/03/2016 104   06/29/2016 199   03/02/2016 159       Imaging Studies: No imaging this admission  Portions of the record may have been created with voice recognition software  Occasional wrong word or "sound a like" substitutions may have occurred due to the inherent limitations of voice recognition software  Read the chart carefully and recognize, using context, where substitutions have occurred

## 2021-12-10 VITALS
HEART RATE: 116 BPM | SYSTOLIC BLOOD PRESSURE: 146 MMHG | HEIGHT: 65 IN | WEIGHT: 215.61 LBS | TEMPERATURE: 98.5 F | RESPIRATION RATE: 20 BRPM | BODY MASS INDEX: 35.92 KG/M2 | OXYGEN SATURATION: 96 % | DIASTOLIC BLOOD PRESSURE: 74 MMHG

## 2021-12-10 PROBLEM — D64.9 ANEMIA: Status: ACTIVE | Noted: 2021-12-10

## 2021-12-10 LAB
ABO GROUP BLD BPU: NORMAL
BPU ID: NORMAL
CROSSMATCH: NORMAL
ERYTHROCYTE [DISTWIDTH] IN BLOOD BY AUTOMATED COUNT: 14.2 % (ref 11.6–15.1)
ERYTHROCYTE [DISTWIDTH] IN BLOOD BY AUTOMATED COUNT: 15.2 % (ref 11.6–15.1)
GLUCOSE SERPL-MCNC: 161 MG/DL (ref 65–140)
GLUCOSE SERPL-MCNC: 168 MG/DL (ref 65–140)
GLUCOSE SERPL-MCNC: 172 MG/DL (ref 65–140)
GLUCOSE SERPL-MCNC: 174 MG/DL (ref 65–140)
GLUCOSE SERPL-MCNC: 186 MG/DL (ref 65–140)
HCT VFR BLD AUTO: 24.7 % (ref 34.8–46.1)
HCT VFR BLD AUTO: 27.7 % (ref 34.8–46.1)
HGB BLD-MCNC: 7.4 G/DL (ref 11.5–15.4)
HGB BLD-MCNC: 8.7 G/DL (ref 11.5–15.4)
MCH RBC QN AUTO: 27.8 PG (ref 26.8–34.3)
MCH RBC QN AUTO: 28.8 PG (ref 26.8–34.3)
MCHC RBC AUTO-ENTMCNC: 30 G/DL (ref 31.4–37.4)
MCHC RBC AUTO-ENTMCNC: 31.4 G/DL (ref 31.4–37.4)
MCV RBC AUTO: 92 FL (ref 82–98)
MCV RBC AUTO: 93 FL (ref 82–98)
PLATELET # BLD AUTO: 234 THOUSANDS/UL (ref 149–390)
PLATELET # BLD AUTO: 238 THOUSANDS/UL (ref 149–390)
PMV BLD AUTO: 9 FL (ref 8.9–12.7)
PMV BLD AUTO: 9 FL (ref 8.9–12.7)
RBC # BLD AUTO: 2.66 MILLION/UL (ref 3.81–5.12)
RBC # BLD AUTO: 3.02 MILLION/UL (ref 3.81–5.12)
UNIT DISPENSE STATUS: NORMAL
UNIT PRODUCT CODE: NORMAL
UNIT PRODUCT VOLUME: 350 ML
UNIT RH: NORMAL
WBC # BLD AUTO: 7.38 THOUSAND/UL (ref 4.31–10.16)
WBC # BLD AUTO: 8.63 THOUSAND/UL (ref 4.31–10.16)

## 2021-12-10 PROCEDURE — 82948 REAGENT STRIP/BLOOD GLUCOSE: CPT

## 2021-12-10 PROCEDURE — 0UT7FZZ RESECTION OF BILATERAL FALLOPIAN TUBES, VIA NATURAL OR ARTIFICIAL OPENING WITH PERCUTANEOUS ENDOSCOPIC ASSISTANCE: ICD-10-PCS | Performed by: OBSTETRICS & GYNECOLOGY

## 2021-12-10 PROCEDURE — 8E0W4CZ ROBOTIC ASSISTED PROCEDURE OF TRUNK REGION, PERCUTANEOUS ENDOSCOPIC APPROACH: ICD-10-PCS | Performed by: OBSTETRICS & GYNECOLOGY

## 2021-12-10 PROCEDURE — 30233N1 TRANSFUSION OF NONAUTOLOGOUS RED BLOOD CELLS INTO PERIPHERAL VEIN, PERCUTANEOUS APPROACH: ICD-10-PCS | Performed by: OBSTETRICS & GYNECOLOGY

## 2021-12-10 PROCEDURE — 0DBW4ZZ EXCISION OF PERITONEUM, PERCUTANEOUS ENDOSCOPIC APPROACH: ICD-10-PCS | Performed by: OBSTETRICS & GYNECOLOGY

## 2021-12-10 PROCEDURE — 0W9G4ZX DRAINAGE OF PERITONEAL CAVITY, PERCUTANEOUS ENDOSCOPIC APPROACH, DIAGNOSTIC: ICD-10-PCS | Performed by: OBSTETRICS & GYNECOLOGY

## 2021-12-10 PROCEDURE — 0UT9FZZ RESECTION OF UTERUS, VIA NATURAL OR ARTIFICIAL OPENING WITH PERCUTANEOUS ENDOSCOPIC ASSISTANCE: ICD-10-PCS | Performed by: OBSTETRICS & GYNECOLOGY

## 2021-12-10 PROCEDURE — 85027 COMPLETE CBC AUTOMATED: CPT | Performed by: OBSTETRICS & GYNECOLOGY

## 2021-12-10 PROCEDURE — P9016 RBC LEUKOCYTES REDUCED: HCPCS

## 2021-12-10 PROCEDURE — 99024 POSTOP FOLLOW-UP VISIT: CPT | Performed by: OBSTETRICS & GYNECOLOGY

## 2021-12-10 RX ORDER — IBUPROFEN 600 MG/1
600 TABLET ORAL EVERY 6 HOURS PRN
Qty: 30 TABLET | Refills: 0 | Status: SHIPPED | OUTPATIENT
Start: 2021-12-10 | End: 2022-03-02

## 2021-12-10 RX ORDER — OXYCODONE HYDROCHLORIDE 5 MG/1
5 TABLET ORAL EVERY 4 HOURS PRN
Qty: 10 TABLET | Refills: 0 | Status: SHIPPED | OUTPATIENT
Start: 2021-12-10 | End: 2021-12-21

## 2021-12-10 RX ORDER — ACETAMINOPHEN 325 MG/1
650 TABLET ORAL EVERY 4 HOURS PRN
Qty: 40 TABLET | Refills: 0 | Status: SHIPPED | OUTPATIENT
Start: 2021-12-10 | End: 2022-01-25

## 2021-12-10 RX ADMIN — ACETAMINOPHEN 650 MG: 325 TABLET, FILM COATED ORAL at 03:33

## 2021-12-10 RX ADMIN — ACETAMINOPHEN 650 MG: 325 TABLET, FILM COATED ORAL at 12:07

## 2021-12-10 RX ADMIN — PRAVASTATIN SODIUM 40 MG: 40 TABLET ORAL at 08:56

## 2021-12-10 RX ADMIN — DOCUSATE SODIUM 100 MG: 100 CAPSULE ORAL at 08:56

## 2021-12-10 RX ADMIN — PREGABALIN 50 MG: 50 CAPSULE ORAL at 08:55

## 2021-12-10 RX ADMIN — HYDROMORPHONE HYDROCHLORIDE 0.5 MG: 1 INJECTION, SOLUTION INTRAMUSCULAR; INTRAVENOUS; SUBCUTANEOUS at 03:34

## 2021-12-10 RX ADMIN — INSULIN LISPRO 10 UNITS: 100 INJECTION, SOLUTION INTRAVENOUS; SUBCUTANEOUS at 12:09

## 2021-12-10 RX ADMIN — INSULIN LISPRO 1 UNITS: 100 INJECTION, SOLUTION INTRAVENOUS; SUBCUTANEOUS at 12:09

## 2021-12-10 RX ADMIN — IBUPROFEN 600 MG: 600 TABLET, FILM COATED ORAL at 12:07

## 2021-12-10 RX ADMIN — INSULIN LISPRO 1 UNITS: 100 INJECTION, SOLUTION INTRAVENOUS; SUBCUTANEOUS at 08:56

## 2021-12-10 RX ADMIN — INSULIN LISPRO 10 UNITS: 100 INJECTION, SOLUTION INTRAVENOUS; SUBCUTANEOUS at 08:56

## 2021-12-10 NOTE — QUICK NOTE
Reviewed blood sugars since transition off of insulin drip  Overall blood sugars are at goal for hospitalization  Endocrinology does not around at [facility] over the weekend  Please contact Endocrinology on-call for discharge recommendations but suspect patient may be able to resume home regimen with outpt follow up with her pcp who manages her DM      POC Glucose   Date Value   12/10/2021 174 mg/dl (H)   12/10/2021 168 mg/dl (H)   12/10/2021 172 mg/dl (H)   12/10/2021 186 mg/dl (H)   12/10/2021 161 mg/dl (H)   12/09/2021 142 mg/dl (H)   12/09/2021 192 mg/dl (H)   12/09/2021 205 mg/dl (H)   12/09/2021 192 mg/dl (H)   12/09/2021 226 mg/dl (H)   12/13/2017 218   08/10/2017 265   05/11/2017 218   05/04/2017 191   10/03/2016 104   06/29/2016 199   03/02/2016 159

## 2021-12-10 NOTE — UTILIZATION REVIEW
Initial Clinical Review    OP  PROCEDURE   12/8 CHANGED TO IP ADMISSION 12/9  HIGH EBL INTRA OP    ABLA   Required transfusion  UNCONTROLLED POST OP PAIN    TACHYCARDIC    Elective    OP     surgical procedure    Age/Sex: 50 y o  female     Surgery Date:    12/8/21    Procedure: [address]; BILAT SALPINGECTOMY; EXCISION PERITONEAL CYST (N/A)    Anesthesia:     general    Operative Findings:  External genitalia grossly normal in appearance   No ulcerations, no lacerations, no lesions   Bimanual exam revealed enlargered uterus to 3cm below the umbilicus with normal contours and freely mobile   No adnexal masses palpated bilaterally  Hardened mass felt in the posterior culdesac through the vagina  2   Vagina and cervix were grossly normal in appearance without any lacerations or lesions  Rectocele noted  3  Uterus sounded to 17 cm  4   Laparoscopic evaluation revealed no injury to bowel or bladder vasculature upon entrance   Uterus was large but mobile with normal contour  Left adnexa was adhered to the uterus  Bilateral ovaries were grossly normal in appearance with simple appearing cyst on the right ovary   Bilateral fallopian tubes were grossly normal in appearance with evidence of prior tubal ligation  Approximately 6cm cystic structure in the posterior culdesac adhered to the bowel and peritoneum  5   Upper abdominal organs were visualized and grossly normal in appearance   These include liver, stomach, and omentum       Complications:   Surgical hemorrhage  EBL 1100  cc    POD#1 Progress Note:   IP ADMISSION   12/9     Continue post op care  Lightheaded/dizziness post op, improved  Intermittent tachycardia, 110's - 120's  Large   EBL, ABLA, S/P  1 U PRBC  Pre op hemoglobin  10 5, intra op hmgb   8 5,  2 hours  Post OR, 9  Hemoglobin drop to   7 4,  IV v enofer ordered  Monitor labs/hemoglobin  Low suspicion for acute intraabdominal bleed  DID void, no return of bowel function thus far   Continue IVF   Continue pain control as needed  Continue insulin drip  Tachycardic with ambulation,  Heart rate in the 140's, then 120's  Feels lightheaded, nausea  Feels a  Bubble sensation in RUQ  Abdomen benign, expected abdominal tenderness  Transfusing 2 U PRBC       12/10  POD   #  2  Persistently tachycardic,  Not  On exam   Still anemic despite transfusion and  Venofer  Transfusinf  Additional UPRBC  Today  No concern for current/active bleeding  Episode of diaphoresis during the night  Tolerating po  Admission Orders: Date/Time/Statement:   Admission Orders (From admission, onward)     Ordered        12/09/21 1852  Inpatient Admission  Once                      Orders Placed This Encounter   Procedures   • Inpatient Admission     Standing Status:   Standing     Number of Occurrences:   1     Order Specific Question:   Level of Care     Answer:   Med Surg [16]     Order Specific Question:   Estimated length of stay     Answer:   More than 2 Midnights     Order Specific Question:   Certification     Answer:   I certify that inpatient services are medically necessary for this patient for a duration of greater than two midnights  See H&P and MD Progress Notes for additional information about the patient's course of treatment       Vital Signs: /75   Pulse 105   Temp 98 5 °F (36 9 °C) (Oral)   Resp 20   Ht 5' 5" (1 651 m)   Wt 97 8 kg (215 lb 9 8 oz) Comment: with clothing  LMP  (LMP Unknown)   SpO2 96%   BMI 35 88 kg/m²     Pertinent Labs/Diagnostic Test Results:   12/10/21 1050 98 5 °F (36 9 °C) 105 20 141/75 -- 96 % -- -- -- None (Room air)   12/10/21 10:48:49 98 5 °F (36 9 °C) 105 -- 141/75 97 96 % -- -- -- --   12/10/21 1030 -- -- 18 -- -- -- -- -- -- None (Room air)   12/10/21 10:28:30 98 6 °F (37 °C) 104 -- 151/75 100 96 % -- -- -- --   12/10/21 0901 -- -- -- -- -- -- -- -- -- None (Room air)   12/10/21 07:40:57 98 °F (36 7 °C) 117 Abnormal  21 167/87 114 97 % -- -- -- --   12/10/21 03:02:36 98 9 °F (37 2 °C) 121 Abnormal  -- 150/81 104 93 % -- -- -- --   12/09/21 22:18:45 -- 113 Abnormal  18 116/62 80 94 % -- -- -- --   12/09/21 19:20:22 -- 116 Abnormal  -- 119/65 83 95 % -- -- -- --   12/09/21 1829 -- -- 20 -- -- -- -- -- -- --   12/09/21 18:24:48 98 2 °F (36 8 °C) 119 Abnormal  -- 117/61 80 95 % -- -- -- --   12/09/21 16:51:45 98 5 °F (36 9 °C) 121 Abnormal  -- 126/65 85 94 % -- -- -- --   12/09/21 1631 98 6 °F (37 °C) 117 Abnormal  16 139/69 -- -- -- -- -- --   12/09/21 15:16:55 97 8 °F (36 6 °C) 121 Abnormal  18 118/61 80 94 % -- -- -- --   12/09/21 12:34:23 -- 124 Abnormal  -- 167/86 113 94 % -- -- -- --   12/09/21 06:59:36 98 4 °F (36 9 °C) 108 Abnormal  18 144/71 95 98 % -- -- -- --   12/09/21 0247 98 7 °F (37 1 °C) 102 18 143/68 96 93 % -- -- -- --   12/08/21 2300 98 9 °F (37 2 °C) 102 18 141/72 96 93 % -- -- -- --   12/08/21 2230 -- -- -- -- -- -- -- -- -- None (Room air)   12/08/21 21:51:34 98 2 °F (36 8 °C) 102 18 140/73 95 91 % -- -- -- --   12/08/21 2106 98 3 °F (36 8 °C) 100 13 141/68 95 97 % 28 -- 2 L/min Nasal cannula           Results from last 7 days   Lab Units 12/10/21  0434 12/09/21  1302 12/09/21  0628 12/08/21  2214 12/08/21  2214 12/08/21  1926 12/08/21  1201 12/08/21  1201   WBC Thousand/uL 8 63 11 36* 10 92*   < > 15 42*  --    < > 4 75   HEMOGLOBIN g/dL 7 4* 7 7* 7 4*  --  9 0*  --   --  10 5*   I STAT HEMOGLOBIN g/dl  --   --   --   --   --  8 5*  --   --    HEMATOCRIT % 24 7* 24 8* 24 5*  --  29 6*  --   --  33 2*   HEMATOCRIT, ISTAT %  --   --   --   --   --  25*  --   --    PLATELETS Thousands/uL 234 270 263   < > 315  --    < > 303   NEUTROS ABS Thousands/µL  --  9 70*  --   --   --   --   --  3 50    < > = values in this interval not displayed           Results from last 7 days   Lab Units 12/09/21  0628 12/08/21 1926   SODIUM mmol/L 137  --    POTASSIUM mmol/L 4 0  --    CHLORIDE mmol/L 105  --    CO2 mmol/L 22  --    CO2, I-STAT mmol/L  --  24   ANION GAP mmol/L 10  --    BUN mg/dL 10  --    CREATININE mg/dL 0 82  --    EGFR ml/min/1 73sq m 82  --    CALCIUM mg/dL 7 3*  --          Results from last 7 days   Lab Units 12/10/21  0855 12/10/21  0538 12/10/21  0210 12/10/21  0006 12/09/21  2223 12/09/21  2021 12/09/21  1953 12/09/21  1822 12/09/21  1559 12/09/21  1428 12/09/21  1207 12/09/21  1002   POC GLUCOSE mg/dl 168* 172* 186* 161* 142* 192* 205* 192* 226* 269* 177* 171*     Results from last 7 days   Lab Units 12/09/21  0628   GLUCOSE RANDOM mg/dL 154*           Results from last 7 days   Lab Units 12/08/21  1926   PH, AKIKO I-STAT  7 258*   PCO2, AKIKO ISTAT mm HG 49 4   PO2, AKIKO ISTAT mm  0*   HCO3, AKIKO ISTAT mmol/L 22 1*   I STAT BASE EXC mmol/L -5*   I STAT O2 SAT % 98*             Diet:    CCD    Mobility:    AS tolerated    DVT Prophylaxis: SCD'S    Medications/Pain Control:   Scheduled Medications:  docusate sodium, 100 mg, Oral, BID  DULoxetine, 60 mg, Oral, HS  insulin glargine, 30 Units, Subcutaneous, HS  insulin lispro, 1-6 Units, Subcutaneous, TID AC  insulin lispro, 10 Units, Subcutaneous, TID With Meals  pravastatin, 40 mg, Oral, Daily  pregabalin, 50 mg, Oral, BID  IV  Venofer   12/9  IV toradol X1  12/9      Continuous IV Infusions:  sodium chloride, 75 mL/hr, Intravenous, Continuous  Insulin drip - d/c   12/9  @  2300      PRN Meds:  acetaminophen, 650 mg, Oral, Q4H PRN  HYDROmorphone, 0 5 mg, Intravenous, Q3H PRN  ( x5   12/9 and  X 1  12/10 thus far)  ibuprofen, 600 mg, Oral, Q6H PRN  ondansetron, 4 mg, Intravenous, Q6H PRN  ( x2  12/9)         Network Utilization Review Department  ATTENTION: Please call with any questions or concerns to [phone] and carefully listen to the prompts so that you are directed to the right person   All voicemails are confidential   Radha Zayas all requests for admission clinical reviews, approved or denied determinations and any other requests to dedicated fax number below belonging to the campus where the patient is receiving treatment   List of dedicated fax numbers for the Facilities:  [address] [place] [address] DENIALS (Administrative/Medical Necessity) [phone]   [address] (Maternity/NICU/Pediatrics) [phone]   [address] [phone]   Cristine Mcbride 77 [phone]   [address] [facility] [place] [address] [address] [facility] [phone]   [address] [facility] [place] [address] 134 815 [facility] [phone]

## 2021-12-10 NOTE — DISCHARGE SUMMARY
Discharge Summary - Gynecology  Chencho Castillo 50 y o  female MRN: [id number]  Unit/Bed#: E5 -01 Encounter: [id number]    Admission Date: 12/8/2021   Discharge Date: 12/10/21    Attending Physician: Bill Moser Physician(s): Kristin Leavitt    Admitting Diagnosis:   Abnormal uterine bleeding [N93 9]    Discharge Diagnosis:   S/p TLH, BS, cystectomy, and cystoscopy    Procedures Performed: S/p TLH, BS, cystectomy, and cystoscopy    Hospital Course: The patient presented on day of admission for the above mentioned procedure  She was admitted for pain control and postoperative management  Her procedure was complicated by increased blood loss  The patient was continued on the insulin drip that was started prior her procedure  Endocrinology was consulted for further management and adjustment of her insulin regimen  The patient's hospital course was uncomplicated  On POD 1, the patient was doing well  Her pain was well controlled  Her Hb fell from 10 5 preoperatively to 7 4 postoperatively  Her Irvin was removed and she was able to void spontaneously  She was tolerating PO and passing flatus  She was ambulating but with dizziness  She was given Venofer and 1UPRBC  On POD2, she continued to do well but was still tachycardic and dizzy with ambulation  Her hgb was still 7 4  An additional unit of PRBC was administered and her Hgb lux appropriately to 8 7  The patient was discharged home on POD 2 in stable condition  She was given prescriptions for pain control  She was asked to follow up with Dr Dudley Cevallos in 2 weeks for a postoperative appointment       Lab Results:   Lab Results   Component Value Date    WBC 8 63 12/10/2021    HGB 7 4 (L) 12/10/2021    HCT 24 7 (L) 12/10/2021    MCV 93 12/10/2021     12/10/2021     Lab Results   Component Value Date    GLUCOSE 136 12/08/2021    CALCIUM 7 3 (L) 12/09/2021     (L) 01/03/2016    K 4 0 12/09/2021    CO2 22 12/09/2021     12/09/2021    BUN 10 12/09/2021    CREATININE 0 82 12/09/2021     Lab Results   Component Value Date/Time    POCGLU 174 (H) 12/10/2021 11:33 AM    POCGLU 168 (H) 12/10/2021 08:55 AM    POCGLU 172 (H) 12/10/2021 05:38 AM    POCGLU 186 (H) 12/10/2021 02:10 AM    POCGLU 161 (H) 12/10/2021 12:06 AM    POCGLU 218 12/13/2017 10:49 AM    POCGLU 265 08/10/2017 12:58 PM    POCGLU 218 05/11/2017 02:07 PM    POCGLU 191 05/04/2017 11:19 AM    POCGLU 104 10/03/2016 12:04 PM     Lab Results   Component Value Date    PTT 26 11/27/2021     Lab Results   Component Value Date    INR 0 98 11/27/2021    INR 1 10 11/12/2021    INR 1 03 11/12/2021    PROTIME 12 9 11/27/2021    PROTIME 14 0 11/12/2021    PROTIME 13 0 11/12/2021       Pathology: pending    Condition at Discharge: good     Discharge Medications: See after visit summary for reconciled discharge medications provided to patient and family  Discharge instructions/Information to patient and family: See after visit summary for information provided to patient and family  Provisions for Follow-Up Care: See after visit summary for information related to follow-up care and any pertinent home health orders  Disposition: Home    Planned Readmission: Vida Gonzales MD  OB/GYN  12/10/2021  2:47 PM

## 2021-12-10 NOTE — PLAN OF CARE
Problem: PAIN - ADULT  Goal: Verbalizes/displays adequate comfort level or baseline comfort level  Description: Interventions:  - Encourage patient to monitor pain and request assistance  - Assess pain using appropriate pain scale  - Administer analgesics based on type and severity of pain and evaluate response  - Implement non-pharmacological measures as appropriate and evaluate response  - Consider cultural and social influences on pain and pain management  - Notify physician/advanced practitioner if interventions unsuccessful or patient reports new pain  Outcome: Progressing     Problem: INFECTION - ADULT  Goal: Absence or prevention of progression during hospitalization  Description: INTERVENTIONS:  - Assess and monitor for signs and symptoms of infection  - Monitor lab/diagnostic results  - Monitor all insertion sites, i e  indwelling lines, tubes, and drains  - Monitor endotracheal if appropriate and nasal secretions for changes in amount and color  - [place] appropriate cooling/warming therapies per order  - Administer medications as ordered  - Instruct and encourage patient and family to use good hand hygiene technique  - Identify and instruct in appropriate isolation precautions for identified infection/condition  Outcome: Progressing  Goal: Absence of fever/infection during neutropenic period  Description: INTERVENTIONS:  - Monitor WBC    Outcome: Progressing     Problem: SAFETY ADULT  Goal: Patient will remain free of falls  Description: INTERVENTIONS:  - Educate patient/family on patient safety including physical limitations  - Instruct patient to call for assistance with activity   - Consult OT/PT to assist with strengthening/mobility   - Keep Call bell within reach  - Keep bed low and locked with side rails adjusted as appropriate  - Keep care items and personal belongings within reach  - Initiate and maintain comfort rounds  - Make Fall Risk Sign visible to staff  - Offer Toileting every  Hours, in advance of need  - Initiate/Maintain alarm  - Obtain necessary fall risk management equipment:   - Apply yellow socks and bracelet for high fall risk patients  - Consider moving patient to room near nurses station  Outcome: Progressing  Goal: Maintain or return to baseline ADL function  Description: INTERVENTIONS:  -  Assess patient's ability to carry out ADLs; assess patient's baseline for ADL function and identify physical deficits which impact ability to perform ADLs (bathing, care of mouth/teeth, toileting, grooming, dressing, etc )  - Assess/evaluate cause of self-care deficits   - Assess range of motion  - Assess patient's mobility; develop plan if impaired  - Assess patient's need for assistive devices and provide as appropriate  - Encourage maximum independence but intervene and supervise when necessary  - Involve family in performance of ADLs  - Assess for home care needs following discharge   - Consider OT consult to assist with ADL evaluation and planning for discharge  - Provide patient education as appropriate  Outcome: Progressing  Goal: Maintains/Returns to pre admission functional level  Description: INTERVENTIONS:  - Perform BMAT or MOVE assessment daily    - Set and communicate daily mobility goal to care team and patient/family/caregiver  - Collaborate with rehabilitation services on mobility goals if consulted  - Perform Range of Motion  times a day  - Reposition patient every  hours    - Dangle patient  times a day  - Stand patient  times a day  - Ambulate patient  times a day  - Out of bed to chair  times a day   - Out of bed for meal times a day  - Out of bed for toileting  - Record patient progress and toleration of activity level   Outcome: Progressing     Problem: DISCHARGE PLANNING  Goal: Discharge to home or other facility with appropriate resources  Description: INTERVENTIONS:  - Identify barriers to discharge w/patient and caregiver  - Arrange for needed discharge resources and transportation as appropriate  - Identify discharge learning needs (meds, wound care, etc )  - Arrange for interpretive services to assist at discharge as needed  - Refer to Case Management Department for coordinating discharge planning if the patient needs post-hospital services based on physician/advanced practitioner order or complex needs related to functional status, cognitive ability, or social support system  Outcome: Progressing     Problem: Knowledge Deficit  Goal: Patient/family/caregiver demonstrates understanding of disease process, treatment plan, medications, and discharge instructions  Description: Complete learning assessment and assess knowledge base    Interventions:  - Provide teaching at level of understanding  - Provide teaching via preferred learning methods  Outcome: Progressing

## 2021-12-10 NOTE — PLAN OF CARE
Problem: PAIN - ADULT  Goal: Verbalizes/displays adequate comfort level or baseline comfort level  Description: Interventions:  - Encourage patient to monitor pain and request assistance  - Assess pain using appropriate pain scale  - Administer analgesics based on type and severity of pain and evaluate response  - Implement non-pharmacological measures as appropriate and evaluate response  - Consider cultural and social influences on pain and pain management  - Notify physician/advanced practitioner if interventions unsuccessful or patient reports new pain  Outcome: Progressing     Problem: INFECTION - ADULT  Goal: Absence or prevention of progression during hospitalization  Description: INTERVENTIONS:  - Assess and monitor for signs and symptoms of infection  - Monitor lab/diagnostic results  - Monitor all insertion sites, i e  indwelling lines, tubes, and drains  - Monitor endotracheal if appropriate and nasal secretions for changes in amount and color  - [place] appropriate cooling/warming therapies per order  - Administer medications as ordered  - Instruct and encourage patient and family to use good hand hygiene technique  - Identify and instruct in appropriate isolation precautions for identified infection/condition  Outcome: Progressing  Goal: Absence of fever/infection during neutropenic period  Description: INTERVENTIONS:  - Monitor WBC    Outcome: Progressing     Problem: SAFETY ADULT  Goal: Patient will remain free of falls  Description: INTERVENTIONS:  - Educate patient/family on patient safety including physical limitations  - Instruct patient to call for assistance with activity   - Consult OT/PT to assist with strengthening/mobility   - Keep Call bell within reach  - Keep bed low and locked with side rails adjusted as appropriate  - Keep care items and personal belongings within reach  - Initiate and maintain comfort rounds  - Make Fall Risk Sign visible to staff  - Offer Toileting every 2 Hours, in advance of need  - Initiate/Maintain bed alarm  - Obtain necessary fall risk management equipment: socks  - Apply yellow socks and bracelet for high fall risk patients  - Consider moving patient to room near nurses station  Outcome: Progressing  Goal: Maintain or return to baseline ADL function  Description: INTERVENTIONS:  -  Assess patient's ability to carry out ADLs; assess patient's baseline for ADL function and identify physical deficits which impact ability to perform ADLs (bathing, care of mouth/teeth, toileting, grooming, dressing, etc )  - Assess/evaluate cause of self-care deficits   - Assess range of motion  - Assess patient's mobility; develop plan if impaired  - Assess patient's need for assistive devices and provide as appropriate  - Encourage maximum independence but intervene and supervise when necessary  - Involve family in performance of ADLs  - Assess for home care needs following discharge   - Consider OT consult to assist with ADL evaluation and planning for discharge  - Provide patient education as appropriate  Outcome: Progressing  Goal: Maintains/Returns to pre admission functional level  Description: INTERVENTIONS:  - Perform BMAT or MOVE assessment daily    - Set and communicate daily mobility goal to care team and patient/family/caregiver  - Collaborate with rehabilitation services on mobility goals if consulted  - Perform Range of Motion 4 times a day  - Reposition patient every 2 hours    - Dangle patient 3 times a day  - Stand patient 3 times a day  - Ambulate patient 3 times a day  - Out of bed to chair 3 times a day   - Out of bed for meals 3 times a day  - Out of bed for toileting  - Record patient 3 and toleration of activity level   Outcome: Progressing     Problem: DISCHARGE PLANNING  Goal: Discharge to home or other facility with appropriate resources  Description: INTERVENTIONS:  - Identify barriers to discharge w/patient and caregiver  - Arrange for needed discharge resources and transportation as appropriate  - Identify discharge learning needs (meds, wound care, etc )  - Arrange for interpretive services to assist at discharge as needed  - Refer to Case Management Department for coordinating discharge planning if the patient needs post-hospital services based on physician/advanced practitioner order or complex needs related to functional status, cognitive ability, or social support system  Outcome: Progressing     Problem: Knowledge Deficit  Goal: Patient/family/caregiver demonstrates understanding of disease process, treatment plan, medications, and discharge instructions  Description: Complete learning assessment and assess knowledge base    Interventions:  - Provide teaching at level of understanding  - Provide teaching via preferred learning methods  Outcome: Progressing

## 2021-12-10 NOTE — ASSESSMENT & PLAN NOTE
Lab Results   Component Value Date    HGBA1C 8 9 (H) 11/12/2021       Recent Labs     12/09/21  2223 12/10/21  0006 12/10/21  0210 12/10/21  0538   POCGLU 142* 161* 186* 172*       Blood Sugar Average: Last 72 hrs:  (P) 179 88     Appreciate Endocrinology recommendations

## 2021-12-10 NOTE — PROGRESS NOTES
Progress Note - OB/GYN   Long Lopez 50 y o  female MRN: [id number]  Unit/Bed#: E5 -01 Encounter: [id number]    Assessment:  50 y o  POD#2 s/p TLH, BS, peritoneal cystectomy, and cystoscopy    Plan:  Anemia  Assessment & Plan  Patient has been persistently tachycardic though on my exam she was not tachycardic  Recent Labs     12/08/21  2214 12/09/21  0628 12/09/21  1302 12/10/21  0434   HGB 9 0* 7 4* 7 7* 7 4*   She is still anemic despite transfusion of 1U PRBC yesterday and venofer  No concern for active/current bleeding  Will transfuse additional unit this morning  Diabetes mellitus [facility])  Assessment & Plan  Lab Results   Component Value Date    HGBA1C 8 9 (H) 11/12/2021       Recent Labs     12/09/21  2223 12/10/21  0006 12/10/21  0210 12/10/21  0538   POCGLU 142* 161* 186* 172*       Blood Sugar Average: Last 72 hrs:  (P) 179 88     Appreciate Endocrinology recommendations    * S/P laparoscopic hysterectomy  Assessment & Plan  Postoperatively she is doing well  Incisions C/D/I      Subjective/Objective     Subjective:   Imer Lomas states that she wishes to go home today  She had an episode of diaphoresis overnight  She reports that her pain has been controlled  She has been tolerating PO  She has been voiding without difficulty and has passed flatus  She denies any vaginal bleeding  She reports occasional dizziness with ambulation but states that it has improved  She denies chest pain, shortness of breath, or pain in her legs  She has not yet had a bowel movement  Objective:     Vitals: Blood pressure 167/87, pulse (!) 117, temperature 98 °F (36 7 °C), resp  rate 21, height 5' 5" (1 651 m), weight 97 8 kg (215 lb 9 8 oz), SpO2 97 %  Physical Exam:     Physical Exam  Vitals reviewed  Constitutional:       General: She is not in acute distress  Appearance: She is well-developed  She is obese  She is not ill-appearing, toxic-appearing or diaphoretic     Cardiovascular:      Rate and Rhythm: Normal rate and regular rhythm  Heart sounds: Normal heart sounds  No murmur heard  No friction rub  No gallop  Pulmonary:      Effort: Pulmonary effort is normal  No respiratory distress  Breath sounds: Normal breath sounds  No stridor  No wheezing  Abdominal:      General: There is no distension  Palpations: Abdomen is soft  Tenderness: There is no abdominal tenderness  There is no guarding  Comments: Incisions clean, dry, and intact without evidence of infection, erythema, or bleeding   Skin:     General: Skin is warm and dry  Neurological:      Mental Status: She is alert and oriented to person, place, and time  Mental status is at baseline  Psychiatric:         Mood and Affect: Mood normal          Behavior: Behavior normal            Lab, Imaging and other studies: I have personally reviewed pertinent reports        Lab Results   Component Value Date    WBC 8 63 12/10/2021    HGB 7 4 (L) 12/10/2021    HCT 24 7 (L) 12/10/2021    MCV 93 12/10/2021     12/10/2021               Gerry Lopez MD  12/10/21

## 2021-12-10 NOTE — ASSESSMENT & PLAN NOTE
Patient has been persistently tachycardic though on my exam she was not tachycardic  Recent Labs     12/08/21  2214 12/09/21  0628 12/09/21  1302 12/10/21  0434   HGB 9 0* 7 4* 7 7* 7 4*   She is still anemic despite transfusion of 1U PRBC yesterday and venofer  No concern for active/current bleeding  Will transfuse additional unit this morning

## 2021-12-11 LAB
ABO GROUP BLD BPU: NORMAL
BPU ID: NORMAL
CROSSMATCH: NORMAL
UNIT DISPENSE STATUS: NORMAL
UNIT PRODUCT CODE: NORMAL
UNIT PRODUCT VOLUME: 350 ML
UNIT RH: NORMAL

## 2021-12-13 ENCOUNTER — TRANSITIONAL CARE MANAGEMENT (OUTPATIENT)
Dept: FAMILY MEDICINE CLINIC | Facility: CLINIC | Age: 56
End: 2021-12-13

## 2021-12-16 ENCOUNTER — APPOINTMENT (OUTPATIENT)
Dept: LAB | Facility: CLINIC | Age: 56
End: 2021-12-16
Payer: COMMERCIAL

## 2021-12-16 DIAGNOSIS — D50.0 IRON DEFICIENCY ANEMIA SECONDARY TO BLOOD LOSS (CHRONIC): ICD-10-CM

## 2021-12-16 LAB
BASOPHILS # BLD AUTO: 0.09 THOUSANDS/ΜL (ref 0–0.1)
BASOPHILS NFR BLD AUTO: 1 % (ref 0–1)
EOSINOPHIL # BLD AUTO: 0.22 THOUSAND/ΜL (ref 0–0.61)
EOSINOPHIL NFR BLD AUTO: 3 % (ref 0–6)
ERYTHROCYTE [DISTWIDTH] IN BLOOD BY AUTOMATED COUNT: 14.1 % (ref 11.6–15.1)
HCT VFR BLD AUTO: 37.5 % (ref 34.8–46.1)
HGB BLD-MCNC: 11.1 G/DL (ref 11.5–15.4)
IMM GRANULOCYTES # BLD AUTO: 0.04 THOUSAND/UL (ref 0–0.2)
IMM GRANULOCYTES NFR BLD AUTO: 1 % (ref 0–2)
IRON SERPL-MCNC: 32 UG/DL (ref 50–170)
LYMPHOCYTES # BLD AUTO: 1.76 THOUSANDS/ΜL (ref 0.6–4.47)
LYMPHOCYTES NFR BLD AUTO: 23 % (ref 14–44)
MCH RBC QN AUTO: 26.7 PG (ref 26.8–34.3)
MCHC RBC AUTO-ENTMCNC: 29.6 G/DL (ref 31.4–37.4)
MCV RBC AUTO: 90 FL (ref 82–98)
MONOCYTES # BLD AUTO: 0.39 THOUSAND/ΜL (ref 0.17–1.22)
MONOCYTES NFR BLD AUTO: 5 % (ref 4–12)
NEUTROPHILS # BLD AUTO: 5.06 THOUSANDS/ΜL (ref 1.85–7.62)
NEUTS SEG NFR BLD AUTO: 67 % (ref 43–75)
NRBC BLD AUTO-RTO: 0 /100 WBCS
PLATELET # BLD AUTO: 396 THOUSANDS/UL (ref 149–390)
PMV BLD AUTO: 9.6 FL (ref 8.9–12.7)
RBC # BLD AUTO: 4.15 MILLION/UL (ref 3.81–5.12)
WBC # BLD AUTO: 7.56 THOUSAND/UL (ref 4.31–10.16)

## 2021-12-16 PROCEDURE — 83540 ASSAY OF IRON: CPT

## 2021-12-16 PROCEDURE — 85025 COMPLETE CBC W/AUTO DIFF WBC: CPT

## 2021-12-16 PROCEDURE — 36415 COLL VENOUS BLD VENIPUNCTURE: CPT

## 2021-12-20 NOTE — UTILIZATION REVIEW
Initial Clinical Review     OP  PROCEDURE   12/8 CHANGED TO IP ADMISSION 12/9  HIGH EBL INTRA OP    ABLA   Required transfusion  UNCONTROLLED POST OP PAIN    TACHYCARDIC     Elective    OP     surgical procedure     Age/Sex: 50 y o  female      Surgery Date:    12/8/21     Procedure: [address]; BILAT SALPINGECTOMY; EXCISION PERITONEAL CYST (N/A)     Anesthesia:     general     Operative Findings:  External genitalia grossly normal in appearance   No ulcerations, no lacerations, no lesions   Bimanual exam revealed enlargered uterus to 3cm below the umbilicus with normal contours and freely mobile   No adnexal masses palpated bilaterally  Hardened mass felt in the posterior culdesac through the vagina    2   Vagina and cervix were grossly normal in appearance without any lacerations or lesions  Rectocele noted  3  Uterus sounded to 17 cm  4   Laparoscopic evaluation revealed no injury to bowel or bladder vasculature upon entrance   Uterus was large but mobile with normal contour  Left adnexa was adhered to the uterus  Bilateral ovaries were grossly normal in appearance with simple appearing cyst on the right ovary   Bilateral fallopian tubes were grossly normal in appearance with evidence of prior tubal ligation  Approximately 6cm cystic structure in the posterior culdesac adhered to the bowel and peritoneum  5   Upper abdominal organs were visualized and grossly normal in appearance   These include liver, stomach, and omentum       Complications:   Surgical hemorrhage  EBL 1100  cc     POD#1 Progress Note:   IP ADMISSION   12/9     Continue post op care  Lightheaded/dizziness post op, improved  Intermittent tachycardia, 110's - 120's  Large   EBL, ABLA, S/P  1 U PRBC  Pre op hemoglobin  10 5, intra op hmgb   8 5,  2 hours  Post OR, 9  Hemoglobin drop to   7 4,  IV v enofer ordered  Monitor labs/hemoglobin  Low suspicion for acute intraabdominal bleed  DID void, no return of bowel function thus far  Continue  IVF  Continue pain control as needed  Continue insulin drip  Tachycardic with ambulation,  Heart rate in the 140's, then 120's  Feels lightheaded, nausea  Feels a  Bubble sensation in RUQ  Abdomen benign, expected abdominal tenderness  Transfusing 2 U PRBC        12/10  POD   #  2  Persistently tachycardic,  Not  On exam   Still anemic despite transfusion and  Venofer  Transfusinf  Additional UPRBC  Today  No concern for current/active bleeding  Episode of diaphoresis during the night  Tolerating po        Admission Orders: Date/Time/Statement:       Admission Orders (From admission, onward)              Ordered          12/09/21 1852   Inpatient Admission  Once                                Orders Placed This Encounter   Procedures   • Inpatient Admission       Standing Status:   Standing       Number of Occurrences:   1       Order Specific Question:   Level of Care       Answer:   Med Surg [16]       Order Specific Question:   Estimated length of stay       Answer:   More than 2 Midnights       Order Specific Question:   Certification       Answer:   I certify that inpatient services are medically necessary for this patient for a duration of greater than two midnights   See H&P and MD Progress Notes for additional information about the patient's course of treatment       Vital Signs: /75   Pulse 105   Temp 98 5 °F (36 9 °C) (Oral)   Resp 20   Ht 5' 5" (1 651 m)   Wt 97 8 kg (215 lb 9 8 oz) Comment: with clothing  LMP  (LMP Unknown)   SpO2 96%   BMI 35 88 kg/m²      Pertinent Labs/Diagnostic Test Results:   12/10/21 1050 98 5 °F (36 9 °C) 105 20 141/75 -- 96 % -- -- -- None (Room air)   12/10/21 10:48:49 98 5 °F (36 9 °C) 105 -- 141/75 97 96 % -- -- -- --   12/10/21 1030 -- -- 18 -- -- -- -- -- -- None (Room air)   12/10/21 10:28:30 98 6 °F (37 °C) 104 -- 151/75 100 96 % -- -- -- --   12/10/21 0901 -- -- -- -- -- -- -- -- -- None (Room air)   12/10/21 07:40:57 98 °F (36 7 °C) 117 Abnormal  21 167/87 114 97 % -- -- -- --   12/10/21 03:02:36 98 9 °F (37 2 °C) 121 Abnormal  -- 150/81 104 93 % -- -- -- --   12/09/21 22:18:45 -- 113 Abnormal  18 116/62 80 94 % -- -- -- --   12/09/21 19:20:22 -- 116 Abnormal  -- 119/65 83 95 % -- -- -- --   12/09/21 1829 -- -- 20 -- -- -- -- -- -- --   12/09/21 18:24:48 98 2 °F (36 8 °C) 119 Abnormal  -- 117/61 80 95 % -- -- -- --   12/09/21 16:51:45 98 5 °F (36 9 °C) 121 Abnormal  -- 126/65 85 94 % -- -- -- --   12/09/21 1631 98 6 °F (37 °C) 117 Abnormal  16 139/69 -- -- -- -- -- --   12/09/21 15:16:55 97 8 °F (36 6 °C) 121 Abnormal  18 118/61 80 94 % -- -- -- --   12/09/21 12:34:23 -- 124 Abnormal  -- 167/86 113 94 % -- -- -- --   12/09/21 06:59:36 98 4 °F (36 9 °C) 108 Abnormal  18 144/71 95 98 % -- -- -- --   12/09/21 0247 98 7 °F (37 1 °C) 102 18 143/68 96 93 % -- -- -- --   12/08/21 2300 98 9 °F (37 2 °C) 102 18 141/72 96 93 % -- -- -- --   12/08/21 2230 -- -- -- -- -- -- -- -- -- None (Room air)   12/08/21 21:51:34 98 2 °F (36 8 °C) 102 18 140/73 95 91 % -- -- -- --   12/08/21 2106 98 3 °F (36 8 °C) 100 13 141/68 95 97 % 28 -- 2 L/min Nasal cannula                         Results from last 7 days   Lab Units 12/10/21  0434 12/09/21  1302 12/09/21  0628 12/08/21 2214 12/08/21 2214 12/08/21  1926 12/08/21  1201 12/08/21  1201   WBC Thousand/uL 8 63 11 36* 10 92*   < > 15 42*  --    < > 4 75   HEMOGLOBIN g/dL 7 4* 7 7* 7 4*  --  9 0*  --   --  10 5*   I STAT HEMOGLOBIN g/dl  --   --   --   --   --  8 5*  --   --    HEMATOCRIT % 24 7* 24 8* 24 5*  --  29 6*  --   --  33 2*   HEMATOCRIT, ISTAT %  --   --   --   --   --  25*  --   --    PLATELETS Thousands/uL 234 270 263   < > 315  --    < > 303   NEUTROS ABS Thousands/µL  --  9 70*  --   --   --   --   --  3 50    < > = values in this interval not displayed                 Results from last 7 days   Lab Units 12/09/21  0628 12/08/21 1926   SODIUM mmol/L 137  --    POTASSIUM mmol/L 4 0  --    CHLORIDE mmol/L 105  --    CO2 mmol/L 22  --    CO2, I-STAT mmol/L  --  24   ANION GAP mmol/L 10  --    BUN mg/dL 10  --    CREATININE mg/dL 0 82  --    EGFR ml/min/1 73sq m 82  --    CALCIUM mg/dL 7 3*  --                           Results from last 7 days   Lab Units 12/10/21  0855 12/10/21  0538 12/10/21  0210 12/10/21  0006 12/09/21  2223 12/09/21  2021 12/09/21  1953 12/09/21  1822 12/09/21  1559 12/09/21  1428 12/09/21  1207 12/09/21  1002   POC GLUCOSE mg/dl 168* 172* 186* 161* 142* 192* 205* 192* 226* 269* 177* 171*           Results from last 7 days   Lab Units 12/09/21  0628   GLUCOSE RANDOM mg/dL 154*                 Results from last 7 days   Lab Units 12/08/21  1926   PH, AKIKO I-STAT   7 258*   PCO2, AKIKO ISTAT mm HG 49 4   PO2, AKIKO ISTAT mm  0*   HCO3, AKIKO ISTAT mmol/L 22 1*   I STAT BASE EXC mmol/L -5*   I STAT O2 SAT % 98*              Diet:    CCD     Mobility:    AS tolerated     DVT Prophylaxis: SCD'S     Medications/Pain Control:   Scheduled Medications:  docusate sodium, 100 mg, Oral, BID  DULoxetine, 60 mg, Oral, HS  insulin glargine, 30 Units, Subcutaneous, HS  insulin lispro, 1-6 Units, Subcutaneous, TID AC  insulin lispro, 10 Units, Subcutaneous, TID With Meals  pravastatin, 40 mg, Oral, Daily  pregabalin, 50 mg, Oral, BID  IV  Venofer   12/9  IV toradol X1  12/9        Continuous IV Infusions:  sodium chloride, 75 mL/hr, Intravenous, Continuous  Insulin drip - d/c   12/9  @  2300        PRN Meds:  acetaminophen, 650 mg, Oral, Q4H PRN  HYDROmorphone, 0 5 mg, Intravenous, Q3H PRN  ( x5   12/9 and  X 1  12/10 thus far)  ibuprofen, 600 mg, Oral, Q6H PRN  ondansetron, 4 mg, Intravenous, Q6H PRN  ( x2  12/9)         Discharge Summary - Gynecology  Xochilt Vera 50 y o  female MRN: [id number]  Unit/Bed#: E5 -01 Encounter: [id number]     Admission Date: 12/8/2021   Discharge Date: 12/10/21     Attending Physician: Tierra Weiss     Consulting Physician(s): Raúl Asif     Admitting Diagnosis: Abnormal uterine bleeding [N93 9]     Discharge Diagnosis:   S/p TLH, BS, cystectomy, and cystoscopy     Procedures Performed: S/p TLH, BS, cystectomy, and cystoscopy     Hospital Course: The patient presented on day of admission for the above mentioned procedure  She was admitted for pain control and postoperative management  Her procedure was complicated by increased blood loss  The patient was continued on the insulin drip that was started prior her procedure  Endocrinology was consulted for further management and adjustment of her insulin regimen  The patient's hospital course was uncomplicated  On POD 1, the patient was doing well  Her pain was well controlled  Her Hb fell from 10 5 preoperatively to 7 4 postoperatively  Her Irvin was removed and she was able to void spontaneously  She was tolerating PO and passing flatus  She was ambulating but with dizziness  She was given Venofer and 1UPRBC       On POD2, she continued to do well but was still tachycardic and dizzy with ambulation  Her hgb was still 7 4  An additional unit of PRBC was administered and her Hgb lux appropriately to 8 7       The patient was discharged home on POD 2 in stable condition  She was given prescriptions for pain control    She was asked to follow up with Dr Barbra Andrade in 2 weeks for a postoperative appointment       Lab Results:         Lab Results   Component Value Date     WBC 8 63 12/10/2021     HGB 7 4 (L) 12/10/2021     HCT 24 7 (L) 12/10/2021     MCV 93 12/10/2021      12/10/2021            Lab Results   Component Value Date     GLUCOSE 136 12/08/2021     CALCIUM 7 3 (L) 12/09/2021      (L) 01/03/2016     K 4 0 12/09/2021     CO2 22 12/09/2021      12/09/2021     BUN 10 12/09/2021     CREATININE 0 82 12/09/2021            Lab Results   Component Value Date/Time     POCGLU 174 (H) 12/10/2021 11:33 AM     POCGLU 168 (H) 12/10/2021 08:55 AM     POCGLU 172 (H) 12/10/2021 05:38 AM     POCGLU 186 (H) 12/10/2021 02:10 AM     POCGLU 161 (H) 12/10/2021 12:06 AM     POCGLU 218 12/13/2017 10:49 AM     POCGLU 265 08/10/2017 12:58 PM     POCGLU 218 05/11/2017 02:07 PM     POCGLU 191 05/04/2017 11:19 AM     POCGLU 104 10/03/2016 12:04 PM            Lab Results   Component Value Date     PTT 26 11/27/2021            Lab Results   Component Value Date     INR 0 98 11/27/2021     INR 1 10 11/12/2021     INR 1 03 11/12/2021     PROTIME 12 9 11/27/2021     PROTIME 14 0 11/12/2021     PROTIME 13 0 11/12/2021         Pathology: pending     Condition at Discharge: good      Discharge Medications: See after visit summary for reconciled discharge medications provided to patient and family        Discharge instructions/Information to patient and family: See after visit summary for information provided to patient and family        Provisions for Follow-Up Care: See after visit summary for information related to follow-up care and any pertinent home health orders        Disposition: Home     Planned Readmission: Vida Mclean MD  OB/GYN  12/10/2021  2:47 PM                              Cosigned by: Natalia Meredith MD at 12/10/2021  2:50 PM     Notification of Discharge   This is a Notification of Discharge from our facility [address]  Please be advised that this patient has been discharge from our facility  Below you will find the admission and discharge date and time including the patient’s disposition  UTILIZATION REVIEW CONTACT:  Corbin Grey  Utilization   Network Utilization Review Department  Phone: [phone] x carefully listen to the prompts  All voicemails are confidential   Email: [email or web address]     PHYSICIAN ADVISORY SERVICES:  FOR [id number] REVIEW - MEDICAL NECESSITY DENIAL  Phone: [phone]  Fax: [phone]  Email: [email or web address]     PRESENTATION DATE: 12/8/2021 10:48 AM    INPATIENT ADMISSION DATE: 12/9/21  6:53 PM   DISCHARGE DATE: 12/10/2021  5:39 PM  DISPOSITION: Home/Self Care Home/Self Care      IMPORTANT INFORMATION:  Send all requests for admission clinical reviews, approved or denied determinations and any other requests to dedicated fax number below belonging to the campus where the patient is receiving treatment   List of dedicated fax numbers:  [address] DENIALS (Administrative/Medical Necessity) [phone]   [address] (Maternity/NICU/Pediatrics) [phone]   [facility] [phone]   [facility]haroon [phone]   [address] [phone]   [address] [facility] [phone]   [facility] [place] [address] [phone]   [facility]  [phone]   [address] [facility] [phone]   [address] [phone]

## 2021-12-21 ENCOUNTER — OFFICE VISIT (OUTPATIENT)
Dept: OBGYN CLINIC | Facility: MEDICAL CENTER | Age: 56
End: 2021-12-21

## 2021-12-21 VITALS
WEIGHT: 214 LBS | SYSTOLIC BLOOD PRESSURE: 130 MMHG | DIASTOLIC BLOOD PRESSURE: 73 MMHG | BODY MASS INDEX: 35.65 KG/M2 | HEIGHT: 65 IN

## 2021-12-21 DIAGNOSIS — Z09 POSTOPERATIVE EXAMINATION: Primary | ICD-10-CM

## 2021-12-21 PROCEDURE — 99024 POSTOP FOLLOW-UP VISIT: CPT | Performed by: OBSTETRICS & GYNECOLOGY

## 2021-12-21 PROCEDURE — 3008F BODY MASS INDEX DOCD: CPT | Performed by: OBSTETRICS & GYNECOLOGY

## 2021-12-30 ENCOUNTER — VBI (OUTPATIENT)
Dept: ADMINISTRATIVE | Facility: OTHER | Age: 56
End: 2021-12-30

## 2022-01-04 LAB — GLUCOSE SERPL-MCNC: 120 MG/DL (ref 65–140)

## 2022-01-11 ENCOUNTER — APPOINTMENT (OUTPATIENT)
Dept: LAB | Facility: MEDICAL CENTER | Age: 57
End: 2022-01-11
Payer: COMMERCIAL

## 2022-01-11 DIAGNOSIS — E53.8 B12 DEFICIENCY: ICD-10-CM

## 2022-01-11 DIAGNOSIS — E53.8 VITAMIN B 12 DEFICIENCY: ICD-10-CM

## 2022-01-11 DIAGNOSIS — E61.1 IRON DEFICIENCY: ICD-10-CM

## 2022-01-11 DIAGNOSIS — D50.0 IRON DEFICIENCY ANEMIA DUE TO CHRONIC BLOOD LOSS: ICD-10-CM

## 2022-01-11 LAB
ALBUMIN SERPL BCP-MCNC: 3.8 G/DL (ref 3.5–5)
ALP SERPL-CCNC: 115 U/L (ref 46–116)
ALT SERPL W P-5'-P-CCNC: 29 U/L (ref 12–78)
ANION GAP SERPL CALCULATED.3IONS-SCNC: 4 MMOL/L (ref 4–13)
AST SERPL W P-5'-P-CCNC: 16 U/L (ref 5–45)
BASOPHILS # BLD AUTO: 0.07 THOUSANDS/ΜL (ref 0–0.1)
BASOPHILS NFR BLD AUTO: 1 % (ref 0–1)
BILIRUB SERPL-MCNC: 0.89 MG/DL (ref 0.2–1)
BUN SERPL-MCNC: 14 MG/DL (ref 5–25)
CALCIUM SERPL-MCNC: 9.3 MG/DL (ref 8.3–10.1)
CHLORIDE SERPL-SCNC: 100 MMOL/L (ref 100–108)
CO2 SERPL-SCNC: 31 MMOL/L (ref 21–32)
CREAT SERPL-MCNC: 0.9 MG/DL (ref 0.6–1.3)
CRP SERPL QL: 5.4 MG/L
EOSINOPHIL # BLD AUTO: 0.23 THOUSAND/ΜL (ref 0–0.61)
EOSINOPHIL NFR BLD AUTO: 4 % (ref 0–6)
ERYTHROCYTE [DISTWIDTH] IN BLOOD BY AUTOMATED COUNT: 13.8 % (ref 11.6–15.1)
ERYTHROCYTE [SEDIMENTATION RATE] IN BLOOD: 19 MM/HOUR (ref 0–29)
FERRITIN SERPL-MCNC: 20 NG/ML (ref 8–388)
GFR SERPL CREATININE-BSD FRML MDRD: 72 ML/MIN/1.73SQ M
GLUCOSE P FAST SERPL-MCNC: 248 MG/DL (ref 65–99)
HCT VFR BLD AUTO: 41.8 % (ref 34.8–46.1)
HGB BLD-MCNC: 12.8 G/DL (ref 11.5–15.4)
IMM GRANULOCYTES # BLD AUTO: 0.02 THOUSAND/UL (ref 0–0.2)
IMM GRANULOCYTES NFR BLD AUTO: 0 % (ref 0–2)
IRON SATN MFR SERPL: 10 % (ref 15–50)
IRON SERPL-MCNC: 42 UG/DL (ref 50–170)
LYMPHOCYTES # BLD AUTO: 1.18 THOUSANDS/ΜL (ref 0.6–4.47)
LYMPHOCYTES NFR BLD AUTO: 20 % (ref 14–44)
MCH RBC QN AUTO: 25.9 PG (ref 26.8–34.3)
MCHC RBC AUTO-ENTMCNC: 30.6 G/DL (ref 31.4–37.4)
MCV RBC AUTO: 85 FL (ref 82–98)
MONOCYTES # BLD AUTO: 0.39 THOUSAND/ΜL (ref 0.17–1.22)
MONOCYTES NFR BLD AUTO: 7 % (ref 4–12)
NEUTROPHILS # BLD AUTO: 4.12 THOUSANDS/ΜL (ref 1.85–7.62)
NEUTS SEG NFR BLD AUTO: 68 % (ref 43–75)
NRBC BLD AUTO-RTO: 0 /100 WBCS
PLATELET # BLD AUTO: 288 THOUSANDS/UL (ref 149–390)
PMV BLD AUTO: 10 FL (ref 8.9–12.7)
POTASSIUM SERPL-SCNC: 3.9 MMOL/L (ref 3.5–5.3)
PROT SERPL-MCNC: 7.7 G/DL (ref 6.4–8.2)
RBC # BLD AUTO: 4.94 MILLION/UL (ref 3.81–5.12)
SODIUM SERPL-SCNC: 135 MMOL/L (ref 136–145)
TIBC SERPL-MCNC: 403 UG/DL (ref 250–450)
VIT B12 SERPL-MCNC: 1061 PG/ML (ref 100–900)
WBC # BLD AUTO: 6.01 THOUSAND/UL (ref 4.31–10.16)

## 2022-01-11 PROCEDURE — 86140 C-REACTIVE PROTEIN: CPT

## 2022-01-11 PROCEDURE — 85025 COMPLETE CBC W/AUTO DIFF WBC: CPT

## 2022-01-11 PROCEDURE — 36415 COLL VENOUS BLD VENIPUNCTURE: CPT

## 2022-01-11 PROCEDURE — 82607 VITAMIN B-12: CPT

## 2022-01-11 PROCEDURE — 82728 ASSAY OF FERRITIN: CPT

## 2022-01-11 PROCEDURE — 85652 RBC SED RATE AUTOMATED: CPT

## 2022-01-11 PROCEDURE — 83550 IRON BINDING TEST: CPT

## 2022-01-11 PROCEDURE — 83540 ASSAY OF IRON: CPT

## 2022-01-11 PROCEDURE — 80053 COMPREHEN METABOLIC PANEL: CPT

## 2022-01-24 ENCOUNTER — VBI (OUTPATIENT)
Dept: ADMINISTRATIVE | Facility: OTHER | Age: 57
End: 2022-01-24

## 2022-01-25 ENCOUNTER — TELEMEDICINE (OUTPATIENT)
Dept: FAMILY MEDICINE CLINIC | Facility: CLINIC | Age: 57
End: 2022-01-25
Payer: COMMERCIAL

## 2022-01-25 DIAGNOSIS — F43.10 PTSD (POST-TRAUMATIC STRESS DISORDER): Primary | ICD-10-CM

## 2022-01-25 DIAGNOSIS — F41.0 ANXIETY DISORDER DUE TO GENERAL MEDICAL CONDITION WITH PANIC ATTACK: ICD-10-CM

## 2022-01-25 DIAGNOSIS — F06.4 ANXIETY DISORDER DUE TO GENERAL MEDICAL CONDITION WITH PANIC ATTACK: ICD-10-CM

## 2022-01-25 PROBLEM — I21.29 SEPTAL INFARCTION (HCC): Status: ACTIVE | Noted: 2022-01-23

## 2022-01-25 PROBLEM — R00.0 TACHYCARDIA: Status: ACTIVE | Noted: 2022-01-22

## 2022-01-25 PROBLEM — S12.100A C2 CERVICAL FRACTURE (HCC): Status: ACTIVE | Noted: 2022-01-19
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
## 2022-01-25 PROCEDURE — G0071 COMM SVCS BY RHC/FQHC 5 MIN: HCPCS | Performed by: FAMILY MEDICINE

## 2022-01-25 RX ORDER — PRAZOSIN HYDROCHLORIDE 1 MG/1
1 CAPSULE ORAL
Qty: 30 CAPSULE | Refills: 0 | Status: SHIPPED | OUTPATIENT
Start: 2022-01-25 | End: 2022-02-08 | Stop reason: SDUPTHER

## 2022-01-25 RX ORDER — SENNA PLUS 8.6 MG/1
8.6 TABLET ORAL 2 TIMES DAILY
COMMUNITY
Start: 2022-01-22 | End: 2022-07-21

## 2022-01-25 RX ORDER — LOSARTAN POTASSIUM 100 MG/1
100 TABLET ORAL DAILY
COMMUNITY
Start: 2022-01-22

## 2022-01-25 RX ORDER — BUSPIRONE HYDROCHLORIDE 5 MG/1
5 TABLET ORAL 3 TIMES DAILY
Qty: 90 TABLET | Refills: 1 | Status: SHIPPED | OUTPATIENT
Start: 2022-01-25 | End: 2022-03-02

## 2022-01-25 RX ORDER — ASPIRIN 81 MG/1
81 TABLET ORAL DAILY
COMMUNITY
Start: 2022-01-23 | End: 2023-01-23

## 2022-01-25 RX ORDER — OXYCODONE HYDROCHLORIDE 5 MG/1
5 TABLET ORAL EVERY 6 HOURS PRN
COMMUNITY
Start: 2022-01-22 | End: 2022-02-01

## 2022-01-25 RX ORDER — OXYCODONE HYDROCHLORIDE 5 MG/1
TABLET ORAL
COMMUNITY
Start: 2022-01-24 | End: 2022-03-02

## 2022-01-25 NOTE — PROGRESS NOTES
Assessment/Plan:    No problem-specific Assessment & Plan notes found for this encounter  Diagnoses and all orders for this visit:    PTSD (post-traumatic stress disorder)  -     prazosin (MINIPRESS) 1 mg capsule; Take 1 capsule (1 mg total) by mouth daily at bedtime  -     busPIRone (BUSPAR) 5 mg tablet; Take 1 tablet (5 mg total) by mouth 3 (three) times a day    Anxiety disorder due to general medical condition with panic attack  -     prazosin (MINIPRESS) 1 mg capsule; Take 1 capsule (1 mg total) by mouth daily at bedtime  -     busPIRone (BUSPAR) 5 mg tablet; Take 1 tablet (5 mg total) by mouth 3 (three) times a day    Other orders  -     senna (SENOKOT) 8 6 MG tablet; Take 8 6 mg by mouth 2 (two) times a day  -     oxyCODONE (ROXICODONE) 5 immediate release tablet; TAKE 1 TABLET BY MOUTH EVERY 6 HOURS AS NEEDED FOR MODERATE PAIN (PAIN SCORE 4-6) MAX DAILY AMOUNT 20MG  -     oxyCODONE (ROXICODONE) 5 immediate release tablet; Take 5 mg by mouth every 6 (six) hours as needed  -     losartan (COZAAR) 100 MG tablet; Take 100 mg by mouth daily  -     aspirin (ECOTRIN LOW STRENGTH) 81 mg EC tablet; Take 81 mg by mouth daily          -minipress at night and buspar PRN  Will tritrate dose as needed  Discussed counseling which nas declines  F/U virtual in 2 weeks given patient's recent neck trauma     Subjective:      Patient ID: Onesimo Mayen is a 49 y o  female who presents for same day visit for treatment of anxiety  Ilene Roach recently fell off her porch and suffered a C2 vertebral fracture  Following with [facility] trauma team  She notes since the incident she is having a lot of anxiety surrounding the situation  Nightmares that she is again falling  Notes high anxiety during the day and is unable to look at her porch, which is where the accident happened  No SI/HI       HPI    The following portions of the patient's history were reviewed and updated as appropriate: allergies, current medications, past family history, past medical history, past social history, past surgical history and problem list     Review of Systems   Constitutional: Negative  HENT: Negative  Respiratory: Negative  Cardiovascular: Negative  Gastrointestinal: Negative  Musculoskeletal: Negative  Skin: Negative  Psychiatric/Behavioral: Positive for dysphoric mood and sleep disturbance  Negative for agitation, hallucinations, self-injury and suicidal ideas  The patient is nervous/anxious  The patient is not hyperactive

## 2022-01-26 ENCOUNTER — TELEPHONE (OUTPATIENT)
Dept: HEMATOLOGY ONCOLOGY | Facility: CLINIC | Age: 57
End: 2022-01-26

## 2022-01-28 ENCOUNTER — VBI (OUTPATIENT)
Dept: ADMINISTRATIVE | Facility: OTHER | Age: 57
End: 2022-01-28

## 2022-02-08 ENCOUNTER — TELEMEDICINE (OUTPATIENT)
Dept: FAMILY MEDICINE CLINIC | Facility: CLINIC | Age: 57
End: 2022-02-08
Payer: COMMERCIAL

## 2022-02-08 DIAGNOSIS — F06.4 ANXIETY DISORDER DUE TO GENERAL MEDICAL CONDITION WITH PANIC ATTACK: ICD-10-CM

## 2022-02-08 DIAGNOSIS — F41.0 ANXIETY DISORDER DUE TO GENERAL MEDICAL CONDITION WITH PANIC ATTACK: ICD-10-CM

## 2022-02-08 DIAGNOSIS — F43.10 PTSD (POST-TRAUMATIC STRESS DISORDER): ICD-10-CM

## 2022-02-08 PROCEDURE — G0071 COMM SVCS BY RHC/FQHC 5 MIN: HCPCS | Performed by: FAMILY MEDICINE

## 2022-02-08 RX ORDER — PRAZOSIN HYDROCHLORIDE 2 MG/1
2 CAPSULE ORAL
Qty: 30 CAPSULE | Refills: 0 | Status: SHIPPED | OUTPATIENT
Start: 2022-02-08 | End: 2022-03-07 | Stop reason: SDUPTHER

## 2022-02-08 RX ORDER — AZITHROMYCIN 250 MG/1
TABLET, FILM COATED ORAL
COMMUNITY
Start: 2022-02-01 | End: 2022-03-02

## 2022-02-08 NOTE — PROGRESS NOTES
Virtual Regular Visit    Verification of patient location:    Patient is located in the following state in which I hold an active license [place]      Assessment/Plan:    Problem List Items Addressed This Visit     None      Visit Diagnoses     PTSD (post-traumatic stress disorder)        Relevant Medications    prazosin (MINIPRESS) 2 mg capsule    Anxiety disorder due to general medical condition with panic attack        Relevant Medications    prazosin (MINIPRESS) 2 mg capsule        -increase prazosin to 2mg qhs  She will take 2 of what she has now  Discussed addition of sleep aid once PRN oxycodone is d/c  -adivsed nas to increase buspar to 10mg if 5 mg is not effective  -RTC 2 weeks for re-evaluation or sooner if concerns arise        Reason for visit is   Chief Complaint   Patient presents with    Follow-up     Patient states she is doing better with medication given   Virtual Regular Visit        Encounter provider Edis Blackman PA-C    Provider located at [address] [address]      Recent Visits  No visits were found meeting these conditions  Showing recent visits within past 7 days and meeting all other requirements  Today's Visits  Date Type Provider Dept   02/08/22 Telemedicine Madison Boyd PA-C [address] today's visits and meeting all other requirements  Future Appointments  No visits were found meeting these conditions  Showing future appointments within next 150 days and meeting all other requirements       The patient was identified by name and date of birth  Ramona Medina was informed that this is a telemedicine visit and that the visit is being conducted through Telephone  My office door was closed  No one else was in the room  She acknowledged consent and understanding of privacy and security of the video platform   The patient has agreed to participate and understands they can discontinue the visit at any time  Patient is aware this is a billable service  Subjective  Jayda Wright is a 49 y o  female who presents via follow up of anxiety and insomnia  Stanton Luo recently fell off her porch and suffered a C2 vertebral fracture  Following with [facility] trauma team  She notes since the incident she is having a lot of anxiety and trouble sleeping  Recently started on minipress which is helping  Still notes broken sleep patterns and being awake several hours during the night  Having nightmares  Anxiety still provoked by triggering situations  Responding well to buspar but notes sometimes she takes the medication as has no effect  Visit with trauma team on Monday 1/14      HPI     Past Medical History:   Diagnosis Date    Anemia     iron deficiency anemia    Depression     Diabetes mellitus ([facility] [address] )     Fibroid     LTH   today 12/8/2021    History of transfusion     Hydrocephalus (HCC)     Hyperlipidemia     Hypertension     Migraine     PONV (postoperative nausea and vomiting)     Sciatica     Seasonal allergies     Vertigo     Wears glasses        Past Surgical History:   Procedure Laterality Date    COLONOSCOPY      EPIDURAL BLOCK INJECTION Right 7/2/2020    Procedure: Right L4-5 and L5-S1 transforaminal epidural steroid injection ([id number]);   Surgeon: Abhay Joseph MD;  Location: [facility];  Service: Pain Management     EPIDURAL BLOCK INJECTION N/A 9/2/2021    Procedure: BLOCK / INJECTION EPIDURAL STEROID LUMBAR  L5-S1 IESI;  Surgeon: Abhay Joseph MD;  Location: [facility];  Service: Pain Management     FL GUIDED NEEDLE PLAC BX/ASP/INJ  7/2/2020    FL GUIDED NEEDLE PLAC BX/ASP/INJ  9/2/2021    WI ESOPHAGOGASTRODUODENOSCOPY TRANSORAL DIAGNOSTIC N/A 10/23/2018    Procedure: EGD with Savary dilation AND COLONOSCOPY;  Surgeon: Mike Zavala MD;  Location: [facility];  Service: Gastroenterology    WI LAPAROSCOPY W TOT HYSTERECT UTERUS 250 GRAM OR LESS N/A 12/8/2021 Procedure: [address] [address]; BILAT SALPINGECTOMY; EXCISION PERITONEAL CYST;  Surgeon: Jorge Flores MD;  Location: [facility];  Service: Gynecology    TUBAL LIGATION         Current Outpatient Medications   Medication Sig Dispense Refill    metoprolol tartrate (LOPRESSOR) 25 mg tablet Take 25 mg by mouth 2 (two) times a day      aspirin (ECOTRIN LOW STRENGTH) 81 mg EC tablet Take 81 mg by mouth daily      azithromycin (ZITHROMAX) 250 mg tablet TAKE 2 TABLETS BY MOUTH ON DAY 1, AND THEN TAKE 1 TABLET BY MOUTH ONCE A DAY ON DAY 2 THROUGH DAY 5      busPIRone (BUSPAR) 5 mg tablet Take 1 tablet (5 mg total) by mouth 3 (three) times a day 90 tablet 1    docusate sodium (COLACE) 100 mg capsule Take 1 capsule (100 mg total) by mouth 2 (two) times a day 60 capsule 1    DULoxetine (CYMBALTA) 60 mg delayed release capsule Take 1 capsule (60 mg total) by mouth daily at bedtime 30 capsule 5    ferrous sulfate 325 (65 Fe) mg tablet Take 1 tablet (325 mg total) by mouth daily with breakfast 30 tablet 0    gabapentin (NEURONTIN) 600 MG tablet TAKE 1 TABLET BY MOUTH THREE TIMES DAILY 90 tablet 2    glimepiride (AMARYL) 4 mg tablet Take 2 mg by mouth daily At night      ibuprofen (MOTRIN) 600 mg tablet Take 1 tablet (600 mg total) by mouth every 6 (six) hours as needed for moderate pain 30 tablet 0    Insulin Glargine-Lixisenatide (SOLIQUA) 100 units-33 mcg/mL injection pen Inject 30 Units under the skin daily       losartan (COZAAR) 100 MG tablet Take 100 mg by mouth daily      oxyCODONE (ROXICODONE) 5 immediate release tablet TAKE 1 TABLET BY MOUTH EVERY 6 HOURS AS NEEDED FOR MODERATE PAIN (PAIN SCORE 4-6) MAX DAILY AMOUNT 20MG      pravastatin (PRAVACHOL) 40 mg tablet Take 40 mg by mouth daily      prazosin (MINIPRESS) 2 mg capsule Take 1 capsule (2 mg total) by mouth daily at bedtime 30 capsule 0    senna (SENOKOT) 8 6 MG tablet Take 8 6 mg by mouth 2 (two) times a day      tiZANidine (ZANAFLEX) 2 mg tablet Take 1 tablet (2 mg total) by mouth every 8 (eight) hours as needed for muscle spasms 90 tablet 5     No current facility-administered medications for this visit  Allergies   Allergen Reactions    Nuts - Food Allergy Anaphylaxis    Chocolate - Food Allergy Cough    Eggs Or Egg-Derived Products - Food Allergy Other (See Comments)     Cough    Other      LETTUCE    Shellfish Allergy - Food Allergy Other (See Comments)     unknown    Tomato - Food Allergy Cough       Review of Systems   Psychiatric/Behavioral: Positive for agitation and sleep disturbance  Negative for self-injury and suicidal ideas  The patient is nervous/anxious  The patient is not hyperactive  I spent 10 minutes with patient today in which greater than 50% of the time was spent in counseling/coordination of care regarding assessment and plan of care    VIRTUAL VISIT DISCLAIMER      Aj Stearns verbally agrees to participate in [employer]  Pt is aware that [employer] could be limited without vital signs or the ability to perform a full hands-on physical Demetria Mckeon understands she or the provider may request at any time to terminate the video visit and request the patient to seek care or treatment in person  It was my intent to perform this visit via video technology but the patient was not able to do a video connection so the visit was completed via audio telephone only

## 2022-03-02 ENCOUNTER — TELEMEDICINE (OUTPATIENT)
Dept: FAMILY MEDICINE CLINIC | Facility: CLINIC | Age: 57
End: 2022-03-02
Payer: COMMERCIAL

## 2022-03-02 DIAGNOSIS — F41.0 ANXIETY DISORDER DUE TO GENERAL MEDICAL CONDITION WITH PANIC ATTACK: ICD-10-CM

## 2022-03-02 DIAGNOSIS — F06.4 ANXIETY DISORDER DUE TO GENERAL MEDICAL CONDITION WITH PANIC ATTACK: ICD-10-CM

## 2022-03-02 DIAGNOSIS — F43.10 PTSD (POST-TRAUMATIC STRESS DISORDER): ICD-10-CM

## 2022-03-02 PROCEDURE — G0071 COMM SVCS BY RHC/FQHC 5 MIN: HCPCS | Performed by: FAMILY MEDICINE

## 2022-03-02 RX ORDER — BUSPIRONE HYDROCHLORIDE 10 MG/1
10 TABLET ORAL 3 TIMES DAILY
Qty: 90 TABLET | Refills: 0
Start: 2022-03-02 | End: 2022-03-17 | Stop reason: SDUPTHER

## 2022-03-02 RX ORDER — METOPROLOL SUCCINATE 100 MG/1
100 TABLET, EXTENDED RELEASE ORAL DAILY
COMMUNITY
Start: 2022-02-28

## 2022-03-02 RX ORDER — ATORVASTATIN CALCIUM 40 MG/1
40 TABLET, FILM COATED ORAL EVERY EVENING
COMMUNITY
Start: 2022-02-28

## 2022-03-02 NOTE — PROGRESS NOTES
Virtual Regular Visit    Verification of patient location:    Patient is located in the following state in which I hold an active license [place]      Assessment/Plan:    Problem List Items Addressed This Visit     None      Visit Diagnoses     PTSD (post-traumatic stress disorder)        Relevant Medications    busPIRone (BUSPAR) 10 mg tablet    Anxiety disorder due to general medical condition with panic attack        Relevant Medications    busPIRone (BUSPAR) 10 mg tablet        -Continue current medications  -follow up with cardiology (May), neurosurg (3/14), and endocrine (April) as scheduled   -RTC 3 months or sooner if concerns arise       Reason for visit is   Chief Complaint   Patient presents with    Follow-up     Patient states she still has some pain on the side of her head    Virtual Regular Visit        Encounter provider Zara Garcia PA-C    Provider located at [address] [place] [id number]      Recent Visits  No visits were found meeting these conditions  Showing recent visits within past 7 days and meeting all other requirements  Today's Visits  Date Type Provider Dept   03/02/22 Telemedicine Madison Boyd PA-C [facility]   Showing today's visits and meeting all other requirements  Future Appointments  No visits were found meeting these conditions  Showing future appointments within next 150 days and meeting all other requirements       The patient was identified by name and date of birth  Gwendolyn Elana was informed that this is a telemedicine visit and that the visit is being conducted through Telephone  My office door was closed  No one else was in the room  She acknowledged consent and understanding of privacy and security of the video platform  The patient has agreed to participate and understands they can discontinue the visit at any time  Patient is aware this is a billable service  Subjective  Eamon Bazan is a 49 y o  female s/p C2 vertebral fracture Jan 2022 who presents via telemedicine for follow up of anxiety and insomnia  Reports doing well with increase in the minipress and buspar  Notes more manageable anxiety and sleep patterns  HPI     Past Medical History:   Diagnosis Date    Anemia     iron deficiency anemia    Depression     Diabetes mellitus ([address] )     Fibroid     LTH   today 12/8/2021    History of transfusion     Hydrocephalus (HCC)     Hyperlipidemia     Hypertension     Migraine     PONV (postoperative nausea and vomiting)     Sciatica     Seasonal allergies     Vertigo     Wears glasses        Past Surgical History:   Procedure Laterality Date    COLONOSCOPY      EPIDURAL BLOCK INJECTION Right 7/2/2020    Procedure: Right L4-5 and L5-S1 transforaminal epidural steroid injection ([id number]);   Surgeon: Joy Jones MD;  Location: [facility];  Service: Pain Management     EPIDURAL BLOCK INJECTION N/A 9/2/2021    Procedure: BLOCK / INJECTION EPIDURAL STEROID LUMBAR  L5-S1 IESI;  Surgeon: Joy Jones MD;  [facility];  Service: Pain Management     FL GUIDED NEEDLE PLAC BX/ASP/INJ  7/2/2020    FL GUIDED NEEDLE PLAC BX/ASP/INJ  9/2/2021    IA ESOPHAGOGASTRODUODENOSCOPY TRANSORAL DIAGNOSTIC N/A 10/23/2018    Procedure: EGD with Savary dilation AND COLONOSCOPY;  Surgeon: Felice Orozco MD;  Location: [facility];  Service: Gastroenterology    IA LAPAROSCOPY W TOT HYSTERECT UTERUS 250 GRAM OR LESS N/A 12/8/2021    Procedure: Sherra Null; BILAT SALPINGECTOMY; EXCISION PERITONEAL CYST;  Surgeon: Stacy Swartz MD;  Location: [facility];  Service: Gynecology    TUBAL LIGATION         Current Outpatient Medications   Medication Sig Dispense Refill    aspirin (ECOTRIN LOW STRENGTH) 81 mg EC tablet Take 81 mg by mouth daily      atorvastatin (LIPITOR) 40 mg tablet Take 40 mg by mouth every evening      busPIRone (BUSPAR) 10 mg tablet Take 1 tablet (10 mg total) by mouth 3 (three) times a day 90 tablet 0    docusate sodium (COLACE) 100 mg capsule Take 1 capsule (100 mg total) by mouth 2 (two) times a day 60 capsule 1    DULoxetine (CYMBALTA) 60 mg delayed release capsule Take 1 capsule (60 mg total) by mouth daily at bedtime 30 capsule 5    ferrous sulfate 325 (65 Fe) mg tablet Take 1 tablet (325 mg total) by mouth daily with breakfast 30 tablet 0    gabapentin (NEURONTIN) 600 MG tablet TAKE 1 TABLET BY MOUTH THREE TIMES DAILY 90 tablet 2    glimepiride (AMARYL) 4 mg tablet Take 2 mg by mouth daily At night      Insulin Glargine-Lixisenatide (SOLIQUA) 100 units-33 mcg/mL injection pen Inject 40 Units under the skin daily        losartan (COZAAR) 100 MG tablet Take 100 mg by mouth daily      metoprolol succinate (TOPROL-XL) 100 mg 24 hr tablet Take 100 mg by mouth daily      prazosin (MINIPRESS) 2 mg capsule Take 1 capsule (2 mg total) by mouth daily at bedtime 30 capsule 0    senna (SENOKOT) 8 6 MG tablet Take 8 6 mg by mouth 2 (two) times a day      tiZANidine (ZANAFLEX) 2 mg tablet Take 1 tablet (2 mg total) by mouth every 8 (eight) hours as needed for muscle spasms 90 tablet 5     No current facility-administered medications for this visit  Allergies   Allergen Reactions    Nuts - Food Allergy Anaphylaxis    Chocolate - Food Allergy Cough    Eggs Or Egg-Derived Products - Food Allergy Other (See Comments)     Cough    Other      LETTUCE    Shellfish Allergy - Food Allergy Other (See Comments)     unknown    Tomato - Food Allergy Cough       Review of Systems   Psychiatric/Behavioral: Positive for sleep disturbance  Negative for agitation, behavioral problems, decreased concentration, dysphoric mood and suicidal ideas  The patient is not hyperactive          I spent 8 minutes with patient today in which greater than 50% of the time was spent in counseling/coordination of care regarding assessment and plan of care    VIRTUAL VISIT DISCLAIMER      Tam Oh verbally agrees to participate in [employer]  Pt is aware that [employer] could be limited without vital signs or the ability to perform a full hands-on physical Lori Drake understands she or the provider may request at any time to terminate the video visit and request the patient to seek care or treatment in person  It was my intent to perform this visit via video technology but the patient was not able to do a video connection so the visit was completed via audio telephone only

## 2022-03-07 DIAGNOSIS — F06.4 ANXIETY DISORDER DUE TO GENERAL MEDICAL CONDITION WITH PANIC ATTACK: ICD-10-CM

## 2022-03-07 DIAGNOSIS — F43.10 PTSD (POST-TRAUMATIC STRESS DISORDER): ICD-10-CM

## 2022-03-07 DIAGNOSIS — F41.0 ANXIETY DISORDER DUE TO GENERAL MEDICAL CONDITION WITH PANIC ATTACK: ICD-10-CM

## 2022-03-07 RX ORDER — PRAZOSIN HYDROCHLORIDE 2 MG/1
2 CAPSULE ORAL
Qty: 30 CAPSULE | Refills: 0 | Status: SHIPPED | OUTPATIENT
Start: 2022-03-07 | End: 2022-05-24

## 2022-03-15 DIAGNOSIS — F41.0 ANXIETY DISORDER DUE TO GENERAL MEDICAL CONDITION WITH PANIC ATTACK: ICD-10-CM

## 2022-03-15 DIAGNOSIS — F43.10 PTSD (POST-TRAUMATIC STRESS DISORDER): ICD-10-CM

## 2022-03-15 DIAGNOSIS — F06.4 ANXIETY DISORDER DUE TO GENERAL MEDICAL CONDITION WITH PANIC ATTACK: ICD-10-CM

## 2022-03-17 DIAGNOSIS — F43.10 PTSD (POST-TRAUMATIC STRESS DISORDER): ICD-10-CM

## 2022-03-17 DIAGNOSIS — F41.0 ANXIETY DISORDER DUE TO GENERAL MEDICAL CONDITION WITH PANIC ATTACK: ICD-10-CM

## 2022-03-17 DIAGNOSIS — F06.4 ANXIETY DISORDER DUE TO GENERAL MEDICAL CONDITION WITH PANIC ATTACK: ICD-10-CM

## 2022-03-17 RX ORDER — BUSPIRONE HYDROCHLORIDE 10 MG/1
10 TABLET ORAL 3 TIMES DAILY PRN
Qty: 90 TABLET | Refills: 0 | Status: SHIPPED | OUTPATIENT
Start: 2022-03-17 | End: 2022-04-04 | Stop reason: SDUPTHER

## 2022-03-17 NOTE — TELEPHONE ENCOUNTER
Refill sent  Thank you, I will contact patient tomorrow morning if she is available around [facility] can we add her as a virtual? Thanks!

## 2022-03-17 NOTE — TELEPHONE ENCOUNTER
Pt called in stating [facility] be [facility]  She is requesting a call back

## 2022-04-04 DIAGNOSIS — F06.4 ANXIETY DISORDER DUE TO GENERAL MEDICAL CONDITION WITH PANIC ATTACK: ICD-10-CM

## 2022-04-04 DIAGNOSIS — F43.10 PTSD (POST-TRAUMATIC STRESS DISORDER): ICD-10-CM

## 2022-04-04 DIAGNOSIS — F41.0 ANXIETY DISORDER DUE TO GENERAL MEDICAL CONDITION WITH PANIC ATTACK: ICD-10-CM

## 2022-04-04 RX ORDER — BUSPIRONE HYDROCHLORIDE 10 MG/1
10 TABLET ORAL 3 TIMES DAILY
Qty: 90 TABLET | Refills: 0
Start: 2022-04-04 | End: 2022-04-04

## 2022-04-04 RX ORDER — BUSPIRONE HYDROCHLORIDE 10 MG/1
10 TABLET ORAL 3 TIMES DAILY PRN
Qty: 90 TABLET | Refills: 0 | Status: SHIPPED | OUTPATIENT
Start: 2022-04-04 | End: 2022-04-15 | Stop reason: SDUPTHER

## 2022-04-13 NOTE — UTILIZATION REVIEW
Hello    Can we please get a final determination-Auth is still showing on the [address] portal 03/30/22 at 3:58 PM as pending  Notification of Discharge   This is a Notification of Discharge from our facility [address]  Please be advised that this patient has been discharge from our facility  Below you will find the admission and discharge date and time including the patient’s disposition  UTILIZATION REVIEW CONTACT:  Evi Guido  Utilization   Network Utilization Review Department  Phone: [phone] x carefully listen to the prompts  All voicemails are confidential   Email: [email or web address]     PHYSICIAN ADVISORY SERVICES:  FOR RIDG-LD-SFBL REVIEW - MEDICAL NECESSITY DENIAL  Phone: [phone]  Fax: [phone]  Email: [email or web address]     PRESENTATION DATE: 12/8/2021 10:48 AM    INPATIENT ADMISSION DATE: 12/9/21  6:53 PM   DISCHARGE DATE: 12/10/2021  5:39 PM  DISPOSITION: Home/Self Care Home/Self Care      IMPORTANT INFORMATION:  Send all requests for admission clinical reviews, approved or denied determinations and any other requests to dedicated fax number below belonging to the campus where the patient is receiving treatment   List of dedicated fax numbers:  [address] [facility] [address] DENIALS (Administrative/Medical Necessity) [phone]   [address] (Maternity/NICU/Pediatrics) [phone]   [facility] [phone]   [facility] [phone]   [address] [phone]   [address] [facility] [phone]   [address] [place] [address] [phone]   [address] [place] [address] 119 [phone]   [facility]  [phone]   [facility] [facility] [phone]   [address] [facility] [address] [facility] [phone]

## 2022-04-15 ENCOUNTER — OFFICE VISIT (OUTPATIENT)
Dept: FAMILY MEDICINE CLINIC | Facility: CLINIC | Age: 57
End: 2022-04-15

## 2022-04-15 VITALS
HEIGHT: 65 IN | HEART RATE: 94 BPM | DIASTOLIC BLOOD PRESSURE: 80 MMHG | BODY MASS INDEX: 36.15 KG/M2 | RESPIRATION RATE: 16 BRPM | TEMPERATURE: 98 F | WEIGHT: 217 LBS | OXYGEN SATURATION: 96 % | SYSTOLIC BLOOD PRESSURE: 122 MMHG

## 2022-04-15 DIAGNOSIS — F41.0 ANXIETY DISORDER DUE TO GENERAL MEDICAL CONDITION WITH PANIC ATTACK: ICD-10-CM

## 2022-04-15 DIAGNOSIS — F43.10 PTSD (POST-TRAUMATIC STRESS DISORDER): ICD-10-CM

## 2022-04-15 DIAGNOSIS — Z01.818 PRE-OPERATIVE EXAMINATION: Primary | ICD-10-CM

## 2022-04-15 DIAGNOSIS — F06.4 ANXIETY DISORDER DUE TO GENERAL MEDICAL CONDITION WITH PANIC ATTACK: ICD-10-CM

## 2022-04-15 PROBLEM — S22.039A T3 VERTEBRAL FRACTURE (HCC): Status: ACTIVE | Noted: 2022-04-11

## 2022-04-15 RX ORDER — BUSPIRONE HYDROCHLORIDE 10 MG/1
10 TABLET ORAL 3 TIMES DAILY PRN
Qty: 90 TABLET | Refills: 0 | Status: SHIPPED | OUTPATIENT
Start: 2022-04-15 | End: 2022-05-26 | Stop reason: SDUPTHER

## 2022-04-15 NOTE — PROGRESS NOTES
Assessment/Plan:     No problem-specific Assessment & Plan notes found for this encounter  Diagnoses and all orders for this visit:    Pre-operative examination    PTSD (post-traumatic stress disorder)  -     busPIRone (BUSPAR) 10 mg tablet; Take 1 tablet (10 mg total) by mouth 3 (three) times a day as needed (anxiety)    Anxiety disorder due to general medical condition with panic attack  -     busPIRone (BUSPAR) 10 mg tablet; Take 1 tablet (10 mg total) by mouth 3 (three) times a day as needed (anxiety)        -preadmission testing as ordered by operating surgeon  Echo and Cardiac clearance as scheduled with [facility] 4/19 and 4/25  -medically cleared for surgery as scheduled 5/3/22  Discussed with Amado Spann goal of focusing on blood sugar control prior to surgery  Given seriousness of injury and failure of non-operative management, I believe the benefit of surgery outweigh the risk  F/U with endocrinology as scheduled   -RTC June 2022 following operation or sooner if concerns arise    Subjective:      Patient ID: Dayna Gna is a 49 y o  female PMH GERD, DM, HTN, HLD, s/p C2 vertebral fracture presents for pre-operative clearance  She is [facility]  BP is well controlled on current regimen  Denies CP, SOB, changes in vision, dizziness, headaches  Follows with Rosalinda Bell for DM care  Notes her A1C yesterday was not at goal at 12%  She notes adjustments to her medications  Requesting refills on her buspar  HPI    The following portions of the patient's history were reviewed and updated as appropriate: allergies, current medications, past family history, past medical history, past social history, past surgical history and problem list     Review of Systems   Constitutional: Negative for activity change, appetite change, chills, fatigue, fever and unexpected weight change     HENT: Negative for congestion, postnasal drip, rhinorrhea, sinus pressure, sinus pain, sneezing and sore throat  Respiratory: Negative for cough, chest tightness, shortness of breath and wheezing  Cardiovascular: Negative for chest pain, palpitations and leg swelling  Gastrointestinal: Negative for abdominal pain, diarrhea, nausea and vomiting  Genitourinary: Negative  Musculoskeletal: Positive for arthralgias and neck pain  Negative for gait problem  Skin: Negative for rash and wound  Neurological: Negative for dizziness, syncope, weakness, light-headedness and headaches  Objective:      /80 (BP Location: Left arm, Patient Position: Sitting)   Pulse 94   Temp 98 °F (36 7 °C) (Temporal)   Resp 16   Ht 5' 5" (1 651 m)   Wt 98 4 kg (217 lb)   LMP  (LMP Unknown)   SpO2 96%   BMI 36 11 kg/m²          Physical Exam  Constitutional:       General: She is not in acute distress  Appearance: Normal appearance  She is well-developed  She is obese  She is not ill-appearing or toxic-appearing  Interventions: Cervical collar in place  HENT:      Head: Normocephalic and atraumatic  Right Ear: Tympanic membrane normal       Left Ear: Tympanic membrane normal       Nose: Nose normal       Mouth/Throat:      Mouth: Mucous membranes are moist       Pharynx: Oropharynx is clear  Eyes:      Pupils: Pupils are equal, round, and reactive to light  Neck:      Comments: c collar in place  Cardiovascular:      Rate and Rhythm: Normal rate and regular rhythm  Pulses: Normal pulses  Heart sounds: Normal heart sounds  No murmur heard  Pulmonary:      Effort: Pulmonary effort is normal  No respiratory distress  Breath sounds: Normal breath sounds  No stridor  No wheezing or rhonchi  Abdominal:      General: Bowel sounds are normal  There is no distension  Palpations: Abdomen is soft  Tenderness: There is no abdominal tenderness  Musculoskeletal:      Right lower leg: No edema        Left lower leg: No edema  Skin:     General: Skin is warm and dry  Capillary Refill: Capillary refill takes less than 2 seconds  Neurological:      Mental Status: She is alert and oriented to person, place, and time  Mental status is at baseline  Psychiatric:         Mood and Affect: Mood normal          Behavior: Behavior normal          Thought Content:  Thought content normal          Judgment: Judgment normal

## 2022-05-06 ENCOUNTER — VBI (OUTPATIENT)
Dept: ADMINISTRATIVE | Facility: OTHER | Age: 57
End: 2022-05-06

## 2022-05-23 DIAGNOSIS — Z23 NEED FOR SHINGLES VACCINE: Primary | ICD-10-CM

## 2022-05-23 RX ORDER — ZOSTER VACCINE RECOMBINANT, ADJUVANTED 50 MCG/0.5
0.5 KIT INTRAMUSCULAR ONCE
Qty: 1 EACH | Refills: 1 | Status: SHIPPED | OUTPATIENT
Start: 2022-05-23 | End: 2022-05-24

## 2022-05-24 ENCOUNTER — OFFICE VISIT (OUTPATIENT)
Dept: FAMILY MEDICINE CLINIC | Facility: CLINIC | Age: 57
End: 2022-05-24
Payer: COMMERCIAL

## 2022-05-24 VITALS
HEIGHT: 65 IN | TEMPERATURE: 98.2 F | DIASTOLIC BLOOD PRESSURE: 88 MMHG | OXYGEN SATURATION: 94 % | BODY MASS INDEX: 35.16 KG/M2 | SYSTOLIC BLOOD PRESSURE: 160 MMHG | HEART RATE: 117 BPM | WEIGHT: 211 LBS | RESPIRATION RATE: 16 BRPM

## 2022-05-24 DIAGNOSIS — R51.9 PERSISTENT HEADACHES: ICD-10-CM

## 2022-05-24 DIAGNOSIS — H60.502 ACUTE NONINFECTIVE OTITIS EXTERNA OF LEFT EAR, UNSPECIFIED TYPE: Primary | ICD-10-CM

## 2022-05-24 PROCEDURE — 99213 OFFICE O/P EST LOW 20 MIN: CPT | Performed by: FAMILY MEDICINE

## 2022-05-24 RX ORDER — NEOMYCIN SULFATE, POLYMYXIN B SULFATE AND HYDROCORTISONE 10; 3.5; 1 MG/ML; MG/ML; [USP'U]/ML
4 SUSPENSION/ DROPS AURICULAR (OTIC) EVERY 8 HOURS SCHEDULED
Qty: 10 ML | Refills: 0 | Status: SHIPPED | OUTPATIENT
Start: 2022-05-24 | End: 2022-05-31

## 2022-05-24 RX ORDER — AMOXICILLIN 250 MG
1 CAPSULE ORAL 2 TIMES DAILY
COMMUNITY
Start: 2022-05-12 | End: 2022-07-21

## 2022-05-24 RX ORDER — METHOCARBAMOL 500 MG/1
TABLET, FILM COATED ORAL
COMMUNITY
Start: 2022-05-12 | End: 2022-06-14

## 2022-05-24 RX ORDER — OXYCODONE HYDROCHLORIDE 5 MG/1
5 TABLET ORAL EVERY 6 HOURS PRN
COMMUNITY
Start: 2022-05-23 | End: 2022-06-02

## 2022-05-24 NOTE — PROGRESS NOTES
Assessment/Plan:    No problem-specific Assessment & Plan notes found for this encounter  Diagnoses and all orders for this visit:    Acute noninfective otitis externa of left ear, unspecified type  -     neomycin-polymyxin-hydrocortisone (CORTISPORIN) 0 35%-10,000 units/mL-1% otic suspension; Administer 4 drops into the left ear every 8 (eight) hours for 7 days    Persistent headaches  Comments:  avoid NSAIDs s/p spinal fusion  continue pain management per surgeon  advised call their office for further recommendation and treatment given surgery 5/11    Other orders  -     methocarbamol (ROBAXIN) 500 mg tablet; TAKE 2 TABLETS BY MOUTH THREE TIMES DAILY AS NEEDED FOR MUSCLE SPASM  -     oxyCODONE (ROXICODONE) 5 immediate release tablet; Take 5 mg by mouth every 6 (six) hours as needed (Patient not taking: Reported on 5/24/2022)  -     senna-docusate sodium (SENOKOT S) 8 6-50 mg per tablet; Take 1 tablet by mouth in the morning and 1 tablet in the evening  RTC as scheduled for routine follow up  Subjective:      Patient ID: Mercy Chu is a 49 y o  female PMH GERD, DM, HTN, HLD, s/p spinal fusion 5/11 for C2 vertebral fracture presents for evaluation of earche and headache  Notes progressive left earache for the past 2 days  Notes she was unable to sleep last night secondary to the ear pain  Some drainage/fluid  Hearing intake  She has history of allergies and is using OTC antihistamine with some relief  Also notes intermittent headaches following her spinal fusion  Using oxycodone and muscle relaxant as prescribed but notes minimal improvement  Denies changes in vision, syncope, vomiting  Has surgery f/u 6/6/22      HPI    The following portions of the patient's history were reviewed and updated as appropriate: allergies, current medications, past family history, past medical history, past social history, past surgical history and problem list     Review of Systems   HENT: Positive for congestion, ear discharge, ear pain, postnasal drip and rhinorrhea  Negative for nosebleeds, sinus pressure, sinus pain, sneezing, sore throat and tinnitus  Respiratory: Negative for cough, shortness of breath and wheezing  Cardiovascular: Negative for chest pain, palpitations and leg swelling  Gastrointestinal: Positive for nausea  Negative for abdominal pain, constipation, diarrhea and vomiting  Musculoskeletal: Positive for arthralgias, myalgias and neck pain  Neurological: Positive for headaches  Negative for dizziness, speech difficulty, weakness, light-headedness and numbness  Objective:      /88   Pulse (!) 117   Temp 98 2 °F (36 8 °C)   Resp 16   Ht 5' 5" (1 651 m)   Wt 95 7 kg (211 lb)   LMP  (LMP Unknown)   SpO2 94%   BMI 35 11 kg/m²          Physical Exam  Constitutional:       General: She is not in acute distress  Appearance: Normal appearance  She is well-developed  HENT:      Head: Normocephalic and atraumatic  Right Ear: Tympanic membrane, ear canal and external ear normal       Left Ear: Drainage, swelling and tenderness present  No middle ear effusion  No mastoid tenderness  Tympanic membrane is not perforated, erythematous or retracted  Eyes:      Pupils: Pupils are equal, round, and reactive to light  Cardiovascular:      Rate and Rhythm: Normal rate and regular rhythm  Heart sounds: Normal heart sounds  No murmur heard  Pulmonary:      Effort: Pulmonary effort is normal  No respiratory distress  Breath sounds: Normal breath sounds  No wheezing  Abdominal:      General: Bowel sounds are normal  There is no distension  Palpations: Abdomen is soft  Tenderness: There is no abdominal tenderness  Skin:     General: Skin is warm and dry  Capillary Refill: Capillary refill takes less than 2 seconds  Neurological:      Mental Status: She is alert and oriented to person, place, and time     Psychiatric:         Mood and Affect: Mood normal          Behavior: Behavior normal          Thought Content:  Thought content normal          Judgment: Judgment normal

## 2022-05-26 DIAGNOSIS — F06.4 ANXIETY DISORDER DUE TO GENERAL MEDICAL CONDITION WITH PANIC ATTACK: ICD-10-CM

## 2022-05-26 DIAGNOSIS — F41.0 ANXIETY DISORDER DUE TO GENERAL MEDICAL CONDITION WITH PANIC ATTACK: ICD-10-CM

## 2022-05-26 DIAGNOSIS — F43.10 PTSD (POST-TRAUMATIC STRESS DISORDER): ICD-10-CM

## 2022-05-26 RX ORDER — BUSPIRONE HYDROCHLORIDE 10 MG/1
10 TABLET ORAL 3 TIMES DAILY PRN
Qty: 90 TABLET | Refills: 5 | Status: SHIPPED | OUTPATIENT
Start: 2022-05-26 | End: 2022-07-14

## 2022-06-14 ENCOUNTER — OFFICE VISIT (OUTPATIENT)
Dept: FAMILY MEDICINE CLINIC | Facility: CLINIC | Age: 57
End: 2022-06-14
Payer: COMMERCIAL

## 2022-06-14 VITALS
DIASTOLIC BLOOD PRESSURE: 88 MMHG | HEART RATE: 115 BPM | WEIGHT: 209.38 LBS | HEIGHT: 65 IN | OXYGEN SATURATION: 98 % | SYSTOLIC BLOOD PRESSURE: 142 MMHG | BODY MASS INDEX: 34.88 KG/M2 | TEMPERATURE: 97.8 F

## 2022-06-14 DIAGNOSIS — F43.10 PTSD (POST-TRAUMATIC STRESS DISORDER): ICD-10-CM

## 2022-06-14 DIAGNOSIS — F41.0 ANXIETY DISORDER DUE TO GENERAL MEDICAL CONDITION WITH PANIC ATTACK: ICD-10-CM

## 2022-06-14 DIAGNOSIS — F06.4 ANXIETY DISORDER DUE TO GENERAL MEDICAL CONDITION WITH PANIC ATTACK: ICD-10-CM

## 2022-06-14 DIAGNOSIS — F33.2 SEVERE EPISODE OF RECURRENT MAJOR DEPRESSIVE DISORDER, WITHOUT PSYCHOTIC FEATURES (HCC): Primary | ICD-10-CM

## 2022-06-14 PROCEDURE — 99213 OFFICE O/P EST LOW 20 MIN: CPT | Performed by: FAMILY MEDICINE

## 2022-06-14 RX ORDER — METHYLPREDNISOLONE 4 MG/1
TABLET ORAL
COMMUNITY
Start: 2022-06-06 | End: 2022-06-14

## 2022-06-14 RX ORDER — HYDROCODONE BITARTRATE AND ACETAMINOPHEN 5; 300 MG/1; MG/1
1 TABLET ORAL EVERY 4 HOURS PRN
COMMUNITY
Start: 2022-06-08 | End: 2022-06-15

## 2022-06-14 RX ORDER — FLUOXETINE HYDROCHLORIDE 40 MG/1
40 CAPSULE ORAL DAILY
Qty: 90 CAPSULE | Refills: 0 | Status: SHIPPED | OUTPATIENT
Start: 2022-06-14

## 2022-06-14 RX ORDER — METHOCARBAMOL 750 MG/1
750 TABLET, FILM COATED ORAL 3 TIMES DAILY PRN
COMMUNITY
Start: 2022-06-06 | End: 2022-07-21

## 2022-06-14 NOTE — PROGRESS NOTES
Assessment/Plan:    No problem-specific Assessment & Plan notes found for this encounter  Diagnoses and all orders for this visit:    Severe episode of recurrent major depressive disorder, without psychotic features ([address] )  -     FLUoxetine (PROzac) 40 MG capsule; Take 1 capsule (40 mg total) by mouth daily  -     Ambulatory Referral to [facility]; Future    PTSD (post-traumatic stress disorder)  -     Ambulatory Referral to [facility]; Future    Anxiety disorder due to general medical condition with panic attack  -     FLUoxetine (PROzac) 40 MG capsule; Take 1 capsule (40 mg total) by mouth daily  -     Ambulatory Referral to [facility]; Future    Other orders  -     HYDROcodone-acetaminophen (XODOL) 5-300 MG per tablet; Take 1 tablet by mouth every 4 (four) hours as needed  -     methocarbamol (ROBAXIN) 750 mg tablet; Take 750 mg by mouth 3 (three) times a day as needed  -     Discontinue: methylPREDNISolone 4 MG tablet therapy pack; TAKE AS DIRECTED PER PACKAGE INSTRUCTIONS (Patient not taking: Reported on 6/14/2022)        -start prozac 40 as patient previously tolerated well  Continue buspar PRN  Advised she may increase to 20mg TID if needed  [facility]  -RTC 1 month for medication check or sooner if concerns arise    Subjective:      Patient ID: Mary Lou Richmond is a 49 y o  female PMH GERD, DM, HTN, HLD, s/p spinal fusion 5/11 for C2 vertebral fracture presents for evaluation of depressive symptoms  Notes depressed mood, trouble sleeping, no interest or pleasure in doing things, decreased appetite, excessive crying which has been worsening  Attributes this to her extensive medical diagnosis over the past several months including abnormal vaginal bleeding resulting in complete hysterectomy and mechanical fall with C2 fracture requiring surgical intervention  Does note improvement in her anxiety symptoms with buspar   Still experiencing night pelletier surrounding her fall  Notes some relief in her sleep with xodol per surgical team  Previously on prozac which did seem to help  HPI    The following portions of the patient's history were reviewed and updated as appropriate: allergies, current medications, past family history, past medical history, past social history, past surgical history and problem list     Review of Systems   Constitutional: Positive for activity change  Psychiatric/Behavioral: Positive for behavioral problems, decreased concentration, dysphoric mood and sleep disturbance  Negative for self-injury and suicidal ideas  The patient is nervous/anxious  Objective:      /88 (BP Location: Left arm, Patient Position: Sitting)   Pulse (!) 115   Temp 97 8 °F (36 6 °C)   Ht 5' 5" (1 651 m)   Wt 95 kg (209 lb 6 oz)   LMP  (LMP Unknown)   SpO2 98%   BMI 34 84 kg/m²          Physical Exam  Vitals reviewed  Constitutional:       Appearance: Normal appearance  Neurological:      Mental Status: She is alert  Psychiatric:         Mood and Affect: Mood is depressed  Affect is flat and tearful  Speech: Speech is delayed  Behavior: Behavior is withdrawn        Comments: Tearful during encounter

## 2022-06-29 ENCOUNTER — SOCIAL WORK (OUTPATIENT)
Dept: BEHAVIORAL/MENTAL HEALTH CLINIC | Facility: CLINIC | Age: 57
End: 2022-06-29
Payer: COMMERCIAL

## 2022-06-29 DIAGNOSIS — F33.1 MODERATE EPISODE OF RECURRENT MAJOR DEPRESSIVE DISORDER (HCC): Primary | ICD-10-CM

## 2022-06-29 DIAGNOSIS — F43.10 POST-TRAUMATIC STRESS: ICD-10-CM

## 2022-06-29 PROCEDURE — 90837 PSYTX W PT 60 MINUTES: CPT | Performed by: SOCIAL WORKER

## 2022-06-29 PROCEDURE — 90791 PSYCH DIAGNOSTIC EVALUATION: CPT | Performed by: SOCIAL WORKER
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
## 2022-06-29 NOTE — PSYCH
Psychotherapy Provided: Individual Psychotherapy 60  minutes 06:15 Pm to 7:15 Pm          Goals addressed in session:(Intake) Met with the client who is seeking services due to long history of trauma and depression  During the session we created her recovery intake and conducted screenings on depression, anxiety, D&A , trauma and suicide  It is hoped that by addressing her weaknesses this will act as a preventive measure against the possible need for higher level of care and services  Interventions: Supportive Therapy, Strengths Therapy,  and Cognitive Behavioral Therapy where the treatment modalities utilized during the session  Assessment and Plan: The Client presented a depressed anxious mood that was congruent in effect  She was alert, goal directed and participated with prompts during the session  The Client was oriented to person, place, time, situation, and reported no suicidal/homicidal ideation, plan, or intent  As team we created the Client's intake, and conducted the depression screening PHQ-9 with the Client scoring in the moderate depression severity range  On the anxiety screening KENYA-7 the Client scored in the severe anxiety severity range  She had a negative screening for D&A on the DANA and AUDIT-C so no other interventions are needed at this time  She did have a positive screening for trauma on the PCL-5 with DANA and L[facility]  The Client did have a low screening for suicide on the [address]  The Client was an active team member  Next appointment was set for 2 weeks  She will complete worksheet on the development of her treatment plan  Pain:      PSYCH MENTAL STATUS PAIN : 8 as reported by the Client     PHYSICAL PAIN SCALE NUMBERS: 5 as reported by the Client     Current suicide risk : Low/Phone number for [facility] was given  to the Client [phone]  The Client was given phone number for the [facility] [phone]           Behavioral Health Treatment Plan [facility]: Diagnosis and Treatment Plan explained to Conrad Coon, Conrad Coon  relates understanding diagnosis and is agreeable to Treatment Plan  Yes

## 2022-07-14 ENCOUNTER — TELEMEDICINE (OUTPATIENT)
Dept: FAMILY MEDICINE CLINIC | Facility: CLINIC | Age: 57
End: 2022-07-14
Payer: COMMERCIAL

## 2022-07-14 DIAGNOSIS — F06.4 ANXIETY DISORDER DUE TO GENERAL MEDICAL CONDITION WITH PANIC ATTACK: ICD-10-CM

## 2022-07-14 DIAGNOSIS — F41.0 ANXIETY DISORDER DUE TO GENERAL MEDICAL CONDITION WITH PANIC ATTACK: ICD-10-CM

## 2022-07-14 DIAGNOSIS — J30.2 SEASONAL ALLERGIC RHINITIS, UNSPECIFIED TRIGGER: ICD-10-CM

## 2022-07-14 DIAGNOSIS — F43.10 PTSD (POST-TRAUMATIC STRESS DISORDER): Primary | ICD-10-CM

## 2022-07-14 DIAGNOSIS — F33.2 SEVERE EPISODE OF RECURRENT MAJOR DEPRESSIVE DISORDER, WITHOUT PSYCHOTIC FEATURES (HCC): ICD-10-CM

## 2022-07-14 PROCEDURE — G0071 COMM SVCS BY RHC/FQHC 5 MIN: HCPCS | Performed by: FAMILY MEDICINE

## 2022-07-14 RX ORDER — BUSPIRONE HYDROCHLORIDE 10 MG/1
20 TABLET ORAL 3 TIMES DAILY PRN
Qty: 90 TABLET | Refills: 5
Start: 2022-07-14

## 2022-07-14 RX ORDER — MONTELUKAST SODIUM 10 MG/1
10 TABLET ORAL
Qty: 90 TABLET | Refills: 0 | Status: SHIPPED | OUTPATIENT
Start: 2022-07-14 | End: 2022-07-21

## 2022-07-14 NOTE — PROGRESS NOTES
Virtual Regular Visit    Verification of patient location:    Patient is located in the following state in which I hold an active license [place]      Assessment/Plan:    Problem List Items Addressed This Visit        Other    Severe episode of recurrent major depressive disorder, without psychotic features ([facility] [address] )    Relevant Medications    busPIRone (BUSPAR) 10 mg tablet      Other Visit Diagnoses     PTSD (post-traumatic stress disorder)    -  Primary    Relevant Medications    busPIRone (BUSPAR) 10 mg tablet    Anxiety disorder due to general medical condition with panic attack        Relevant Medications    busPIRone (BUSPAR) 10 mg tablet    Seasonal allergic rhinitis, unspecified trigger        Relevant Medications    montelukast (SINGULAIR) 10 mg tablet        -continue current medications at current doses  Will add singulair for further control of allergic rhinitis  Continue follow up with LCSW  -RTC 3 months for routine follow up or sooner if concerns arise         Reason for visit is   Chief Complaint   Patient presents with    Virtual Regular Visit        Encounter provider Alvin Elliott PA-C    Provider located at [address] [address]      Recent Visits  No visits were found meeting these conditions  Showing recent visits within past 7 days and meeting all other requirements  Today's Visits  Date Type Provider Dept   07/14/22 Telemedicine Madison Byod PA-C [address] today's visits and meeting all other requirements  Future Appointments  No visits were found meeting these conditions  Showing future appointments within next 150 days and meeting all other requirements       The patient was identified by name and date of birth  Adrian Rivera was informed that this is a telemedicine visit and that the visit is being conducted through Telephone  My office door was closed  No one else was in the room    She acknowledged consent and understanding of privacy and security of the video platform  The patient has agreed to participate and understands they can discontinue the visit at any time  Patient is aware this is a billable service  Subjective  Melchor Evangelista is a 49 y o  female PMH GERD, DM, HTN, HLD, s/p spinal fusion 5/11 for C2 vertebral fracture presents for follow up  Recently restarted prozac 1 month ago  Has seen some improvement in her depressive symptoms  Continues to use buspar 20mg BID with good relief of anxiety symptoms  Established with [address] [place] [address] and states she has helped  Has 2nd session tomorrow  Would like to continue current medication regimen/counseling  Notes increases in seasonal allergy symptoms  Taking OTC loraditine with minimal relief  Per chart she was on singulair in the past as well  HPI     Past Medical History:   Diagnosis Date    Anemia     iron deficiency anemia    Depression     Diabetes mellitus ([facility] [address] )     Fibroid     LTH   today 12/8/2021    History of transfusion     Hydrocephalus (HCC)     Hyperlipidemia     Hypertension     Migraine     PONV (postoperative nausea and vomiting)     Sciatica     Seasonal allergies     Vertigo     Wears glasses        Past Surgical History:   Procedure Laterality Date    COLONOSCOPY      EPIDURAL BLOCK INJECTION Right 7/2/2020    Procedure: Right L4-5 and L5-S1 transforaminal epidural steroid injection ([id number]);   Surgeon: Ashia Wick MD;  Location: [facility];  Service: Pain Management     EPIDURAL BLOCK INJECTION N/A 9/2/2021    Procedure: BLOCK / INJECTION EPIDURAL STEROID LUMBAR  L5-S1 IESI;  Surgeon: Ashia Wick MD;  Location: [facility];  Service: Pain Management     FL GUIDED NEEDLE PLAC BX/ASP/INJ  7/2/2020    FL GUIDED NEEDLE PLAC BX/ASP/INJ  9/2/2021    OR ESOPHAGOGASTRODUODENOSCOPY TRANSORAL DIAGNOSTIC N/A 10/23/2018    Procedure: EGD with Savary dilation AND COLONOSCOPY;  Surgeon: Gerry De León MD;  Location: [facility];  Service: Gastroenterology    MA LAPAROSCOPY W TOT HYSTERECT UTERUS 250 GRAM OR LESS N/A 12/8/2021    Procedure: [address]; BILAT SALPINGECTOMY; EXCISION PERITONEAL CYST;  Surgeon: Pablo Mishra MD;  Location: [facility];  Service: Gynecology    TUBAL LIGATION         Current Outpatient Medications   Medication Sig Dispense Refill    busPIRone (BUSPAR) 10 mg tablet Take 2 tablets (20 mg total) by mouth 3 (three) times a day as needed (anxiety) 90 tablet 5    montelukast (SINGULAIR) 10 mg tablet Take 1 tablet (10 mg total) by mouth daily at bedtime 90 tablet 0    aspirin (ECOTRIN LOW STRENGTH) 81 mg EC tablet Take 81 mg by mouth daily      atorvastatin (LIPITOR) 40 mg tablet Take 40 mg by mouth every evening (Patient not taking: Reported on 6/14/2022)      ferrous sulfate 325 (65 Fe) mg tablet Take 1 tablet (325 mg total) by mouth daily with breakfast 30 tablet 0    FLUoxetine (PROzac) 40 MG capsule Take 1 capsule (40 mg total) by mouth daily 90 capsule 0    gabapentin (NEURONTIN) 600 MG tablet TAKE 1 TABLET BY MOUTH THREE TIMES DAILY 90 tablet 2    glimepiride (AMARYL) 4 mg tablet Take 2 mg by mouth daily At night      Insulin Glargine-Lixisenatide (Insulin Glargine-Lixisenatide) 100 units-33 mcg/mL injection pen Inject 40 Units under the skin daily        losartan (COZAAR) 100 MG tablet Take 100 mg by mouth daily      methocarbamol (ROBAXIN) 750 mg tablet Take 750 mg by mouth 3 (three) times a day as needed      metoprolol succinate (TOPROL-XL) 100 mg 24 hr tablet Take 100 mg by mouth daily (Patient not taking: No sig reported)      neomycin-polymyxin-hydrocortisone (CORTISPORIN) 0 35%-10,000 units/mL-1% otic suspension Administer 4 drops into the left ear every 8 (eight) hours for 7 days 10 mL 0    prazosin (MINIPRESS) 2 mg capsule Take 1 capsule (2 mg total) by mouth daily at bedtime 30 capsule 0    senna (SENOKOT) 8 6 MG tablet Take 8 6 mg by mouth 2 (two) times a day      senna-docusate sodium (SENOKOT S) 8 6-50 mg per tablet Take 1 tablet by mouth in the morning and 1 tablet in the evening   tiZANidine (ZANAFLEX) 2 mg tablet Take 1 tablet (2 mg total) by mouth every 8 (eight) hours as needed for muscle spasms 90 tablet 5     No current facility-administered medications for this visit  Allergies   Allergen Reactions    Nuts - Food Allergy Anaphylaxis    Chocolate - Food Allergy Cough    Eggs Or Egg-Derived Products - Food Allergy Other (See Comments)     Cough    Other      LETTUCE    Shellfish Allergy - Food Allergy Other (See Comments)     unknown    Tomato - Food Allergy Cough       Review of Systems   HENT: Positive for rhinorrhea  Psychiatric/Behavioral: Positive for dysphoric mood and sleep disturbance  The patient is nervous/anxious  I spent 10 minutes directly with the patient during this visit    VIRTUAL VISIT [address] verbally agrees to participate in [employer]  Pt is aware that [employer] could be limited without vital signs or the ability to perform a full hands-on physical aGlen Odell understands she or the provider may request at any time to terminate the video visit and request the patient to seek care or treatment in person  It was my intent to perform this visit via video technology but the patient was not able to do a video connection so the visit was completed via audio telephone only

## 2022-07-21 ENCOUNTER — OFFICE VISIT (OUTPATIENT)
Dept: URGENT CARE | Facility: CLINIC | Age: 57
End: 2022-07-21
Payer: COMMERCIAL

## 2022-07-21 ENCOUNTER — APPOINTMENT (OUTPATIENT)
Dept: RADIOLOGY | Facility: CLINIC | Age: 57
End: 2022-07-21
Payer: COMMERCIAL

## 2022-07-21 VITALS
TEMPERATURE: 97.5 F | DIASTOLIC BLOOD PRESSURE: 81 MMHG | WEIGHT: 209.6 LBS | BODY MASS INDEX: 34.92 KG/M2 | OXYGEN SATURATION: 96 % | SYSTOLIC BLOOD PRESSURE: 173 MMHG | HEART RATE: 98 BPM | HEIGHT: 65 IN | RESPIRATION RATE: 20 BRPM

## 2022-07-21 DIAGNOSIS — M25.561 CHRONIC PAIN OF RIGHT KNEE: ICD-10-CM

## 2022-07-21 DIAGNOSIS — G89.29 CHRONIC PAIN OF RIGHT KNEE: ICD-10-CM

## 2022-07-21 DIAGNOSIS — G89.29 CHRONIC PAIN OF RIGHT KNEE: Primary | ICD-10-CM

## 2022-07-21 DIAGNOSIS — M25.561 CHRONIC PAIN OF RIGHT KNEE: Primary | ICD-10-CM

## 2022-07-21 PROCEDURE — 99213 OFFICE O/P EST LOW 20 MIN: CPT

## 2022-07-21 PROCEDURE — 73564 X-RAY EXAM KNEE 4 OR MORE: CPT

## 2022-07-21 NOTE — PSYCH
Litzy Phoenix  1965       Date of Initial Treatment Plan: 07/22/22  Date of Current Treatment Plan: 07/22/22    Treatment Plan Number: Initial Treatment Plan    Strengths/Personal Resources for Self Care: The Client has a supportive family of origin, and before her physical health issues decreased she was activity employed  She is receiving her physical health care thought Camron Abebe [address] issues, and for her mental health with this therapist  Luigi Flores MSW LCSW at [facility]    Diagnosis:   1  Moderate episode of recurrent major depressive disorder ([address] )     2  Post-traumatic stress         Area of Needs: The client reported that she would like to address her depression, and trauma history that is effecting her different relationships and environments  It is hoped that by addressing her weaknesses the Client  will achieve or maintain maximum functional capacity in performing daily activizes, taking into account both the functional capacity of the individual and those functional capacities appropriate for the individuals of the same age  Reduce or ameliorate the physical mental, behavioral, or developmental effects of an illness, condition, injury or disability  Present treatment plan will cover 4 months or 12 visits which ever comes first            Long Term Goal 1: The Client's depression score on the PHQ-9 will decrease from 18 to 9 or less    Target Date: 07/22/2023  Completion Date:          Short Term Objectives for Goal 1: The Client will learn 4 coping skills to address her depression       Long Term Goal 2: The Client's trauma score on her PCL-5 will decrease from 45 to 25 or less    Target Date: 07/22/2023  Completion Date:     Short Term Objectives for Goal 2: The Client will learn 5 triggers to her trauma symtoms         Long Term Goal # 3:The Client all of her medical appointments 100% of time     Target Date: 07/22/2023  Completion Date:     GOAL 1: Modality: The person(s) responsible for carrying out the plan is  the Client and her therapist Helga Camara [facility]    GOAL 2: Modality: The person(s) responsible for carrying out the plan is  the Client and her therapist Helga Camara [facility]     GOAL 3: Modality: The person(s) responsible for carrying out the plan is  the Client and her therapist Helga Camara [facility] [address] [facility] Ne: Diagnosis and Treatment Plan explained to Caitlyn Pitts relates understanding diagnosis and is agreeable to Treatment Plan  Client Comments : Please share your thoughts, feelings, need and/or experiences regarding your treatment plan:    The Client's short term goals will be reviewed and or completed on or before 11/22/2022

## 2022-07-21 NOTE — PROGRESS NOTES
[facility]        NAME: Hallie Wyatt is a 49 y o  female  : 1965    MRN: [id number]  DATE: 2022  TIME: 11:42 AM    Assessment and Plan   Chronic pain of right knee [M25 561, G89 29]  1  Chronic pain of right knee  XR knee 4+ vw right injury    Diclofenac Sodium (VOLTAREN) 1 %    Ambulatory Referral to Orthopedic Surgery    Elastic Bandages & Supports (Knee Brace/Hinged L/XL) MISC     Knee xr- no acute findings  Pain most likely related to previous immobility and crossing of legs  Will refer to ortho for if symptoms do not improve  Treat with voltaren gel and knee brace  Patient Instructions     No acute findings on xray  Wear knee brace  Use the voltaren gel 4 times a day  Avoid crossing your legs  Follow with orthopedics as needed if pain worsens or does not resolve  Follow up with PCP in 3-5 days  Proceed to  ER if symptoms worsen  Chief Complaint     Chief Complaint   Patient presents with    Knee Pain     Right knee pain bad fall in [place]         History of Present Illness       Patient is a 49year old female who presents to the office today for right knee pain for the past 6 months  States that the pain has been ongoing since her fall off the porch in January  Denies any knew injury/insult to the knee  States over the past week it has gotten worse  Pain is worse with crossing of her legs  Nothing seems to make the pain better  Is taking her gabapentin and zanaflex with minimal relief  Gait is steady  Review of Systems   Review of Systems   Musculoskeletal: Positive for arthralgias  All other systems reviewed and are negative          Current Medications       Current Outpatient Medications:     aspirin (ECOTRIN LOW STRENGTH) 81 mg EC tablet, Take 81 mg by mouth daily, Disp: , Rfl:     atorvastatin (LIPITOR) 40 mg tablet, Take 40 mg by mouth every evening, Disp: , Rfl:     busPIRone (BUSPAR) 10 mg tablet, Take 2 tablets (20 mg total) by mouth 3 (three) times a day as needed (anxiety), Disp: 90 tablet, Rfl: 5    Diclofenac Sodium (VOLTAREN) 1 %, Apply 2 g topically 4 (four) times a day, Disp: 50 g, Rfl: 1    Elastic Bandages & Supports (Knee Brace/Hinged L/XL) MISC, Use 1 (one) time for 1 dose, Disp: 1 each, Rfl: 0    FLUoxetine (PROzac) 40 MG capsule, Take 1 capsule (40 mg total) by mouth daily, Disp: 90 capsule, Rfl: 0    gabapentin (NEURONTIN) 600 MG tablet, TAKE 1 TABLET BY MOUTH THREE TIMES DAILY, Disp: 90 tablet, Rfl: 2    glimepiride (AMARYL) 4 mg tablet, Take 2 mg by mouth daily At night, Disp: , Rfl:     Insulin Glargine-Lixisenatide (Insulin Glargine-Lixisenatide) 100 units-33 mcg/mL injection pen, Inject 40 Units under the skin daily  , Disp: , Rfl:     losartan (COZAAR) 100 MG tablet, Take 100 mg by mouth daily, Disp: , Rfl:     metoprolol succinate (TOPROL-XL) 100 mg 24 hr tablet, Take 100 mg by mouth daily, Disp: , Rfl:     tiZANidine (ZANAFLEX) 2 mg tablet, Take 1 tablet (2 mg total) by mouth every 8 (eight) hours as needed for muscle spasms, Disp: 90 tablet, Rfl: 5    neomycin-polymyxin-hydrocortisone (CORTISPORIN) 0 35%-10,000 units/mL-1% otic suspension, Administer 4 drops into the left ear every 8 (eight) hours for 7 days, Disp: 10 mL, Rfl: 0    prazosin (MINIPRESS) 2 mg capsule, Take 1 capsule (2 mg total) by mouth daily at bedtime, Disp: 30 capsule, Rfl: 0    Current Allergies     Allergies as of 07/21/2022 - Reviewed 07/21/2022   Allergen Reaction Noted    Nuts - food allergy Anaphylaxis 07/12/2017    Chocolate - food allergy Cough 07/12/2017    Eggs or egg-derived products - food allergy Other (See Comments) 07/07/2020    Other  12/04/2018    Shellfish allergy - food allergy Other (See Comments) 07/12/2017    Tomato - food allergy Cough 07/12/2017            The following portions of the patient's history were reviewed and updated as appropriate: allergies, current medications, past family history, past medical history, past social history, past surgical history and problem list      Past Medical History:   Diagnosis Date    Anemia     iron deficiency anemia    Depression     Diabetes mellitus ([address] )     Fibroid     LTH   today 12/8/2021    History of transfusion     Hydrocephalus (HCC)     Hyperlipidemia     Hypertension     Migraine     PONV (postoperative nausea and vomiting)     Sciatica     Seasonal allergies     Vertigo     Wears glasses        Past Surgical History:   Procedure Laterality Date    COLONOSCOPY      EPIDURAL BLOCK INJECTION Right 7/2/2020    Procedure: Right L4-5 and L5-S1 transforaminal epidural steroid injection ([id number]); Surgeon: Katerina Solis MD;  [facility];  Service: Pain Management     EPIDURAL BLOCK INJECTION N/A 9/2/2021    Procedure: BLOCK / INJECTION EPIDURAL STEROID LUMBAR  L5-S1 IESI;  Surgeon: Katerina Solis MD;  [facility];  Service: Pain Management     FL GUIDED NEEDLE PLAC BX/ASP/INJ  7/2/2020    FL GUIDED NEEDLE PLAC BX/ASP/INJ  9/2/2021    AK ESOPHAGOGASTRODUODENOSCOPY TRANSORAL DIAGNOSTIC N/A 10/23/2018    Procedure: EGD with Savary dilation AND COLONOSCOPY;  Surgeon: Rach Huggins MD;  [facility];  Service: Gastroenterology    AK LAPAROSCOPY W TOT HYSTERECT UTERUS 250 GRAM OR LESS N/A 12/8/2021    Procedure: [address]; BILAT SALPINGECTOMY; EXCISION PERITONEAL CYST;  Surgeon: Hernan Grajeda MD;  [facility];  Service: Gynecology    TUBAL LIGATION         Family History   Problem Relation Age of Onset    Diabetes Father     Cancer Father     Varicose Veins Mother     Drug abuse Mother          Medications have been verified  Objective   BP (!) 173/81   Pulse 98   Temp 97 5 °F (36 4 °C)   Resp 20   Ht 5' 5" (1 651 m)   Wt 95 1 kg (209 lb 9 6 oz)   LMP  (LMP Unknown)   SpO2 96%   BMI 34 88 kg/m²        Physical Exam     Physical Exam  Vitals and nursing note reviewed     Constitutional:       General: She is not in acute distress  Appearance: Normal appearance  She is normal weight  She is not ill-appearing or toxic-appearing  Cardiovascular:      Rate and Rhythm: Normal rate and regular rhythm  Pulses: Normal pulses  Heart sounds: Normal heart sounds  No murmur heard  No friction rub  No gallop  Pulmonary:      Effort: Pulmonary effort is normal  No respiratory distress  Breath sounds: Normal breath sounds  No stridor  No wheezing, rhonchi or rales  Chest:      Chest wall: No tenderness  Musculoskeletal:         General: Tenderness present  No swelling or deformity  Right knee: Tenderness present over the medial joint line and lateral joint line  No LCL laxity, MCL laxity, ACL laxity or PCL laxity  Normal alignment and normal meniscus  Instability Tests: Anterior Lachman test negative  Medial Alex test negative and lateral Alex test negative  Right lower leg: No edema  Left lower leg: No edema  Legs:       Comments: Positive varus stress on right knee  Popliteal pulse +2   Pedal pulse +2  Cap refill <2 s  No abrasions of knee noted  Skin:     General: Skin is warm  Capillary Refill: Capillary refill takes less than 2 seconds  Neurological:      Mental Status: She is alert

## 2022-07-21 NOTE — PATIENT INSTRUCTIONS
No acute findings on xray  Wear knee brace  Use the voltaren gel 4 times a day  Avoid crossing your legs  Follow with orthopedics as needed if pain worsens or does not resolve
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
## 2022-07-22 ENCOUNTER — SOCIAL WORK (OUTPATIENT)
Dept: BEHAVIORAL/MENTAL HEALTH CLINIC | Facility: CLINIC | Age: 57
End: 2022-07-22
Payer: COMMERCIAL

## 2022-07-22 DIAGNOSIS — F33.1 MODERATE EPISODE OF RECURRENT MAJOR DEPRESSIVE DISORDER (HCC): Primary | ICD-10-CM

## 2022-07-22 DIAGNOSIS — F43.10 POST-TRAUMATIC STRESS: ICD-10-CM

## 2022-07-22 PROCEDURE — 90837 PSYTX W PT 60 MINUTES: CPT | Performed by: SOCIAL WORKER

## 2022-07-26 ENCOUNTER — VBI (OUTPATIENT)
Dept: ADMINISTRATIVE | Facility: OTHER | Age: 57
End: 2022-07-26

## 2022-08-15 ENCOUNTER — SOCIAL WORK (OUTPATIENT)
Dept: BEHAVIORAL/MENTAL HEALTH CLINIC | Facility: CLINIC | Age: 57
End: 2022-08-15
Payer: COMMERCIAL

## 2022-08-15 DIAGNOSIS — F43.10 POST-TRAUMATIC STRESS: ICD-10-CM

## 2022-08-15 DIAGNOSIS — F33.1 MODERATE EPISODE OF RECURRENT MAJOR DEPRESSIVE DISORDER (HCC): Primary | ICD-10-CM

## 2022-08-15 PROCEDURE — 90837 PSYTX W PT 60 MINUTES: CPT | Performed by: SOCIAL WORKER
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
## 2022-08-22 NOTE — TELEPHONE ENCOUNTER
I called her but there was no answer so I left a message for her to call back for the results, her biopsies showed H pylori and once she understands this and has an opportunity to ask all questions, I will prescribe antibiotics to treat the infection  Her colon polyp was a tubular adenoma so she will need a repeat colonoscopy sooner than 10 years, most likely 5 years as long as she does not have a family or personal history of colon cancer  Her esophageal biopsies were normal as were the duodenal biopsies  LVM SS results are on my chart

## 2022-08-29 ENCOUNTER — SOCIAL WORK (OUTPATIENT)
Dept: BEHAVIORAL/MENTAL HEALTH CLINIC | Facility: CLINIC | Age: 57
End: 2022-08-29
Payer: COMMERCIAL

## 2022-08-29 DIAGNOSIS — F43.10 POST-TRAUMATIC STRESS: ICD-10-CM

## 2022-08-29 DIAGNOSIS — F33.1 MODERATE EPISODE OF RECURRENT MAJOR DEPRESSIVE DISORDER (HCC): Primary | ICD-10-CM

## 2022-08-29 PROCEDURE — 90837 PSYTX W PT 60 MINUTES: CPT | Performed by: SOCIAL WORKER

## 2022-08-29 NOTE — PSYCH
Psychotherapy Provided: Individual Psychotherapy 60  minutes 2:30 Pm to 3:40 pm          Goals addressed in session:(Long Term goal Number One) The Client reported continued anxiety and depression due to relationship issues  She reported her pain has been constant and has been using her coping skills to address her weaknesses  During the session we explored boundaries in her relationships  Interventions: Support Therapy, Strengths Therapy, Seeking Safety as well as Cognitive Behavioral Therapy was used as the treatment modalities during the session  Assessment and Plan: The Client presented a depressed anxious mood that was congruent in effect  She was alert, goal directed and participated with prompts during the session  The Client was oriented to person, place, time, situation, and reported no suicidal/homicidal ideation, plan, or intent  As team we explored and processed the Client's boundaries and strength and weaknesses and the effects on her mental health  The Client was able to verbalize past problems with her boundaries  Next appointment was set for 2 weeks  Pain:      PSYCH MENTAL STATUS PAIN :6 as reported by the client     PHYSICAL PAIN SCALE NUMBERS:7 as reported by the Client     Current suicide risk : Low/Phone number for National Suicide [employer] was given to the [address]: Diagnosis and Treatment Plan explained to Kenna Sarah, Kenan Sarah  relates understanding diagnosis and is agreeable to Treatment Plan  Yes

## 2022-09-11 DIAGNOSIS — F41.0 ANXIETY DISORDER DUE TO GENERAL MEDICAL CONDITION WITH PANIC ATTACK: ICD-10-CM

## 2022-09-11 DIAGNOSIS — F33.2 SEVERE EPISODE OF RECURRENT MAJOR DEPRESSIVE DISORDER, WITHOUT PSYCHOTIC FEATURES (HCC): ICD-10-CM

## 2022-09-11 DIAGNOSIS — F06.4 ANXIETY DISORDER DUE TO GENERAL MEDICAL CONDITION WITH PANIC ATTACK: ICD-10-CM

## 2022-09-12 ENCOUNTER — SOCIAL WORK (OUTPATIENT)
Dept: BEHAVIORAL/MENTAL HEALTH CLINIC | Facility: CLINIC | Age: 57
End: 2022-09-12
Payer: COMMERCIAL

## 2022-09-12 DIAGNOSIS — F43.10 POST-TRAUMATIC STRESS: ICD-10-CM

## 2022-09-12 DIAGNOSIS — F33.1 MODERATE EPISODE OF RECURRENT MAJOR DEPRESSIVE DISORDER (HCC): Primary | ICD-10-CM

## 2022-09-12 PROCEDURE — 90837 PSYTX W PT 60 MINUTES: CPT | Performed by: SOCIAL WORKER

## 2022-09-12 RX ORDER — FLUOXETINE HYDROCHLORIDE 40 MG/1
CAPSULE ORAL
Qty: 90 CAPSULE | Refills: 0 | Status: SHIPPED | OUTPATIENT
Start: 2022-09-12
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
## 2022-10-05 ENCOUNTER — SOCIAL WORK (OUTPATIENT)
Dept: BEHAVIORAL/MENTAL HEALTH CLINIC | Facility: CLINIC | Age: 57
End: 2022-10-05
Payer: COMMERCIAL

## 2022-10-05 DIAGNOSIS — F43.10 POST-TRAUMATIC STRESS: ICD-10-CM

## 2022-10-05 DIAGNOSIS — F33.1 MODERATE EPISODE OF RECURRENT MAJOR DEPRESSIVE DISORDER (HCC): Primary | ICD-10-CM

## 2022-10-05 PROCEDURE — 90837 PSYTX W PT 60 MINUTES: CPT | Performed by: SOCIAL WORKER

## 2022-10-05 NOTE — PSYCH
Psychotherapy Provided: Individual Psychotherapy 60  minutes 1:00 Pm to 159 Pm          Goals addressed in session:(Long Term Goal Number One) The client reported increase care with her grandson, which has helped with her anxiety and depression  It is hoped that by addressing her weaknesses this will act as a preventive measure against the possible need for higher level of care and services  Interventions: Support Therapy, Strengths Therapy, Seeking Safety as well as Cognitive Behavioral Therapy was used as the treatment modalities during the session  Assessment and Plan: The client presented a depressed anxious mood that was congruent in effect  She was alert, goal directed and participated with prompts during the session  The Client was oriented to person, place, time, situation, and reported no suicidal/homicidal ideation, plan, or intent  As team we explored and processed the Client's coping skills in addressing her trauma history  She was an active team member during the session  Next appointment was set for 2 weeks  As  Her home commitment the Client will complete worksheet on couples communication  Pain:      PSYCH MENTAL STATUS PAIN : as reported by the Client     PHYSICAL PAIN SCALE NUMBERS: as reported by the Client     Current suicide risk : Low/Phone number for National Suicide Prevention Life Line was given to the Client/ [address]: Diagnosis and Treatment Plan explained to Ramona Medina, Ramona Medina  relates understanding diagnosis and is agreeable to Treatment Plan  Yes

## 2022-10-06 ENCOUNTER — OFFICE VISIT (OUTPATIENT)
Dept: PAIN MEDICINE | Facility: CLINIC | Age: 57
End: 2022-10-06
Payer: COMMERCIAL

## 2022-10-06 VITALS
BODY MASS INDEX: 36.06 KG/M2 | WEIGHT: 216.4 LBS | HEIGHT: 65 IN | DIASTOLIC BLOOD PRESSURE: 75 MMHG | SYSTOLIC BLOOD PRESSURE: 146 MMHG | HEART RATE: 86 BPM

## 2022-10-06 DIAGNOSIS — M79.18 MYOFASCIAL PAIN SYNDROME: ICD-10-CM

## 2022-10-06 DIAGNOSIS — M54.50 CHRONIC MIDLINE LOW BACK PAIN WITHOUT SCIATICA: ICD-10-CM

## 2022-10-06 DIAGNOSIS — M54.16 LUMBAR RADICULOPATHY: ICD-10-CM

## 2022-10-06 DIAGNOSIS — G89.29 CHRONIC MIDLINE LOW BACK PAIN WITH RIGHT-SIDED SCIATICA: ICD-10-CM

## 2022-10-06 DIAGNOSIS — M54.41 CHRONIC MIDLINE LOW BACK PAIN WITH RIGHT-SIDED SCIATICA: ICD-10-CM

## 2022-10-06 DIAGNOSIS — G89.4 CHRONIC PAIN SYNDROME: Primary | ICD-10-CM

## 2022-10-06 DIAGNOSIS — M47.816 LUMBAR SPONDYLOSIS: ICD-10-CM

## 2022-10-06 DIAGNOSIS — M46.1 SACROILIITIS (HCC): ICD-10-CM

## 2022-10-06 DIAGNOSIS — G89.29 CHRONIC MIDLINE LOW BACK PAIN WITHOUT SCIATICA: ICD-10-CM

## 2022-10-06 PROCEDURE — 99214 OFFICE O/P EST MOD 30 MIN: CPT | Performed by: NURSE PRACTITIONER

## 2022-10-06 RX ORDER — GABAPENTIN 300 MG/1
CAPSULE ORAL
Qty: 90 CAPSULE | Refills: 1 | Status: SHIPPED | OUTPATIENT
Start: 2022-10-06

## 2022-10-06 RX ORDER — TIZANIDINE 2 MG/1
2 TABLET ORAL EVERY 8 HOURS PRN
Qty: 90 TABLET | Refills: 5 | Status: SHIPPED | OUTPATIENT
Start: 2022-10-06

## 2022-10-06 RX ORDER — PRAMIPEXOLE DIHYDROCHLORIDE 0.25 MG/1
TABLET ORAL
COMMUNITY
Start: 2022-09-21

## 2022-10-06 NOTE — PROGRESS NOTES
Assessment:  1  Chronic pain syndrome    2  Chronic midline low back pain without sciatica    3  Lumbar radiculopathy    4  Lumbar spondylosis    5  Myofascial pain syndrome    6  Sacroiliitis ([address] )    7  Chronic midline low back pain with right-sided sciatica        Plan:  While the patient was in the office today, I did have a thorough conversation regarding their chronic pain syndrome, medication management, and treatment plan options  Patient is being seen for follow-up visit  She was last seen here on 11/16/2021  She has been seen here for awhile  She fractured her C2 vertebrae during a fall  She has C1-3 fusion that was performed in May  Overall, her neck pain is pretty stable  Her main reason for returning is complaints of jumping and pins and needles sensation in her legs  She was recently started on Mirapex which has been somewhat helpful  She has been off of gabapentin for 2 months  At this point, we will plan to restart gabapentin titrating to 300 mg 3 times daily  Prescription was sent to her pharmacy  Continue tizanidine 2 mg every 8 hours as needed for spasms  Prescription was sent to her pharmacy  Patient's most recent hemoglobin A1c was 11 3 on 05/11/2022    The patient will follow-up in 1 month for medication prescription refill and reevaluation  The patient was advised to contact the office should their symptoms worsen in the interim  The patient was agreeable and verbalized an understanding  History of Present Illness: The patient is a 49 y o  female who presents for a follow up office visit in regards to Back Pain  The patients current symptoms include complaints of low back pain  Current pain level is a 6/10  Quality pain is described as sharp, throbbing, shooting, numb, pins and needles  Current pain medications includes:  Tizanidine 2 mg as needed  She ran out of gabapentin 2 months ago        I have personally reviewed and/or updated the patient's past medical history, past surgical history, family history, social history, current medications, allergies, and vital signs today  Review of Systems  Review of Systems   Respiratory: Negative for shortness of breath  Cardiovascular: Negative for chest pain  Gastrointestinal: Positive for nausea  Negative for constipation, diarrhea and vomiting  Musculoskeletal: Negative for arthralgias, gait problem, joint swelling and myalgias  Joint stiffness   Neurological: Positive for dizziness  Negative for seizures and weakness  All other systems reviewed and are negative  Past Medical History:   Diagnosis Date    Anemia     iron deficiency anemia    Depression     Diabetes mellitus ([address] )     Fibroid     LTH   today 12/8/2021    History of transfusion     Hydrocephalus (HCC)     Hyperlipidemia     Hypertension     Migraine     PONV (postoperative nausea and vomiting)     Sciatica     Seasonal allergies     Vertigo     Wears glasses        Past Surgical History:   Procedure Laterality Date    COLONOSCOPY      EPIDURAL BLOCK INJECTION Right 07/02/2020    Procedure: Right L4-5 and L5-S1 transforaminal epidural steroid injection ([id number]);   Surgeon: Bre Gan MD;  [facility];  Service: Pain Management     EPIDURAL BLOCK INJECTION N/A 09/02/2021    Procedure: BLOCK / INJECTION EPIDURAL STEROID LUMBAR  L5-S1 IESI;  Surgeon: Bre Gan MD;  [facility];  Service: Pain Management     FL GUIDED NEEDLE PLAC BX/ASP/INJ  07/02/2020    FL GUIDED NEEDLE PLAC BX/ASP/INJ  09/02/2021    HYSTERECTOMY      GA ESOPHAGOGASTRODUODENOSCOPY TRANSORAL DIAGNOSTIC N/A 10/23/2018    Procedure: EGD with Savary dilation AND COLONOSCOPY;  Surgeon: Edgard Chua MD;  [facility];  Service: Gastroenterology    GA LAPAROSCOPY W TOT HYSTERECT UTERUS 250 GRAM OR LESS N/A 12/08/2021    Procedure: [address]; BILAT SALPINGECTOMY; EXCISION PERITONEAL CYST;  Surgeon: Gurpreet Villarreal Angelica Reveles MD;  Location[facility];  Service: Gynecology    TUBAL LIGATION         Family History   Problem Relation Age of Onset    Diabetes Father     Cancer Father     Varicose Veins Mother     Drug abuse Mother        Social History     Occupational History    Not on file   Tobacco Use    Smoking status: Never Smoker    Smokeless tobacco: Never Used   Vaping Use    Vaping Use: Never used   Substance and Sexual Activity    Alcohol use: No    Drug use: No    Sexual activity: Not Currently     Comment:          Current Outpatient Medications:     aspirin (ECOTRIN LOW STRENGTH) 81 mg EC tablet, Take 81 mg by mouth daily, Disp: , Rfl:     atorvastatin (LIPITOR) 40 mg tablet, Take 40 mg by mouth every evening, Disp: , Rfl:     busPIRone (BUSPAR) 10 mg tablet, Take 2 tablets (20 mg total) by mouth 3 (three) times a day as needed (anxiety), Disp: 90 tablet, Rfl: 5    FLUoxetine (PROzac) 40 MG capsule, Take 1 capsule by mouth once daily, Disp: 90 capsule, Rfl: 0    gabapentin (NEURONTIN) 300 mg capsule, 1 PO QHS x 1 day, then 1 PO BID x 1 dday, then 1 PO TID, Disp: 90 capsule, Rfl: 1    glimepiride (AMARYL) 4 mg tablet, Take 2 mg by mouth daily At night, Disp: , Rfl:     Insulin Glargine-Lixisenatide (Insulin Glargine-Lixisenatide) 100 units-33 mcg/mL injection pen, Inject 40 Units under the skin daily  , Disp: , Rfl:     losartan (COZAAR) 100 MG tablet, Take 100 mg by mouth daily, Disp: , Rfl:     metoprolol succinate (TOPROL-XL) 100 mg 24 hr tablet, Take 100 mg by mouth daily, Disp: , Rfl:     pramipexole (MIRAPEX) 0 25 mg tablet, Half tab at night for 7 days than 1 tab at night, Disp: , Rfl:     tiZANidine (ZANAFLEX) 2 mg tablet, Take 1 tablet (2 mg total) by mouth every 8 (eight) hours as needed for muscle spasms, Disp: 90 tablet, Rfl: 5    Diclofenac Sodium (VOLTAREN) 1 %, Apply 2 g topically 4 (four) times a day, Disp: 50 g, Rfl: 1    neomycin-polymyxin-hydrocortisone (CORTISPORIN) 0 35%-10,000 units/mL-1% otic suspension, Administer 4 drops into the left ear every 8 (eight) hours for 7 days, Disp: 10 mL, Rfl: 0    prazosin (MINIPRESS) 2 mg capsule, Take 1 capsule (2 mg total) by mouth daily at bedtime, Disp: 30 capsule, Rfl: 0    Allergies   Allergen Reactions    Nuts - Food Allergy Anaphylaxis    Chocolate - Food Allergy Cough    Eggs Or Egg-Derived Products - Food Allergy Other (See Comments)     Cough    Other      LETTUCE    Shellfish Allergy - Food Allergy Other (See Comments)     unknown    Tomato - Food Allergy Cough       Physical Exam:    /75 (BP Location: Left arm, Patient Position: Sitting, Cuff Size: Standard)   Pulse 86   Ht 5' 5" (1 651 m)   Wt 98 2 kg (216 lb 6 4 oz)   LMP  (LMP Unknown)   BMI 36 01 kg/m²     Constitutional:normal, well developed, well nourished, alert, in no distress and non-toxic and no overt pain behavior  Eyes:anicteric  HEENT:grossly intact  Neck:supple, symmetric, trachea midline and no masses   Pulmonary:even and unlabored  Cardiovascular:No edema or pitting edema present  Skin:Normal without rashes or lesions and well hydrated  Psychiatric:Mood and affect appropriate  Neurologic:Cranial Nerves II-XII grossly intact  Musculoskeletal:normal    Imaging  No orders to display       No orders of the defined types were placed in this encounter

## 2022-10-28 ENCOUNTER — VBI (OUTPATIENT)
Dept: ADMINISTRATIVE | Facility: OTHER | Age: 57
End: 2022-10-28

## 2022-10-31 ENCOUNTER — RA CDI HCC (OUTPATIENT)
Dept: OTHER | Facility: HOSPITAL | Age: 57
End: 2022-10-31

## 2022-10-31 NOTE — PROGRESS NOTES
[facility]  coding opportunities          Chart Reviewed number of suggestions sent to Provider: 1    E66 01- Severe obesity with BMI over 35 and comorbid condition ([facility] )  *BMI of 36 01 with diabetes and hypertension    If this is correct, please document and assess at your next visit   11/7/22     Patients Insurance      Commercial Insurance: [employer]

## 2022-11-01 ENCOUNTER — SOCIAL WORK (OUTPATIENT)
Dept: BEHAVIORAL/MENTAL HEALTH CLINIC | Facility: CLINIC | Age: 57
End: 2022-11-01

## 2022-11-01 DIAGNOSIS — F33.1 MODERATE EPISODE OF RECURRENT MAJOR DEPRESSIVE DISORDER (HCC): Primary | ICD-10-CM

## 2022-11-01 NOTE — PSYCH
Psychotherapy Provided: Individual Psychotherapy 60  minutes          Goals addressed in session: (Creation of 2nd treatment Plan) The Client reported that her physical health had decreased causing an increase in her anxiety and depression  During the session we explored and processed her weaknesses and strength and the effects on her mental health , as well as covered creation of there treatment plan  Interventions: Supportive Therapy, Strengths Therapy,  and Cognitive Behavioral Therapy where the treatment modalities utilized during the session  Assessment and Plan: The client presented a depressed anxious mood that was congruent in effect  She was alert, goal directd and particpated with protms during the session  The Client was oriented to person, place, time, situation, and reported no suicidal/homcidal ideation, plan, or intent  As team we explored and and processed the Client's  Weaknesses and the effects on her mental health  [facility] which will cover during the next appointment  Next appointment was set for 3 weeks  Pain:      PSYCH MENTAL STATUS PAIN :6 as reported by the Client     PHYSICAL PAIN SCALE NUMBERS:9 as reported by the client due to neck pain  Current suicide risk : Low/Phone number for National Suicide Prevention Life Line was given to the Client/ [address]: Diagnosis and Treatment Plan explained to Bethany Argueta, Bethany Argueta  relates understanding diagnosis and is agreeable to Treatment Plan  Yes

## 2022-11-01 NOTE — PSYCH
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  She is receiving her physical health care thought Camron Abebe [id number] issues, and for her mental health with this therapist  Eva Alcala MSW LCSW at [facility]     Diagnosis:   1  Moderate episode of recurrent major depressive disorder (HCC)      2  Post-traumatic stress            Area of Needs: The client reported that she would like to address her depression, and trauma history that is effecting her different relationships and environments  It is hoped that by addressing her weaknesses the Client  will achieve or maintain maximum functional capacity in performing daily activizes, taking into account both the functional capacity of the individual and those functional capacities appropriate for the individuals of the same age  Reduce or ameliorate the physical mental, behavioral, or developmental effects of an illness, condition, injury or disability   Present treatment plan will cover 4 months or 12 visits which ever comes first                Long Term Goal 1: The Client's depression score on the PHQ-9 will decrease from 18 to 9 or less (score increased by 2 points over her origianl screening 11/01/22)     Target Date: 07/22/2023  Completion Date:          Short Term Objectives for Goal 1: The Client will learn 4 coping skills to address her depression  (emerging)      Long Term Goal 2: The Client's trauma score on her PCL-5 will decrease from 45 to 25 or less (score decreased by 3 points over her origianl screening 11/01/22)      Target Date: 07/22/2023  Completion Date:      Short Term Objectives for Goal 2: The Client will learn 5 triggers to her trauma symptoms (emerging)          Long Term Goal # 3:The Client all of her medical appointments 100% of time     Target Date: 07/22/2023  Completion Date:      GOAL 1: Modality: The person(s) responsible for carrying out the plan is  the Client and her therapist Barbara CARNEY LCSAYE     GOAL 2: Modality: The person(s) responsible for carrying out the plan is  the Client and her therapist Barbara CARNEY LCSW      GOAL 3: Modality: The person(s) responsible for carrying out the plan is  the Client and her therapist Barbara CARNEY [facility] [facility]: Diagnosis and Treatment Plan explained to Jaime Collins relates understanding diagnosis and is agreeable to Treatment Plan          Client Comments : Please share your thoughts, feelings, need and/or experiences regarding your treatment plan:    The Client's short term goals will be reviewed and or completed on or before 03/01/22  The Client reported that she will no longer need mental health therapy when " I am feeling better"   __________________________________________________________________     __________________________________________________________________     __________________________________________________________________     __________________________________________________________________     _______________________________________                 Patient signature, Date Time: __________________________________________        Physician cosigner signature, Date, Time: ________________________________

## 2022-11-11 ENCOUNTER — OFFICE VISIT (OUTPATIENT)
Dept: FAMILY MEDICINE CLINIC | Facility: CLINIC | Age: 57
End: 2022-11-11

## 2022-11-11 VITALS
BODY MASS INDEX: 35.99 KG/M2 | WEIGHT: 216 LBS | RESPIRATION RATE: 18 BRPM | DIASTOLIC BLOOD PRESSURE: 76 MMHG | HEIGHT: 65 IN | HEART RATE: 88 BPM | SYSTOLIC BLOOD PRESSURE: 138 MMHG | OXYGEN SATURATION: 97 %

## 2022-11-11 DIAGNOSIS — M25.552 CHRONIC HIP PAIN, BILATERAL: ICD-10-CM

## 2022-11-11 DIAGNOSIS — F41.0 ANXIETY DISORDER DUE TO GENERAL MEDICAL CONDITION WITH PANIC ATTACK: ICD-10-CM

## 2022-11-11 DIAGNOSIS — Z13.820 SCREENING FOR OSTEOPOROSIS: ICD-10-CM

## 2022-11-11 DIAGNOSIS — R53.83 OTHER FATIGUE: ICD-10-CM

## 2022-11-11 DIAGNOSIS — F06.4 ANXIETY DISORDER DUE TO GENERAL MEDICAL CONDITION WITH PANIC ATTACK: ICD-10-CM

## 2022-11-11 DIAGNOSIS — E11.9 DIABETES MELLITUS WITHOUT COMPLICATION (HCC): ICD-10-CM

## 2022-11-11 DIAGNOSIS — Z13.5 SCREENING FOR DIABETIC RETINOPATHY: ICD-10-CM

## 2022-11-11 DIAGNOSIS — F43.10 PTSD (POST-TRAUMATIC STRESS DISORDER): ICD-10-CM

## 2022-11-11 DIAGNOSIS — Z12.31 ENCOUNTER FOR SCREENING MAMMOGRAM FOR MALIGNANT NEOPLASM OF BREAST: ICD-10-CM

## 2022-11-11 DIAGNOSIS — M25.551 CHRONIC HIP PAIN, BILATERAL: ICD-10-CM

## 2022-11-11 DIAGNOSIS — G89.29 CHRONIC HIP PAIN, BILATERAL: ICD-10-CM

## 2022-11-11 DIAGNOSIS — Z00.00 ANNUAL PHYSICAL EXAM: Primary | ICD-10-CM

## 2022-11-11 PROBLEM — M54.81 OCCIPITAL NEURALGIA OF RIGHT SIDE: Status: ACTIVE | Noted: 2022-10-17

## 2022-11-11 RX ORDER — BUSPIRONE HYDROCHLORIDE 10 MG/1
20 TABLET ORAL 3 TIMES DAILY PRN
Qty: 180 TABLET | Refills: 5 | Status: SHIPPED | OUTPATIENT
Start: 2022-11-11 | End: 2022-12-11

## 2022-11-11 RX ORDER — HUMAN INSULIN 100 [IU]/ML
INJECTION, SUSPENSION SUBCUTANEOUS
COMMUNITY
Start: 2022-08-25

## 2022-11-11 RX ORDER — FERROUS SULFATE 325(65) MG
325 TABLET ORAL
COMMUNITY

## 2022-11-11 NOTE — PATIENT INSTRUCTIONS

## 2022-11-21 ENCOUNTER — OFFICE VISIT (OUTPATIENT)
Dept: URGENT CARE | Facility: CLINIC | Age: 57
End: 2022-11-21

## 2022-11-21 ENCOUNTER — APPOINTMENT (OUTPATIENT)
Dept: RADIOLOGY | Facility: CLINIC | Age: 57
End: 2022-11-21

## 2022-11-21 VITALS
TEMPERATURE: 99.2 F | HEART RATE: 104 BPM | RESPIRATION RATE: 20 BRPM | DIASTOLIC BLOOD PRESSURE: 90 MMHG | BODY MASS INDEX: 37.15 KG/M2 | HEIGHT: 65 IN | WEIGHT: 223 LBS | OXYGEN SATURATION: 96 % | SYSTOLIC BLOOD PRESSURE: 138 MMHG

## 2022-11-21 DIAGNOSIS — R05.1 ACUTE COUGH: ICD-10-CM

## 2022-11-21 DIAGNOSIS — R06.00 DYSPNEA, UNSPECIFIED TYPE: ICD-10-CM

## 2022-11-21 DIAGNOSIS — J18.9 PNEUMONIA OF RIGHT MIDDLE LOBE DUE TO INFECTIOUS ORGANISM: Primary | ICD-10-CM

## 2022-11-21 LAB
SARS-COV-2 AG UPPER RESP QL IA: NEGATIVE
VALID CONTROL: NORMAL

## 2022-11-21 RX ORDER — AZITHROMYCIN 250 MG/1
TABLET, FILM COATED ORAL
Qty: 6 TABLET | Refills: 0 | Status: SHIPPED | OUTPATIENT
Start: 2022-11-21 | End: 2022-11-25

## 2022-11-21 RX ORDER — ALBUTEROL SULFATE 90 UG/1
2 AEROSOL, METERED RESPIRATORY (INHALATION) EVERY 6 HOURS PRN
Qty: 18 G | Refills: 0 | Status: SHIPPED | OUTPATIENT
Start: 2022-11-21

## 2022-11-21 NOTE — PROGRESS NOTES
[facility]        NAME: Conrad Coon is a 49 y o  female  : 1965    MRN: [id number]  DATE: 2022  TIME: 9:25 AM    Assessment and Plan   Pneumonia of right middle lobe due to infectious organism [J18 9]  1  Pneumonia of right middle lobe due to infectious organism  azithromycin (ZITHROMAX) 250 mg tablet    albuterol (Ventolin HFA) 90 mcg/act inhaler      2  Acute cough  Poct Covid 19 Rapid Antigen Test    XR chest pa & lateral    Covid/Flu-Office Collect      3  Dyspnea, unspecified type  albuterol (Ventolin HFA) 90 mcg/act inhaler            Patient Instructions   Patient Instructions     Pneumonia   WHAT YOU NEED TO KNOW:   Pneumonia is an infection in your lungs caused by bacteria, viruses, fungi, or parasites  You can become infected if you come in contact with someone who is sick  You can get pneumonia if you recently had surgery or needed a ventilator to help you breathe  Pneumonia can also be caused by accidentally inhaling saliva or small pieces of food  Pneumonia may cause mild symptoms, or it can be severe and life-threatening  DISCHARGE INSTRUCTIONS:   Return to the emergency department if:   · You cough up blood  · Your heart beats more than 100 beats in 1 minute  · You are very tired, confused, and cannot think clearly  · You have chest pain or trouble breathing  · Your lips or fingernails turn gray or blue  Call your doctor if:   · Your symptoms are the same or get worse 48 hours after you start antibiotics  · Your fever is not below 99°F (37 2°C) 48 hours after you start antibiotics  · You have a fever higher than 101°F (38 3°C)  · You cannot eat, or you have loss of appetite, nausea, or are vomiting  · You have questions or concerns about your condition or care  Medicines: You may need any of the following:  · Antibiotics  treat pneumonia caused by bacteria  · Acetaminophen  decreases pain and fever   It is available without a doctor's order  Ask how much to take and how often to take it  Follow directions  Read the labels of all other medicines you are using to see if they also contain acetaminophen, or ask your doctor or pharmacist  Acetaminophen can cause liver damage if not taken correctly  Do not use more than 4 grams (4,000 milligrams) total of acetaminophen in one day  · NSAIDs , such as ibuprofen, help decrease swelling, pain, and fever  This medicine is available with or without a doctor's order  NSAIDs can cause stomach bleeding or kidney problems in certain people  If you take blood thinner medicine, always ask your healthcare provider if NSAIDs are safe for you  Always read the medicine label and follow directions  · Take your medicine as directed  Contact your healthcare provider if you think your medicine is not helping or if you have side effects  Tell him or her if you are allergic to any medicine  Keep a list of the medicines, vitamins, and herbs you take  Include the amounts, and when and why you take them  Bring the list or the pill bottles to follow-up visits  Carry your medicine list with you in case of an emergency  Follow up with your doctor as directed: You will need to return for more tests  Write down your questions so you remember to ask them during your visits  Manage your symptoms:   · Rest as needed  Rest often throughout the day  Alternate times of activity with times of rest     · Drink liquids as directed  Ask how much liquid to drink each day and which liquids are best for you  Liquids help thin your mucus, which may make it easier for you to cough it up  · Do not smoke  Avoid secondhand smoke  Smoking increases your risk for pneumonia  Smoking also makes it harder for you to get better after you have had pneumonia  Ask your healthcare provider for information if you need help to quit smoking  · Limit alcohol  Women should limit alcohol to 1 drink a day   Men should limit alcohol to 2 drinks a day  A drink of alcohol is 12 ounces of beer, 5 ounces of wine, or 1½ ounces of liquor  · Use a cool mist humidifier  A humidifier will help increase air moisture in your home  This may make it easier for you to breathe and help decrease your cough  · Keep your head elevated  You may be able to breathe better if you lie down with the head of your bed up  Prevent pneumonia:   · Wash your hands often  Use soap and water every time you wash your hands  Rub your soapy hands together, lacing your fingers  Use the fingers of one hand to scrub under the nails of the other hand  Wash for at least 20 seconds  Rinse with warm, running water for several seconds  Then dry your hands with a clean towel or paper towel  Use hand  that contains alcohol if soap and water are not available  Do not touch your eyes, nose, or mouth without washing your hands first          · Cover a sneeze or cough  Use a tissue that covers your mouth and nose  Throw the tissue away in a trash can right away  Use the bend of your arm if a tissue is not available  Wash your hands well with soap and water or use a hand   Do not stand close to anyone who is sneezing or coughing  · Stay away from others until you are well  Do not go to work or other activities  Wait until your symptoms are gone or your healthcare provider says it is okay to return  · Ask about vaccines you may need  You may need a vaccine to help prevent pneumonia  Get an influenza (flu) vaccine every year as soon as recommended, usually in September or October  Flu viruses change, so it is important to get a yearly flu vaccine  © Copyright [employer] 2022 Information is for End User's use only and may not be sold, redistributed or otherwise used for commercial purposes   All illustrations and images included in CareNotes® are the copyrighted property of [employer]  or Claribel Montoya  The above information is an  only  It is not intended as medical advice for individual conditions or treatments  Talk to your doctor, nurse or pharmacist before following any medical regimen to see if it is safe and effective for you  Follow up with PCP in 3-5 days  Proceed to  ER if symptoms worsen  Chief Complaint     Chief Complaint   Patient presents with   • Shortness of Breath   • Fever   • Sore Throat   • Chills         History of Present Illness       The patient presents to the clinic complaining of a cough, fevers, chills, postnasal drip, shortness of breath, and wheezing for approximately 5 days  She is not vaccinated against flu or fully vaccinated against COVID  She also states that her sugars have been running high  She denies associated diarrhea  Review of Systems   Review of Systems   Constitutional: Positive for chills and fever  HENT: Positive for congestion, postnasal drip, rhinorrhea, sinus pressure and sore throat  Negative for ear pain  Eyes: Negative for pain and visual disturbance  Respiratory: Positive for cough, shortness of breath and wheezing  Cardiovascular: Negative for chest pain and palpitations  Gastrointestinal: Negative for abdominal pain, diarrhea and vomiting  Genitourinary: Negative for dysuria and hematuria  Musculoskeletal: Negative for arthralgias and back pain  Skin: Negative for color change and rash  Neurological: Positive for dizziness and headaches  Negative for seizures and syncope  All other systems reviewed and are negative          Current Medications       Current Outpatient Medications:   •  albuterol (Ventolin HFA) 90 mcg/act inhaler, Inhale 2 puffs every 6 (six) hours as needed for wheezing, Disp: 18 g, Rfl: 0  •  aspirin (ECOTRIN LOW STRENGTH) 81 mg EC tablet, Take 81 mg by mouth daily, Disp: , Rfl:   •  atorvastatin (LIPITOR) 40 mg tablet, Take 40 mg by mouth every evening, Disp: , Rfl:   •  azithromycin (ZITHROMAX) 250 mg tablet, Take 2 tablets today then 1 tablet daily x 4 days, Disp: 6 tablet, Rfl: 0  •  busPIRone (BUSPAR) 10 mg tablet, Take 2 tablets (20 mg total) by mouth 3 (three) times a day as needed (anxiety), Disp: 180 tablet, Rfl: 5  •  ferrous sulfate 325 (65 Fe) mg tablet, Take 325 mg by mouth, Disp: , Rfl:   •  FLUoxetine (PROzac) 40 MG capsule, Take 1 capsule by mouth once daily, Disp: 90 capsule, Rfl: 0  •  gabapentin (NEURONTIN) 300 mg capsule, 1 PO QHS x 1 day, then 1 PO BID x 1 dday, then 1 PO TID, Disp: 90 capsule, Rfl: 1  •  glimepiride (AMARYL) 4 mg tablet, Take 2 mg by mouth daily At night, Disp: , Rfl:   •  Insulin Glargine-Lixisenatide (Insulin Glargine-Lixisenatide) 100 units-33 mcg/mL injection pen, Inject 40 Units under the skin daily  , Disp: , Rfl:   •  losartan (COZAAR) 100 MG tablet, Take 100 mg by mouth daily, Disp: , Rfl:   •  metoprolol succinate (TOPROL-XL) 100 mg 24 hr tablet, Take 100 mg by mouth daily, Disp: , Rfl:   •  NovoLIN 70/30 FlexPen (70-30) 100 UNIT/ML injection pen, , Disp: , Rfl:   •  pramipexole (MIRAPEX) 0 25 mg tablet, Half tab at night for 7 days than 1 tab at night, Disp: , Rfl:   •  tiZANidine (ZANAFLEX) 2 mg tablet, Take 1 tablet (2 mg total) by mouth every 8 (eight) hours as needed for muscle spasms, Disp: 90 tablet, Rfl: 5    Current Allergies     Allergies as of 11/21/2022 - Reviewed 11/21/2022   Allergen Reaction Noted   • Nuts - food allergy Anaphylaxis 07/12/2017   • Chocolate - food allergy Cough 07/12/2017   • Eggs or egg-derived products - food allergy Other (See Comments) 07/07/2020   • Other  12/04/2018   • Shellfish allergy - food allergy Other (See Comments) 07/12/2017   • Tomato - food allergy Cough 07/12/2017            The following portions of the patient's history were reviewed and updated as appropriate: allergies, current medications, past family history, past medical history, past social history, past surgical history and problem list      Past Medical History: Diagnosis Date   • Anemia     iron deficiency anemia   • Depression    • Diabetes mellitus ([facility] [address] )    • Fibroid     LT   today 12/8/2021   • History of transfusion    • Hydrocephalus (HCC)    • Hyperlipidemia    • Hypertension    • Migraine    • PONV (postoperative nausea and vomiting)    • Sciatica    • Seasonal allergies    • Vertigo    • Wears glasses        Past Surgical History:   Procedure Laterality Date   • COLONOSCOPY     • EPIDURAL BLOCK INJECTION Right 07/02/2020    Procedure: Right L4-5 and L5-S1 transforaminal epidural steroid injection ([id number]); Surgeon: Leora Jacobson MD;  Location: [facility];  Service: Pain Management    • EPIDURAL BLOCK INJECTION N/A 09/02/2021    Procedure: BLOCK / INJECTION EPIDURAL STEROID LUMBAR  L5-S1 IESI;  Surgeon: Leora Jacobson MD;  Location: [facility];  Service: Pain Management    • FL GUIDED NEEDLE PLAC BX/ASP/INJ  07/02/2020   • FL GUIDED NEEDLE PLAC BX/ASP/INJ  09/02/2021   • HYSTERECTOMY     • CA ESOPHAGOGASTRODUODENOSCOPY TRANSORAL DIAGNOSTIC N/A 10/23/2018    Procedure: EGD with Savary dilation AND COLONOSCOPY;  Surgeon: Lex Manjarrez MD;  Location: [facility];  Service: Gastroenterology   • CA LAPAROSCOPY W TOT HYSTERECT UTERUS 250 GRAM OR LESS N/A 12/08/2021    Procedure: Cornelio Torres; BILAT SALPINGECTOMY; EXCISION PERITONEAL CYST;  Surgeon: Beto Michelle MD;  Location: [facility];  Service: Gynecology   • TUBAL LIGATION         Family History   Problem Relation Age of Onset   • Diabetes Father    • Cancer Father    • Varicose Veins Mother    • Drug abuse Mother          Medications have been verified  Objective   /90   Pulse 104   Temp 99 2 °F (37 3 °C)   Resp 20   Ht 5' 5" (1 651 m)   Wt 101 kg (223 lb)   LMP  (LMP Unknown)   SpO2 96%   BMI 37 11 kg/m²        Physical Exam     Physical Exam  Constitutional:       Appearance: She is well-developed  She is not ill-appearing  HENT:      Head: Normocephalic     Eyes:      General: Right eye: No discharge  Left eye: No discharge  Pupils: Pupils are equal, round, and reactive to light  Neck:      Thyroid: No thyromegaly  Cardiovascular:      Rate and Rhythm: Normal rate and regular rhythm  Heart sounds: No murmur heard  Pulmonary:      Effort: Pulmonary effort is normal  No respiratory distress  Breath sounds: Examination of the left-middle field reveals decreased breath sounds  Examination of the left-lower field reveals decreased breath sounds  Decreased breath sounds and rhonchi present  No wheezing or rales  Chest:      Chest wall: No tenderness  Abdominal:      General: There is no distension  Palpations: Abdomen is soft  Tenderness: There is no abdominal tenderness  There is no guarding or rebound  Musculoskeletal:         General: Normal range of motion  Cervical back: Normal range of motion  Lymphadenopathy:      Cervical: No cervical adenopathy  Skin:     General: Skin is warm  Neurological:      Mental Status: She is alert and oriented to person, place, and time  -x-ray is reviewed  The patient has a infiltrate in the right middle lobe  The patient is made aware     -she was instructed to follow up with primary care doctor in the next 1-2 weeks  She was instructed to go the ER if symptoms worsen

## 2022-11-21 NOTE — PATIENT INSTRUCTIONS
Pneumonia   WHAT YOU NEED TO KNOW:   Pneumonia is an infection in your lungs caused by bacteria, viruses, fungi, or parasites  You can become infected if you come in contact with someone who is sick  You can get pneumonia if you recently had surgery or needed a ventilator to help you breathe  Pneumonia can also be caused by accidentally inhaling saliva or small pieces of food  Pneumonia may cause mild symptoms, or it can be severe and life-threatening  DISCHARGE INSTRUCTIONS:   Return to the emergency department if:   You cough up blood  Your heart beats more than 100 beats in 1 minute  You are very tired, confused, and cannot think clearly  You have chest pain or trouble breathing  Your lips or fingernails turn gray or blue  Call your doctor if:   Your symptoms are the same or get worse 48 hours after you start antibiotics  Your fever is not below 99°F (37 2°C) 48 hours after you start antibiotics  You have a fever higher than 101°F (38 3°C)  You cannot eat, or you have loss of appetite, nausea, or are vomiting  You have questions or concerns about your condition or care  Medicines: You may need any of the following:  Antibiotics  treat pneumonia caused by bacteria  Acetaminophen  decreases pain and fever  It is available without a doctor's order  Ask how much to take and how often to take it  Follow directions  Read the labels of all other medicines you are using to see if they also contain acetaminophen, or ask your doctor or pharmacist  Acetaminophen can cause liver damage if not taken correctly  Do not use more than 4 grams (4,000 milligrams) total of acetaminophen in one day  NSAIDs , such as ibuprofen, help decrease swelling, pain, and fever  This medicine is available with or without a doctor's order  NSAIDs can cause stomach bleeding or kidney problems in certain people   If you take blood thinner medicine, always ask your healthcare provider if NSAIDs are safe for you  Always read the medicine label and follow directions  Take your medicine as directed  Contact your healthcare provider if you think your medicine is not helping or if you have side effects  Tell him or her if you are allergic to any medicine  Keep a list of the medicines, vitamins, and herbs you take  Include the amounts, and when and why you take them  Bring the list or the pill bottles to follow-up visits  Carry your medicine list with you in case of an emergency  Follow up with your doctor as directed: You will need to return for more tests  Write down your questions so you remember to ask them during your visits  Manage your symptoms:   Rest as needed  Rest often throughout the day  Alternate times of activity with times of rest     Drink liquids as directed  Ask how much liquid to drink each day and which liquids are best for you  Liquids help thin your mucus, which may make it easier for you to cough it up  Do not smoke  Avoid secondhand smoke  Smoking increases your risk for pneumonia  Smoking also makes it harder for you to get better after you have had pneumonia  Ask your healthcare provider for information if you need help to quit smoking  Limit alcohol  Women should limit alcohol to 1 drink a day  Men should limit alcohol to 2 drinks a day  A drink of alcohol is 12 ounces of beer, 5 ounces of wine, or 1½ ounces of liquor  Use a cool mist humidifier  A humidifier will help increase air moisture in your home  This may make it easier for you to breathe and help decrease your cough  Keep your head elevated  You may be able to breathe better if you lie down with the head of your bed up  Prevent pneumonia:   Wash your hands often  Use soap and water every time you wash your hands  Rub your soapy hands together, lacing your fingers  Use the fingers of one hand to scrub under the nails of the other hand  Wash for at least 20 seconds   Rinse with warm, running water for several seconds  Then dry your hands with a clean towel or paper towel  Use hand  that contains alcohol if soap and water are not available  Do not touch your eyes, nose, or mouth without washing your hands first          Cover a sneeze or cough  Use a tissue that covers your mouth and nose  Throw the tissue away in a trash can right away  Use the bend of your arm if a tissue is not available  Wash your hands well with soap and water or use a hand   Do not stand close to anyone who is sneezing or coughing  Stay away from others until you are well  Do not go to work or other activities  Wait until your symptoms are gone or your healthcare provider says it is okay to return  Ask about vaccines you may need  You may need a vaccine to help prevent pneumonia  Get an influenza (flu) vaccine every year as soon as recommended, usually in September or October  Flu viruses change, so it is important to get a yearly flu vaccine  © Copyright [employer] 2022 Information is for End User's use only and may not be sold, redistributed or otherwise used for commercial purposes  All illustrations and images included in CareNotes® are the copyrighted property of [employer]  or [facility] Karel Senior   The above information is an  only  It is not intended as medical advice for individual conditions or treatments  Talk to your doctor, nurse or pharmacist before following any medical regimen to see if it is safe and effective for you

## 2022-11-22 LAB
FLUAV RNA RESP QL NAA+PROBE: NEGATIVE
FLUBV RNA RESP QL NAA+PROBE: NEGATIVE
SARS-COV-2 RNA RESP QL NAA+PROBE: NEGATIVE

## 2022-11-23 ENCOUNTER — VBI (OUTPATIENT)
Dept: ADMINISTRATIVE | Facility: OTHER | Age: 57
End: 2022-11-23

## 2022-11-28 ENCOUNTER — TELEMEDICINE (OUTPATIENT)
Dept: FAMILY MEDICINE CLINIC | Facility: CLINIC | Age: 57
End: 2022-11-28

## 2022-11-28 VITALS — HEIGHT: 65 IN | WEIGHT: 223 LBS | BODY MASS INDEX: 37.15 KG/M2

## 2022-11-28 DIAGNOSIS — J18.9 PNEUMONIA OF RIGHT MIDDLE LOBE DUE TO INFECTIOUS ORGANISM: Primary | ICD-10-CM

## 2022-11-28 RX ORDER — DOXYCYCLINE HYCLATE 100 MG/1
100 CAPSULE ORAL EVERY 12 HOURS SCHEDULED
Qty: 14 CAPSULE | Refills: 0 | Status: SHIPPED | OUTPATIENT
Start: 2022-11-28 | End: 2022-12-05

## 2022-11-28 NOTE — PROGRESS NOTES
Virtual Regular Visit    Verification of patient location:    Patient is located in the following state in which I hold an active license [place]      Assessment/Plan:    Problem List Items Addressed This Visit    None  Visit Diagnoses     Pneumonia of right middle lobe due to infectious organism    -  Primary    Relevant Medications    doxycycline hyclate (VIBRAMYCIN) 100 mg capsule        RTC 2 days for follow up  Report to ER For worsening SOB         Reason for visit is   Chief Complaint   Patient presents with   • Pneumonia     Pt was seen at urgent care 11/21, was dx with pneumonia  States she was given zpack and inhaler but still has a lot of chest congestion and cough  Also c/o headaches  • Virtual Regular Visit        Encounter provider Hang Wren PA-C    Provider located at [address] [place] [address] [address]  [place] [id number]      Recent Visits  No visits were found meeting these conditions  Showing recent visits within past 7 days and meeting all other requirements  Today's Visits  Date Type Provider Dept   11/28/22 Telemedicine Madison Boyd PA-C [facility] [address] [facility] today's visits and meeting all other requirements  Future Appointments  No visits were found meeting these conditions  Showing future appointments within next 150 days and meeting all other requirements       The patient was identified by name and date of birth  Wilton Lr was informed that this is a telemedicine visit and that the visit is being conducted through Telephone  My office door was closed  No one else was in the room  She acknowledged consent and understanding of privacy and security of the video platform  The patient has agreed to participate and understands they can discontinue the visit at any time  Patient is aware this is a billable service  Subjective  Wilton Lr is a 49 y o  female presents via telemedicine for follow up of pneunonia  Evaluated in UC 11/21 and completed z jose  Still endosring cough, congestion, fever, chills, body aches, fatigue  COVID/flu negative  She is taking mucinex which is breaking up her cough but she still feels as though she cannot get her chest cleared  HPI     Past Medical History:   Diagnosis Date   • Anemia     iron deficiency anemia   • Depression    • Diabetes mellitus ([facility] [address] )    • Fibroid     LT   today 12/8/2021   • History of transfusion    • Hydrocephalus (HCC)    • Hyperlipidemia    • Hypertension    • Migraine    • PONV (postoperative nausea and vomiting)    • Sciatica    • Seasonal allergies    • Vertigo    • Wears glasses        Past Surgical History:   Procedure Laterality Date   • COLONOSCOPY     • EPIDURAL BLOCK INJECTION Right 07/02/2020    Procedure: Right L4-5 and L5-S1 transforaminal epidural steroid injection ([id number]);   Surgeon: Feng Sanches MD;  [facility];  Service: Pain Management    • EPIDURAL BLOCK INJECTION N/A 09/02/2021    Procedure: BLOCK / INJECTION EPIDURAL STEROID LUMBAR  L5-S1 IESI;  Surgeon: Feng Sanches MD;  [facility];  Service: Pain Management    • FL GUIDED NEEDLE PLAC BX/ASP/INJ  07/02/2020   • FL GUIDED NEEDLE PLAC BX/ASP/INJ  09/02/2021   • HYSTERECTOMY     • [place] ESOPHAGOGASTRODUODENOSCOPY TRANSORAL DIAGNOSTIC N/A 10/23/2018    Procedure: EGD with Savary dilation AND COLONOSCOPY;  Surgeon: Marian Moscoso MD;  [facility];  Service: Gastroenterology   • [place] LAPAROSCOPY W TOT HYSTERECT UTERUS 250 GRAM OR LESS N/A 12/08/2021    Procedure: Castro Blevins; BILAT SALPINGECTOMY; EXCISION PERITONEAL CYST;  Surgeon: Marco A Woo MD;  [facility];  Service: Gynecology   • TUBAL LIGATION         Current Outpatient Medications   Medication Sig Dispense Refill   • albuterol (Ventolin HFA) 90 mcg/act inhaler Inhale 2 puffs every 6 (six) hours as needed for wheezing 18 g 0   • aspirin (ECOTRIN LOW STRENGTH) 81 mg EC tablet Take 81 mg by mouth daily     • atorvastatin (LIPITOR) 40 mg tablet Take 40 mg by mouth every evening     • busPIRone (BUSPAR) 10 mg tablet Take 2 tablets (20 mg total) by mouth 3 (three) times a day as needed (anxiety) 180 tablet 5   • doxycycline hyclate (VIBRAMYCIN) 100 mg capsule Take 1 capsule (100 mg total) by mouth every 12 (twelve) hours for 7 days 14 capsule 0   • ferrous sulfate 325 (65 Fe) mg tablet Take 325 mg by mouth     • FLUoxetine (PROzac) 40 MG capsule Take 1 capsule by mouth once daily 90 capsule 0   • gabapentin (NEURONTIN) 300 mg capsule 1 PO QHS x 1 day, then 1 PO BID x 1 dday, then 1 PO TID 90 capsule 1   • glimepiride (AMARYL) 4 mg tablet Take 2 mg by mouth daily At night     • Insulin Glargine-Lixisenatide (Insulin Glargine-Lixisenatide) 100 units-33 mcg/mL injection pen Inject 40 Units under the skin daily       • losartan (COZAAR) 100 MG tablet Take 100 mg by mouth daily     • metoprolol succinate (TOPROL-XL) 100 mg 24 hr tablet Take 100 mg by mouth daily     • NovoLIN 70/30 FlexPen (70-30) 100 UNIT/ML injection pen      • pramipexole (MIRAPEX) 0 25 mg tablet Half tab at night for 7 days than 1 tab at night     • tiZANidine (ZANAFLEX) 2 mg tablet Take 1 tablet (2 mg total) by mouth every 8 (eight) hours as needed for muscle spasms 90 tablet 5     No current facility-administered medications for this visit  Allergies   Allergen Reactions   • Nuts - Food Allergy Anaphylaxis   • Chocolate - Food Allergy Cough   • Eggs Or Egg-Derived Products - Food Allergy Other (See Comments)     Cough   • Other      LETTUCE   • Shellfish Allergy - Food Allergy Other (See Comments)     unknown   • Tomato - Food Allergy Cough       Review of Systems   Constitutional: Positive for activity change, chills, fatigue and fever  HENT: Positive for congestion, ear discharge, ear pain, postnasal drip and sinus pressure  Respiratory: Positive for cough, chest tightness and shortness of breath      Cardiovascular: Negative for chest pain  Musculoskeletal: Positive for arthralgias and myalgias  Neurological: Positive for headaches  I spent 10 minutes directly with the patient during this visit  It was my intent to perform this visit via video technology but the patient was not able to do a video connection so the visit was completed via audio telephone only

## 2022-11-30 ENCOUNTER — TELEMEDICINE (OUTPATIENT)
Dept: FAMILY MEDICINE CLINIC | Facility: CLINIC | Age: 57
End: 2022-11-30

## 2022-11-30 DIAGNOSIS — J18.9 PNEUMONIA OF RIGHT MIDDLE LOBE DUE TO INFECTIOUS ORGANISM: Primary | ICD-10-CM

## 2022-11-30 NOTE — PROGRESS NOTES
Virtual Regular Visit    Verification of patient location:    Patient is located in the following state in which I hold an active license [place]      Assessment/Plan:    Problem List Items Addressed This Visit    None  Visit Diagnoses     Pneumonia of right middle lobe due to infectious organism    -  Primary    improving  continue abx until complete  FUP for worsening symptoms               Reason for visit is   Chief Complaint   Patient presents with   • Virtual Brief Visit     Follow up   • Virtual Regular Visit        Encounter provider Kwadwo Hosikns PA-C    Provider located at [address] [place] [id number]      Recent Visits  Date Type Provider Dept   11/28/22 Telemedicine Madison Boyd PA-C [facility] recent visits within past 7 days and meeting all other requirements  Today's Visits  Date Type Provider Dept   11/30/22 Telemedicine Madison Boyd PA-C [facility] [address] [facility] today's visits and meeting all other requirements  Future Appointments  No visits were found meeting these conditions  Showing future appointments within next 150 days and meeting all other requirements       The patient was identified by name and date of birth  Mary Lou Richmond was informed that this is a telemedicine visit and that the visit is being conducted through Telephone  My office door was closed  No one else was in the room  She acknowledged consent and understanding of privacy and security of the video platform  The patient has agreed to participate and understands they can discontinue the visit at any time  Patient is aware this is a billable service  Subjective  Mary Lou Richmond is a 49 y o  female presents via telemedicine for follow up of pneumnoia  Reports symptoms have much improved since starting doxycline  Her cough and congestion is breaking up and clearning  She is no longer requires her albuterol inhaler  HPI     Past Medical History:   Diagnosis Date   • Anemia     iron deficiency anemia   • Depression    • Diabetes mellitus ([facility] [address] )    • Fibroid     LT   today 12/8/2021   • History of transfusion    • Hydrocephalus (HCC)    • Hyperlipidemia    • Hypertension    • Migraine    • PONV (postoperative nausea and vomiting)    • Sciatica    • Seasonal allergies    • Vertigo    • Wears glasses        Past Surgical History:   Procedure Laterality Date   • COLONOSCOPY     • EPIDURAL BLOCK INJECTION Right 07/02/2020    Procedure: Right L4-5 and L5-S1 transforaminal epidural steroid injection ([id number]);   Surgeon: Billy Blankenship MD;  [facility];  Service: Pain Management    • EPIDURAL BLOCK INJECTION N/A 09/02/2021    Procedure: BLOCK / INJECTION EPIDURAL STEROID LUMBAR  L5-S1 IESI;  Surgeon: Billy Blankenship MD;  [facility];  Service: Pain Management    • FL GUIDED NEEDLE PLAC BX/ASP/INJ  07/02/2020   • FL GUIDED NEEDLE PLAC BX/ASP/INJ  09/02/2021   • HYSTERECTOMY     • [place] ESOPHAGOGASTRODUODENOSCOPY TRANSORAL DIAGNOSTIC N/A 10/23/2018    Procedure: EGD with Savary dilation AND COLONOSCOPY;  Surgeon: Sarah Morales MD;  [facility];  Service: Gastroenterology   • [place] LAPAROSCOPY W TOT HYSTERECT UTERUS 250 GRAM OR LESS N/A 12/08/2021    Procedure: [address] [facility] [address]; BILAT SALPINGECTOMY; EXCISION PERITONEAL CYST;  Surgeon: Pete Hurt MD;  [facility];  Service: Gynecology   • TUBAL LIGATION         Current Outpatient Medications   Medication Sig Dispense Refill   • albuterol (Ventolin HFA) 90 mcg/act inhaler Inhale 2 puffs every 6 (six) hours as needed for wheezing 18 g 0   • aspirin (ECOTRIN LOW STRENGTH) 81 mg EC tablet Take 81 mg by mouth daily     • atorvastatin (LIPITOR) 40 mg tablet Take 40 mg by mouth every evening     • busPIRone (BUSPAR) 10 mg tablet Take 2 tablets (20 mg total) by mouth 3 (three) times a day as needed (anxiety) 180 tablet 5   • doxycycline hyclate (VIBRAMYCIN) 100 mg capsule Take 1 capsule (100 mg total) by mouth every 12 (twelve) hours for 7 days 14 capsule 0   • ferrous sulfate 325 (65 Fe) mg tablet Take 325 mg by mouth     • FLUoxetine (PROzac) 40 MG capsule Take 1 capsule by mouth once daily 90 capsule 0   • gabapentin (NEURONTIN) 300 mg capsule 1 PO QHS x 1 day, then 1 PO BID x 1 dday, then 1 PO TID 90 capsule 1   • glimepiride (AMARYL) 4 mg tablet Take 2 mg by mouth daily At night     • Insulin Glargine-Lixisenatide (Insulin Glargine-Lixisenatide) 100 units-33 mcg/mL injection pen Inject 40 Units under the skin daily       • losartan (COZAAR) 100 MG tablet Take 100 mg by mouth daily     • metoprolol succinate (TOPROL-XL) 100 mg 24 hr tablet Take 100 mg by mouth daily     • NovoLIN 70/30 FlexPen (70-30) 100 UNIT/ML injection pen      • pramipexole (MIRAPEX) 0 25 mg tablet Half tab at night for 7 days than 1 tab at night     • tiZANidine (ZANAFLEX) 2 mg tablet Take 1 tablet (2 mg total) by mouth every 8 (eight) hours as needed for muscle spasms 90 tablet 5     No current facility-administered medications for this visit  Allergies   Allergen Reactions   • Nuts - Food Allergy Anaphylaxis   • Chocolate - Food Allergy Cough   • Eggs Or Egg-Derived Products - Food Allergy Other (See Comments)     Cough   • Other      LETTUCE   • Shellfish Allergy - Food Allergy Other (See Comments)     unknown   • Tomato - Food Allergy Cough       Review of Systems   Constitutional: Negative  HENT: Positive for congestion  Respiratory: Positive for cough and chest tightness  Cardiovascular: Negative  Gastrointestinal: Negative  I spent 5 minutes with patient today in which greater than 50% of the time was spent in counseling/coordination of care regarding assessment and plan of care     It was my intent to perform this visit via video technology but the patient was not able to do a video connection so the visit was completed via audio telephone only

## 2022-12-02 ENCOUNTER — APPOINTMENT (OUTPATIENT)
Dept: LAB | Facility: CLINIC | Age: 57
End: 2022-12-02

## 2022-12-02 ENCOUNTER — SOCIAL WORK (OUTPATIENT)
Dept: BEHAVIORAL/MENTAL HEALTH CLINIC | Facility: CLINIC | Age: 57
End: 2022-12-02

## 2022-12-02 DIAGNOSIS — F43.10 POST-TRAUMATIC STRESS: ICD-10-CM

## 2022-12-02 DIAGNOSIS — R53.83 OTHER FATIGUE: ICD-10-CM

## 2022-12-02 DIAGNOSIS — E11.9 DIABETES MELLITUS WITHOUT COMPLICATION (HCC): ICD-10-CM

## 2022-12-02 DIAGNOSIS — F33.1 MODERATE EPISODE OF RECURRENT MAJOR DEPRESSIVE DISORDER (HCC): Primary | ICD-10-CM

## 2022-12-02 LAB
ALBUMIN SERPL BCP-MCNC: 3.4 G/DL (ref 3.5–5)
ALP SERPL-CCNC: 124 U/L (ref 46–116)
ALT SERPL W P-5'-P-CCNC: 39 U/L (ref 12–78)
ANION GAP SERPL CALCULATED.3IONS-SCNC: 7 MMOL/L (ref 4–13)
AST SERPL W P-5'-P-CCNC: 22 U/L (ref 5–45)
BILIRUB SERPL-MCNC: 1.24 MG/DL (ref 0.2–1)
BUN SERPL-MCNC: 14 MG/DL (ref 5–25)
CALCIUM ALBUM COR SERPL-MCNC: 10 MG/DL (ref 8.3–10.1)
CALCIUM SERPL-MCNC: 9.5 MG/DL (ref 8.3–10.1)
CHLORIDE SERPL-SCNC: 103 MMOL/L (ref 96–108)
CHOLEST SERPL-MCNC: 158 MG/DL
CO2 SERPL-SCNC: 26 MMOL/L (ref 21–32)
CREAT SERPL-MCNC: 0.97 MG/DL (ref 0.6–1.3)
GFR SERPL CREATININE-BSD FRML MDRD: 66 ML/MIN/1.73SQ M
GLUCOSE P FAST SERPL-MCNC: 211 MG/DL (ref 65–99)
HDLC SERPL-MCNC: 67 MG/DL
LDLC SERPL CALC-MCNC: 65 MG/DL (ref 0–100)
NONHDLC SERPL-MCNC: 91 MG/DL
POTASSIUM SERPL-SCNC: 4.2 MMOL/L (ref 3.5–5.3)
PROT SERPL-MCNC: 7.4 G/DL (ref 6.4–8.4)
SODIUM SERPL-SCNC: 136 MMOL/L (ref 135–147)
TRIGL SERPL-MCNC: 131 MG/DL
TSH SERPL DL<=0.05 MIU/L-ACNC: 2.14 UIU/ML (ref 0.45–4.5)

## 2022-12-02 NOTE — PSYCH
Psychotherapy Provided: Individual Psychotherapy 60  minutes 12:00 Pm to 12:55 Pm          Goals addressed in session: (Long Term Goal Number One) The Client reported continued problems with peer and family relationships which has increased her anxiety and depression  During the session we explored relationships and the effects on her mental health  Interventions: Supportive Therapy, Strengths Therapy,  and Cognitive Behavioral Therapy where the treatment modalities utilized during the session  Assessment and Plan: The Client presented a depressed an depressed anxious mood that was congruent in effect  She was alert, goal directed and participated with prompts during the session  The Client was oriented to person, place, time, situation, and reported no suicidal/homicidal ideation, plan, or intent  As team we explored and processed her relationships issues and the effects on her mental health  She was able to verbalize weaknesses in her boundaries  Coping skills were reviewed during the session  Next appointment was set for 2 weeks, as her home commitment the Client will practice her communication skills  Pain:      PSYCH MENTAL STATUS PAIN :8 as reported by the client     PHYSICAL PAIN SCALE NUMBERS:9 as reported by the Client     Current suicide risk : Low/Phone number for National Suicide Prevention Life Line was given to the Client/ [address]: Diagnosis and Treatment Plan explained to Prerna Barone, Prerna Barone  relates understanding diagnosis and is agreeable to Treatment Plan  Yes

## 2022-12-06 ENCOUNTER — TELEPHONE (OUTPATIENT)
Dept: FAMILY MEDICINE CLINIC | Facility: CLINIC | Age: 57
End: 2022-12-06

## 2022-12-06 DIAGNOSIS — J18.9 PNEUMONIA OF RIGHT MIDDLE LOBE DUE TO INFECTIOUS ORGANISM: Primary | ICD-10-CM

## 2022-12-06 NOTE — TELEPHONE ENCOUNTER
Pt called,wanted to make you aware that she is having chest congestion and tightness,  requested a order for chest xray, states she will go 12/7 to [facility] now to have done    ty

## 2022-12-07 ENCOUNTER — HOSPITAL ENCOUNTER (OUTPATIENT)
Dept: RADIOLOGY | Facility: HOSPITAL | Age: 57
Discharge: HOME | End: 2022-12-07

## 2022-12-07 DIAGNOSIS — J18.9 PNEUMONIA OF RIGHT MIDDLE LOBE DUE TO INFECTIOUS ORGANISM: ICD-10-CM

## 2022-12-09 ENCOUNTER — TELEMEDICINE (OUTPATIENT)
Dept: FAMILY MEDICINE CLINIC | Facility: CLINIC | Age: 57
End: 2022-12-09

## 2022-12-09 DIAGNOSIS — J18.9 PNEUMONIA OF RIGHT MIDDLE LOBE DUE TO INFECTIOUS ORGANISM: ICD-10-CM

## 2022-12-09 DIAGNOSIS — R06.00 DYSPNEA, UNSPECIFIED TYPE: ICD-10-CM

## 2022-12-09 RX ORDER — PREDNISONE 20 MG/1
40 TABLET ORAL DAILY
Qty: 10 TABLET | Refills: 0 | Status: SHIPPED | OUTPATIENT
Start: 2022-12-09 | End: 2022-12-14

## 2022-12-09 RX ORDER — DOXYCYCLINE HYCLATE 100 MG/1
100 CAPSULE ORAL EVERY 12 HOURS SCHEDULED
Qty: 14 CAPSULE | Refills: 0 | Status: SHIPPED | OUTPATIENT
Start: 2022-12-09 | End: 2022-12-16

## 2022-12-09 RX ORDER — ALBUTEROL SULFATE 90 UG/1
2 AEROSOL, METERED RESPIRATORY (INHALATION) EVERY 6 HOURS PRN
Qty: 18 G | Refills: 0 | Status: SHIPPED | OUTPATIENT
Start: 2022-12-09

## 2022-12-09 NOTE — PROGRESS NOTES
Virtual Regular Visit    Verification of patient location:    Patient is located in the following state in which I hold an active license [place]      Assessment/Plan:    Problem List Items Addressed This Visit    None  Visit Diagnoses     Pneumonia of right middle lobe due to infectious organism        Relevant Medications    albuterol (Ventolin HFA) 90 mcg/act inhaler    doxycycline hyclate (VIBRAMYCIN) 100 mg capsule    predniSONE 20 mg tablet    Dyspnea, unspecified type        Relevant Medications    albuterol (Ventolin HFA) 90 mcg/act inhaler        Repeat CXR pending radiology read  Will continue doxy, add prednisone  Advised her sugars may elevated - adjust insulin per sliding scale  Will call with formal CXR read   RTC 1 week       Reason for visit is   Chief Complaint   Patient presents with   • Virtual Regular Visit     Review xray, states she is still not feeling well        Encounter provider Berta Saldaña PA-C    Provider located at [address] [place] [phone]      Recent Visits  Date Type Provider Dept   12/06/22 Telephone [facility] recent visits within past 7 days and meeting all other requirements  Today's Visits  Date Type Provider Dept   12/09/22 Telemedicine Madison Boyd PA-C [facility] [address] [facility] today's visits and meeting all other requirements  Future Appointments  No visits were found meeting these conditions  Showing future appointments within next 150 days and meeting all other requirements       The patient was identified by name and date of birth  Susan Angulo was informed that this is a telemedicine visit and that the visit is being conducted through Telephone  My office door was closed  No one else was in the room  She acknowledged consent and understanding of privacy and security of the video platform   The patient has agreed to participate and understands they can discontinue the visit at any time  Patient is aware this is a billable service  Subjective  Onesimo Mayen is a 49 y o  female presents via telemedicine for reoccurance of cough, sore throat, congestion, SOB  Dx of pneumnoa treated with doxycycline  At the time symptoms were improving but worsening once abx was stopped  Previously off of inhaler but now needing 2x daily again  Denies fever, chills, night sweats  HPI     Past Medical History:   Diagnosis Date   • Anemia     iron deficiency anemia   • Depression    • Diabetes mellitus ([address] )    • Fibroid     LT   today 12/8/2021   • History of transfusion    • Hydrocephalus (HCC)    • Hyperlipidemia    • Hypertension    • Migraine    • PONV (postoperative nausea and vomiting)    • Sciatica    • Seasonal allergies    • Vertigo    • Wears glasses        Past Surgical History:   Procedure Laterality Date   • COLONOSCOPY     • EPIDURAL BLOCK INJECTION Right 07/02/2020    Procedure: Right L4-5 and L5-S1 transforaminal epidural steroid injection ([id number]);   Surgeon: Neris Schmidt MD;  [facility];  Service: Pain Management    • EPIDURAL BLOCK INJECTION N/A 09/02/2021    Procedure: BLOCK / INJECTION EPIDURAL STEROID LUMBAR  L5-S1 IESI;  Surgeon: Neris Schmidt MD;  [facility];  Service: Pain Management    • FL GUIDED NEEDLE PLAC BX/ASP/INJ  07/02/2020   • FL GUIDED NEEDLE PLAC BX/ASP/INJ  09/02/2021   • HYSTERECTOMY     • [place] ESOPHAGOGASTRODUODENOSCOPY TRANSORAL DIAGNOSTIC N/A 10/23/2018    Procedure: EGD with Savary dilation AND COLONOSCOPY;  Surgeon: Demetrice Nathan MD;  [facility];  Service: Gastroenterology   • [place] LAPAROSCOPY W TOT HYSTERECT UTERUS 250 GRAM OR LESS N/A 12/08/2021    Procedure: Risa Bales; BILAT SALPINGECTOMY; EXCISION PERITONEAL CYST;  Surgeon: José Luis Coreas MD;  [facility];  Service: Gynecology   • TUBAL LIGATION         Current Outpatient Medications   Medication Sig Dispense Refill   • albuterol (Ventolin HFA) 90 mcg/act inhaler Inhale 2 puffs every 6 (six) hours as needed for wheezing 18 g 0   • doxycycline hyclate (VIBRAMYCIN) 100 mg capsule Take 1 capsule (100 mg total) by mouth every 12 (twelve) hours for 7 days 14 capsule 0   • predniSONE 20 mg tablet Take 2 tablets (40 mg total) by mouth daily for 5 days 10 tablet 0   • aspirin (ECOTRIN LOW STRENGTH) 81 mg EC tablet Take 81 mg by mouth daily     • atorvastatin (LIPITOR) 40 mg tablet Take 40 mg by mouth every evening     • busPIRone (BUSPAR) 10 mg tablet Take 2 tablets (20 mg total) by mouth 3 (three) times a day as needed (anxiety) 180 tablet 5   • ferrous sulfate 325 (65 Fe) mg tablet Take 325 mg by mouth     • FLUoxetine (PROzac) 40 MG capsule Take 1 capsule (40 mg total) by mouth daily 90 capsule 3   • gabapentin (NEURONTIN) 300 mg capsule 1 PO QHS x 1 day, then 1 PO BID x 1 dday, then 1 PO TID 90 capsule 1   • glimepiride (AMARYL) 4 mg tablet Take 2 mg by mouth daily At night     • Insulin Glargine-Lixisenatide (Insulin Glargine-Lixisenatide) 100 units-33 mcg/mL injection pen Inject 40 Units under the skin daily       • losartan (COZAAR) 100 MG tablet Take 100 mg by mouth daily     • metoprolol succinate (TOPROL-XL) 100 mg 24 hr tablet Take 100 mg by mouth daily     • NovoLIN 70/30 FlexPen (70-30) 100 UNIT/ML injection pen      • pramipexole (MIRAPEX) 0 25 mg tablet Half tab at night for 7 days than 1 tab at night     • tiZANidine (ZANAFLEX) 2 mg tablet Take 1 tablet (2 mg total) by mouth every 8 (eight) hours as needed for muscle spasms 90 tablet 5     No current facility-administered medications for this visit          Allergies   Allergen Reactions   • Nuts - Food Allergy Anaphylaxis   • Chocolate - Food Allergy Cough   • Eggs Or Egg-Derived Products - Food Allergy Other (See Comments)     Cough   • Other      LETTUCE   • Shellfish Allergy - Food Allergy Other (See Comments)     unknown   • Tomato - Food Allergy Cough       Review of Systems   Constitutional: Positive for appetite change  Negative for chills, fatigue and fever  HENT: Positive for congestion and sore throat  Respiratory: Positive for cough, chest tightness and shortness of breath  Cardiovascular: Negative  Musculoskeletal: Negative  I spent 10 minutes with patient today in which greater than 50% of the time was spent in counseling/coordination of care regarding assessment and plan of care     It was my intent to perform this visit via video technology but the patient was not able to do a video connection so the visit was completed via audio telephone only

## 2022-12-16 ENCOUNTER — TELEMEDICINE (OUTPATIENT)
Dept: FAMILY MEDICINE CLINIC | Facility: CLINIC | Age: 57
End: 2022-12-16

## 2022-12-16 ENCOUNTER — APPOINTMENT (OUTPATIENT)
Dept: BEHAVIORAL/MENTAL HEALTH CLINIC | Facility: CLINIC | Age: 57
End: 2022-12-16

## 2022-12-16 DIAGNOSIS — E11.9 DIABETES MELLITUS WITHOUT COMPLICATION (HCC): ICD-10-CM

## 2022-12-16 DIAGNOSIS — J18.9 PNEUMONIA OF RIGHT MIDDLE LOBE DUE TO INFECTIOUS ORGANISM: Primary | ICD-10-CM

## 2022-12-16 PROBLEM — G25.81 RLS (RESTLESS LEGS SYNDROME): Status: ACTIVE | Noted: 2022-12-09

## 2022-12-16 PROBLEM — I42.0 CARDIOMYOPATHY, DILATED (HCC): Status: ACTIVE | Noted: 2022-12-09

## 2022-12-16 RX ORDER — BENZONATATE 100 MG/1
100 CAPSULE ORAL 3 TIMES DAILY
COMMUNITY
Start: 2022-12-14

## 2022-12-16 RX ORDER — INSULIN ASPART INJECTION 100 [IU]/ML
INJECTION, SOLUTION SUBCUTANEOUS
COMMUNITY
Start: 2022-12-14

## 2022-12-16 NOTE — PROGRESS NOTES
Virtual Regular Visit    Verification of patient location:    Patient is located in the following state in which I hold an active license [place]      Assessment/Plan:    Problem List Items Addressed This Visit    None  Visit Diagnoses     Pneumonia of right middle lobe due to infectious organism    -  Primary    CXR showing improvement  continue supportive care  RTC 2 weeks     Relevant Medications    benzonatate (TESSALON PERLES) 100 mg capsule    Diabetes mellitus without complication (HCC)        Relevant Medications    Fiasp FlexTouch 100 units/mL injection pen        RTC 2 weeks or sooner if concerns arise       Reason for visit is   Chief Complaint   Patient presents with   • Virtual Regular Visit     Follow up    • Virtual Regular Visit        Encounter provider Edis Blackman PA-C    Provider located at [address] [place] [address] [address]  [place] [address]      Recent Visits  Date Type Provider Dept   12/09/22 Telemedicine Madison Boyd PA-C [facility] [employer] recent visits within past 7 days and meeting all other requirements  Today's Visits  Date Type Provider Dept   12/16/22 Telemedicine Madison Boyd PA-C [facility] [facility] today's visits and meeting all other requirements  Future Appointments  No visits were found meeting these conditions  Showing future appointments within next 150 days and meeting all other requirements       The patient was identified by name and date of birth  Roosevelt Rinaldi was informed that this is a telemedicine visit and that the visit is being conducted through Telephone  My office door was closed  No one else was in the room  She acknowledged consent and understanding of privacy and security of the video platform  The patient has agreed to participate and understands they can discontinue the visit at any time  Patient is aware this is a billable service       Subjective  Roosevelt Rinaldi is a 56 y o  female presents via telemedicine for follow up of pneumnia  Overall states she is somewhat improving but states her symptoms seem to worsen once abx is stopped  She has completed 14 days of doxycycline and a z jose, a prednisone taper  She is using albuterol once daily  She is using tessalon pearsl with good relief of cough  HPI     Past Medical History:   Diagnosis Date   • Anemia     iron deficiency anemia   • Depression    • Diabetes mellitus ([facility] [address] )    • Fibroid     LT   today 12/8/2021   • History of transfusion    • Hydrocephalus (HCC)    • Hyperlipidemia    • Hypertension    • Migraine    • PONV (postoperative nausea and vomiting)    • Sciatica    • Seasonal allergies    • Vertigo    • Wears glasses        Past Surgical History:   Procedure Laterality Date   • COLONOSCOPY     • EPIDURAL BLOCK INJECTION Right 07/02/2020    Procedure: Right L4-5 and L5-S1 transforaminal epidural steroid injection ([id number]);   Surgeon: Bradly Trinh MD;  [facility];  Service: Pain Management    • EPIDURAL BLOCK INJECTION N/A 09/02/2021    Procedure: BLOCK / INJECTION EPIDURAL STEROID LUMBAR  L5-S1 IESI;  Surgeon: Bradly Trinh MD;  [facility];  Service: Pain Management    • FL GUIDED NEEDLE PLAC BX/ASP/INJ  07/02/2020   • FL GUIDED NEEDLE PLAC BX/ASP/INJ  09/02/2021   • HYSTERECTOMY     • FL ESOPHAGOGASTRODUODENOSCOPY TRANSORAL DIAGNOSTIC N/A 10/23/2018    Procedure: EGD with Savary dilation AND COLONOSCOPY;  Surgeon: Addy Russ MD;  [facility];  Service: Gastroenterology   • FL LAPAROSCOPY W TOT HYSTERECT UTERUS 250 GRAM OR LESS N/A 12/08/2021    Procedure: [address]; BILAT SALPINGECTOMY; EXCISION PERITONEAL CYST;  Surgeon: Ish Brown MD;  [facility];  Service: Gynecology   • TUBAL LIGATION         Current Outpatient Medications   Medication Sig Dispense Refill   • albuterol (Ventolin HFA) 90 mcg/act inhaler Inhale 2 puffs every 6 (six) hours as needed for wheezing 18 g 0   • aspirin (ECOTRIN LOW STRENGTH) 81 mg EC tablet Take 81 mg by mouth daily     • atorvastatin (LIPITOR) 40 mg tablet Take 40 mg by mouth every evening     • benzonatate (TESSALON PERLES) 100 mg capsule Take 100 mg by mouth 3 (three) times a day     • busPIRone (BUSPAR) 10 mg tablet Take 2 tablets (20 mg total) by mouth 3 (three) times a day as needed (anxiety) 180 tablet 5   • doxycycline hyclate (VIBRAMYCIN) 100 mg capsule Take 1 capsule (100 mg total) by mouth every 12 (twelve) hours for 7 days 14 capsule 0   • ferrous sulfate 325 (65 Fe) mg tablet Take 325 mg by mouth     • Fiasp FlexTouch 100 units/mL injection pen INJECT 3 TIMES DAILY WITH MEALS PER SLIDING SCALE: <150=0 UNIT, 150-200=4 UNITS, 201-250=6 UNITS, 251-300= 8 UNITS, 301-350=9 UNITS, 351-400=10 UNITS  MAX DAILY DOSE=30 UNITS     • FLUoxetine (PROzac) 40 MG capsule Take 1 capsule (40 mg total) by mouth daily 90 capsule 3   • gabapentin (NEURONTIN) 300 mg capsule 1 PO QHS x 1 day, then 1 PO BID x 1 dday, then 1 PO TID 90 capsule 1   • glimepiride (AMARYL) 4 mg tablet Take 2 mg by mouth daily At night     • Insulin Glargine-Lixisenatide (Insulin Glargine-Lixisenatide) 100 units-33 mcg/mL injection pen Inject 40 Units under the skin daily       • losartan (COZAAR) 100 MG tablet Take 100 mg by mouth daily     • metoprolol succinate (TOPROL-XL) 100 mg 24 hr tablet Take 100 mg by mouth daily     • NovoLIN 70/30 FlexPen (70-30) 100 UNIT/ML injection pen      • tiZANidine (ZANAFLEX) 2 mg tablet Take 1 tablet (2 mg total) by mouth every 8 (eight) hours as needed for muscle spasms 90 tablet 5     No current facility-administered medications for this visit          Allergies   Allergen Reactions   • Nuts - Food Allergy Anaphylaxis   • Chocolate - Food Allergy Cough   • Eggs Or Egg-Derived Products - Food Allergy Other (See Comments)     Cough   • Other      LETTUCE   • Shellfish Allergy - Food Allergy Other (See Comments)     unknown   • Tomato - Food Allergy Cough       Review of Systems   HENT: Negative  Respiratory: Positive for cough and shortness of breath  Cardiovascular: Negative for chest pain  I spent 10 minutes with patient today in which greater than 50% of the time was spent in counseling/coordination of care regarding assessment and plan of care     It was my intent to perform this visit via video technology but the patient was not able to do a video connection so the visit was completed via audio telephone only

## 2023-01-06 ENCOUNTER — HOSPITAL ENCOUNTER (OUTPATIENT)
Dept: MAMMOGRAPHY | Facility: HOSPITAL | Age: 58
Discharge: HOME | End: 2023-01-06

## 2023-01-06 ENCOUNTER — HOSPITAL ENCOUNTER (OUTPATIENT)
Dept: BONE DENSITY | Facility: HOSPITAL | Age: 58
Discharge: HOME | End: 2023-01-06

## 2023-01-06 VITALS — BODY MASS INDEX: 40.4 KG/M2 | WEIGHT: 228 LBS | HEIGHT: 63 IN

## 2023-01-06 DIAGNOSIS — Z12.31 ENCOUNTER FOR SCREENING MAMMOGRAM FOR MALIGNANT NEOPLASM OF BREAST: ICD-10-CM

## 2023-01-06 DIAGNOSIS — Z13.820 SCREENING FOR OSTEOPOROSIS: ICD-10-CM

## 2023-01-09 ENCOUNTER — VBI (OUTPATIENT)
Dept: ADMINISTRATIVE | Facility: OTHER | Age: 58
End: 2023-01-09

## 2023-01-09 ENCOUNTER — SOCIAL WORK (OUTPATIENT)
Dept: BEHAVIORAL/MENTAL HEALTH CLINIC | Facility: CLINIC | Age: 58
End: 2023-01-09

## 2023-01-09 DIAGNOSIS — F33.1 MODERATE EPISODE OF RECURRENT MAJOR DEPRESSIVE DISORDER (HCC): Primary | ICD-10-CM

## 2023-01-09 DIAGNOSIS — F43.10 POST-TRAUMATIC STRESS: ICD-10-CM

## 2023-01-09 NOTE — PSYCH
Psychotherapy Provided: Individual Psychotherapy 60  minutes 2:00 PM to 3:00 pm          Goals addressed in session:(Long term goal number One) The Client reported an increase in her anxiety and depression over the past few weeks  " my anxiety and depression has gotten bad over the past few week" She was able to verbalize that she has had multiple years of depression and anxiety during the winter  As team we explored and processed her weaknesses in her environment and personal relationships and the effects on her different relationships and environments  Interventions: Supportive Therapy, Strengths Therapy, therapy through Art and Cognitive Behavioral Therapy where the treatment modalities utilized during the session  Assessment and Plan: the client presented a depressed anxious mood that was congruent in effect  She was alert, goal directed and participated with prompts during the session  The Client was oriented to person, place, time, situation, and reported no suicidal/homicidal ideation, plan, or intent  As team we explored and processed the client 's emotions and feelings and how she has not been able to verbalize her needs  Coping skills were reviewed during the session  Next appointment was set for 2 weeks  As her home commitment the Client will create a vision board on goals for the year  Pain:      PSYCH MENTAL STATUS PAIN :5 as reported by the Client     PHYSICAL PAIN SCALE NUMBERS:3 as reported by the Client     Current suicide risk : Low/Phone number for National Suicide Prevention Life Line was given to the Client/ [address]: Diagnosis and Treatment Plan explained to Floyd Sams, Floyd Sams  relates understanding diagnosis and is agreeable to Treatment Plan  Yes

## 2023-01-20 ENCOUNTER — OFFICE VISIT (OUTPATIENT)
Dept: URGENT CARE | Facility: CLINIC | Age: 58
End: 2023-01-20

## 2023-01-20 ENCOUNTER — TELEPHONE (OUTPATIENT)
Dept: FAMILY MEDICINE CLINIC | Facility: CLINIC | Age: 58
End: 2023-01-20

## 2023-01-20 VITALS
TEMPERATURE: 98.4 F | SYSTOLIC BLOOD PRESSURE: 144 MMHG | RESPIRATION RATE: 20 BRPM | BODY MASS INDEX: 39.95 KG/M2 | WEIGHT: 234 LBS | OXYGEN SATURATION: 97 % | HEIGHT: 64 IN | HEART RATE: 98 BPM | DIASTOLIC BLOOD PRESSURE: 82 MMHG

## 2023-01-20 DIAGNOSIS — K13.79 ACUTE PAIN OF MOUTH: ICD-10-CM

## 2023-01-20 DIAGNOSIS — J01.40 ACUTE PANSINUSITIS, RECURRENCE NOT SPECIFIED: Primary | ICD-10-CM

## 2023-01-20 RX ORDER — AZITHROMYCIN 250 MG/1
TABLET, FILM COATED ORAL
Qty: 6 TABLET | Refills: 0 | Status: SHIPPED | OUTPATIENT
Start: 2023-01-20 | End: 2023-01-24

## 2023-01-20 NOTE — TELEPHONE ENCOUNTER
Pt called, c/o right side sinus pain and pressure  Also c/o mouth soreness, upper mouth are is raw and sore  Pt offered appt for 1/23 due to situation, declined will go to -care now today to be seen  Just wanted to make you aware    ty

## 2023-01-20 NOTE — PROGRESS NOTES
[address] [facility]        NAME: Dung Eduardo is a 56 y o  female  : 1965    MRN: [id number]  DATE: 2023  TIME: 1:20 PM    Assessment and Plan   Acute pansinusitis, recurrence not specified [J01 40]  1  Acute pansinusitis, recurrence not specified        2  Acute pain of mouth  al mag oxide-diphenhydramine-lidocaine viscous (MAGIC MOUTHWASH) 1:1:1 suspension    azithromycin (ZITHROMAX) 250 mg tablet        Will treat pain in mouth the magic mouthwash  Given inflammation of sinuses in right side, pts intolerance of nasal sprays and high blood sugars will treat with azithromycin  Patient Instructions   You have been prescribed an antibiotic  Take antibiotic as directed for the full duration  Do not stop the antibiotics just because you are feeling better  Side effects of antibiotics include diarrhea  Eat yogurt or take a probiotic or acidophilus tablet while taking this medication to help prevent diarrhea and replenish good gut bacteria  Follow up with PCP in 3-5 days  Proceed to  ER if symptoms worsen  Chief Complaint     Chief Complaint   Patient presents with   • Oral Pain   • Headache         History of Present Illness       Patient is a 56year old female who presents to the office today for pain and irritation of the roof of her mouth on the right side  Also c/o right sided facial pain since last night  Also has headache on that side  Has not tried anything  Has chronic rhinorrhea  Review of Systems   Review of Systems   Constitutional: Negative for fever  HENT: Positive for congestion, mouth sores, sinus pressure and sinus pain  Negative for sore throat and trouble swallowing  Respiratory: Negative for cough, shortness of breath and wheezing  Cardiovascular: Positive for chest pain and palpitations  Gastrointestinal: Negative for diarrhea, nausea and vomiting  Musculoskeletal: Negative for myalgias  Neurological: Positive for headaches     All other systems reviewed and are negative          Current Medications       Current Outpatient Medications:   •  al mag oxide-diphenhydramine-lidocaine viscous (MAGIC MOUTHWASH) 1:1:1 suspension, Swish and spit 10 mL every 4 (four) hours as needed for mouth pain or discomfort, Disp: 90 mL, Rfl: 0  •  albuterol (Ventolin HFA) 90 mcg/act inhaler, Inhale 2 puffs every 6 (six) hours as needed for wheezing, Disp: 18 g, Rfl: 0  •  aspirin (ECOTRIN LOW STRENGTH) 81 mg EC tablet, Take 81 mg by mouth daily, Disp: , Rfl:   •  atorvastatin (LIPITOR) 40 mg tablet, Take 40 mg by mouth every evening, Disp: , Rfl:   •  azithromycin (ZITHROMAX) 250 mg tablet, Take 2 tablets today then 1 tablet daily x 4 days, Disp: 6 tablet, Rfl: 0  •  benzonatate (TESSALON PERLES) 100 mg capsule, Take 100 mg by mouth 3 (three) times a day, Disp: , Rfl:   •  busPIRone (BUSPAR) 10 mg tablet, Take 2 tablets (20 mg total) by mouth 3 (three) times a day as needed (anxiety), Disp: 180 tablet, Rfl: 5  •  FLUoxetine (PROzac) 40 MG capsule, Take 1 capsule (40 mg total) by mouth daily, Disp: 90 capsule, Rfl: 3  •  gabapentin (NEURONTIN) 300 mg capsule, 1 PO QHS x 1 day, then 1 PO BID x 1 dday, then 1 PO TID, Disp: 90 capsule, Rfl: 1  •  glimepiride (AMARYL) 4 mg tablet, Take 2 mg by mouth daily At night, Disp: , Rfl:   •  Insulin Glargine-Lixisenatide (Insulin Glargine-Lixisenatide) 100 units-33 mcg/mL injection pen, Inject 40 Units under the skin daily  , Disp: , Rfl:   •  losartan (COZAAR) 100 MG tablet, Take 100 mg by mouth daily, Disp: , Rfl:   •  metoprolol succinate (TOPROL-XL) 100 mg 24 hr tablet, Take 100 mg by mouth daily, Disp: , Rfl:   •  tiZANidine (ZANAFLEX) 2 mg tablet, Take 1 tablet (2 mg total) by mouth every 8 (eight) hours as needed for muscle spasms, Disp: 90 tablet, Rfl: 5  •  Fiasp FlexTouch 100 units/mL injection pen, INJECT 3 TIMES DAILY WITH MEALS PER SLIDING SCALE: <150=0 UNIT, 150-200=4 UNITS, 201-250=6 UNITS, 251-300= 8 UNITS, 301-350=9 UNITS, 351-400=10 UNITS  MAX DAILY DOSE=30 UNITS, Disp: , Rfl:     Current Allergies     Allergies as of 01/20/2023 - Reviewed 01/20/2023   Allergen Reaction Noted   • Nuts - food allergy Anaphylaxis 07/12/2017   • Chocolate - food allergy Cough 07/12/2017   • Eggs or egg-derived products - food allergy Other (See Comments) 07/07/2020   • Other  12/04/2018   • Shellfish allergy - food allergy Other (See Comments) 07/12/2017   • Tomato - food allergy Cough 07/12/2017            The following portions of the patient's history were reviewed and updated as appropriate: allergies, current medications, past family history, past medical history, past social history, past surgical history and problem list      Past Medical History:   Diagnosis Date   • Anemia     iron deficiency anemia   • Depression    • Diabetes mellitus ([facility] [address] )    • Fibroid     LT   today 12/8/2021   • History of transfusion    • Hydrocephalus ([facility] [address] )    • Hyperlipidemia    • Hypertension    • Migraine    • PONV (postoperative nausea and vomiting)    • Sciatica    • Seasonal allergies    • Vertigo    • Wears glasses        Past Surgical History:   Procedure Laterality Date   • COLONOSCOPY     • EPIDURAL BLOCK INJECTION Right 07/02/2020    Procedure: Right L4-5 and L5-S1 transforaminal epidural steroid injection ([id number]);   Surgeon: Pat Santoyo MD;  [facility];  Service: Pain Management    • EPIDURAL BLOCK INJECTION N/A 09/02/2021    Procedure: BLOCK / INJECTION EPIDURAL STEROID LUMBAR  L5-S1 IESI;  Surgeon: Pat Santoyo MD;  [facility];  Service: Pain Management    • FL GUIDED NEEDLE PLAC BX/ASP/INJ  07/02/2020   • FL GUIDED NEEDLE PLAC BX/ASP/INJ  09/02/2021   • HYSTERECTOMY     • MO ESOPHAGOGASTRODUODENOSCOPY TRANSORAL DIAGNOSTIC N/A 10/23/2018    Procedure: EGD with Savary dilation AND COLONOSCOPY;  Surgeon: Jeanne Fernandes MD;  [facility];  Service: Gastroenterology   • MO LAPAROSCOPY W TOTAL HYSTERECTOMY UTERUS 250 GM/< N/A 12/08/2021    Procedure: [facility]; BILAT SALPINGECTOMY; EXCISION PERITONEAL CYST;  Surgeon: Flaco Nicholson MD;  [facility];  Service: Gynecology   • TUBAL LIGATION         Family History   Problem Relation Age of Onset   • Varicose Veins Mother    • Drug abuse Mother    • Diabetes Father    • Liver cancer Father    • Breast cancer Sister         unknown age   • No Known Problems Daughter    • No Known Problems Daughter    • No Known Problems Maternal Grandmother    • No Known Problems Maternal Grandfather    • No Known Problems Paternal Grandmother    • No Known Problems Paternal Grandfather    • No Known Problems Maternal Aunt    • No Known Problems Maternal Aunt    • No Known Problems Maternal Aunt    • No Known Problems Maternal Aunt    • No Known Problems Maternal Aunt    • No Known Problems Paternal Aunt          Medications have been verified  Objective   /82   Pulse 98   Temp 98 4 °F (36 9 °C)   Resp 20   Ht 5' 3 5" (1 613 m)   Wt 106 kg (234 lb)   LMP  (LMP Unknown)   SpO2 97%   BMI 40 80 kg/m²        Physical Exam     Physical Exam  Vitals and nursing note reviewed  Constitutional:       General: She is not in acute distress  Appearance: Normal appearance  She is normal weight  She is not ill-appearing or toxic-appearing  HENT:      Nose: Congestion present  Right Turbinates: Enlarged and swollen  Left Turbinates: Not enlarged or swollen  Right Sinus: Maxillary sinus tenderness and frontal sinus tenderness present  Mouth/Throat:      Mouth: Mucous membranes are moist  No oral lesions  Dentition: Normal dentition  No dental tenderness, gingival swelling, dental caries or dental abscesses  Cardiovascular:      Rate and Rhythm: Normal rate and regular rhythm  Pulses: Normal pulses  Heart sounds: Normal heart sounds  Pulmonary:      Effort: Pulmonary effort is normal       Breath sounds: Normal breath sounds  Skin:     General: Skin is warm  Capillary Refill: Capillary refill takes less than 2 seconds  Neurological:      Mental Status: She is alert

## 2023-01-25 ENCOUNTER — TELEPHONE (OUTPATIENT)
Dept: FAMILY MEDICINE CLINIC | Facility: CLINIC | Age: 58
End: 2023-01-25

## 2023-01-25 NOTE — TELEPHONE ENCOUNTER
Pt called, states she believes she has shingles on her back  Pt stated she did have them before, stated symptoms started about 2 days ago  Pt unsure if something could be called in or did she need appt?

## 2023-02-02 ENCOUNTER — SOCIAL WORK (OUTPATIENT)
Dept: BEHAVIORAL/MENTAL HEALTH CLINIC | Facility: CLINIC | Age: 58
End: 2023-02-02

## 2023-02-02 DIAGNOSIS — F43.10 POST-TRAUMATIC STRESS: ICD-10-CM

## 2023-02-02 DIAGNOSIS — F33.1 MODERATE EPISODE OF RECURRENT MAJOR DEPRESSIVE DISORDER (HCC): Primary | ICD-10-CM

## 2023-02-02 NOTE — PSYCH
Psychotherapy Provided: Individual Psychotherapy 60  minutes 12:15 Pm to 1:15 pm          Goals addressed in session:(Long Term Goal Number One) The Client reported continued pain in her back and increased depression and anxiety due physical health issues  " My depression is bad due to my pain" During the session we explored and processed her environmental and relationship weaknesses and the effects on her mental health  Interventions: Supportive Therapy, Strengths Therapy,  and Cognitive Behavioral Therapy where the treatment modalities utilized during the session  Assessment and Plan: The Client presented a depressed anxious mood that was congruent in effect  She was alert, goal directed and participated with prompts during the session  The client was oriented to person, place, time, situation, and reported no suicidal/homicidal ideation, plan, or intent  As team we explored and processed the Client's weaknesses with her physical pain and the effects on her mental health  During the session we explored and processed her coping skills to address her anxiety and depression  Next appointment was set for 2 weeks  Pain:      PSYCH MENTAL STATUS PAIN :5 as reported by the Client     PHYSICAL PAIN SCALE NUMBERS:8 as reported by  The Client     Current suicide risk : Low/Phone number for National Suicide Prevention Life Line was given to the Client/ [address]: Diagnosis and Treatment Plan explained to Jr Maldonado  relates understanding diagnosis and is agreeable to Treatment Plan  Yes

## 2023-02-13 ENCOUNTER — HOSPITAL ENCOUNTER (OUTPATIENT)
Dept: MAMMOGRAPHY | Facility: HOSPITAL | Age: 58
Discharge: HOME | End: 2023-02-13

## 2023-02-13 ENCOUNTER — HOSPITAL ENCOUNTER (OUTPATIENT)
Dept: ULTRASOUND IMAGING | Facility: HOSPITAL | Age: 58
Discharge: HOME | End: 2023-02-13

## 2023-02-13 VITALS — WEIGHT: 233.69 LBS | BODY MASS INDEX: 39.9 KG/M2 | HEIGHT: 64 IN

## 2023-02-13 DIAGNOSIS — R92.8 ABNORMAL SCREENING MAMMOGRAM: ICD-10-CM

## 2023-02-14 ENCOUNTER — SOCIAL WORK (OUTPATIENT)
Dept: BEHAVIORAL/MENTAL HEALTH CLINIC | Facility: CLINIC | Age: 58
End: 2023-02-14

## 2023-02-14 DIAGNOSIS — F33.1 MODERATE EPISODE OF RECURRENT MAJOR DEPRESSIVE DISORDER (HCC): Primary | ICD-10-CM

## 2023-02-14 DIAGNOSIS — F43.10 POST-TRAUMATIC STRESS: ICD-10-CM

## 2023-02-14 NOTE — PROGRESS NOTES
Call placed to patient regarding recommendation for;    _____ RIGHT ___X___LEFT      __X___Ultrasound guided  ______Stereotactic breast biopsy  Pt states that procedure was explained to her, additional questions answered at this time    __X___Verbalized understanding        Blood thinners: No: __x___ Yes: _____ What:     Biopsy teaching sheet given:  _____yes __X__no (All teaching points discussed during call, pt with no questions at this time, pt adv to arrive at [phone] for 1300 biopsy)    Pt given name/# for any further questions/needs    Pt agreeable to a post procedure call and states we can give her biopsy results to her over the phone

## 2023-02-14 NOTE — PSYCH
Psychotherapy Provided: Individual Psychotherapy 60  minutes 3:00 Pm to 3:59 Pm          Goals addressed in session:(Long Term Goal number One) the client reported that she had a negative result for her mammogram, and will have a tissue biopsy  " they say it is 90% sure it is cancer" During the session we explored and processed her grief and the effects on her mental health  Interventions: Supportive Therapy, Strengths Therapy,  and Cognitive Behavioral Therapy where the treatment modalities utilized during the session  Assessment and Plan: The Client presented a depressed anxious mood that was congruent in effect  She was alert, goal directed and participated with prompts during the session  The Client was oriented to person, place, time, situation, and reported no suicidal/homicidal ideation, plan, or intent  As team we explored and processed the Client's grief with the client being able to verbalize she is in the denial  Phase at the present time  " It was yesterday I don't think I believe it happened" During the session we reviewed her coping skills  Next appointment was set for 2 weeks  Pain:      PSYCH MENTAL STATUS PAIN :7 as reported by the client     PHYSICAL PAIN SCALE NUMBERS:9 as reported by the client     Current suicide risk : Low/Phone number for National Suicide Prevention Life Line was given to the Client/ [address]: Diagnosis and Treatment Plan explained to Charlene Matthew, Charlene Matthew  relates understanding diagnosis and is agreeable to Treatment Plan  Yes

## 2023-02-16 ENCOUNTER — VBI (OUTPATIENT)
Dept: ADMINISTRATIVE | Facility: OTHER | Age: 58
End: 2023-02-16

## 2023-02-17 ENCOUNTER — VBI (OUTPATIENT)
Dept: ADMINISTRATIVE | Facility: OTHER | Age: 58
End: 2023-02-17

## 2023-02-20 ENCOUNTER — TELEPHONE (OUTPATIENT)
Dept: PSYCHIATRY | Facility: CLINIC | Age: 58
End: 2023-02-20

## 2023-03-02 DIAGNOSIS — G89.29 CHRONIC MIDLINE LOW BACK PAIN WITHOUT SCIATICA: ICD-10-CM

## 2023-03-02 DIAGNOSIS — M54.41 CHRONIC MIDLINE LOW BACK PAIN WITH RIGHT-SIDED SCIATICA: ICD-10-CM

## 2023-03-02 DIAGNOSIS — M47.816 LUMBAR SPONDYLOSIS: ICD-10-CM

## 2023-03-02 DIAGNOSIS — M46.1 SACROILIITIS (HCC): ICD-10-CM

## 2023-03-02 DIAGNOSIS — G89.4 CHRONIC PAIN SYNDROME: ICD-10-CM

## 2023-03-02 DIAGNOSIS — M79.18 MYOFASCIAL PAIN SYNDROME: ICD-10-CM

## 2023-03-02 DIAGNOSIS — M54.50 CHRONIC MIDLINE LOW BACK PAIN WITHOUT SCIATICA: ICD-10-CM

## 2023-03-02 DIAGNOSIS — M54.16 LUMBAR RADICULOPATHY: ICD-10-CM

## 2023-03-02 DIAGNOSIS — G89.29 CHRONIC MIDLINE LOW BACK PAIN WITH RIGHT-SIDED SCIATICA: ICD-10-CM

## 2023-03-03 RX ORDER — GABAPENTIN 300 MG/1
CAPSULE ORAL
Qty: 90 CAPSULE | Refills: 0 | OUTPATIENT
Start: 2023-03-03

## 2023-03-07 ENCOUNTER — HOSPITAL ENCOUNTER (OUTPATIENT)
Dept: MAMMOGRAPHY | Facility: HOSPITAL | Age: 58
Discharge: HOME | End: 2023-03-07

## 2023-03-07 ENCOUNTER — HOSPITAL ENCOUNTER (OUTPATIENT)
Dept: ULTRASOUND IMAGING | Facility: HOSPITAL | Age: 58
Discharge: HOME | End: 2023-03-07

## 2023-03-07 VITALS — DIASTOLIC BLOOD PRESSURE: 76 MMHG | SYSTOLIC BLOOD PRESSURE: 136 MMHG | HEART RATE: 98 BPM

## 2023-03-07 DIAGNOSIS — R92.8 ABNORMAL MAMMOGRAM: ICD-10-CM

## 2023-03-07 RX ORDER — LIDOCAINE HYDROCHLORIDE 10 MG/ML
5 INJECTION, SOLUTION EPIDURAL; INFILTRATION; INTRACAUDAL; PERINEURAL ONCE
Status: COMPLETED | OUTPATIENT
Start: 2023-03-07 | End: 2023-03-07

## 2023-03-07 RX ADMIN — LIDOCAINE HYDROCHLORIDE 5 ML: 10 INJECTION, SOLUTION EPIDURAL; INFILTRATION; INTRACAUDAL; PERINEURAL at 13:01

## 2023-03-07 NOTE — PROGRESS NOTES
Procedure type:    __X___ultrasound guided _____stereotactic    Breast:    __X___Left _____Right    Location: 2 OCLOCK, 4 CMFN    Needle: 12G CHARLEE    # of passes: 3    Clip: BEAU     Performed by:DR BRODIE CASTILLO    Pressure held for 5 minutes by:LIZETTE JONES    Steri Strips:    ___X__yes _____no    Band aid:    __X___yes_____no    Tolerated procedure:    __X___yes _____no

## 2023-03-07 NOTE — PSYCH
Psychotherapy Provided: Individual Psychotherapy 60  minutes 10:01 Am to 10:58 Am          Goals addressed in session: (creation of 3rd Treatment Plan) The client reported that she had exploratory surgery for the mass on her breast  " the procedures went well" During the session we created her recovery treatment plan  It is hoped that by addressing her weaknesses that this will act as a preventive measure assistant the possible need for higher level of care and services  Interventions: Supportive Therapy, Strengths Therapy,  and Cognitive Behavioral Therapy where the treatment modalities utilized during the session  Assessment and Plan: the client presented a depressed anxious mood that was congruent in effect  She was alert, goal directed and participated with prompts during the session   The client was oriented to person, place, time, situation, and reported no suicidal/homicidal ideation, plan, or intent  As team we created the Client's recovery treatment plan that will cover the next 12 weeks or 4 months  Next appointment was set for 2 weeks  Pain:      PSYCH MENTAL STATUS PAIN : 7 as reported by the client     PHYSICAL PAIN SCALE NUMBERS: 6 as reported by the client     Current suicide risk : Low/Phone number for National Suicide Prevention Life Line was given to the Client/ [address]: Diagnosis and Treatment Plan explained to Belinda Munson, Belinda Munson  relates understanding diagnosis and is agreeable to Treatment Plan  Yes

## 2023-03-07 NOTE — PSYCH
Outpatient [address] [facility]  1965     Date of Initial Psychotherapy Assessment: 7/22/22  Date of Current Treatment Plan: 03/08/23  Treatment Plan Target Date: 03/08/24  Treatment Plan Expiration Date: 7/08/23    Diagnosis:   1  Moderate episode of recurrent major depressive disorder ([facility] [address] )        2  Post-traumatic stress            Area(s) of Need:The client reported that she would like to address her depression, and trauma history that is effecting her different relationships and environments  It is hoped that by addressing her weaknesses the Client  will achieve or maintain maximum functional capacity in performing daily activizes, taking into account both the functional capacity of the individual and those functional capacities appropriate for the individuals of the same age  Reduce or ameliorate the physical mental, behavioral, or developmental effects of an illness, condition, injury or disability  Present treatment plan will cover 4 months or 12 visits which ever comes first     Long Term Goal 1 (in the client's own words): " My depression is bad"     Stage of Change: Action    Target Date for completion: 03/07/2024     Anticipated therapeutic modalities: Supportive Therapy, Strengths Therapy,  and Cognitive Behavioral Therapy will be the  treatment modalities that will be utilized during the session  People identified to complete this goal: The client her support team and this therapist       Objective 1: (identify the means of measuring success in meeting the objective): The Client's depression score on the PHQ-9 will decrease from 18 to 9 or less (score increased by 6 points over original screening due to possible cancer Dx)         Objective 2: (identify the means of measuring success in meeting the objective): The Client will utilize her coping skills when presented 4  out of 7 times         Long Term Goal 2 (in the client's own words): " My trauma is not good"     Stage of Change: Action    Target Date for completion: 03/07/2024      Anticipated therapeutic modalities: Supportive Therapy, Strengths Therapy, seeking Safety, CPT  and Cognitive Behavioral Therapy will be the  treatment modalities that will be utilized during the session  People identified to complete this goal: The client her support team and this therapist       Objective 1: (identify the means of measuring success in meeting the objective): The Client's trauma score on her PCL-5 will decrease from 45 to 25 or less       Objective 2: (identify the means of measuring success in meeting the objective): The Client will utilize her coping skills when presented to address her triggers 4 out 7 times when presented  Long Term Goal 3 (in the client's own words): " let's work on my medical appointment"     Stage of Change: Action    Target Date for completion:  03/07/2024     Anticipated therapeutic modalities: Supportive Therapy, Strengths Therapy, and CBTwill be the  treatment modalities that will be utilized during the session  People identified to complete this goal: The client her support team and this therapist       Objective 1: (identify the means of measuring success in meeting the objective): The Client all of her medical appointments 100% of  time          I am currently under the care of a [address] psychiatric provider: yes    My [facility]'s psychiatric provider is: Clinton Hernandez MSW LCSW at [place] [address] for [address] therapy and for Psychiatric medication with PCP Madison Boyd    I am currently taking psychiatric medications: Yes, as prescribed    I feel that I will be ready for discharge from mental health care when I reach the following (measurable goal/objective): " who knows"     For children and adults who have a legal guardian:   Has there been any change to custody orders and/or guardianship status? NA  If yes, attach updated documentation      I have created my Crisis Plan and have been offered a copy of this plan    [address]: Diagnosis and Treatment Plan explained to [address] acknowledges an understanding of their diagnosis  Sagar Brooms agrees to this treatment plan  I have been offered a copy of this Treatment Plan   Yes  __________________________________________________________________     __________________________________________________________________     __________________________________________________________________     __________________________________________________________________     _______________________________________                 Patient signature, Date Time: __________________________________________        Physician cosigner signature, Date, Time: ________________________________

## 2023-03-08 ENCOUNTER — TELEPHONE (OUTPATIENT)
Dept: BEHAVIORAL/MENTAL HEALTH CLINIC | Facility: CLINIC | Age: 58
End: 2023-03-08

## 2023-03-08 ENCOUNTER — SOCIAL WORK (OUTPATIENT)
Dept: BEHAVIORAL/MENTAL HEALTH CLINIC | Facility: CLINIC | Age: 58
End: 2023-03-08

## 2023-03-08 ENCOUNTER — TELEPHONE (OUTPATIENT)
Dept: FAMILY MEDICINE CLINIC | Facility: CLINIC | Age: 58
End: 2023-03-08

## 2023-03-08 DIAGNOSIS — F41.0 ANXIETY DISORDER DUE TO GENERAL MEDICAL CONDITION WITH PANIC ATTACK: ICD-10-CM

## 2023-03-08 DIAGNOSIS — F06.4 ANXIETY DISORDER DUE TO GENERAL MEDICAL CONDITION WITH PANIC ATTACK: ICD-10-CM

## 2023-03-08 DIAGNOSIS — F41.0 ANXIETY DISORDER DUE TO GENERAL MEDICAL CONDITION WITH PANIC ATTACK: Primary | ICD-10-CM

## 2023-03-08 DIAGNOSIS — F43.10 PTSD (POST-TRAUMATIC STRESS DISORDER): ICD-10-CM

## 2023-03-08 DIAGNOSIS — F43.10 POST-TRAUMATIC STRESS: ICD-10-CM

## 2023-03-08 DIAGNOSIS — F33.1 MODERATE EPISODE OF RECURRENT MAJOR DEPRESSIVE DISORDER (HCC): Primary | ICD-10-CM

## 2023-03-08 DIAGNOSIS — F06.4 ANXIETY DISORDER DUE TO GENERAL MEDICAL CONDITION WITH PANIC ATTACK: Primary | ICD-10-CM

## 2023-03-08 RX ORDER — ALPRAZOLAM 0.25 MG/1
0.25 TABLET ORAL 2 TIMES DAILY PRN
Qty: 60 TABLET | Refills: 0 | Status: SHIPPED | OUTPATIENT
Start: 2023-03-08 | End: 2023-04-07

## 2023-03-08 NOTE — PSYCH
Treatment Plan Tracking    3nd Treatment Plan not completed within required time limits due to: Medical appointment could not be scheduled due to Client's health issues  Nonnie Senters

## 2023-03-08 NOTE — PROGRESS NOTES
Post procedure call completed    Bleeding: _____yes __X___no (pt denies)    Pain: _____yes ___X___no (pt with discomfort, using ice, took Advil with relief)    Redness/Swelling: ______yes ___X___no (pt with slight bruising    Band aid removed: __X___yes _____no    Steri-Strips intact: ___X___yes _____no (discussed with patient to remove steri strips on Sunday if they have not come off on their own)    Pt with no questions at this time, adv will call when results available, adv to call with any questions or concerns, has name/# for contact

## 2023-03-09 ENCOUNTER — TELEPHONE (OUTPATIENT)
Dept: MAMMOGRAPHY | Facility: CLINIC | Age: 58
End: 2023-03-09

## 2023-03-09 NOTE — TELEPHONE ENCOUNTER
[facility] Newly Diagnosed Genetics Checklist    Please route all intake forms to 'oncology genetics breast' pool in [facility]  This includes patients that decline testing  Patient is under 60 at the time of diagnosis discuss genetics (script below)    Script for Genetics:  Approximately 5-10% of cancer can have an underlying genetic cause  Genetic testing is recommended for women diagnosed with breast cancer under the age of 61  Your breast surgeon recommends that you have genetic testing to help determine your surgical plan  Our genetics team will call you in 24-48 hours to discuss this test and schedule a blood draw  The Genetics team will be able to address your questions  Outcome:    Patient is under 60:  Yes    Referral Outcome: Patient is interested in genetic testing, a referral was placed to the oncology genetics testing program

## 2023-03-09 NOTE — TELEPHONE ENCOUNTER
Call placed to patient after pt given biopsy results from radiologist, questions answered, support given, adv next step is to set patient up with surgeon, options discussed and patient wanted to have appt made with Dr Jalen Casey, determined pt availability and adv pt I would make appt for her and call her back

## 2023-03-10 ENCOUNTER — TELEPHONE (OUTPATIENT)
Dept: MAMMOGRAPHY | Facility: CLINIC | Age: 58
End: 2023-03-10

## 2023-03-10 ENCOUNTER — PATIENT OUTREACH (OUTPATIENT)
Dept: HEMATOLOGY ONCOLOGY | Facility: CLINIC | Age: 58
End: 2023-03-10

## 2023-03-10 ENCOUNTER — TELEPHONE (OUTPATIENT)
Dept: GENETICS | Facility: CLINIC | Age: 58
End: 2023-03-10

## 2023-03-10 NOTE — TELEPHONE ENCOUNTER
I introduced myself to Huber Bennett and let her know that her nurse navigator reached out to the cancer risk and genetics program on her behalf  I reviewed the following with Huber Bennett:    • While the majority of cancer occurs by chance, approximately 5-10% of breast cancer has an underlying genetic cause  Genetic testing is available which can determine if there is an underlying genetic cause to your cancer  Understanding if there is an underlying genetic cause can:  o Provide your surgeon with additional information to help with surgical decisions, treatment decisions and eligibility for clinical trials  o It can determine if you have an increased risk for any additional cancers  o Help family members understand their cancer risk  • We work closely with the [place] [address] breast surgeons and are reaching out to see if you have interest in genetic testing  The reason we are reaching out at this time is that this result may help your surgeon determine the appropriate type of surgery (i e  lumpectomy vs mastectomy)  This test is not a requirement but can take 5-10 days to complete so we would like to start the process as soon as possible so the results are ready for your appointment with your surgeon  • If you are interested in genetic testing, I can collect your family history and initiate genetic testing for you  •   Patient elected to pursue testing     • Diagnosis Details:  o Invasive Ductal Carcinoma  o Left  o Hormone receptors pending  • Personal History:  o Do you have a personal history of any other cancer? No  - If yes type/age of diagnosis:   • Family history:   o Do you have Ashkenazi Ep[employer] ancestry? No  - If yes, maternal, paternal, or both?  o Do you have any children? Yes  - How many sons? 1  - How many daughters? 2  - Do any of your children have a history of cancer? No  - If yes type/age of diagnosis:     o Do you have any brothers or sisters? Yes  - How many brothers?  1  - How many sisters? 2  - Are they from the same parents? Yes  - If no how maternal/paternal half-siblings: maternal half sister x 1  - Do any of your brothers or sisters have a history of cancer? Yes  - If yes who and the type/age of diagnosis:   Half sister - Breast Cancer age unknown          Do you have nieces or nephews? Yes  - Do any of them have a history of cancer? No  - If yes type/age of diagnosis:    o Does your mother have a history of cancer? No  - If yes, cancer type and age of diagnosis:   - Is your mother still living? No  - Age/Age of death: 71    o Thinking about your mother's family (aunts, uncles, cousins, grandparents) is there anyone with a history of cancer? Yes  - If yes, list relationship, cancer type and age of diagnosis:  Maternal Aunt - Breast Cancer age unknown ()    o Does your father have a history of cancer? Yes  - If yes, cancer type and age of diagnosis: Bile Duct Cancer age 61; Liver Cancer age 61  - Is your father still living? No  - Age/age of death: 61    o Thinking about your father's family (aunts, uncles, cousins, grandparents) is there anyone with a history of cancer? No  - If yes, list relationship, cancer type and age of diagnosis:      • Types of Results- positive, negative, VUS  o Positive result- may explain personal diagnosis/family history  Can give surgeon information on treatment plan, inform future screening/management or tell a person about other possible risks  Positive results can initiate testing for other family members who may be at risk (children, siblings, etc)  o Negative result- does not give an explanation  Surgical/treatment plan will be based on clinical presentation and will be part of discussion with surgeon  Negative result cannot be passed down to children, but they are still at elevated risk  o Uncertain result- common, but treated like a negative result clinically  90% are downgraded over time       • [employer]   o Most insurance plans cover the cost of genetic testing  The out-of-pocket cost varies due to the differences in deductibles, co-payments and co-insurance defined by individual plans but 90% of people pay $100 or less for a genetic test     A blood kit will be mailed to you overnight  Please take the blood kit along with packet of paperwork to any [facility] lab to have your blood drawn  We have genetic counselors available, if you have any additional questions or would like to speak with them we can schedule you a 15 minute appointment  Plan:  A blood kit was mailed out on 3/10/2023 along with information on genetic testing and the lab's billing policy  Genetic Testing Preformed: [facility] BRCA[facility] STAT Panel (8 genes): MYLENE, BRCA1, BRCA2, CDH1, CHEK2, PALB2, PTEN, TP53 with reflex to [facility] CancerNext (28 additional genes): APC, MYLENE, AXIN2 BARD1, BRCA1, BRCA2, BRIP1, BMPR1A, CDH1, CDK4, CDKN2A, CHEK2, DICER1, EPCAM, GREM1, HOXB13, MLH1, MSH2, MSH3, MSH6, MUTYH, NBN, NF1, NTHL1, PALB2, PMS2, POLD1, POLE, PTEN, RAD51C, RAD51D, RECQL SMAD4, SMARCA4, STK11, TP53    Result Call Information:  I confirmed the patient's mobile number on file as the best number to call with results  I confirmed with the patient that we can leave a voicemail on the provided numbers    Initial results will take approximately 5-12 days to return     Additional results may take up to 2-3 weeks to complete once test is started  Patient does not have any further questions, declined meeting with genetic counselor    When your results are ready, someone from the genetics team will call you, review the results, and contact your breast surgeon  You will be contacted with any type of result- positive, negative, or uncertain

## 2023-03-13 ENCOUNTER — APPOINTMENT (OUTPATIENT)
Dept: LAB | Facility: CLINIC | Age: 58
End: 2023-03-13

## 2023-03-13 NOTE — PROGRESS NOTES
Breast Oncology Nurse Navigator      Called patient for initial outreach from nurse navigator  Introduced myself and my role  Education provided regarding diagnosis and treatment options  Pt has no further questions at this time  Patient lives at home with  and 21year old daughter who works as a home health aid  Pt is disabled due to a fall last year  Discussed genetics, provided education in regards to the advantages of having genetic testing completed  Pts family history includes sister with breast cancer  Father has liver cancer        My Chart message sent with information regarding Cancer Support community and with Nurse Navigator contact information along with [address] Coordinator  Pt aware she can reach out with questions and general assessment completed  Spoke for > 15 minutes

## 2023-03-14 ENCOUNTER — TELEMEDICINE (OUTPATIENT)
Dept: FAMILY MEDICINE CLINIC | Facility: CLINIC | Age: 58
End: 2023-03-14

## 2023-03-14 DIAGNOSIS — M54.16 LUMBAR RADICULOPATHY: ICD-10-CM

## 2023-03-14 DIAGNOSIS — F06.4 ANXIETY DISORDER DUE TO GENERAL MEDICAL CONDITION WITH PANIC ATTACK: Primary | ICD-10-CM

## 2023-03-14 DIAGNOSIS — C50.912 MALIGNANT NEOPLASM OF LEFT FEMALE BREAST, UNSPECIFIED ESTROGEN RECEPTOR STATUS, UNSPECIFIED SITE OF BREAST (HCC): ICD-10-CM

## 2023-03-14 DIAGNOSIS — G89.4 CHRONIC PAIN SYNDROME: ICD-10-CM

## 2023-03-14 DIAGNOSIS — F41.0 ANXIETY DISORDER DUE TO GENERAL MEDICAL CONDITION WITH PANIC ATTACK: Primary | ICD-10-CM

## 2023-03-14 RX ORDER — GABAPENTIN 600 MG/1
600 TABLET ORAL 2 TIMES DAILY
Qty: 180 TABLET | Refills: 0 | Status: SHIPPED | OUTPATIENT
Start: 2023-03-14 | End: 2023-06-12

## 2023-03-14 NOTE — PROGRESS NOTES
Virtual Regular Visit    Verification of patient location:    Patient is located in the following state in which I hold an active license [place]      Assessment/Plan:    Problem List Items Addressed This Visit        Nervous and Auditory    Lumbar radiculopathy    Relevant Medications    gabapentin (Neurontin) 600 MG tablet       Other    Chronic pain syndrome    Relevant Medications    gabapentin (Neurontin) 600 MG tablet   Other Visit Diagnoses     Anxiety disorder due to general medical condition with panic attack    -  Primary    recent diagnosis of breast cancer  continue 1/2 tab 0 25mg xanax in AM PRN  1 0 25mg tab qhs PRN  FUP 4/4    Malignant neoplasm of left female breast, unspecified estrogen receptor status, unspecified site of breast ([facility] [address] )            FUP in 1 month or sooner if concerns arise         Reason for visit is   Chief Complaint   Patient presents with   • Virtual Regular Visit   • Virtual Regular Visit        Encounter provider Maurizio De Dios PA-C    Provider located at [address] [place] [id number]      Recent Visits  Date Type Provider Dept   03/08/23 Telephone [facility] recent visits within past 7 days and meeting all other requirements  Today's Visits  Date Type Provider Dept   03/14/23 Telemedicine [facility]  Clinic   Showing today's visits and meeting all other requirements  Future Appointments  No visits were found meeting these conditions  Showing future appointments within next 150 days and meeting all other requirements       The patient was identified by name and date of birth  Belinda Munson was informed that this is a telemedicine visit and that the visit is being conducted through Telephone  My office door was closed  No one else was in the room  She acknowledged consent and understanding of privacy and security of the video platform   The patient has agreed to participate and understands they can discontinue the visit at any time  Patient is aware this is a billable service  Subjective  Zora Luu is a 56 y o  female presents via telemedicine for follow up of anxiety / recent breast cancer dianogsis  Breast biopsy consistent with left invasive ductal carcinoma  She has appointment tomorrow with oncology in [place]  She has establish with her nurse navigator  She is in the process of disability evaluation Thursday  She is following with LCSW S  She states she is still digesting all the information  She is using 1/2 tab 0 25 mg xanax in the morning  She states a full tab makes her too fatigued  She is using 1 0 25mg tab nightly, which is helping her sleep  She states she is out of gabapentin prescribed by pain management  She cannot get refills has she is unable to fit an appointment secondary to all of her other appointments  HPI     Past Medical History:   Diagnosis Date   • Anemia     iron deficiency anemia   • Depression    • Diabetes mellitus ([facility] [address] )    • Fibroid     LT   today 12/8/2021   • History of transfusion    • Hydrocephalus (HCC)    • Hyperlipidemia    • Hypertension    • Migraine    • PONV (postoperative nausea and vomiting)    • Sciatica    • Seasonal allergies    • Vertigo    • Wears glasses        Past Surgical History:   Procedure Laterality Date   • COLONOSCOPY     • EPIDURAL BLOCK INJECTION Right 07/02/2020    Procedure: Right L4-5 and L5-S1 transforaminal epidural steroid injection ([id number]);   Surgeon: Dairus Ro MD;  Location: [facility];  Service: Pain Management    • EPIDURAL BLOCK INJECTION N/A 09/02/2021    Procedure: BLOCK / INJECTION EPIDURAL STEROID LUMBAR  L5-S1 IESI;  Surgeon: Darius Ro MD;  Location: [facility];  Service: Pain Management    • FL GUIDED NEEDLE PLAC BX/ASP/INJ  07/02/2020   • FL GUIDED NEEDLE PLAC BX/ASP/INJ  09/02/2021   • HYSTERECTOMY     • MS ESOPHAGOGASTRODUODENOSCOPY TRANSORAL DIAGNOSTIC N/A 10/23/2018    Procedure: EGD with Savary dilation AND COLONOSCOPY;  Surgeon: Bernard Vu MD;  Location: [facility];  Service: Gastroenterology   • DE LAPAROSCOPY W TOTAL HYSTERECTOMY UTERUS 250 GM/< N/A 12/08/2021    Procedure: Juan Brownroma; BILAT SALPINGECTOMY; EXCISION PERITONEAL CYST;  Surgeon: Elisabet Tellez MD;  Location: [facility];  Service: Gynecology   • TUBAL LIGATION     • US GUIDED BREAST BIOPSY LEFT COMPLETE Left 3/7/2023       Current Outpatient Medications   Medication Sig Dispense Refill   • gabapentin (Neurontin) 600 MG tablet Take 1 tablet (600 mg total) by mouth 2 (two) times a day 180 tablet 0   • ALPRAZolam (XANAX) 0 25 mg tablet Take 1 tablet (0 25 mg total) by mouth 2 (two) times a day as needed for anxiety 60 tablet 0   • atorvastatin (LIPITOR) 40 mg tablet Take 40 mg by mouth every evening     • busPIRone (BUSPAR) 10 mg tablet Take 2 tablets (20 mg total) by mouth 3 (three) times a day as needed (anxiety) 180 tablet 5   • Fiasp FlexTouch 100 units/mL injection pen INJECT 3 TIMES DAILY WITH MEALS PER SLIDING SCALE: <150=0 UNIT, 150-200=4 UNITS, 201-250=6 UNITS, 251-300= 8 UNITS, 301-350=9 UNITS, 351-400=10 UNITS  MAX DAILY DOSE=30 UNITS     • FLUoxetine (PROzac) 40 MG capsule Take 1 capsule (40 mg total) by mouth daily 90 capsule 3   • glimepiride (AMARYL) 4 mg tablet Take 2 mg by mouth daily At night     • Insulin Glargine-Lixisenatide (Insulin Glargine-Lixisenatide) 100 units-33 mcg/mL injection pen Inject 40 Units under the skin daily       • metoprolol succinate (TOPROL-XL) 100 mg 24 hr tablet Take 100 mg by mouth daily     • tiZANidine (ZANAFLEX) 2 mg tablet Take 1 tablet (2 mg total) by mouth every 8 (eight) hours as needed for muscle spasms 90 tablet 5     No current facility-administered medications for this visit          Allergies   Allergen Reactions   • Nuts - Food Allergy Anaphylaxis   • Chocolate - Food Allergy Cough   • Eggs Or Egg-Derived Products - Food Allergy Other (See Comments)     Cough   • Other      LETTUCE   • Shellfish Allergy - Food Allergy Other (See Comments)     unknown   • Tomato - Food Allergy Cough       Review of Systems   Constitutional: Positive for activity change, appetite change and fatigue  HENT: Negative  Respiratory: Negative  Cardiovascular: Negative  Gastrointestinal: Negative  Musculoskeletal: Negative  Psychiatric/Behavioral: Positive for dysphoric mood  Video Exam    There were no vitals filed for this visit  Physical Exam  Neurological:      Mental Status: She is alert and oriented to person, place, and time  Mental status is at baseline  Psychiatric:         Mood and Affect: Affect is flat  Speech: Speech normal          Behavior: Behavior is withdrawn  Behavior is cooperative  I spent 15 minutes with patient today in which greater than 50% of the time was spent in counseling/coordination of care regarding assessment and plan of care    It was my intent to perform this visit via video technology but the patient was not able to do a video connection so the visit was completed via audio telephone only

## 2023-03-15 ENCOUNTER — CONSULT (OUTPATIENT)
Dept: SURGERY | Facility: CLINIC | Age: 58
End: 2023-03-15

## 2023-03-15 VITALS
WEIGHT: 232.4 LBS | HEIGHT: 64 IN | SYSTOLIC BLOOD PRESSURE: 138 MMHG | DIASTOLIC BLOOD PRESSURE: 82 MMHG | BODY MASS INDEX: 39.67 KG/M2 | TEMPERATURE: 98 F | OXYGEN SATURATION: 97 % | HEART RATE: 102 BPM | RESPIRATION RATE: 20 BRPM

## 2023-03-15 DIAGNOSIS — Z79.4 TYPE 2 DIABETES MELLITUS WITH HYPERGLYCEMIA, WITH LONG-TERM CURRENT USE OF INSULIN (HCC): ICD-10-CM

## 2023-03-15 DIAGNOSIS — I25.2 MYOCARDIAL INFARCT, OLD: ICD-10-CM

## 2023-03-15 DIAGNOSIS — E11.65 TYPE 2 DIABETES MELLITUS WITH HYPERGLYCEMIA, WITH LONG-TERM CURRENT USE OF INSULIN (HCC): ICD-10-CM

## 2023-03-15 DIAGNOSIS — C50.912 INVASIVE DUCTAL CARCINOMA OF BREAST, FEMALE, LEFT (HCC): Primary | ICD-10-CM

## 2023-03-15 DIAGNOSIS — I42.0 DILATED CARDIOMYOPATHY (HCC): ICD-10-CM

## 2023-03-15 RX ORDER — CHLORHEXIDINE GLUCONATE 4 G/100ML
SOLUTION TOPICAL DAILY PRN
OUTPATIENT
Start: 2023-03-15

## 2023-03-15 RX ORDER — SODIUM CHLORIDE, SODIUM LACTATE, POTASSIUM CHLORIDE, CALCIUM CHLORIDE 600; 310; 30; 20 MG/100ML; MG/100ML; MG/100ML; MG/100ML
125 INJECTION, SOLUTION INTRAVENOUS CONTINUOUS
OUTPATIENT
Start: 2023-03-15

## 2023-03-15 NOTE — PROGRESS NOTES
Consult - Surgical Oncology  Reji Dejesus 56 y o  female MRN: [id number]  Encounter: [id number]    Assessment/Plan     57F with poorly controlled insulin dependent diabetes, A1c 10 3, history of prior MI, prior spinal trauma status post cervical spine intervention, now with newly diagnosed early stage left sided invasive ductal carcinoma, grade 1, ER/MA+, HER2-, no clinical or radiologic evidence of axillary involvement  Genetic testing has been sent off and is pending  Discussed the modern, individualized, multidisciplinary management of breast cancer in detail  Breast cancer handbook given  Discussed lumpectomy versus mastectomy decision making  Discussed adjuvant radiation, endocrine therapy, chemotherapy decision making  Consented for left breast lumpectomy with janae  localization, left axillary sentinel lymph node biopsy  Janae  reflector in place  Ordered updated labs, point of care A1c today at endocrinology was 10 3 per the patient, down from the 11's prior, working on arranging an insulin pump  We discussed that she is higher risk than a standard patient for wound healing and wound infection issues, she is understanding of this  I have asked for an updated EKG, PCP and cardiology clearance as well  We will need to be aware of her upper extremity neuropathy and cervical spine issues when we arrange her OR position  We also discussed the risk of worsening neuropraxia and the adjustments that we will make to try to lower this risk  We will stay in touch with her once her genetics and clearances all come through to finalize our surgical planning  History of Present Illness     First ever mammogram performed recently showing a focal asymmetry in the left breast at the outer central portion, posterior depth  Diagnostic imaging confirm this and identified a 5 x 6 x4mm mass in the left breast at 2 oclock, 4cm from the nipple, posterior depth  Negative right breast, negative left axilla  Biopsied and confirmed invasive ductal carcinoma, grade 1, ER 90-95%, IN 90-95%, HER2-, Ki67 <10%  Janae  reflector in place  No breast or lymph node or nipple symptoms  No prior mammograms, no prior breast biopsies or procedures before now  Menarche - 10  Pregnancies - 3  Age at youngest pregnancy - 25  OCP - short time  Menopause - 50 after hysterectomy  HRT - no  Personal - laparoscopic hysterectomy, bilateral salpingectomy, peritoneal cystectomy, ovaries remain in place, no cancers, no prior breast workup, poorly controlled IDDM, A1c 10-11, awaiting insulin pump, prior MI, prior spine trauma, instrumentation in cervical spine, current bilateral upper extremity weakness and neuropathy at baseline  Family - sister with breast cancer at age 48, does not know details, father with bile duct/liver cancer at 61, no other known cancers      Review of Systems   Constitutional: Negative  Cardiovascular:        Prior MI  Dilated cardiomyopathy   Endocrine:        Poorly controlled diabetes   Neurological:        Chronic upper extremity weakness, numbness    Prior cervical spine injury and intervention   Hematological: Negative for adenopathy  Does not bruise/bleed easily  New left sided breast cancer   All other systems reviewed and are negative  Historical Information   Past Medical History:   Diagnosis Date   • Anemia     iron deficiency anemia   • Depression    • Diabetes mellitus ([facility] [address] )    • Fibroid     LT   today 12/8/2021   • History of transfusion    • Hydrocephalus (HCC)    • Hyperlipidemia    • Hypertension    • Migraine    • PONV (postoperative nausea and vomiting)    • Sciatica    • Seasonal allergies    • Vertigo    • Wears glasses      Past Surgical History:   Procedure Laterality Date   • COLONOSCOPY     • EPIDURAL BLOCK INJECTION Right 07/02/2020    Procedure: Right L4-5 and L5-S1 transforaminal epidural steroid injection ([id number]);   Surgeon: Mindi Dawson MD;  [facility];  Service: Pain Management    • EPIDURAL BLOCK INJECTION N/A 09/02/2021    Procedure: BLOCK / INJECTION EPIDURAL STEROID LUMBAR  L5-S1 IESI;  Surgeon: Shiela Espinal MD;  [facility];  Service: Pain Management    • FL GUIDED NEEDLE PLAC BX/ASP/INJ  07/02/2020   • FL GUIDED NEEDLE PLAC BX/ASP/INJ  09/02/2021   • HYSTERECTOMY     • NC ESOPHAGOGASTRODUODENOSCOPY TRANSORAL DIAGNOSTIC N/A 10/23/2018    Procedure: EGD with Savary dilation AND COLONOSCOPY;  Surgeon: Jane Bloch, MD;  Location: [facility];  Service: Gastroenterology   • NC LAPAROSCOPY W TOTAL HYSTERECTOMY UTERUS 250 GM/< N/A 12/08/2021    Procedure: [address]; BILAT SALPINGECTOMY; EXCISION PERITONEAL CYST;  Surgeon: Rafa Du MD;  Location: [facility];  Service: Gynecology   • TUBAL LIGATION     • US GUIDED BREAST BIOPSY LEFT COMPLETE Left 3/7/2023     Social History   Social History     Substance and Sexual Activity   Alcohol Use No     Social History     Substance and Sexual Activity   Drug Use No     Social History     Tobacco Use   Smoking Status Never   Smokeless Tobacco Never     Family History   Problem Relation Age of Onset   • Varicose Veins Mother    • Drug abuse Mother    • Diabetes Father    • Liver cancer Father    • Breast cancer Sister         unknown age   • No Known Problems Daughter    • No Known Problems Daughter    • No Known Problems Maternal Grandmother    • No Known Problems Maternal Grandfather    • No Known Problems Paternal Grandmother    • No Known Problems Paternal Grandfather    • No Known Problems Maternal Aunt    • No Known Problems Maternal Aunt    • No Known Problems Maternal Aunt    • No Known Problems Maternal Aunt    • No Known Problems Maternal Aunt    • No Known Problems Paternal Aunt        Meds/Allergies     Current Outpatient Medications:   •  ALPRAZolam (XANAX) 0 25 mg tablet, Take 1 tablet (0 25 mg total) by mouth 2 (two) times a day as needed for anxiety, Disp: 60 tablet, Rfl: 0  • atorvastatin (LIPITOR) 40 mg tablet, Take 40 mg by mouth every evening, Disp: , Rfl:   •  Fiasp FlexTouch 100 units/mL injection pen, INJECT 3 TIMES DAILY WITH MEALS PER SLIDING SCALE: <150=0 UNIT, 150-200=4 UNITS, 201-250=6 UNITS, 251-300= 8 UNITS, 301-350=9 UNITS, 351-400=10 UNITS  MAX DAILY DOSE=30 UNITS, Disp: , Rfl:   •  FLUoxetine (PROzac) 40 MG capsule, Take 1 capsule (40 mg total) by mouth daily, Disp: 90 capsule, Rfl: 3  •  gabapentin (Neurontin) 600 MG tablet, Take 1 tablet (600 mg total) by mouth 2 (two) times a day, Disp: 180 tablet, Rfl: 0  •  Insulin Glargine-Lixisenatide (Insulin Glargine-Lixisenatide) 100 units-33 mcg/mL injection pen, Inject 40 Units under the skin daily  , Disp: , Rfl:   •  metoprolol succinate (TOPROL-XL) 100 mg 24 hr tablet, Take 100 mg by mouth daily, Disp: , Rfl:   •  tiZANidine (ZANAFLEX) 2 mg tablet, Take 1 tablet (2 mg total) by mouth every 8 (eight) hours as needed for muscle spasms, Disp: 90 tablet, Rfl: 5  •  busPIRone (BUSPAR) 10 mg tablet, Take 2 tablets (20 mg total) by mouth 3 (three) times a day as needed (anxiety), Disp: 180 tablet, Rfl: 5  •  glimepiride (AMARYL) 4 mg tablet, Take 2 mg by mouth daily At night (Patient not taking: Reported on 3/15/2023), Disp: , Rfl:   Allergies   Allergen Reactions   • Nuts - Food Allergy Anaphylaxis   • Chocolate - Food Allergy Cough   • Eggs Or Egg-Derived Products - Food Allergy Other (See Comments)     Cough   • Other      LETTUCE   • Shellfish Allergy - Food Allergy Other (See Comments)     unknown   • Tomato - Food Allergy Cough       The following portions of the patient's history were reviewed and updated as appropriate: allergies, current medications, past family history, past medical history, past social history, past surgical history and problem list     Objective   Current Vitals:   Blood pressure 138/82, pulse 102, temperature 98 °F (36 7 °C), resp   rate 20, height 5' 3 5" (1 613 m), weight 105 kg (232 lb 6 4 oz), SpO2 97 %     Physical Exam  Vitals reviewed  Constitutional:       General: She is not in acute distress  Appearance: She is obese  HENT:      Head: Normocephalic  Mouth/Throat:      Mouth: Mucous membranes are moist    Eyes:      Pupils: Pupils are equal, round, and reactive to light  Cardiovascular:      Rate and Rhythm: Tachycardia present  Pulmonary:      Effort: Pulmonary effort is normal  No respiratory distress  Musculoskeletal:         General: Normal range of motion  Cervical back: Normal range of motion  Skin:     General: Skin is warm and dry  Capillary Refill: Capillary refill takes less than 2 seconds  Neurological:      General: No focal deficit present  Mental Status: She is alert  Psychiatric:         Mood and Affect: Mood normal      The patient has no palpable cervical, supraclavicular, or axillary lymphadenopathy bilaterally  The breasts are symmetric  There are no dominant lumps, masses, skin changes or nipple retraction bilaterally  Signature:   Poli Santiago MD  Date: 3/15/2023 Time: 12:49 PM

## 2023-03-16 ENCOUNTER — TELEPHONE (OUTPATIENT)
Dept: FAMILY MEDICINE CLINIC | Facility: CLINIC | Age: 58
End: 2023-03-16

## 2023-03-16 NOTE — TELEPHONE ENCOUNTER
Hi, this is Carli calling from [facility] in [place]  I'm calling regarding a mutual patient  Sophia Officer last name is Marianna Belcher  Date of birth is [id number]  I am calling in regards to requesting a medical clearance for her upcoming left breast lumpectomy surgery scheduled with Doctor Farideh Fuchs on April 10th  If you can please give us a call back  She did have a virtual visit done yesterday I'm not sure if she will need an in person visit  Our phone number is [phone]  Again, patient is Caitlyn Gilliam  Date of birth is [id number]  Thank you  Bye bye

## 2023-03-16 NOTE — TELEPHONE ENCOUNTER
Kelvin Carrasquillo from [facility] specialty office called stating patient needs clearance for her breast lumpectomy  Is this something you need to see her in person for or can you clear her off you your telemed appointment with her from 3/14/23? Thanks!

## 2023-03-20 ENCOUNTER — APPOINTMENT (OUTPATIENT)
Dept: LAB | Facility: CLINIC | Age: 58
End: 2023-03-20

## 2023-03-20 ENCOUNTER — PATIENT OUTREACH (OUTPATIENT)
Dept: HEMATOLOGY ONCOLOGY | Facility: CLINIC | Age: 58
End: 2023-03-20

## 2023-03-20 DIAGNOSIS — E11.65 TYPE 2 DIABETES MELLITUS WITH HYPERGLYCEMIA, WITH LONG-TERM CURRENT USE OF INSULIN (HCC): ICD-10-CM

## 2023-03-20 DIAGNOSIS — Z79.4 TYPE 2 DIABETES MELLITUS WITH HYPERGLYCEMIA, WITH LONG-TERM CURRENT USE OF INSULIN (HCC): ICD-10-CM

## 2023-03-20 DIAGNOSIS — C50.912 INVASIVE DUCTAL CARCINOMA OF BREAST, FEMALE, LEFT (HCC): ICD-10-CM

## 2023-03-20 DIAGNOSIS — E11.8 DIABETIC COMPLICATION (HCC): ICD-10-CM

## 2023-03-20 DIAGNOSIS — C50.912 INVASIVE DUCTAL CARCINOMA OF BREAST, FEMALE, LEFT (HCC): Primary | ICD-10-CM

## 2023-03-20 LAB
ALBUMIN SERPL BCP-MCNC: 3.4 G/DL (ref 3.5–5)
ALP SERPL-CCNC: 96 U/L (ref 46–116)
ALT SERPL W P-5'-P-CCNC: 40 U/L (ref 12–78)
ANION GAP SERPL CALCULATED.3IONS-SCNC: 6 MMOL/L (ref 4–13)
AST SERPL W P-5'-P-CCNC: 28 U/L (ref 5–45)
BASOPHILS # BLD AUTO: 0.01 THOUSANDS/ÂΜL (ref 0–0.1)
BASOPHILS NFR BLD AUTO: 0 % (ref 0–1)
BILIRUB SERPL-MCNC: 2.46 MG/DL (ref 0.2–1)
BUN SERPL-MCNC: 19 MG/DL (ref 5–25)
CALCIUM ALBUM COR SERPL-MCNC: 9.4 MG/DL (ref 8.3–10.1)
CALCIUM SERPL-MCNC: 8.9 MG/DL (ref 8.3–10.1)
CHLORIDE SERPL-SCNC: 99 MMOL/L (ref 96–108)
CO2 SERPL-SCNC: 25 MMOL/L (ref 21–32)
CREAT SERPL-MCNC: 0.83 MG/DL (ref 0.6–1.3)
EOSINOPHIL # BLD AUTO: 0.05 THOUSAND/ÂΜL (ref 0–0.61)
EOSINOPHIL NFR BLD AUTO: 1 % (ref 0–6)
ERYTHROCYTE [DISTWIDTH] IN BLOOD BY AUTOMATED COUNT: 13 % (ref 11.6–15.1)
GFR SERPL CREATININE-BSD FRML MDRD: 79 ML/MIN/1.73SQ M
GLUCOSE P FAST SERPL-MCNC: 288 MG/DL (ref 65–99)
HCT VFR BLD AUTO: 40.4 % (ref 34.8–46.1)
HGB BLD-MCNC: 13.4 G/DL (ref 11.5–15.4)
IMM GRANULOCYTES # BLD AUTO: 0.02 THOUSAND/UL (ref 0–0.2)
IMM GRANULOCYTES NFR BLD AUTO: 0 % (ref 0–2)
LYMPHOCYTES # BLD AUTO: 0.51 THOUSANDS/ÂΜL (ref 0.6–4.47)
LYMPHOCYTES NFR BLD AUTO: 11 % (ref 14–44)
MCH RBC QN AUTO: 29.2 PG (ref 26.8–34.3)
MCHC RBC AUTO-ENTMCNC: 33.2 G/DL (ref 31.4–37.4)
MCV RBC AUTO: 88 FL (ref 82–98)
MONOCYTES # BLD AUTO: 0.5 THOUSAND/ÂΜL (ref 0.17–1.22)
MONOCYTES NFR BLD AUTO: 11 % (ref 4–12)
NEUTROPHILS # BLD AUTO: 3.55 THOUSANDS/ÂΜL (ref 1.85–7.62)
NEUTS SEG NFR BLD AUTO: 77 % (ref 43–75)
NRBC BLD AUTO-RTO: 0 /100 WBCS
PLATELET # BLD AUTO: 193 THOUSANDS/UL (ref 149–390)
PMV BLD AUTO: 10.1 FL (ref 8.9–12.7)
POTASSIUM SERPL-SCNC: 3.7 MMOL/L (ref 3.5–5.3)
PROT SERPL-MCNC: 6.9 G/DL (ref 6.4–8.4)
RBC # BLD AUTO: 4.59 MILLION/UL (ref 3.81–5.12)
SODIUM SERPL-SCNC: 130 MMOL/L (ref 135–147)
WBC # BLD AUTO: 4.64 THOUSAND/UL (ref 4.31–10.16)

## 2023-03-20 NOTE — PROGRESS NOTES
Breast Cancer Nurse Navigation     Chart reviewed for progress of cancer care, appointments verified  Spoke with patient and reviewed post operative needs to include medical and radiation therapy  Pt understands  Surgical Oncology:  Dr Aldair Hoffman surgery scheduled for 4/10/23  Medical Oncology:  Dr Francis Blas location 5/2/23    Radiation Oncology: Pt prefers out of network in [facility]  Explained Dr Aldair Hoffman can make out of network referral in post op time frame  Any further needs: Oncology Care Coordinator and Nurse Navigator will outreach patient and follow progress   Pt has ONN contact information and will reach out with questions

## 2023-03-22 ENCOUNTER — SOCIAL WORK (OUTPATIENT)
Dept: BEHAVIORAL/MENTAL HEALTH CLINIC | Facility: CLINIC | Age: 58
End: 2023-03-22

## 2023-03-22 ENCOUNTER — TELEPHONE (OUTPATIENT)
Dept: GENETICS | Facility: CLINIC | Age: 58
End: 2023-03-22

## 2023-03-22 ENCOUNTER — CONSULT (OUTPATIENT)
Dept: FAMILY MEDICINE CLINIC | Facility: CLINIC | Age: 58
End: 2023-03-22

## 2023-03-22 VITALS
HEIGHT: 63 IN | HEART RATE: 86 BPM | OXYGEN SATURATION: 98 % | WEIGHT: 232 LBS | DIASTOLIC BLOOD PRESSURE: 80 MMHG | SYSTOLIC BLOOD PRESSURE: 136 MMHG | BODY MASS INDEX: 41.11 KG/M2

## 2023-03-22 DIAGNOSIS — E11.65 TYPE 2 DIABETES MELLITUS WITH HYPERGLYCEMIA, WITHOUT LONG-TERM CURRENT USE OF INSULIN (HCC): ICD-10-CM

## 2023-03-22 DIAGNOSIS — Z01.818 PRE-OPERATIVE EXAMINATION: Primary | ICD-10-CM

## 2023-03-22 DIAGNOSIS — E87.1 HYPONATREMIA: ICD-10-CM

## 2023-03-22 DIAGNOSIS — F33.1 MODERATE EPISODE OF RECURRENT MAJOR DEPRESSIVE DISORDER (HCC): Primary | ICD-10-CM

## 2023-03-22 DIAGNOSIS — F43.10 POST-TRAUMATIC STRESS: ICD-10-CM

## 2023-03-22 LAB — PANC ISLET CELL AB TITR SER: NEGATIVE {TITER}

## 2023-03-22 NOTE — PROGRESS NOTES
Assessment/Plan:     No problem-specific Assessment & Plan notes found for this encounter  Diagnoses and all orders for this visit:    Pre-operative examination  Comments:  medically cleared for surgery as scheduled 4/10/2023    Hyponatremia  Comments:  patient notes having gastroenteritis prior to lab work  this is likely secondary to that as her prior labwork as been stable     Type 2 diabetes mellitus with hyperglycemia, without long-term current use of insulin ([address] )  Comments:  plan per endo  discussed importance of BS control and surgery outcomes          Subjective:      Patient ID: Ty Bronson is a 56 y o  female PMH PMH GERD, DM, HTN, HLD, MI, obesity, s/p spinal fusion for C2 vertebral fracture, and newly diagnosed ductal carcinoma of left breast presents for pre-operative clearance  She is scheduled for left lumpectomy with Dr Neftali Hoyos on 4/10/2023 for ductal carcinoma  She had completed her pre-operative lab work  She is aware she needs an EKG and cardiology clearance  Her BP is at her baseline today of 136/90  Denies CP, SOB, dizziness, fatigue  She is followed by endocrinology  She recently received her insulin pump and is awaiting for instruction from endocrine to set up so she can utilize  HPI    The following portions of the patient's history were reviewed and updated as appropriate: allergies, current medications, past family history, past medical history, past social history, past surgical history and problem list     Review of Systems   Constitutional: Negative  HENT: Negative  Respiratory: Negative  Cardiovascular: Negative  Gastrointestinal: Negative  Genitourinary: Negative  Musculoskeletal: Negative  Neurological: Negative  Psychiatric/Behavioral: Negative            Objective:      /80   Pulse 86   Ht 5' 3" (1 6 m)   Wt 105 kg (232 lb)   LMP  (LMP Unknown)   SpO2 98%   BMI 41 10 kg/m²          Physical Exam  Constitutional: General: She is not in acute distress  Appearance: Normal appearance  She is well-developed  She is obese  HENT:      Head: Normocephalic and atraumatic  Eyes:      Pupils: Pupils are equal, round, and reactive to light  Cardiovascular:      Rate and Rhythm: Normal rate and regular rhythm  Pulses: Normal pulses  Heart sounds: Normal heart sounds  No murmur heard  Pulmonary:      Effort: Pulmonary effort is normal  No respiratory distress  Breath sounds: Normal breath sounds  No stridor  No wheezing or rhonchi  Abdominal:      General: Bowel sounds are normal  There is no distension  Palpations: Abdomen is soft  Tenderness: There is no abdominal tenderness  Musculoskeletal:      Right lower leg: No edema  Left lower leg: No edema  Skin:     General: Skin is warm and dry  Capillary Refill: Capillary refill takes less than 2 seconds  Neurological:      Mental Status: She is alert and oriented to person, place, and time  Psychiatric:         Mood and Affect: Mood normal          Behavior: Behavior normal          Thought Content:  Thought content normal          Judgment: Judgment normal

## 2023-03-22 NOTE — TELEPHONE ENCOUNTER
Stat Genetic Test Result    Summary:    I spoke with Marcellus Haney to review the result of her STAT genetic test     Negative - No Clinically Significant Variants Detected      Test Performed: Suni Vásquez (8 genes): MYLENE, BRCA1, BRCA2, CDH1, CHEK2, PALB2, PTEN, TP53     Assessment:     Negative   A negative result significantly reduces the likelihood that Marcellus Haney has a hereditary cancer syndrome related to the high-risk breast cancer genes listed above  This result does not have surgical implications and surgical options should be discussed with her healthcare provider  Additional Testing: The Leticia Patrick (29 additional genes): APC, MYLENE, AXIN2 BARD1, BRCA1, BRCA2, BRIP1, BMPR1A, CDH1, CDK4, CDKN2A, CHEK2, DICER1, EPCAM, GREM1, HOXB13, MLH1, MSH2, MSH3, MSH6, MUTYH, NBN, NF1, NTHL1, PALB2, PMS2, POLD1, POLE, PTEN, RAD51C, RAD51D, RECQL SMAD4, SMARCA4, STK11, TP53 test is pending  We will contact Marcellus Haney once results are available  Additional recommendations for surveillance/medical management will be made upon final genetic test result  Stacy's surgeon Dr Tom Springer was made aware of this test result

## 2023-03-22 NOTE — PSYCH
Behavioral Health Psychotherapy Progress Note    Psychotherapy Provided: Individual Psychotherapy     1  Moderate episode of recurrent major depressive disorder ([address] )        2  Post-traumatic stress        The client signed the behavioral treatment plan signature place due to crisis plan being created for [facility]  Goals addressed in session: Goal 1/Crisis plan     DATA: The client reported that she received a phone call to from  and was not to have a  genetic component for her breast cancer Dx  She reported that she will be discussing the results with her PCP and surgeon  During the session we created the Client's crisis plan, which will cover one year with hopes to act as a preventive measure against the possible need for higher level of care and services  During this session, this clinician used the following therapeutic modalities: Cognitive Behavioral Therapy, Mindfulness-based Strategies, Solution-Focused Therapy and Supportive Psychotherapy    Substance Abuse was not addressed during this session  Stage of change for addressing substance use diagnoses: No substance use/Not applicable    ASSESSMENT:  Shyam Thornton presents with a Anxious and Depressed mood  her affect is Normal range and intensity, which is congruent, with her mood and the content of the session  The client has made progress on their goals  Shyam Thornton presents with a none risk of suicide, none risk of self-harm, and none risk of harm to others  For any risk assessment that surpasses a "low" rating, a safety plan must be developed  A safety plan was indicated: No  If yes, describe in detail no     PLAN: Between sessions, Shyam Thornton will  Practice her coping skills when presented with anxiety and or depression    At the next session, the therapist will use Cognitive Behavioral Therapy, Motivational Interviewing and Supportive Psychotherapy to address her anxiety and depression when presented in her different relationships and environments    Behavioral Health Treatment Plan and Discharge Planning: Rebecca Dangelo is aware of and agrees to continue to work on their treatment plan  They have identified and are working toward their discharge goals   yes    Visit start and stop times:12:55 Pm to 1:50 Pm    03/22/23

## 2023-03-22 NOTE — PSYCH
Client Name: Jeison Charlton       Client YOB: 1965  : 1965    Treatment Team (include name and contact information):     Psychotherapist:  Luigi Flores MSW LCSW at [address] [facility]/[id number]    Psychiatrist: N/A   Release of information completed: N/A    " N/A   Release of information completed: N/A    Other (Specify Role):  PCP-Madison Boyd PA-C at the [address] [place] [address] [phone]    Release of information completed: yes    Other (Specify Role): N/A   Release of information completed: N/A    Healthcare Provider  Avery Agustin PA-C  [address] [facility]  [phone]    Type of Plan   * Child plans (children 15 yo and younger) must be completed and signed by the child's legal guardian   * Plans for all individuals 15 yo and above must be signed by the client  Plan Type: adolescent/adult (15 and over) Initial      My Personal Strengths are (in the client's own words):  " [address]"  Survivor " Kind"   " [employer]"     The stressors and triggers that may put me at risk are:  being physically tired, boredom, loneliness, being treated unfairly, people (describe - names, etc) Mom, abuse provided to the client  and emotions (describe) anger anxiety, depression    Coping skills I can use to keep myself calm and safe:   Take a shower, Listen to music, Call a friend or family member, Increased contact with professional supports and Other (describe) talking with Daughters    Coping skills/supports I can use to maintain abstinence from substance use:   N/A    The people that provide me with help and support: (Include name, contact, and how they can help)   Support person #1: Laurie    * Phone number: In my phone    * How can they help me? " talks with me and tells me the stupid stuff"    Support person #2: Keith Godfrey    * Phone number: in my phone    * How can they help me? " Babies me"      Support person #3: jasen    * Phone number: in my phone    * How can they help me? Sounding board    In the past, the following has helped me in times of crisis:    Being with other people, Talking to a professional on the telephone, Calling a family member, Breathing exercises (or other mindfulness-based activities) and Watching television or a movie      If it is an emergency and you need immediate help, call 9-1-1    If there is a possibility of danger to yourself or others, call the following crisis hotline resources:     Adult Crisis Numbers  Suicide Prevention Hotline - Dial 9-8-8  [facility]: [address]: [address]: [address]: [address] [place] [address] Avenue: [address] [place] [address] Street: [address] [place] [address] Avenue: [address] [place] [address] St: [phone] (daytime)  [phone] (after hours, weekends, holidays)     Child/Adolescent Crisis Numbers   [facility]: [address]: [phone]   [place] Commander: [phone]   [address] [place] [address] ([facility]): [phone]    Please note: Some [facility] do not have a separate number for Child/Adolescent specific crisis  If your county is not listed under Child/Adolescent, please call the adult number for your county     National Talk to Text Line   [employer] - [phone]    In the event your feelings become unmanageable, and you cannot reach your support system, you will call 911 immediately or go to the nearest hospital emergency room

## 2023-03-23 ENCOUNTER — TELEPHONE (OUTPATIENT)
Dept: GENETICS | Facility: CLINIC | Age: 58
End: 2023-03-23

## 2023-03-23 NOTE — TELEPHONE ENCOUNTER
Genetic Test Result Note:    Summary:    Result Disclosure: Today I spoke with Marcelo Antony over the phone to review the results of her genetic test for hereditary cancer  She underwent genetic testing through our program on 3/10/2023 due to her recent diagnosis of breast cancer  Her results will be sent to her surgeon Brooke Conti MD     A separate report of her STAT genetic test results were disclosed to her on 3/22/2023 by Stef Dove  This result includes new genes and does not change her initial genetic test result  Test Performed: Elena Qi STAT Panel (8 genes): MYLENE, BRCA1, BRCA2, CDH1, CHEK2, PALB2, PTEN, TP53 with reflex to [facility] (28 additional genes): APC, AXIN2 BARD1, BRIP1, BMPR1A, CDK4, CDKN2A, DICER1, EPCAM, GREM1, HOXB13, MLH1, MSH2, MSH3, MSH6, MUTYH, NBN, NF1, NTHL1, PMS2, POLD1, POLE, RAD51C, RAD51D, RECQL SMAD4, SMARCA4, STK11    Result: Negative - No Clinically Significant Variants Detected     Assessment:   A negative result significantly reduces the likelihood that Marcelo Antony has a hereditary cancer syndrome  However, this testing is unable to completely rule out the presence of hereditary cancer  It remains possible that:  - There is a variant in an area of a gene which was not tested or there is a variant not detectable due to technical limitations of this test      - There is a variant in another gene that was not included in this test or in a gene not known to be linked to cancer or tumors  - A family member has a genetic variant that the patient did not inherit  - The cancer in the family is sporadic and is related to non-hereditary factors  Risks and Testing for Family Members:  Marcelo Antony was made aware that all of her first-degree relatives are at increased risk to develop breast cancer based on her recent diagnosis and family history of breast cancer   We recommend that her first-degree relatives make their healthcare providers aware of the family history and discuss their options for screening and risk-reduction  At this time we do not recommend testing for Vamsi Turpin 's children based on her negative test result  Vamsi Turpin 's children still need to consider the history of cancer on the other side of their family when determining their risks  If Vamsi Turpin has any affected family members with a cancer diagnosis, especially at a young age, they may still consider genetic testing  Relatives who wish to pursue genetic testing can reach out to the [address] [place] [address] at [phone] to schedule an appointment or visit www [facility] to identify a local genetic counselor  Plan:     Negative Result: This result does not have surgical implications and surgical options should be discussed with her healthcare provider   Stacy's surgeon, Dr Andree Mcclure will be made aware of her results

## 2023-04-05 ENCOUNTER — SOCIAL WORK (OUTPATIENT)
Dept: BEHAVIORAL/MENTAL HEALTH CLINIC | Facility: CLINIC | Age: 58
End: 2023-04-05

## 2023-04-05 ENCOUNTER — CLINICAL SUPPORT (OUTPATIENT)
Dept: URGENT CARE | Facility: CLINIC | Age: 58
End: 2023-04-05

## 2023-04-05 ENCOUNTER — TELEPHONE (OUTPATIENT)
Dept: GASTROENTEROLOGY | Facility: CLINIC | Age: 58
End: 2023-04-05

## 2023-04-05 DIAGNOSIS — C50.912 MALIGNANT NEOPLASM OF LEFT FEMALE BREAST, UNSPECIFIED ESTROGEN RECEPTOR STATUS, UNSPECIFIED SITE OF BREAST (HCC): Primary | ICD-10-CM

## 2023-04-05 DIAGNOSIS — I25.2 MYOCARDIAL INFARCT, OLD: ICD-10-CM

## 2023-04-05 DIAGNOSIS — I42.0 DILATED CARDIOMYOPATHY (HCC): ICD-10-CM

## 2023-04-05 DIAGNOSIS — F43.10 POST-TRAUMATIC STRESS: ICD-10-CM

## 2023-04-05 DIAGNOSIS — F33.1 MODERATE EPISODE OF RECURRENT MAJOR DEPRESSIVE DISORDER (HCC): Primary | ICD-10-CM

## 2023-04-05 NOTE — TELEPHONE ENCOUNTER
lvm for patient  Dr Nancy Rosado would like patient to go for STAT CMP prior to surgery due to elevated total bilirubin

## 2023-04-05 NOTE — PSYCH
"Behavioral Health Psychotherapy Progress Note    Psychotherapy Provided: Individual Psychotherapy     1  Moderate episode of recurrent major depressive disorder ([address] )        2  Post-traumatic stress            Goals addressed in session: Goal 1     DATA: The client reported she has been having issues within her relationship with her Daughter Rachel Reina, which has caused an increase in her anxiety and depression  She was able to connect the major trigger for this increase in her anxiety and depression due to conflict she has within her relationship with  Mother  \" I sounded like my mother\" During the session we broke down her emotions and healthy way to process and address the core concept behind her relationship conflict  Healthy coping skills were reviewed during the session  During this session, this clinician used the following therapeutic modalities: Cognitive Behavioral Therapy, Mindfulness-based Strategies, Solution-Focused Therapy and Supportive Psychotherapy    Substance Abuse was not addressed during this session  If the client is diagnosed with a co-occurring substance use disorder, please indicate any changes in the frequency or amount of use: D/A  Stage of change for addressing substance use diagnoses: No substance use/Not applicable    ASSESSMENT:  La Summers presents with a Anxious and Depressed mood  her affect is Normal range and intensity, which is congruent, with her mood and the content of the session  The client has made progress on their goals  La Summers presents with a none risk of suicide, none risk of self-harm, and none risk of harm to others  For any risk assessment that surpasses a \"low\" rating, a safety plan must be developed  A safety plan was indicated: No  If yes, describe in detail N/A    PLAN: Between sessions, La Summers will practice her coping skill when presented with anxiety and or depression     At the next session, the therapist will use Cognitive " Behavioral Therapy, Solution-Focused Therapy and Supportive Psychotherapy to address her relationship and environmental weaknesses     [address] and Discharge Planning: Gerre Person is aware of and agrees to continue to work on their treatment plan  They have identified and are working toward their discharge goals   yes    Visit start and stop times: 3:05 PM to 4:05 Pm     04/05/23

## 2023-04-06 ENCOUNTER — APPOINTMENT (OUTPATIENT)
Dept: LAB | Facility: HOSPITAL | Age: 58
End: 2023-04-06
Attending: SURGERY

## 2023-04-06 DIAGNOSIS — F06.4 ANXIETY DISORDER DUE TO GENERAL MEDICAL CONDITION WITH PANIC ATTACK: ICD-10-CM

## 2023-04-06 DIAGNOSIS — C50.912 MALIGNANT NEOPLASM OF LEFT FEMALE BREAST, UNSPECIFIED ESTROGEN RECEPTOR STATUS, UNSPECIFIED SITE OF BREAST (HCC): ICD-10-CM

## 2023-04-06 DIAGNOSIS — F41.0 ANXIETY DISORDER DUE TO GENERAL MEDICAL CONDITION WITH PANIC ATTACK: ICD-10-CM

## 2023-04-06 LAB
ALBUMIN SERPL BCP-MCNC: 4.2 G/DL (ref 3.5–5)
ALP SERPL-CCNC: 101 U/L (ref 34–104)
ALT SERPL W P-5'-P-CCNC: 34 U/L (ref 7–52)
ANION GAP SERPL CALCULATED.3IONS-SCNC: 11 MMOL/L (ref 4–13)
AST SERPL W P-5'-P-CCNC: 27 U/L (ref 13–39)
BILIRUB SERPL-MCNC: 1.5 MG/DL (ref 0.2–1)
BUN SERPL-MCNC: 17 MG/DL (ref 5–25)
CALCIUM SERPL-MCNC: 9.1 MG/DL (ref 8.4–10.2)
CHLORIDE SERPL-SCNC: 100 MMOL/L (ref 96–108)
CO2 SERPL-SCNC: 26 MMOL/L (ref 21–32)
CREAT SERPL-MCNC: 0.85 MG/DL (ref 0.6–1.3)
GFR SERPL CREATININE-BSD FRML MDRD: 77 ML/MIN/1.73SQ M
GLUCOSE P FAST SERPL-MCNC: 136 MG/DL (ref 65–99)
POTASSIUM SERPL-SCNC: 3.5 MMOL/L (ref 3.5–5.3)
PROT SERPL-MCNC: 6.8 G/DL (ref 6.4–8.4)
SODIUM SERPL-SCNC: 137 MMOL/L (ref 135–147)

## 2023-04-07 RX ORDER — ALPRAZOLAM 0.25 MG/1
0.25 TABLET ORAL 2 TIMES DAILY PRN
Qty: 60 TABLET | Refills: 0 | Status: SHIPPED | OUTPATIENT
Start: 2023-04-07 | End: 2023-05-07

## 2023-04-19 ENCOUNTER — LAB REQUISITION (OUTPATIENT)
Dept: LAB | Facility: HOSPITAL | Age: 58
End: 2023-04-19

## 2023-04-19 DIAGNOSIS — C50.912 MALIGNANT NEOPLASM OF UNSPECIFIED SITE OF LEFT FEMALE BREAST (HCC): ICD-10-CM

## 2023-04-25 ENCOUNTER — SOCIAL WORK (OUTPATIENT)
Dept: BEHAVIORAL/MENTAL HEALTH CLINIC | Facility: CLINIC | Age: 58
End: 2023-04-25

## 2023-04-25 DIAGNOSIS — F33.1 MODERATE EPISODE OF RECURRENT MAJOR DEPRESSIVE DISORDER (HCC): Primary | ICD-10-CM

## 2023-04-25 LAB — SCAN RESULT: NORMAL

## 2023-04-25 NOTE — PSYCH
"Behavioral Health Psychotherapy Progress Note    Psychotherapy Provided: Individual Psychotherapy     1  Moderate episode of recurrent major depressive disorder ([address] )            Goals addressed in session: Goal 1     DATA: The client reported that she will be starting radiation therapy in the next few weeks, which has caused an increase in her anxiety and depression  \" starting radiation has caused increase in my anxiety and depression  \" During the session we explored her negative thoughts patterns and the connection with her mental health  The Client was able to verbalize that she has black and white thoughts which effects her mental health  Coping skills were reviewed during the session to address her increased mental health symptoms  During this session, this clinician used the following therapeutic modalities: Cognitive Behavioral Therapy, Mindfulness-based Strategies, Solution-Focused Therapy and Supportive Psychotherapy    Substance Abuse was not addressed during this session  If the client is diagnosed with a co-occurring substance use disorder, please indicate any changes in the frequency or amount of use: N/A  Stage of change for addressing substance use diagnoses: No substance use/Not applicable    ASSESSMENT:  La Summers presents with a Anxious and Depressed mood  her affect is Normal range and intensity, which is congruent, with her mood and the content of the session  The client has made progress on their goals  The client  La Summers presents with a none risk of suicide, none risk of self-harm, and none risk of harm to others  For any risk assessment that surpasses a \"low\" rating, a safety plan must be developed   Phone number for Nery Ambriz was given to the Client/ [phone]    A safety plan was indicated: No  If yes, describe in detail N/A    PLAN: Between sessions, La Summers will explore worksheet her personal values  At the next session, the " therapist will use Cognitive Behavioral Therapy, Mindfulness-based Strategies, Solution-Focused Therapy and Supportive Psychotherapy to address to address her weaknesses that are effecting her different relationships and environments  Behavioral Health Treatment Plan and Discharge Planning: Stuart Urvashi is aware of and agrees to continue to work on their treatment plan  They have identified and are working toward their discharge goals   Yes    Visit start and stop times:2:00 Pm to 3:00 pm     04/25/23

## 2023-05-02 ENCOUNTER — CONSULT (OUTPATIENT)
Dept: HEMATOLOGY ONCOLOGY | Facility: CLINIC | Age: 58
End: 2023-05-02

## 2023-05-02 VITALS
OXYGEN SATURATION: 97 % | BODY MASS INDEX: 40.4 KG/M2 | SYSTOLIC BLOOD PRESSURE: 120 MMHG | DIASTOLIC BLOOD PRESSURE: 68 MMHG | TEMPERATURE: 97.1 F | HEART RATE: 93 BPM | WEIGHT: 228 LBS | HEIGHT: 63 IN

## 2023-05-02 DIAGNOSIS — C50.912 INVASIVE DUCTAL CARCINOMA OF BREAST, FEMALE, LEFT (HCC): ICD-10-CM

## 2023-05-02 NOTE — PROGRESS NOTES
[place]  1965  [facility] [address] [facility] [place]  [address] [facility] [id number] [phone]    No chief complaint on file  History of Present Illness:  Patient had routine mammogram showing an abnormality in the left breast, 6 mm  March 7, 2023 patient underwent ultrasound-guided biopsy showing invasive ductal carcinoma, grade 1  ER 90-95%, NH 90-95%, Ki-67 less than 10%, HER2 +1  April 10, 2023 patient underwent left breast lumpectomy showing invasive mammary carcinoma, T1b, N0  Negative margins  Oncotype score 13      Review of Systems    Patient Active Problem List   Diagnosis    Acid reflux    Allergic cough    Anxiety    Moderate episode of recurrent major depressive disorder (HCC)    Diabetes mellitus (HCC)    Hydrocephalus (HCC)    Hypertension    Iron deficiency    Pica in adults    Herniation of lumbar intervertebral disc with radiculopathy    Other hyperlipidemia    Colon cancer screening    Oropharyngeal dysphagia    Hepatic steatosis    Elevated LFTs    Dysphagia    Screening for colon cancer    Gastroesophageal reflux disease    Post-traumatic stress    Agoraphobia with panic attacks    Generalized anxiety disorder    Chronic midline low back pain without sciatica    Allergic dermatitis    Vitamin D deficiency    Chronic pain syndrome    Lumbar spondylosis    Lumbar radiculopathy    Myofascial pain syndrome    Uterine leiomyoma    Radial collateral ligament sprain of right elbow    Iron deficiency anemia due to chronic blood loss    B12 deficiency    Severe episode of recurrent major depressive disorder, without psychotic features (HCC)    Sacroiliitis (HCC)    PONV (postoperative nausea and vomiting)    Morbid obesity due to excess calories ([address] )    S/P laparoscopic hysterectomy    Status post bilateral salpingectomy    Anemia    C2 cervical fracture ([address] )    Fall from height of greater than 3 feet    Myocardial infarct, old    Tachycardia    T3 vertebral fracture (HCC)    Occipital neuralgia of right side    Dilated cardiomyopathy (HCC)    RLS (restless legs syndrome)    Type 2 diabetes mellitus with hyperglycemia (HCC)    Invasive ductal carcinoma of breast, female, left ([facility] )     Past Medical History:   Diagnosis Date    Anemia     iron deficiency anemia    Anxiety     Depression     Diabetes mellitus ([facility] )     Fibroid     LTH   today 12/8/2021    History of transfusion     Hydrocephalus (HCC)     Hyperlipidemia     Hypertension     Migraine     Myocardial infarction ([facility] )     PONV (postoperative nausea and vomiting)     Sciatica     Seasonal allergies     Vertigo     Wears glasses      Past Surgical History:   Procedure Laterality Date    BACK SURGERY      C1-3 fusion    COLONOSCOPY      EPIDURAL BLOCK INJECTION Right 07/02/2020    Procedure: Right L4-5 and L5-S1 transforaminal epidural steroid injection ([id number]);   Surgeon: Osbaldo Chaudhary MD;  Location: [facility];  Service: Pain Management     EPIDURAL BLOCK INJECTION N/A 09/02/2021    Procedure: BLOCK / INJECTION EPIDURAL STEROID LUMBAR  L5-S1 IESI;  Surgeon: Osbaldo Chaudhary MD;  [facility];  Service: Pain Management     FL GUIDED NEEDLE PLAC BX/ASP/INJ  07/02/2020    FL GUIDED NEEDLE PLAC BX/ASP/INJ  09/02/2021    HYSTERECTOMY      FL ESOPHAGOGASTRODUODENOSCOPY TRANSORAL DIAGNOSTIC N/A 10/23/2018    Procedure: EGD with Savary dilation AND COLONOSCOPY;  Surgeon: Braden Lind MD;  [facility]: [facility];  Service: Gastroenterology    FL LAPAROSCOPY W TOTAL HYSTERECTOMY UTERUS 250 GM/< N/A 12/08/2021    Procedure: [address]; BILAT SALPINGECTOMY; EXCISION PERITONEAL CYST;  Surgeon: Rebecca Crane MD;  Location: [facility];  Service: Gynecology    FL MASTECTOMY PARTIAL Left 4/10/2023    Procedure: LUMPECTOMY BREAST WITH BEAU  LOCALIZATION,  BIOPSY LYMPH NODE SENTINEL (INJECT AT 1130 BY DR Ralph Davis IN THE OR);   Surgeon: Erlin Thrasher MD;  Location: [facility];  Service: General    TUBAL LIGATION      US GUIDED BREAST BIOPSY LEFT COMPLETE Left 03/07/2023     Family History   Problem Relation Age of Onset    Varicose Veins Mother     Drug abuse Mother     Diabetes Father     Liver cancer Father     Breast cancer Sister         unknown age   Rich Nones No Known Problems Daughter     No Known Problems Daughter     No Known Problems Maternal Grandmother     No Known Problems Maternal Grandfather     No Known Problems Paternal Grandmother     No Known Problems Paternal Grandfather     No Known Problems Maternal Aunt     No Known Problems Maternal Aunt     No Known Problems Maternal Aunt     No Known Problems Maternal Aunt     No Known Problems Maternal Aunt     No Known Problems Paternal Aunt      Social History     Socioeconomic History    Marital status: /Civil Union     Spouse name: Not on file    Number of children: Not on file    Years of education: Not on file    Highest education level: Not on file   Occupational History    Not on file   Tobacco Use    Smoking status: Never    Smokeless tobacco: Never   Vaping Use    Vaping Use: Never used   Substance and Sexual Activity    Alcohol use: No    Drug use: No    Sexual activity: Not Currently     Comment:    Other Topics Concern    Not on file   Social History Narrative    Not on file     Social Determinants of Health     Financial Resource Strain: Not on file   Food Insecurity: Not on file   Transportation Needs: Not on file   Physical Activity: Not on file   Stress: Not on file   Social Connections: Not on file   Intimate Partner Violence: Not on file   Housing Stability: Not on file       Current Outpatient Medications:     acetaminophen (TYLENOL) 325 mg tablet, Take 2 tablets (650 mg total) by mouth every 6 (six) hours, Disp: , Rfl:     ALPRAZolam (XANAX) 0 25 mg tablet, Take 1 tablet (0 25 mg total) by mouth 2 (two) times a day as needed for anxiety, Disp: 60 tablet, Rfl: 0    atorvastatin (LIPITOR) 40 mg tablet, Take 40 mg by mouth every evening, Disp: , Rfl:     Fiasp FlexTouch 100 units/mL injection pen, INJECT 3 TIMES DAILY WITH MEALS PER SLIDING SCALE: <150=0 UNIT, 150-200=4 UNITS, 201-250=6 UNITS, 251-300= 8 UNITS, 301-350=9 UNITS, 351-400=10 UNITS   MAX DAILY DOSE=30 UNITS, Disp: , Rfl:     FLUoxetine (PROzac) 40 MG capsule, Take 1 capsule (40 mg total) by mouth daily, Disp: 90 capsule, Rfl: 3    gabapentin (Neurontin) 600 MG tablet, Take 1 tablet (600 mg total) by mouth 2 (two) times a day, Disp: 180 tablet, Rfl: 0    glimepiride (AMARYL) 4 mg tablet, , Disp: , Rfl:     ibuprofen (MOTRIN) 200 mg tablet, Take 2 tablets (400 mg total) by mouth every 6 (six) hours, Disp: , Rfl:     Insulin Glargine-Lixisenatide (Insulin Glargine-Lixisenatide) 100 units-33 mcg/mL injection pen, Inject 40 Units under the skin daily  , Disp: , Rfl:     losartan (COZAAR) 100 MG tablet, Take 100 mg by mouth daily, Disp: , Rfl:     metoprolol succinate (TOPROL-XL) 100 mg 24 hr tablet, Take 100 mg by mouth daily, Disp: , Rfl:     tiZANidine (ZANAFLEX) 2 mg tablet, Take 1 tablet (2 mg total) by mouth every 8 (eight) hours as needed for muscle spasms, Disp: 90 tablet, Rfl: 5    busPIRone (BUSPAR) 10 mg tablet, Take 2 tablets (20 mg total) by mouth 3 (three) times a day as needed (anxiety), Disp: 180 tablet, Rfl: 5  Allergies   Allergen Reactions    Nuts - Food Allergy Anaphylaxis and Throat Swelling    Chocolate - Food Allergy Cough    Eggs Or Egg-Derived Products - Food Allergy Other (See Comments)     Cough    Other Cough     LETTUCE    Shellfish Allergy - Food Allergy Cough    Tomato - Food Allergy Cough     Vitals:    05/02/23 1316   BP: 120/68   Pulse: 93   Temp: (!) 97 1 °F (36 2 °C)   SpO2: 97%       Physical Exam      Labs:  CBC, Coags, BMP, Mg, Phos     Imaging  NM sentinal node inj only    Result Date: 4/10/2023  Narrative: SENTINEL NODE LYMPHOSCINTIGRAPHY INDICATION:  C50 912: Malignant neoplasm of unspecified site of left female breast  FINDINGS: 0 55 mCi Tc-99m filtered sulfur colloid (0 6 cc volume) was administered intradermally into the left breast by Dr Todd Gaming in the [address] [place] [address] Street  No imaging was performed  Impression: Injection of  0 55 mCi Tc-99m filtered sulfur colloid into the left breast in the OR  Workstation performed: [id number]     Mammo sharyn  breast specimen (No Charge)    Result Date: 4/11/2023  Narrative: Surgical specimen Two views of a surgical specimen were provided for noncontemporaneous review  SHARYN  radiofrequency reflector is noted in the specimen  The findings were not discussed with the surgeon at the time of surgery  I reviewed the above laboratory and imaging data  Discussion/Summary: In summary, this is a 70-year-old female with a history of recently resected left breast cancer, T1b, N0, ER/NM positive, HER2 negative  Oncotype score 13  We reviewed that overall, her risk of distant recurrence is low, 8%, and that with adjuvant hormonal therapy this decreases to 4%  Additionally, her risk of new breast primary is approximately 18%, reduced to 9% with adjuvant hormonal therapy  If distant recurrence occurs, this can be treated although we think that it is generally not curable  We reviewed potential toxicities of aromatase inhibitor including but not limited to hot flashes, joint stiffness, increased risk for osteoporosis  She had a DEXA scan in January 2023-normal   Calcium and vitamin D supplementation is recommended  I would plan to start aromatase inhibitor after completion of adjuvant radiation therapy  She has a radiation consult tomorrow  I reviewed the above at length with the patient  She voiced understanding and agreement

## 2023-05-03 ENCOUNTER — RADIATION ONCOLOGY CONSULT (OUTPATIENT)
Dept: RADIATION ONCOLOGY | Facility: CLINIC | Age: 58
End: 2023-05-03
Attending: RADIOLOGY

## 2023-05-03 ENCOUNTER — CLINICAL SUPPORT (OUTPATIENT)
Dept: RADIATION ONCOLOGY | Facility: CLINIC | Age: 58
End: 2023-05-03
Attending: RADIOLOGY

## 2023-05-03 VITALS
HEART RATE: 89 BPM | DIASTOLIC BLOOD PRESSURE: 80 MMHG | TEMPERATURE: 98.1 F | RESPIRATION RATE: 18 BRPM | HEIGHT: 63 IN | BODY MASS INDEX: 40.39 KG/M2 | SYSTOLIC BLOOD PRESSURE: 125 MMHG | WEIGHT: 227.96 LBS | OXYGEN SATURATION: 94 %

## 2023-05-03 DIAGNOSIS — C50.112 MALIGNANT NEOPLASM OF CENTRAL PORTION OF LEFT BREAST IN FEMALE, ESTROGEN RECEPTOR POSITIVE (HCC): Primary | ICD-10-CM

## 2023-05-03 DIAGNOSIS — C50.912 INVASIVE DUCTAL CARCINOMA OF BREAST, FEMALE, LEFT (HCC): ICD-10-CM

## 2023-05-03 DIAGNOSIS — C50.912 INVASIVE DUCTAL CARCINOMA OF BREAST, FEMALE, LEFT (HCC): Primary | ICD-10-CM

## 2023-05-03 DIAGNOSIS — Z17.0 MALIGNANT NEOPLASM OF CENTRAL PORTION OF LEFT BREAST IN FEMALE, ESTROGEN RECEPTOR POSITIVE (HCC): Primary | ICD-10-CM

## 2023-05-03 NOTE — PROGRESS NOTES
Consultation - Radiation Oncology      QSX:[id number] : 1965  Encounter: [id number]  Patient Information: Veronica Vora      CHIEF COMPLAINT  Chief Complaint   Patient presents with   • Consult     Radiation Oncology      Cancer Staging   Malignant neoplasm of central portion of left breast in female, estrogen receptor positive ([facility] [address] )  Staging form: Breast, AJCC 8th Edition  - Pathologic stage from 5/3/2023: Stage IA (pT1b, pN0, cM0, G1, ER+, CT+, HER2-, Oncotype DX score: 13) - Signed by Niurka Gamino MD on 2023  Stage prefix: Initial diagnosis  Nuclear grade: G2  Multigene prognostic tests performed: Oncotype DX  Recurrence score range: Greater than or equal to 11  Histologic grading system: 3 grade system           History of Present Illness   Veronica Vora is a 56y o  year old female who presents for discussion of RT for recently diagnosed invasive ductal carcinoma of left breast   Referred by Dr Thang Alvarado  Additional medical problems include poorly controlled IDDM prior history of MI, spinal trauma s/p cervical spine intervention, anxiety, chronic pain disorder, depression and PICA      Completed screening mammogram with results revealing focal asymmetry in left breast   She had no palpable abnormality  Diagnostic mammogram and US revealed  There is a 5 mm x 6 mm x 4 mm irregularly shaped, non-parallel, hypoechoic mass with angular margins seen in the left breast at 2 o'clock in the posterior depth, 4 cm from the nipple  Ultimately, US guided biopsy completed with results revealing ER/CT+, HER2-, invasive ductal carcinoma, histologic grade 1, 6 mm in greatest extent, involving 3 fragmented cores      3/15/23  Dr Thang Alvarado  Consented for lumpectomy    Genetic testing pending     3/23/23  Genetic Testing: NEGATIVE  No clinically significant variants detected     4/10/23  Tissue Exam  INVASIVE CARCINOMA OF THE BREAST: Resection  8th Edition - Protocol posted: 2022  INVASIVE CARCINOMA OF THE BREAST: COMPLETE EXCISION - All Specimens       SPECIMEN   Procedure   Excision (less than total mastectomy)    Specimen Laterality   Left    TUMOR   Tumor Site   Clock position        2 o'clock    Histologic Type   Invasive carcinoma of no special type (ductal)    Histologic Grade (Nate Histologic Score)       Glandular (Acinar) / Tubular Differentiation   Score 2    Nuclear Pleomorphism   Score 2    Mitotic Rate   Score 1    Overall Grade   Grade 1 (scores of 3, 4 or 5)    Tumor Size   Greatest dimension of largest invasive focus (Millimeters): 10mm mm   Tumor Focality   Single focus of invasive carcinoma    Ductal Carcinoma In Situ (DCIS)   Present        Negative for extensive intraductal component (EIC)    Size (Extent) of DCIS   Estimated size (extent) of DCIS is at least (Millimeters): 0 40mm mm   Number of Blocks with DCIS   1    Number of Blocks Examined   9    Architectural Patterns   Cribriform    Nuclear Grade   Grade II (intermediate)    Necrosis   Not identified    Lobular Carcinoma In Situ (LCIS)   Not identified    Lymphovascular Invasion   Not identified    Dermal Lymphovascular Invasion   No skin present    Microcalcifications   Not identified    Treatment Effect in the Breast   No known presurgical therapy    MARGINS   Margin Status for Invasive Carcinoma   All margins negative for invasive carcinoma    Distance from Invasive Carcinoma to Closest Margin   Cannot be determined: separate margin submitted     Margin Status for DCIS   Cannot be determined: Separate margin submitted     REGIONAL LYMPH NODES   Regional Lymph Node Status   All regional lymph nodes negative for tumor    Total Number of Lymph Nodes Examined (sentinel and non-sentinel)   3    Number of [place] Nodes Examined   3    PATHOLOGIC STAGE CLASSIFICATION (pTNM, AJCC 8th Edition)   Reporting of pT, pN, and (when applicable) pM categories is based on information available to the pathologist at the time the report is issued  As per the AJCC (Chapter 1, 8th Ed ) it is the managing physician's responsibility to establish the final pathologic stage based upon all pertinent information, including but potentially not limited to this pathology report  pT Category   pT1b    Regional Lymph Nodes Modifier   (sn): [place] node(s) evaluated  pN Category   pN0            Estrogen Receptor (ER) Status   Positive (greater than 10% of cells demonstrate nuclear positivity)    Percentage of Cells with Nuclear Positivity   90-95 %           Progesterone Receptor (PgR) Status   Positive    Percentage of Cells with Nuclear Positivity   90-95 %           HER2 (by immunohistochemistry)   Negative (Score 1+)    Testing Performed on Case Number   [id number]           23  Dr Wesley Coad  Nodes and margins are negative  Incision healing well  Oncotype ordered  Drain removed      23  Oncotype Recurrence Score:13     23  Dr Gino Castaneda  She had a DEXA scan in 2023-normal   Calcium and vitamin D supplementation is recommended  Plan to start aromatase inhibitor after completion of adjuvant radiation therapy       Patient seen for consultation today with her  and daughter  She lives in [place], [place] which is about 1 hour and 10 minutes away  She is recovering from her surgery  She was on antibiotics postoperatively which have been completed  Her drain site open yesterday after being removed about 2 weeks ago with a small amount of blood  She denies any pain within the breast is well as the left axillary area  She has no swelling of the left upper extremity  She denies any previous history of cancer  She denies any previous radiation therapy and no chemotherapy  She has a history of fibroids with constant bleeding and required hysterectomy on 2022  Her mother had no history of any breast cancer  She fell down the steps and  at the age of 79    Her father had a carcinoma of the bile duct/liver and  at the age of 61  She has a sister who had breast cancer and is doing well at the age of 58  She has another sister who is 52 and has had no history of any breast cancer  She has a 29-year-old brother who is doing well  She has never smoked any tobacco   She does not drink any alcohol  She suffered a fall on 2022 and fractured her neck requiring surgery  She had previously worked as a  at [employer]  She has not worked since 2022 and is filing for disability  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 with 2 daughters and 1 son that are all healthy      Upcomin2024- Evan Alcantar   Oncology History   Invasive ductal carcinoma of breast, female, left ([facility] [address] )   3/15/2023 Initial Diagnosis    Invasive ductal carcinoma of breast, female, left ([facility] [address] )     4/10/2023 Biopsy    INVASIVE CARCINOMA OF THE BREAST: Resection  8th Edition - Protocol posted: 2022  INVASIVE CARCINOMA OF THE BREAST: COMPLETE EXCISION - All Specimens  SPECIMEN   Procedure  Excision (less than total mastectomy)    Specimen Laterality  Left    TUMOR   Tumor Site  Clock position      2 o'clock    Histologic Type  Invasive carcinoma of no special type (ductal)    Histologic Grade (Nate Histologic Score)     Glandular (Acinar) / Tubular Differentiation  Score 2    Nuclear Pleomorphism  Score 2    Mitotic Rate  Score 1    Overall Grade  Grade 1 (scores of 3, 4 or 5)    Tumor Size  Greatest dimension of largest invasive focus (Millimeters): 10mm mm   Tumor Focality  Single focus of invasive carcinoma    Ductal Carcinoma In Situ (DCIS)  Present      Negative for extensive intraductal component (EIC)    Size (Extent) of DCIS  Estimated size (extent) of DCIS is at least (Millimeters): 0 40mm mm   Number of Blocks with DCIS  1    Number of Blocks Examined  9    Architectural Patterns  Cribriform    Nuclear Grade  Grade II (intermediate)    Necrosis  Not identified    Lobular Carcinoma In Situ (LCIS)  Not identified    Lymphovascular Invasion  Not identified    Dermal Lymphovascular Invasion  No skin present    Microcalcifications  Not identified    Treatment Effect in the Breast  No known presurgical therapy    MARGINS   Margin Status for Invasive Carcinoma  All margins negative for invasive carcinoma    Distance from Invasive Carcinoma to Closest Margin  Cannot be determined: separate margin submitted     Margin Status for DCIS  Cannot be determined: Separate margin submitted     REGIONAL LYMPH NODES   Regional Lymph Node Status  All regional lymph nodes negative for tumor    Total Number of Lymph Nodes Examined (sentinel and non-sentinel)  3    Number of [place] Nodes Examined  3    PATHOLOGIC STAGE CLASSIFICATION (pTNM, AJCC 8th Edition)   Reporting of pT, pN, and (when applicable) pM categories is based on information available to the pathologist at the time the report is issued  As per the AJCC (Chapter 1, 8th Ed ) it is the managing physician's responsibility to establish the final pathologic stage based upon all pertinent information, including but potentially not limited to this pathology report  pT Category  pT1b    Regional Lymph Nodes Modifier  (sn): [place] node(s) evaluated  pN Category  pN0         Estrogen Receptor (ER) Status  Positive (greater than 10% of cells demonstrate nuclear positivity)    Percentage of Cells with Nuclear Positivity  90-95 %        Progesterone Receptor (PgR) Status  Positive    Percentage of Cells with Nuclear Positivity  90-95 %        HER2 (by immunohistochemistry)  Negative (Score 1+)    Testing Performed on Case Number  [id number]                    Past Medical History:   Diagnosis Date   • Anemia     iron deficiency anemia   • Anxiety    • Breast cancer ([facility] [address] )    • Depression    • Diabetes mellitus ([facility] [address] )    • Fibroid     [facility]   today 12/8/2021   • History of transfusion    • Hydrocephalus (HCC)    • Hyperlipidemia • Hypertension    • Migraine    • Myocardial infarction ([facility] [address] ) 01/19/2022   • PONV (postoperative nausea and vomiting)    • Sciatica    • Seasonal allergies    • Vertigo    • Wears glasses      Past Surgical History:   Procedure Laterality Date   • BACK SURGERY      C1-3 fusion   • COLONOSCOPY     • EPIDURAL BLOCK INJECTION Right 07/02/2020    Procedure: Right L4-5 and L5-S1 transforaminal epidural steroid injection ([id number]); Surgeon: Houston Nyhan, MD;  Location: [facility];  Service: Pain Management    • EPIDURAL BLOCK INJECTION N/A 09/02/2021    Procedure: BLOCK / INJECTION EPIDURAL STEROID LUMBAR  L5-S1 IESI;  Surgeon: Houston Nyhan, MD;  Location: [facility];  Service: Pain Management    • FL GUIDED NEEDLE PLAC BX/ASP/INJ  07/02/2020   • FL GUIDED NEEDLE PLAC BX/ASP/INJ  09/02/2021   • HYSTERECTOMY     • NM ESOPHAGOGASTRODUODENOSCOPY TRANSORAL DIAGNOSTIC N/A 10/23/2018    Procedure: EGD with Savary dilation AND COLONOSCOPY;  Surgeon: Radha Sage MD;  Location: [facility];  Service: Gastroenterology   • NM LAPAROSCOPY W TOTAL HYSTERECTOMY UTERUS 250 GM/< N/A 12/08/2021    Procedure: [address]; BILAT SALPINGECTOMY; EXCISION PERITONEAL CYST;  Surgeon: Kathleen Gaytan MD;  Location: [facility];  Service: Gynecology   • NM MASTECTOMY PARTIAL Left 4/10/2023    Procedure: LUMPECTOMY BREAST WITH BEAU  LOCALIZATION,  BIOPSY LYMPH NODE SENTINEL (INJECT AT 1130 BY DR Evonne Mckeon IN THE OR);   Surgeon: Inga Brown MD;  Location: [facility];  Service: General   • TUBAL LIGATION     • US GUIDED BREAST BIOPSY LEFT COMPLETE Left 03/07/2023       Family History   Problem Relation Age of Onset   • Varicose Veins Mother    • Drug abuse Mother    • Diabetes Father    • Liver cancer Father         bile duct   • Breast cancer Sister         unknown age   • No Known Problems Daughter    • No Known Problems Daughter    • No Known Problems Maternal Aunt    • No Known Problems Maternal Aunt    • No Known Problems Maternal Aunt    • No Known Problems Maternal Aunt    • No Known Problems Maternal Aunt    • No Known Problems Paternal Aunt    • No Known Problems Maternal Grandmother    • No Known Problems Maternal Grandfather    • No Known Problems Paternal Grandmother    • No Known Problems Paternal Grandfather        Social History   Social History     Substance and Sexual Activity   Alcohol Use No     Social History     Substance and Sexual Activity   Drug Use No     Social History     Tobacco Use   Smoking Status Never   Smokeless Tobacco Never         Meds/Allergies     Current Outpatient Medications:   •  acetaminophen (TYLENOL) 325 mg tablet, Take 2 tablets (650 mg total) by mouth every 6 (six) hours, Disp: , Rfl:   •  ALPRAZolam (XANAX) 0 25 mg tablet, Take 1 tablet (0 25 mg total) by mouth 2 (two) times a day as needed for anxiety, Disp: 60 tablet, Rfl: 0  •  atorvastatin (LIPITOR) 40 mg tablet, Take 40 mg by mouth every evening, Disp: , Rfl:   •  busPIRone (BUSPAR) 10 mg tablet, Take 2 tablets (20 mg total) by mouth 3 (three) times a day as needed (anxiety), Disp: 180 tablet, Rfl: 5  •  Fiasp FlexTouch 100 units/mL injection pen, INJECT 3 TIMES DAILY WITH MEALS PER SLIDING SCALE: <150=0 UNIT, 150-200=4 UNITS, 201-250=6 UNITS, 251-300= 8 UNITS, 301-350=9 UNITS, 351-400=10 UNITS   MAX DAILY DOSE=30 UNITS, Disp: , Rfl:   •  FLUoxetine (PROzac) 40 MG capsule, Take 1 capsule (40 mg total) by mouth daily, Disp: 90 capsule, Rfl: 3  •  gabapentin (Neurontin) 600 MG tablet, Take 1 tablet (600 mg total) by mouth 2 (two) times a day, Disp: 180 tablet, Rfl: 0  •  glimepiride (AMARYL) 4 mg tablet, , Disp: , Rfl:   •  ibuprofen (MOTRIN) 200 mg tablet, Take 2 tablets (400 mg total) by mouth every 6 (six) hours, Disp: , Rfl:   •  Insulin Glargine-Lixisenatide (Insulin Glargine-Lixisenatide) 100 units-33 mcg/mL injection pen, Inject 40 Units under the skin daily  , Disp: , Rfl:   •  losartan (COZAAR) 100 MG tablet, Take 100 mg by mouth "daily, Disp: , Rfl:   •  metoprolol succinate (TOPROL-XL) 100 mg 24 hr tablet, Take 100 mg by mouth daily, Disp: , Rfl:   •  tiZANidine (ZANAFLEX) 2 mg tablet, Take 1 tablet (2 mg total) by mouth every 8 (eight) hours as needed for muscle spasms, Disp: 90 tablet, Rfl: 5  Allergies   Allergen Reactions   • Nuts - Food Allergy Anaphylaxis and Throat Swelling   • Chocolate - Food Allergy Cough   • Eggs Or Egg-Derived Products - Food Allergy Other (See Comments)     Cough   • Other Cough     LETTUCE   • Shellfish Allergy - Food Allergy Cough   • Tomato - Food Allergy Cough     Review of Systems  Constitutional: Positive for appetite change (poor appetite at times) and fatigue (chronic)  Negative for chills, fever and unexpected weight change  HENT: Positive for rhinorrhea (d/t allergies, chronic)  Eyes: Positive for visual disturbance (ocassional blurry vision)  Wears Glasses   Respiratory: Positive for shortness of breath (with activity)  Negative for cough and chest tightness  Cardiovascular: Negative for chest pain and leg swelling  Gastrointestinal: Negative for constipation, diarrhea, nausea and vomiting  Endocrine: Positive for heat intolerance (hot flashes)  Negative for cold intolerance  Genitourinary: Positive for frequency and urgency  Negative for difficulty urinating  Nocturia x2-3, dependent on fluid intake   Musculoskeletal: Positive for arthralgias (generalized), back pain, neck pain (hx of back/neck surgery) and neck stiffness  Left breast tenderness states \"feels raw\", mobility to left arm decreased   Skin: Negative for rash and wound  Left breast wound healing, small amount of blood noted 5/2 from norman site/scab   Allergic/Immunologic: Positive for environmental allergies (seasonal allergies) and food allergies  Neurological: Positive for dizziness, weakness (generalized), light-headedness, numbness (bilateral fingers and toes d/t neuropathy) and headaches       " "   Hx of DM and hydrocephalus   Hematological: Does not bruise/bleed easily  Psychiatric/Behavioral: Positive for sleep disturbance (restless sleep, sleeps alot)       Clinical Trial: no     OB/GYN History:  The patient underwent menarche at 8 years  Menopause Status Post  No LMP recorded (lmp unknown)  Patient has had a hysterectomy  Menopause at 56 years  Menopause Reason hysterectomy  Hormone replacement therapy: no   Years used n/a   3   Para 3   Age at first delivery being 25 years  Nursing: no    Birth control pills: yes  Years used 4     Pregnancy test needed:  no     ONCOTYPE/MAMMOPRINT results completed 13    OBJECTIVE:   /80 (BP Location: Right arm, Patient Position: Sitting, Cuff Size: Large)   Pulse 89   Temp 98 1 °F (36 7 °C) (Temporal)   Resp 18   Ht 5' 3\" (1 6 m)   Wt 103 kg (227 lb 15 3 oz)   LMP  (LMP Unknown)   SpO2 94%   BMI 40 38 kg/m²   Pain Assessment:  0  Performance Status: ECOG/Zubrod/WHO: 1 - Symptomatic but completely ambulatory    Physical Exam  Vitals and nursing note reviewed  Exam conducted with a chaperone present  Constitutional:       General: She is not in acute distress  Appearance: She is well-developed  She is not diaphoretic  HENT:      Head: Normocephalic and atraumatic  Mouth/Throat:      Pharynx: No oropharyngeal exudate  Eyes:      General: No scleral icterus  Conjunctiva/sclera: Conjunctivae normal       Pupils: Pupils are equal, round, and reactive to light  Neck:      Thyroid: No thyromegaly  Trachea: No tracheal deviation  Cardiovascular:      Rate and Rhythm: Normal rate and regular rhythm  Heart sounds: Normal heart sounds  Pulmonary:      Effort: Pulmonary effort is normal  No respiratory distress  Breath sounds: Normal breath sounds  No stridor  No wheezing, rhonchi or rales  Chest:      Chest wall: No tenderness  Abdominal:      General: Bowel sounds are normal  There is no distension        " Palpations: Abdomen is soft  There is no mass  Tenderness: There is no abdominal tenderness  Musculoskeletal:         General: No tenderness  Normal range of motion  Cervical back: Normal range of motion and neck supple  Lymphadenopathy:      Cervical: No cervical adenopathy  Upper Body:      Right upper body: No supraclavicular or axillary adenopathy  Left upper body: No supraclavicular or axillary ( There is no drainage from her axillary drain site as well as no blood  There is no sign of any infection ) adenopathy  Skin:     General: Skin is warm and dry  Coloration: Skin is not jaundiced or pale  Findings: No erythema or rash  Neurological:      General: No focal deficit present  Mental Status: She is alert and oriented to person, place, and time  Cranial Nerves: No cranial nerve deficit  Coordination: Coordination normal    Psychiatric:         Mood and Affect: Mood normal          Behavior: Behavior normal          Thought Content:  Thought content normal          Judgment: Judgment normal        RESULTS  Lab Results    Chemistry        Component Value Date/Time     (L) 01/03/2016 1520    K 3 5 04/06/2023 0920    K 3 2 (L) 01/03/2016 1520     04/06/2023 0920    CL 95 (L) 01/03/2016 1520    CO2 26 04/06/2023 0920    CO2 24 12/08/2021 1926    BUN 17 04/06/2023 0920    BUN 10 01/03/2016 1520    CREATININE 0 85 04/06/2023 0920    CREATININE 0 89 01/03/2016 1520        Component Value Date/Time    CALCIUM 9 1 04/06/2023 0920    CALCIUM 8 7 01/03/2016 1520    ALKPHOS 101 04/06/2023 0920    ALKPHOS 104 10/06/2015 1114    AST 27 04/06/2023 0920    AST 21 10/06/2015 1114    ALT 34 04/06/2023 0920    ALT 40 10/06/2015 1114    BILITOT 0 63 10/06/2015 1114            Lab Results   Component Value Date    WBC 4 64 03/20/2023    HGB 13 4 03/20/2023    HCT 40 4 03/20/2023    MCV 88 03/20/2023     03/20/2023     Imaging Studies  NM sentinal node inj only    Result Date: 4/10/2023  Narrative: SENTINEL NODE LYMPHOSCINTIGRAPHY INDICATION:  C50 912: Malignant neoplasm of unspecified site of left female breast  FINDINGS: 0 55 mCi Tc-99m filtered sulfur colloid (0 6 cc volume) was administered intradermally into the left breast by Dr Tonnie Sandhoff in the [address] [place] [address]  No imaging was performed  Impression: Injection of  0 55 mCi Tc-99m filtered sulfur colloid into the left breast in the OR  Workstation performed: [id number]     Mammo sharyn  breast specimen (No Charge)    Result Date: 4/11/2023  Narrative: Surgical specimen Two views of a surgical specimen were provided for noncontemporaneous review  SHARYN  radiofrequency reflector is noted in the specimen  The findings were not discussed with the surgeon at the time of surgery  Pathology: See Above    ASSESSMENT  1  Malignant neoplasm of central portion of left breast in female, estrogen receptor positive ([facility] [address] )  Radiation Simulation Treatment      2  Invasive ductal carcinoma of breast, female, left [facility])  Ambulatory referral to Radiation Oncology    Radiation Simulation Treatment         Cancer Staging   Malignant neoplasm of central portion of left breast in female, estrogen receptor positive ([facility] [address] )  Staging form: Breast, AJCC 8th Edition  - Pathologic stage from 5/3/2023: Stage IA (pT1b, pN0, cM0, G1, ER+, WA+, HER2-, Oncotype DX score: 13) - Signed by Edith Pepper MD on 5/11/2023  Stage prefix: Initial diagnosis  Nuclear grade: G2  Multigene prognostic tests performed:  Oncotype DX  Recurrence score range: Greater than or equal to 11  Histologic grading system: 3 grade system      PLAN/DISCUSSION  Orders Placed This Encounter   Procedures   • Radiation Simulation Treatment          Olayinka Hector is a 56y o  year old female with a stage IA, pT1b, pN0, M0 grade 1 ER/WA positive HER2/leida negative invasive ductal carcinoma of the left breast diagnosed after an abnormal routine screening "mammogram   She is status postlumpectomy with Dr Josep Amador for a 10 mm primary with a small component of DCIS  All margins of resection are negative  She had 3 negative sentinel lymph nodes  She had Oncotype recurrence score which came back at 13  She saw Dr Jerry Lopez who recommended treatment with an aromatase inhibitor after she completes adjuvant radiation therapy  She does not require any chemotherapy  In order to complete her breast conservation management, she will require radiation therapy to the entire left breast   This will help prevent any locally recurrent disease  We discussed treatment using breath-hold techniques to reduce dose to her heart as well as her left lung  We recommend a hypofractionated course of treatment to the left breast over 3 weeks  We discussed the rational for treatment including the acute side effects and the potential chronic complications with her  She does consent to proceed with the treatment  She will return for simulation and treatment planning purposes in the [address] [place] [address] in approximately 1 week which is also where she will be treated  Viridiana Uriostegui MD  6/7/0793,7:30 AM      Portions of the record may have been created with voice recognition software  Occasional wrong word or \"sound a like\" substitutions may have occurred due to the inherent limitations of voice recognition software  Read the chart carefully and recognize, using context, where substitutions have occurred          "

## 2023-05-03 NOTE — PROGRESS NOTES
Shahnaz Cisneros 1965 is a 56 y o  female Presents for discussion of RT for recently diagnosed invasive ductal carcinoma of left breast   Referred by Dr Kalen Lopez  Additional medical problems include poorly controlled IDDM prior history of MI, spinal trauma s/p cervical spine intervention, anxiety, chronic pain disorder, depression and PICA  Completed screening mammogram with results revealing focal asymmetry in left breast   Diagnostic mammogram and US revealed  There is a 5 mm x 6 mm x 4 mm irregularly shaped, non-parallel, hypoechoic mass with angular margins seen in the left breast at 2 o'clock in the posterior depth, 4 cm from the nipple  Ultimately, US guided biopsy completed with results revealing ER/NJ+, HER2-, invasive ductal carcinoma, histologic grade 1, 6 mm in greatest extent, involving 3 fragmented cores  3/15/23  Dr Kalen Lopez  Consented for lumpectomy    Genetic testing pending    3/23/23  Genetic Testing: NEGATIVE  No clinically significant variants detected    4/10/23  Tissue Exam  INVASIVE CARCINOMA OF THE BREAST: Resection  8th Edition - Protocol posted: 9/21/2022  INVASIVE CARCINOMA OF THE BREAST: COMPLETE EXCISION - All Specimens  SPECIMEN   Procedure  Excision (less than total mastectomy)    Specimen Laterality  Left    TUMOR   Tumor Site  Clock position      2 o'clock    Histologic Type  Invasive carcinoma of no special type (ductal)    Histologic Grade (Nate Histologic Score)     Glandular (Acinar) / Tubular Differentiation  Score 2    Nuclear Pleomorphism  Score 2    Mitotic Rate  Score 1    Overall Grade  Grade 1 (scores of 3, 4 or 5)    Tumor Size  Greatest dimension of largest invasive focus (Millimeters): 10mm mm   Tumor Focality  Single focus of invasive carcinoma    Ductal Carcinoma In Situ (DCIS)  Present      Negative for extensive intraductal component (EIC)    Size (Extent) of DCIS  Estimated size (extent) of DCIS is at least (Millimeters): 0 40mm mm   Number of Blocks with DCIS  1    Number of Blocks Examined  9    Architectural Patterns  Cribriform    Nuclear Grade  Grade II (intermediate)    Necrosis  Not identified    Lobular Carcinoma In Situ (LCIS)  Not identified    Lymphovascular Invasion  Not identified    Dermal Lymphovascular Invasion  No skin present    Microcalcifications  Not identified    Treatment Effect in the Breast  No known presurgical therapy    MARGINS   Margin Status for Invasive Carcinoma  All margins negative for invasive carcinoma    Distance from Invasive Carcinoma to Closest Margin  Cannot be determined: separate margin submitted     Margin Status for DCIS  Cannot be determined: Separate margin submitted     REGIONAL LYMPH NODES   Regional Lymph Node Status  All regional lymph nodes negative for tumor    Total Number of Lymph Nodes Examined (sentinel and non-sentinel)  3    Number of Wright Nodes Examined  3    PATHOLOGIC STAGE CLASSIFICATION (pTNM, AJCC 8th Edition)   Reporting of pT, pN, and (when applicable) pM categories is based on information available to the pathologist at the time the report is issued  As per the AJCC (Chapter 1, 8th Ed ) it is the managing physician's responsibility to establish the final pathologic stage based upon all pertinent information, including but potentially not limited to this pathology report  pT Category  pT1b    Regional Lymph Nodes Modifier  (sn): Wright node(s) evaluated  pN Category  pN0         Estrogen Receptor (ER) Status  Positive (greater than 10% of cells demonstrate nuclear positivity)    Percentage of Cells with Nuclear Positivity  90-95 %        Progesterone Receptor (PgR) Status  Positive    Percentage of Cells with Nuclear Positivity  90-95 %        HER2 (by immunohistochemistry)  Negative (Score 1+)    Testing Performed on Case Number  [id number]          4/19/23  Dr Greenwood Rim  Nodes and margins are negative  Incision healing well    Oncotype ordered    4/19/23  Oncotype Recurrence Score:13    23  Dr Bria Merritt  She had a DEXA scan in 2023-normal   Calcium and vitamin D supplementation is recommended  Plan to start aromatase inhibitor after completion of adjuvant radiation therapy       [employer]2024- Evan Merritt            Oncology History   Invasive ductal carcinoma of breast, female, left ([facility] [address] )   3/15/2023 Initial Diagnosis    Invasive ductal carcinoma of breast, female, left ([facility] [address] )     4/10/2023 Biopsy    INVASIVE CARCINOMA OF THE BREAST: Resection  8th Edition - Protocol posted: 2022  INVASIVE CARCINOMA OF THE BREAST: COMPLETE EXCISION - All Specimens  SPECIMEN   Procedure  Excision (less than total mastectomy)    Specimen Laterality  Left    TUMOR   Tumor Site  Clock position      2 o'clock    Histologic Type  Invasive carcinoma of no special type (ductal)    Histologic Grade ([place] Histologic Score)     Glandular (Acinar) / Tubular Differentiation  Score 2    Nuclear Pleomorphism  Score 2    Mitotic Rate  Score 1    Overall Grade  Grade 1 (scores of 3, 4 or 5)    Tumor Size  Greatest dimension of largest invasive focus (Millimeters): 10mm mm   Tumor Focality  Single focus of invasive carcinoma    Ductal Carcinoma In Situ (DCIS)  Present      Negative for extensive intraductal component (EIC)    Size (Extent) of DCIS  Estimated size (extent) of DCIS is at least (Millimeters): 0 40mm mm   Number of Blocks with DCIS  1    Number of Blocks Examined  9    Architectural Patterns  Cribriform    Nuclear Grade  Grade II (intermediate)    Necrosis  Not identified    Lobular Carcinoma In Situ (LCIS)  Not identified    Lymphovascular Invasion  Not identified    Dermal Lymphovascular Invasion  No skin present    Microcalcifications  Not identified    Treatment Effect in the Breast  No known presurgical therapy    MARGINS   Margin Status for Invasive Carcinoma  All margins negative for invasive carcinoma    Distance from Invasive Carcinoma to Closest Margin  Cannot be determined: separate margin submitted     Margin Status for DCIS  Cannot be determined: Separate margin submitted     REGIONAL LYMPH NODES   Regional Lymph Node Status  All regional lymph nodes negative for tumor    Total Number of Lymph Nodes Examined (sentinel and non-sentinel)  3    Number of Melfa Nodes Examined  3    PATHOLOGIC STAGE CLASSIFICATION (pTNM, AJCC 8th Edition)   Reporting of pT, pN, and (when applicable) pM categories is based on information available to the pathologist at the time the report is issued  As per the AJCC (Chapter 1, 8th Ed ) it is the managing physician's responsibility to establish the final pathologic stage based upon all pertinent information, including but potentially not limited to this pathology report  pT Category  pT1b    Regional Lymph Nodes Modifier  (sn): Melfa node(s) evaluated  pN Category  pN0         Estrogen Receptor (ER) Status  Positive (greater than 10% of cells demonstrate nuclear positivity)    Percentage of Cells with Nuclear Positivity  90-95 %        Progesterone Receptor (PgR) Status  Positive    Percentage of Cells with Nuclear Positivity  90-95 %        HER2 (by immunohistochemistry)  Negative (Score 1+)    Testing Performed on Case Number  [id number]      Review of Systems:  Review of Systems   Constitutional: Positive for appetite change (poor appetite at times) and fatigue (chronic)  Negative for chills, fever and unexpected weight change  HENT: Positive for rhinorrhea (d/t allergies, chronic)  Eyes: Positive for visual disturbance (ocassional blurry vision)  Wears Glasses   Respiratory: Positive for shortness of breath (with activity)  Negative for cough and chest tightness  Cardiovascular: Negative for chest pain and leg swelling  Gastrointestinal: Negative for constipation, diarrhea, nausea and vomiting  Endocrine: Positive for heat intolerance (hot flashes)  Negative for cold intolerance     Genitourinary: "Positive for frequency and urgency  Negative for difficulty urinating  Nocturia x2-3, dependent on fluid intake   Musculoskeletal: Positive for arthralgias (generalized), back pain, neck pain (hx of back/neck surgery) and neck stiffness  Left breast tenderness states \"feels raw\", mobility to left arm decreased   Skin: Negative for rash and wound  Left breast wound healing, small amount of blood noted 5/2 from norman site/scab   Allergic/Immunologic: Positive for environmental allergies (seasonal allergies) and food allergies  Neurological: Positive for dizziness, weakness (generalized), light-headedness, numbness (bilateral fingers and toes d/t neuropathy) and headaches  Hx of DM and hydrocephalus   Hematological: Does not bruise/bleed easily  Psychiatric/Behavioral: Positive for sleep disturbance (restless sleep, sleeps alot)  Clinical Trial: no    OB/GYN History:  The patient underwent menarche at 8 years  Menopause Status Post  No LMP recorded (lmp unknown)  Patient has had a hysterectomy  Menopause at 56 years  Menopause Reason hysterectomy  Hormone replacement therapy: no   Years used n/a   3   Para 3   Age at first delivery being 25 years  Nursing: no    Birth control pills: yes    Years used 4    Pregnancy test needed:  no    ONCOTYPE/MAMMOPRINT results completed    PFT n/a    Prior Radiation No    Teaching NCI breast packet given    MST completed    Implantable Devices (Port, Pacemaker, pain stimulator) no     Hip Replacement no        Health Maintenance   Topic Date Due   • Kidney Health Evaluation: Microalbumin/Creatinine Ratio  2020   • COVID-19 Vaccine (3 - Booster for Moderna series) 2021   • Pneumococcal Vaccine: Pediatrics (0 to 5 Years) and At-Risk Patients (6 to 59 Years) (2 - PCV) 2021   • BMI: Followup Plan  10/08/2021   • DM Eye Exam  2021   • Diabetic Foot Exam  2022   • HEMOGLOBIN A1C  2022   • Influenza Vaccine " (Season Ended) 09/01/2023   • Depression Remission PHQ  11/03/2023   • Annual Physical  11/11/2023   • Breast Cancer Screening: Mammogram  01/06/2024   • Kidney Health Evaluation: GFR  04/06/2024   • BMI: Adult  05/02/2024   • Colorectal Cancer Screening  10/23/2028   • DTaP,Tdap,and Td Vaccines (2 - Td or Tdap) 07/07/2030   • HIV Screening  Completed   • Hepatitis C Screening  Completed   • Osteoporosis Screening  Completed   • HIB Vaccine  Aged Out   • IPV Vaccine  Aged Out   • Hepatitis A Vaccine  Aged Out   • Meningococcal ACWY Vaccine  Aged Out   • HPV Vaccine  Aged Out     Past Medical History:   Diagnosis Date   • Anemia     iron deficiency anemia   • Anxiety    • Breast cancer ([facility] [address] )    • Depression    • Diabetes mellitus ([facility] [address] )    • Fibroid     [facility]   today 12/8/2021   • History of transfusion    • Hydrocephalus ([facility] [address] )    • Hyperlipidemia    • Hypertension    • Migraine    • Myocardial infarction ([facility] [address] ) 01/19/2022   • PONV (postoperative nausea and vomiting)    • Sciatica    • Seasonal allergies    • Vertigo    • Wears glasses      Past Surgical History:   Procedure Laterality Date   • BACK SURGERY      C1-3 fusion   • COLONOSCOPY     • EPIDURAL BLOCK INJECTION Right 07/02/2020    Procedure: Right L4-5 and L5-S1 transforaminal epidural steroid injection ([id number]);   Surgeon: Marcelina Pitt MD;  Location: [facility];  Service: Pain Management    • EPIDURAL BLOCK INJECTION N/A 09/02/2021    Procedure: BLOCK / INJECTION EPIDURAL STEROID LUMBAR  L5-S1 IESI;  Surgeon: Marcelina Pitt MD;  Location: [facility];  Service: Pain Management    • FL GUIDED NEEDLE PLAC BX/ASP/INJ  07/02/2020   • FL GUIDED NEEDLE PLAC BX/ASP/INJ  09/02/2021   • HYSTERECTOMY     • AR ESOPHAGOGASTRODUODENOSCOPY TRANSORAL DIAGNOSTIC N/A 10/23/2018    Procedure: EGD with Savary dilation AND COLONOSCOPY;  Surgeon: Doy Bamberger, MD;  Location: [facility];  Service: Gastroenterology   • AR LAPAROSCOPY W TOTAL HYSTERECTOMY UTERUS 250 GM/< N/A 12/08/2021    Procedure: [address]; BILAT SALPINGECTOMY; EXCISION PERITONEAL CYST;  Surgeon: Rashaun Balderrama MD;  Location: [facility];  Service: Gynecology   • SD MASTECTOMY PARTIAL Left 4/10/2023    Procedure: LUMPECTOMY BREAST WITH BEAU  LOCALIZATION,  BIOPSY LYMPH NODE SENTINEL (INJECT AT 1130 BY DR Erinn Blanc IN THE OR);   Surgeon: Marquez Garcia MD;  Location: [facility];  Service: General   • TUBAL LIGATION     • US GUIDED BREAST BIOPSY LEFT COMPLETE Left 03/07/2023     Family History   Problem Relation Age of Onset   • Varicose Veins Mother    • Drug abuse Mother    • Diabetes Father    • Liver cancer Father         bile duct   • Breast cancer Sister         unknown age   • No Known Problems Daughter    • No Known Problems Daughter    • No Known Problems Maternal Aunt    • No Known Problems Maternal Aunt    • No Known Problems Maternal Aunt    • No Known Problems Maternal Aunt    • No Known Problems Maternal Aunt    • No Known Problems Paternal Aunt    • No Known Problems Maternal Grandmother    • No Known Problems Maternal Grandfather    • No Known Problems Paternal Grandmother    • No Known Problems Paternal Grandfather      Social History     Tobacco Use   • Smoking status: Never   • Smokeless tobacco: Never   Vaping Use   • Vaping Use: Never used   Substance Use Topics   • Alcohol use: No   • Drug use: No        Current Outpatient Medications:   •  acetaminophen (TYLENOL) 325 mg tablet, Take 2 tablets (650 mg total) by mouth every 6 (six) hours, Disp: , Rfl:   •  ALPRAZolam (XANAX) 0 25 mg tablet, Take 1 tablet (0 25 mg total) by mouth 2 (two) times a day as needed for anxiety, Disp: 60 tablet, Rfl: 0  •  atorvastatin (LIPITOR) 40 mg tablet, Take 40 mg by mouth every evening, Disp: , Rfl:   •  busPIRone (BUSPAR) 10 mg tablet, Take 2 tablets (20 mg total) by mouth 3 (three) times a day as needed (anxiety), Disp: 180 tablet, Rfl: 5  •  Fiasp FlexTouch 100 units/mL injection pen, INJECT 3 TIMES DAILY "WITH MEALS PER SLIDING SCALE: <150=0 UNIT, 150-200=4 UNITS, 201-250=6 UNITS, 251-300= 8 UNITS, 301-350=9 UNITS, 351-400=10 UNITS   MAX DAILY DOSE=30 UNITS, Disp: , Rfl:   •  FLUoxetine (PROzac) 40 MG capsule, Take 1 capsule (40 mg total) by mouth daily, Disp: 90 capsule, Rfl: 3  •  gabapentin (Neurontin) 600 MG tablet, Take 1 tablet (600 mg total) by mouth 2 (two) times a day, Disp: 180 tablet, Rfl: 0  •  glimepiride (AMARYL) 4 mg tablet, , Disp: , Rfl:   •  ibuprofen (MOTRIN) 200 mg tablet, Take 2 tablets (400 mg total) by mouth every 6 (six) hours, Disp: , Rfl:   •  Insulin Glargine-Lixisenatide (Insulin Glargine-Lixisenatide) 100 units-33 mcg/mL injection pen, Inject 40 Units under the skin daily  , Disp: , Rfl:   •  losartan (COZAAR) 100 MG tablet, Take 100 mg by mouth daily, Disp: , Rfl:   •  metoprolol succinate (TOPROL-XL) 100 mg 24 hr tablet, Take 100 mg by mouth daily, Disp: , Rfl:   •  tiZANidine (ZANAFLEX) 2 mg tablet, Take 1 tablet (2 mg total) by mouth every 8 (eight) hours as needed for muscle spasms, Disp: 90 tablet, Rfl: 5  Allergies   Allergen Reactions   • Nuts - Food Allergy Anaphylaxis and Throat Swelling   • Chocolate - Food Allergy Cough   • Eggs Or Egg-Derived Products - Food Allergy Other (See Comments)     Cough   • Other Cough     LETTUCE   • Shellfish Allergy - Food Allergy Cough   • Tomato - Food Allergy Cough      Vitals:    05/03/23 0824   BP: 125/80   BP Location: Right arm   Patient Position: Sitting   Cuff Size: Large   Pulse: 89   Resp: 18   Temp: 98 1 °F (36 7 °C)   TempSrc: Temporal   SpO2: 94%   Weight: 103 kg (227 lb 15 3 oz)   Height: 5' 3\" (1 6 m)     Pain Score:   5  "

## 2023-05-08 ENCOUNTER — PATIENT OUTREACH (OUTPATIENT)
Dept: HEMATOLOGY ONCOLOGY | Facility: CLINIC | Age: 58
End: 2023-05-08

## 2023-05-08 ENCOUNTER — DOCUMENTATION (OUTPATIENT)
Dept: HEMATOLOGY ONCOLOGY | Facility: CLINIC | Age: 58
End: 2023-05-08

## 2023-05-08 DIAGNOSIS — L03.313 CELLULITIS OF CHEST WALL: Primary | ICD-10-CM

## 2023-05-08 RX ORDER — DOXYCYCLINE HYCLATE 100 MG/1
100 CAPSULE ORAL EVERY 12 HOURS SCHEDULED
Qty: 14 CAPSULE | Refills: 0 | Status: SHIPPED | OUTPATIENT
Start: 2023-05-08 | End: 2023-05-15

## 2023-05-08 NOTE — PROGRESS NOTES
Patient is scheduled for Wednesday  Called her and advised her of medication / recommendations, she verbalized understanding and had no more questions at this time

## 2023-05-08 NOTE — PROGRESS NOTES
Breast Cancer Nurse Navigation     Chart reviewed for progress of cancer care, appointments verified  Surgical Oncology: Dr Kaila Belcher following  Will follow for survivorship  FU TBD    Medical Oncology: Dr Joss Gastelum following  AI recommended to start after Radiation completed  FU 1/2/24    Radiation Oncology: Dr Ana Jolly consult completed  F/u 5/15/23    Any further needs: Oncology Care Coordinator and Nurse Navigator will outreach patient and follow progress    Will have Diana outreach patient for any needs at this time  Verify upcoming plan for AI after radiation  Radiation consult complete SIM scheduled

## 2023-05-08 NOTE — PROGRESS NOTES
Reviewed messages and photos  Prescribed doxycycline, please have patient start it and monitor the site carefully  Would put her in for a clinical visit on Wednesday or Thursday so that I can see it in person as well  Thank her for mentioning it, I think we should treat it as a cellulitis

## 2023-05-08 NOTE — PROGRESS NOTES
Called patient, she stated there is one big red ring near her left nipple that started yesterday  She stated it is more pinkish in color today  She denied any pain but stated it is warm to touch  She denied it growing in size since her noticing it yesterday  She denied any open area  She is going to send us a picture through [employer] now

## 2023-05-08 NOTE — PROGRESS NOTES
I received a in basket message from [facility] to follow up with Pt  I called Pt to discuss what she can expect from her [facility] visit on 5/15/23    Pt states that Dr Medina Bassett reviewed all the information on what to expect at her consult  Pt states that she is more worried about this red [place] that she woke up with yesterday around the whole perimeter of the Left breast     Pt states that it is warm to touch and looks a little worse today but denies any fever or any other symptoms at this time  I did inform Pt that I will reach out to Dr Allen Wall office and let her Nurse Sharon Navarro know about the area around the Breast     Pt states that she is not to start the Anastrozole until after Radiation  Pt had no more questions or concerns at this time  I did encourage her to call if any were to arise

## 2023-05-09 ENCOUNTER — SOCIAL WORK (OUTPATIENT)
Dept: BEHAVIORAL/MENTAL HEALTH CLINIC | Facility: CLINIC | Age: 58
End: 2023-05-09

## 2023-05-09 DIAGNOSIS — F43.10 POST-TRAUMATIC STRESS: ICD-10-CM

## 2023-05-09 DIAGNOSIS — F33.1 MODERATE EPISODE OF RECURRENT MAJOR DEPRESSIVE DISORDER (HCC): Primary | ICD-10-CM

## 2023-05-09 DIAGNOSIS — F41.0 ANXIETY DISORDER DUE TO GENERAL MEDICAL CONDITION WITH PANIC ATTACK: ICD-10-CM

## 2023-05-09 DIAGNOSIS — F06.4 ANXIETY DISORDER DUE TO GENERAL MEDICAL CONDITION WITH PANIC ATTACK: ICD-10-CM

## 2023-05-09 RX ORDER — ALPRAZOLAM 0.25 MG/1
0.25 TABLET ORAL 2 TIMES DAILY PRN
Qty: 60 TABLET | Refills: 0 | Status: SHIPPED | OUTPATIENT
Start: 2023-05-09 | End: 2023-05-19

## 2023-05-09 NOTE — PSYCH
"Behavioral Health Psychotherapy Progress Note    Psychotherapy Provided: Individual Psychotherapy     1  Moderate episode of recurrent major depressive disorder ([address] )        2  Post-traumatic stress            Goals addressed in session: Goal 1     DATA: The client reported that she will be starting her radiation therapy next week, but she does have an infection in her \"boob\" During the session we explored her increased life changes as well as the concept of on imposter syndrome, exploring traits, consequences and risk factors  Coping skills were reviewed to address her Mh Symptoms  During this session, this clinician used the following therapeutic modalities: Cognitive Behavioral Therapy, Mindfulness-based Strategies, Solution-Focused Therapy and Supportive Psychotherapy    Substance Abuse was not addressed during this session  If the client is diagnosed with a co-occurring substance use disorder, please indicate any changes in the frequency or amount of use: N/A  Stage of change for addressing substance use diagnoses: No substance use/Not applicable    ASSESSMENT:  Mary Jane Beck presents with a Anxious and Depressed mood  her affect is Tearful, which is congruent, with her mood and the content of the session  The client has made progress on their goals  The client  Mary Jane Beck presents with a none risk of suicide, none risk of self-harm, and none risk of harm to others  For any risk assessment that surpasses a \"low\" rating, a safety plan must be developed  A safety plan was indicated: No  If yes, describe in detail N/A    PLAN: Between sessions, Mary Jane Beck will utilize her coping skills when presented with anxiety and or depression    At the next session, the therapist will use Cognitive Behavioral Therapy, Mindfulness-based Strategies, Solution-Focused Therapy and Supportive Psychotherapy to address the client's relationships and environments and the effects on her Mental health    Behavioral " Health Treatment Plan and Discharge Planning: Mariely Claudio is aware of and agrees to continue to work on their treatment plan  They have identified and are working toward their discharge goals   yes    Visit start and stop times:    05/09/23  Start Time: 1355  Stop Time: 1450  Total Visit Time: 55 minutes

## 2023-05-10 ENCOUNTER — OFFICE VISIT (OUTPATIENT)
Dept: SURGERY | Facility: CLINIC | Age: 58
End: 2023-05-10

## 2023-05-10 VITALS
WEIGHT: 229.2 LBS | HEIGHT: 63 IN | DIASTOLIC BLOOD PRESSURE: 60 MMHG | RESPIRATION RATE: 16 BRPM | BODY MASS INDEX: 40.61 KG/M2 | HEART RATE: 82 BPM | OXYGEN SATURATION: 96 % | TEMPERATURE: 97.6 F | SYSTOLIC BLOOD PRESSURE: 110 MMHG

## 2023-05-10 DIAGNOSIS — C50.112 MALIGNANT NEOPLASM OF CENTRAL PORTION OF LEFT BREAST IN FEMALE, ESTROGEN RECEPTOR POSITIVE (HCC): ICD-10-CM

## 2023-05-10 DIAGNOSIS — L03.313 CELLULITIS OF CHEST WALL: ICD-10-CM

## 2023-05-10 DIAGNOSIS — C50.912 INVASIVE DUCTAL CARCINOMA OF BREAST, FEMALE, LEFT (HCC): Primary | ICD-10-CM

## 2023-05-10 DIAGNOSIS — Z17.0 MALIGNANT NEOPLASM OF CENTRAL PORTION OF LEFT BREAST IN FEMALE, ESTROGEN RECEPTOR POSITIVE (HCC): ICD-10-CM

## 2023-05-11 NOTE — PROGRESS NOTES
Post-Op Note - Surgical Oncology  Veronica Vora 56 y o  female MRN: [id number]  Encounter: [id number]    Assessment/Plan     57F with poorly controlled diabetes now status post left breast lumpectomy with sharyn  localization and left axillary sentinel lymph node biopsy, drain placement for pT1bN0 ER/MA+ HER2- grade 1 invasive ductal carcinoma of the left breast with grade 2 DCIS, negative margins, negative nodes, on 4/10/23  Some drain exit site cellulitis requiring a course of antibiotics in the early days  Since then, drain removed, antibiotics completed, wound healed, was doing well  Plan for adjuvant radiation and endocrine therapy as oncotype score was low  She now has a mild left breast cellulitis that started 2 days ago, she was reinitiated on antibiotics and the cellulitis is stable so far without worsening but without significant improvement since she contacted us earlier in the week  She will complete the antibiotic course and monitor her skin closely  I have let Dr Dafne Noriega know and he will reexamine her on Monday as well at the time of their planned simulation appointment  We will check in with her next week as well to see how things are doing as the antibiotics are completed  We will plan for a bilateral diagnostic mammogram and surveillance appointment 6 months after she completes her radiation therapy  Subjective      Chief Complaint   Patient presents with   • Post-op     Called 2 days ago for new left breast cellulitis, wound has healed, no drainage, no fevers, some increased pain at the left breast where it is red, prior drain site has not entirely healed yet  Planning for adjuvant radiation and endocrine therapy, oncotype score low, nodes negative, no chemo needed  Sim appointment next Monday  Restarted on antibiotics when photos sent in earlier in the week, here for wound check and breast check today  Review of Systems   Constitutional: Negative for chills and fever  "  Endocrine:        Poorly controlled diabetes   Skin: Positive for color change and wound  Hematological: Negative for adenopathy  Does not bruise/bleed easily  Psychiatric/Behavioral: The patient is nervous/anxious  All other systems reviewed and are negative  The following portions of the patient's history were reviewed and updated as appropriate: allergies, current medications, past family history, past medical history, past social history, past surgical history and problem list     Objective      Blood pressure 110/60, pulse 82, temperature 97 6 °F (36 4 °C), temperature source Temporal, resp  rate 16, height 5' 3\" (1 6 m), weight 104 kg (229 lb 3 2 oz), SpO2 96 %  Well appearing, no acute distress   Regular rate   No respiratory distress   Normal range of motion   Left breast wound healed, prior left chest wall drain sites still healing,  not actively infected, majority of left breast is slightly pink and tender  without drainage from the incision or fluctuance, picture consistent  with breast cellulitis    Signature:   Yanna Haney MD  Date: 5/10/2023 Time: 10:17 PM   "

## 2023-05-15 ENCOUNTER — APPOINTMENT (OUTPATIENT)
Dept: RADIATION ONCOLOGY | Facility: CLINIC | Age: 58
End: 2023-05-15
Attending: RADIOLOGY
Payer: COMMERCIAL

## 2023-05-15 PROCEDURE — 77332 RADIATION TREATMENT AID(S): CPT | Performed by: RADIOLOGY

## 2023-05-15 PROCEDURE — 77280 THER RAD SIMULAJ FIELD SMPL: CPT | Performed by: RADIOLOGY

## 2023-05-19 ENCOUNTER — SOCIAL WORK (OUTPATIENT)
Dept: BEHAVIORAL/MENTAL HEALTH CLINIC | Facility: CLINIC | Age: 58
End: 2023-05-19

## 2023-05-19 ENCOUNTER — DOCUMENTATION (OUTPATIENT)
Dept: BEHAVIORAL/MENTAL HEALTH CLINIC | Facility: CLINIC | Age: 58
End: 2023-05-19

## 2023-05-19 ENCOUNTER — OFFICE VISIT (OUTPATIENT)
Dept: FAMILY MEDICINE CLINIC | Facility: CLINIC | Age: 58
End: 2023-05-19

## 2023-05-19 VITALS
SYSTOLIC BLOOD PRESSURE: 114 MMHG | HEIGHT: 63 IN | DIASTOLIC BLOOD PRESSURE: 64 MMHG | OXYGEN SATURATION: 98 % | WEIGHT: 229 LBS | HEART RATE: 81 BPM | BODY MASS INDEX: 40.57 KG/M2

## 2023-05-19 DIAGNOSIS — F41.0 ANXIETY DISORDER DUE TO GENERAL MEDICAL CONDITION WITH PANIC ATTACK: ICD-10-CM

## 2023-05-19 DIAGNOSIS — M54.16 LUMBAR RADICULOPATHY: ICD-10-CM

## 2023-05-19 DIAGNOSIS — G89.4 CHRONIC PAIN SYNDROME: ICD-10-CM

## 2023-05-19 DIAGNOSIS — F06.4 ANXIETY DISORDER DUE TO GENERAL MEDICAL CONDITION WITH PANIC ATTACK: ICD-10-CM

## 2023-05-19 DIAGNOSIS — F33.1 MODERATE EPISODE OF RECURRENT MAJOR DEPRESSIVE DISORDER (HCC): Primary | ICD-10-CM

## 2023-05-19 RX ORDER — GABAPENTIN 600 MG/1
600 TABLET ORAL 3 TIMES DAILY
Qty: 270 TABLET | Refills: 2 | Status: SHIPPED | OUTPATIENT
Start: 2023-05-19 | End: 2023-08-17

## 2023-05-19 RX ORDER — ALPRAZOLAM 0.5 MG/1
0.5 TABLET ORAL 3 TIMES DAILY PRN
Qty: 90 TABLET | Refills: 2 | Status: SHIPPED | OUTPATIENT
Start: 2023-05-19 | End: 2023-06-18

## 2023-05-19 NOTE — PSYCH
"Behavioral Health Psychotherapy Progress Note    Psychotherapy Provided: Individual Psychotherapy     1  Moderate episode of recurrent major depressive disorder ([address] )            Goals addressed in session: Goal 1     DATA: the client reported that she had a good Mother's day and has been spending an increase amount of time with her family of origin  during the session we explored the client's skewed thought patterns in her personal relationships, using the [employer] worksheets  The client was able to verbalize her negative thought patterns, and healthy coping skills to address her weaknesses  During this session, this clinician used the following therapeutic modalities: Cognitive Behavioral Therapy, Mindfulness-based Strategies, Solution-Focused Therapy and Supportive Psychotherapy    Substance Abuse was not addressed during this session  If the client is diagnosed with a co-occurring substance use disorder, please indicate any changes in the frequency or amount of use: n/a  Stage of change for addressing substance use diagnoses: No substance use/Not applicable    ASSESSMENT:  Kaity Stafford presents with a Anxious and Depressed mood  her affect is Normal range and intensity, which is congruent, with her mood and the content of the session  The client has made progress on their goals  The client  Kaity Stafford presents with a none risk of suicide, none risk of self-harm, and none risk of harm to others  For any risk assessment that surpasses a \"low\" rating, a safety plan must be developed      A safety plan was indicated: no  If yes, describe in detail n/a    PLAN: Between sessions, Kaity Stafford will work on the computer program [employer] on [employer] At the next session, the therapist will use Cognitive Behavioral Therapy, Solution-Focused Therapy and Supportive Psychotherapy to address the client MH issues and the effects on her different relationships  and enviorments    [address] and " Discharge Planning: Raudel Renae is aware of and agrees to continue to work on their treatment plan  They have identified and are working toward their discharge goals       Visit start and stop times:    05/19/23  Start Time: 1405  Stop Time: 1459  Total Visit Time: 54 minutes

## 2023-05-19 NOTE — PROGRESS NOTES
"Assessment/Plan:    No problem-specific Assessment & Plan notes found for this encounter  Diagnoses and all orders for this visit:    Chronic pain syndrome  -     gabapentin (Neurontin) 600 MG tablet; Take 1 tablet (600 mg total) by mouth 3 (three) times a day    Lumbar radiculopathy  -     gabapentin (Neurontin) 600 MG tablet; Take 1 tablet (600 mg total) by mouth 3 (three) times a day    Anxiety disorder due to general medical condition with panic attack  -     ALPRAZolam (XANAX) 0 5 mg tablet; Take 1 tablet (0 5 mg total) by mouth 3 (three) times a day as needed for anxiety        PDMP reviewed  Increase to 0 5mg xanax BID, TID if needed during course of radiation  RTC 2 months    Subjective:      Patient ID: Herbert Huddleston is a 56 y o  female PMH PMH GERD, DM, HTN, HLD, MI, obesity, s/p spinal fusion for C2 vertebral fracture, breast cancer presents for increased anxiety  She notes this is secondary to recent diagnosis of breast cancer  She is s/p excision and is due to start radiation 5x per week x3 weeks  She is using xanax 0 25mg BID, sometimes TID with minimal relief  She does it is more helpful than buspar  She is taking her prozac  Follows with Siobhan Soler for DM  Getting insulin pump set up  Requests refills on gabapentin  HPI    The following portions of the patient's history were reviewed and updated as appropriate: allergies, current medications, past family history, past medical history, past social history, past surgical history and problem list     Review of Systems   Constitutional: Negative  Respiratory: Negative  Cardiovascular: Negative  Gastrointestinal: Negative  Psychiatric/Behavioral: Positive for agitation, decreased concentration and sleep disturbance  Negative for self-injury and suicidal ideas  The patient is nervous/anxious and is hyperactive            Objective:      /64   Pulse 81   Ht 5' 3\" (1 6 m)   Wt 104 kg (229 lb)   LMP  (LMP " Unknown)   SpO2 98%   BMI 40 57 kg/m²          Physical Exam  Constitutional:       General: She is not in acute distress  Appearance: Normal appearance  She is well-developed  She is obese  HENT:      Head: Normocephalic and atraumatic  Eyes:      Pupils: Pupils are equal, round, and reactive to light  Cardiovascular:      Rate and Rhythm: Normal rate and regular rhythm  Heart sounds: Normal heart sounds  No murmur heard  Pulmonary:      Effort: Pulmonary effort is normal  No respiratory distress  Breath sounds: Normal breath sounds  No wheezing  Abdominal:      General: Bowel sounds are normal  There is no distension  Palpations: Abdomen is soft  Tenderness: There is no abdominal tenderness  Skin:     General: Skin is warm and dry  Capillary Refill: Capillary refill takes less than 2 seconds  Neurological:      Mental Status: She is alert and oriented to person, place, and time  Psychiatric:         Mood and Affect: Mood normal          Behavior: Behavior normal          Thought Content:  Thought content normal          Judgment: Judgment normal

## 2023-05-24 PROCEDURE — 77295 3-D RADIOTHERAPY PLAN: CPT | Performed by: RADIOLOGY

## 2023-05-24 PROCEDURE — 77300 RADIATION THERAPY DOSE PLAN: CPT | Performed by: RADIOLOGY

## 2023-05-24 PROCEDURE — 77334 RADIATION TREATMENT AID(S): CPT | Performed by: RADIOLOGY

## 2023-05-31 PROCEDURE — G6017 INTRAFRACTION TRACK MOTION: HCPCS | Performed by: RADIOLOGY

## 2023-05-31 PROCEDURE — 77427 RADIATION TX MANAGEMENT X5: CPT | Performed by: INTERNAL MEDICINE

## 2023-05-31 PROCEDURE — 77412 RADIATION TX DELIVERY LVL 3: CPT | Performed by: RADIOLOGY

## 2023-05-31 PROCEDURE — 77387 GUIDANCE FOR RADJ TX DLVR: CPT | Performed by: RADIOLOGY

## 2023-06-01 ENCOUNTER — APPOINTMENT (OUTPATIENT)
Dept: RADIATION ONCOLOGY | Facility: CLINIC | Age: 58
End: 2023-06-01
Attending: RADIOLOGY
Payer: COMMERCIAL

## 2023-06-01 DIAGNOSIS — E11.9 TYPE 2 DIABETES MELLITUS WITHOUT COMPLICATION, WITH LONG-TERM CURRENT USE OF INSULIN (HCC): Primary | ICD-10-CM

## 2023-06-01 DIAGNOSIS — Z79.4 TYPE 2 DIABETES MELLITUS WITHOUT COMPLICATION, WITH LONG-TERM CURRENT USE OF INSULIN (HCC): Primary | ICD-10-CM

## 2023-06-01 PROCEDURE — 77412 RADIATION TX DELIVERY LVL 3: CPT | Performed by: RADIOLOGY

## 2023-06-01 PROCEDURE — G6017 INTRAFRACTION TRACK MOTION: HCPCS | Performed by: RADIOLOGY

## 2023-06-01 PROCEDURE — 77387 GUIDANCE FOR RADJ TX DLVR: CPT | Performed by: RADIOLOGY

## 2023-06-02 ENCOUNTER — APPOINTMENT (OUTPATIENT)
Dept: RADIATION ONCOLOGY | Facility: CLINIC | Age: 58
End: 2023-06-02
Attending: RADIOLOGY
Payer: COMMERCIAL

## 2023-06-02 PROCEDURE — 77387 GUIDANCE FOR RADJ TX DLVR: CPT | Performed by: RADIOLOGY

## 2023-06-02 PROCEDURE — 77412 RADIATION TX DELIVERY LVL 3: CPT | Performed by: RADIOLOGY

## 2023-06-05 ENCOUNTER — APPOINTMENT (OUTPATIENT)
Dept: RADIATION ONCOLOGY | Facility: CLINIC | Age: 58
End: 2023-06-05
Attending: RADIOLOGY
Payer: COMMERCIAL

## 2023-06-06 ENCOUNTER — APPOINTMENT (OUTPATIENT)
Dept: RADIATION ONCOLOGY | Facility: CLINIC | Age: 58
End: 2023-06-06
Attending: RADIOLOGY
Payer: COMMERCIAL

## 2023-06-06 ENCOUNTER — DOCUMENTATION (OUTPATIENT)
Dept: HEMATOLOGY ONCOLOGY | Facility: CLINIC | Age: 58
End: 2023-06-06

## 2023-06-06 ENCOUNTER — PATIENT OUTREACH (OUTPATIENT)
Dept: HEMATOLOGY ONCOLOGY | Facility: CLINIC | Age: 58
End: 2023-06-06

## 2023-06-06 PROCEDURE — 77387 GUIDANCE FOR RADJ TX DLVR: CPT | Performed by: INTERNAL MEDICINE

## 2023-06-06 PROCEDURE — G6017 INTRAFRACTION TRACK MOTION: HCPCS | Performed by: INTERNAL MEDICINE

## 2023-06-06 PROCEDURE — 77412 RADIATION TX DELIVERY LVL 3: CPT | Performed by: INTERNAL MEDICINE

## 2023-06-06 NOTE — PROGRESS NOTES
I received a in basket message from [facility] to follow up with Pt  I called Pt to see if she had any questions or concerns at this time  Pt states that she is currently at her Doctors appointment but she is feeling well at this time  I did tell Pt that I would call her back and check in with her in a couple of weeks  I also encouraged her to call if she had any questions or concerns

## 2023-06-06 NOTE — PROGRESS NOTES
Breast Cancer Nurse Navigation      Chart reviewed for progress of cancer care, appointments verified       Surgical Oncology: Dr Rosalva Johnson following  Will follow for survivorship  FU TBD     Medical Oncology: Dr Veronica Dinero following  AI recommended to start after Radiation completed  FU 1/2/24     Radiation Oncology: Dr Chowdhury Moment consult completed, rad onc in process  Any further needs:      Oncology Care Coordinator and Nurse Navigator will outreach patient and follow progress     Will have Diana outreach patient for any needs at this time  Verify upcoming plan for AI after radiation   Radiation in process

## 2023-06-07 ENCOUNTER — APPOINTMENT (OUTPATIENT)
Dept: RADIATION ONCOLOGY | Facility: CLINIC | Age: 58
End: 2023-06-07
Attending: RADIOLOGY
Payer: COMMERCIAL

## 2023-06-07 PROCEDURE — 77336 RADIATION PHYSICS CONSULT: CPT | Performed by: INTERNAL MEDICINE

## 2023-06-07 PROCEDURE — 77387 GUIDANCE FOR RADJ TX DLVR: CPT | Performed by: INTERNAL MEDICINE

## 2023-06-07 PROCEDURE — 77412 RADIATION TX DELIVERY LVL 3: CPT | Performed by: INTERNAL MEDICINE

## 2023-06-07 PROCEDURE — G6017 INTRAFRACTION TRACK MOTION: HCPCS | Performed by: INTERNAL MEDICINE

## 2023-06-08 ENCOUNTER — SOCIAL WORK (OUTPATIENT)
Dept: BEHAVIORAL/MENTAL HEALTH CLINIC | Facility: CLINIC | Age: 58
End: 2023-06-08
Payer: COMMERCIAL

## 2023-06-08 ENCOUNTER — APPOINTMENT (OUTPATIENT)
Dept: RADIATION ONCOLOGY | Facility: CLINIC | Age: 58
End: 2023-06-08
Attending: RADIOLOGY
Payer: COMMERCIAL

## 2023-06-08 DIAGNOSIS — F33.1 MODERATE EPISODE OF RECURRENT MAJOR DEPRESSIVE DISORDER (HCC): Primary | ICD-10-CM

## 2023-06-08 DIAGNOSIS — F43.10 POST-TRAUMATIC STRESS: ICD-10-CM

## 2023-06-08 PROCEDURE — 90837 PSYTX W PT 60 MINUTES: CPT | Performed by: SOCIAL WORKER

## 2023-06-08 PROCEDURE — 77387 GUIDANCE FOR RADJ TX DLVR: CPT | Performed by: RADIOLOGY

## 2023-06-08 PROCEDURE — 77427 RADIATION TX MANAGEMENT X5: CPT | Performed by: RADIOLOGY

## 2023-06-08 PROCEDURE — G6017 INTRAFRACTION TRACK MOTION: HCPCS | Performed by: RADIOLOGY

## 2023-06-08 PROCEDURE — 77412 RADIATION TX DELIVERY LVL 3: CPT | Performed by: RADIOLOGY

## 2023-06-08 NOTE — PSYCH
"Behavioral Health Psychotherapy Progress Note    Psychotherapy Provided: Individual Psychotherapy     1  Moderate episode of recurrent major depressive disorder ([address] )        2  Post-traumatic stress            Goals addressed in session: Goal 1     DATA: The client reported that she started her radiation which has been causing increase in nausea and head aches  During the session we explored and processed her physical health and the effects on her personal relationships  The client was able to verbalize increased conflict with her  and Daughter  Healthy relationship and  Boundaries were explored during the session, as well as her coping skills to address her increased conflict in her personal relationships  During this session, this clinician used the following therapeutic modalities: Cognitive Behavioral Therapy, Mindfulness-based Strategies, Solution-Focused Therapy and Supportive Psychotherapy    Substance Abuse was not addressed during this session  If the client is diagnosed with a co-occurring substance use disorder, please indicate any changes in the frequency or amount of use: n/a  Stage of change for addressing substance use diagnoses: No substance use/Not applicable    ASSESSMENT:  José Miguel Fernandez presents with a Anxious and Depressed mood  her affect is Normal range and intensity, which is congruent, with her mood and the content of the session  The client has not made progress on their goals  José Miguel Fernandez presents with a none risk of suicide, none risk of self-harm, and none risk of harm to others  For any risk assessment that surpasses a \"low\" rating, a safety plan must be developed  A safety plan was indicated: no  If yes, describe in detail n/a    PLAN: Between sessions, José Miguel Fernandez will work on the [facility] computer program [employer] on stress   At the next session, the therapist will use Cognitive Behavioral Therapy, Mindfulness-based Strategies, Solution-Focused Therapy and " Supportive Psychotherapy to address the client's MH issues that are effecting her different relationships and environments       Behavioral Health Treatment Plan and Discharge Planning: Laurita Jacobs is aware of and agrees to continue to work on their treatment plan  They have identified and are working toward their discharge goals   yes    Visit start and stop times:    06/08/23  Start Time: 1430  Stop Time: 1540  Total Visit Time: 70 minutes

## 2023-06-09 ENCOUNTER — APPOINTMENT (OUTPATIENT)
Dept: RADIATION ONCOLOGY | Facility: CLINIC | Age: 58
End: 2023-06-09
Attending: RADIOLOGY
Payer: COMMERCIAL

## 2023-06-09 PROCEDURE — G6017 INTRAFRACTION TRACK MOTION: HCPCS | Performed by: RADIOLOGY

## 2023-06-09 PROCEDURE — 77387 GUIDANCE FOR RADJ TX DLVR: CPT | Performed by: RADIOLOGY

## 2023-06-09 PROCEDURE — 77412 RADIATION TX DELIVERY LVL 3: CPT | Performed by: RADIOLOGY

## 2023-06-12 ENCOUNTER — APPOINTMENT (OUTPATIENT)
Dept: RADIATION ONCOLOGY | Facility: CLINIC | Age: 58
End: 2023-06-12
Attending: RADIOLOGY
Payer: COMMERCIAL

## 2023-06-12 PROCEDURE — 77412 RADIATION TX DELIVERY LVL 3: CPT | Performed by: RADIOLOGY

## 2023-06-12 PROCEDURE — 77387 GUIDANCE FOR RADJ TX DLVR: CPT | Performed by: RADIOLOGY

## 2023-06-12 PROCEDURE — G6017 INTRAFRACTION TRACK MOTION: HCPCS | Performed by: RADIOLOGY

## 2023-06-13 ENCOUNTER — APPOINTMENT (OUTPATIENT)
Dept: RADIATION ONCOLOGY | Facility: CLINIC | Age: 58
End: 2023-06-13
Attending: RADIOLOGY
Payer: COMMERCIAL

## 2023-06-13 PROCEDURE — 77387 GUIDANCE FOR RADJ TX DLVR: CPT | Performed by: RADIOLOGY

## 2023-06-13 PROCEDURE — 77412 RADIATION TX DELIVERY LVL 3: CPT | Performed by: RADIOLOGY

## 2023-06-14 ENCOUNTER — APPOINTMENT (OUTPATIENT)
Dept: RADIATION ONCOLOGY | Facility: CLINIC | Age: 58
End: 2023-06-14
Attending: RADIOLOGY
Payer: COMMERCIAL

## 2023-06-14 ENCOUNTER — APPOINTMENT (OUTPATIENT)
Dept: RADIATION ONCOLOGY | Facility: HOSPITAL | Age: 58
End: 2023-06-14
Payer: COMMERCIAL

## 2023-06-14 PROCEDURE — 77412 RADIATION TX DELIVERY LVL 3: CPT | Performed by: RADIOLOGY

## 2023-06-14 PROCEDURE — G6017 INTRAFRACTION TRACK MOTION: HCPCS | Performed by: RADIOLOGY

## 2023-06-14 PROCEDURE — 77387 GUIDANCE FOR RADJ TX DLVR: CPT | Performed by: RADIOLOGY

## 2023-06-15 ENCOUNTER — APPOINTMENT (OUTPATIENT)
Dept: RADIATION ONCOLOGY | Facility: CLINIC | Age: 58
End: 2023-06-15
Attending: RADIOLOGY
Payer: COMMERCIAL

## 2023-06-15 PROCEDURE — 77387 GUIDANCE FOR RADJ TX DLVR: CPT | Performed by: RADIOLOGY

## 2023-06-15 PROCEDURE — 77427 RADIATION TX MANAGEMENT X5: CPT | Performed by: RADIOLOGY

## 2023-06-15 PROCEDURE — 77334 RADIATION TREATMENT AID(S): CPT | Performed by: RADIOLOGY

## 2023-06-15 PROCEDURE — 77307 TELETHX ISODOSE PLAN CPLX: CPT | Performed by: RADIOLOGY

## 2023-06-15 PROCEDURE — G6017 INTRAFRACTION TRACK MOTION: HCPCS | Performed by: RADIOLOGY

## 2023-06-15 PROCEDURE — 77412 RADIATION TX DELIVERY LVL 3: CPT | Performed by: RADIOLOGY

## 2023-06-15 PROCEDURE — 77336 RADIATION PHYSICS CONSULT: CPT | Performed by: RADIOLOGY

## 2023-06-16 ENCOUNTER — APPOINTMENT (OUTPATIENT)
Dept: RADIATION ONCOLOGY | Facility: CLINIC | Age: 58
End: 2023-06-16
Attending: RADIOLOGY
Payer: COMMERCIAL

## 2023-06-16 PROCEDURE — 77412 RADIATION TX DELIVERY LVL 3: CPT | Performed by: INTERNAL MEDICINE

## 2023-06-16 PROCEDURE — 77331 SPECIAL RADIATION DOSIMETRY: CPT | Performed by: RADIOLOGY

## 2023-06-16 PROCEDURE — 77280 THER RAD SIMULAJ FIELD SMPL: CPT | Performed by: INTERNAL MEDICINE

## 2023-06-16 PROCEDURE — 77387 GUIDANCE FOR RADJ TX DLVR: CPT | Performed by: INTERNAL MEDICINE

## 2023-06-19 ENCOUNTER — APPOINTMENT (OUTPATIENT)
Dept: RADIATION ONCOLOGY | Facility: CLINIC | Age: 58
End: 2023-06-19
Attending: RADIOLOGY
Payer: COMMERCIAL

## 2023-06-19 ENCOUNTER — TELEPHONE (OUTPATIENT)
Dept: HEMATOLOGY ONCOLOGY | Facility: CLINIC | Age: 58
End: 2023-06-19

## 2023-06-19 DIAGNOSIS — C50.912 INVASIVE DUCTAL CARCINOMA OF BREAST, FEMALE, LEFT (HCC): Primary | ICD-10-CM

## 2023-06-19 PROCEDURE — 77387 GUIDANCE FOR RADJ TX DLVR: CPT | Performed by: RADIOLOGY

## 2023-06-19 PROCEDURE — G6017 INTRAFRACTION TRACK MOTION: HCPCS | Performed by: RADIOLOGY

## 2023-06-19 PROCEDURE — 77412 RADIATION TX DELIVERY LVL 3: CPT | Performed by: RADIOLOGY

## 2023-06-19 RX ORDER — ANASTROZOLE 1 MG/1
1 TABLET ORAL DAILY
Qty: 30 TABLET | Refills: 5 | Status: SHIPPED | OUTPATIENT
Start: 2023-06-19

## 2023-06-19 NOTE — TELEPHONE ENCOUNTER
Patient Call    Who are you speaking with? Patient    If it is not the patient, are they listed on an active communication consent form? N/A   What is the reason for this call? Patient will be completing radiation on Thursday 05/22/23 and would like to know when Dr Renita Resendiz will be coordinating the start of her medication that she will be taking after the completion of her treatment  Does this require a call back? Yes   If a call back is required, please list best call back number [phone]   If a call back is required, advise that a message will be forwarded to their care team and someone will return their call as soon as possible  Did you relay this information to the patient?  Yes

## 2023-06-19 NOTE — TELEPHONE ENCOUNTER
Attempted to return phone call to patient, no answer  Left detailed voicemail stating Dr Roxanna Carr will be sending over medication script to her pharmacy for her to start soon after radiation is completed  I left my direct number to return phone call with any questions or concerns

## 2023-06-20 ENCOUNTER — APPOINTMENT (OUTPATIENT)
Dept: RADIATION ONCOLOGY | Facility: CLINIC | Age: 58
End: 2023-06-20
Attending: RADIOLOGY
Payer: COMMERCIAL

## 2023-06-20 PROCEDURE — 77387 GUIDANCE FOR RADJ TX DLVR: CPT | Performed by: INTERNAL MEDICINE

## 2023-06-20 PROCEDURE — G6017 INTRAFRACTION TRACK MOTION: HCPCS | Performed by: INTERNAL MEDICINE

## 2023-06-20 PROCEDURE — 77412 RADIATION TX DELIVERY LVL 3: CPT | Performed by: INTERNAL MEDICINE

## 2023-06-21 ENCOUNTER — APPOINTMENT (OUTPATIENT)
Dept: RADIATION ONCOLOGY | Facility: CLINIC | Age: 58
End: 2023-06-21
Attending: RADIOLOGY
Payer: COMMERCIAL

## 2023-06-21 PROCEDURE — G6017 INTRAFRACTION TRACK MOTION: HCPCS | Performed by: RADIOLOGY

## 2023-06-21 PROCEDURE — 77387 GUIDANCE FOR RADJ TX DLVR: CPT | Performed by: RADIOLOGY

## 2023-06-21 PROCEDURE — 77412 RADIATION TX DELIVERY LVL 3: CPT | Performed by: RADIOLOGY

## 2023-06-22 ENCOUNTER — SOCIAL WORK (OUTPATIENT)
Dept: BEHAVIORAL/MENTAL HEALTH CLINIC | Facility: CLINIC | Age: 58
End: 2023-06-22
Payer: COMMERCIAL

## 2023-06-22 ENCOUNTER — APPOINTMENT (OUTPATIENT)
Dept: RADIATION ONCOLOGY | Facility: CLINIC | Age: 58
End: 2023-06-22
Attending: RADIOLOGY
Payer: COMMERCIAL

## 2023-06-22 DIAGNOSIS — F43.10 POST-TRAUMATIC STRESS: ICD-10-CM

## 2023-06-22 DIAGNOSIS — F33.1 MODERATE EPISODE OF RECURRENT MAJOR DEPRESSIVE DISORDER (HCC): Primary | ICD-10-CM

## 2023-06-22 PROCEDURE — 90837 PSYTX W PT 60 MINUTES: CPT | Performed by: SOCIAL WORKER

## 2023-06-22 PROCEDURE — 77387 GUIDANCE FOR RADJ TX DLVR: CPT | Performed by: RADIOLOGY

## 2023-06-22 PROCEDURE — 77412 RADIATION TX DELIVERY LVL 3: CPT | Performed by: RADIOLOGY

## 2023-06-22 PROCEDURE — G6017 INTRAFRACTION TRACK MOTION: HCPCS | Performed by: RADIOLOGY

## 2023-06-22 PROCEDURE — 77336 RADIATION PHYSICS CONSULT: CPT | Performed by: RADIOLOGY

## 2023-06-22 NOTE — PSYCH
"Behavioral Health Psychotherapy Progress Note    Psychotherapy Provided: Individual Psychotherapy     1  Moderate episode of recurrent major depressive disorder ([address] )        2  Post-traumatic stress            Goals addressed in session: Goal 1     DATA: The client reported that she finished her radioaction  therapy  \" I am done and I am going to live\" She reported that she is scared for her future, which has increased her anxiety and depression  During the session we explored and processed her weaknesses and the effects on her different relationships and environments  Coping skills were reviewed during the session  During this session, this clinician used the following therapeutic modalities: Cognitive Behavioral Therapy    Substance Abuse was not addressed during this session  If the client is diagnosed with a co-occurring substance use disorder, please indicate any changes in the frequency or amount of use: n/a  Stage of change for addressing substance use diagnoses: No substance use/Not applicable       ASSESSMENT:  Avery Hatch presents with a Anxious and Depressed mood  her affect is Normal range and intensity, which is congruent, with her mood and the content of the session  The client has made progress on their goals  The client  Avery Hatch presents with a none risk of suicide, none risk of self-harm, and none risk of harm to others  For any risk assessment that surpasses a \"low\" rating, a safety plan must be developed  A safety plan was indicated: no  If yes, describe in detail n/a    PLAN: Between sessions, Avery Hatch will work on [address] [place] [address] to address her [address] issues At the next session, the therapist will use Cognitive Behavioral Therapy, Solution-Focused Therapy and Supportive Psychotherapy to address the [address] issues that are effecting her different relationships and environments       Behavioral Health Treatment Plan and Discharge Planning: Avery Htach is aware of and agrees to " continue to work on their treatment plan  They have identified and are working toward their discharge goals   yes    Visit start and stop times:    06/22/23  Start Time: 9449  Stop Time: 1531  Total Visit Time: 60 minutes

## 2023-06-23 ENCOUNTER — TELEPHONE (OUTPATIENT)
Dept: ENDOCRINOLOGY | Facility: CLINIC | Age: 58
End: 2023-06-23

## 2023-06-23 ENCOUNTER — TELEPHONE (OUTPATIENT)
Dept: SURGERY | Facility: CLINIC | Age: 58
End: 2023-06-23

## 2023-06-23 ENCOUNTER — APPOINTMENT (EMERGENCY)
Dept: RADIOLOGY | Facility: HOSPITAL | Age: 58
End: 2023-06-23
Payer: COMMERCIAL

## 2023-06-23 ENCOUNTER — HOSPITAL ENCOUNTER (INPATIENT)
Facility: HOSPITAL | Age: 58
LOS: 1 days | Discharge: HOME | End: 2023-06-25
Attending: EMERGENCY MEDICINE | Admitting: INTERNAL MEDICINE
Payer: COMMERCIAL

## 2023-06-23 ENCOUNTER — APPOINTMENT (OUTPATIENT)
Dept: RADIATION ONCOLOGY | Facility: CLINIC | Age: 58
End: 2023-06-23
Attending: RADIOLOGY
Payer: COMMERCIAL

## 2023-06-23 DIAGNOSIS — L30.4 INTERTRIGO: ICD-10-CM

## 2023-06-23 DIAGNOSIS — R06.09 DYSPNEA ON EXERTION: Primary | ICD-10-CM

## 2023-06-23 DIAGNOSIS — J06.9 VIRAL URI WITH COUGH: ICD-10-CM

## 2023-06-23 DIAGNOSIS — U07.1 COVID: ICD-10-CM

## 2023-06-23 DIAGNOSIS — U07.1 COVID-19: ICD-10-CM

## 2023-06-23 DIAGNOSIS — L03.313 CELLULITIS OF CHEST WALL: ICD-10-CM

## 2023-06-23 LAB
ALBUMIN SERPL BCP-MCNC: 4.1 G/DL (ref 3.5–5)
ALP SERPL-CCNC: 88 U/L (ref 34–104)
ALT SERPL W P-5'-P-CCNC: 22 U/L (ref 7–52)
ANION GAP SERPL CALCULATED.3IONS-SCNC: 12 MMOL/L
APTT PPP: 28 SECONDS (ref 23–37)
AST SERPL W P-5'-P-CCNC: 20 U/L (ref 13–39)
ATRIAL RATE: 110 BPM
BACTERIA UR QL AUTO: NORMAL /HPF
BASOPHILS # BLD AUTO: 0.03 THOUSANDS/ÂΜL (ref 0–0.1)
BASOPHILS NFR BLD AUTO: 0 % (ref 0–1)
BILIRUB SERPL-MCNC: 1.81 MG/DL (ref 0.2–1)
BILIRUB UR QL STRIP: NEGATIVE
BNP SERPL-MCNC: 24 PG/ML (ref 0–100)
BUN SERPL-MCNC: 17 MG/DL (ref 5–25)
CALCIUM SERPL-MCNC: 9.4 MG/DL (ref 8.4–10.2)
CARDIAC TROPONIN I PNL SERPL HS: 2 NG/L
CHLORIDE SERPL-SCNC: 97 MMOL/L (ref 96–108)
CLARITY UR: CLEAR
CO2 SERPL-SCNC: 25 MMOL/L (ref 21–32)
COLOR UR: YELLOW
CREAT SERPL-MCNC: 0.88 MG/DL (ref 0.6–1.3)
EOSINOPHIL # BLD AUTO: 0.08 THOUSAND/ÂΜL (ref 0–0.61)
EOSINOPHIL NFR BLD AUTO: 1 % (ref 0–6)
ERYTHROCYTE [DISTWIDTH] IN BLOOD BY AUTOMATED COUNT: 12.7 % (ref 11.6–15.1)
FLUAV RNA RESP QL NAA+PROBE: NEGATIVE
FLUBV RNA RESP QL NAA+PROBE: NEGATIVE
GFR SERPL CREATININE-BSD FRML MDRD: 74 ML/MIN/1.73SQ M
GLUCOSE SERPL-MCNC: 180 MG/DL (ref 65–140)
GLUCOSE SERPL-MCNC: 234 MG/DL (ref 65–140)
GLUCOSE SERPL-MCNC: 268 MG/DL (ref 65–140)
GLUCOSE UR STRIP-MCNC: ABNORMAL MG/DL
HCT VFR BLD AUTO: 41.1 % (ref 34.8–46.1)
HGB BLD-MCNC: 13.4 G/DL (ref 11.5–15.4)
HGB UR QL STRIP.AUTO: NEGATIVE
IMM GRANULOCYTES # BLD AUTO: 0.04 THOUSAND/UL (ref 0–0.2)
IMM GRANULOCYTES NFR BLD AUTO: 1 % (ref 0–2)
INR PPP: 0.95 (ref 0.84–1.19)
KETONES UR STRIP-MCNC: NEGATIVE MG/DL
LACTATE SERPL-SCNC: 1.9 MMOL/L (ref 0.5–2)
LEUKOCYTE ESTERASE UR QL STRIP: ABNORMAL
LYMPHOCYTES # BLD AUTO: 0.18 THOUSANDS/ÂΜL (ref 0.6–4.47)
LYMPHOCYTES NFR BLD AUTO: 2 % (ref 14–44)
MCH RBC QN AUTO: 29.5 PG (ref 26.8–34.3)
MCHC RBC AUTO-ENTMCNC: 32.6 G/DL (ref 31.4–37.4)
MCV RBC AUTO: 90 FL (ref 82–98)
MONOCYTES # BLD AUTO: 0.68 THOUSAND/ÂΜL (ref 0.17–1.22)
MONOCYTES NFR BLD AUTO: 9 % (ref 4–12)
NEUTROPHILS # BLD AUTO: 7.01 THOUSANDS/ÂΜL (ref 1.85–7.62)
NEUTS SEG NFR BLD AUTO: 87 % (ref 43–75)
NITRITE UR QL STRIP: NEGATIVE
NON-SQ EPI CELLS URNS QL MICRO: NORMAL /HPF
NRBC BLD AUTO-RTO: 0 /100 WBCS
P AXIS: 43 DEGREES
PH UR STRIP.AUTO: 7.5 [PH]
PLATELET # BLD AUTO: 204 THOUSANDS/UL (ref 149–390)
PMV BLD AUTO: 9.7 FL (ref 8.9–12.7)
POTASSIUM SERPL-SCNC: 3.9 MMOL/L (ref 3.5–5.3)
PR INTERVAL: 180 MS
PROT SERPL-MCNC: 6.8 G/DL (ref 6.4–8.4)
PROT UR STRIP-MCNC: NEGATIVE MG/DL
PROTHROMBIN TIME: 12.9 SECONDS (ref 11.6–14.5)
QRS AXIS: 47 DEGREES
QRSD INTERVAL: 86 MS
QT INTERVAL: 320 MS
QTC INTERVAL: 433 MS
RBC # BLD AUTO: 4.55 MILLION/UL (ref 3.81–5.12)
RBC #/AREA URNS AUTO: NORMAL /HPF
RSV RNA RESP QL NAA+PROBE: NEGATIVE
SARS-COV-2 RNA RESP QL NAA+PROBE: POSITIVE
SODIUM SERPL-SCNC: 134 MMOL/L (ref 135–147)
SP GR UR STRIP.AUTO: 1.01 (ref 1–1.03)
T WAVE AXIS: 43 DEGREES
UROBILINOGEN UR QL STRIP.AUTO: 0.2 E.U./DL
VENTRICULAR RATE: 110 BPM
WBC # BLD AUTO: 8.02 THOUSAND/UL (ref 4.31–10.16)
WBC #/AREA URNS AUTO: NORMAL /HPF

## 2023-06-23 PROCEDURE — 96365 THER/PROPH/DIAG IV INF INIT: CPT

## 2023-06-23 PROCEDURE — 83880 ASSAY OF NATRIURETIC PEPTIDE: CPT

## 2023-06-23 PROCEDURE — 0241U HB NFCT DS VIR RESP RNA 4 TRGT: CPT

## 2023-06-23 PROCEDURE — 81001 URINALYSIS AUTO W/SCOPE: CPT

## 2023-06-23 PROCEDURE — 83605 ASSAY OF LACTIC ACID: CPT

## 2023-06-23 PROCEDURE — 36415 COLL VENOUS BLD VENIPUNCTURE: CPT

## 2023-06-23 PROCEDURE — XW033E5 INTRODUCTION OF REMDESIVIR ANTI-INFECTIVE INTO PERIPHERAL VEIN, PERCUTANEOUS APPROACH, NEW TECHNOLOGY GROUP 5: ICD-10-PCS | Performed by: INTERNAL MEDICINE

## 2023-06-23 PROCEDURE — 84484 ASSAY OF TROPONIN QUANT: CPT

## 2023-06-23 PROCEDURE — 99285 EMERGENCY DEPT VISIT HI MDM: CPT | Performed by: EMERGENCY MEDICINE

## 2023-06-23 PROCEDURE — 85025 COMPLETE CBC W/AUTO DIFF WBC: CPT

## 2023-06-23 PROCEDURE — 93010 ELECTROCARDIOGRAM REPORT: CPT | Performed by: INTERNAL MEDICINE

## 2023-06-23 PROCEDURE — 99285 EMERGENCY DEPT VISIT HI MDM: CPT

## 2023-06-23 PROCEDURE — 99223 1ST HOSP IP/OBS HIGH 75: CPT | Performed by: INTERNAL MEDICINE

## 2023-06-23 PROCEDURE — 71045 X-RAY EXAM CHEST 1 VIEW: CPT

## 2023-06-23 PROCEDURE — 87040 BLOOD CULTURE FOR BACTERIA: CPT

## 2023-06-23 PROCEDURE — 85610 PROTHROMBIN TIME: CPT

## 2023-06-23 PROCEDURE — 96375 TX/PRO/DX INJ NEW DRUG ADDON: CPT

## 2023-06-23 PROCEDURE — 82948 REAGENT STRIP/BLOOD GLUCOSE: CPT

## 2023-06-23 PROCEDURE — 80053 COMPREHEN METABOLIC PANEL: CPT

## 2023-06-23 PROCEDURE — 93005 ELECTROCARDIOGRAM TRACING: CPT

## 2023-06-23 PROCEDURE — 85730 THROMBOPLASTIN TIME PARTIAL: CPT

## 2023-06-23 RX ORDER — DEXAMETHASONE SODIUM PHOSPHATE 10 MG/ML
6 INJECTION, SOLUTION INTRAMUSCULAR; INTRAVENOUS EVERY 24 HOURS
Status: DISCONTINUED | OUTPATIENT
Start: 2023-06-23 | End: 2023-06-25 | Stop reason: HOSPADM

## 2023-06-23 RX ORDER — INSULIN LISPRO 100 [IU]/ML
1-6 INJECTION, SOLUTION INTRAVENOUS; SUBCUTANEOUS
Status: DISCONTINUED | OUTPATIENT
Start: 2023-06-23 | End: 2023-06-25 | Stop reason: HOSPADM

## 2023-06-23 RX ORDER — OXYCODONE HYDROCHLORIDE 5 MG/1
5 TABLET ORAL ONCE
Status: COMPLETED | OUTPATIENT
Start: 2023-06-23 | End: 2023-06-23

## 2023-06-23 RX ORDER — BUSPIRONE HYDROCHLORIDE 10 MG/1
20 TABLET ORAL 3 TIMES DAILY PRN
Status: DISCONTINUED | OUTPATIENT
Start: 2023-06-23 | End: 2023-06-25 | Stop reason: HOSPADM

## 2023-06-23 RX ORDER — LOSARTAN POTASSIUM 50 MG/1
100 TABLET ORAL DAILY
Status: DISCONTINUED | OUTPATIENT
Start: 2023-06-24 | End: 2023-06-25 | Stop reason: HOSPADM

## 2023-06-23 RX ORDER — INSULIN GLARGINE 100 [IU]/ML
40 INJECTION, SOLUTION SUBCUTANEOUS
Status: DISCONTINUED | OUTPATIENT
Start: 2023-06-23 | End: 2023-06-25 | Stop reason: HOSPADM

## 2023-06-23 RX ORDER — FLUOXETINE HYDROCHLORIDE 20 MG/1
40 CAPSULE ORAL DAILY
Status: DISCONTINUED | OUTPATIENT
Start: 2023-06-24 | End: 2023-06-25 | Stop reason: HOSPADM

## 2023-06-23 RX ORDER — ACETAMINOPHEN 325 MG/1
650 TABLET ORAL EVERY 6 HOURS PRN
Status: DISCONTINUED | OUTPATIENT
Start: 2023-06-23 | End: 2023-06-25 | Stop reason: HOSPADM

## 2023-06-23 RX ORDER — HEPARIN SODIUM 5000 [USP'U]/ML
5000 INJECTION, SOLUTION INTRAVENOUS; SUBCUTANEOUS EVERY 8 HOURS SCHEDULED
Status: DISCONTINUED | OUTPATIENT
Start: 2023-06-24 | End: 2023-06-25 | Stop reason: HOSPADM

## 2023-06-23 RX ORDER — ATORVASTATIN CALCIUM 40 MG/1
40 TABLET, FILM COATED ORAL EVERY EVENING
Status: DISCONTINUED | OUTPATIENT
Start: 2023-06-23 | End: 2023-06-25 | Stop reason: HOSPADM

## 2023-06-23 RX ORDER — GABAPENTIN 300 MG/1
600 CAPSULE ORAL 3 TIMES DAILY
Status: DISCONTINUED | OUTPATIENT
Start: 2023-06-23 | End: 2023-06-25 | Stop reason: HOSPADM

## 2023-06-23 RX ORDER — METOPROLOL SUCCINATE 100 MG/1
100 TABLET, EXTENDED RELEASE ORAL DAILY
Status: DISCONTINUED | OUTPATIENT
Start: 2023-06-24 | End: 2023-06-25 | Stop reason: HOSPADM

## 2023-06-23 RX ORDER — CEFAZOLIN SODIUM 2 G/50ML
2000 SOLUTION INTRAVENOUS EVERY 8 HOURS
Status: DISCONTINUED | OUTPATIENT
Start: 2023-06-24 | End: 2023-06-25 | Stop reason: HOSPADM

## 2023-06-23 RX ORDER — ANASTROZOLE 1 MG/1
1 TABLET ORAL DAILY
Status: DISCONTINUED | OUTPATIENT
Start: 2023-06-24 | End: 2023-06-25 | Stop reason: HOSPADM

## 2023-06-23 RX ORDER — ACETAMINOPHEN 325 MG/1
975 TABLET ORAL ONCE
Status: COMPLETED | OUTPATIENT
Start: 2023-06-23 | End: 2023-06-23

## 2023-06-23 RX ORDER — CEFTRIAXONE 1 G/50ML
1000 INJECTION, SOLUTION INTRAVENOUS ONCE
Status: COMPLETED | OUTPATIENT
Start: 2023-06-23 | End: 2023-06-23

## 2023-06-23 RX ORDER — ONDANSETRON 2 MG/ML
4 INJECTION INTRAMUSCULAR; INTRAVENOUS ONCE
Status: COMPLETED | OUTPATIENT
Start: 2023-06-23 | End: 2023-06-23

## 2023-06-23 RX ORDER — KETOROLAC TROMETHAMINE 30 MG/ML
15 INJECTION, SOLUTION INTRAMUSCULAR; INTRAVENOUS ONCE
Status: COMPLETED | OUTPATIENT
Start: 2023-06-23 | End: 2023-06-23

## 2023-06-23 RX ADMIN — ATORVASTATIN CALCIUM 40 MG: 40 TABLET, FILM COATED ORAL at 22:31

## 2023-06-23 RX ADMIN — GABAPENTIN 600 MG: 300 CAPSULE ORAL at 22:31

## 2023-06-23 RX ADMIN — SODIUM CHLORIDE, SODIUM LACTATE, POTASSIUM CHLORIDE, AND CALCIUM CHLORIDE 500 ML: .6; .31; .03; .02 INJECTION, SOLUTION INTRAVENOUS at 18:41

## 2023-06-23 RX ADMIN — ONDANSETRON 4 MG: 2 INJECTION INTRAMUSCULAR; INTRAVENOUS at 15:21

## 2023-06-23 RX ADMIN — ACETAMINOPHEN 975 MG: 325 TABLET ORAL at 15:23

## 2023-06-23 RX ADMIN — INSULIN GLARGINE 40 UNITS: 100 INJECTION, SOLUTION SUBCUTANEOUS at 22:31

## 2023-06-23 RX ADMIN — KETOROLAC TROMETHAMINE 15 MG: 30 INJECTION, SOLUTION INTRAMUSCULAR at 15:21

## 2023-06-23 RX ADMIN — OXYCODONE HYDROCHLORIDE 5 MG: 5 TABLET ORAL at 18:40

## 2023-06-23 RX ADMIN — INSULIN LISPRO 20 UNITS: 100 INJECTION, SOLUTION INTRAVENOUS; SUBCUTANEOUS at 18:55

## 2023-06-23 RX ADMIN — CEFTRIAXONE 1000 MG: 1 INJECTION, SOLUTION INTRAVENOUS at 15:25

## 2023-06-23 RX ADMIN — SODIUM CHLORIDE 1000 ML: 0.9 INJECTION, SOLUTION INTRAVENOUS at 15:20

## 2023-06-23 RX ADMIN — DEXAMETHASONE SODIUM PHOSPHATE 6 MG: 10 INJECTION, SOLUTION INTRAMUSCULAR; INTRAVENOUS at 18:40

## 2023-06-23 RX ADMIN — REMDESIVIR 200 MG: 100 INJECTION, POWDER, LYOPHILIZED, FOR SOLUTION INTRAVENOUS at 18:40

## 2023-06-23 NOTE — ASSESSMENT & PLAN NOTE
Lab Results   Component Value Date    HGBA1C 11 3 (H) 05/11/2022   Continue home insulin regimen  Sliding scale insulin

## 2023-06-23 NOTE — ASSESSMENT & PLAN NOTE
Presented with intermittent fevers, chills shortness of breath with a nonproductive cough  Noted to be COVID-positive in the ER  Chest x-ray without any acute abnormalities  Saturating adequately on room air at rest however desaturates to 88% with exertion  We will place on COVID protocol  IV steroids  Remdesivir  Trend inflammatory markers  Wean oxygen as able

## 2023-06-23 NOTE — PLAN OF CARE
Problem: PAIN - ADULT  Goal: Verbalizes/displays adequate comfort level or baseline comfort level  Description: Interventions:  - Encourage patient to monitor pain and request assistance  - Assess pain using appropriate pain scale  - Administer analgesics based on type and severity of pain and evaluate response  - Implement non-pharmacological measures as appropriate and evaluate response  - Consider cultural and social influences on pain and pain management  - Notify physician/advanced practitioner if interventions unsuccessful or patient reports new pain  Outcome: Progressing     Problem: INFECTION - ADULT  Goal: Absence or prevention of progression during hospitalization  Description: INTERVENTIONS:  - Assess and monitor for signs and symptoms of infection  - Monitor lab/diagnostic results  - Monitor all insertion sites, i e  indwelling lines, tubes, and drains  - Monitor endotracheal if appropriate and nasal secretions for changes in amount and color  - [place] appropriate cooling/warming therapies per order  - Administer medications as ordered  - Instruct and encourage patient and family to use good hand hygiene technique  - Identify and instruct in appropriate isolation precautions for identified infection/condition  Outcome: Progressing  Goal: Absence of fever/infection during neutropenic period  Description: INTERVENTIONS:  - Monitor WBC    Outcome: Progressing     Problem: SAFETY ADULT  Goal: Patient will remain free of falls  Description: INTERVENTIONS:  - Educate patient/family on patient safety including physical limitations  - Instruct patient to call for assistance with activity   - Consult OT/PT to assist with strengthening/mobility   - Keep Call bell within reach  - Keep bed low and locked with side rails adjusted as appropriate  - Keep care items and personal belongings within reach  - Initiate and maintain comfort rounds  - Make Fall Risk Sign visible to staff  - Offer Toileting every 2 Hours, in advance of need  - Initiate/Maintain bed/chair alarm  - Obtain necessary fall risk management equipment: non skid socks  - Apply yellow socks and bracelet for high fall risk patients  - Consider moving patient to room near nurses station  Outcome: Progressing  Goal: Maintain or return to baseline ADL function  Description: INTERVENTIONS:  -  Assess patient's ability to carry out ADLs; assess patient's baseline for ADL function and identify physical deficits which impact ability to perform ADLs (bathing, care of mouth/teeth, toileting, grooming, dressing, etc )  - Assess/evaluate cause of self-care deficits   - Assess range of motion  - Assess patient's mobility; develop plan if impaired  - Assess patient's need for assistive devices and provide as appropriate  - Encourage maximum independence but intervene and supervise when necessary  - Involve family in performance of ADLs  - Assess for home care needs following discharge   - Consider OT consult to assist with ADL evaluation and planning for discharge  - Provide patient education as appropriate  Outcome: Progressing  Goal: Maintains/Returns to pre admission functional level  Description: INTERVENTIONS:  - Perform BMAT or MOVE assessment daily    - Set and communicate daily mobility goal to care team and patient/family/caregiver  - Collaborate with rehabilitation services on mobility goals if consulted  - Perform Range of Motion 3 times a day  - Reposition patient every 3 hours    - Dangle patient 3 times a day  - Stand patient 3 times a day  - Ambulate patient 3 times a day  - Out of bed to chair 3 times a day   - Out of bed for meals 3 times a day  - Out of bed for toileting  - Record patient progress and toleration of activity level   Outcome: Progressing     Problem: RESPIRATORY - ADULT  Goal: Achieves optimal ventilation and oxygenation  Description: INTERVENTIONS:  - Assess for changes in respiratory status  - Assess for changes in mentation and behavior  - Position to facilitate oxygenation and minimize respiratory effort  - Oxygen administered by appropriate delivery if ordered  - Initiate smoking cessation education as indicated  - Encourage broncho-pulmonary hygiene including cough, deep breathe, Incentive Spirometry  - Assess the need for suctioning and aspirate as needed  - Assess and instruct to report SOB or any respiratory difficulty  - Respiratory Therapy support as indicated  Outcome: Progressing     Problem: GENITOURINARY - ADULT  Goal: Maintains or returns to baseline urinary function  Description: INTERVENTIONS:  - Assess urinary function  - Encourage oral fluids to ensure adequate hydration if ordered  - Administer IV fluids as ordered to ensure adequate hydration  - Administer ordered medications as needed  - Offer frequent toileting  - Follow urinary retention protocol if ordered  Outcome: Progressing  Goal: Absence of urinary retention  Description: INTERVENTIONS:  - Assess patient’s ability to void and empty bladder  - Monitor I/O  - Bladder scan as needed  - Discuss with physician/AP medications to alleviate retention as needed  - Discuss catheterization for long term situations as appropriate  Outcome: Progressing     Problem: METABOLIC, FLUID AND ELECTROLYTES - ADULT  Goal: Electrolytes maintained within normal limits  Description: INTERVENTIONS:  - Monitor labs and assess patient for signs and symptoms of electrolyte imbalances  - Administer electrolyte replacement as ordered  - Monitor response to electrolyte replacements, including repeat lab results as appropriate  - Instruct patient on fluid and nutrition as appropriate  Outcome: Progressing  Goal: Fluid balance maintained  Description: INTERVENTIONS:  - Monitor labs   - Monitor I/O and WT  - Instruct patient on fluid and nutrition as appropriate  - Assess for signs & symptoms of volume excess or deficit  Outcome: Progressing  Goal: Glucose maintained within target range  Description: INTERVENTIONS:  - Monitor Blood Glucose as ordered  - Assess for signs and symptoms of hyperglycemia and hypoglycemia  - Administer ordered medications to maintain glucose within target range  - Assess nutritional intake and initiate nutrition service referral as needed  Outcome: Progressing

## 2023-06-23 NOTE — TELEPHONE ENCOUNTER
Pt called into the office that she completed her radiation yesterday  Pt reporting she can not get warm  She is under 2 blankets, temp is 101 1, feels like she has been beat up  She reports the area is red/sore  She did do warm compression last night and area was oozing on the washcloth  She is concerned for infection  Sent TT to Dr Mike Gabriel who is covering for Dr Cecil Fiore  Pt knows to await a phone call from him

## 2023-06-23 NOTE — TELEPHONE ENCOUNTER
Attempted to contact patient by phone  A voicemail was left with contact information for her to follow-up with us

## 2023-06-23 NOTE — ED ATTENDING ATTESTATION
6/23/2023  IJamie DO, saw and evaluated the patient  I have discussed the patient with the resident/non-physician practitioner and agree with the resident's/non-physician practitioner's findings, Plan of Care, and MDM as documented in the resident's/non-physician practitioner's note, except where noted  All available labs and Radiology studies were reviewed  I was present for key portions of any procedure(s) performed by the resident/non-physician practitioner and I was immediately available to provide assistance  At this point I agree with the current assessment done in the Emergency Department  I have conducted an independent evaluation of this patient a history and physical is as follows:    53yo female presents for breast wound s/p radiation and lumpectomy, weakness  Febrile and tachycardic here  Sees Dr Hemant Vallejo for surg/onc, Dr Pancho Olea for onc, Dr Adelaide Barone for rads onc  Hx of 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  Had lumpectomy on 3/15/23 showing histology, nodes and margins were negative  Erythema and pain in L breast worsened overnight, green discharge overnight  Fever, chills, dry cough, sore throat  Nausea at baseline from treatment, no vomiting, decreased PO   reports some cold sx in himself as well  Will assess for infection such as pneumonia, viral, breast infection--cellulitis, abscess; with poor PO intake will check basic labs        Physical Exam  Vitals reviewed  Constitutional:       Appearance: Normal appearance  HENT:      Head: Normocephalic and atraumatic  Right Ear: External ear normal       Left Ear: External ear normal       Nose: Congestion present  Eyes:      General:         Right eye: No discharge  Left eye: No discharge  Cardiovascular:      Rate and Rhythm: Regular rhythm  Tachycardia present  Pulmonary:      Effort: Pulmonary effort is normal  No respiratory distress     Chest:      Comments: Left breast erythematous with dimpling, breast fold tissue raw with clear drainage, no palpable sore or abscess  Musculoskeletal:         General: No deformity or signs of injury  Skin:     General: Skin is warm  Coloration: Skin is not jaundiced or pale  Findings: Erythema present  Neurological:      General: No focal deficit present  Mental Status: She is alert  Mental status is at baseline        Gait: Gait normal              ED Course         Critical Care Time  Procedures

## 2023-06-23 NOTE — ASSESSMENT & PLAN NOTE
Noted to have erythema and warmth to left chest wall consistent with cellulitis  We will place on IV cefazolin for now

## 2023-06-23 NOTE — ED PROVIDER NOTES
History  Chief Complaint   Patient presents with   • Abdominal Pain     Finished radiation yesterday for breast CA  She reports that her skin is open under left breast  Also reports fever, chills, body aches     Nissa Valero is a 57-year-old female with left breast cancer, currently undergoing radiation for the past 4 weeks, with resultant mild erythema of the left breast from radiation, who presents with worsening erythema and pain under the left breast with some discharge from a minor wound in the fold, onset overnight, accompanied by fever, chills, generalized weakness, mild frontal headache, decreased PO intake, decreased urination, nasal congestion, sore throat, dry cough, and exertional dyspnea, is unsure whether she is developing a URI or if her symptoms are from a potential infection of the skin under her left breast  Denies vision changes, numbness, focal weakness, vision changes, difficulty swallowing, earache, chest pain, abdominal pain, vomiting, diarrhea, constipation, dysuria, hematuria, peripheral edema, or unilateral calf pain or swelling  Has chronic nausea at baseline  Is not on chemotherapy  Has no known sick contacts however takes a public bus to [facility] for radiation  Prior to Admission Medications   Prescriptions Last Dose Informant Patient Reported? Taking? ALPRAZolam (XANAX) 0 5 mg tablet   No No   Sig: Take 1 tablet (0 5 mg total) by mouth 3 (three) times a day as needed for anxiety   FLUoxetine (PROzac) 40 MG capsule 6/22/2023 Self No Yes   Sig: Take 1 capsule (40 mg total) by mouth daily   Fiasp FlexTouch 100 units/mL injection pen  Self Yes No   Sig: INJECT 3 TIMES DAILY WITH MEALS PER SLIDING SCALE: <150=0 UNIT, 150-200=4 UNITS, 201-250=6 UNITS, 251-300= 8 UNITS, 301-350=9 UNITS, 351-400=10 UNITS   MAX DAILY DOSE=30 UNITS   Insulin Glargine-Lixisenatide (Insulin Glargine-Lixisenatide) 100 units-33 mcg/mL injection pen  Self Yes No   Sig: Inject 40 Units under the skin daily acetaminophen (TYLENOL) 325 mg tablet 6/23/2023 Self No Yes   Sig: Take 2 tablets (650 mg total) by mouth every 6 (six) hours   anastrozole (ARIMIDEX) 1 mg tablet 6/22/2023  No Yes   Sig: Take 1 tablet (1 mg total) by mouth daily   atorvastatin (LIPITOR) 40 mg tablet Past Week Self Yes Yes   Sig: Take 40 mg by mouth every evening   busPIRone (BUSPAR) 10 mg tablet  Self No No   Sig: Take 2 tablets (20 mg total) by mouth 3 (three) times a day as needed (anxiety)   gabapentin (Neurontin) 600 MG tablet 6/22/2023  No Yes   Sig: Take 1 tablet (600 mg total) by mouth 3 (three) times a day   glimepiride (AMARYL) 4 mg tablet  Self Yes No   ibuprofen (MOTRIN) 200 mg tablet 6/22/2023 Self No Yes   Sig: Take 2 tablets (400 mg total) by mouth every 6 (six) hours   losartan (COZAAR) 100 MG tablet 6/23/2023 Self Yes Yes   Sig: Take 100 mg by mouth daily   metoprolol succinate (TOPROL-XL) 100 mg 24 hr tablet 6/23/2023 Self Yes Yes   Sig: Take 100 mg by mouth daily   tiZANidine (ZANAFLEX) 2 mg tablet 6/23/2023 Self No Yes   Sig: Take 1 tablet (2 mg total) by mouth every 8 (eight) hours as needed for muscle spasms      Facility-Administered Medications: None       Past Medical History:   Diagnosis Date   • Anemia     iron deficiency anemia   • Anxiety    • Breast cancer ([facility] [address] )    • Depression    • Diabetes mellitus ([facility] [address] )    • Fibroid     [facility]   today 12/8/2021   • History of transfusion    • Hydrocephalus (HCC)    • Hyperlipidemia    • Hypertension    • Migraine    • Myocardial infarction ([facility] [address] ) 01/19/2022   • PONV (postoperative nausea and vomiting)    • Sciatica    • Seasonal allergies    • Vertigo    • Wears glasses        Past Surgical History:   Procedure Laterality Date   • BACK SURGERY      C1-3 fusion   • COLONOSCOPY     • EPIDURAL BLOCK INJECTION Right 07/02/2020    Procedure: Right L4-5 and L5-S1 transforaminal epidural steroid injection ([id number]);   Surgeon: Tenisha Rojas MD;  Location: [facility];  Service: Pain Management    • EPIDURAL BLOCK INJECTION N/A 09/02/2021    Procedure: BLOCK / INJECTION EPIDURAL STEROID LUMBAR  L5-S1 IESI;  Surgeon: Yfn Meyers MD;  Location: [facility];  Service: Pain Management    • FL GUIDED NEEDLE PLAC BX/ASP/INJ  07/02/2020   • FL GUIDED NEEDLE PLAC BX/ASP/INJ  09/02/2021   • HYSTERECTOMY     • NC ESOPHAGOGASTRODUODENOSCOPY TRANSORAL DIAGNOSTIC N/A 10/23/2018    Procedure: EGD with Savary dilation AND COLONOSCOPY;  Surgeon: Maggie Domingo MD;  Location: [facility];  Service: Gastroenterology   • NC LAPAROSCOPY W TOTAL HYSTERECTOMY UTERUS 250 GM/< N/A 12/08/2021    Procedure: [address]; BILAT SALPINGECTOMY; EXCISION PERITONEAL CYST;  Surgeon: Indio Barros MD;  Location: [facility];  Service: Gynecology   • NC MASTECTOMY PARTIAL Left 4/10/2023    Procedure: LUMPECTOMY BREAST WITH BEAU  LOCALIZATION,  BIOPSY LYMPH NODE SENTINEL (INJECT AT 1130 BY DR Parisa De Santiago IN THE OR); Surgeon: Isabell Estrada MD;  Location: [facility];  Service: General   • TUBAL LIGATION     • US GUIDED BREAST BIOPSY LEFT COMPLETE Left 03/07/2023       Family History   Problem Relation Age of Onset   • Varicose Veins Mother    • Drug abuse Mother    • Diabetes Father    • Liver cancer Father         bile duct   • Cancer Father    • Breast cancer Sister         unknown age   • No Known Problems Daughter    • No Known Problems Daughter    • No Known Problems Maternal Aunt    • No Known Problems Maternal Aunt    • No Known Problems Maternal Aunt    • No Known Problems Maternal Aunt    • No Known Problems Maternal Aunt    • No Known Problems Paternal Aunt    • No Known Problems Maternal Grandmother    • No Known Problems Maternal Grandfather    • No Known Problems Paternal Grandmother    • No Known Problems Paternal Grandfather      I have reviewed and agree with the history as documented      E-Cigarette/Vaping   • E-Cigarette Use Never User      E-Cigarette/Vaping Substances   • Nicotine No    • THC No    • CBD No    • Flavoring No    • Other No    • Unknown No      Social History     Tobacco Use   • Smoking status: Never   • Smokeless tobacco: Never   • Tobacco comments:     none   Vaping Use   • Vaping Use: Never used   Substance Use Topics   • Alcohol use: No     Comment: none   • Drug use: No     Comment: none        Review of Systems   Constitutional: Positive for appetite change, chills, fatigue and fever  Negative for diaphoresis and unexpected weight change  HENT: Positive for congestion, postnasal drip and sore throat  Negative for ear discharge, ear pain, mouth sores, rhinorrhea, sinus pressure, sinus pain, sneezing and trouble swallowing  Eyes: Negative  Negative for photophobia, pain, discharge, redness, itching and visual disturbance  Respiratory: Positive for cough and shortness of breath  Negative for chest tightness and wheezing  Cardiovascular: Negative  Negative for chest pain, palpitations and leg swelling  Gastrointestinal: Positive for nausea  Negative for abdominal distention, abdominal pain, constipation, diarrhea and vomiting  Endocrine: Negative  Negative for cold intolerance, heat intolerance, polydipsia and polyuria  Genitourinary: Positive for decreased urine volume  Negative for difficulty urinating, dysuria, flank pain, hematuria and pelvic pain  Musculoskeletal: Negative  Negative for back pain, gait problem, joint swelling and neck pain  Skin: Positive for color change and wound  Negative for pallor and rash  Erythema of left breast with painful, cracked area under left breast    Allergic/Immunologic: Positive for food allergies  Negative for environmental allergies and immunocompromised state  Neurological: Positive for weakness and headaches  Negative for dizziness, syncope, facial asymmetry, speech difficulty, light-headedness and numbness  Generalized weakness  Hematological: Negative  Negative for adenopathy  Does not bruise/bleed easily  Psychiatric/Behavioral: Negative for confusion  All other systems reviewed and are negative  Physical Exam  ED Triage Vitals [06/23/23 1425]   Temperature Pulse Respirations Blood Pressure SpO2   (!) 102 2 °F (39 °C) (!) 116 20 165/74 98 %      Temp Source Heart Rate Source Patient Position - Orthostatic VS BP Location FiO2 (%)   Temporal Monitor Sitting Right arm --      Pain Score       7             Orthostatic Vital Signs  Vitals:    06/23/23 1615 06/23/23 1630 06/23/23 1723 06/23/23 1725   BP: 162/67 120/58  123/65   Pulse: (!) 106 (!) 110 103 (!) 107   Patient Position - Orthostatic VS: Lying Lying         Physical Exam  Vitals and nursing note reviewed  Exam conducted with a chaperone present  Constitutional:       General: She is not in acute distress  Appearance: She is ill-appearing  She is not diaphoretic  HENT:      Head: Normocephalic and atraumatic  Right Ear: Tympanic membrane and external ear normal       Left Ear: Tympanic membrane and external ear normal       Nose: Congestion present  No rhinorrhea  Mouth/Throat:      Pharynx: No oropharyngeal exudate  Comments: Tacky oral mucosa  Mild oropharyngeal erythema without edema, exudates, or ulceration  No tonsillar or uvular swelling noted  Eyes:      General: No scleral icterus  Right eye: No discharge  Left eye: No discharge  Extraocular Movements: Extraocular movements intact  Conjunctiva/sclera: Conjunctivae normal       Pupils: Pupils are equal, round, and reactive to light  Cardiovascular:      Rate and Rhythm: Regular rhythm  Tachycardia present  Heart sounds: Normal heart sounds  No murmur heard  No gallop  Pulmonary:      Effort: Pulmonary effort is normal  No respiratory distress  Breath sounds: Normal breath sounds  No wheezing, rhonchi or rales  Abdominal:      General: Abdomen is flat  Bowel sounds are normal  There is no distension        Palpations: Abdomen is soft  Tenderness: There is no abdominal tenderness  There is no right CVA tenderness, left CVA tenderness, guarding or rebound  Musculoskeletal:         General: No swelling or tenderness  Normal range of motion  Cervical back: Normal range of motion and neck supple  No rigidity or tenderness  Right lower leg: No edema  Left lower leg: No edema  Comments: No calf swelling, erythema, warmth, or tenderness to palpation bilaterally  Lymphadenopathy:      Cervical: No cervical adenopathy  Skin:     General: Skin is warm and dry  Capillary Refill: Capillary refill takes less than 2 seconds  Coloration: Skin is not cyanotic, jaundiced, mottled or pale  Findings: Erythema present  No bruising  Comments: Erythema of left breast   Intertrigo of the left mammary fold with minor skin disruption, no drainage, fluctuance, or bleeding noted  Area is tender to palpation  Neurological:      General: No focal deficit present  Mental Status: She is alert and oriented to person, place, and time  Motor: No weakness     Psychiatric:         Mood and Affect: Mood normal          Behavior: Behavior normal          ED Medications  Medications   acetaminophen (TYLENOL) tablet 650 mg (has no administration in time range)   heparin (porcine) subcutaneous injection 5,000 Units (has no administration in time range)   dexamethasone (PF) (DECADRON) injection 6 mg (6 mg Intravenous Given 6/23/23 1840)   remdesivir (Veklury) 200 mg in sodium chloride 0 9 % 290 mL IVPB (200 mg Intravenous New Bag 6/23/23 1840)     Followed by   remdesivir [facility] Members) 100 mg in sodium chloride 0 9 % 270 mL IVPB (has no administration in time range)   insulin lispro (HumaLOG) 100 units/mL subcutaneous injection 1-6 Units (20 Units Subcutaneous Given by Other 6/23/23 1855)   insulin lispro (HumaLOG) 100 units/mL subcutaneous injection 1-6 Units (has no administration in time range)   ceFAZolin (ANCEF) IVPB (premix in dextrose) 2,000 mg 50 mL (has no administration in time range)   lactated ringers bolus 500 mL (500 mL Intravenous New Bag 6/23/23 1841)   acetaminophen (TYLENOL) tablet 975 mg (975 mg Oral Given 6/23/23 1523)   ketorolac (TORADOL) injection 15 mg (15 mg Intravenous Given 6/23/23 1521)   sodium chloride 0 9 % bolus 1,000 mL (0 mL Intravenous Stopped 6/23/23 1631)   cefTRIAXone (ROCEPHIN) IVPB (premix in dextrose) 1,000 mg 50 mL (0 mg Intravenous Stopped 6/23/23 1631)   ondansetron (ZOFRAN) injection 4 mg (4 mg Intravenous Given 6/23/23 1521)   oxyCODONE (ROXICODONE) IR tablet 5 mg (5 mg Oral Given 6/23/23 1840)       Diagnostic Studies  Results Reviewed     Procedure Component Value Units Date/Time    FLU/RSV/COVID - if FLU/RSV clinically relevant [[id number]]  (Abnormal) Collected: 06/23/23 1455    Lab Status: Final result Specimen: Nares from Nose Updated: 06/23/23 1605     SARS-CoV-2 Positive     INFLUENZA A PCR Negative     INFLUENZA B PCR Negative     RSV PCR Negative    Narrative:      FOR PEDIATRIC PATIENTS - copy/paste COVID Guidelines URL to browser: [email or web address]    SARS-CoV-2 assay is a Nucleic Acid Amplification assay intended for the  qualitative detection of nucleic acid from SARS-CoV-2 in nasopharyngeal  swabs  Results are for the presumptive identification of SARS-CoV-2 RNA  Positive results are indicative of infection with SARS-CoV-2, the virus  causing COVID-19, but do not rule out bacterial infection or co-infection  with other viruses  Laboratories within the [place] and its  territories are required to report all positive results to the appropriate  public health authorities  Negative results do not preclude SARS-CoV-2  infection and should not be used as the sole basis for treatment or other  patient management decisions   Negative results must be combined with  clinical observations, patient history, and epidemiological information  This test has not been FDA cleared or approved  This test has been authorized by FDA under an Emergency Use Authorization  (EUA)  This test is only authorized for the duration of time the  declaration that circumstances exist justifying the authorization of the  emergency use of an in vitro diagnostic tests for detection of SARS-CoV-2  virus and/or diagnosis of COVID-19 infection under section 564(b)(1) of  the Act, 21 U  S C  618YBQ-4(B)(2), unless the authorization is terminated  or revoked sooner  The test has been validated but independent review by FDA  and CLIA is pending  Test performed using [employer] GeneXpert: This RT-PCR assay targets N2,  a region unique to SARS-CoV-2  A conserved region in the E-gene was chosen  for pan-Sarbecovirus detection which includes SARS-CoV-2  According to CMS-2020-01-R, this platform meets the definition of high-throughput technology      Protime-INR [[id number]]  (Normal) Collected: 06/23/23 1455    Lab Status: Final result Specimen: Blood from Arm, Left Updated: 06/23/23 1543     Protime 12 9 seconds      INR 0 95    APTT [[id number]]  (Normal) Collected: 06/23/23 1455    Lab Status: Final result Specimen: Blood from Arm, Left Updated: 06/23/23 1543     PTT 28 seconds     HS Troponin 0hr (reflex protocol) [[id number]]  (Normal) Collected: 06/23/23 1455    Lab Status: Final result Specimen: Blood from Arm, Left Updated: 06/23/23 1538     hs TnI 0hr 2 ng/L     B-Type Natriuretic Peptide(BNP) [[id number]]  (Normal) Collected: 06/23/23 1455    Lab Status: Final result Specimen: Blood from Arm, Left Updated: 06/23/23 1537     BNP 24 pg/mL     Comprehensive metabolic panel [[id number]]  (Abnormal) Collected: 06/23/23 1455    Lab Status: Final result Specimen: Blood from Arm, Left Updated: 06/23/23 1536     Sodium 134 mmol/L      Potassium 3 9 mmol/L      Chloride 97 mmol/L      CO2 25 mmol/L      ANION GAP 12 mmol/L      BUN 17 mg/dL      Creatinine 0 88 mg/dL      Glucose 180 mg/dL      Calcium 9 4 mg/dL      AST 20 U/L      ALT 22 U/L      Alkaline Phosphatase 88 U/L      Total Protein 6 8 g/dL      Albumin 4 1 g/dL      Total Bilirubin 1 81 mg/dL      eGFR 74 ml/min/1 73sq m     Narrative:      [place] guidelines for Chronic Kidney Disease (CKD):   •  Stage 1 with normal or high GFR (GFR > 90 mL/min/1 73 square meters)  •  Stage 2 Mild CKD (GFR = 60-89 mL/min/1 73 square meters)  •  Stage 3A Moderate CKD (GFR = 45-59 mL/min/1 73 square meters)  •  Stage 3B Moderate CKD (GFR = 30-44 mL/min/1 73 square meters)  •  Stage 4 Severe CKD (GFR = 15-29 mL/min/1 73 square meters)  •  Stage 5 End Stage CKD (GFR <15 mL/min/1 73 square meters)  Note: GFR calculation is accurate only with a steady state creatinine    Lactic acid, plasma (w/reflex if result > 2 0) [[id number]]  (Normal) Collected: 06/23/23 1455    Lab Status: Final result Specimen: Blood from Arm, Left Updated: 06/23/23 1535     LACTIC ACID 1 9 mmol/L     Narrative:      Result may be elevated if tourniquet was used during collection      Urine Microscopic [[id number]]  (Normal) Collected: 06/23/23 1513    Lab Status: Final result Specimen: Urine, Clean Catch Updated: 06/23/23 1534     RBC, UA None Seen /hpf      WBC, UA 0-1 /hpf      Epithelial Cells Occasional /hpf      Bacteria, UA None Seen /hpf     UA w Reflex to Microscopic w Reflex to Culture [[id number]]  (Abnormal) Collected: 06/23/23 1513    Lab Status: Final result Specimen: Urine, Clean Catch Updated: 06/23/23 1521     Color, UA Yellow     Clarity, UA Clear     Specific Gravity, UA 1 015     pH, UA 7 5     Leukocytes, UA Trace     Nitrite, UA Negative     Protein, UA Negative mg/dl      Glucose,  (1/10%) mg/dl      Ketones, UA Negative mg/dl      Urobilinogen, UA 0 2 E U /dl      Bilirubin, UA Negative     Occult Blood, UA Negative    CBC and differential [[id number]]  (Abnormal) Collected: 06/23/23 1455    Lab Status: Final result Specimen: Blood from Arm, Left Updated: 06/23/23 1516     WBC 8 02 Thousand/uL      RBC 4 55 Million/uL      Hemoglobin 13 4 g/dL      Hematocrit 41 1 %      MCV 90 fL      MCH 29 5 pg      MCHC 32 6 g/dL      RDW 12 7 %      MPV 9 7 fL      Platelets 908 Thousands/uL      nRBC 0 /100 WBCs      Neutrophils Relative 87 %      Immat GRANS % 1 %      Lymphocytes Relative 2 %      Monocytes Relative 9 %      Eosinophils Relative 1 %      Basophils Relative 0 %      Neutrophils Absolute 7 01 Thousands/µL      Immature Grans Absolute 0 04 Thousand/uL      Lymphocytes Absolute 0 18 Thousands/µL      Monocytes Absolute 0 68 Thousand/µL      Eosinophils Absolute 0 08 Thousand/µL      Basophils Absolute 0 03 Thousands/µL     Blood culture #1 [[id number]] Collected: 06/23/23 1455    Lab Status: In process Specimen: Blood from Arm, Left Updated: 06/23/23 1513    Blood culture #2 [[id number]] Collected: 06/23/23 1455    Lab Status: In process Specimen: Blood from Arm, Right Updated: 06/23/23 1513                 XR chest 1 view portable   ED Interpretation by Vanda Harris DO (06/23 1528)   No acute cardiopulmonary findings as compared to prior dated 11/21/2022  Final Result by Cami Reinoso MD (06/23 1533)      No acute cardiopulmonary disease                    Workstation performed: [id number]               Procedures  ECG 12 Lead Documentation Only    Date/Time: 6/23/2023 2:36 PM    Performed by: Vanda Harris DO  Authorized by: Vanda Harris DO    Indications / Diagnosis:  Exertional dyspnea  ECG reviewed by me, the ED Provider: yes    Patient location:  ED  Previous ECG:     Previous ECG:  Compared to current    Comparison ECG info:  04/05/2023    Similarity:  No change  Interpretation:     Interpretation: non-specific    Rate:     ECG rate:  110    ECG rate assessment: tachycardic    Rhythm:     Rhythm: sinus tachycardia    Ectopy:     Ectopy: none    QRS:     QRS axis:  Normal    QRS intervals:  Normal  Conduction:     Conduction: normal    ST segments:     ST segments:  Normal  T waves:     T waves: flattening      Flattening:  III          ED Course  ED Course as of 06/23/23 1926 Fri Jun 23, 2023   1502 Madonna Wilkins is a 61-year-old female currently undergoing radiation for left breast cancer, on anastrozole but no chemotherapy, who presents with fever, chills, weakness, exertional dyspnea, dry cough, mild sore throat, and worsening left mammary fold erythema and pain with new scant green drainage, onset overnight, notes poor p o  intake today with decreased urine output  Has not taken any medications to control fever at this time  On exam, is ill-appearing, tachycardic, febrile, A&O x 4, has mild posterior oropharyngeal erythema without edema, swelling, or exudates, consistent with postnasal drip, no lymphadenopathy, lung sounds clear to auscultation bilaterally, abdomen soft and nontender  Does have erythema, severe tenderness to palpation, and mild skin disruption consistent with intertrigo vs developing cellulitis under left breast, however this may also be attributed to recent radiation treatment,  no fluctuance of drainage noted  No other wounds on head to toe exam   Given onset of all symptoms, including increased left breast pain at the time of fever, in addition to viral URI, could also represent pneumonia  Per chart review, also had prior history of heart failure which has since resolved, however will obtain troponin and BNP to evaluate exertional dyspnea, however this may be related to fever and current demand  Will obtain labs to evaluate for infection as well  We will start Rocephin to cover for both cellulitis versus less likely pneumonia  We will also obtain chest x-ray, give IV fluids, Tylenol, Zofran, and Toradol for pain  Will reevaluate shortly  1525 CBC and differential(!)  No leukocytosis but does have neutrophillic predominance     [facility] Leukocytes, UA(!): Trace  Pending urine micro  1526 ECG sinus tach, rate 110, without evidence of ischemia, infarct, or abnormal interval as per my independent interpretation  1528 XR chest 1 view portable  No acute cardiopulmonary findings as compared to prior dated 11/21/2022 as per my independent interpretation  1538 Urine Microscopic  No evidence of UTI    1538 Comprehensive metabolic panel(!)  Mild hyponatremia in setting of dehydration  Glucose mildly elevated, is nonfasting, pt with known DM  Bili elevated but within baseline range for pt    1539 XR chest 1 view portable  IMPRESSION:     No acute cardiopulmonary disease       1539 BNP: 24  Doubt acute heart failure  1540 hs TnI 0hr: 2  WNL  Pt denies chest pain  Given onset of exertional dyspnea overnight, doubt ACS  1541 LACTIC ACID: 1 9  WNL  1559 POCT INR: 0 95   1559 PTT: 28   1612 SARS-COV-2(!): Positive   1643 Patient desats to 88% with significant dyspnea with short distance ambulation, will request obs/admission for COVID, likely cellulitis  Pt amenable to admission  1653 Pt endorses continued left breast soreness, also has mild frontal headache  Wishes to avoid narcotics if possible, however cannot give further NSAIDs or tylenol  Does appear to be dehydrated is mildly tachycardic  Will give small dose oxycodone(pt amenable to trial), additional IV fluids, reassess shortly  SBIRT 22yo+    Flowsheet Row Most Recent Value   Initial Alcohol Screen: US AUDIT-C     1  How often do you have a drink containing alcohol? 0 Filed at: 06/23/2023 1425   2  How many drinks containing alcohol do you have on a typical day you are drinking? 0 Filed at: 06/23/2023 1425   3a  Male UNDER 65: How often do you have five or more drinks on one occasion? 0 Filed at: 06/23/2023 1425   3b  FEMALE Any Age, or MALE 65+: How often do you have 4 or more drinks on one occassion?  0 Filed at: 06/23/2023 1425   Audit-C Score 0 Filed at: 06/23/2023 1425   DANA: How many times in the past year have you    Used an illegal drug or used a prescription medication for non-medical reasons? Never Filed at: 06/23/2023 1425                Medical Decision Making  See ED course for MDM  COVID-19: acute illness or injury  Dyspnea on exertion: acute illness or injury  Intertrigo: acute illness or injury  Viral URI with cough: acute illness or injury  Amount and/or Complexity of Data Reviewed  External Data Reviewed: labs and notes  Labs: ordered  Decision-making details documented in ED Course  Radiology: ordered and independent interpretation performed  Decision-making details documented in ED Course  ECG/medicine tests: ordered and independent interpretation performed  Decision-making details documented in ED Course  Risk  OTC drugs  Prescription drug management  Decision regarding hospitalization  Disposition  Final diagnoses:   Dyspnea on exertion   Intertrigo   Viral URI with cough   COVID-19     Time reflects when diagnosis was documented in both MDM as applicable and the Disposition within this note     Time User Action Codes Description Comment    6/23/2023  4:47 PM Jarocho Kieran Add [R06 09] Dyspnea on exertion     6/23/2023  4:47 PM Jarocho Kieran Add [L30 4] Intertrigo     6/23/2023  4:47 PM Jarocho Kieran Add [J06 9] Viral URI with cough     6/23/2023  4:47 PM Jarocho Kieran Add [U07 1] COVID-19       ED Disposition     ED Disposition   Admit    Condition   Stable    Date/Time   Fri Jun 23, 2023  4:47 PM    Comment   Case was discussed with Dr David Cordova and the patient's admission status was agreed to be Admission Status: observation status to the service of Dr David Cordova              Follow-up Information    None         Current Discharge Medication List      CONTINUE these medications which have NOT CHANGED    Details   acetaminophen (TYLENOL) 325 mg tablet Take 2 tablets (650 mg total) by mouth every 6 (six) hours Associated Diagnoses: Invasive ductal carcinoma of breast, female, left (HCC)      anastrozole (ARIMIDEX) 1 mg tablet Take 1 tablet (1 mg total) by mouth daily  Qty: 30 tablet, Refills: 5    Associated Diagnoses: Invasive ductal carcinoma of breast, female, left (HCC)      atorvastatin (LIPITOR) 40 mg tablet Take 40 mg by mouth every evening      FLUoxetine (PROzac) 40 MG capsule Take 1 capsule (40 mg total) by mouth daily  Qty: 90 capsule, Refills: 3    Associated Diagnoses: Anxiety disorder due to general medical condition with panic attack;  Severe episode of recurrent major depressive disorder, without psychotic features (HCC)      gabapentin (Neurontin) 600 MG tablet Take 1 tablet (600 mg total) by mouth 3 (three) times a day  Qty: 270 tablet, Refills: 2    Associated Diagnoses: Chronic pain syndrome; Lumbar radiculopathy      ibuprofen (MOTRIN) 200 mg tablet Take 2 tablets (400 mg total) by mouth every 6 (six) hours    Associated Diagnoses: Invasive ductal carcinoma of breast, female, left (HCC)      losartan (COZAAR) 100 MG tablet Take 100 mg by mouth daily      metoprolol succinate (TOPROL-XL) 100 mg 24 hr tablet Take 100 mg by mouth daily      tiZANidine (ZANAFLEX) 2 mg tablet Take 1 tablet (2 mg total) by mouth every 8 (eight) hours as needed for muscle spasms  Qty: 90 tablet, Refills: 5    Associated Diagnoses: Chronic midline low back pain with right-sided sciatica      ALPRAZolam (XANAX) 0 5 mg tablet Take 1 tablet (0 5 mg total) by mouth 3 (three) times a day as needed for anxiety  Qty: 90 tablet, Refills: 2    Associated Diagnoses: Anxiety disorder due to general medical condition with panic attack      busPIRone (BUSPAR) 10 mg tablet Take 2 tablets (20 mg total) by mouth 3 (three) times a day as needed (anxiety)  Qty: 180 tablet, Refills: 5    Associated Diagnoses: Anxiety disorder due to general medical condition with panic attack; PTSD (post-traumatic stress disorder)      Fiasp FlexTouch 100 units/mL injection pen INJECT 3 TIMES DAILY WITH MEALS PER SLIDING SCALE: <150=0 UNIT, 150-200=4 UNITS, 201-250=6 UNITS, 251-300= 8 UNITS, 301-350=9 UNITS, 351-400=10 UNITS  MAX DAILY DOSE=30 UNITS      glimepiride (AMARYL) 4 mg tablet       Insulin Glargine-Lixisenatide (Insulin Glargine-Lixisenatide) 100 units-33 mcg/mL injection pen Inject 40 Units under the skin daily             No discharge procedures on file  PDMP Review       Value Time User    PDMP Reviewed  Yes 5/19/2023  1:03 PM Madison Nichole PA-C           ED Provider  Attending physically available and evaluated Abhaymiguel a Cook I managed the patient along with the ED Attending      Electronically Signed by         Alexandro Nicholson DO  06/23/23 1927

## 2023-06-23 NOTE — H&P
[address]  H&P  Name: Ariela Wolf 56 y o  female I MRN: [id number]  Unit/Bed#: AB75 I Date of Admission: 6/23/2023   Date of Service: 6/23/2023 I Hospital Day: 0      Assessment/Plan   * COVID  Assessment & Plan  Presented with intermittent fevers, chills shortness of breath with a nonproductive cough  Noted to be COVID-positive in the ER  Chest x-ray without any acute abnormalities  Saturating adequately on room air at rest however desaturates to 88% with exertion  We will place on COVID protocol  IV steroids  Remdesivir  Trend inflammatory markers  Wean oxygen as able    Cellulitis of chest wall  Assessment & Plan  Noted to have erythema and warmth to left chest wall consistent with cellulitis  We will place on IV cefazolin for now    Malignant neoplasm of central portion of left breast in female, estrogen receptor positive ([facility] )  Assessment & Plan  History of left breast cancer currently undergoing radiation therapy  Continue outpatient follow-up    Gastroesophageal reflux disease  Assessment & Plan  Continue Protonix    Hypertension  Assessment & Plan  BP currently controlled  Continue home meds    Diabetes mellitus ([facility] )  Assessment & Plan    Lab Results   Component Value Date    HGBA1C 11 3 (H) 05/11/2022   Continue home insulin regimen  Sliding scale insulin           VTE Prophylaxis: Heparin  / sequential compression device   Code Status: full code  POLST: There is no POLST form on file for this patient (pre-hospital)  Discussion with family: pt    Anticipated Length of Stay:  Patient will be admitted on an Observation basis with an anticipated length of stay of  < 2 midnights  Justification for Hospital Stay: Acute respiratory failure    Total Time for Visit, including Counseling / Coordination of Care: 60 minutes  Greater than 50% of this total time spent on direct patient counseling and coordination of care      Chief Complaint:   fever    History of Present Illness:    Shakeel Calvo is a 56 y o  female past medical history significant of breast cancer undergoing radiation therapy initially presented with fever  Patient reports fever shortness of breath and nonproductive cough for the past few days  Denies chest pain  Also notes erythema of her left breast   Otherwise denies any acute complaints  Denies chest pain, nausea, vomiting, abdominal pain, diarrhea or any other complaints  Review of Systems:    Review of Systems   Constitutional: Positive for appetite change, chills and fever  Negative for activity change, diaphoresis and unexpected weight change  HENT: Negative for congestion, facial swelling and rhinorrhea  Eyes: Negative for photophobia and visual disturbance  Respiratory: Positive for cough and shortness of breath  Negative for wheezing  Cardiovascular: Negative for chest pain and palpitations  Gastrointestinal: Negative for abdominal pain, blood in stool, constipation, diarrhea, nausea and vomiting  Genitourinary: Negative for decreased urine volume, difficulty urinating, dysuria, flank pain, frequency, hematuria and urgency  Musculoskeletal: Negative for arthralgias, back pain, joint swelling and myalgias  Neurological: Negative for dizziness, syncope, facial asymmetry, light-headedness, numbness and headaches  Psychiatric/Behavioral: Negative for confusion and decreased concentration  The patient is not nervous/anxious          Past Medical and Surgical History:     Past Medical History:   Diagnosis Date   • Anemia     iron deficiency anemia   • Anxiety    • [facility] ([facility] )    • Depression    • Diabetes mellitus ([facility] )    • Fibroid     [facility]   today 12/8/2021   • History of transfusion    • Hydrocephalus (HCC)    • Hyperlipidemia    • Hypertension    • Migraine    • Myocardial infarction ([facility] ) 01/19/2022   • PONV (postoperative nausea and vomiting)    • Sciatica    • Seasonal allergies    • Vertigo    • Wears glasses Past Surgical History:   Procedure Laterality Date   • BACK SURGERY      C1-3 fusion   • COLONOSCOPY     • EPIDURAL BLOCK INJECTION Right 07/02/2020    Procedure: Right L4-5 and L5-S1 transforaminal epidural steroid injection ([id number]); Surgeon: Koby Yanez MD;  Location: [facility];  Service: Pain Management    • EPIDURAL BLOCK INJECTION N/A 09/02/2021    Procedure: BLOCK / INJECTION EPIDURAL STEROID LUMBAR  L5-S1 IESI;  Surgeon: Koby Yanez MD;  Location: [facility];  Service: Pain Management    • FL GUIDED NEEDLE PLAC BX/ASP/INJ  07/02/2020   • FL GUIDED NEEDLE PLAC BX/ASP/INJ  09/02/2021   • HYSTERECTOMY     • KS ESOPHAGOGASTRODUODENOSCOPY TRANSORAL DIAGNOSTIC N/A 10/23/2018    Procedure: EGD with Savary dilation AND COLONOSCOPY;  Surgeon: Vandana Abrams MD;  Location: [facility];  Service: Gastroenterology   • KS LAPAROSCOPY W TOTAL HYSTERECTOMY UTERUS 250 GM/< N/A 12/08/2021    Procedure: [address]; BILAT SALPINGECTOMY; EXCISION PERITONEAL CYST;  Surgeon: Lesa Ball MD;  Location: [facility];  Service: Gynecology   • KS MASTECTOMY PARTIAL Left 4/10/2023    Procedure: LUMPECTOMY BREAST WITH BEAU  LOCALIZATION,  BIOPSY LYMPH NODE SENTINEL (INJECT AT 1130 BY DR Edy Middleton IN THE OR); Surgeon: Neda Jackson MD;  Location: [facility];  Service: General   • TUBAL LIGATION     • US GUIDED BREAST BIOPSY LEFT COMPLETE Left 03/07/2023       Meds/Allergies:    Prior to Admission medications    Medication Sig Start Date End Date Taking?  Authorizing Provider   acetaminophen (TYLENOL) 325 mg tablet Take 2 tablets (650 mg total) by mouth every 6 (six) hours 4/10/23   Neda Jackson MD   ALPRAZolam Calleen Saldaña) 0 5 mg tablet Take 1 tablet (0 5 mg total) by mouth 3 (three) times a day as needed for anxiety 5/19/23 6/18/23  Madison Boyd PA-C   anastrozole (ARIMIDEX) 1 mg tablet Take 1 tablet (1 mg total) by mouth daily 6/19/23   Barbra Disla DO   atorvastatin (LIPITOR) 40 mg tablet Take 40 mg by mouth every evening 2/28/22   Historical Provider, MD   busPIRone (BUSPAR) 10 mg tablet Take 2 tablets (20 mg total) by mouth 3 (three) times a day as needed (anxiety) 11/11/22 5/10/23  Madison Boyd PA-C   Fiasp FlexTouch 100 units/mL injection pen INJECT 3 TIMES DAILY WITH MEALS PER SLIDING SCALE: <150=0 UNIT, 150-200=4 UNITS, 201-250=6 UNITS, 251-300= 8 UNITS, 301-350=9 UNITS, 351-400=10 UNITS  MAX DAILY DOSE=30 UNITS 12/14/22   Historical Provider, MD   FLUoxetine (PROzac) 40 MG capsule Take 1 capsule (40 mg total) by mouth daily 12/6/22   Madison Boyd PA-C   gabapentin (Neurontin) 600 MG tablet Take 1 tablet (600 mg total) by mouth 3 (three) times a day 5/19/23 8/17/23  Madison Boyd PA-C   glimepiride (AMARYL) 4 mg tablet  3/26/23   Historical Provider, MD   ibuprofen (MOTRIN) 200 mg tablet Take 2 tablets (400 mg total) by mouth every 6 (six) hours 4/10/23   Palma Martinez MD   Insulin Glargine-Lixisenatide (Insulin Glargine-Lixisenatide) 100 units-33 mcg/mL injection pen Inject 40 Units under the skin daily      Historical Provider, MD   losartan (COZAAR) 100 MG tablet Take 100 mg by mouth daily 3/26/23   Historical Provider, MD   metoprolol succinate (TOPROL-XL) 100 mg 24 hr tablet Take 100 mg by mouth daily 2/28/22   Historical Provider, MD   tiZANidine (ZANAFLEX) 2 mg tablet Take 1 tablet (2 mg total) by mouth every 8 (eight) hours as needed for muscle spasms 10/6/22   ERNIE Patel     I have reviewed home medications with patient personally  Allergies:    Allergies   Allergen Reactions   • Nuts - Food Allergy Anaphylaxis and Throat Swelling   • Chocolate - Food Allergy Cough   • Eggs Or Egg-Derived Products - Food Allergy Other (See Comments)     Cough   • Other Cough     LETTUCE   • Shellfish Allergy - Food Allergy Cough   • Tomato - Food Allergy Cough       Social History:     Marital Status: /Civil Union     Patient Pre-hospital Living Situation: home  Patient Pre-hospital Level "of Mobility: idnependent  Patient Pre-hospital Diet Restrictions: none  Substance Use History:   Social History     Substance and Sexual Activity   Alcohol Use No     Social History     Tobacco Use   Smoking Status Never   Smokeless Tobacco Never     Social History     Substance and Sexual Activity   Drug Use No       Family History:    Family History   Problem Relation Age of Onset   • Varicose Veins Mother    • Drug abuse Mother    • Diabetes Father    • Liver cancer Father         bile duct   • Cancer Father    • Breast cancer Sister         unknown age   • No Known Problems Daughter    • No Known Problems Daughter    • No Known Problems Maternal Aunt    • No Known Problems Maternal Aunt    • No Known Problems Maternal Aunt    • No Known Problems Maternal Aunt    • No Known Problems Maternal Aunt    • No Known Problems Paternal Aunt    • No Known Problems Maternal Grandmother    • No Known Problems Maternal Grandfather    • No Known Problems Paternal Grandmother    • No Known Problems Paternal Grandfather        Physical Exam:     Vitals:   Blood Pressure: 120/58 (06/23/23 1630)  Pulse: (!) 110 (06/23/23 1630)  Temperature: (S) (!) 101 8 °F (38 8 °C) (provider aware ) (06/23/23 1630)  Temp Source: Tympanic (06/23/23 1630)  Respirations: 19 (06/23/23 1630)  Height: 5' 3\" (160 cm) (06/23/23 1425)  Weight - Scale: 104 kg (229 lb) (06/23/23 1425)  SpO2: 90 % (06/23/23 1630)    Physical Exam  Constitutional:       General: She is not in acute distress  Appearance: She is well-developed  She is not diaphoretic  HENT:      Head: Normocephalic and atraumatic  Nose: Nose normal       Mouth/Throat:      Pharynx: No oropharyngeal exudate  Eyes:      General: No scleral icterus  Right eye: No discharge  Left eye: No discharge  Conjunctiva/sclera: Conjunctivae normal    Neck:      Thyroid: No thyromegaly  Vascular: No JVD     Cardiovascular:      Rate and Rhythm: Normal rate and regular " rhythm  Heart sounds: Normal heart sounds  No murmur heard  No friction rub  No gallop  Pulmonary:      Effort: Pulmonary effort is normal  No respiratory distress  Breath sounds: Normal breath sounds  No wheezing or rales  Chest:      Chest wall: No tenderness  Abdominal:      General: Bowel sounds are normal  There is no distension  Palpations: Abdomen is soft  Tenderness: There is no abdominal tenderness  There is no guarding or rebound  Musculoskeletal:         General: No tenderness or deformity  Normal range of motion  Cervical back: Normal range of motion and neck supple  Skin:     General: Skin is warm and dry  Findings: No erythema or rash  Neurological:      Mental Status: She is alert  Mental status is at baseline  Cranial Nerves: No cranial nerve deficit  Sensory: No sensory deficit  Motor: No abnormal muscle tone  Coordination: Coordination normal            Additional Data:     Lab Results: I have personally reviewed pertinent reports  Results from last 7 days   Lab Units 06/23/23  1455   WBC Thousand/uL 8 02   HEMOGLOBIN g/dL 13 4   HEMATOCRIT % 41 1   PLATELETS Thousands/uL 204   NEUTROS PCT % 87*   LYMPHS PCT % 2*   MONOS PCT % 9   EOS PCT % 1     Results from last 7 days   Lab Units 06/23/23  1455   SODIUM mmol/L 134*   POTASSIUM mmol/L 3 9   CHLORIDE mmol/L 97   CO2 mmol/L 25   BUN mg/dL 17   CREATININE mg/dL 0 88   ANION GAP mmol/L 12   CALCIUM mg/dL 9 4   ALBUMIN g/dL 4 1   TOTAL BILIRUBIN mg/dL 1 81*   ALK PHOS U/L 88   ALT U/L 22   AST U/L 20   GLUCOSE RANDOM mg/dL 180*     Results from last 7 days   Lab Units 06/23/23  1455   INR  0 95             Results from last 7 days   Lab Units 06/23/23  1455   LACTIC ACID mmol/L 1 9       Imaging: I have personally reviewed pertinent reports        XR chest 1 view portable   ED Interpretation by Felipa Dakins, DO (06/23 6948)   No acute cardiopulmonary findings as compared to prior dated 11/21/2022  Final Result by Gentry Ahumada MD (06/23 9653)      No acute cardiopulmonary disease  Workstation performed: [id number]             EKG, Pathology, and Other Studies Reviewed on Admission:   · EKG: reviewed    Allscripts / Epic Records Reviewed: Yes     ** Please Note: This note has been constructed using a voice recognition system   **

## 2023-06-24 LAB
ALBUMIN SERPL BCP-MCNC: 3.8 G/DL (ref 3.5–5)
ALP SERPL-CCNC: 75 U/L (ref 34–104)
ALT SERPL W P-5'-P-CCNC: 22 U/L (ref 7–52)
ANION GAP SERPL CALCULATED.3IONS-SCNC: 8 MMOL/L
AST SERPL W P-5'-P-CCNC: 19 U/L (ref 13–39)
BASOPHILS # BLD AUTO: 0.01 THOUSANDS/ÂΜL (ref 0–0.1)
BASOPHILS NFR BLD AUTO: 0 % (ref 0–1)
BILIRUB SERPL-MCNC: 1.35 MG/DL (ref 0.2–1)
BUN SERPL-MCNC: 19 MG/DL (ref 5–25)
CALCIUM SERPL-MCNC: 9 MG/DL (ref 8.4–10.2)
CHLORIDE SERPL-SCNC: 99 MMOL/L (ref 96–108)
CO2 SERPL-SCNC: 26 MMOL/L (ref 21–32)
CREAT SERPL-MCNC: 0.92 MG/DL (ref 0.6–1.3)
CRP SERPL QL: 33.5 MG/L
EOSINOPHIL # BLD AUTO: 0 THOUSAND/ÂΜL (ref 0–0.61)
EOSINOPHIL NFR BLD AUTO: 0 % (ref 0–6)
ERYTHROCYTE [DISTWIDTH] IN BLOOD BY AUTOMATED COUNT: 12.8 % (ref 11.6–15.1)
GFR SERPL CREATININE-BSD FRML MDRD: 70 ML/MIN/1.73SQ M
GLUCOSE SERPL-MCNC: 175 MG/DL (ref 65–140)
GLUCOSE SERPL-MCNC: 179 MG/DL (ref 65–140)
GLUCOSE SERPL-MCNC: 181 MG/DL (ref 65–140)
GLUCOSE SERPL-MCNC: 266 MG/DL (ref 65–140)
GLUCOSE SERPL-MCNC: 290 MG/DL (ref 65–140)
HCT VFR BLD AUTO: 37.2 % (ref 34.8–46.1)
HGB BLD-MCNC: 12 G/DL (ref 11.5–15.4)
IMM GRANULOCYTES # BLD AUTO: 0.04 THOUSAND/UL (ref 0–0.2)
IMM GRANULOCYTES NFR BLD AUTO: 1 % (ref 0–2)
LYMPHOCYTES # BLD AUTO: 0.18 THOUSANDS/ÂΜL (ref 0.6–4.47)
LYMPHOCYTES NFR BLD AUTO: 4 % (ref 14–44)
MCH RBC QN AUTO: 29.2 PG (ref 26.8–34.3)
MCHC RBC AUTO-ENTMCNC: 32.3 G/DL (ref 31.4–37.4)
MCV RBC AUTO: 91 FL (ref 82–98)
MONOCYTES # BLD AUTO: 0.27 THOUSAND/ÂΜL (ref 0.17–1.22)
MONOCYTES NFR BLD AUTO: 5 % (ref 4–12)
NEUTROPHILS # BLD AUTO: 4.56 THOUSANDS/ÂΜL (ref 1.85–7.62)
NEUTS SEG NFR BLD AUTO: 90 % (ref 43–75)
NRBC BLD AUTO-RTO: 0 /100 WBCS
PLATELET # BLD AUTO: 181 THOUSANDS/UL (ref 149–390)
PMV BLD AUTO: 10 FL (ref 8.9–12.7)
POTASSIUM SERPL-SCNC: 4 MMOL/L (ref 3.5–5.3)
PROCALCITONIN SERPL-MCNC: 0.1 NG/ML
PROT SERPL-MCNC: 6.3 G/DL (ref 6.4–8.4)
RBC # BLD AUTO: 4.11 MILLION/UL (ref 3.81–5.12)
SODIUM SERPL-SCNC: 133 MMOL/L (ref 135–147)
WBC # BLD AUTO: 5.06 THOUSAND/UL (ref 4.31–10.16)

## 2023-06-24 PROCEDURE — 82948 REAGENT STRIP/BLOOD GLUCOSE: CPT

## 2023-06-24 PROCEDURE — 86140 C-REACTIVE PROTEIN: CPT | Performed by: INTERNAL MEDICINE

## 2023-06-24 PROCEDURE — 80053 COMPREHEN METABOLIC PANEL: CPT | Performed by: INTERNAL MEDICINE

## 2023-06-24 PROCEDURE — 84145 PROCALCITONIN (PCT): CPT | Performed by: INTERNAL MEDICINE

## 2023-06-24 PROCEDURE — 85025 COMPLETE CBC W/AUTO DIFF WBC: CPT | Performed by: INTERNAL MEDICINE

## 2023-06-24 PROCEDURE — 99232 SBSQ HOSP IP/OBS MODERATE 35: CPT | Performed by: INTERNAL MEDICINE

## 2023-06-24 RX ADMIN — CEFAZOLIN SODIUM 2000 MG: 2 SOLUTION INTRAVENOUS at 08:31

## 2023-06-24 RX ADMIN — METOPROLOL SUCCINATE 100 MG: 100 TABLET, EXTENDED RELEASE ORAL at 08:38

## 2023-06-24 RX ADMIN — INSULIN LISPRO 1 UNITS: 100 INJECTION, SOLUTION INTRAVENOUS; SUBCUTANEOUS at 08:30

## 2023-06-24 RX ADMIN — ANASTROZOLE 1 MG: 1 TABLET, COATED ORAL at 08:30

## 2023-06-24 RX ADMIN — DEXAMETHASONE SODIUM PHOSPHATE 6 MG: 10 INJECTION, SOLUTION INTRAMUSCULAR; INTRAVENOUS at 17:09

## 2023-06-24 RX ADMIN — ATORVASTATIN CALCIUM 40 MG: 40 TABLET, FILM COATED ORAL at 17:09

## 2023-06-24 RX ADMIN — REMDESIVIR 100 MG: 100 INJECTION, POWDER, LYOPHILIZED, FOR SOLUTION INTRAVENOUS at 18:46

## 2023-06-24 RX ADMIN — LOSARTAN POTASSIUM 100 MG: 50 TABLET, FILM COATED ORAL at 08:38

## 2023-06-24 RX ADMIN — INSULIN LISPRO 4 UNITS: 100 INJECTION, SOLUTION INTRAVENOUS; SUBCUTANEOUS at 21:18

## 2023-06-24 RX ADMIN — CEFAZOLIN SODIUM 2000 MG: 2 SOLUTION INTRAVENOUS at 17:09

## 2023-06-24 RX ADMIN — INSULIN LISPRO 1 UNITS: 100 INJECTION, SOLUTION INTRAVENOUS; SUBCUTANEOUS at 17:10

## 2023-06-24 RX ADMIN — GABAPENTIN 600 MG: 300 CAPSULE ORAL at 21:17

## 2023-06-24 RX ADMIN — HEPARIN SODIUM 5000 UNITS: 5000 INJECTION INTRAVENOUS; SUBCUTANEOUS at 21:17

## 2023-06-24 RX ADMIN — HEPARIN SODIUM 5000 UNITS: 5000 INJECTION INTRAVENOUS; SUBCUTANEOUS at 05:29

## 2023-06-24 RX ADMIN — GABAPENTIN 600 MG: 300 CAPSULE ORAL at 17:09

## 2023-06-24 RX ADMIN — HEPARIN SODIUM 5000 UNITS: 5000 INJECTION INTRAVENOUS; SUBCUTANEOUS at 14:35

## 2023-06-24 RX ADMIN — INSULIN LISPRO 3 UNITS: 100 INJECTION, SOLUTION INTRAVENOUS; SUBCUTANEOUS at 12:10

## 2023-06-24 RX ADMIN — INSULIN GLARGINE 40 UNITS: 100 INJECTION, SOLUTION SUBCUTANEOUS at 21:17

## 2023-06-24 RX ADMIN — FLUOXETINE 40 MG: 20 CAPSULE ORAL at 08:30

## 2023-06-24 RX ADMIN — GABAPENTIN 600 MG: 300 CAPSULE ORAL at 08:30

## 2023-06-24 NOTE — UTILIZATION REVIEW
OBSERVATION 6/23/23 @ 1648 CONVERTED TO INPATIENT 6/24/23 @ 0950 DUE TO COVID REQUIRING IV STEROIDS, REMDESIVIR  Initial Clinical Review    Admission: Date/Time/Statement:   Admission Orders (From admission, onward)     Ordered        06/24/23 0950  Inpatient Admission  Once            06/23/23 1648  Place in Observation  Once                      Orders Placed This Encounter   Procedures   • Place in Observation     Standing Status:   Standing     Number of Occurrences:   1     Order Specific Question:   Level of Care     Answer:   Med Surg [16]   • Inpatient Admission     Standing Status:   Standing     Number of Occurrences:   1     Order Specific Question:   Level of Care     Answer:   Med Surg [16]     Order Specific Question:   Estimated length of stay     Answer:   More than 2 Midnights     Order Specific Question:   Certification     Answer:   I certify that inpatient services are medically necessary for this patient for a duration of greater than two midnights  See H&P and MD Progress Notes for additional information about the patient's course of treatment  ED Arrival Information     Expected   -    Arrival   6/23/2023 14:17    Acuity   Emergent            Means of arrival   Walk-In    Escorted by   Self    Service   Hospitalist    Admission type   Emergency            Arrival complaint   Chills, Shortness of Breath           Chief Complaint   Patient presents with   • Abdominal Pain     Finished radiation yesterday for breast CA  She reports that her skin is open under left breast  Also reports fever, chills, body aches       Initial Presentation: 56 y o  female to the ED from home with complaints of abdominal pain  Admitted under observation then converted to inpatient for COVID, cellulitis chest wall  H/o breast cancer currently under going radiation, DM, GERd, htn   Arrives febrile, tachycardic, has worsening erythema and pain under the left breast with some discharge from a minor wound in the fold  Decreased po intake  Cough, congestion, appearing ill  Has mild oropharyngeal erythema without edema  CRP elevated  Started on iv steroids, iv remdesivir, iv ab  Date: 6/24      INPATIENT:  Signficant shortness of breath, remains on room air  Lungs clear  Continue steroids, IV remdesivir    6/25:Symptoms improved with IV steroids  Pulse ox good on room air  Continue IV abx  LUngs clear  ED Triage Vitals [06/23/23 1425]   Temperature Pulse Respirations Blood Pressure SpO2   (!) 102 2 °F (39 °C) (!) 116 20 165/74 98 %      Temp Source Heart Rate Source Patient Position - Orthostatic VS BP Location FiO2 (%)   Temporal Monitor Sitting Right arm --      Pain Score       7          Wt Readings from Last 1 Encounters:   06/23/23 104 kg (229 lb)     Additional Vital Signs:   Date/Time Temp Pulse Resp BP MAP (mmHg) SpO2 O2 Device Patient Position - Orthostatic VS   06/25/23 0800 -- 75 -- 137/74 95 97 % -- --   06/25/23 07:56:42 97 5 °F (36 4 °C) 74 19 137/74 95 97 % -- --   06/25/23 05:00:03 97 4 °F (36 3 °C) Abnormal  72 17 115/65 82 95 % None (Room air)        Date/Time Temp Pulse Resp BP MAP (mmHg) SpO2 O2 Device Patient Position - Orthostatic VS   06/24/23 08:35:54 98 4 °F (36 9 °C) 91 15 128/61 83 93 % None (Room air) --   06/24/23 05:30:43 98 8 °F (37 1 °C) 92 -- -- -- 94 % -- --   06/23/23 2300 -- -- -- -- -- -- None (Room air) --   06/23/23 17:25:14 99 8 °F (37 7 °C) 107 Abnormal  18 123/65 84 94 % None (Room air) --   06/23/23 17:23:29 -- 103 -- -- -- 94 % -- --   06/23/23 1630 101 8 °F (38 8 °C) Abnormal    110 Abnormal  19 120/58 83 90 % None (Room air) Lying   Temp: provider aware   at 06/23/23 1630   06/23/23 1615 -- 106 Abnormal  18 162/67 97 93 % None (Room air) Lying   06/23/23 1530 -- 109 Abnormal  28 Abnormal  100/62 76 95 % None (Room air) Lying   06/23/23 1425 102 2 °F (39 °C) Abnormal  116 Abnormal  20 165/74 -- 98 % None (Room air) Sitting       Pertinent Labs/Diagnostic Test Results: 6/23 EKG:Sinus tachycardia  Cannot rule out Anterior infarct , age undetermined  Abnormal ECG  When compared with ECG of 05-APR-2023 08:12,  Minimal criteria for Anterior infarct are now Present  XR chest 1 view portable   ED Interpretation by Vanda Harris DO (06/23 1528)   No acute cardiopulmonary findings as compared to prior dated 11/21/2022  Final Result by Cmai Reinoso MD (06/23 1533)      No acute cardiopulmonary disease                    Workstation performed: [id number]           Results from last 7 days   Lab Units 06/23/23  1455   SARS-COV-2  Positive*     Results from last 7 days   Lab Units 06/24/23  0538 06/23/23  1455   WBC Thousand/uL 5 06 8 02   HEMOGLOBIN g/dL 12 0 13 4   HEMATOCRIT % 37 2 41 1   PLATELETS Thousands/uL 181 204   NEUTROS ABS Thousands/µL 4 56 7 01         Results from last 7 days   Lab Units 06/24/23  0538 06/23/23  1455   SODIUM mmol/L 133* 134*   POTASSIUM mmol/L 4 0 3 9   CHLORIDE mmol/L 99 97   CO2 mmol/L 26 25   ANION GAP mmol/L 8 12   BUN mg/dL 19 17   CREATININE mg/dL 0 92 0 88   EGFR ml/min/1 73sq m 70 74   CALCIUM mg/dL 9 0 9 4     Results from last 7 days   Lab Units 06/24/23  0538 06/23/23  1455   AST U/L 19 20   ALT U/L 22 22   ALK PHOS U/L 75 88   TOTAL PROTEIN g/dL 6 3* 6 8   ALBUMIN g/dL 3 8 4 1   TOTAL BILIRUBIN mg/dL 1 35* 1 81*     Results from last 7 days   Lab Units 06/24/23  1101 06/24/23  0714 06/23/23  2042 06/23/23  1831   POC GLUCOSE mg/dl 266* 179* 268* 234*     Results from last 7 days   Lab Units 06/24/23  0538 06/23/23  1455   GLUCOSE RANDOM mg/dL 181* 180*             Results from last 7 days   Lab Units 06/23/23  1455   HS TNI 0HR ng/L 2         Results from last 7 days   Lab Units 06/23/23  1455   PROTIME seconds 12 9   INR  0 95   PTT seconds 28         Results from last 7 days   Lab Units 06/24/23  0538   PROCALCITONIN ng/ml 0 10     Results from last 7 days   Lab Units 06/23/23  1455   LACTIC ACID mmol/L 1 9         Results from last 7 days   Lab Units 06/23/23  1455   BNP pg/mL 24           Results from last 7 days   Lab Units 06/24/23  0538   CRP mg/L 33 5*     Results from last 7 days   Lab Units 06/23/23  1513   CLARITY UA  Clear   COLOR UA  Yellow   SPEC GRAV UA  1 015   PH UA  7 5   GLUCOSE UA mg/dl 100 (1/10%)*   KETONES UA mg/dl Negative   BLOOD UA  Negative   PROTEIN UA mg/dl Negative   NITRITE UA  Negative   BILIRUBIN UA  Negative   UROBILINOGEN UA E U /dl 0 2   LEUKOCYTES UA  Trace*   WBC UA /hpf 0-1   RBC UA /hpf None Seen   BACTERIA UA /hpf None Seen   EPITHELIAL CELLS WET PREP /hpf Occasional     Results from last 7 days   Lab Units 06/23/23  1455   INFLUENZA A PCR  Negative   INFLUENZA B PCR  Negative   RSV PCR  Negative         Results from last 7 days   Lab Units 06/23/23  1455   BLOOD CULTURE  Received in Microbiology Lab  Culture in Progress  Received in Microbiology Lab  Culture in Progress         ED Treatment:   Medication Administration from 06/23/2023 1417 to 06/23/2023 1714       Date/Time Order Dose Route Action     06/23/2023 1523 EDT acetaminophen (TYLENOL) tablet 975 mg 975 mg Oral Given     06/23/2023 1521 EDT ketorolac (TORADOL) injection 15 mg 15 mg Intravenous Given     06/23/2023 1520 EDT sodium chloride 0 9 % bolus 1,000 mL 1,000 mL Intravenous New Bag     06/23/2023 1525 EDT cefTRIAXone (ROCEPHIN) IVPB (premix in dextrose) 1,000 mg 50 mL 1,000 mg Intravenous New Bag     06/23/2023 1521 EDT ondansetron (ZOFRAN) injection 4 mg 4 mg Intravenous Given        Past Medical History:   Diagnosis Date   • Anemia     iron deficiency anemia   • Anxiety    • Breast cancer ([facility] )    • Depression    • Diabetes mellitus ([facility] )    • Fibroid     LT   today 12/8/2021   • History of transfusion    • Hydrocephalus (HCC)    • Hyperlipidemia    • Hypertension    • Migraine    • Myocardial infarction ([facility] ) 01/19/2022   • PONV (postoperative nausea and vomiting)    • Sciatica    • Seasonal allergies    • Vertigo    • Wears glasses          Admitting Diagnosis: Intertrigo [L30 4]  Dyspnea on exertion [R06 09]  Abdominal pain [R10 9]  Viral URI with cough [J06 9]  COVID-19 [U07 1]  Age/Sex: 56 y o  female  Admission Orders:  Scheduled Medications:  anastrozole, 1 mg, Oral, Daily  atorvastatin, 40 mg, Oral, QPM  cefazolin, 2,000 mg, Intravenous, Q8H  dexamethasone, 6 mg, Intravenous, Q24H  FLUoxetine, 40 mg, Oral, Daily  gabapentin, 600 mg, Oral, TID  heparin (porcine), 5,000 Units, Subcutaneous, Q8H LISA  insulin glargine, 40 Units, Subcutaneous, HS  insulin lispro, 1-6 Units, Subcutaneous, TID AC  insulin lispro, 1-6 Units, Subcutaneous, HS  losartan, 100 mg, Oral, Daily  metoprolol succinate, 100 mg, Oral, Daily  remdesivir, 100 mg, Intravenous, Q24H      Continuous IV Infusions:     PRN Meds:  acetaminophen, 650 mg, Oral, Q6H PRN  busPIRone, 20 mg, Oral, TID PRN        None    Network Utilization Review Department  ATTENTION: Please call with any questions or concerns to [phone] and carefully listen to the prompts so that you are directed to the right person  All voicemails are confidential   [place] all requests for admission clinical reviews, approved or denied determinations and any other requests to dedicated fax number below belonging to the campus where the patient is receiving treatment   List of dedicated fax numbers for the Facilities:  [address] DENIALS (Administrative/Medical Necessity) [phone]   [address] [place] [address] (Maternity/NICU/Pediatrics) [phone]   [address] [phone]   [facility] 77 [phone]   [address] [address] Medical [place] [address] [place] [address] [place] [address] [address] [phone]     [address] [place] [phone]   [address] [phone]

## 2023-06-24 NOTE — PROGRESS NOTES
[facility] [facility]  Progress Note  Name: Nate Dee I  MRN: [id number]  Unit/Bed#: 071-77 I Date of Admission: 6/23/2023   Date of Service: 6/24/2023 I Hospital Day: 0    Assessment/Plan   * COVID  Assessment & Plan  Presented with intermittent fevers, chills shortness of breath with a nonproductive cough  Noted to be COVID-positive in the ER  Chest x-ray without any acute abnormalities  Saturating adequately on room air at rest however desaturates to 88% with exertion  Still with significant shortness of breath however oxygen remained stable  Saturating adequately on room air  We will continue steroids and remdesivir for 1 more day  Likely need home O2 eval prior to discharge    Cellulitis of chest wall  Assessment & Plan  Noted to have erythema and warmth to left chest wall consistent with cellulitis  Continue IV cefazolin for now anticipate can transition oral antibiotics in next 24 hours    Malignant neoplasm of central portion of left breast in female, estrogen receptor positive ([facility] )  Assessment & Plan  History of left breast cancer currently undergoing radiation therapy  Continue outpatient follow-up    Gastroesophageal reflux disease  Assessment & Plan  Continue Protonix    Hypertension  Assessment & Plan  BP currently controlled    Diabetes mellitus ([facility] )  Assessment & Plan    Lab Results   Component Value Date    HGBA1C 11 3 (H) 05/11/2022   Continue home insulin  Sliding scale insulin           VTE Pharmacologic Prophylaxis:   Pharmacologic: Heparin  Mechanical VTE Prophylaxis in Place: Yes    Patient Centered Rounds: I have performed bedside rounds with nursing staff today  Discussions with Specialists or Other Care Team Provider: yeni greco    Education and Discussions with Family / Patient: pt    Time Spent for Care: 30 minutes  More than 50% of total time spent on counseling and coordination of care as described above      Current Length of Stay: 0 day(s)    Current Patient Status: Inpatient   Certification Statement: The patient will continue to require additional inpatient hospital stay due to see below    Discharge Plan: Anticipate discharge in the next 24 hours continues to improve    Code Status: Level 1 - Full Code      Subjective:   Denies fevers, chills  Reports breathing improving  Denies chest pain    Objective:     Vitals:   Temp (24hrs), Av 2 °F (37 9 °C), Min:98 4 °F (36 9 °C), Max:102 2 °F (39 °C)    Temp:  [98 4 °F (36 9 °C)-102 2 °F (39 °C)] 98 4 °F (36 9 °C)  HR:  [] 91  Resp:  [15-28] 15  BP: (100-165)/(58-74) 128/61  SpO2:  [90 %-98 %] 93 %  Body mass index is 40 57 kg/m²  Input and Output Summary (last 24 hours): Intake/Output Summary (Last 24 hours) at 2023 1657  Last data filed at 2023 0901  Gross per 24 hour   Intake 1700 ml   Output --   Net 1700 ml       Physical Exam:     Physical Exam  Constitutional:       General: She is not in acute distress  Appearance: She is well-developed  She is not diaphoretic  HENT:      Head: Normocephalic and atraumatic  Nose: Nose normal       Mouth/Throat:      Pharynx: No oropharyngeal exudate  Eyes:      General: No scleral icterus  Right eye: No discharge  Left eye: No discharge  Conjunctiva/sclera: Conjunctivae normal    Neck:      Thyroid: No thyromegaly  Vascular: No JVD  Cardiovascular:      Rate and Rhythm: Normal rate and regular rhythm  Heart sounds: Normal heart sounds  No murmur heard  No friction rub  No gallop  Pulmonary:      Effort: Pulmonary effort is normal  No respiratory distress  Breath sounds: Normal breath sounds  No wheezing or rales  Chest:      Chest wall: No tenderness  Abdominal:      General: Bowel sounds are normal  There is no distension  Palpations: Abdomen is soft  Tenderness: There is no abdominal tenderness  There is no guarding or rebound     Musculoskeletal:         General: No tenderness or deformity  Normal range of motion  Cervical back: Normal range of motion and neck supple  Skin:     General: Skin is warm and dry  Findings: No erythema or rash  Neurological:      Mental Status: She is alert  Mental status is at baseline  Cranial Nerves: No cranial nerve deficit  Sensory: No sensory deficit  Motor: No abnormal muscle tone  Coordination: Coordination normal            Additional Data:     Labs:    Results from last 7 days   Lab Units 06/24/23  0538   WBC Thousand/uL 5 06   HEMOGLOBIN g/dL 12 0   HEMATOCRIT % 37 2   PLATELETS Thousands/uL 181   NEUTROS PCT % 90*   LYMPHS PCT % 4*   MONOS PCT % 5   EOS PCT % 0     Results from last 7 days   Lab Units 06/24/23  0538   SODIUM mmol/L 133*   POTASSIUM mmol/L 4 0   CHLORIDE mmol/L 99   CO2 mmol/L 26   BUN mg/dL 19   CREATININE mg/dL 0 92   ANION GAP mmol/L 8   CALCIUM mg/dL 9 0   ALBUMIN g/dL 3 8   TOTAL BILIRUBIN mg/dL 1 35*   ALK PHOS U/L 75   ALT U/L 22   AST U/L 19   GLUCOSE RANDOM mg/dL 181*     Results from last 7 days   Lab Units 06/23/23  1455   INR  0 95     Results from last 7 days   Lab Units 06/24/23  0714 06/23/23  2042 06/23/23  1831   POC GLUCOSE mg/dl 179* 268* 234*         Results from last 7 days   Lab Units 06/24/23  0538 06/23/23  1455   LACTIC ACID mmol/L  --  1 9   PROCALCITONIN ng/ml 0 10  --            * I Have Reviewed All Lab Data Listed Above  * Additional Pertinent Lab Tests Reviewed: All Labs Within Last 24 Hours Reviewed    Imaging:    Imaging Reports Reviewed Today Include: na  Imaging Personally Reviewed by Myself Includes:  na    Recent Cultures (last 7 days):     Results from last 7 days   Lab Units 06/23/23  1455   BLOOD CULTURE  Received in Microbiology Lab  Culture in Progress  Received in Microbiology Lab  Culture in Progress         Last 24 Hours Medication List:   Current Facility-Administered Medications   Medication Dose Route Frequency Provider Last Rate   • acetaminophen  650 mg Oral Q6H PRN Srikanth Huntley MD     • anastrozole  1 mg Oral Daily Srikanth Huntley MD     • atorvastatin  40 mg Oral QPM Srikanth Huntley MD     • busPIRone  20 mg Oral TID PRN Srikanth Huntley MD     • cefazolin  2,000 mg Intravenous Blanca Cornejo MD Stopped (06/24/23 0901)   • dexamethasone  6 mg Intravenous Q24H Srikanth Huntley MD     • FLUoxetine  40 mg Oral Daily Srikanth Huntley MD     • gabapentin  600 mg Oral TID Srikanth Huntley MD     • heparin (porcine)  5,000 Units Subcutaneous Q8H Stanley Nolasco MD     • insulin glargine  40 Units Subcutaneous HS Srikanth Huntley MD     • insulin lispro  1-6 Units Subcutaneous TID AC Srikanth Huntley MD     • insulin lispro  1-6 Units Subcutaneous HS Srikanth Huntley MD     • losartan  100 mg Oral Daily Srikanth Huntley MD     • metoprolol succinate  100 mg Oral Daily Srikanth Huntley MD     • remdesivir  100 mg Intravenous Q24H Bonifacio Conley MD          Today, Patient Was Seen By: Bonifacio Conley MD    ** Please Note: Dictation voice to text software may have been used in the creation of this document   **

## 2023-06-24 NOTE — PLAN OF CARE
Problem: PAIN - ADULT  Goal: Verbalizes/displays adequate comfort level or baseline comfort level  Description: Interventions:  - Encourage patient to monitor pain and request assistance  - Assess pain using appropriate pain scale (0-10 pain scale)  - Administer analgesics based on type and severity of pain and evaluate response  - Implement non-pharmacological measures as appropriate and evaluate response  - Consider cultural and social influences on pain and pain management  - Notify physician/advanced practitioner if interventions unsuccessful or patient reports new pain  Outcome: Progressing     Problem: INFECTION - ADULT  Goal: Absence or prevention of progression during hospitalization  Description: INTERVENTIONS:  - Assess and monitor for signs and symptoms of infection  - Monitor lab/diagnostic results  - Monitor all insertion sites, i e  indwelling lines  - Administer medications as ordered  - Instruct and encourage patient and family to use good hand hygiene technique  - Identify and instruct in appropriate isolation precautions for identified infection/condition (contact and airborne precautions)  Outcome: Progressing     Problem: SAFETY ADULT  Goal: Patient will remain free of falls  Description: INTERVENTIONS:  - Educate patient/family on patient safety including physical limitations  - Instruct patient to call for assistance with activity (independent)  - Consult OT/PT to assist with strengthening/mobility   - Keep Call bell within reach  - Keep bed low and locked with side rails adjusted as appropriate  - Keep care items and personal belongings within reach  - Initiate and maintain comfort rounds  - Make Fall Risk Sign visible to staff (low fall risk)  - Offer Toileting every 1-2 Hours, in advance of need  - Obtain necessary fall risk management equipment: nonskid footwear  Outcome: Progressing  Goal: Maintain or return to baseline ADL function  Description: INTERVENTIONS:  -  Assess patient's ability to carry out ADLs; (independent)  - Assess/evaluate cause of self-care deficits (fatigue, dyspnea)  - Assess range of motion  - Assess patient's mobility; (independent)  - Assess patient's need for assistive devices and provide as appropriate  - Encourage maximum independence but intervene and supervise when necessary  - Assess for home care needs following discharge   - Consider OT consult to assist with ADL evaluation and planning for discharge  - Provide patient education as appropriate  Outcome: Progressing  Goal: Maintains/Returns to pre admission functional level  Description: INTERVENTIONS:  - Perform BMAT or MOVE assessment daily    - Set and communicate daily mobility goal to care team and patient/family/caregiver     - Collaborate with rehabilitation services on mobility goals if consulted  - Ambulate patient 3-5 times a day  - Out of bed to chair 3 times a day   - Out of bed for meals 3 times a day  - Out of bed for toileting  - Record patient progress and toleration of activity level   Outcome: Progressing     Problem: RESPIRATORY - ADULT  Goal: Achieves optimal ventilation and oxygenation  Description: INTERVENTIONS:  - Assess for changes in respiratory status  - Assess for changes in mentation and behavior  - Position to facilitate oxygenation and minimize respiratory effort  - Oxygen administered by appropriate delivery if ordered  - Encourage broncho-pulmonary hygiene including cough, deep breathe, Incentive Spirometry  - Assess and instruct to report SOB or any respiratory difficulty  - Respiratory Therapy support as indicated  Outcome: Progressing     Problem: METABOLIC, FLUID AND ELECTROLYTES - ADULT  Goal: Electrolytes maintained within normal limits  Description: INTERVENTIONS:  - Monitor labs and assess patient for signs and symptoms of electrolyte imbalances  - Administer electrolyte replacement as ordered  - Monitor response to electrolyte replacements, including repeat lab results as appropriate  - Instruct patient on fluid and nutrition as appropriate  Outcome: Progressing  Goal: Fluid balance maintained  Description: INTERVENTIONS:  - Monitor labs   - Monitor I/O and WT  - Instruct patient on fluid and nutrition as appropriate  - Assess for signs & symptoms of volume excess or deficit  Outcome: Progressing  Goal: Glucose maintained within target range  Description: INTERVENTIONS:  - Monitor Blood Glucose as ordered  - Assess for signs and symptoms of hyperglycemia and hypoglycemia  - Administer ordered medications to maintain glucose within target range  - Assess nutritional intake and initiate nutrition service referral as needed  Outcome: Progressing     Problem: MOBILITY - ADULT  Goal: Maintain or return to baseline ADL function  Description: INTERVENTIONS:  -  Assess patient's ability to carry out ADLs; (independent)  - Assess/evaluate cause of self-care deficits (fatigue, dyspnea)  - Assess range of motion  - Assess patient's mobility; (independent)  - Assess patient's need for assistive devices and provide as appropriate  - Encourage maximum independence but intervene and supervise when necessary  - Assess for home care needs following discharge   - Consider OT consult to assist with ADL evaluation and planning for discharge  - Provide patient education as appropriate  Outcome: Progressing  Goal: Maintains/Returns to pre admission functional level  Description: INTERVENTIONS:  - Perform BMAT or MOVE assessment daily    - Set and communicate daily mobility goal to care team and patient/family/caregiver     - Collaborate with rehabilitation services on mobility goals if consulted  - Ambulate patient 3-5 times a day  - Out of bed to chair 3 times a day   - Out of bed for meals 3 times a day  - Out of bed for toileting  - Record patient progress and toleration of activity level   Outcome: Progressing     Problem: DISCHARGE PLANNING  Goal: Discharge to home or other facility with appropriate resources  Description: INTERVENTIONS:  - Identify barriers to discharge w/patient and caregiver  - Arrange for needed discharge resources and transportation as appropriate  - Identify discharge learning needs (meds, wound care, etc )  - Refer to Case Management Department for coordinating discharge planning if the patient needs post-hospital services based on physician/advanced practitioner order or complex needs related to functional status, cognitive ability, or social support system  Outcome: Progressing     Problem: Knowledge Deficit  Goal: Patient/family/caregiver demonstrates understanding of disease process, treatment plan, medications, and discharge instructions  Description: Complete learning assessment and assess knowledge base    Interventions:  - Provide teaching at level of understanding  - Provide teaching via preferred learning methods  Outcome: Progressing     Problem: GENITOURINARY - ADULT  Goal: Maintains or returns to baseline urinary function  Description: INTERVENTIONS:  - Assess urinary function  - Encourage oral fluids to ensure adequate hydration if ordered  - Administer IV fluids as ordered to ensure adequate hydration  - Administer ordered medications as needed  - Offer frequent toileting  - Follow urinary retention protocol if ordered  Outcome: Completed  Goal: Absence of urinary retention  Description: INTERVENTIONS:  - Assess patient’s ability to void and empty bladder  - Monitor I/O  - Bladder scan as needed  - Discuss with physician/AP medications to alleviate retention as needed  - Discuss catheterization for long term situations as appropriate  Outcome: Completed

## 2023-06-24 NOTE — ASSESSMENT & PLAN NOTE
Noted to have erythema and warmth to left chest wall consistent with cellulitis  Continue IV cefazolin for now anticipate can transition oral antibiotics in next 24 hours

## 2023-06-24 NOTE — CASE MANAGEMENT
Case Management Assessment & Discharge Planning Note    Patient name Alondra Avila  Location [address] [id number] MRN [id number]  : 1965 Date 2023       Current Admission Date: 2023  Current Admission Diagnosis:COVID   Patient Active Problem List    Diagnosis Date Noted   • COVID 2023   • Cellulitis of chest wall 05/10/2023   • Malignant neoplasm of central portion of left breast in female, estrogen receptor positive ([facility] ) 2023   • Type 2 diabetes mellitus with hyperglycemia ([facility] ) 03/15/2023   • Invasive ductal carcinoma of breast, female, left ([facility] ) 03/15/2023   • Dilated cardiomyopathy ([address] ) 2022   • RLS (restless legs syndrome) 2022   • Occipital neuralgia of right side 10/17/2022   • T3 vertebral fracture ([facility] ) 2022   • Myocardial infarct, old 2022   • Tachycardia 2022   • Fall from height of greater than 3 feet 2022   • C2 cervical fracture ([facility] ) 2022   • Anemia 12/10/2021   • Morbid obesity due to excess calories ([address] ) 2021   • S/P laparoscopic hysterectomy 2021   • Status post bilateral salpingectomy 2021   • PONV (postoperative nausea and vomiting)    • Sacroiliitis ([facility] ) 2021   • Severe episode of recurrent major depressive disorder, without psychotic features ([facility] ) 2021   • Iron deficiency anemia due to chronic blood loss 2021   • B12 deficiency 2021   • Radial collateral ligament sprain of right elbow 10/08/2020   • Uterine leiomyoma 2020   • Chronic pain syndrome 2020   • Lumbar spondylosis 2020   • Lumbar radiculopathy 2020   • Myofascial pain syndrome 2020   • Vitamin D deficiency 2020   • Chronic midline low back pain without sciatica 2019   • Allergic dermatitis 2019   • Generalized anxiety disorder 2019   • Agoraphobia with panic attacks 2019   • Post-traumatic stress 2019   • Colon cancer screening 10/10/2018   • Oropharyngeal dysphagia 10/10/2018   • Hepatic steatosis 10/10/2018   • Elevated LFTs 10/10/2018   • Dysphagia 10/10/2018   • Screening for colon cancer 10/10/2018   • Gastroesophageal reflux disease 10/10/2018   • Other hyperlipidemia 08/09/2018   • Herniation of lumbar intervertebral disc with radiculopathy 06/07/2018   • Anxiety 08/10/2017   • Iron deficiency 02/01/2017   • Pica in adults 10/03/2016   • Allergic cough 06/29/2016   • Acid reflux 03/02/2016   • Moderate episode of recurrent major depressive disorder ([facility] ) 03/02/2016   • Diabetes mellitus ([facility] [facility] ) 03/02/2016   • Hydrocephalus ([facility] ) 03/02/2016   • Hypertension 03/02/2016      LOS (days): 0  Geometric Mean LOS (GMLOS) (days):   Days to GMLOS:     OBJECTIVE:              Current admission status: Observation       Preferred Pharmacy:   [facility] [address] [id number]  Phone: [phone] Fax: [phone]    Primary Care Provider: Patt Dukes PA-C    Primary Insurance: [address] [place] [address]  Secondary Insurance:     ASSESSMENT:  [address], [place] [address] Representative - Spouse   Primary Phone: [phone] (Mobile)  Home Phone: [phone]               Advance Directives  Does patient have a [address]?: No  Was patient offered paperwork?: Yes (declines)  Does patient currently have a Health Care decision maker?: Yes, please see Health Care Proxy section  Does patient have Advance Directives?: No  Was patient offered paperwork?: Yes (declines)  Primary Contact: Angelica Spears (Spouse)   [phone] (M)         Readmission Root Cause  30 Day Readmission: No    Patient Information  Admitted from[de-identified] Home  Mental Status: Alert  During Assessment patient was accompanied by: Not accompanied during assessment  Assessment information provided by[de-identified] Patient  Primary Caregiver: Self  Support Systems: Spouse/significant other, Daughter  [place] of Residence: [facility]  do you live in?: [address] entry access options   Select all that apply : Stairs  Number of steps to enter home : 1  Type of Current Residence:  (1 story home)  In the last 12 months, was there a time when you were not able to pay the mortgage or rent on time?: No  In the last 12 months, how many places have you lived?: 1  In the last 12 months, was there a time when you did not have a steady place to sleep or slept in a shelter (including now)?: No  Homeless/housing insecurity resource given?: N/A  Living Arrangements: Lives w/ Spouse/significant other, Lives w/ Daughter  Is patient a ?: No    Activities of Daily Living Prior to Admission  Functional Status: Independent  Completes ADLs independently?: Yes  Ambulates independently?: Yes  Does patient use assisted devices?: No  Does patient currently own DME?: No  Does patient have a history of Outpatient Therapy (PT/OT)?: No  Does the patient have a history of Short-Term Rehab?: No  Does patient have a history of HHC?: No  Does patient currently have [employer]?: No         Patient Information Continued  Income Source:  (pt in the process of working on getting disability)  Does patient have prescription coverage?: Yes  Within the past 12 months, you worried that your food would run out before you got the money to buy more : Never true  Within the past 12 months, the food you bought just didn't last and you didn't have money to get more : Never true  Food insecurity resource given?: N/A  Does patient receive dialysis treatments?: No  Does patient have a history of substance abuse?: No  Does patient have a history of Mental Health Diagnosis?: Yes (anxiety)  Is patient receiving treatment for mental health?: Yes (follow up on an OP basis)  Has patient received inpatient treatment related to mental health in the last 2 years?: No         Means of Transportation  Means of Transport to [facility][de-identified] Drives Self  In the past 12 months, has lack of transportation kept you from medical appointments or from getting medications?: No  In the past 12 months, has lack of transportation kept you from meetings, work, or from getting things needed for daily living?: No  Was application for public transport provided?: N/A        DISCHARGE DETAILS:    Discharge planning discussed with[de-identified] patient        CM contacted family/caregiver?: No- see comments (declines)  Were Treatment Team discharge recommendations reviewed with patient/caregiver?: Yes  Did patient/caregiver verbalize understanding of patient care needs?: Yes  Were patient/caregiver advised of the risks associated with not following Treatment Team discharge recommendations?: Yes         [address]         Is the patient interested in [place] [address] at discharge?: No    DME Referral Provided  Referral made for DME?: No         Would you like to participate in our [facility] service program?  : No - Declined       Discharge Destination Plan[de-identified] Home  Transport at Discharge : Family   Plans at this time are home on dc with OP follow up  Cm will follow and assist in dc planning

## 2023-06-24 NOTE — PLAN OF CARE
Problem: PAIN - ADULT  Goal: Verbalizes/displays adequate comfort level or baseline comfort level  Description: Interventions:  - Encourage patient to monitor pain and request assistance  - Assess pain using appropriate pain scale  - Administer analgesics based on type and severity of pain and evaluate response  - Implement non-pharmacological measures as appropriate and evaluate response  - Consider cultural and social influences on pain and pain management  - Notify physician/advanced practitioner if interventions unsuccessful or patient reports new pain  Outcome: Progressing     Problem: INFECTION - ADULT  Goal: Absence or prevention of progression during hospitalization  Description: INTERVENTIONS:  - Assess and monitor for signs and symptoms of infection  - Monitor lab/diagnostic results  - Monitor all insertion sites, i e  indwelling lines, tubes, and drains  - Monitor endotracheal if appropriate and nasal secretions for changes in amount and color  - Leonard appropriate cooling/warming therapies per order  - Administer medications as ordered  - Instruct and encourage patient and family to use good hand hygiene technique  - Identify and instruct in appropriate isolation precautions for identified infection/condition  Outcome: Progressing  Goal: Absence of fever/infection during neutropenic period  Description: INTERVENTIONS:  - Monitor WBC    Outcome: Progressing     Problem: SAFETY ADULT  Goal: Patient will remain free of falls  Description: INTERVENTIONS:  - Educate patient/family on patient safety including physical limitations  - Instruct patient to call for assistance with activity   - Consult OT/PT to assist with strengthening/mobility   - Keep Call bell within reach  - Keep bed low and locked with side rails adjusted as appropriate  - Keep care items and personal belongings within reach  - Initiate and maintain comfort rounds  - Make Fall Risk Sign visible to staff  - Offer Toileting every 2 Hours, in advance of need  - Initiate/Maintain bed/chair alarm  - Obtain necessary fall risk management equipment: non skid socks  - Apply yellow socks and bracelet for high fall risk patients  - Consider moving patient to room near nurses station  Outcome: Progressing  Goal: Maintain or return to baseline ADL function  Description: INTERVENTIONS:  -  Assess patient's ability to carry out ADLs; assess patient's baseline for ADL function and identify physical deficits which impact ability to perform ADLs (bathing, care of mouth/teeth, toileting, grooming, dressing, etc )  - Assess/evaluate cause of self-care deficits   - Assess range of motion  - Assess patient's mobility; develop plan if impaired  - Assess patient's need for assistive devices and provide as appropriate  - Encourage maximum independence but intervene and supervise when necessary  - Involve family in performance of ADLs  - Assess for home care needs following discharge   - Consider OT consult to assist with ADL evaluation and planning for discharge  - Provide patient education as appropriate  Outcome: Progressing  Goal: Maintains/Returns to pre admission functional level  Description: INTERVENTIONS:  - Perform BMAT or MOVE assessment daily    - Set and communicate daily mobility goal to care team and patient/family/caregiver  - Collaborate with rehabilitation services on mobility goals if consulted  - Perform Range of Motion 3 times a day  - Reposition patient every 3 hours    - Dangle patient 3 times a day  - Stand patient 3 times a day  - Ambulate patient 3 times a day  - Out of bed to chair 3 times a day   - Out of bed for meals 3 times a day  - Out of bed for toileting  - Record patient progress and toleration of activity level   Outcome: Progressing     Problem: RESPIRATORY - ADULT  Goal: Achieves optimal ventilation and oxygenation  Description: INTERVENTIONS:  - Assess for changes in respiratory status  - Assess for changes in mentation and behavior  - Position to facilitate oxygenation and minimize respiratory effort  - Oxygen administered by appropriate delivery if ordered  - Initiate smoking cessation education as indicated  - Encourage broncho-pulmonary hygiene including cough, deep breathe, Incentive Spirometry  - Assess the need for suctioning and aspirate as needed  - Assess and instruct to report SOB or any respiratory difficulty  - Respiratory Therapy support as indicated  Outcome: Progressing     Problem: GENITOURINARY - ADULT  Goal: Maintains or returns to baseline urinary function  Description: INTERVENTIONS:  - Assess urinary function  - Encourage oral fluids to ensure adequate hydration if ordered  - Administer IV fluids as ordered to ensure adequate hydration  - Administer ordered medications as needed  - Offer frequent toileting  - Follow urinary retention protocol if ordered  Outcome: Progressing  Goal: Absence of urinary retention  Description: INTERVENTIONS:  - Assess patient’s ability to void and empty bladder  - Monitor I/O  - Bladder scan as needed  - Discuss with physician/AP medications to alleviate retention as needed  - Discuss catheterization for long term situations as appropriate  Outcome: Progressing     Problem: METABOLIC, FLUID AND ELECTROLYTES - ADULT  Goal: Electrolytes maintained within normal limits  Description: INTERVENTIONS:  - Monitor labs and assess patient for signs and symptoms of electrolyte imbalances  - Administer electrolyte replacement as ordered  - Monitor response to electrolyte replacements, including repeat lab results as appropriate  - Instruct patient on fluid and nutrition as appropriate  Outcome: Progressing  Goal: Fluid balance maintained  Description: INTERVENTIONS:  - Monitor labs   - Monitor I/O and WT  - Instruct patient on fluid and nutrition as appropriate  - Assess for signs & symptoms of volume excess or deficit  Outcome: Progressing  Goal: Glucose maintained within target range  Description: INTERVENTIONS:  - Monitor Blood Glucose as ordered  - Assess for signs and symptoms of hyperglycemia and hypoglycemia  - Administer ordered medications to maintain glucose within target range  - Assess nutritional intake and initiate nutrition service referral as needed  Outcome: Progressing     Problem: MOBILITY - ADULT  Goal: Maintain or return to baseline ADL function  Description: INTERVENTIONS:  -  Assess patient's ability to carry out ADLs; assess patient's baseline for ADL function and identify physical deficits which impact ability to perform ADLs (bathing, care of mouth/teeth, toileting, grooming, dressing, etc )  - Assess/evaluate cause of self-care deficits   - Assess range of motion  - Assess patient's mobility; develop plan if impaired  - Assess patient's need for assistive devices and provide as appropriate  - Encourage maximum independence but intervene and supervise when necessary  - Involve family in performance of ADLs  - Assess for home care needs following discharge   - Consider OT consult to assist with ADL evaluation and planning for discharge  - Provide patient education as appropriate  Outcome: Progressing  Goal: Maintains/Returns to pre admission functional level  Description: INTERVENTIONS:  - Perform BMAT or MOVE assessment daily    - Set and communicate daily mobility goal to care team and patient/family/caregiver  - Collaborate with rehabilitation services on mobility goals if consulted  - Perform Range of Motion 3 times a day  - Reposition patient every 3 hours    - Dangle patient 3 times a day  - Stand patient 3 times a day  - Ambulate patient 3 times a day  - Out of bed to chair 3 times a day   - Out of bed for meals 3 times a day  - Out of bed for toileting  - Record patient progress and toleration of activity level   Outcome: Progressing

## 2023-06-24 NOTE — ASSESSMENT & PLAN NOTE
Presented with intermittent fevers, chills shortness of breath with a nonproductive cough  Noted to be COVID-positive in the ER  Chest x-ray without any acute abnormalities  Saturating adequately on room air at rest however desaturates to 88% with exertion  Still with significant shortness of breath however oxygen remained stable  Saturating adequately on room air  We will continue steroids and remdesivir for 1 more day  Likely need home O2 eval prior to discharge

## 2023-06-25 VITALS
TEMPERATURE: 97.5 F | SYSTOLIC BLOOD PRESSURE: 137 MMHG | OXYGEN SATURATION: 97 % | DIASTOLIC BLOOD PRESSURE: 74 MMHG | HEART RATE: 75 BPM | WEIGHT: 229 LBS | HEIGHT: 63 IN | BODY MASS INDEX: 40.57 KG/M2 | RESPIRATION RATE: 19 BRPM

## 2023-06-25 LAB
ANION GAP SERPL CALCULATED.3IONS-SCNC: 8 MMOL/L
BASOPHILS # BLD AUTO: 0 THOUSANDS/ÂΜL (ref 0–0.1)
BASOPHILS NFR BLD AUTO: 0 % (ref 0–1)
BUN SERPL-MCNC: 19 MG/DL (ref 5–25)
CALCIUM SERPL-MCNC: 9.1 MG/DL (ref 8.4–10.2)
CHLORIDE SERPL-SCNC: 100 MMOL/L (ref 96–108)
CO2 SERPL-SCNC: 27 MMOL/L (ref 21–32)
CREAT SERPL-MCNC: 0.83 MG/DL (ref 0.6–1.3)
EOSINOPHIL # BLD AUTO: 0 THOUSAND/ÂΜL (ref 0–0.61)
EOSINOPHIL NFR BLD AUTO: 0 % (ref 0–6)
ERYTHROCYTE [DISTWIDTH] IN BLOOD BY AUTOMATED COUNT: 12.7 % (ref 11.6–15.1)
GFR SERPL CREATININE-BSD FRML MDRD: 79 ML/MIN/1.73SQ M
GLUCOSE SERPL-MCNC: 157 MG/DL (ref 65–140)
GLUCOSE SERPL-MCNC: 221 MG/DL (ref 65–140)
HCT VFR BLD AUTO: 36.3 % (ref 34.8–46.1)
HGB BLD-MCNC: 11.7 G/DL (ref 11.5–15.4)
IMM GRANULOCYTES # BLD AUTO: 0.03 THOUSAND/UL (ref 0–0.2)
IMM GRANULOCYTES NFR BLD AUTO: 1 % (ref 0–2)
LYMPHOCYTES # BLD AUTO: 0.28 THOUSANDS/ÂΜL (ref 0.6–4.47)
LYMPHOCYTES NFR BLD AUTO: 5 % (ref 14–44)
MCH RBC QN AUTO: 29.6 PG (ref 26.8–34.3)
MCHC RBC AUTO-ENTMCNC: 32.2 G/DL (ref 31.4–37.4)
MCV RBC AUTO: 92 FL (ref 82–98)
MONOCYTES # BLD AUTO: 0.56 THOUSAND/ÂΜL (ref 0.17–1.22)
MONOCYTES NFR BLD AUTO: 11 % (ref 4–12)
NEUTROPHILS # BLD AUTO: 4.27 THOUSANDS/ÂΜL (ref 1.85–7.62)
NEUTS SEG NFR BLD AUTO: 83 % (ref 43–75)
NRBC BLD AUTO-RTO: 0 /100 WBCS
PLATELET # BLD AUTO: 171 THOUSANDS/UL (ref 149–390)
PMV BLD AUTO: 9.8 FL (ref 8.9–12.7)
POTASSIUM SERPL-SCNC: 4.1 MMOL/L (ref 3.5–5.3)
RBC # BLD AUTO: 3.95 MILLION/UL (ref 3.81–5.12)
SODIUM SERPL-SCNC: 135 MMOL/L (ref 135–147)
WBC # BLD AUTO: 5.14 THOUSAND/UL (ref 4.31–10.16)

## 2023-06-25 PROCEDURE — 80048 BASIC METABOLIC PNL TOTAL CA: CPT | Performed by: INTERNAL MEDICINE

## 2023-06-25 PROCEDURE — 85025 COMPLETE CBC W/AUTO DIFF WBC: CPT | Performed by: INTERNAL MEDICINE

## 2023-06-25 PROCEDURE — 99239 HOSP IP/OBS DSCHRG MGMT >30: CPT | Performed by: INTERNAL MEDICINE

## 2023-06-25 PROCEDURE — 82948 REAGENT STRIP/BLOOD GLUCOSE: CPT

## 2023-06-25 RX ORDER — BENZONATATE 100 MG/1
100 CAPSULE ORAL 3 TIMES DAILY PRN
Qty: 20 CAPSULE | Refills: 0 | Status: SHIPPED | OUTPATIENT
Start: 2023-06-25 | End: 2023-07-11

## 2023-06-25 RX ORDER — CEPHALEXIN 500 MG/1
500 CAPSULE ORAL EVERY 6 HOURS SCHEDULED
Qty: 24 CAPSULE | Refills: 0 | Status: SHIPPED | OUTPATIENT
Start: 2023-06-25 | End: 2023-07-01

## 2023-06-25 RX ADMIN — HEPARIN SODIUM 5000 UNITS: 5000 INJECTION INTRAVENOUS; SUBCUTANEOUS at 05:05

## 2023-06-25 RX ADMIN — GABAPENTIN 600 MG: 300 CAPSULE ORAL at 08:25

## 2023-06-25 RX ADMIN — ANASTROZOLE 1 MG: 1 TABLET, COATED ORAL at 08:25

## 2023-06-25 RX ADMIN — CEFAZOLIN SODIUM 2000 MG: 2 SOLUTION INTRAVENOUS at 00:59

## 2023-06-25 RX ADMIN — INSULIN LISPRO 1 UNITS: 100 INJECTION, SOLUTION INTRAVENOUS; SUBCUTANEOUS at 08:27

## 2023-06-25 RX ADMIN — LOSARTAN POTASSIUM 100 MG: 50 TABLET, FILM COATED ORAL at 08:25

## 2023-06-25 RX ADMIN — CEFAZOLIN SODIUM 2000 MG: 2 SOLUTION INTRAVENOUS at 08:26

## 2023-06-25 RX ADMIN — FLUOXETINE 40 MG: 20 CAPSULE ORAL at 08:25

## 2023-06-25 RX ADMIN — METOPROLOL SUCCINATE 100 MG: 100 TABLET, EXTENDED RELEASE ORAL at 08:25

## 2023-06-25 NOTE — PLAN OF CARE
Problem: PAIN - ADULT  Goal: Verbalizes/displays adequate comfort level or baseline comfort level  Description: Interventions:  - Encourage patient to monitor pain and request assistance  - Assess pain using appropriate pain scale (0-10 pain scale)  - Administer analgesics based on type and severity of pain and evaluate response  - Implement non-pharmacological measures as appropriate and evaluate response  - Consider cultural and social influences on pain and pain management  - Notify physician/advanced practitioner if interventions unsuccessful or patient reports new pain  6/25/2023 0945 by Marquis Zelalem RN  Outcome: Adequate for Discharge  6/25/2023 0848 by Marquis Zelalem RN  Outcome: Progressing  6/25/2023 0848 by Marquis Zelalem RN  Outcome: Progressing     Problem: INFECTION - ADULT  Goal: Absence or prevention of progression during hospitalization  Description: INTERVENTIONS:  - Assess and monitor for signs and symptoms of infection  - Monitor lab/diagnostic results  - Monitor all insertion sites, i e  indwelling lines  - Administer medications as ordered  - Instruct and encourage patient and family to use good hand hygiene technique  - Identify and instruct in appropriate isolation precautions for identified infection/condition (contact and airborne precautions)  6/25/2023 0945 by Marquis Zelalem RN  Outcome: Adequate for Discharge  6/25/2023 0848 by Marquis Zelalem RN  Outcome: Progressing  6/25/2023 0848 by Marquis Zelalem RN  Outcome: Progressing     Problem: SAFETY ADULT  Goal: Patient will remain free of falls  Description: INTERVENTIONS:  - Educate patient/family on patient safety including physical limitations  - Instruct patient to call for assistance with activity (independent)  - Consult OT/PT to assist with strengthening/mobility   - Keep Call bell within reach  - Keep bed low and locked with side rails adjusted as appropriate  - Keep care items and personal belongings within reach  - Initiate and maintain comfort rounds  - Make Fall Risk Sign visible to staff (low fall risk)  - Offer Toileting every 1-2 Hours, in advance of need  - Obtain necessary fall risk management equipment: nonskid footwear  6/25/2023 0945 by Eugene Stark RN  Outcome: Adequate for Discharge  6/25/2023 0848 by Eugene Stark RN  Outcome: Progressing  6/25/2023 0848 by Eugene Stark RN  Outcome: Progressing  Goal: Maintain or return to baseline ADL function  Description: INTERVENTIONS:  -  Assess patient's ability to carry out ADLs; (independent)  - Assess/evaluate cause of self-care deficits (fatigue, dyspnea)  - Assess range of motion  - Assess patient's mobility; (independent)  - Assess patient's need for assistive devices and provide as appropriate  - Encourage maximum independence but intervene and supervise when necessary  - Assess for home care needs following discharge   - Consider OT consult to assist with ADL evaluation and planning for discharge  - Provide patient education as appropriate  6/25/2023 0945 by Eugene Stark RN  Outcome: Adequate for Discharge  6/25/2023 0848 by Eugene Stark RN  Outcome: Progressing  6/25/2023 0848 by Eugene Stark RN  Outcome: Progressing  Goal: Maintains/Returns to pre admission functional level  Description: INTERVENTIONS:  - Perform BMAT or MOVE assessment daily    - Set and communicate daily mobility goal to care team and patient/family/caregiver     - Collaborate with rehabilitation services on mobility goals if consulted  - Ambulate patient 3-5 times a day  - Out of bed to chair 3 times a day   - Out of bed for meals 3 times a day  - Out of bed for toileting  - Record patient progress and toleration of activity level   6/25/2023 0945 by Eugene Stark RN  Outcome: Adequate for Discharge  6/25/2023 0848 by Eugene Stark RN  Outcome: Progressing  6/25/2023 0848 by Eugene Stark RN  Outcome: Progressing     Problem: DISCHARGE PLANNING  Goal: Discharge to home or other facility with appropriate resources  Description: INTERVENTIONS:  - Identify barriers to discharge w/patient and caregiver  - Arrange for needed discharge resources and transportation as appropriate  - Identify discharge learning needs (meds, wound care, etc )  - Refer to Case Management Department for coordinating discharge planning if the patient needs post-hospital services based on physician/advanced practitioner order or complex needs related to functional status, cognitive ability, or social support system  6/25/2023 0945 by Mikal Ruiz RN  Outcome: Adequate for Discharge  6/25/2023 0848 by Mikal Ruiz RN  Outcome: Progressing  6/25/2023 0848 by Mikal Ruiz RN  Outcome: Progressing     Problem: Knowledge Deficit  Goal: Patient/family/caregiver demonstrates understanding of disease process, treatment plan, medications, and discharge instructions  Description: Complete learning assessment and assess knowledge base    Interventions:  - Provide teaching at level of understanding  - Provide teaching via preferred learning methods  6/25/2023 0945 by Mikal Ruiz RN  Outcome: Adequate for Discharge  6/25/2023 0848 by Mikal Ruiz RN  Outcome: Progressing  6/25/2023 0848 by Mikal Ruiz RN  Outcome: Progressing     Problem: RESPIRATORY - ADULT  Goal: Achieves optimal ventilation and oxygenation  Description: INTERVENTIONS:  - Assess for changes in respiratory status  - Assess for changes in mentation and behavior  - Position to facilitate oxygenation and minimize respiratory effort  - Oxygen administered by appropriate delivery if ordered  - Encourage broncho-pulmonary hygiene including cough, deep breathe, Incentive Spirometry  - Assess and instruct to report SOB or any respiratory difficulty  - Respiratory Therapy support as indicated  6/25/2023 0945 by Mikal Ruiz RN  Outcome: Adequate for Discharge  6/25/2023 0848 by Mikal Ruiz RN  Outcome: Progressing  6/25/2023 0848 by Edwige Santos RN  Outcome: Progressing     Problem: METABOLIC, FLUID AND ELECTROLYTES - ADULT  Goal: Electrolytes maintained within normal limits  Description: INTERVENTIONS:  - Monitor labs and assess patient for signs and symptoms of electrolyte imbalances  - Administer electrolyte replacement as ordered  - Monitor response to electrolyte replacements, including repeat lab results as appropriate  - Instruct patient on fluid and nutrition as appropriate  6/25/2023 0945 by Edwige Santos RN  Outcome: Adequate for Discharge  6/25/2023 0848 by Edwige Santos RN  Outcome: Progressing  6/25/2023 0848 by Edwige Santos RN  Outcome: Progressing  Goal: Fluid balance maintained  Description: INTERVENTIONS:  - Monitor labs   - Monitor I/O and WT  - Instruct patient on fluid and nutrition as appropriate  - Assess for signs & symptoms of volume excess or deficit  6/25/2023 0945 by Edwige Santos RN  Outcome: Adequate for Discharge  6/25/2023 0848 by Edwige Santos RN  Outcome: Progressing  6/25/2023 0848 by Edwige Santos RN  Outcome: Progressing  Goal: Glucose maintained within target range  Description: INTERVENTIONS:  - Monitor Blood Glucose as ordered  - Assess for signs and symptoms of hyperglycemia and hypoglycemia  - Administer ordered medications to maintain glucose within target range  - Assess nutritional intake and initiate nutrition service referral as needed  6/25/2023 0945 by Edwige Santos RN  Outcome: Adequate for Discharge  6/25/2023 0848 by Edwige Santos RN  Outcome: Progressing  6/25/2023 0848 by Edwige Santos RN  Outcome: Progressing     Problem: MOBILITY - ADULT  Goal: Maintain or return to baseline ADL function  Description: INTERVENTIONS:  -  Assess patient's ability to carry out ADLs; (independent)  - Assess/evaluate cause of self-care deficits (fatigue, dyspnea)  - Assess range of motion  - Assess patient's mobility; (independent)  - Assess patient's need for assistive devices and provide as appropriate  - Encourage maximum independence but intervene and supervise when necessary  - Assess for home care needs following discharge   - Consider OT consult to assist with ADL evaluation and planning for discharge  - Provide patient education as appropriate  6/25/2023 0945 by Arcadio Ortiz RN  Outcome: Adequate for Discharge  6/25/2023 0848 by Arcadio Ortiz RN  Outcome: Progressing  6/25/2023 0848 by Arcadio Ortiz RN  Outcome: Progressing  Goal: Maintains/Returns to pre admission functional level  Description: INTERVENTIONS:  - Perform BMAT or MOVE assessment daily    - Set and communicate daily mobility goal to care team and patient/family/caregiver     - Collaborate with rehabilitation services on mobility goals if consulted  - Ambulate patient 3-5 times a day  - Out of bed to chair 3 times a day   - Out of bed for meals 3 times a day  - Out of bed for toileting  - Record patient progress and toleration of activity level   6/25/2023 0945 by Arcadio Ortiz RN  Outcome: Adequate for Discharge  6/25/2023 0848 by Arcadio Ortiz RN  Outcome: Progressing  6/25/2023 0848 by Arcadio Ortiz RN  Outcome: Progressing

## 2023-06-25 NOTE — NURSING NOTE
"While in patient's room this morning, noted her to have an insulin pump  Inquired with patient if she has been covering her blood sugars while in the hospital, she states \"yes, I have been  \" Patient educated on the importance of letting staff know if she does cover her sugars, as we have standing orders in for lantus and humalog  Patient agreeable  On call Neeru Martinez made aware of same this morning     "

## 2023-06-25 NOTE — PLAN OF CARE
Problem: PAIN - ADULT  Goal: Verbalizes/displays adequate comfort level or baseline comfort level  Description: Interventions:  - Encourage patient to monitor pain and request assistance  - Assess pain using appropriate pain scale (0-10 pain scale)  - Administer analgesics based on type and severity of pain and evaluate response  - Implement non-pharmacological measures as appropriate and evaluate response  - Consider cultural and social influences on pain and pain management  - Notify physician/advanced practitioner if interventions unsuccessful or patient reports new pain  Outcome: Progressing     Problem: INFECTION - ADULT  Goal: Absence or prevention of progression during hospitalization  Description: INTERVENTIONS:  - Assess and monitor for signs and symptoms of infection  - Monitor lab/diagnostic results  - Monitor all insertion sites, i e  indwelling lines  - Administer medications as ordered  - Instruct and encourage patient and family to use good hand hygiene technique  - Identify and instruct in appropriate isolation precautions for identified infection/condition (contact and airborne precautions)  Outcome: Progressing     Problem: SAFETY ADULT  Goal: Patient will remain free of falls  Description: INTERVENTIONS:  - Educate patient/family on patient safety including physical limitations  - Instruct patient to call for assistance with activity (independent)  - Consult OT/PT to assist with strengthening/mobility   - Keep Call bell within reach  - Keep bed low and locked with side rails adjusted as appropriate  - Keep care items and personal belongings within reach  - Initiate and maintain comfort rounds  - Make Fall Risk Sign visible to staff (low fall risk)  - Offer Toileting every 1-2 Hours, in advance of need  - Obtain necessary fall risk management equipment: nonskid footwear  Outcome: Progressing  Goal: Maintain or return to baseline ADL function  Description: INTERVENTIONS:  -  Assess patient's ability to carry out ADLs; (independent)  - Assess/evaluate cause of self-care deficits (fatigue, dyspnea)  - Assess range of motion  - Assess patient's mobility; (independent)  - Assess patient's need for assistive devices and provide as appropriate  - Encourage maximum independence but intervene and supervise when necessary  - Assess for home care needs following discharge   - Consider OT consult to assist with ADL evaluation and planning for discharge  - Provide patient education as appropriate  Outcome: Progressing  Goal: Maintains/Returns to pre admission functional level  Description: INTERVENTIONS:  - Perform BMAT or MOVE assessment daily    - Set and communicate daily mobility goal to care team and patient/family/caregiver     - Collaborate with rehabilitation services on mobility goals if consulted  - Ambulate patient 3-5 times a day  - Out of bed to chair 3 times a day   - Out of bed for meals 3 times a day  - Out of bed for toileting  - Record patient progress and toleration of activity level   Outcome: Progressing

## 2023-06-25 NOTE — PLAN OF CARE
Problem: PAIN - ADULT  Goal: Verbalizes/displays adequate comfort level or baseline comfort level  Description: Interventions:  - Encourage patient to monitor pain and request assistance  - Assess pain using appropriate pain scale (0-10 pain scale)  - Administer analgesics based on type and severity of pain and evaluate response  - Implement non-pharmacological measures as appropriate and evaluate response  - Consider cultural and social influences on pain and pain management  - Notify physician/advanced practitioner if interventions unsuccessful or patient reports new pain  Outcome: Progressing     Problem: INFECTION - ADULT  Goal: Absence or prevention of progression during hospitalization  Description: INTERVENTIONS:  - Assess and monitor for signs and symptoms of infection  - Monitor lab/diagnostic results  - Monitor all insertion sites, i e  indwelling lines  - Administer medications as ordered  - Instruct and encourage patient and family to use good hand hygiene technique  - Identify and instruct in appropriate isolation precautions for identified infection/condition (contact and airborne precautions)  Outcome: Progressing     Problem: SAFETY ADULT  Goal: Patient will remain free of falls  Description: INTERVENTIONS:  - Educate patient/family on patient safety including physical limitations  - Instruct patient to call for assistance with activity (independent)  - Consult OT/PT to assist with strengthening/mobility   - Keep Call bell within reach  - Keep bed low and locked with side rails adjusted as appropriate  - Keep care items and personal belongings within reach  - Initiate and maintain comfort rounds  - Make Fall Risk Sign visible to staff (low fall risk)  - Offer Toileting every 1-2 Hours, in advance of need  - Obtain necessary fall risk management equipment: nonskid footwear  Outcome: Progressing  Goal: Maintain or return to baseline ADL function  Description: INTERVENTIONS:  -  Assess patient's ability to carry out ADLs; (independent)  - Assess/evaluate cause of self-care deficits (fatigue, dyspnea)  - Assess range of motion  - Assess patient's mobility; (independent)  - Assess patient's need for assistive devices and provide as appropriate  - Encourage maximum independence but intervene and supervise when necessary  - Assess for home care needs following discharge   - Consider OT consult to assist with ADL evaluation and planning for discharge  - Provide patient education as appropriate  Outcome: Progressing  Goal: Maintains/Returns to pre admission functional level  Description: INTERVENTIONS:  - Perform BMAT or MOVE assessment daily    - Set and communicate daily mobility goal to care team and patient/family/caregiver     - Collaborate with rehabilitation services on mobility goals if consulted  - Ambulate patient 3-5 times a day  - Out of bed to chair 3 times a day   - Out of bed for meals 3 times a day  - Out of bed for toileting  - Record patient progress and toleration of activity level   Outcome: Progressing     Problem: DISCHARGE PLANNING  Goal: Discharge to home or other facility with appropriate resources  Description: INTERVENTIONS:  - Identify barriers to discharge w/patient and caregiver  - Arrange for needed discharge resources and transportation as appropriate  - Identify discharge learning needs (meds, wound care, etc )  - Refer to Case Management Department for coordinating discharge planning if the patient needs post-hospital services based on physician/advanced practitioner order or complex needs related to functional status, cognitive ability, or social support system  Outcome: Progressing     Problem: RESPIRATORY - ADULT  Goal: Achieves optimal ventilation and oxygenation  Description: INTERVENTIONS:  - Assess for changes in respiratory status  - Assess for changes in mentation and behavior  - Position to facilitate oxygenation and minimize respiratory effort  - Oxygen administered by appropriate delivery if ordered  - Encourage broncho-pulmonary hygiene including cough, deep breathe, Incentive Spirometry  - Assess and instruct to report SOB or any respiratory difficulty  - Respiratory Therapy support as indicated  Outcome: Progressing     Problem: Knowledge Deficit  Goal: Patient/family/caregiver demonstrates understanding of disease process, treatment plan, medications, and discharge instructions  Description: Complete learning assessment and assess knowledge base    Interventions:  - Provide teaching at level of understanding  - Provide teaching via preferred learning methods  Outcome: Progressing     Problem: METABOLIC, FLUID AND ELECTROLYTES - ADULT  Goal: Electrolytes maintained within normal limits  Description: INTERVENTIONS:  - Monitor labs and assess patient for signs and symptoms of electrolyte imbalances  - Administer electrolyte replacement as ordered  - Monitor response to electrolyte replacements, including repeat lab results as appropriate  - Instruct patient on fluid and nutrition as appropriate  Outcome: Progressing  Goal: Fluid balance maintained  Description: INTERVENTIONS:  - Monitor labs   - Monitor I/O and WT  - Instruct patient on fluid and nutrition as appropriate  - Assess for signs & symptoms of volume excess or deficit  Outcome: Progressing  Goal: Glucose maintained within target range  Description: INTERVENTIONS:  - Monitor Blood Glucose as ordered  - Assess for signs and symptoms of hyperglycemia and hypoglycemia  - Administer ordered medications to maintain glucose within target range  - Assess nutritional intake and initiate nutrition service referral as needed  Outcome: Progressing     Problem: MOBILITY - ADULT  Goal: Maintain or return to baseline ADL function  Description: INTERVENTIONS:  -  Assess patient's ability to carry out ADLs; (independent)  - Assess/evaluate cause of self-care deficits (fatigue, dyspnea)  - Assess range of motion  - Assess patient's mobility; (independent)  - Assess patient's need for assistive devices and provide as appropriate  - Encourage maximum independence but intervene and supervise when necessary  - Assess for home care needs following discharge   - Consider OT consult to assist with ADL evaluation and planning for discharge  - Provide patient education as appropriate  Outcome: Progressing  Goal: Maintains/Returns to pre admission functional level  Description: INTERVENTIONS:  - Perform BMAT or MOVE assessment daily    - Set and communicate daily mobility goal to care team and patient/family/caregiver     - Collaborate with rehabilitation services on mobility goals if consulted  - Ambulate patient 3-5 times a day  - Out of bed to chair 3 times a day   - Out of bed for meals 3 times a day  - Out of bed for toileting  - Record patient progress and toleration of activity level   Outcome: Progressing

## 2023-06-25 NOTE — DISCHARGE SUMMARY
[address] [facility] [address]  Discharge- Tania Rocha 1965, 56 y o  female MRN: [id number]  Unit/Bed#: 416-01 Encounter: [id number]  Primary Care Provider: Shmuel Medeiros PA-C   Date and time admitted to hospital: 6/23/2023  2:22 PM    * [address]  Presented with intermittent fevers, chills shortness of breath with a nonproductive cough  Noted to be COVID-positive in the ER  Chest x-ray without any acute abnormalities  Saturating adequately on room air at rest however desaturates to 88% with exertion  Significantly improved with IV steroids   now saturating adequately  Medically clear for discharge    Cellulitis of chest wall  Assessment & Plan  Noted to have erythema and warmth to left chest wall consistent with cellulitis  Significant improved with IV antibiotics  Transition to oral Keflex on discharge    Malignant neoplasm of central portion of left breast in female, estrogen receptor positive ([facility] [address] )  Assessment & Plan  History of left breast cancer currently undergoing radiation therapy  Continue outpatient follow-up    Hypertension  Assessment & Plan  BP controlled  Continue home meds    Diabetes mellitus ([facility] [address] )  Assessment & Plan  Continue home insulin         Discharging Physician / Practitioner: Isra Bingham MD  PCP: Shmuel Medeiros PA-C  Admission Date:   Admission Orders (From admission, onward)     Ordered        06/24/23 0950  Inpatient Admission  Once            06/23/23 1648  Place in Observation  Once                      Discharge Date: 06/25/23    Medical Problems     Resolved Problems  Date Reviewed: 6/25/2023   None         Consultations During Hospital Stay:  · none    Procedures Performed:   · none    Significant Findings / Test Results:   XR chest 1 view portable    Result Date: 6/23/2023  · Impression: No acute cardiopulmonary disease   Workstation performed: [id number]     Incidental Findings:   · none     Test Results Pending at Discharge (will require follow "up):   · none     Outpatient Tests Requested:  · none    Complications:  none  Reason for Admission: Acute hypoxic respiratory failure secondary to [address] Course:     Kaylan Sevilla is a 56 y o  female patient who originally presented to the hospital on 6/23/2023 history of breast cancer who initially presented with COVID and hypoxic respiratory failure  Was treated with IV steroids with significant improvement now saturating adequately on room air    will follow-up outpatient with primary care doctor      Please see above list of diagnoses and related plan for additional information  Condition at Discharge: stable     Discharge Day Visit / Exam:     Subjective: Reports breathing significantly improved  Denies chest pain, fevers, chills  Vitals: Blood Pressure: 137/74 (06/25/23 0800)  Pulse: 75 (06/25/23 0800)  Temperature: 97 5 °F (36 4 °C) (06/25/23 0756)  Temp Source: Oral (06/25/23 0500)  Respirations: 19 (06/25/23 0756)  Height: 5' 3\" (160 cm) (06/23/23 1425)  Weight - Scale: 104 kg (229 lb) (06/23/23 1425)  SpO2: 97 % (06/25/23 0800)  Exam:   Physical Exam  Constitutional:       General: She is not in acute distress  Appearance: She is well-developed  She is not diaphoretic  HENT:      Head: Normocephalic and atraumatic  Nose: Nose normal       Mouth/Throat:      Pharynx: No oropharyngeal exudate  Eyes:      General: No scleral icterus  Right eye: No discharge  Left eye: No discharge  Conjunctiva/sclera: Conjunctivae normal    Neck:      Thyroid: No thyromegaly  Vascular: No JVD  Cardiovascular:      Rate and Rhythm: Normal rate and regular rhythm  Heart sounds: Normal heart sounds  No murmur heard  No friction rub  No gallop  Pulmonary:      Effort: Pulmonary effort is normal  No respiratory distress  Breath sounds: Normal breath sounds  No wheezing or rales  Chest:      Chest wall: No tenderness     Abdominal:      General: Bowel " sounds are normal  There is no distension  Palpations: Abdomen is soft  Tenderness: There is no abdominal tenderness  There is no guarding or rebound  Musculoskeletal:         General: No tenderness or deformity  Normal range of motion  Cervical back: Normal range of motion and neck supple  Skin:     General: Skin is warm and dry  Findings: No erythema or rash  Neurological:      Mental Status: She is alert  Mental status is at baseline  Cranial Nerves: No cranial nerve deficit  Sensory: No sensory deficit  Motor: No abnormal muscle tone  Coordination: Coordination normal        (  Discussion with Family: pt    Discharge instructions/Information to patient and family:   See after visit summary for information provided to patient and family  Provisions for Follow-Up Care:  See after visit summary for information related to follow-up care and any pertinent home health orders  Disposition:     Home    For Discharges to [facility] SNF:   · Not Applicable to this Patient - Not Applicable to this Patient    Planned Readmission: none     Discharge Statement:  I spent 60 minutes discharging the patient  This time was spent on the day of discharge  I had direct contact with the patient on the day of discharge  Greater than 50% of the total time was spent examining patient, answering all patient questions, arranging and discussing plan of care with patient as well as directly providing post-discharge instructions  Additional time then spent on discharge activities  Discharge Medications:  See after visit summary for reconciled discharge medications provided to patient and family        ** Please Note: This note has been constructed using a voice recognition system **

## 2023-06-25 NOTE — ASSESSMENT & PLAN NOTE
Presented with intermittent fevers, chills shortness of breath with a nonproductive cough  Noted to be COVID-positive in the ER  Chest x-ray without any acute abnormalities  Saturating adequately on room air at rest however desaturates to 88% with exertion  Significantly improved with IV steroids   now saturating adequately  Medically clear for discharge

## 2023-06-25 NOTE — NURSING NOTE
Discharge instructions reviewed with patient, verbalized understanding  Walked to car with family members, patient had mask on and belongings in hand  Patient was given instructions to stop xanax and zanaflex - this was questioned by patient and Dr Farrukh Rivas was consulted and patient was told to resume all 'stopped' medication

## 2023-06-25 NOTE — ASSESSMENT & PLAN NOTE
Noted to have erythema and warmth to left chest wall consistent with cellulitis  Significant improved with IV antibiotics  Transition to oral Keflex on discharge

## 2023-06-26 ENCOUNTER — TRANSITIONAL CARE MANAGEMENT (OUTPATIENT)
Dept: FAMILY MEDICINE CLINIC | Facility: CLINIC | Age: 58
End: 2023-06-26

## 2023-06-26 NOTE — UTILIZATION REVIEW
NOTIFICATION OF ADMISSION DISCHARGE   This is a Notification of Discharge from [facility]  Please be advised that this patient has been discharge from our facility  Below you will find the admission and discharge date and time including the patient’s disposition  UTILIZATION REVIEW CONTACT:  Amairani Durán  Utilization   Network Utilization Review Department  Phone: [phone] x carefully listen to the prompts  All voicemails are confidential   Email: [email or web address]     ADMISSION INFORMATION  PRESENTATION DATE: 6/23/2023  2:22 PM  OBERVATION ADMISSION DATE:  INPATIENT ADMISSION DATE: 6/24/23  9:50 AM   DISCHARGE DATE: 6/25/2023 10:09 AM   DISPOSITION:Home/Self Care    IMPORTANT INFORMATION:  Send all requests for admission clinical reviews, approved or denied determinations and any other requests to dedicated fax number below belonging to the campus where the patient is receiving treatment   List of dedicated fax numbers:  [address] DENIALS (Administrative/Medical Necessity) [phone]   [address] (Maternity/NICU/Pediatrics) [phone]   [facility] [phone]   [place] [address] [phone]   [address] [phone]   220 [facility] [phone]   90 [facility] [phone]   [facility] [phone]   [facility]  [phone]   [facility] [phone]   [facility] [address] [facility] [phone]

## 2023-06-26 NOTE — CASE MANAGEMENT
Case Management Discharge Planning Note    Patient name Jose Ruiz  Location [address] [id number] MRN [id number]  : 1965 Date 2023       Current Admission Date: 2023  Current Admission Diagnosis:COVID   Patient Active Problem List    Diagnosis Date Noted   • COVID 2023   • Cellulitis of chest wall 05/10/2023   • Malignant neoplasm of central portion of left breast in female, estrogen receptor positive ([facility] [address] ) 2023   • Type 2 diabetes mellitus with hyperglycemia ([facility] [address] ) 03/15/2023   • Invasive ductal carcinoma of breast, female, left ([facility] [address] ) 03/15/2023   • Dilated cardiomyopathy ([facility] [address] ) 2022   • RLS (restless legs syndrome) 2022   • Occipital neuralgia of right side 10/17/2022   • T3 vertebral fracture ([facility] [address] ) 2022   • Myocardial infarct, old 2022   • Tachycardia 2022   • Fall from height of greater than 3 feet 2022   • C2 cervical fracture ([facility] [address] ) 2022   • Anemia 12/10/2021   • Morbid obesity due to excess calories ([facility] [address] ) 2021   • S/P laparoscopic hysterectomy 2021   • Status post bilateral salpingectomy 2021   • PONV (postoperative nausea and vomiting)    • Sacroiliitis ([facility] [address] ) 2021   • Severe episode of recurrent major depressive disorder, without psychotic features [facility] 2021   • Iron deficiency anemia due to chronic blood loss 2021   • B12 deficiency 2021   • Radial collateral ligament sprain of right elbow 10/08/2020   • Uterine leiomyoma 2020   • Chronic pain syndrome 2020   • Lumbar spondylosis 2020   • Lumbar radiculopathy 2020   • Myofascial pain syndrome 2020   • Vitamin D deficiency 2020   • Chronic midline low back pain without sciatica 2019   • Allergic dermatitis 2019   • Generalized anxiety disorder 2019   • Agoraphobia with panic attacks 2019   • Post-traumatic stress 2019   • Colon cancer screening 10/10/2018   • Oropharyngeal dysphagia 10/10/2018   • Hepatic steatosis 10/10/2018   • Elevated LFTs 10/10/2018   • Dysphagia 10/10/2018   • Screening for colon cancer 10/10/2018   • Gastroesophageal reflux disease 10/10/2018   • Other hyperlipidemia 08/09/2018   • Herniation of lumbar intervertebral disc with radiculopathy 06/07/2018   • Anxiety 08/10/2017   • Iron deficiency 02/01/2017   • Pica in adults 10/03/2016   • Allergic cough 06/29/2016   • Acid reflux 03/02/2016   • Moderate episode of recurrent major depressive disorder [facility] 03/02/2016   • Diabetes mellitus ([facility] ) 03/02/2016   • Hydrocephalus ([facility] ) 03/02/2016   • Hypertension 03/02/2016      LOS (days): 1  Geometric Mean LOS (GMLOS) (days):   Days to GMLOS:     OBJECTIVE:  Risk of Unplanned Readmission Score: 11 28         Current admission status: Inpatient   Preferred Pharmacy:   [facility] [address] [place] [address]  Phone: [phone] Fax: [phone]    Primary Care Provider: Sydnee Charles PA-C    Primary Insurance: [address] [place] [address]  Secondary Insurance:     DISCHARGE DETAILS:  Pt was dc'd home on this date  Cm in basket messaged PCP office re: a follow up appt in 5-7 days  Requested office call pt to schedule

## 2023-06-28 ENCOUNTER — TELEMEDICINE (OUTPATIENT)
Dept: FAMILY MEDICINE CLINIC | Facility: CLINIC | Age: 58
End: 2023-06-28
Payer: COMMERCIAL

## 2023-06-28 ENCOUNTER — TELEPHONE (OUTPATIENT)
Dept: SURGERY | Facility: CLINIC | Age: 58
End: 2023-06-28

## 2023-06-28 DIAGNOSIS — C50.112 MALIGNANT NEOPLASM OF CENTRAL PORTION OF LEFT BREAST IN FEMALE, ESTROGEN RECEPTOR POSITIVE: ICD-10-CM

## 2023-06-28 DIAGNOSIS — C50.912 INVASIVE DUCTAL CARCINOMA OF BREAST, FEMALE, LEFT (HCC): Primary | ICD-10-CM

## 2023-06-28 DIAGNOSIS — R06.09 DYSPNEA ON EXERTION: ICD-10-CM

## 2023-06-28 DIAGNOSIS — Z76.89 ENCOUNTER FOR SUPPORT AND COORDINATION OF TRANSITION OF CARE: Primary | ICD-10-CM

## 2023-06-28 DIAGNOSIS — U07.1 COVID-19 VIRUS INFECTION: ICD-10-CM

## 2023-06-28 DIAGNOSIS — Z17.0 MALIGNANT NEOPLASM OF CENTRAL PORTION OF LEFT BREAST IN FEMALE, ESTROGEN RECEPTOR POSITIVE: ICD-10-CM

## 2023-06-28 LAB
BACTERIA BLD CULT: NORMAL
BACTERIA BLD CULT: NORMAL

## 2023-06-28 RX ORDER — DOXYCYCLINE HYCLATE 100 MG/1
100 CAPSULE ORAL EVERY 12 HOURS SCHEDULED
Qty: 14 CAPSULE | Refills: 0 | Status: SHIPPED | OUTPATIENT
Start: 2023-06-28 | End: 2023-07-05

## 2023-06-28 RX ORDER — METHYLPREDNISOLONE 4 MG/1
TABLET ORAL
Qty: 21 EACH | Refills: 0 | Status: SHIPPED | OUTPATIENT
Start: 2023-06-28

## 2023-06-28 NOTE — TELEPHONE ENCOUNTER
Will review photo when sent in  Please update rad onc team re: post treatment cellulitis as well  Will likely plan to add doxycycline to the keflex once I review the photo  We will need to keep in close touch with her and monitor photos or do a virtual visit etc to make sure she improves  Let’s plan to reach out to her again tomorrow for a check in and finalization of the new plan

## 2023-06-28 NOTE — TELEPHONE ENCOUNTER
Placed call to patient  She is s/p left lumpectomy on 4/10/2023  Was recently in the hospital for [facility] from 6/23/23 to 6/25/23 and was noted to have left breast cellulitis  Was started on oral Keflex  She stated she has had no improvement to the area since leaving the hospital  She is covid positive so does not want to leave the house  She is agreeable to sending in a picture through [employer] when her daughter gets home at 7 pm tonight  She stated the area is still painful when cleaning or touched, red and there is warmth  She denied any fevers  She is not taking any Tylenol / Ibuprofen  She is doing warm compresses with a washcloth to the area  She has an open sore on under the breast that is not draining at all and she is leaving open to air  She was able to wear a bra for the first time today

## 2023-06-28 NOTE — TELEPHONE ENCOUNTER
Patient made aware of everything and I will reach out tomorrow if no picture via mychart is received

## 2023-06-28 NOTE — TELEPHONE ENCOUNTER
Side note: diagnostic b/l mammogram ordered to be done in 6 months since radiation was completed last Thursday with follow up after when we talk to her tomorrow

## 2023-06-28 NOTE — PROGRESS NOTES
Virtual Regular Visit    Verification of patient location:    Patient is located at Home in the following state in which I hold an active license [place]      Assessment/Plan:    Problem List Items Addressed This Visit    None  Visit Diagnoses     Encounter for support and coordination of transition of care    -  Primary    COVID-19 virus infection        Relevant Medications    doxycycline hyclate (VIBRAMYCIN) 100 mg capsule    methylPREDNISolone 4 MG tablet therapy pack    Other Relevant Orders    XR chest pa & lateral    Dyspnea on exertion        Relevant Medications    doxycycline hyclate (VIBRAMYCIN) 100 mg capsule    methylPREDNISolone 4 MG tablet therapy pack    Other Relevant Orders    XR chest pa & lateral        Will treat with abx given concern for superimposed bacterial infection given her immunocompromised state  Repeat CXR  I provided her with strict return precautions and she verbalized understanding  I will follow up with her in 1 day  Reason for visit is   Chief Complaint   Patient presents with   • Virtual Tcm   • COVID-19   • Virtual Regular Visit        Encounter provider Sydnee Charles PA-C    Provider located at [address] [place] [phone]      Recent Visits  No visits were found meeting these conditions  Showing recent visits within past 7 days and meeting all other requirements  Today's Visits  Date Type Provider Dept   06/28/23 Telemedicine GALO Yeh [address] today's visits and meeting all other requirements  Future Appointments  No visits were found meeting these conditions  Showing future appointments within next 150 days and meeting all other requirements       The patient was identified by name and date of birth  Abdulaziz Felix was informed that this is a telemedicine visit and that the visit is being conducted through the [employer]  She agrees to proceed  Carlos Paz My office door was closed  No one else was in the room  She acknowledged consent and understanding of privacy and security of the video platform  The patient has agreed to participate and understands they can discontinue the visit at any time  Patient is aware this is a billable service  Subjective  Yola Moya is a 56 y o  female PMH GERD, DM, HTN, HLD, MI, obesity, s/p spinal fusion for C2 vertebral fracture, breast cancer s/p lumpectoy and radiation presents for TCM following hospitalization for cellulitis of chest wall and COVID  She reports she felt good on her day or discharge, however, 1 day later her symptoms abruptly returned  She complains of continued cough, dyspnea with exertion, chills, sweats, fatigue  She feels too unwell to leave her bed  She is able to get up and ambulate to the restroom/kitchen without SOB but states anything beyond that does make her feel winded  She is currently on keflex for cellulitis of her chest wall  She does not feel that has improved since discharge  She has been in discussion with her surgeon for this  HPI     Past Medical History:   Diagnosis Date   • Anemia     iron deficiency anemia   • Anxiety    • Breast cancer ([facility] [address] )    • Depression    • Diabetes mellitus ([facility] [address] )    • Fibroid     LTH   today 12/8/2021   • History of transfusion    • Hydrocephalus (HCC)    • Hyperlipidemia    • Hypertension    • Migraine    • Myocardial infarction ([facility] [address] ) 01/19/2022   • PONV (postoperative nausea and vomiting)    • Sciatica    • Seasonal allergies    • Vertigo    • Wears glasses        Past Surgical History:   Procedure Laterality Date   • BACK SURGERY      C1-3 fusion   • COLONOSCOPY     • EPIDURAL BLOCK INJECTION Right 07/02/2020    Procedure: Right L4-5 and L5-S1 transforaminal epidural steroid injection ([id number]);   Surgeon: Bashir Vu MD;  Location: [facility];  Service: Pain Management    • EPIDURAL BLOCK INJECTION N/A 09/02/2021    Procedure: BLOCK / INJECTION EPIDURAL STEROID LUMBAR  L5-S1 IESI;  Surgeon: Bashir Vu MD;  Location: [facility];  Service: Pain Management    • FL GUIDED NEEDLE PLAC BX/ASP/INJ  07/02/2020   • FL GUIDED NEEDLE PLAC BX/ASP/INJ  09/02/2021   • HYSTERECTOMY     • WY ESOPHAGOGASTRODUODENOSCOPY TRANSORAL DIAGNOSTIC N/A 10/23/2018    Procedure: EGD with Savary dilation AND COLONOSCOPY;  Surgeon: Amado Love MD;  Location: [facility];  Service: Gastroenterology   • WY LAPAROSCOPY W TOTAL HYSTERECTOMY UTERUS 250 GM/< N/A 12/08/2021    Procedure: Arvel Shield; BILAT SALPINGECTOMY; EXCISION PERITONEAL CYST;  Surgeon: Briana Faye MD;  Location: [facility];  Service: Gynecology   • WY MASTECTOMY PARTIAL Left 4/10/2023    Procedure: LUMPECTOMY BREAST WITH BEAU  LOCALIZATION,  BIOPSY LYMPH NODE SENTINEL (INJECT AT 1130 BY DR Chava Gabriel IN THE OR);   Surgeon: Shahrzad Klein MD;  Location: [facility];  Service: General   • TUBAL LIGATION     • US GUIDED BREAST BIOPSY LEFT COMPLETE Left 03/07/2023       Current Outpatient Medications   Medication Sig Dispense Refill   • doxycycline hyclate (VIBRAMYCIN) 100 mg capsule Take 1 capsule (100 mg total) by mouth every 12 (twelve) hours for 7 days 14 capsule 0   • methylPREDNISolone 4 MG tablet therapy pack Use as directed on package 21 each 0   • acetaminophen (TYLENOL) 325 mg tablet Take 2 tablets (650 mg total) by mouth every 6 (six) hours     • anastrozole (ARIMIDEX) 1 mg tablet Take 1 tablet (1 mg total) by mouth daily 30 tablet 5   • atorvastatin (LIPITOR) 40 mg tablet Take 40 mg by mouth every evening     • benzonatate (TESSALON PERLES) 100 mg capsule Take 1 capsule (100 mg total) by mouth 3 (three) times a day as needed for cough 20 capsule 0   • busPIRone (BUSPAR) 10 mg tablet Take 2 tablets (20 mg total) by mouth 3 (three) times a day as needed (anxiety) 180 tablet 5   • cephalexin (KEFLEX) 500 mg capsule Take 1 capsule (500 mg total) by mouth every 6 (six) hours for 6 days 24 capsule 0   • FLUoxetine (PROzac) 40 MG capsule Take 1 capsule (40 mg total) by mouth daily 90 capsule 3   • gabapentin (Neurontin) 600 MG tablet Take 1 tablet (600 mg total) by mouth 3 (three) times a day 270 tablet 2   • losartan (COZAAR) 100 MG tablet Take 100 mg by mouth daily     • metoprolol succinate (TOPROL-XL) 100 mg 24 hr tablet Take 100 mg by mouth daily       No current facility-administered medications for this visit  Allergies   Allergen Reactions   • Nuts - Food Allergy Anaphylaxis and Throat Swelling   • Chocolate - Food Allergy Cough   • Eggs Or Egg-Derived Products - Food Allergy Other (See Comments)     Cough   • Other Cough     LETTUCE   • Shellfish Allergy - Food Allergy Cough   • Tomato - Food Allergy Cough       Review of Systems   Constitutional: Positive for activity change, appetite change, chills, diaphoresis and fatigue  Negative for fever  HENT: Positive for congestion, sinus pressure and sinus pain  Negative for postnasal drip, rhinorrhea, sneezing and sore throat  Respiratory: Positive for cough and shortness of breath  Negative for wheezing and stridor  Cardiovascular: Negative for chest pain  Gastrointestinal: Negative  Musculoskeletal: Positive for arthralgias and myalgias  Video Exam    There were no vitals filed for this visit  Physical Exam  Constitutional:       General: She is not in acute distress  Appearance: She is ill-appearing  Comments: Laying in bed with multiple blankets   HENT:      Head: Normocephalic and atraumatic  Eyes:      Pupils: Pupils are equal, round, and reactive to light  Pulmonary:      Effort: Pulmonary effort is normal    Neurological:      Mental Status: She is alert and oriented to person, place, and time  Mental status is at baseline  Psychiatric:         Mood and Affect: Mood normal          Behavior: Behavior normal          Thought Content:  Thought content normal          Judgment: Judgment normal  Visit Time  Total Visit Duration: 15 minutes

## 2023-06-28 NOTE — TELEPHONE ENCOUNTER
Patient stated she had surgery done by Dr Shante Latif  She has a big open sore that is under the left breast  Patient has been putting antibiotic cream and its not helping   Patient said she feels really weak where she can barely do anything

## 2023-06-29 ENCOUNTER — TELEMEDICINE (OUTPATIENT)
Dept: FAMILY MEDICINE CLINIC | Facility: CLINIC | Age: 58
End: 2023-06-29
Payer: COMMERCIAL

## 2023-06-29 DIAGNOSIS — U07.1 COVID-19 VIRUS INFECTION: Primary | ICD-10-CM

## 2023-06-29 DIAGNOSIS — R06.09 DYSPNEA ON EXERTION: ICD-10-CM

## 2023-06-29 NOTE — TELEPHONE ENCOUNTER
Picture reviewed, would not add additional antibiotics now, she should finish the course that she is on  Would ask her to talk to radiation oncology office to review the photo that she sent in, skin changes consistent with post radiation changes  Would ask them to advise regarding skin care  Make sure the patient is showering daily and keeping the area dry under her breast even if she has to put some gauze padding under there so that the breast and upper abdomen do not rub onto each other  Let's schedule her mammogram and appointment with me to follow as you outlined below  Ask her to keep us posted on the skin progress and that she can send photos in anytime to us and radiation

## 2023-06-29 NOTE — PROGRESS NOTES
Virtual Regular Visit    Verification of patient location:    Patient is located at Home in the following state in which I hold an active license [place]      Assessment/Plan:    Problem List Items Addressed This Visit    None  Visit Diagnoses     COVID-19 virus infection    -  Primary    cont current  RTC 24 hours for FUP or sooner if concerns arise    Dyspnea on exertion        improving with prednisone               Reason for visit is   Chief Complaint   Patient presents with   • Virtual Regular Visit        Encounter provider Nas Roldan PA-C    Provider located at [address] [place] [phone]      Recent Visits  Date Type Provider Dept   06/28/23 Telemedicine Madison Boyd PA-C [facility] recent visits within past 7 days and meeting all other requirements  Today's Visits  Date Type Provider Dept   06/29/23 Telemedicine Madison Boyd PA-C [facility] today's visits and meeting all other requirements  Future Appointments  No visits were found meeting these conditions  Showing future appointments within next 150 days and meeting all other requirements       The patient was identified by name and date of birth  Alondra Avila was informed that this is a telemedicine visit and that the visit is being conducted through the [employer]  She agrees to proceed     My office door was closed  No one else was in the room  She acknowledged consent and understanding of privacy and security of the video platform  The patient has agreed to participate and understands they can discontinue the visit at any time  Patient is aware this is a billable service  Subjective  Alondra Avila is a 56 y o  female presents for follow up  Known COVID infection  Restarted on abx and steroid given aburput rebound of sypmtoms  She started medications today and feels some small improvement   Still very fatigued, coughing, diaphoresis  Her breathing is more tolerable  She did not yet have her CXR  HPI     Past Medical History:   Diagnosis Date   • Anemia     iron deficiency anemia   • Anxiety    • Breast cancer ([facility] )    • Depression    • Diabetes mellitus ([facility] )    • Fibroid     LTH   today 12/8/2021   • History of transfusion    • Hydrocephalus (HCC)    • Hyperlipidemia    • Hypertension    • Migraine    • Myocardial infarction ([facility] ) 01/19/2022   • PONV (postoperative nausea and vomiting)    • Sciatica    • Seasonal allergies    • Vertigo    • Wears glasses        Past Surgical History:   Procedure Laterality Date   • BACK SURGERY      C1-3 fusion   • COLONOSCOPY     • EPIDURAL BLOCK INJECTION Right 07/02/2020    Procedure: Right L4-5 and L5-S1 transforaminal epidural steroid injection ([id number]); Surgeon: Baltazar Gowers, MD;  [facility];  Service: Pain Management    • EPIDURAL BLOCK INJECTION N/A 09/02/2021    Procedure: BLOCK / INJECTION EPIDURAL STEROID LUMBAR  L5-S1 IESI;  Surgeon: Baltazar Gowers, MD;  [facility];  Service: Pain Management    • FL GUIDED NEEDLE PLAC BX/ASP/INJ  07/02/2020   • FL GUIDED NEEDLE PLAC BX/ASP/INJ  09/02/2021   • HYSTERECTOMY     • WI ESOPHAGOGASTRODUODENOSCOPY TRANSORAL DIAGNOSTIC N/A 10/23/2018    Procedure: EGD with Savary dilation AND COLONOSCOPY;  Surgeon: Billey Meckel, MD;  [facility];  Service: Gastroenterology   • WI LAPAROSCOPY W TOTAL HYSTERECTOMY UTERUS 250 GM/< N/A 12/08/2021    Procedure: Mikael Frey; BILAT SALPINGECTOMY; EXCISION PERITONEAL CYST;  Surgeon: Jessica Vaz MD;  [facility];  Service: Gynecology   • WI MASTECTOMY PARTIAL Left 4/10/2023    Procedure: LUMPECTOMY BREAST WITH BEAU  LOCALIZATION,  BIOPSY LYMPH NODE SENTINEL (INJECT AT 1130 BY DR Olivier Gama IN THE OR);   Surgeon: Drew Viera MD;  [facility];  Service: General   • TUBAL LIGATION     • [place] GUIDED BREAST BIOPSY LEFT COMPLETE Left 03/07/2023       Current Outpatient Medications   Medication Sig Dispense Refill   • acetaminophen (TYLENOL) 325 mg tablet Take 2 tablets (650 mg total) by mouth every 6 (six) hours     • anastrozole (ARIMIDEX) 1 mg tablet Take 1 tablet (1 mg total) by mouth daily 30 tablet 5   • atorvastatin (LIPITOR) 40 mg tablet Take 40 mg by mouth every evening     • benzonatate (TESSALON PERLES) 100 mg capsule Take 1 capsule (100 mg total) by mouth 3 (three) times a day as needed for cough 20 capsule 0   • busPIRone (BUSPAR) 10 mg tablet Take 2 tablets (20 mg total) by mouth 3 (three) times a day as needed (anxiety) 180 tablet 5   • cephalexin (KEFLEX) 500 mg capsule Take 1 capsule (500 mg total) by mouth every 6 (six) hours for 6 days 24 capsule 0   • doxycycline hyclate (VIBRAMYCIN) 100 mg capsule Take 1 capsule (100 mg total) by mouth every 12 (twelve) hours for 7 days 14 capsule 0   • FLUoxetine (PROzac) 40 MG capsule Take 1 capsule (40 mg total) by mouth daily 90 capsule 3   • gabapentin (Neurontin) 600 MG tablet Take 1 tablet (600 mg total) by mouth 3 (three) times a day 270 tablet 2   • losartan (COZAAR) 100 MG tablet Take 100 mg by mouth daily     • methylPREDNISolone 4 MG tablet therapy pack Use as directed on package 21 each 0   • metoprolol succinate (TOPROL-XL) 100 mg 24 hr tablet Take 100 mg by mouth daily       No current facility-administered medications for this visit  Allergies   Allergen Reactions   • Nuts - Food Allergy Anaphylaxis and Throat Swelling   • Chocolate - Food Allergy Cough   • Eggs Or Egg-Derived Products - Food Allergy Other (See Comments)     Cough   • Other Cough     LETTUCE   • Shellfish Allergy - Food Allergy Cough   • Tomato - Food Allergy Cough       Review of Systems   Constitutional: Positive for activity change, appetite change, chills, diaphoresis and fatigue  HENT: Negative  Respiratory: Positive for cough and shortness of breath      Cardiovascular: Negative for chest pain        Video Exam    There were no vitals filed for this visit  Physical Exam  Constitutional:       Appearance: Normal appearance  Neurological:      Mental Status: She is alert and oriented to person, place, and time  Mental status is at baseline     Psychiatric:         Mood and Affect: Mood normal          Behavior: Behavior normal           Visit Time  Total Visit Duration: 10 minutes

## 2023-06-29 NOTE — TELEPHONE ENCOUNTER
Scheduled and called radiation oncology, they confirmed they did receive the message  No more questions at this time

## 2023-06-29 NOTE — TELEPHONE ENCOUNTER
Called patient and relayed message, I will schedule mammogram / follow up and she will see it in her mychart  No more questions at this time

## 2023-06-30 ENCOUNTER — TELEMEDICINE (OUTPATIENT)
Dept: FAMILY MEDICINE CLINIC | Facility: CLINIC | Age: 58
End: 2023-06-30
Payer: COMMERCIAL

## 2023-06-30 DIAGNOSIS — U07.1 COVID-19 VIRUS INFECTION: Primary | ICD-10-CM

## 2023-06-30 DIAGNOSIS — R06.09 DYSPNEA ON EXERTION: ICD-10-CM

## 2023-06-30 PROCEDURE — 99213 OFFICE O/P EST LOW 20 MIN: CPT | Performed by: PHYSICIAN ASSISTANT

## 2023-06-30 NOTE — PROGRESS NOTES
Virtual Regular Visit    Verification of patient location:    Patient is located at Home in the following state in which I hold an active license [place]      Assessment/Plan:    Problem List Items Addressed This Visit    None  Visit Diagnoses     COVID-19 virus infection    -  Primary    improving  cont current medication until complete  she will follow up with any concerns    Dyspnea on exertion        resolved               Reason for visit is   Chief Complaint   Patient presents with   • Virtual Regular Visit        Encounter provider Genesis Flynn PA-C    Provider located at [address] [place] [phone]      Recent Visits  Date Type Provider Dept   06/29/23 Telemedicine Madison Berry PA-C [facility] [address]   06/28/23 Telemedicine Madison Boyd PA-C [facility] [address] recent visits within past 7 days and meeting all other requirements  Today's Visits  Date Type Provider Dept   06/30/23 Telemedicine [facility] [address] today's visits and meeting all other requirements  Future Appointments  No visits were found meeting these conditions  Showing future appointments within next 150 days and meeting all other requirements       The patient was identified by name and date of birth  Clare Mcknight was informed that this is a telemedicine visit and that the visit is being conducted through the [employer]  She agrees to proceed     My office door was closed  No one else was in the room  She acknowledged consent and understanding of privacy and security of the video platform  The patient has agreed to participate and understands they can discontinue the visit at any time  Patient is aware this is a billable service  Dar Mcknight is a 56 y o  female presents via telemedicine for COVID follow up  Feeling much improved with addition of abx and steroid   Still experience congestion and cough but feels now she is able to get everything out  Still having sweats but no fever  No SOB  HPI     Past Medical History:   Diagnosis Date   • Anemia     iron deficiency anemia   • Anxiety    • Breast cancer ([facility] )    • Depression    • Diabetes mellitus ([facility] )    • Fibroid     LT   today 12/8/2021   • History of transfusion    • Hydrocephalus (HCC)    • Hyperlipidemia    • Hypertension    • Migraine    • Myocardial infarction ([facility] ) 01/19/2022   • PONV (postoperative nausea and vomiting)    • Sciatica    • Seasonal allergies    • Vertigo    • Wears glasses        Past Surgical History:   Procedure Laterality Date   • BACK SURGERY      C1-3 fusion   • COLONOSCOPY     • EPIDURAL BLOCK INJECTION Right 07/02/2020    Procedure: Right L4-5 and L5-S1 transforaminal epidural steroid injection ([id number]); Surgeon: Robby Jung MD;  [facility];  Service: Pain Management    • EPIDURAL BLOCK INJECTION N/A 09/02/2021    Procedure: BLOCK / INJECTION EPIDURAL STEROID LUMBAR  L5-S1 IESI;  Surgeon: Robby Jung MD;  [facility];  Service: Pain Management    • FL GUIDED NEEDLE PLAC BX/ASP/INJ  07/02/2020   • FL GUIDED NEEDLE PLAC BX/ASP/INJ  09/02/2021   • HYSTERECTOMY     • ID ESOPHAGOGASTRODUODENOSCOPY TRANSORAL DIAGNOSTIC N/A 10/23/2018    Procedure: EGD with Savary dilation AND COLONOSCOPY;  Surgeon: Isabel Baldwin MD;  [facility];  Service: Gastroenterology   • ID LAPAROSCOPY W TOTAL HYSTERECTOMY UTERUS 250 GM/< N/A 12/08/2021    Procedure: [address]; BILAT SALPINGECTOMY; EXCISION PERITONEAL CYST;  Surgeon: Jamilah Burgos MD;  [facility];  Service: Gynecology   • ID MASTECTOMY PARTIAL Left 4/10/2023    Procedure: LUMPECTOMY BREAST WITH BEAU  LOCALIZATION,  BIOPSY LYMPH NODE SENTINEL (INJECT AT 1130 BY DR Trinity Nj IN THE OR);   Surgeon: Angie Sanchez MD;  [facility];  Service: General   • TUBAL LIGATION     • [place] BIOPSY LEFT COMPLETE Left 03/07/2023       Current Outpatient Medications   Medication Sig Dispense Refill   • acetaminophen (TYLENOL) 325 mg tablet Take 2 tablets (650 mg total) by mouth every 6 (six) hours     • anastrozole (ARIMIDEX) 1 mg tablet Take 1 tablet (1 mg total) by mouth daily 30 tablet 5   • atorvastatin (LIPITOR) 40 mg tablet Take 40 mg by mouth every evening     • benzonatate (TESSALON PERLES) 100 mg capsule Take 1 capsule (100 mg total) by mouth 3 (three) times a day as needed for cough 20 capsule 0   • busPIRone (BUSPAR) 10 mg tablet Take 2 tablets (20 mg total) by mouth 3 (three) times a day as needed (anxiety) 180 tablet 5   • cephalexin (KEFLEX) 500 mg capsule Take 1 capsule (500 mg total) by mouth every 6 (six) hours for 6 days 24 capsule 0   • doxycycline hyclate (VIBRAMYCIN) 100 mg capsule Take 1 capsule (100 mg total) by mouth every 12 (twelve) hours for 7 days 14 capsule 0   • FLUoxetine (PROzac) 40 MG capsule Take 1 capsule (40 mg total) by mouth daily 90 capsule 3   • gabapentin (Neurontin) 600 MG tablet Take 1 tablet (600 mg total) by mouth 3 (three) times a day 270 tablet 2   • losartan (COZAAR) 100 MG tablet Take 100 mg by mouth daily     • methylPREDNISolone 4 MG tablet therapy pack Use as directed on package 21 each 0   • metoprolol succinate (TOPROL-XL) 100 mg 24 hr tablet Take 100 mg by mouth daily       No current facility-administered medications for this visit  Allergies   Allergen Reactions   • Nuts - Food Allergy Anaphylaxis and Throat Swelling   • Chocolate - Food Allergy Cough   • Eggs Or Egg-Derived Products - Food Allergy Other (See Comments)     Cough   • Other Cough     LETTUCE   • Shellfish Allergy - Food Allergy Cough   • Tomato - Food Allergy Cough       Review of Systems   Constitutional: Positive for diaphoresis  Negative for chills, fatigue and fever  HENT: Positive for congestion and rhinorrhea  Respiratory: Positive for cough   Negative for shortness of breath and wheezing  Cardiovascular: Negative  Video Exam    There were no vitals filed for this visit  Physical Exam  Constitutional:       General: She is not in acute distress  Appearance: Normal appearance  She is not ill-appearing or diaphoretic  Comments: Pt sitting comfortably in chair/couch  Significant visual improvement compared to 2 days ago  HENT:      Head: Normocephalic and atraumatic  Pulmonary:      Effort: Pulmonary effort is normal    Neurological:      Mental Status: She is alert  Psychiatric:         Mood and Affect: Mood normal          Behavior: Behavior normal          Thought Content:  Thought content normal          Judgment: Judgment normal           Visit Time  Total Visit Duration: 10 minutes

## 2023-07-04 ENCOUNTER — HOSPITAL ENCOUNTER (EMERGENCY)
Facility: HOSPITAL | Age: 58
Discharge: HOME | End: 2023-07-04
Attending: EMERGENCY MEDICINE
Payer: COMMERCIAL

## 2023-07-04 ENCOUNTER — APPOINTMENT (EMERGENCY)
Dept: RADIOLOGY | Facility: HOSPITAL | Age: 58
End: 2023-07-04
Payer: COMMERCIAL

## 2023-07-04 VITALS
OXYGEN SATURATION: 96 % | DIASTOLIC BLOOD PRESSURE: 65 MMHG | RESPIRATION RATE: 19 BRPM | SYSTOLIC BLOOD PRESSURE: 142 MMHG | BODY MASS INDEX: 40.61 KG/M2 | HEART RATE: 86 BPM | WEIGHT: 229.28 LBS

## 2023-07-04 DIAGNOSIS — J02.9 SORE THROAT: Primary | ICD-10-CM

## 2023-07-04 LAB
ANION GAP SERPL CALCULATED.3IONS-SCNC: 11 MMOL/L
APTT PPP: 18 SECONDS (ref 23–37)
BASOPHILS # BLD AUTO: 0.04 THOUSANDS/ÂΜL (ref 0–0.1)
BASOPHILS NFR BLD AUTO: 0 % (ref 0–1)
BUN SERPL-MCNC: 19 MG/DL (ref 5–25)
CALCIUM SERPL-MCNC: 9.4 MG/DL (ref 8.4–10.2)
CARDIAC TROPONIN I PNL SERPL HS: 2 NG/L
CHLORIDE SERPL-SCNC: 100 MMOL/L (ref 96–108)
CO2 SERPL-SCNC: 28 MMOL/L (ref 21–32)
CREAT SERPL-MCNC: 0.83 MG/DL (ref 0.6–1.3)
EOSINOPHIL # BLD AUTO: 0.05 THOUSAND/ÂΜL (ref 0–0.61)
EOSINOPHIL NFR BLD AUTO: 0 % (ref 0–6)
ERYTHROCYTE [DISTWIDTH] IN BLOOD BY AUTOMATED COUNT: 12.5 % (ref 11.6–15.1)
GFR SERPL CREATININE-BSD FRML MDRD: 79 ML/MIN/1.73SQ M
GLUCOSE SERPL-MCNC: 88 MG/DL (ref 65–140)
HCT VFR BLD AUTO: 42.5 % (ref 34.8–46.1)
HGB BLD-MCNC: 13.9 G/DL (ref 11.5–15.4)
IMM GRANULOCYTES # BLD AUTO: 0.16 THOUSAND/UL (ref 0–0.2)
IMM GRANULOCYTES NFR BLD AUTO: 1 % (ref 0–2)
INR PPP: 0.93 (ref 0.84–1.19)
LYMPHOCYTES # BLD AUTO: 0.82 THOUSANDS/ÂΜL (ref 0.6–4.47)
LYMPHOCYTES NFR BLD AUTO: 7 % (ref 14–44)
MCH RBC QN AUTO: 29.1 PG (ref 26.8–34.3)
MCHC RBC AUTO-ENTMCNC: 32.7 G/DL (ref 31.4–37.4)
MCV RBC AUTO: 89 FL (ref 82–98)
MONOCYTES # BLD AUTO: 1.01 THOUSAND/ÂΜL (ref 0.17–1.22)
MONOCYTES NFR BLD AUTO: 9 % (ref 4–12)
NEUTROPHILS # BLD AUTO: 9.22 THOUSANDS/ÂΜL (ref 1.85–7.62)
NEUTS SEG NFR BLD AUTO: 83 % (ref 43–75)
NRBC BLD AUTO-RTO: 0 /100 WBCS
PLATELET # BLD AUTO: 324 THOUSANDS/UL (ref 149–390)
PMV BLD AUTO: 8.5 FL (ref 8.9–12.7)
POTASSIUM SERPL-SCNC: 4.1 MMOL/L (ref 3.5–5.3)
PROTHROMBIN TIME: 12.7 SECONDS (ref 11.6–14.5)
RBC # BLD AUTO: 4.77 MILLION/UL (ref 3.81–5.12)
S PYO DNA THROAT QL NAA+PROBE: NOT DETECTED
SODIUM SERPL-SCNC: 139 MMOL/L (ref 135–147)
WBC # BLD AUTO: 11.3 THOUSAND/UL (ref 4.31–10.16)

## 2023-07-04 PROCEDURE — 80048 BASIC METABOLIC PNL TOTAL CA: CPT | Performed by: EMERGENCY MEDICINE

## 2023-07-04 PROCEDURE — 85730 THROMBOPLASTIN TIME PARTIAL: CPT | Performed by: EMERGENCY MEDICINE

## 2023-07-04 PROCEDURE — 93005 ELECTROCARDIOGRAM TRACING: CPT

## 2023-07-04 PROCEDURE — 85025 COMPLETE CBC W/AUTO DIFF WBC: CPT | Performed by: EMERGENCY MEDICINE

## 2023-07-04 PROCEDURE — 85610 PROTHROMBIN TIME: CPT | Performed by: EMERGENCY MEDICINE

## 2023-07-04 PROCEDURE — 84484 ASSAY OF TROPONIN QUANT: CPT | Performed by: EMERGENCY MEDICINE

## 2023-07-04 PROCEDURE — 36415 COLL VENOUS BLD VENIPUNCTURE: CPT | Performed by: EMERGENCY MEDICINE

## 2023-07-04 PROCEDURE — 87651 STREP A DNA AMP PROBE: CPT | Performed by: EMERGENCY MEDICINE

## 2023-07-04 PROCEDURE — 71045 X-RAY EXAM CHEST 1 VIEW: CPT

## 2023-07-04 RX ORDER — HYDROXYZINE HYDROCHLORIDE 25 MG/1
25 TABLET, FILM COATED ORAL EVERY 6 HOURS PRN
Qty: 12 TABLET | Refills: 0 | Status: SHIPPED | OUTPATIENT
Start: 2023-07-04

## 2023-07-04 RX ORDER — CETIRIZINE HYDROCHLORIDE 10 MG/1
10 TABLET ORAL DAILY
Qty: 14 TABLET | Refills: 0 | Status: SHIPPED | OUTPATIENT
Start: 2023-07-04

## 2023-07-04 RX ORDER — HYDROXYZINE HYDROCHLORIDE 25 MG/1
25 TABLET, FILM COATED ORAL ONCE
Status: COMPLETED | OUTPATIENT
Start: 2023-07-04 | End: 2023-07-04

## 2023-07-04 RX ORDER — KETOROLAC TROMETHAMINE 30 MG/ML
15 INJECTION, SOLUTION INTRAMUSCULAR; INTRAVENOUS ONCE
Status: DISCONTINUED | OUTPATIENT
Start: 2023-07-04 | End: 2023-07-04 | Stop reason: HOSPADM

## 2023-07-04 RX ADMIN — DEXAMETHASONE SODIUM PHOSPHATE 10 MG: 10 INJECTION, SOLUTION INTRAMUSCULAR; INTRAVENOUS at 12:28

## 2023-07-04 RX ADMIN — HYDROXYZINE HYDROCHLORIDE 25 MG: 25 TABLET ORAL at 13:10

## 2023-07-04 NOTE — ED PROVIDER NOTES
Final Diagnosis:  1. Sore throat        Chief Complaint   Patient presents with   • Sore Throat     PT COMPLAINING OF SORE THROAT DRAINING OF EYES NASAL CONGESTION. TESTED COVID NEGATIVE YESTERDAY     HPI  Patient presents for evaluation of throat pain and shortness of breath. Patient states that over the past couple days she is feels like she has some increasing shortness of breath as well as some difficulty swallowing secondary to discomfort in her throat. Denies any fever or chills. Endorses a nonproductive cough. No missy chest pain or abdominal pain. No dysuria, hematuria constipation or diarrhea. Review of records show that the patient has been checking in with primary care on a TDO evaluation. From that it seems like her work of breathing has been improved. She did recently have COVID approximately 1 or 2 weeks ago. Was hospitalized for short period of time and improved on steroids. At that time she was also found to have some erythema around breasts and was treated with Keflex for cellulitis. Unless otherwise specified:  - No language barrier.   - History obtained from patient. - There are no limitations to the history obtained. - Previous charting was reviewed    PMH:   has a past medical history of Anemia, Anxiety, Breast cancer ([address]), Depression, Diabetes mellitus ([address]), Fibroid, History of transfusion, Hydrocephalus ([address]), Hyperlipidemia, Hypertension, Migraine, Myocardial infarction ([address]) (01/19/2022), PONV (postoperative nausea and vomiting), Sciatica, Seasonal allergies, Vertigo, and Wears glasses. PSH:   has a past surgical history that includes Tubal ligation; pr esophagogastroduodenoscopy transoral diagnostic (N/A, 10/23/2018); FL guided needle plac bx/asp/inj (07/02/2020); Epidural block injection (Right, 07/02/2020); Epidural block injection (N/A, 09/02/2021); FL guided needle plac bx/asp/inj (09/02/2021);  Colonoscopy; pr laparoscopy w total hysterectomy uterus 250 gm/< (N/A, 12/08/2021); Hysterectomy; US guided breast biopsy left complete (Left, 03/07/2023); Back surgery; and pr mastectomy partial (Left, 4/10/2023). ROS:  Review of Systems   - 13 point ROS was performed and all are normal unless stated in the history above. - Nursing note reviewed. Vitals reviewed. - Orders placed by myself and/or advanced practitioner / resident. PE:   Vitals:    07/04/23 1006   BP: 165/75   BP Location: Left arm   Pulse: 88   Resp: 20   SpO2: 96%   Weight: 104 kg (229 lb 4.5 oz)     Vitals reviewed by me. Patient appears nondistressed sitting in bed. No actual work of breathing. No conversational dyspnea. Oropharynx is mildly dry, cannot fully visualize tonsils. No change in voice. Able to handle oral secretions. Unless otherwise specified above:    General: VS reviewed  Appears in NAD    Head: Normocephalic, atraumatic  Eyes: EOM-I.     ENT: Atraumatic external nose and ears. No drooling. Neck: No JVD. CV: No pallor noted  Lungs:   No tachypnea  No respiratory distress    Abdomen:  Soft, non-tender, non-distended    MSK:   No obvious deformity    Skin: Dry, intact. No obvious rash. Psychiatric/Behavioral: Appropriate mood and affect   Exam: deferred    Physical Exam     Procedures   A:  - Nursing note reviewed. ED Course as of 07/04/23 1249   Tue Jul 04, 2023   1239 Procedure Note: EKG  Date/Time: 07/04/23 12:40 PM   Interpreted by: Patricia Sapp  Indications / Diagnosis: Shortness of breath  ECG reviewed by me, the ED Provider: yes   The EKG demonstrates:  Rhythm: normal sinus  Intervals: normal intervals  Axis: normal axis  QRS/Blocks: normal QRS  ST Changes: No acute ST Changes, no STD/LEANNE. No diffuse elevations to indicate pericarditis. No coved ST elevations greater than 2mm with negative T waves in V1-3 to indicate concern for brugada. No biphasic T waves in V2, V3 to indicate Wellens (critical stenosis of LAD).    No elevation in aVR or deviation when compared to V1 (can be associated with ST depression in I,II, V4-6 when left main occlusion is present). 1240 hs TnI 0hr: 2  Not consistent with ACS     XR chest 1 view portable   Final Result      No acute cardiopulmonary disease.       Findings are stable            Workstation performed: [id number]           Orders Placed This Encounter   Procedures   • Strep A PCR   • XR chest 1 view portable   • CBC and differential   • Protime-INR   • APTT   • Basic metabolic panel   • HS Troponin 0hr (reflex protocol)   • Insert peripheral IV   • ECG 12 lead   • ECG 12 lead     Labs Reviewed   CBC AND DIFFERENTIAL - Abnormal       Result Value Ref Range Status    WBC 11.30 (*) 4.31 - 10.16 Thousand/uL Final    RBC 4.77  3.81 - 5.12 Million/uL Final    Hemoglobin 13.9  11.5 - 15.4 g/dL Final    Hematocrit 42.5  34.8 - 46.1 % Final    MCV 89  82 - 98 fL Final    MCH 29.1  26.8 - 34.3 pg Final    MCHC 32.7  31.4 - 37.4 g/dL Final    RDW 12.5  11.6 - 15.1 % Final    MPV 8.5 (*) 8.9 - 12.7 fL Final    Platelets 288  076 - 390 Thousands/uL Final    nRBC 0  /100 WBCs Final    Neutrophils Relative 83 (*) 43 - 75 % Final    Immat GRANS % 1  0 - 2 % Final    Lymphocytes Relative 7 (*) 14 - 44 % Final    Monocytes Relative 9  4 - 12 % Final    Eosinophils Relative 0  0 - 6 % Final    Basophils Relative 0  0 - 1 % Final    Neutrophils Absolute 9.22 (*) 1.85 - 7.62 Thousands/µL Final    Immature Grans Absolute 0.16  0.00 - 0.20 Thousand/uL Final    Lymphocytes Absolute 0.82  0.60 - 4.47 Thousands/µL Final    Monocytes Absolute 1.01  0.17 - 1.22 Thousand/µL Final    Eosinophils Absolute 0.05  0.00 - 0.61 Thousand/µL Final    Basophils Absolute 0.04  0.00 - 0.10 Thousands/µL Final   APTT - Abnormal    PTT 18 (*) 23 - 37 seconds Final    Comment: Therapeutic Heparin Range =  60-90 seconds   STREP A PCR - Normal    STREP A PCR Not Detected  Not Detected Final   PROTIME-INR - Normal    Protime 12.7  11.6 - 14.5 seconds Final    INR 0.93  0.84 - 1.19 Final   HS TROPONIN I 0HR - Normal    hs TnI 0hr 2  "Refer to ACS Flowchart"- see link ng/L Final    Comment:                                              Initial (time 0) result  If >=50 ng/L, Myocardial injury suggested ;  Type of myocardial injury and treatment strategy  to be determined. If 5-49 ng/L, a delta result at 2 hours and or 4 hours will be needed to further evaluate. If <4 ng/L, and chest pain has been >3 hours since onset, patient may qualify for discharge based on the HEART score in the ED. If <5 ng/L and <3hours since onset of chest pain, a delta result at 2 hours will be needed to further evaluate. HS Troponin 99th Percentile URL of a Health Population=12 ng/L with a 95% Confidence Interval of 8-18 ng/L. Second Troponin (time 2 hours)  If calculated delta >= 20 ng/L,  Myocardial injury suggested ; Type of myocardial injury and treatment strategy to be determined. If 5-49 ng/L and the calculated delta is 5-19 ng/L, consult medical service for evaluation. Continue evaluation for ischemia on ecg and other possible etiology and repeat hs troponin at 4 hours. If delta is <5 ng/L at 2 hours, consider discharge based on risk stratification via the HEART score (if in ED), or MAINE risk score in IP/Observation. HS Troponin 99th Percentile URL of a Health Population=12 ng/L with a 95% Confidence Interval of 8-18 ng/L. BASIC METABOLIC PANEL    Sodium 828  135 - 147 mmol/L Final    Potassium 4.1  3.5 - 5.3 mmol/L Final    Chloride 100  96 - 108 mmol/L Final    CO2 28  21 - 32 mmol/L Final    ANION GAP 11  mmol/L Final    BUN 19  5 - 25 mg/dL Final    Creatinine 0.83  0.60 - 1.30 mg/dL Final    Comment: Standardized to IDMS reference method    Glucose 88  65 - 140 mg/dL Final    Comment: If the patient is fasting, the ADA then defines impaired fasting glucose as > 100 mg/dL and diabetes as > or equal to 123 mg/dL.     Calcium 9.4  8.4 - 10.2 mg/dL Final    eGFR 79  ml/min/1.73sq m Final    Narrative:     [facility] guidelines for Chronic Kidney Disease (CKD):   •  Stage 1 with normal or high GFR (GFR > 90 mL/min/1.73 square meters)  •  Stage 2 Mild CKD (GFR = 60-89 mL/min/1.73 square meters)  •  Stage 3A Moderate CKD (GFR = 45-59 mL/min/1.73 square meters)  •  Stage 3B Moderate CKD (GFR = 30-44 mL/min/1.73 square meters)  •  Stage 4 Severe CKD (GFR = 15-29 mL/min/1.73 square meters)  •  Stage 5 End Stage CKD (GFR <15 mL/min/1.73 square meters)  Note: GFR calculation is accurate only with a steady state creatinine         Final Diagnosis:  1. Sore throat        P:  -Patient presents for evaluation of cough, shortness of breath. Throat pain is well as eye watering. Overall the patient's current presentation seems consistent with some kind of allergic irritant. Could also be some kind of infection. Given that she did recently have COVID and has other medical comorbidities there would be some concern for developing pneumonia. We will also evaluate for ACS, arrhythmia, anemia.  -Laboratory analysis was unremarkable. X-ray without acute findings. EKG not consistent with arrhythmia or ACS. I reviewed results with patient and  at bedside. Likely viral versus allergen induced pharyngitis as strep was negative. Will provide Atarax to help with allergies as well as discussed taking Zyrtec. She does have an intermittent cough, will provide some Mucinex to help promote expulsion. Return precautions reviewed. Unless otherwise noted the patient's medications were reviewed and they should continue as directed.     Medications   sodium chloride 0.9 % bolus 1,000 mL (has no administration in time range)   ketorolac (TORADOL) injection 15 mg (has no administration in time range)   hydrOXYzine HCL (ATARAX) tablet 25 mg (has no administration in time range)   dexamethasone oral liquid 10 mg 1 mL (10 mg Oral Given 7/4/23 5676) Time reflects when diagnosis was documented in both MDM as applicable and the Disposition within this note     Time User Action Codes Description Comment    7/4/2023 12:46 PM Radha Liriano Add [J02.9] Sore throat       ED Disposition     ED Disposition   Discharge    Condition   Stable    Date/Time   Tue Jul 4, 2023 12:46 PM    [address] discharge to home/self care. Follow-up Information     Follow up With Specialties Details Why Contact Info    Madison Boyd PA-C Family Medicine, Physician Assistant   [address] [place] [address] [address]  [phone]          Patient's Medications   Discharge Prescriptions    CETIRIZINE (ZYRTEC) 10 MG TABLET    Take 1 tablet (10 mg total) by mouth daily       Start Date: 7/4/2023  End Date: --       Order Dose: 10 mg       Quantity: 14 tablet    Refills: 0    DEXTROMETHORPHAN-GUAIFENESIN (MUCINEX DM)  MG PER 12 HR TABLET    Take 1 tablet by mouth every 12 (twelve) hours for 5 days       Start Date: 7/4/2023  End Date: 7/9/2023       Order Dose: 1 tablet       Quantity: 10 tablet    Refills: 0    HYDROXYZINE HCL (ATARAX) 25 MG TABLET    Take 1 tablet (25 mg total) by mouth every 6 (six) hours as needed for allergies       Start Date: 7/4/2023  End Date: --       Order Dose: 25 mg       Quantity: 12 tablet    Refills: 0     No discharge procedures on file. Prior to Admission Medications   Prescriptions Last Dose Informant Patient Reported? Taking?    FLUoxetine (PROzac) 40 MG capsule  Self No No   Sig: Take 1 capsule (40 mg total) by mouth daily   acetaminophen (TYLENOL) 325 mg tablet  Self No No   Sig: Take 2 tablets (650 mg total) by mouth every 6 (six) hours   anastrozole (ARIMIDEX) 1 mg tablet   No No   Sig: Take 1 tablet (1 mg total) by mouth daily   atorvastatin (LIPITOR) 40 mg tablet  Self Yes No   Sig: Take 40 mg by mouth every evening   benzonatate (TESSALON PERLES) 100 mg capsule   No No   Sig: Take 1 capsule (100 mg total) by mouth 3 (three) times a day as needed for cough   busPIRone (BUSPAR) 10 mg tablet  Self No No   Sig: Take 2 tablets (20 mg total) by mouth 3 (three) times a day as needed (anxiety)   doxycycline hyclate (VIBRAMYCIN) 100 mg capsule   No No   Sig: Take 1 capsule (100 mg total) by mouth every 12 (twelve) hours for 7 days   gabapentin (Neurontin) 600 MG tablet   No No   Sig: Take 1 tablet (600 mg total) by mouth 3 (three) times a day   losartan (COZAAR) 100 MG tablet  Self Yes No   Sig: Take 100 mg by mouth daily   methylPREDNISolone 4 MG tablet therapy pack   No No   Sig: Use as directed on package   metoprolol succinate (TOPROL-XL) 100 mg 24 hr tablet  Self Yes No   Sig: Take 100 mg by mouth daily      Facility-Administered Medications: None       Portions of the record may have been created with voice recognition software. Occasional wrong word or "sound a like" substitutions may have occurred due to the inherent limitations of voice recognition software. Read the chart carefully and recognize, using context, where substitutions have occurred.     Electronically signed by:  MD Dyllan Kowalski MD  07/04/23 7525

## 2023-07-05 LAB
ATRIAL RATE: 83 BPM
P AXIS: 62 DEGREES
PR INTERVAL: 170 MS
QRS AXIS: 60 DEGREES
QRSD INTERVAL: 86 MS
QT INTERVAL: 394 MS
QTC INTERVAL: 462 MS
T WAVE AXIS: 27 DEGREES
VENTRICULAR RATE: 83 BPM

## 2023-07-05 PROCEDURE — 93010 ELECTROCARDIOGRAM REPORT: CPT | Performed by: INTERNAL MEDICINE

## 2023-07-06 ENCOUNTER — PATIENT OUTREACH (OUTPATIENT)
Dept: HEMATOLOGY ONCOLOGY | Facility: CLINIC | Age: 58
End: 2023-07-06

## 2023-07-06 ENCOUNTER — DOCUMENTATION (OUTPATIENT)
Dept: HEMATOLOGY ONCOLOGY | Facility: CLINIC | Age: 58
End: 2023-07-06

## 2023-07-06 NOTE — PROGRESS NOTES
Will have [place] outreach patient, verify no further questions, concerns or assistance needed from [address] [place] [address]. If patient is not in need, will sign off. Please removed [place] team from care team. Be sure patient has contact information and encourage patient to outreach in future if ever in need.

## 2023-07-11 ENCOUNTER — TELEMEDICINE (OUTPATIENT)
Dept: FAMILY MEDICINE CLINIC | Facility: CLINIC | Age: 58
End: 2023-07-11
Payer: COMMERCIAL

## 2023-07-11 ENCOUNTER — TELEPHONE (OUTPATIENT)
Dept: FAMILY MEDICINE CLINIC | Facility: CLINIC | Age: 58
End: 2023-07-11

## 2023-07-11 DIAGNOSIS — R05.8 PRODUCTIVE COUGH: ICD-10-CM

## 2023-07-11 DIAGNOSIS — D64.9 ANEMIA, UNSPECIFIED TYPE: ICD-10-CM

## 2023-07-11 DIAGNOSIS — G93.39 OTHER POST INFECTION AND RELATED FATIGUE SYNDROMES: ICD-10-CM

## 2023-07-11 DIAGNOSIS — J02.9 SORE THROAT: Primary | ICD-10-CM

## 2023-07-11 PROCEDURE — 99213 OFFICE O/P EST LOW 20 MIN: CPT | Performed by: PHYSICIAN ASSISTANT

## 2023-07-11 RX ORDER — PROMETHAZINE HYDROCHLORIDE AND CODEINE PHOSPHATE 6.25; 1 MG/5ML; MG/5ML
5 SYRUP ORAL EVERY 4 HOURS PRN
Qty: 473 ML | Refills: 0 | Status: SHIPPED | OUTPATIENT
Start: 2023-07-11 | End: 2023-07-11 | Stop reason: ALTCHOICE

## 2023-07-11 RX ORDER — DEXTROMETHORPHAN HYDROBROMIDE AND PROMETHAZINE HYDROCHLORIDE 15; 6.25 MG/5ML; MG/5ML
5 SOLUTION ORAL 4 TIMES DAILY PRN
Qty: 473 ML | Refills: 0 | Status: SHIPPED | OUTPATIENT
Start: 2023-07-11 | End: 2023-07-21

## 2023-07-11 RX ORDER — INSULIN ASPART INJECTION 100 [IU]/ML
INJECTION, SOLUTION SUBCUTANEOUS
COMMUNITY
Start: 2023-07-03

## 2023-07-11 NOTE — PROGRESS NOTES
Virtual Regular Visit    Verification of patient location:    Patient is located at Home in the following state in which I hold an active license [place]      Assessment/Plan:    Problem List Items Addressed This Visit        Other    Anemia    Relevant Orders    CBC and differential    Iron Panel (Includes Ferritin, Iron Sat%, Iron, and TIBC)   Other Visit Diagnoses     Sore throat    -  Primary    Relevant Medications    promethazine-codeine (PHENERGAN WITH CODEINE) 6.25-10 mg/5 mL syrup    Other post infection and related fatigue syndromes        Relevant Orders    CBC and differential    Iron Panel (Includes Ferritin, Iron Sat%, Iron, and TIBC)    Productive cough        Relevant Medications    promethazine-codeine (PHENERGAN WITH CODEINE) 6.25-10 mg/5 mL syrup        Labs as ordered  Will trial promethazine w/codeine given failure of other medications  Consider FUP with pulm if no improvement in respiratory symptoms  RTC 3 days         Reason for visit is No chief complaint on file. Encounter provider Doug Ardon PA-C    Provider located at [address] [id number]      Recent Visits  No visits were found meeting these conditions. Showing recent visits within past 7 days and meeting all other requirements  Today's Visits  Date Type Provider Dept   07/11/23 Telemedicine Madison Boyd PA-C [facility]   Showing today's visits and meeting all other requirements  Future Appointments  No visits were found meeting these conditions. Showing future appointments within next 150 days and meeting all other requirements       The patient was identified by name and date of birth. [place] Wong was informed that this is a telemedicine visit and that the visit is being conducted through the [address] [place] [address]. She agrees to proceed. .  My office door was closed. No one else was in the room.   She acknowledged consent and understanding of privacy and security of the video platform. The patient has agreed to participate and understands they can discontinue the visit at any time. Patient is aware this is a billable service. Dar King  is a 56 y.o. female presents via telemedicine for follow up. She continues with sore throat, productive cough, dypsnea, and fatigue since being dx with COVID 6/23/23. Subsequently treated with doxy for concern for pneumonia. She was re-evaluated in the ER 7/4/23 with overall normal work up. She is still having sore throat, productive cough, and fatigue. She has tried steroids, mucinex and tessalon pearles with minimal relief. Throat feels dry and raw. She was unable to complete her grocery shopping decondary to fatigue. HPI     Past Medical History:   Diagnosis Date   • Anemia     iron deficiency anemia   • Anxiety    • Breast cancer ([address])    • Depression    • Diabetes mellitus ([address])    • Fibroid     LTH   today 12/8/2021   • History of transfusion    • Hydrocephalus (HCC)    • Hyperlipidemia    • Hypertension    • Migraine    • Myocardial infarction ([address]) 01/19/2022   • PONV (postoperative nausea and vomiting)    • Sciatica    • Seasonal allergies    • Vertigo    • Wears glasses        Past Surgical History:   Procedure Laterality Date   • BACK SURGERY      C1-3 fusion   • COLONOSCOPY     • EPIDURAL BLOCK INJECTION Right 07/02/2020    Procedure: Right L4-5 and L5-S1 transforaminal epidural steroid injection ([id number]);   Surgeon: Tracee Nguyen MD;  Location: [facility];  Service: Pain Management    • EPIDURAL BLOCK INJECTION N/A 09/02/2021    Procedure: BLOCK / INJECTION EPIDURAL STEROID LUMBAR  L5-S1 IESI;  Surgeon: Tracee Nguyen MD;  Location: [facility];  Service: Pain Management    • FL GUIDED NEEDLE PLAC BX/ASP/INJ  07/02/2020   • FL GUIDED NEEDLE PLAC BX/ASP/INJ  09/02/2021   • HYSTERECTOMY     • VT ESOPHAGOGASTRODUODENOSCOPY TRANSORAL DIAGNOSTIC N/A 10/23/2018    Procedure: EGD with Savary dilation AND COLONOSCOPY;  Surgeon: Wanda Dalal MD;  Location: [facility];  Service: Gastroenterology   • MO LAPAROSCOPY W TOTAL HYSTERECTOMY UTERUS 250 GM/< N/A 12/08/2021    Procedure: [address]; BILAT SALPINGECTOMY; EXCISION PERITONEAL CYST;  Surgeon: Theron Brown MD;  Location: [facility];  Service: Gynecology   • MO MASTECTOMY PARTIAL Left 4/10/2023    Procedure: LUMPECTOMY BREAST WITH BEAU  LOCALIZATION,  BIOPSY LYMPH NODE SENTINEL (INJECT AT 1130 BY DR Amrita Blood IN THE OR);   Surgeon: Tootie Neal MD;  Location: [facility];  Service: General   • TUBAL LIGATION     • US GUIDED BREAST BIOPSY LEFT COMPLETE Left 03/07/2023       Current Outpatient Medications   Medication Sig Dispense Refill   • promethazine-codeine (PHENERGAN WITH CODEINE) 6.25-10 mg/5 mL syrup Take 5 mL by mouth every 4 (four) hours as needed for cough for up to 10 days 473 mL 0   • acetaminophen (TYLENOL) 325 mg tablet Take 2 tablets (650 mg total) by mouth every 6 (six) hours     • anastrozole (ARIMIDEX) 1 mg tablet Take 1 tablet (1 mg total) by mouth daily 30 tablet 5   • atorvastatin (LIPITOR) 40 mg tablet Take 40 mg by mouth every evening     • busPIRone (BUSPAR) 10 mg tablet Take 2 tablets (20 mg total) by mouth 3 (three) times a day as needed (anxiety) 180 tablet 5   • cetirizine (ZyrTEC) 10 mg tablet Take 1 tablet (10 mg total) by mouth daily 14 tablet 0   • Fiasp 100 UNIT/ML SOLN INJECT 120 UNITS ONCE DAILY VIA INSULIN PUMP     • FLUoxetine (PROzac) 40 MG capsule Take 1 capsule (40 mg total) by mouth daily 90 capsule 3   • gabapentin (Neurontin) 600 MG tablet Take 1 tablet (600 mg total) by mouth 3 (three) times a day 270 tablet 2   • hydrOXYzine HCL (ATARAX) 25 mg tablet Take 1 tablet (25 mg total) by mouth every 6 (six) hours as needed for allergies 12 tablet 0   • losartan (COZAAR) 100 MG tablet Take 100 mg by mouth daily     • metoprolol succinate (TOPROL-XL) 100 mg 24 hr tablet Take 100 mg by mouth daily       No current facility-administered medications for this visit. Allergies   Allergen Reactions   • Nuts - Food Allergy Anaphylaxis and Throat Swelling   • Chocolate - Food Allergy Cough   • Eggs Or Egg-Derived Products - Food Allergy Other (See Comments)     Cough   • Other Cough     LETTUCE   • Shellfish Allergy - Food Allergy Cough   • Tomato - Food Allergy Cough       Review of Systems   Constitutional: Positive for fatigue. HENT: Positive for sore throat. Respiratory: Positive for cough and shortness of breath. Negative for chest tightness and wheezing. Cardiovascular: Negative. Gastrointestinal: Negative. Musculoskeletal: Negative. Video Exam    There were no vitals filed for this visit. Physical Exam  Constitutional:       General: She is not in acute distress. Appearance: Normal appearance. She is obese. She is not toxic-appearing. HENT:      Head: Normocephalic and atraumatic. Pulmonary:      Effort: Pulmonary effort is normal.   Neurological:      Mental Status: She is alert. Psychiatric:         Mood and Affect: Mood normal.         Behavior: Behavior normal.         Thought Content:  Thought content normal.         Judgment: Judgment normal.          Visit Time  Total Visit Duration: 15 minutes

## 2023-07-11 NOTE — TELEPHONE ENCOUNTER
pts pharmacy called, states they no longer carry phenergan with codeine, asking for something else to be sent in for pt.  ty

## 2023-07-12 ENCOUNTER — APPOINTMENT (OUTPATIENT)
Dept: LAB | Facility: HOSPITAL | Age: 58
End: 2023-07-12
Payer: COMMERCIAL

## 2023-07-12 DIAGNOSIS — G93.39 OTHER POST INFECTION AND RELATED FATIGUE SYNDROMES: ICD-10-CM

## 2023-07-12 DIAGNOSIS — D64.9 ANEMIA, UNSPECIFIED TYPE: ICD-10-CM

## 2023-07-12 LAB
BASOPHILS # BLD AUTO: 0.04 THOUSANDS/ÂΜL (ref 0–0.1)
BASOPHILS NFR BLD AUTO: 1 % (ref 0–1)
EOSINOPHIL # BLD AUTO: 0.1 THOUSAND/ÂΜL (ref 0–0.61)
EOSINOPHIL NFR BLD AUTO: 2 % (ref 0–6)
ERYTHROCYTE [DISTWIDTH] IN BLOOD BY AUTOMATED COUNT: 12.6 % (ref 11.6–15.1)
FERRITIN SERPL-MCNC: 122 NG/ML (ref 11–307)
HCT VFR BLD AUTO: 37.1 % (ref 34.8–46.1)
HGB BLD-MCNC: 11.8 G/DL (ref 11.5–15.4)
IMM GRANULOCYTES # BLD AUTO: 0.04 THOUSAND/UL (ref 0–0.2)
IMM GRANULOCYTES NFR BLD AUTO: 1 % (ref 0–2)
IRON SATN MFR SERPL: 25 % (ref 15–50)
IRON SERPL-MCNC: 60 UG/DL (ref 50–170)
LYMPHOCYTES # BLD AUTO: 0.9 THOUSANDS/ÂΜL (ref 0.6–4.47)
LYMPHOCYTES NFR BLD AUTO: 14 % (ref 14–44)
MCH RBC QN AUTO: 28.4 PG (ref 26.8–34.3)
MCHC RBC AUTO-ENTMCNC: 31.8 G/DL (ref 31.4–37.4)
MCV RBC AUTO: 89 FL (ref 82–98)
MONOCYTES # BLD AUTO: 0.54 THOUSAND/ÂΜL (ref 0.17–1.22)
MONOCYTES NFR BLD AUTO: 8 % (ref 4–12)
NEUTROPHILS # BLD AUTO: 4.81 THOUSANDS/ÂΜL (ref 1.85–7.62)
NEUTS SEG NFR BLD AUTO: 74 % (ref 43–75)
NRBC BLD AUTO-RTO: 0 /100 WBCS
PLATELET # BLD AUTO: 275 THOUSANDS/UL (ref 149–390)
PMV BLD AUTO: 8.9 FL (ref 8.9–12.7)
RBC # BLD AUTO: 4.16 MILLION/UL (ref 3.81–5.12)
TIBC SERPL-MCNC: 244 UG/DL (ref 250–450)
WBC # BLD AUTO: 6.43 THOUSAND/UL (ref 4.31–10.16)

## 2023-07-12 PROCEDURE — 83550 IRON BINDING TEST: CPT

## 2023-07-12 PROCEDURE — 83540 ASSAY OF IRON: CPT

## 2023-07-12 PROCEDURE — 82728 ASSAY OF FERRITIN: CPT

## 2023-07-12 PROCEDURE — 85025 COMPLETE CBC W/AUTO DIFF WBC: CPT

## 2023-07-12 PROCEDURE — 36415 COLL VENOUS BLD VENIPUNCTURE: CPT

## 2023-07-14 ENCOUNTER — TELEMEDICINE (OUTPATIENT)
Dept: FAMILY MEDICINE CLINIC | Facility: CLINIC | Age: 58
End: 2023-07-14
Payer: COMMERCIAL

## 2023-07-14 DIAGNOSIS — D64.9 ANEMIA, UNSPECIFIED TYPE: Primary | ICD-10-CM

## 2023-07-14 DIAGNOSIS — U09.9 POST-COVID SYNDROME: ICD-10-CM

## 2023-07-14 PROCEDURE — 99213 OFFICE O/P EST LOW 20 MIN: CPT | Performed by: PHYSICIAN ASSISTANT

## 2023-07-14 NOTE — PROGRESS NOTES
Virtual Regular Visit    Verification of patient location:    Patient is located at Home in the following state in which I hold an active license [place]      Assessment/Plan:    Problem List Items Addressed This Visit        Other    Anemia - Primary   Other Visit Diagnoses     Post-COVID syndrome            Her white count and CXR are normal. Cont supportive care. Decrease in hemoglobin from prior, concern for anemia post COVID. Will start iron supplement OTC. She will follow up if any concern   RTC 2-4 weeks for recheck or sooner if concerns arise     BMI Counseling: There is no height or weight on file to calculate BMI. The BMI is above normal. Nutrition recommendations include moderation in carbohydrate intake and increasing intake of lean protein. Rationale for BMI follow-up plan is due to patient being overweight or obese. Reason for visit is   Chief Complaint   Patient presents with   • Cough   • Sore Throat     Patient had TIFYO-[id number] on June 24th     • Virtual Regular Visit        Encounter provider Bernice Dolan PA-C    Provider located at [address] [address]      Recent Visits  Date Type Provider Dept   07/11/23 Telephone [address] [place] [address],[address]   07/11/23 Telemedicine Madison Boyd PA-C [facility] [address] recent visits within past 7 days and meeting all other requirements  Today's Visits  Date Type Provider Dept   07/14/23 Telemedicine Madison Boyd PA-C [facility] [address] today's visits and meeting all other requirements  Future Appointments  No visits were found meeting these conditions. Showing future appointments within next 150 days and meeting all other requirements       The patient was identified by name and date of birth. Barrington Mindi was informed that this is a telemedicine visit and that the visit is being conducted through the [employer].  She agrees to proceed. .  My office door was closed. No one else was in the room. She acknowledged consent and understanding of privacy and security of the video platform. The patient has agreed to participate and understands they can discontinue the visit at any time. Patient is aware this is a billable service. Dar Bishop is a 56 y.o. female presents via telemedicine for follow up. Her cough and sore throat is somewhat improving with medications. Her fatigue is still present. She recently had lab work. HPI     Past Medical History:   Diagnosis Date   • Anemia     iron deficiency anemia   • Anxiety    • Breast cancer ([address])    • Depression    • Diabetes mellitus ([address])    • Fibroid     LTH   today 12/8/2021   • History of transfusion    • Hydrocephalus (HCC)    • Hyperlipidemia    • Hypertension    • Migraine    • Myocardial infarction ([address]) 01/19/2022   • PONV (postoperative nausea and vomiting)    • Sciatica    • Seasonal allergies    • Vertigo    • Wears glasses        Past Surgical History:   Procedure Laterality Date   • BACK SURGERY      C1-3 fusion   • COLONOSCOPY     • EPIDURAL BLOCK INJECTION Right 07/02/2020    Procedure: Right L4-5 and L5-S1 transforaminal epidural steroid injection ([id number]);   Surgeon: Sagar Rodriguez MD;  Location: [facility];  Service: Pain Management    • EPIDURAL BLOCK INJECTION N/A 09/02/2021    Procedure: BLOCK / INJECTION EPIDURAL STEROID LUMBAR  L5-S1 IESI;  Surgeon: Sagar Rodriguez MD;  Location: [facility];  Service: Pain Management    • FL GUIDED NEEDLE PLAC BX/ASP/INJ  07/02/2020   • FL GUIDED NEEDLE PLAC BX/ASP/INJ  09/02/2021   • HYSTERECTOMY     • SD ESOPHAGOGASTRODUODENOSCOPY TRANSORAL DIAGNOSTIC N/A 10/23/2018    Procedure: EGD with Savary dilation AND COLONOSCOPY;  Surgeon: Janice Sanford MD;  Location: [facility];  Service: Gastroenterology   • SD LAPAROSCOPY W TOTAL HYSTERECTOMY UTERUS 250 GM/< N/A 12/08/2021    Procedure: [facility] SALPINGECTOMY; EXCISION PERITONEAL CYST;  Surgeon: Kristy Patterson MD;  Location: [facility];  Service: Gynecology   • ID MASTECTOMY PARTIAL Left 4/10/2023    Procedure: LUMPECTOMY BREAST WITH BEAU  LOCALIZATION,  BIOPSY LYMPH NODE SENTINEL (INJECT AT 1130 BY DR Randi Cornejo IN THE OR); Surgeon: Liana Chambers MD;  Location: [facility];  Service: General   • TUBAL LIGATION     • US GUIDED BREAST BIOPSY LEFT COMPLETE Left 03/07/2023       Current Outpatient Medications   Medication Sig Dispense Refill   • acetaminophen (TYLENOL) 325 mg tablet Take 2 tablets (650 mg total) by mouth every 6 (six) hours     • anastrozole (ARIMIDEX) 1 mg tablet Take 1 tablet (1 mg total) by mouth daily 30 tablet 5   • atorvastatin (LIPITOR) 40 mg tablet Take 40 mg by mouth every evening     • cetirizine (ZyrTEC) 10 mg tablet Take 1 tablet (10 mg total) by mouth daily 14 tablet 0   • Fiasp 100 UNIT/ML SOLN INJECT 120 UNITS ONCE DAILY VIA INSULIN PUMP     • FLUoxetine (PROzac) 40 MG capsule Take 1 capsule (40 mg total) by mouth daily 90 capsule 3   • gabapentin (Neurontin) 600 MG tablet Take 1 tablet (600 mg total) by mouth 3 (three) times a day 270 tablet 2   • hydrOXYzine HCL (ATARAX) 25 mg tablet Take 1 tablet (25 mg total) by mouth every 6 (six) hours as needed for allergies 12 tablet 0   • losartan (COZAAR) 100 MG tablet Take 100 mg by mouth daily     • metoprolol succinate (TOPROL-XL) 100 mg 24 hr tablet Take 100 mg by mouth daily     • Promethazine-DM (PHENERGAN-DM) 6.25-15 mg/5 mL oral syrup Take 5 mL by mouth 4 (four) times a day as needed for cough for up to 10 days 473 mL 0   • busPIRone (BUSPAR) 10 mg tablet Take 2 tablets (20 mg total) by mouth 3 (three) times a day as needed (anxiety) 180 tablet 5     No current facility-administered medications for this visit.         Allergies   Allergen Reactions   • Nuts - Food Allergy Anaphylaxis and Throat Swelling   • Chocolate - Food Allergy Cough   • Eggs Or Egg-Derived Products - Food Allergy Other (See Comments)     Cough   • Other Cough     LETTUCE   • Shellfish Allergy - Food Allergy Cough   • Tomato - Food Allergy Cough       Review of Systems   Constitutional: Positive for activity change and fatigue. HENT: Positive for sore throat. Respiratory: Positive for cough. Cardiovascular: Negative. Gastrointestinal: Negative. Musculoskeletal: Negative. Neurological: Negative. Video Exam    There were no vitals filed for this visit. Physical Exam  Constitutional:       General: She is not in acute distress. Appearance: She is well-developed. She is not ill-appearing, toxic-appearing or diaphoretic. Pulmonary:      Effort: Pulmonary effort is normal.   Neurological:      Mental Status: She is alert. Psychiatric:         Mood and Affect: Mood normal.         Behavior: Behavior normal.         Thought Content:  Thought content normal.         Judgment: Judgment normal.          Visit Time  Total Visit Duration: 10 minutes

## 2023-07-17 ENCOUNTER — TELEMEDICINE (OUTPATIENT)
Dept: RADIATION ONCOLOGY | Facility: CLINIC | Age: 58
End: 2023-07-17
Attending: RADIOLOGY

## 2023-07-17 DIAGNOSIS — Z17.0 MALIGNANT NEOPLASM OF CENTRAL PORTION OF LEFT BREAST IN FEMALE, ESTROGEN RECEPTOR POSITIVE: Primary | ICD-10-CM

## 2023-07-17 DIAGNOSIS — C50.112 MALIGNANT NEOPLASM OF CENTRAL PORTION OF LEFT BREAST IN FEMALE, ESTROGEN RECEPTOR POSITIVE: Primary | ICD-10-CM

## 2023-07-17 DIAGNOSIS — C50.912 INVASIVE DUCTAL CARCINOMA OF BREAST, FEMALE, LEFT (HCC): ICD-10-CM

## 2023-07-17 PROCEDURE — 99024 POSTOP FOLLOW-UP VISIT: CPT | Performed by: RADIOLOGY

## 2023-07-17 NOTE — PROGRESS NOTES
Virtual Brief Visit    This Visit is being completed by telephone. The Patient is located at Home and in the following state in which I hold an active license [place]    The patient was identified by name and date of birth. David Beltran was informed that this is a telemedicine visit and that the visit is being conducted through Telephone. My office door was closed. No one else was in the room. She acknowledged consent and understanding of privacy and security of the video platform. The patient has agreed to participate and understands they can discontinue the visit at any time. Patient is aware this is a billable service. Assessment/Plan:    Problem List Items Addressed This Visit        Other    Invasive ductal carcinoma of breast, female, left ([address])    Malignant neoplasm of central portion of left breast in female, estrogen receptor positive ([address]) - Primary     David Beltran is a 56y.o. year old female with a stage IA, pT1b, pN0, M0 grade 1 ER/ME positive HER2/leida negative invasive ductal carcinoma of the left breast diagnosed after an abnormal routine screening mammogram.  She is status postlumpectomy with Dr. Alejandra Lanes for a 10 mm primary with a small component of DCIS. All margins of resection were negative. She had 3 negative sentinel lymph nodes. She had Oncotype recurrence score which came back at 13. She saw Dr. Dereck Mcclain who recommended treatment with an aromatase inhibitor after she completes adjuvant radiation therapy. She did not require any chemotherapy. In order to complete her breast conservation management, we recommended radiation therapy to the entire left breast using a hypofractionated course of treatment to the left breast over 3 weeks/16 fractions. She completed treatment to a total dose of 4256 cGy in the [facility] on June 22, 2023. She returns for follow-up today and reports she is feeling well other than fatigue that is improving.   She had be admitted to the hospital the day after radiation therapy for COVID infection and for left breast cellulitis. She was treated for both of these and her infection has resolved. She reports some mild shortness of breath. She has been applying lotion and massaging the treated left breast on a daily basis. She started anastrozole 2023 and she is tolerating this well with no hot flashes. She will see Dr. Dina Wray for follow-up on 2023 after repeat mammogram 2023. She will see Dr. Liam Ward for follow-up 2024. Since she is following closely with Dr. Dina Wray and Dr. Liam Ward closer to her home, she requests to be discharged from follow-up in this office.     Recent Visits  Date Type Provider Dept   23 Mika Rivera   23 Telemedicine Madison Boyd PA-C [facility] [address] recent visits within past 7 days and meeting all other requirements  Future Appointments  No visits were found meeting these conditions. Showing future appointments within next 150 days and meeting all other requirements     Oncology History Overview Note   with diagnosed invasive ductal carcinoma of left breast. Completed screening mammogram with results revealing focal asymmetry in left breast.  Diagnostic mammogram and US revealed irregularly shaped, non-parallel, hypoechoic mass with angular margins seen in the left breast. Ultimately, US guided biopsy completed with results revealing ER/VA+, HER2-, invasive ductal carcinoma, histologic grade 1, 6 mm in greatest extent, involving 3 fragmented cores. Left breast lumpectomy done on 4/10/23 by Dr. Dina Wray. She completed Radiation to left breast on 23. This is her EOT phone call.        23-23 hospital admit at [facility] for COVID and cellulitis of left breast.     Upcomin23 Dr. Dina Wray  24 Dr. Liam Ward          Invasive ductal carcinoma of breast, female, left ([address]) 3/15/2023 Initial Diagnosis    Invasive ductal carcinoma of breast, female, left ([address])     4/10/2023 Biopsy    INVASIVE CARCINOMA OF THE BREAST: Resection  8th Edition - Protocol posted: 9/21/2022  INVASIVE CARCINOMA OF THE BREAST: COMPLETE EXCISION - All Specimens  SPECIMEN   Procedure  Excision (less than total mastectomy)    Specimen Laterality  Left    TUMOR   Tumor Site  Clock position      2 o'clock    Histologic Type  Invasive carcinoma of no special type (ductal)    Histologic Grade (Nate Histologic Score)     Glandular (Acinar) / Tubular Differentiation  Score 2    Nuclear Pleomorphism  Score 2    Mitotic Rate  Score 1    Overall Grade  Grade 1 (scores of 3, 4 or 5)    Tumor Size  Greatest dimension of largest invasive focus (Millimeters): 10mm mm   Tumor Focality  Single focus of invasive carcinoma    Ductal Carcinoma In Situ (DCIS)  Present      Negative for extensive intraductal component (EIC)    Size (Extent) of DCIS  Estimated size (extent) of DCIS is at least (Millimeters): 0.40mm mm   Number of Blocks with DCIS  1    Number of Blocks Examined  9    Architectural Patterns  Cribriform    Nuclear Grade  Grade II (intermediate)    Necrosis  Not identified    Lobular Carcinoma In Situ (LCIS)  Not identified    Lymphovascular Invasion  Not identified    Dermal Lymphovascular Invasion  No skin present    Microcalcifications  Not identified    Treatment Effect in the Breast  No known presurgical therapy    MARGINS   Margin Status for Invasive Carcinoma  All margins negative for invasive carcinoma    Distance from Invasive Carcinoma to Closest Margin  Cannot be determined: separate margin submitted     Margin Status for DCIS  Cannot be determined: Separate margin submitted     REGIONAL LYMPH NODES   Regional Lymph Node Status  All regional lymph nodes negative for tumor    Total Number of Lymph Nodes Examined (sentinel and non-sentinel)  3    Number of Dorothy Nodes Examined  3    PATHOLOGIC STAGE CLASSIFICATION (pTNM, AJCC 8th Edition)   Reporting of pT, pN, and (when applicable) pM categories is based on information available to the pathologist at the time the report is issued. As per the AJCC (Chapter 1, 8th Ed.) it is the managing physician's responsibility to establish the final pathologic stage based upon all pertinent information, including but potentially not limited to this pathology report. pT Category  pT1b    Regional Lymph Nodes Modifier  (sn): [place] node(s) evaluated. pN Category  pN0         Estrogen Receptor (ER) Status  Positive (greater than 10% of cells demonstrate nuclear positivity)    Percentage of Cells with Nuclear Positivity  90-95 %        Progesterone Receptor (PgR) Status  Positive    Percentage of Cells with Nuclear Positivity  90-95 %        HER2 (by immunohistochemistry)  Negative (Score 1+)    Testing Performed on Case Number  [id number]    .        5/31/2023 - 6/22/2023 Radiation      Plan ID Energy Fractions Dose per Fraction (cGy) Dose Correction (cGy) Total Dose Delivered (cGy) Elapsed Days    CORINA Edouard # 10X 11 / 16 266 0 2,926 15   Rev  CORINA CASTRO # 10X 5 / 5 266 0 1,330 6      Treatment dates:  C1: 5/31/2023 - 6/22/2023         Malignant neoplasm of central portion of left breast in female, estrogen receptor positive ([address])   5/3/2023 Initial Diagnosis    Malignant neoplasm of central portion of left breast in female, estrogen receptor positive ([address])     5/3/2023 -  Cancer Staged    Staging form: Breast, AJCC 8th Edition  - Pathologic stage from 5/3/2023: Stage IA (pT1b, pN0, cM0, G1, ER+, WA+, HER2-, Oncotype DX score: 13) - Signed by Alonzo Pacheco MD on 5/11/2023  Stage prefix: Initial diagnosis  Nuclear grade: G2  Multigene prognostic tests performed:  Oncotype DX  Recurrence score range: Greater than or equal to 11  Histologic grading system: 3 grade system       5/31/2023 - 6/22/2023 Radiation      Plan ID Energy Fractions Dose per Fraction (cGy) Dose Correction (cGy) Total Dose Delivered (cGy) Elapsed Days   AALIYAH Edouard # 10X 11 / 16 266 0 2,926 15   Rev AALIYAH CASTRO # 10X 5 / 5 266 0 1,330 6      Treatment dates:  C1: 5/31/2023 - 6/22/2023             Visit Time  Total Visit Duration: 10 minutes

## 2023-07-25 ENCOUNTER — SOCIAL WORK (OUTPATIENT)
Dept: BEHAVIORAL/MENTAL HEALTH CLINIC | Facility: CLINIC | Age: 58
End: 2023-07-25
Payer: COMMERCIAL

## 2023-07-25 ENCOUNTER — OFFICE VISIT (OUTPATIENT)
Dept: FAMILY MEDICINE CLINIC | Facility: CLINIC | Age: 58
End: 2023-07-25
Payer: COMMERCIAL

## 2023-07-25 VITALS
SYSTOLIC BLOOD PRESSURE: 128 MMHG | HEIGHT: 63 IN | WEIGHT: 229 LBS | BODY MASS INDEX: 40.57 KG/M2 | DIASTOLIC BLOOD PRESSURE: 72 MMHG | HEART RATE: 82 BPM | OXYGEN SATURATION: 96 %

## 2023-07-25 DIAGNOSIS — F33.1 MODERATE EPISODE OF RECURRENT MAJOR DEPRESSIVE DISORDER (HCC): Primary | ICD-10-CM

## 2023-07-25 DIAGNOSIS — F43.10 POST-TRAUMATIC STRESS: ICD-10-CM

## 2023-07-25 DIAGNOSIS — E11.65 TYPE 2 DIABETES MELLITUS WITH HYPERGLYCEMIA, WITH LONG-TERM CURRENT USE OF INSULIN (HCC): ICD-10-CM

## 2023-07-25 DIAGNOSIS — Z79.4 TYPE 2 DIABETES MELLITUS WITH HYPERGLYCEMIA, WITH LONG-TERM CURRENT USE OF INSULIN (HCC): ICD-10-CM

## 2023-07-25 DIAGNOSIS — D64.9 ANEMIA, UNSPECIFIED TYPE: ICD-10-CM

## 2023-07-25 DIAGNOSIS — F50.81 BINGE EATING DISORDER: Primary | ICD-10-CM

## 2023-07-25 DIAGNOSIS — F50.81 BINGE-EATING DISORDER, MODERATE: ICD-10-CM

## 2023-07-25 PROCEDURE — 90837 PSYTX W PT 60 MINUTES: CPT | Performed by: SOCIAL WORKER

## 2023-07-25 PROCEDURE — 99213 OFFICE O/P EST LOW 20 MIN: CPT | Performed by: PHYSICIAN ASSISTANT

## 2023-07-25 RX ORDER — GLIMEPIRIDE 4 MG/1
TABLET ORAL
COMMUNITY
Start: 2023-07-14

## 2023-07-25 NOTE — PSYCH
Behavioral Health Psychotherapy Progress Note    Psychotherapy Provided: Individual Psychotherapy     1. Moderate episode of recurrent major depressive disorder ([address])        2. Post-traumatic stress        3. Binge-eating disorder, moderate            Goals addressed in session: Creation of treatment plan    DATA: The Client reported that she is feeling " better but I am sleeping all the time" During the session her recover treatment plan was created, which will cover the next 6 months. The Client reported an increase in binge eating over the past 6 months that have led to feeling of " shame and horror. I should have not done that" Due to these issues conflict has increased in her primary relationships and increase in her anxiety and depression. The Client was referred to her PCP for evaluation and coping skills were reviewed   During this session, this clinician used the following therapeutic modalities: Client-centered Therapy, Mindfulness-based Strategies, Solution-Focused Therapy and Supportive Psychotherapy    Substance Abuse was not addressed during this session. If the client is diagnosed with a co-occurring substance use disorder, please indicate any changes in the frequency or amount of use: n/a Stage of change for addressing substance use diagnoses: No substance use/Not applicable    ASSESSMENT:  Soren Troy presents with a Anxious and Depressed mood. her affect is Normal range and intensity, which is congruent, with her mood and the content of the session. The client has made progress on their goals. The client  Soren Troy presents with a none risk of suicide, none risk of self-harm, and none risk of harm to others. For any risk assessment that surpasses a "low" rating, a safety plan must be developed.     A safety plan was indicated: no  If yes, describe in detail n/a    PLAN: Between sessions, Soren Troy will work on her silver cloud to address her [address] [place] [address] issues that are effecting her different relationships and environments. . At the next session, the therapist will use Cognitive Behavioral Therapy, Mindfulness-based Strategies, Solution-Focused Therapy and Supportive Psychotherapy to address the Client's MH issues that are effecting her different relationships and environments     [facility] and Discharge Planning: Sierra Irizarry is aware of and agrees to continue to work on their treatment plan. They have identified and are working toward their discharge goals.  yes    Visit start and stop times:    07/25/23  Start Time: 0830  Stop Time: 0930  Total Visit Time: 60 minutes

## 2023-07-25 NOTE — PSYCH
Outpatient Behavioral Health Psychotherapy Treatment Plan     Sagar Barnett  1965      Date of Initial Psychotherapy Assessment: 7/22/22  Date of Current Treatment Plan: 03/08/23  Treatment Plan Target Date: 03/08/24  Treatment Plan Expiration Date: 7/08/23     Diagnosis:   1. Moderate episode of recurrent major depressive disorder (HCC)          2. Post-traumatic stress                Area(s) of Need:The client reported that she would like to address her depression, and trauma history that is effecting her different relationships and environments. It is hoped that by addressing her weaknesses the Client  will achieve or maintain maximum functional capacity in performing daily activizes, taking into account both the functional capacity of the individual and those functional capacities appropriate for the individuals of the same age. Reduce or ameliorate the physical mental, behavioral, or developmental effects of an illness, condition, injury or disability. Present treatment plan will cover 6 months or completion of recovery treatment plan goals whichever comes first      Long Term Goal 1 (in the client's own words): " My depression is bad"      Stage of Change: Action     Target Date for completion: 03/07/2024             Anticipated therapeutic modalities: Supportive Therapy, Strengths Therapy,  and Cognitive Behavioral Therapy will be the  treatment modalities that will be utilized during the session.             People identified to complete this goal: The client her support team and this therapist                     Objective 1: (identify the means of measuring success in meeting the objective): The Client's depression score on the PHQ-9 will decrease from 18 to 9 or less                        Objective 2: (identify the means of measuring success in meeting the objective):  The Client will utilize her coping skills in addressing her depression when presented 4  out of 7 times. (emerging)      Long Term Goal 2 (in the client's own words): " My trauma is not good"      Stage of Change: Action     Target Date for completion: 03/07/2024             Anticipated therapeutic modalities: Supportive Therapy, Strengths Therapy, seeking Safety, CPT  and Cognitive Behavioral Therapy will be the  treatment modalities that will be utilized during the session.             People identified to complete this goal: The client her support team and this therapist                     Objective 1: (identify the means of measuring success in meeting the objective): The Client's trauma score on her PCL-5 will decrease from 45 to 25 or less (emerging)                     Objective 2: (identify the means of measuring success in meeting the objective): The Client will utilize her coping skills when presented to address her triggers 4 out 7 times when presented. (emerging)      Long Term Goal 3 (in the client's own words): " let's work on my medical appointment"      Stage of Change: Action     Target Date for completion:  03/07/2024             Anticipated therapeutic modalities: Supportive Therapy, Strengths Therapy, and CBTwill be the  treatment modalities that will be utilized during the session.                People identified to complete this goal: The client her support team and this therapist                     Objective 1: (identify the means of measuring success in meeting the objective): The Client all of her medical appointments 100% of  time (emerging)                   I am currently under the care of a [facility] psychiatric provider: yes     My . [place]'s psychiatric provider is:    Kurt CARNEY LCSW at [address] [place] [address] therapy and for Psychiatric medication with PCP Madison Boyd     I am currently taking psychiatric medications: Yes, as prescribed     I feel that I will be ready for discharge from mental health care when I reach the following (measurable goal/objective): " who knows"    For children and adults who have a legal guardian:          Has there been any change to custody orders and/or guardianship status? NA. If yes, attach updated documentation.     I have created my Crisis Plan and have been offered a copy of this plan     [address]: Diagnosis and Treatment Plan explained to Vita Lino acknowledges an understanding of their diagnosis. Garryed Ignacio agrees to this treatment plan.     I have been offered a copy of this Treatment Plan.  Yes

## 2023-07-25 NOTE — PROGRESS NOTES
Assessment/Plan:    No problem-specific Assessment & Plan notes found for this encounter. Diagnoses and all orders for this visit:    Binge eating disorder  -     lisdexamfetamine (VYVANSE) 30 MG capsule; Take 1 capsule (30 mg total) by mouth every morning Max Daily Amount: 30 mg    Type 2 diabetes mellitus with hyperglycemia, with long-term current use of insulin ([address])  -     Ambulatory Referral to Endocrinology; Future    Anemia, unspecified type  -     Vitamin B12; Future  -     Folate; Future    Other orders  -     glimepiride (AMARYL) 4 mg tablet; TAKE 1 TABLET BY MOUTH TWICE DAILY (BREAKFAST AND SUPPER)        FUP 1 month    Subjective:      Patient ID: Meredith Kamara is a 56 y.o. female presents for concerns of binge eating. She states this is something she has always struggled with but notes it has increased since COVID and being mostly home bound. She states she has episodes occurring once daily where she where eat large portion of food and immediately feel guilty. She states she does not feel like she can stop until the entirety of the food is gone. She states this is mostly concerns as she is diabetic and relies on her insulin pump. She has episodes of hyperglycemia secondary to this. She is in need of a new endocrinologist as hers as recently left practice. Continues with fatigue. She wonders if her b12 is low as she previoulsy received injections through her endocrine. HPI    The following portions of the patient's history were reviewed and updated as appropriate: allergies, current medications, past family history, past medical history, past social history, past surgical history and problem list.    Review of Systems   Constitutional: Positive for activity change, appetite change and fatigue. HENT: Negative. Respiratory: Negative. Cardiovascular: Negative. Gastrointestinal: Negative.           Objective:      /72   Pulse 82   Ht 5' 3" (1.6 m)   Wt 104 kg (229 lb)   LMP  (LMP Unknown)   SpO2 96%   BMI 40.57 kg/m²          Physical Exam  Constitutional:       General: She is not in acute distress. Appearance: Normal appearance. She is well-developed. She is obese. HENT:      Head: Normocephalic and atraumatic. Eyes:      Pupils: Pupils are equal, round, and reactive to light. Cardiovascular:      Rate and Rhythm: Normal rate and regular rhythm. Heart sounds: Normal heart sounds. No murmur heard. Pulmonary:      Effort: Pulmonary effort is normal. No respiratory distress. Breath sounds: Normal breath sounds. No wheezing. Abdominal:      General: Bowel sounds are normal. There is no distension. Palpations: Abdomen is soft. Tenderness: There is no abdominal tenderness. Skin:     General: Skin is warm and dry. Capillary Refill: Capillary refill takes less than 2 seconds. Neurological:      Mental Status: She is alert and oriented to person, place, and time. Psychiatric:         Mood and Affect: Mood normal.         Behavior: Behavior normal.         Thought Content:  Thought content normal.         Judgment: Judgment normal.

## 2023-07-25 NOTE — PATIENT INSTRUCTIONS
Type 2 Diabetes Management for Adults   AMBULATORY CARE:   Type 2 diabetes  is a disease that affects how your body uses glucose (sugar). Either your body cannot make enough insulin, or it cannot use the insulin correctly. It is important to keep diabetes controlled to prevent damage to your heart, blood vessels, and other organs. Management will help you feel well and enjoy your daily activities. Your diabetes care team providers can help you make a plan to fit diabetes care into your schedule. Your plan can change over time to fit your needs and your family's needs. Have someone call your local emergency number (911 in the [address]) if:   • You cannot be woken. • You have signs of diabetic ketoacidosis:     ? confusion, fatigue    ? vomiting    ? rapid heartbeat    ? fruity smelling breath    ? extreme thirst    ? dry mouth and skin    • You have any of the following signs of a heart attack:      ? Squeezing, pressure, or pain in your chest    ? You may  also have any of the following:     - Discomfort or pain in your back, neck, jaw, stomach, or arm    - Shortness of breath    - Nausea or vomiting    - Lightheadedness or a sudden cold sweat    • You have any of the following signs of a stroke:      ? Numbness or drooping on one side of your face     ? Weakness in an arm or leg    ? Confusion or difficulty speaking    ? Dizziness, a severe headache, or vision loss    Call your doctor or diabetes care team provider if:   • You have a sore or wound that will not heal.    • You have a change in the amount you urinate. • Your blood sugar levels are higher than your target goals. • You often have lower blood sugar levels than your target goals. • Your skin is red, dry, warm, or swollen. • You have trouble coping with diabetes, or you feel anxious or depressed. • You have questions or concerns about your condition or care.     What you need to know about high blood sugar levels:  High blood sugar levels may not cause any symptoms. You may feel more thirsty or urinate more often than usual. Over time, high blood sugar levels can damage your nerves, blood vessels, tissues, and organs. The following can increase your blood sugar levels:  • Large meals or large amounts of carbohydrates at one time    • Less physical activity    • Stress    • Illness    • A lower dose of diabetes medicine or insulin, or a late dose    What you need to know about low blood sugar levels:  Symptoms include feeling shaky, dizzy, irritable, or confused. You can prevent symptoms by keeping your blood sugar levels from going too low. • Treat a low blood sugar level right away:      ? Drink 4 ounces of juice or have 1 tube of glucose gel. ? Check your blood sugar level again 10 to 15 minutes later. ? When the level goes back to normal, eat a meal or snack to prevent another decrease. • Keep glucose gel, raisins, or hard candy with you at all times to treat a low blood sugar level. • Your blood sugar level can get too low if you take diabetes medicine or insulin and do not eat enough food. • If you use insulin, check your blood sugar level before you exercise. ? If your blood sugar level is below 100 mg/dL, eat 4 crackers or 2 ounces of raisins, or drink 4 ounces of juice. ? Check your level every 30 minutes if you exercise longer than 1 hour. ? You may need a snack during or after exercise. What you can do to manage your blood sugar levels:   • Check your blood sugar levels as directed and as needed. Several items are available to use to check your levels. You may need to check by testing a drop of blood in a glucose monitor. You may instead be given a continuous glucose monitoring (CGM) device. The device is worn at all times. The CGM checks your blood sugar level every 5 minutes. It sends results to an electronic device such as a smart phone. A CGM can be used with or without an insulin pump.  You and your diabetes care team providers will decide on the best method for you. The goal for blood sugar levels before meals  is between 80 and 130 mg/dL and 2 hours after eating  is lower than 180 mg/dL. • Make healthy food choices. Work with a dietitian to develop a meal plan that works for you and your schedule. A dietitian can help you learn how to eat the right amount of carbohydrates during your meals and snacks. Carbohydrates can raise your blood sugar level if you eat too many at one time. Examples of foods that contain carbohydrates are breads, cereals, rice, pasta, and sweets. • Eat high-fiber foods as directed. Fiber helps improve blood sugar levels. Fiber also lowers your risk for heart disease and other problems diabetes can cause. Examples of high-fiber foods include vegetables, whole-grain bread, and beans such as chua beans. Your dietitian can tell you how much fiber to have each day. • Get regular physical activity. Physical activity can help you get to your target blood sugar level goal and manage your weight. Get at least 150 minutes of moderate to vigorous aerobic physical activity each week. Do not miss more than 2 days in a row. Do not sit longer than 30 minutes at a time. Your healthcare provider can help you create an activity plan. The plan can include the best activities for you and can help you build your strength and endurance. • Maintain a healthy weight. Ask your team what a healthy weight is for you. A healthy weight can help you control diabetes and prevent heart disease. Ask your team to help you create a weight loss plan, if needed. Weight loss can help make a difference in managing diabetes. Your team will help you set a weight-loss goal, such as 10 to 15 pounds, or 5% of your extra weight. Together you and your team can set manageable weight loss goals. • Take your diabetes medicine or insulin as directed.   You may need diabetes medicine, insulin, or both to help control your blood sugar levels. Your healthcare provider will teach you how and when to take your diabetes medicine or insulin. You will also be taught about side effects oral diabetes medicine can cause. Insulin may be injected or given through a pump or pen. You and your providers will decide on the best method for you:    ? An insulin pump  is an implanted device that gives your insulin 24 hours a day. An insulin pump prevents the need for multiple insulin injections in a day. ? An insulin pen  is a device prefilled with the right amount of insulin. ? You and your family members will be taught how to draw up and give insulin  if this is the best method for you. Your providers will also teach you how to dispose of needles and syringes. ? You will learn how much insulin you need  and when to give it. You will be taught when not to give insulin. You will also be taught what to do if your blood sugar level drops too low. This may happen if you take insulin and do not eat the right amount of carbohydrates. More ways to manage type 2 diabetes:   • Wear medical alert identification. Wear medical alert jewelry or carry a card that says you have diabetes. Ask your provider where to get these items. • Do not smoke. Nicotine and other chemicals in cigarettes and cigars can cause lung and blood vessel damage. It also makes it more difficult to manage your diabetes. Ask your provider for information if you currently smoke and need help to quit. Do not use e-cigarettes or smokeless tobacco in place of cigarettes or to help you quit. They still contain nicotine. • Check your feet each day for cuts, scratches, calluses, or other wounds. Look for redness and swelling, and feel for warmth. Wear shoes that fit well. Check your shoes for rocks or other objects that can hurt your feet. Do not walk barefoot or wear shoes without socks.  Wear cotton socks to help keep your feet dry. • Ask about vaccines you may need. You have a higher risk for serious illness if you get the flu, pneumonia, COVID-19, or hepatitis. Ask your provider if you should get vaccines to prevent these or other diseases, and when to get the vaccines. • Talk to your provider if you become stressed about diabetes care. Sometimes being able to fit diabetes care into your life can cause increased stress. The stress can cause you not to take care of yourself properly. Your care team providers can help by offering tips about self-care. Your providers may suggest you talk to a mental health provider who can listen and offer help with self-care issues. • Have your A1c checked as directed. Your provider may check your A1c every 3 months, or 2 times each year if your diabetes is controlled. An A1c test shows the average amount of sugar in your blood over the past 2 to 3 months. Your provider will tell you what your A1c level should be. • Have screening tests as directed. Your provider may recommend screening for complications of diabetes and other conditions that may develop. Some screenings may begin right away and some may happen within the first 5 years of diagnosis:    ? Examples of diabetes complications  include kidney problems, high cholesterol, high blood pressure, blood vessel problems, eye problems, and sleep apnea. ? You may be screened for a low vitamin B level  if you take oral diabetes medicine for a long time. ? Women of childbearing years may be screened  for polycystic ovarian syndrome (PCOS). Follow up with your doctor or diabetes care team providers as directed: You may need to have blood tests done before your follow-up visit. The test results will show if changes need to be made in your treatment or self-care. Talk to your provider if you cannot afford your medicine. Write down your questions so you remember to ask them during your visits.   © Copyright [employer] 2022 Information is for End User's use only and may not be sold, redistributed or otherwise used for commercial purposes. The above information is an  only. It is not intended as medical advice for individual conditions or treatments. Talk to your doctor, nurse or pharmacist before following any medical regimen to see if it is safe and effective for you.

## 2023-07-25 NOTE — PSYCH
Treatment Plan Tracking    # 4Treatment Plan not completed within required time limits due to: Client was sick with covid and Radiation treatment.

## 2023-07-27 ENCOUNTER — TELEPHONE (OUTPATIENT)
Dept: FAMILY MEDICINE CLINIC | Facility: CLINIC | Age: 58
End: 2023-07-27

## 2023-07-27 DIAGNOSIS — M79.605 PAIN OF LEFT ANTERIOR LOWER EXTREMITY: Primary | ICD-10-CM

## 2023-07-27 RX ORDER — METHOCARBAMOL 500 MG/1
500 TABLET, FILM COATED ORAL 3 TIMES DAILY
Qty: 21 TABLET | Refills: 0 | Status: SHIPPED | OUTPATIENT
Start: 2023-07-27

## 2023-07-27 NOTE — TELEPHONE ENCOUNTER
Based on the information she told me I'm not sure an xray would give us much information. I think we should trial a muscle relaxer and see if that helps. If it continues to worsen we can go from there.

## 2023-07-27 NOTE — TELEPHONE ENCOUNTER
Pt called, c/o left leg pain, states she spoke with you regarding pain but is asking if a xray can be ordered to have leg checked.   ty

## 2023-08-07 ENCOUNTER — CONSULT (OUTPATIENT)
Dept: ENDOCRINOLOGY | Facility: CLINIC | Age: 58
End: 2023-08-07
Payer: COMMERCIAL

## 2023-08-07 VITALS
HEIGHT: 63 IN | HEART RATE: 77 BPM | WEIGHT: 225 LBS | DIASTOLIC BLOOD PRESSURE: 68 MMHG | SYSTOLIC BLOOD PRESSURE: 120 MMHG | BODY MASS INDEX: 39.87 KG/M2

## 2023-08-07 DIAGNOSIS — E11.51 TYPE 2 DIABETES MELLITUS WITH DIABETIC PERIPHERAL ANGIOPATHY WITHOUT GANGRENE, WITH LONG-TERM CURRENT USE OF INSULIN (HCC): Primary | ICD-10-CM

## 2023-08-07 DIAGNOSIS — Z96.41 INSULIN PUMP IN PLACE: ICD-10-CM

## 2023-08-07 DIAGNOSIS — E66.01 CLASS 2 SEVERE OBESITY DUE TO EXCESS CALORIES WITH SERIOUS COMORBIDITY AND BODY MASS INDEX (BMI) OF 39.0 TO 39.9 IN ADULT (HCC): ICD-10-CM

## 2023-08-07 DIAGNOSIS — E78.49 OTHER HYPERLIPIDEMIA: ICD-10-CM

## 2023-08-07 DIAGNOSIS — Z79.4 TYPE 2 DIABETES MELLITUS WITH DIABETIC PERIPHERAL ANGIOPATHY WITHOUT GANGRENE, WITH LONG-TERM CURRENT USE OF INSULIN (HCC): Primary | ICD-10-CM

## 2023-08-07 DIAGNOSIS — I10 PRIMARY HYPERTENSION: ICD-10-CM

## 2023-08-07 PROCEDURE — 95251 CONT GLUC MNTR ANALYSIS I&R: CPT | Performed by: STUDENT IN AN ORGANIZED HEALTH CARE EDUCATION/TRAINING PROGRAM

## 2023-08-07 PROCEDURE — 99204 OFFICE O/P NEW MOD 45 MIN: CPT | Performed by: STUDENT IN AN ORGANIZED HEALTH CARE EDUCATION/TRAINING PROGRAM

## 2023-08-07 NOTE — PROGRESS NOTES
Sofya Manriquez 56 y.o. female MRN: [id number]    Encounter: [id number]      Assessment/Plan     1. Type 2 diabetes c/b DPN, MI - fair control by CGM. Discussed carb counting and proper bolusing. Patient declined cde referral, will continue to work on consistent bolusing and improving carb counts. Given ASCVD history and obesity, could benefit from GLP. Reviewed pro/cons. Start ozempic 0.25 mg weekly x4 weeks, then 0.5 mg weekly thereafter. No other changes in pump settings for now. Will arrange for 3-mo follow up with updated labs     2. Insulin pump in place    3. Hypertension - well controlled on current Rx    4. Hyperlipidemia - continue statin. Will update lipid panel. Problem List Items Addressed This Visit        Endocrine    Type 2 diabetes mellitus with hyperglycemia ([address]) - Primary    Relevant Medications    semaglutide, 0.25 or 0.5 mg/dose, (Ozempic, 0.25 or 0.5 MG/DOSE,) 2 mg/3 mL injection pen       Cardiovascular and Mediastinum    Hypertension       Other    Other hyperlipidemia   Other Visit Diagnoses     Insulin pump in place        Class 2 severe obesity due to excess calories with serious comorbidity and body mass index (BMI) of 39.0 to 39.9 in [facility])            RTC 3-mo    CC: Diabetes    History of Present Illness     HPI:     Sofya Manriquez is a 56 y.o. female who presents for an initial evaluation of Type 2 diabetes mellitus. She is transitioning care from [facility]. The patient was initially diagnosed with Type 2 diabetes mellitus 15 years ago. She reports history of DPN. She needs to update DM eye examination. She also has history of myocardial infarction without stent or bypass surgery. Current diabetic medications include insulin pump. Previously was on metformin, but self-discontinued. Prior visit with dietician: yes - patient notes intolerance to fruits and vegetables. She does not want to meet with nutrition again.    Current diet: in general, an "unhealthy" diet, on average, 1 meals per day. She does report issues with snack cravings. Current exercise: walking    Patient is on a medtronic 780g pump prescribed by Enrike Gonzalez. She has been on this pump since May 2023. She denies any malfunctioning of the pump. Current Insulin pump settings:  Basal rate: 2.6 u/h  Insulin to carb ratio: 3.5  Insulin sensitivity factor:15  Type of insulin: fiasp  BG target: 110 - 120  AIT: 3h  .4 units  Bolus 46.9 u/d  Auto correction 29.3 u/d  Auto basal 62.5 u/d  Carb per day 55 +/- 49g  Meal per day 0.9    Jerl Mt   Device used guardian 3  Home use     Indication   Type 2 Diabetes    More than 72 hours of data was reviewed. Report to be scanned to chart. Date Range:  7/25 - 8/7/23    Analysis of data:   % time CGM used: 94%  Average Glucose: 161   Coefficient of Variation: 35.1%   SD : 56   Time in Target Range: 65%   Time Above Range: 34%   Time Below Range: 1%     Interpretation of data: Patient does not always bolus for meals/snacks. Can have post-prandial hyperglycemia, even with appropriate bolus timing. [employer] technology frequently intervening. Patient feels okay with carb counting, although needs more experience. She does not want to meet with nutrition services. There is no history of pancreatitis. No family history of MTC/MEN syndrome. Cost of medications is a concern. For hypertension she takes losartan 100 mg daily, and metoprolol  mg daily. For hyperlipidemia she takes lipitor 40 mg daily. Other history notable for breast cancer on anastrozole. Review of Systems   Constitutional: Negative for unexpected weight change. HENT: Negative for trouble swallowing and voice change. Cardiovascular: Negative for chest pain and palpitations. Gastrointestinal: Positive for nausea. Endocrine: Negative for polydipsia and polyuria. All other systems reviewed and are negative.       Historical Information Past Medical History:   Diagnosis Date   • Anemia     iron deficiency anemia   • Anxiety    • Breast cancer ([address])    • Depression    • Diabetes mellitus ([address])    • Fibroid     LT   today 12/8/2021   • History of transfusion    • Hydrocephalus (HCC)    • Hyperlipidemia    • Hypertension    • Migraine    • Myocardial infarction ([address]) 01/19/2022   • PONV (postoperative nausea and vomiting)    • Sciatica    • Seasonal allergies    • Vertigo    • Wears glasses      Past Surgical History:   Procedure Laterality Date   • BACK SURGERY      C1-3 fusion   • COLONOSCOPY     • EPIDURAL BLOCK INJECTION Right 07/02/2020    Procedure: Right L4-5 and L5-S1 transforaminal epidural steroid injection ([id number]); Surgeon: Edgar Worthy MD;  Location: [facility];  Service: Pain Management    • EPIDURAL BLOCK INJECTION N/A 09/02/2021    Procedure: BLOCK / INJECTION EPIDURAL STEROID LUMBAR  L5-S1 IESI;  Surgeon: Edgar Worthy MD;  Location: [facility];  Service: Pain Management    • FL GUIDED NEEDLE PLAC BX/ASP/INJ  07/02/2020   • FL GUIDED NEEDLE PLAC BX/ASP/INJ  09/02/2021   • HYSTERECTOMY     • VA ESOPHAGOGASTRODUODENOSCOPY TRANSORAL DIAGNOSTIC N/A 10/23/2018    Procedure: EGD with Savary dilation AND COLONOSCOPY;  Surgeon: Kike Kay MD;  Location: [facility];  Service: Gastroenterology   • VA LAPAROSCOPY W TOTAL HYSTERECTOMY UTERUS 250 GM/< N/A 12/08/2021    Procedure: Rosalynd Courser; BILAT SALPINGECTOMY; EXCISION PERITONEAL CYST;  Surgeon: Danny Leon MD;  Location: [facility];  Service: Gynecology   • VA MASTECTOMY PARTIAL Left 4/10/2023    Procedure: LUMPECTOMY BREAST WITH BEAU  LOCALIZATION,  BIOPSY LYMPH NODE SENTINEL (INJECT AT 1130 BY DR Kyara Gresham IN THE OR);   Surgeon: Benny Uriostegui MD;  Location: [facility];  Service: General   • TUBAL LIGATION     • US GUIDED BREAST BIOPSY LEFT COMPLETE Left 03/07/2023     Social History   Social History     Substance and Sexual Activity   Alcohol Use Not Currently Comment: none     Social History     Substance and Sexual Activity   Drug Use No    Comment: none     Social History     Tobacco Use   Smoking Status Never   Smokeless Tobacco Never   Tobacco Comments    none     Family History:   Family History   Problem Relation Age of Onset   • Varicose Veins Mother    • Drug abuse Mother    • Diabetes Father    • Liver cancer Father         bile duct   • Cancer Father    • Breast cancer Sister         unknown age   • No Known Problems Daughter    • No Known Problems Daughter    • No Known Problems Maternal Aunt    • No Known Problems Maternal Aunt    • No Known Problems Maternal Aunt    • No Known Problems Maternal Aunt    • No Known Problems Maternal Aunt    • No Known Problems Paternal Aunt    • No Known Problems Maternal Grandmother    • No Known Problems Maternal Grandfather    • No Known Problems Paternal Grandmother    • No Known Problems Paternal Grandfather        Meds/Allergies   Current Outpatient Medications   Medication Sig Dispense Refill   • anastrozole (ARIMIDEX) 1 mg tablet Take 1 tablet (1 mg total) by mouth daily 30 tablet 5   • atorvastatin (LIPITOR) 40 mg tablet Take 40 mg by mouth every evening     • Fiasp 100 UNIT/ML SOLN INJECT 120 UNITS ONCE DAILY VIA INSULIN PUMP     • FLUoxetine (PROzac) 40 MG capsule Take 1 capsule (40 mg total) by mouth daily 90 capsule 3   • gabapentin (Neurontin) 600 MG tablet Take 1 tablet (600 mg total) by mouth 3 (three) times a day 270 tablet 2   • lisdexamfetamine (VYVANSE) 30 MG capsule Take 1 capsule (30 mg total) by mouth every morning Max Daily Amount: 30 mg 30 capsule 0   • losartan (COZAAR) 100 MG tablet Take 100 mg by mouth daily     • metoprolol succinate (TOPROL-XL) 100 mg 24 hr tablet Take 100 mg by mouth daily     • semaglutide, 0.25 or 0.5 mg/dose, (Ozempic, 0.25 or 0.5 MG/DOSE,) 2 mg/3 mL injection pen 0.25 mg weekly for 4-weeks, then 0.5 mg weekly thereafter 3 mL 0   • glimepiride (AMARYL) 4 mg tablet TAKE 1 TABLET BY MOUTH TWICE DAILY (BREAKFAST AND SUPPER)     • methocarbamol (ROBAXIN) 500 mg tablet Take 1 tablet (500 mg total) by mouth 3 (three) times a day (Patient not taking: Reported on 8/7/2023) 21 tablet 0     No current facility-administered medications for this visit. Allergies   Allergen Reactions   • Nuts - Food Allergy Anaphylaxis and Throat Swelling   • Chocolate - Food Allergy Cough   • Eggs Or Egg-Derived Products - Food Allergy Other (See Comments)     Cough   • Other Cough     LETTUCE   • Shellfish Allergy - Food Allergy Cough   • Tomato - Food Allergy Cough       Objective   Vitals: Blood pressure 120/68, pulse 77, height 5' 3" (1.6 m), weight 102 kg (225 lb). Physical Exam  Vitals reviewed. Constitutional:       General: She is not in acute distress. HENT:      Head: Normocephalic and atraumatic. Nose: Nose normal.   Eyes:      General: No scleral icterus. Conjunctiva/sclera: Conjunctivae normal.   Cardiovascular:      Rate and Rhythm: Regular rhythm. Pulmonary:      Effort: Pulmonary effort is normal. No respiratory distress. Abdominal:      Palpations: Abdomen is soft. Tenderness: There is no abdominal tenderness. Musculoskeletal:      Cervical back: Normal range of motion. Skin:     General: Skin is warm and dry. Neurological:      General: No focal deficit present. Mental Status: She is alert. Psychiatric:         Mood and Affect: Mood normal.         Behavior: Behavior normal.         The history was obtained from the review of the chart, patient.     Lab Results:   Lab Results   Component Value Date/Time    WBC 6.43 07/12/2023 07:46 AM    WBC 11.30 (H) 07/04/2023 12:09 PM    WBC 5.14 06/25/2023 05:11 AM    Hemoglobin 11.8 07/12/2023 07:46 AM    Hemoglobin 13.9 07/04/2023 12:09 PM    Hemoglobin 11.7 06/25/2023 05:11 AM    Hematocrit 37.1 07/12/2023 07:46 AM    Hematocrit 42.5 07/04/2023 12:09 PM    Hematocrit 36.3 06/25/2023 05:11 AM    MCV 89 07/12/2023 07:46 AM    MCV 89 07/04/2023 12:09 PM    MCV 92 06/25/2023 05:11 AM    Platelets 343 81/69/5725 07:46 AM    Platelets 896 39/24/2410 12:09 PM    Platelets 384 12/05/8210 05:11 AM    BUN 19 07/04/2023 12:09 PM    BUN 19 06/25/2023 05:11 AM    BUN 19 06/24/2023 05:38 AM    Potassium 4.1 07/04/2023 12:09 PM    Potassium 4.1 06/25/2023 05:11 AM    Potassium 4.0 06/24/2023 05:38 AM    Chloride 100 07/04/2023 12:09 PM    Chloride 100 06/25/2023 05:11 AM    Chloride 99 06/24/2023 05:38 AM    CO2 28 07/04/2023 12:09 PM    CO2 27 06/25/2023 05:11 AM    CO2 26 06/24/2023 05:38 AM    Creatinine 0.83 07/04/2023 12:09 PM    Creatinine 0.83 06/25/2023 05:11 AM    Creatinine 0.92 06/24/2023 05:38 AM    AST 19 06/24/2023 05:38 AM    AST 20 06/23/2023 02:55 PM    AST 27 04/06/2023 09:20 AM    ALT 22 06/24/2023 05:38 AM    ALT 22 06/23/2023 02:55 PM    ALT 34 04/06/2023 09:20 AM    Total Protein 6.3 (L) 06/24/2023 05:38 AM    Total Protein 6.8 06/23/2023 02:55 PM    Total Protein 6.8 04/06/2023 09:20 AM    Albumin 3.8 06/24/2023 05:38 AM    Albumin 4.1 06/23/2023 02:55 PM    Albumin 4.2 04/06/2023 09:20 AM    HDL, Direct 67 12/02/2022 08:17 AM    Triglycerides 131 12/02/2022 08:17 AM           Imaging Studies: I have personally reviewed pertinent reports. Portions of the record may have been created with voice recognition software. Occasional wrong word or "sound a like" substitutions may have occurred due to the inherent limitations of voice recognition software. Read the chart carefully and recognize, using context, where substitutions have occurred.

## 2023-08-07 NOTE — PATIENT INSTRUCTIONS
You are being started on a GLP1 agonist injection    Common side effects: nausea, diarrhea, vomiting, decreased appetite, indigestion, and constipation. Rare risks include pancreatitis and medullary thyroid cancer. Call if severe nausea or new abdominal pain  Call if Blood sugar <70 or >250 on three or more occasions    Stop using and go to the emergency room if you develop sudden and severe stomach pain, nausea, vomiting, fever, lightheadedness. If you do experience nausea after starting the Ozempic, here are some recommendations:  -Eat smaller meals.  Stop eating when you feel full.  -Eat bland, low-fat foods, like crackers, toast, and rice  -Eat foods that contain water, like soups and gelatin  -Avoid lying down after you eat  -Avoid fried or fatty foods

## 2023-08-08 ENCOUNTER — SOCIAL WORK (OUTPATIENT)
Dept: BEHAVIORAL/MENTAL HEALTH CLINIC | Facility: CLINIC | Age: 58
End: 2023-08-08
Payer: COMMERCIAL

## 2023-08-08 DIAGNOSIS — F43.10 POST-TRAUMATIC STRESS: ICD-10-CM

## 2023-08-08 DIAGNOSIS — F33.1 MODERATE EPISODE OF RECURRENT MAJOR DEPRESSIVE DISORDER (HCC): Primary | ICD-10-CM

## 2023-08-08 PROCEDURE — 90837 PSYTX W PT 60 MINUTES: CPT | Performed by: SOCIAL WORKER

## 2023-08-22 ENCOUNTER — SOCIAL WORK (OUTPATIENT)
Dept: BEHAVIORAL/MENTAL HEALTH CLINIC | Facility: CLINIC | Age: 58
End: 2023-08-22
Payer: COMMERCIAL

## 2023-08-22 DIAGNOSIS — F43.10 POST-TRAUMATIC STRESS: ICD-10-CM

## 2023-08-22 DIAGNOSIS — F33.1 MODERATE EPISODE OF RECURRENT MAJOR DEPRESSIVE DISORDER (HCC): Primary | ICD-10-CM

## 2023-08-22 PROCEDURE — 90837 PSYTX W PT 60 MINUTES: CPT | Performed by: SOCIAL WORKER

## 2023-08-22 NOTE — PSYCH
Behavioral Health Psychotherapy Progress Note    Psychotherapy Provided: Individual Psychotherapy     1. Moderate episode of recurrent major depressive disorder ([address])        2. Post-traumatic stress            Goals addressed in session: Goal 1     DATA: The client reported that her  and her had a date that went well until he complained about money, which has caused problems in their relationships. During the session we explored healthy communication patterns, and boundaries in relationships as well as reviewed coping skills. During this session, this clinician used the following therapeutic modalities: Cognitive Behavioral Therapy, Mindfulness-based Strategies, Solution-Focused Therapy and Supportive Psychotherapy    Substance Abuse was not addressed during this session. If the client is diagnosed with a co-occurring substance use disorder, please indicate any changes in the frequency or amount of use: n/a. Stage of change for addressing substance use diagnoses: No substance use/Not applicable    ASSESSMENT:  To Johnson presents with a Depressed mood. her affect is Normal range and intensity, which is congruent, with her mood and the content of the session. The client has made progress on their goals. The client  To Johnson presents with a none risk of suicide, none risk of self-harm, and none risk of harm to others. For any risk assessment that surpasses a "low" rating, a safety plan must be developed. A safety plan was indicated: no  If yes, describe in detail n/a    PLAN: Between sessions, To Johnson will complete worksheet with her  and review communication patterns. At the next session, the therapist will use Cognitive Behavioral Therapy, Motivational Interviewing, Solution-Focused Therapy and Supportive Psychotherapy to address the client's MH issues and the effects on her different relationships and environments.     Behavioral Health Treatment Plan and Discharge Planning: Ruth Muller is aware of and agrees to continue to work on their treatment plan. They have identified and are working toward their discharge goals.  yes    Visit start and stop times:    08/22/23  Start Time: 1120  Stop Time: 1230  Total Visit Time: 70 minutes

## 2023-08-23 ENCOUNTER — OFFICE VISIT (OUTPATIENT)
Dept: FAMILY MEDICINE CLINIC | Facility: CLINIC | Age: 58
End: 2023-08-23
Payer: COMMERCIAL

## 2023-08-23 VITALS
SYSTOLIC BLOOD PRESSURE: 122 MMHG | WEIGHT: 222 LBS | BODY MASS INDEX: 39.34 KG/M2 | HEART RATE: 81 BPM | HEIGHT: 63 IN | OXYGEN SATURATION: 96 % | DIASTOLIC BLOOD PRESSURE: 70 MMHG

## 2023-08-23 DIAGNOSIS — R53.1 LEFT-SIDED WEAKNESS: Primary | ICD-10-CM

## 2023-08-23 PROCEDURE — 99213 OFFICE O/P EST LOW 20 MIN: CPT

## 2023-08-23 NOTE — PROGRESS NOTES
Assessment/Plan:    Left-sided weakness  - Carina Barros out of bed, no head trauma  - Did not seek medical services  - 2wks of L sided weakness, numbness, worsening  - Will obtain MRI and MRA for further eval       Diagnoses and all orders for this visit:    Left-sided weakness  -     MRI brain w wo mra head wo mra neck w wo; Future          Subjective:      Patient ID: Sherlyn Gilbert is a 56 y.o. female. Ms Delfino Sheets is a 51y F with a complex PMH, being seen in clinic because of a 2wk h/o L side weakness. The patient reports that 2 weeks prior, she was in her usual state of health when she awoke from sleeping after having fallen out of bed. The patient was dreaming of falling before she awoke. She does not recall if she struck her head, but believes she woke immediately after falling. She reports some bruising of the L forearm and L calve, but otherwise no acute injuries. She did not seek medical services after this event. Since that time, the patient has had L sided upper and lower extremity weakness and some reduction in sensation, both of which she believes to be getting worse. Denies any preceding symptoms, denies dizziness, light-headedness, palpitations, CP, SOB, HA, changes in vision, N/V, changes in bowel or urinary habits, recent illness, sick contacts. On physical exam, there is mild reduction in strength of L extremities, lower with a more noticeable reduction than upper extremity. There is decreased sensation to light touch on the lateral surfaces of the L calve and L forearm. The patient has a prior h/o hydrocephalus, as well as a prior MI. We would like to obtain an MRI and MRA of the head and neck to ensure there are no acute vascular or structural pathologies of concern. The patient is agreeable to this, and we will follow up after images have been obtained.        The following portions of the patient's history were reviewed and updated as appropriate: allergies, current medications, past family history, past medical history, past social history, past surgical history and problem list.    Review of Systems   Constitutional: Negative for activity change, chills and fever. HENT: Negative for congestion, ear pain, rhinorrhea and sore throat. Eyes: Negative for pain and visual disturbance. Respiratory: Negative for cough, chest tightness, shortness of breath and wheezing. Cardiovascular: Negative for chest pain and palpitations. Gastrointestinal: Negative for abdominal pain, constipation, diarrhea, nausea and vomiting. Genitourinary: Negative for dysuria, flank pain, hematuria and urgency. Musculoskeletal: Positive for arthralgias and back pain. Negative for joint swelling, neck pain and neck stiffness. Skin: Negative for color change and rash. Neurological: Positive for weakness. Negative for dizziness, seizures, syncope, facial asymmetry, light-headedness, numbness and headaches. Psychiatric/Behavioral: Negative for agitation, confusion and sleep disturbance. The patient is nervous/anxious. All other systems reviewed and are negative. Objective:      /70   Pulse 81   Ht 5' 3" (1.6 m)   Wt 101 kg (222 lb)   LMP  (LMP Unknown)   SpO2 96%   BMI 39.33 kg/m²          Physical Exam  Vitals and nursing note reviewed. Constitutional:       General: She is not in acute distress. Appearance: Normal appearance. She is not ill-appearing. HENT:      Head: Normocephalic and atraumatic. Right Ear: External ear normal.      Left Ear: External ear normal.      Nose: Nose normal.      Mouth/Throat:      Mouth: Mucous membranes are moist.      Pharynx: Oropharynx is clear. Eyes:      Extraocular Movements: Extraocular movements intact. Conjunctiva/sclera: Conjunctivae normal.   Cardiovascular:      Rate and Rhythm: Normal rate and regular rhythm. Pulses: Normal pulses. Heart sounds: Normal heart sounds. No murmur heard.   Pulmonary:      Effort: Pulmonary effort is normal.      Breath sounds: Normal breath sounds. No wheezing. Abdominal:      General: Abdomen is flat. Bowel sounds are normal.      Palpations: Abdomen is soft. Tenderness: There is no abdominal tenderness. There is no guarding. Musculoskeletal:         General: No swelling, tenderness, deformity or signs of injury. Normal range of motion. Cervical back: Normal range of motion and neck supple. No rigidity. Skin:     General: Skin is warm and dry. Capillary Refill: Capillary refill takes less than 2 seconds. Findings: No bruising, erythema or rash. Neurological:      General: No focal deficit present. Mental Status: She is alert and oriented to person, place, and time. Sensory: Sensory deficit present. Motor: Weakness present.       Comments: Some reduced light sensation, reduced strength diffusely on L side extremities, Lower>upper   Psychiatric:         Mood and Affect: Mood normal.         Behavior: Behavior normal.

## 2023-08-28 DIAGNOSIS — E11.51 TYPE 2 DIABETES MELLITUS WITH DIABETIC PERIPHERAL ANGIOPATHY WITHOUT GANGRENE, WITH LONG-TERM CURRENT USE OF INSULIN (HCC): ICD-10-CM

## 2023-08-28 DIAGNOSIS — Z79.4 TYPE 2 DIABETES MELLITUS WITH DIABETIC PERIPHERAL ANGIOPATHY WITHOUT GANGRENE, WITH LONG-TERM CURRENT USE OF INSULIN (HCC): ICD-10-CM

## 2023-08-28 NOTE — TELEPHONE ENCOUNTER
Assuming patient tolerated 0.25mg weekly introductory dose well, she would benefit from up-titration to the first treatment dose, 0.5mg weekly.

## 2023-08-30 ENCOUNTER — HOSPITAL ENCOUNTER (OUTPATIENT)
Dept: MRI IMAGING | Facility: HOSPITAL | Age: 58
Discharge: HOME | End: 2023-08-30
Payer: COMMERCIAL

## 2023-08-30 DIAGNOSIS — R53.1 LEFT-SIDED WEAKNESS: ICD-10-CM

## 2023-08-30 PROCEDURE — 70544 MR ANGIOGRAPHY HEAD W/O DYE: CPT

## 2023-08-30 PROCEDURE — 70549 MR ANGIOGRAPH NECK W/O&W/DYE: CPT

## 2023-08-30 PROCEDURE — G1004 CDSM NDSC: HCPCS

## 2023-08-30 PROCEDURE — 70553 MRI BRAIN STEM W/O & W/DYE: CPT

## 2023-08-30 PROCEDURE — A9585 GADOBUTROL INJECTION: HCPCS

## 2023-08-30 RX ORDER — GADOBUTROL 604.72 MG/ML
10 INJECTION INTRAVENOUS
Status: COMPLETED | OUTPATIENT
Start: 2023-08-30 | End: 2023-08-30

## 2023-08-30 RX ADMIN — GADOBUTROL 10 ML: 604.72 INJECTION INTRAVENOUS at 16:27

## 2023-09-05 ENCOUNTER — SOCIAL WORK (OUTPATIENT)
Dept: BEHAVIORAL/MENTAL HEALTH CLINIC | Facility: CLINIC | Age: 58
End: 2023-09-05
Payer: COMMERCIAL

## 2023-09-05 DIAGNOSIS — F33.1 MODERATE EPISODE OF RECURRENT MAJOR DEPRESSIVE DISORDER (HCC): Primary | ICD-10-CM

## 2023-09-05 DIAGNOSIS — F43.10 POST-TRAUMATIC STRESS: ICD-10-CM

## 2023-09-05 PROCEDURE — 90837 PSYTX W PT 60 MINUTES: CPT | Performed by: SOCIAL WORKER

## 2023-09-05 NOTE — PSYCH
Behavioral Health Psychotherapy Progress Note    Psychotherapy Provided: Individual Psychotherapy     1. Moderate episode of recurrent major depressive disorder ([address])        2. Post-traumatic stress            Goals addressed in session: Goal 1     DATA: The client reported that her and her  have been "fighting" She reported this has caused an increase in her anxiety and depression. " I wanted to walk out due to the crap he has been doing" During the session we explored her communication patterns as well as well as what she appreciate about her partner and ways they show that they care. Coping skills were reviewed during the session. During this session, this clinician used the following therapeutic modalities: Cognitive Behavioral Therapy, Mindfulness-based Strategies, Solution-Focused Therapy and Supportive Psychotherapy    Substance Abuse was not addressed during this session. If the client is diagnosed with a co-occurring substance use disorder, please indicate any changes in the frequency or amount of use: n/a. Stage of change for addressing substance use diagnoses: No substance use/Not applicable    ASSESSMENT:  Toña Haji presents with a Anxious and Depressed mood. her affect is Normal range and intensity, which is congruent, with her mood and the content of the session. The client has made progress on their goals. The Client  Toña Haji presents with a none risk of suicide, none risk of self-harm, and none risk of harm to others. For any risk assessment that surpasses a "low" rating, a safety plan must be developed. A safety plan was indicated: no  If yes, describe in detail n/a    PLAN: Between sessions, Toña Haji will practice her coping skills when presented with anxiety and or depression.  At the next session, the therapist will use Cognitive Behavioral Therapy, Mindfulness-based Strategies, Solution-Focused Therapy and Supportive Psychotherapy to address the client's MH and trauma issues and the effects on her different relationships and environments. Behavioral Health Treatment Plan and Discharge Planning: Emiliano Wang is aware of and agrees to continue to work on their treatment plan. They have identified and are working toward their discharge goals.  yes    Visit start and stop times:    09/05/23  Start Time: 1101  Stop Time: 1159  Total Visit Time: 58 minutes

## 2023-09-06 ENCOUNTER — TELEPHONE (OUTPATIENT)
Dept: ENDOCRINOLOGY | Facility: CLINIC | Age: 58
End: 2023-09-06

## 2023-09-06 ENCOUNTER — TELEPHONE (OUTPATIENT)
Dept: SURGERY | Facility: CLINIC | Age: 58
End: 2023-09-06

## 2023-09-06 DIAGNOSIS — L03.313 CELLULITIS OF CHEST WALL: Primary | ICD-10-CM

## 2023-09-06 RX ORDER — CEPHALEXIN 500 MG/1
500 CAPSULE ORAL EVERY 6 HOURS SCHEDULED
Qty: 28 CAPSULE | Refills: 0 | Status: SHIPPED | OUTPATIENT
Start: 2023-09-06 | End: 2023-09-13

## 2023-09-06 NOTE — TELEPHONE ENCOUNTER
Placed call to patient, she stated she has been having pain that comes and goes on the top of her left breast since radiation. She stated the past couple of days it has been pretty constant. She notes redness and warmth to the area but stated she has had that since radiation was completed at the end of June. She is going to send in pictures of the area. Will then route to rad onc and our surg onc doctor.

## 2023-09-06 NOTE — TELEPHONE ENCOUNTER
Pt left voicemail message that she would like to speak with Dr. Ana Montano directly about some issues.      Call back at [phone]

## 2023-09-06 NOTE — TELEPHONE ENCOUNTER
70-year-old woman with past medical history significant for left breast carcinoma status post breast conserving therapy and previously diagnosed left breast/chest wall cellulitis reported increasing pain and ongoing warmth and redness to the left chest.    Case reviewed with radiation oncology who felt as though this was not likely to be radiation dermatitis. I spoke to Andres kiser on the phone, agreeable to initiation of oral antibiotics for empiric treatment of suspected left breast cellulitis. We will arrange for close follow-up in our office with Dr. Yuni Cheng, scheduled for 9/13/2023 for further evaluation management. Encouraged to call or come back sooner or go to the ER if symptoms progress or worsen.

## 2023-09-11 ENCOUNTER — HOSPITAL ENCOUNTER (EMERGENCY)
Facility: HOSPITAL | Age: 58
Discharge: HOME | End: 2023-09-12
Attending: EMERGENCY MEDICINE | Admitting: EMERGENCY MEDICINE
Payer: COMMERCIAL

## 2023-09-11 ENCOUNTER — APPOINTMENT (EMERGENCY)
Dept: CT IMAGING | Facility: HOSPITAL | Age: 58
End: 2023-09-11
Payer: COMMERCIAL

## 2023-09-11 DIAGNOSIS — N61.1 LEFT BREAST ABSCESS: Primary | ICD-10-CM

## 2023-09-11 LAB
ALBUMIN SERPL BCP-MCNC: 4 G/DL (ref 3.5–5)
ALP SERPL-CCNC: 111 U/L (ref 34–104)
ALT SERPL W P-5'-P-CCNC: 22 U/L (ref 7–52)
ANION GAP SERPL CALCULATED.3IONS-SCNC: 9 MMOL/L
APTT PPP: 27 SECONDS (ref 23–37)
AST SERPL W P-5'-P-CCNC: 14 U/L (ref 13–39)
BASOPHILS # BLD AUTO: 0.04 THOUSANDS/ÂΜL (ref 0–0.1)
BASOPHILS NFR BLD AUTO: 1 % (ref 0–1)
BILIRUB DIRECT SERPL-MCNC: 0.23 MG/DL (ref 0–0.2)
BILIRUB SERPL-MCNC: 1.12 MG/DL (ref 0.2–1)
BUN SERPL-MCNC: 20 MG/DL (ref 5–25)
CALCIUM SERPL-MCNC: 9.5 MG/DL (ref 8.4–10.2)
CHLORIDE SERPL-SCNC: 99 MMOL/L (ref 96–108)
CO2 SERPL-SCNC: 28 MMOL/L (ref 21–32)
CREAT SERPL-MCNC: 0.93 MG/DL (ref 0.6–1.3)
EOSINOPHIL # BLD AUTO: 0.12 THOUSAND/ÂΜL (ref 0–0.61)
EOSINOPHIL NFR BLD AUTO: 2 % (ref 0–6)
ERYTHROCYTE [DISTWIDTH] IN BLOOD BY AUTOMATED COUNT: 13.3 % (ref 11.6–15.1)
GFR SERPL CREATININE-BSD FRML MDRD: 69 ML/MIN/1.73SQ M
GLUCOSE SERPL-MCNC: 218 MG/DL (ref 65–140)
HCT VFR BLD AUTO: 40 % (ref 34.8–46.1)
HGB BLD-MCNC: 12.9 G/DL (ref 11.5–15.4)
IMM GRANULOCYTES # BLD AUTO: 0.03 THOUSAND/UL (ref 0–0.2)
IMM GRANULOCYTES NFR BLD AUTO: 0 % (ref 0–2)
INR PPP: 0.99 (ref 0.84–1.19)
LACTATE SERPL-SCNC: 1.2 MMOL/L (ref 0.5–2)
LYMPHOCYTES # BLD AUTO: 1.12 THOUSANDS/ÂΜL (ref 0.6–4.47)
LYMPHOCYTES NFR BLD AUTO: 14 % (ref 14–44)
MCH RBC QN AUTO: 28.9 PG (ref 26.8–34.3)
MCHC RBC AUTO-ENTMCNC: 32.3 G/DL (ref 31.4–37.4)
MCV RBC AUTO: 90 FL (ref 82–98)
MONOCYTES # BLD AUTO: 0.59 THOUSAND/ÂΜL (ref 0.17–1.22)
MONOCYTES NFR BLD AUTO: 7 % (ref 4–12)
NEUTROPHILS # BLD AUTO: 6.18 THOUSANDS/ÂΜL (ref 1.85–7.62)
NEUTS SEG NFR BLD AUTO: 76 % (ref 43–75)
NRBC BLD AUTO-RTO: 0 /100 WBCS
PLATELET # BLD AUTO: 259 THOUSANDS/UL (ref 149–390)
PMV BLD AUTO: 8.7 FL (ref 8.9–12.7)
POTASSIUM SERPL-SCNC: 3.7 MMOL/L (ref 3.5–5.3)
PROT SERPL-MCNC: 7 G/DL (ref 6.4–8.4)
PROTHROMBIN TIME: 13 SECONDS (ref 11.6–14.5)
RBC # BLD AUTO: 4.47 MILLION/UL (ref 3.81–5.12)
SODIUM SERPL-SCNC: 136 MMOL/L (ref 135–147)
WBC # BLD AUTO: 8.08 THOUSAND/UL (ref 4.31–10.16)

## 2023-09-11 PROCEDURE — 85610 PROTHROMBIN TIME: CPT | Performed by: EMERGENCY MEDICINE

## 2023-09-11 PROCEDURE — G1004 CDSM NDSC: HCPCS

## 2023-09-11 PROCEDURE — 80076 HEPATIC FUNCTION PANEL: CPT | Performed by: EMERGENCY MEDICINE

## 2023-09-11 PROCEDURE — 85730 THROMBOPLASTIN TIME PARTIAL: CPT | Performed by: EMERGENCY MEDICINE

## 2023-09-11 PROCEDURE — 83605 ASSAY OF LACTIC ACID: CPT | Performed by: EMERGENCY MEDICINE

## 2023-09-11 PROCEDURE — 87040 BLOOD CULTURE FOR BACTERIA: CPT | Performed by: EMERGENCY MEDICINE

## 2023-09-11 PROCEDURE — 99285 EMERGENCY DEPT VISIT HI MDM: CPT | Performed by: EMERGENCY MEDICINE

## 2023-09-11 PROCEDURE — 87205 SMEAR GRAM STAIN: CPT | Performed by: EMERGENCY MEDICINE

## 2023-09-11 PROCEDURE — 87070 CULTURE OTHR SPECIMN AEROBIC: CPT | Performed by: EMERGENCY MEDICINE

## 2023-09-11 PROCEDURE — 71260 CT THORAX DX C+: CPT

## 2023-09-11 PROCEDURE — 80048 BASIC METABOLIC PNL TOTAL CA: CPT | Performed by: EMERGENCY MEDICINE

## 2023-09-11 PROCEDURE — 85025 COMPLETE CBC W/AUTO DIFF WBC: CPT | Performed by: EMERGENCY MEDICINE

## 2023-09-11 PROCEDURE — 36415 COLL VENOUS BLD VENIPUNCTURE: CPT | Performed by: EMERGENCY MEDICINE

## 2023-09-11 PROCEDURE — 99284 EMERGENCY DEPT VISIT MOD MDM: CPT

## 2023-09-11 RX ADMIN — IOHEXOL 100 ML: 350 INJECTION, SOLUTION INTRAVENOUS at 23:16

## 2023-09-12 ENCOUNTER — HOSPITAL ENCOUNTER (OUTPATIENT)
Dept: ULTRASOUND IMAGING | Facility: HOSPITAL | Age: 58
Discharge: HOME | End: 2023-09-12
Attending: SURGERY | Admitting: STUDENT IN AN ORGANIZED HEALTH CARE EDUCATION/TRAINING PROGRAM
Payer: COMMERCIAL

## 2023-09-12 ENCOUNTER — HOSPITAL ENCOUNTER (OUTPATIENT)
Dept: ULTRASOUND IMAGING | Facility: HOSPITAL | Age: 58
Discharge: HOME | End: 2023-09-12
Attending: SURGERY
Payer: COMMERCIAL

## 2023-09-12 VITALS — DIASTOLIC BLOOD PRESSURE: 76 MMHG | SYSTOLIC BLOOD PRESSURE: 116 MMHG | HEART RATE: 80 BPM

## 2023-09-12 VITALS
DIASTOLIC BLOOD PRESSURE: 67 MMHG | WEIGHT: 216 LBS | OXYGEN SATURATION: 96 % | SYSTOLIC BLOOD PRESSURE: 148 MMHG | TEMPERATURE: 97.2 F | HEIGHT: 63 IN | BODY MASS INDEX: 38.27 KG/M2 | RESPIRATION RATE: 18 BRPM | HEART RATE: 83 BPM

## 2023-09-12 DIAGNOSIS — N64.89 SEROMA OF BREAST: ICD-10-CM

## 2023-09-12 PROCEDURE — 76942 ECHO GUIDE FOR BIOPSY: CPT

## 2023-09-12 PROCEDURE — 87070 CULTURE OTHR SPECIMN AEROBIC: CPT | Performed by: SURGERY

## 2023-09-12 PROCEDURE — 19000 PUNCTURE ASPIR CYST BREAST: CPT

## 2023-09-12 PROCEDURE — 87205 SMEAR GRAM STAIN: CPT | Performed by: SURGERY

## 2023-09-12 PROCEDURE — 76642 ULTRASOUND BREAST LIMITED: CPT

## 2023-09-12 RX ORDER — SULFAMETHOXAZOLE AND TRIMETHOPRIM 800; 160 MG/1; MG/1
1 TABLET ORAL 2 TIMES DAILY
Qty: 14 TABLET | Refills: 0 | Status: SHIPPED | OUTPATIENT
Start: 2023-09-12 | End: 2023-09-14 | Stop reason: SDUPTHER

## 2023-09-12 RX ORDER — SULFAMETHOXAZOLE AND TRIMETHOPRIM 800; 160 MG/1; MG/1
1 TABLET ORAL ONCE
Status: COMPLETED | OUTPATIENT
Start: 2023-09-12 | End: 2023-09-12

## 2023-09-12 RX ORDER — LIDOCAINE HYDROCHLORIDE 10 MG/ML
5 INJECTION, SOLUTION EPIDURAL; INFILTRATION; INTRACAUDAL; PERINEURAL ONCE
Status: COMPLETED | OUTPATIENT
Start: 2023-09-12 | End: 2023-09-12

## 2023-09-12 RX ADMIN — LIDOCAINE HYDROCHLORIDE 5 ML: 10 INJECTION, SOLUTION EPIDURAL; INFILTRATION; INTRACAUDAL; PERINEURAL at 13:20

## 2023-09-12 RX ADMIN — SULFAMETHOXAZOLE AND TRIMETHOPRIM 1 TABLET: 800; 160 TABLET ORAL at 00:44

## 2023-09-12 NOTE — PROGRESS NOTES
Message rec'd from [id number], Dr. Lynne Delacruz office, regarding recommendation for L breast US;    _____ RIGHT ___X___LEFT      __X___Ultrasound guided  ______Stereotactic seroma aspiration.     Procedure was explained to patient by Dr. Joni Mchugh    Blood thinners: No: __X___ Yes: _____ What:     Biopsy teaching sheet given:  _____yes __X__no     Pt given name/# for any further questions/needs

## 2023-09-12 NOTE — ED CARE HANDOFF
Emergency Department Sign Out Note        Sign out and transfer of care from Dr. Curtis Jansen. See Separate Emergency Department note. The patient, Dhaval Welch, was evaluated by the previous provider for left breast swelling/discharge. Hx notable for left breast CA s/p lumpectomy and XRT. Workup Completed:  labs    ED Course / Workup Pending (followup):  CT                                     Procedures  Medical Decision Making    56 y.o. female presenting for left breast swelling and discharge. VSS, labs reassuring. Pending CT results and discussion with surgery for dispo. CT results demonstrating left breast abscess vs. Seroma. This was discussed with the patient. Patient prefers trial of outpatient management. D/w Dr. Denise Wayne who recommends additional antibiotics and will assist with outpatient f/u and drainage of abscess. Disposition: I have discussed with the patient our plan to discharge them from the ED and the patient is in agreement with this plan. Discharge Plan: Rx for bactrim. Discussed possibility of disease progression and failure of outpatient treatment. RTED precautions emphasized. The patient was provided a written after visit summary with strict RTED precautions. Followup: I have discussed with the patient plan to follow up with general surgery. Contact information provided in AVS.    Amount and/or Complexity of Data Reviewed  Labs: ordered. Radiology: ordered. Risk  Prescription drug management. Disposition  Final diagnoses:   Left breast abscess     Time reflects when diagnosis was documented in both MDM as applicable and the Disposition within this note     Time User Action Codes Description Comment    9/12/2023 12:35 AM Dariana Hussein Add [N61.1] Left breast abscess       ED Disposition     ED Disposition   Discharge    Condition   Stable    Date/Time   Tue Sep 12, 2023 12:35 AM    Comment   Dhaval Welch discharge to home/self care. Follow-up Information     Follow up With Specialties Details Why Contact Info    Colletta Brier, MD General Surgery, Surgical Oncology Schedule an appointment as soon as possible for a visit  To make appointment for reevaluation ASAP.  [id number] [facility]          Discharge Medication List as of 9/12/2023 12:38 AM      START taking these medications    Details   sulfamethoxazole-trimethoprim (BACTRIM DS) 800-160 mg per tablet Take 1 tablet by mouth 2 (two) times a day for 7 days smx-tmp DS (BACTRIM) 800-160 mg tabs (1tab q12 D10), Starting Tue 9/12/2023, Until Tue 9/19/2023, Normal         CONTINUE these medications which have NOT CHANGED    Details   semaglutide, 0.25 or 0.5 mg/dose, (Ozempic, 0.25 or 0.5 MG/DOSE,) 2 mg/3 mL injection pen Inject 0.75 mL (0.5 mg total) under the skin every 7 days, Starting Mon 8/28/2023, Normal      anastrozole (ARIMIDEX) 1 mg tablet Take 1 tablet (1 mg total) by mouth daily, Starting Mon 6/19/2023, Normal      atorvastatin (LIPITOR) 40 mg tablet Take 40 mg by mouth every evening, Starting Mon 2/28/2022, Historical Med      cephalexin (KEFLEX) 500 mg capsule Take 1 capsule (500 mg total) by mouth every 6 (six) hours for 7 days, Starting Wed 9/6/2023, Until Wed 9/13/2023, Normal      Fiasp 100 UNIT/ML SOLN INJECT 120 UNITS ONCE DAILY VIA INSULIN PUMP, Historical Med      FLUoxetine (PROzac) 40 MG capsule Take 1 capsule (40 mg total) by mouth daily, Starting Tue 12/6/2022, Normal      gabapentin (Neurontin) 600 MG tablet Take 1 tablet (600 mg total) by mouth 3 (three) times a day, Starting Fri 5/19/2023, Until Thu 8/17/2023, Normal      lisdexamfetamine (VYVANSE) 30 MG capsule Take 1 capsule (30 mg total) by mouth every morning Max Daily Amount: 30 mg, Starting Tue 7/25/2023, Normal      losartan (COZAAR) 100 MG tablet Take 100 mg by mouth daily, Starting Sun 3/26/2023, Historical Med      methocarbamol (ROBAXIN) 500 mg tablet Take 1 tablet (500 mg total) by mouth 3 (three) times a day, Starting Thu 7/27/2023, Normal      metoprolol succinate (TOPROL-XL) 100 mg 24 hr tablet Take 100 mg by mouth daily, Starting Mon 2/28/2022, Historical Med           No discharge procedures on file.        ED Provider  Electronically Signed by     Tamiko Sousa DO  09/12/23 [id number]

## 2023-09-12 NOTE — PROGRESS NOTES
1Procedure type:    __X___ultrasound guided LEFT BREAST ASPIRATION  _____stereotactic    Breast:    _X____Left _____Right    Location: 2 OCLOCK, 10 CMFN    Needle:18G SPINAL NEEDLE    # of passes:SAMEER    Clip:SAMEER    Performed by:DR GARVIN CORDS    Pressure held for 5 minutes by:  SAMEER Neff Strips:    ____yes ___X__no    Band aid:    __X___yes_____no    Tolerated procedure:    __X___yes _____no

## 2023-09-12 NOTE — DISCHARGE INSTRUCTIONS
You have been seen for a left breast abscess. Please complete the course of bactrim as prescribed. Return to the emergency department if you develop worsening swelling, fevers, trouble breathing, lethargy or any other symptoms of concern. Please follow up with Dr. Li Cerrato by calling the number provided.

## 2023-09-13 NOTE — PROGRESS NOTES
Post procedure call completed    Bleeding: _____yes __X___no (pt denies bleeding at site, pt denies any nipple discharge)    Pain: ___X__yes ______no (pt states she is having a lot of pain today at aspiration site, pt states she feels like area is filling back up, pt states she did reach out to Dr. Reva Jackson office and has appt tomorrow to be re-evaluated)    Redness/Swelling: ___X___yes ______no (pt states that area is still really warm to touch)    Band aid removed: ___X__yes _____no     Discussed that she is currently on Bactrim but we did send a specimen for culture and that takes a couple days to come back, adv that Dr. Rumaldo Runner will get those results and may change her antibiotic depending on those results, pt states understanding, Pt with no questions at this time, adv to call with any questions or concerns, has name/# for contact

## 2023-09-14 ENCOUNTER — OFFICE VISIT (OUTPATIENT)
Dept: SURGERY | Facility: CLINIC | Age: 58
End: 2023-09-14
Payer: COMMERCIAL

## 2023-09-14 VITALS
HEART RATE: 88 BPM | WEIGHT: 226 LBS | HEIGHT: 63 IN | SYSTOLIC BLOOD PRESSURE: 112 MMHG | BODY MASS INDEX: 40.04 KG/M2 | DIASTOLIC BLOOD PRESSURE: 68 MMHG | OXYGEN SATURATION: 98 % | TEMPERATURE: 97.5 F | RESPIRATION RATE: 16 BRPM

## 2023-09-14 DIAGNOSIS — C50.912 INVASIVE DUCTAL CARCINOMA OF BREAST, FEMALE, LEFT (HCC): ICD-10-CM

## 2023-09-14 DIAGNOSIS — N64.89 SEROMA OF BREAST: ICD-10-CM

## 2023-09-14 DIAGNOSIS — L03.313 CELLULITIS OF CHEST WALL: Primary | ICD-10-CM

## 2023-09-14 DIAGNOSIS — N61.1 LEFT BREAST ABSCESS: ICD-10-CM

## 2023-09-14 LAB
BACTERIA WND AEROBE CULT: NORMAL
GRAM STN SPEC: NORMAL

## 2023-09-14 PROCEDURE — 99213 OFFICE O/P EST LOW 20 MIN: CPT | Performed by: SURGERY

## 2023-09-14 RX ORDER — GLIMEPIRIDE 4 MG/1
TABLET ORAL
COMMUNITY
Start: 2023-09-06 | End: 2023-09-27

## 2023-09-14 RX ORDER — SULFAMETHOXAZOLE AND TRIMETHOPRIM 800; 160 MG/1; MG/1
1 TABLET ORAL 2 TIMES DAILY
Qty: 14 TABLET | Refills: 0 | Status: SHIPPED | OUTPATIENT
Start: 2023-09-14 | End: 2023-09-21

## 2023-09-15 LAB
BACTERIA WND AEROBE CULT: NO GROWTH
GRAM STN SPEC: NORMAL

## 2023-09-15 NOTE — PROGRESS NOTES
Message rec'd from [facility], Dr. Lynne Delacruz office, regarding recommendation for;    _____ RIGHT __X____LEFT      __X___Ultrasound guided  ______Stereotactic seroma aspiration.     Procedure was explained to patient by Dr. Joni Mchugh    Blood thinners: No: __X___ Yes: _____ What:     Biopsy teaching sheet given:  _____yes __X__no (All teaching points discussed during call, pt questions answered during office visit, pt adv to arrive a 0830 ultrasound to be followed by aspiration)    Pt given name/# for any further questions/needs    Pt to be set up for transportation by Cindy at Dr. Felipe Hazel office

## 2023-09-17 PROBLEM — N61.1 LEFT BREAST ABSCESS: Status: ACTIVE | Noted: 2023-09-17

## 2023-09-17 PROBLEM — N64.89 SEROMA OF BREAST: Status: ACTIVE | Noted: 2023-09-17

## 2023-09-17 LAB
BACTERIA BLD CULT: NORMAL
BACTERIA BLD CULT: NORMAL

## 2023-09-17 NOTE — PROGRESS NOTES
Progress Note - Surgical Oncology  Akash Booker 56 y.o. female MRN: [id number]  Encounter: [id number]    Assessment/Plan     57F with obesity and diabetes now status post left breast lumpectomy with sharyn  localization and left axillary sentinel lymph node biopsy, drain placement for pT1bN0 ER/MA+ HER2- grade 1 invasive ductal carcinoma of the left breast with grade 2 DCIS, negative margins, negative nodes, on 4/10/23. Oncotype score was low, she underwent adjuvant radiation and continues on anastrozole.      She has had multiple episodes of left breast infection postoperatively requiring antibiotic therapy. She was recently symptomatic again and is now status post aspiration of a large seroma from the lumpectomy cavity at breast radiology 2 days ago with good relief of symptoms and improvement in exam. Cultures are negative so far. She completed a course of keflex and is now completing a course of bactrim.      The site is starting to fill back in again and become a bit more symptomatic as of today. We discussed a repeat breast center appointment to assess for reaccumulation and the ability to aspirate again. The patient understands that we can sometimes need a few aspirations to completely resolve the situation. Orders placed, discussion with nurse navigation, plan for breast center appointment next week with the help of [facility]. Patient to continue bactrim and I have renewed the bactrim script for now so that she does not run out of it as there are still cellulitic changes to the breast. Will follow up her breast center visit next week. Next mammogram and surveillance visit with me is set up for December 2023. Subjective       Chief Complaint   Patient presents with   • Follow-up      Patient has had recurrent left breast infections since her lumpectomy and radiation. Has recently been on 1 week of keflex with only mild improvement. CT chest in the hospital showed a large seroma.  We started her on bactrim and arranged a next day breast center appointment. They were able to aspirate over 300cc from the large seroma cavity. Cultures are no growth to date. Bactrim was started at the ED. She continues on bactrim but will finish it on Monday. No fevers, no spreading cellulitis. Cellulitis still present but improved from the worst. Noticed a lot of improvement right after the aspiration but feels the breast edema and swelling is increasing again and that the seroma may be filling back in. Continues on anastrozole. Has improved glucose control since obtaining an insulin pump. Review of Systems   Constitutional: Negative. Negative for chills, diaphoresis and fever. Skin: Positive for color change. All other systems reviewed and are negative. The following portions of the patient's history were reviewed and updated as appropriate: allergies, current medications, past family history, past medical history, past social history, past surgical history and problem list.    Objective      Blood pressure 112/68, pulse 88, temperature 97.5 °F (36.4 °C), temperature source Temporal, resp. rate 16, height 5' 3" (1.6 m), weight 103 kg (226 lb), SpO2 98 %. Physical Exam  Vitals reviewed. Constitutional:       General: She is not in acute distress. Appearance: She is obese. HENT:      Head: Normocephalic. Mouth/Throat:      Mouth: Mucous membranes are moist.   Eyes:      Pupils: Pupils are equal, round, and reactive to light. Cardiovascular:      Rate and Rhythm: Normal rate. Pulmonary:      Effort: Pulmonary effort is normal. No respiratory distress. Abdominal:      Palpations: Abdomen is soft. Musculoskeletal:         General: Normal range of motion. Cervical back: Normal range of motion. Skin:     General: Skin is warm and dry. Capillary Refill: Capillary refill takes less than 2 seconds. Findings: Erythema present.    Neurological:      General: No focal deficit present. Mental Status: She is alert. Psychiatric:         Mood and Affect: Mood normal.     Left breast incision well healed, evidence of left breast cellulitis with edema, erythema, mild tenderness, and warmth    Signature:   Grace Franco MD  Date: 9/17/2023 Time: 9:39 AM

## 2023-09-18 ENCOUNTER — TELEPHONE (OUTPATIENT)
Dept: FAMILY MEDICINE CLINIC | Facility: CLINIC | Age: 58
End: 2023-09-18

## 2023-09-18 ENCOUNTER — SOCIAL WORK (OUTPATIENT)
Dept: BEHAVIORAL/MENTAL HEALTH CLINIC | Facility: CLINIC | Age: 58
End: 2023-09-18
Payer: COMMERCIAL

## 2023-09-18 DIAGNOSIS — F43.10 POST-TRAUMATIC STRESS: ICD-10-CM

## 2023-09-18 DIAGNOSIS — F33.1 MODERATE EPISODE OF RECURRENT MAJOR DEPRESSIVE DISORDER (HCC): Primary | ICD-10-CM

## 2023-09-18 DIAGNOSIS — F41.9 ANXIETY: Primary | ICD-10-CM

## 2023-09-18 PROCEDURE — 90837 PSYTX W PT 60 MINUTES: CPT | Performed by: SOCIAL WORKER

## 2023-09-18 RX ORDER — ALPRAZOLAM 0.5 MG/1
0.5 TABLET ORAL 3 TIMES DAILY PRN
Qty: 90 TABLET | Refills: 0 | Status: SHIPPED | OUTPATIENT
Start: 2023-09-18

## 2023-09-18 NOTE — TELEPHONE ENCOUNTER
Pt requested refill on Xanax. Medication not listed as current, per pt she only uses as needed but would like to have as she is going through personal issues.   ty

## 2023-09-18 NOTE — PSYCH
Behavioral Health Psychotherapy Progress Note    Psychotherapy Provided: Individual Psychotherapy     No diagnosis found. Goals addressed in session: Goal 1     DATA: The Client reported that she will be having surgery on her breast due to infection in her left breast. She reported continued anxiety and depression over her physical health, and the effects on her personal relationships coping skills were reviewed during the session. During this session, this clinician used the following therapeutic modalities: Cognitive Behavioral Therapy, Mindfulness-based Strategies, Solution-Focused Therapy and Supportive Psychotherapy    Substance Abuse was not addressed during this session. If the client is diagnosed with a co-occurring substance use disorder, please indicate any changes in the frequency or amount of use: n/a. Stage of change for addressing substance use diagnoses: No substance use/Not applicable    ASSESSMENT:  Darrin Ho presents with a Anxious and Depressed mood. her affect is Normal range and intensity, which is congruent, with her mood and the content of the session. The client has not made progress on their goals. The Client  Darrin Ho presents with a none risk of suicide, none risk of self-harm, and none risk of harm to others. For any risk assessment that surpasses a "low" rating, a safety plan must be developed. A safety plan was indicated: no  If yes, describe in detail n/a    PLAN: Between sessions, Darrin Ho will utilize  Her coping skills when presented with anxiety and or derpssion. At the next session, the therapist will use Cognitive Behavioral Therapy, Cognitive Processing Therapy, Mindfulness-based Strategies, Solution-Focused Therapy and Supportive Psychotherapy to address the client's MH issues and the effects on her different relationships and environments.      Behavioral Health Treatment Plan and Discharge Planning: Darrin Ho is aware of and agrees to continue to work on their treatment plan. They have identified and are working toward their discharge goals.  yes    Visit start and stop times:    09/18/23  Start Time: 1401  Stop Time: 1455  Total Visit Time: 54 minutes

## 2023-09-21 ENCOUNTER — HOSPITAL ENCOUNTER (OUTPATIENT)
Dept: ULTRASOUND IMAGING | Facility: CLINIC | Age: 58
End: 2023-09-21
Payer: COMMERCIAL

## 2023-09-21 VITALS — SYSTOLIC BLOOD PRESSURE: 136 MMHG | DIASTOLIC BLOOD PRESSURE: 77 MMHG | HEART RATE: 88 BPM

## 2023-09-21 DIAGNOSIS — C50.912 INVASIVE DUCTAL CARCINOMA OF BREAST, FEMALE, LEFT (HCC): ICD-10-CM

## 2023-09-21 DIAGNOSIS — N64.89 SEROMA OF BREAST: ICD-10-CM

## 2023-09-21 DIAGNOSIS — L03.313 CELLULITIS OF CHEST WALL: ICD-10-CM

## 2023-09-21 PROCEDURE — 76942 ECHO GUIDE FOR BIOPSY: CPT

## 2023-09-21 PROCEDURE — 19000 PUNCTURE ASPIR CYST BREAST: CPT

## 2023-09-21 PROCEDURE — 76642 ULTRASOUND BREAST LIMITED: CPT

## 2023-09-21 RX ORDER — LIDOCAINE HYDROCHLORIDE 10 MG/ML
5 INJECTION, SOLUTION EPIDURAL; INFILTRATION; INTRACAUDAL; PERINEURAL ONCE
Status: COMPLETED | OUTPATIENT
Start: 2023-09-21 | End: 2023-09-21

## 2023-09-21 RX ADMIN — LIDOCAINE HYDROCHLORIDE 5 ML: 10 INJECTION, SOLUTION EPIDURAL; INFILTRATION; INTRACAUDAL; PERINEURAL at 09:07

## 2023-09-21 NOTE — PROGRESS NOTES
Procedure type:    ___X__ultrasound guided _____stereotactic    Breast:    ___X__Left _____Right    Location: 2:00 10cmfn    Needle: 18G precision    # of passes: 1, 130cc fluid drained    Clip: N/A    Performed by: Dr. Ezequiel Burger held for 5 minutes by: Bruce Neff Strips:    ___X__yes ___X__no    Band aid:    __X___yes_____no    Tolerated procedure:    __X___yes _____no

## 2023-09-22 NOTE — PROGRESS NOTES
Post procedure call completed    Bleeding: _____yes __X___no (pt denies)    Pain: _____yes ___X___no (pt states she still has pain at site as before, no new pain)    Redness/Swelling: ______yes ___X___no (pt denies)    Band aid removed: __X___yes _____no       Pt with no questions at this time, adv to call with any questions or concerns, has name/# for contact, adv pt that Dr. David He office will reach out to schedule f/u with their office, adv I do have another appt for poss aspiration on hold for her on 10/3 at [facility] if needed but that will be determined after follow up appt with Dr. Rohan Khan, pt states understanding

## 2023-09-27 ENCOUNTER — OFFICE VISIT (OUTPATIENT)
Dept: SURGERY | Facility: CLINIC | Age: 58
End: 2023-09-27
Payer: COMMERCIAL

## 2023-09-27 VITALS
HEART RATE: 96 BPM | TEMPERATURE: 97.7 F | RESPIRATION RATE: 16 BRPM | DIASTOLIC BLOOD PRESSURE: 72 MMHG | BODY MASS INDEX: 40.01 KG/M2 | OXYGEN SATURATION: 97 % | HEIGHT: 63 IN | WEIGHT: 225.8 LBS | SYSTOLIC BLOOD PRESSURE: 118 MMHG

## 2023-09-27 DIAGNOSIS — C50.912 INVASIVE DUCTAL CARCINOMA OF BREAST, FEMALE, LEFT (HCC): ICD-10-CM

## 2023-09-27 DIAGNOSIS — I89.0 LYMPHEDEMA OF BREAST: ICD-10-CM

## 2023-09-27 DIAGNOSIS — N64.89 SEROMA OF BREAST: Primary | ICD-10-CM

## 2023-09-27 DIAGNOSIS — L03.313 CELLULITIS OF CHEST WALL: ICD-10-CM

## 2023-09-27 PROCEDURE — 99213 OFFICE O/P EST LOW 20 MIN: CPT | Performed by: SURGERY

## 2023-09-27 NOTE — PROGRESS NOTES
Message rec'd from [facility], Dr. Wyman Sor office, regarding recommendation for;    _____ RIGHT __X____LEFT      __X___Ultrasound guided  ______Stereotactic seroma/abscess aspiration.     Procedure was explained to patient by Dr. Elayne Moss    Blood thinners: No: __X___ Yes: _____ What:     Biopsy teaching sheet given:  _____yes __X__no (All teaching points discussed during call, pt questions answered during office visit, pt adv to arrive at 1400 for Ultrasound to be followed by aspiration)    Pt given name/# for any further questions/needs

## 2023-09-29 PROBLEM — I89.0 LYMPHEDEMA OF BREAST: Status: ACTIVE | Noted: 2023-09-29

## 2023-09-29 NOTE — PROGRESS NOTES
Progress Note - Surgical Oncology  Brigida Bradshaw 56 y.o. female MRN: [id number]  Encounter: [id number]    Assessment/Plan     57F with obesity and diabetes now status post left breast lumpectomy with sharyn  localization and left axillary sentinel lymph node biopsy, drain placement for pT1bN0 ER/[place]+ HER2- grade 1 invasive ductal carcinoma of the left breast with grade 2 DCIS, negative margins, negative nodes, on 4/10/23. Oncotype score was low, she underwent adjuvant radiation and continues on anastrozole.      She has had multiple episodes of left breast infection postoperatively requiring antibiotic therapy. She was recently symptomatic again and is now status post aspiration of a large seroma from the lumpectomy cavity at breast radiology x 2. She completed antibiotic courses of keflex and bactrim. Cultures have been negative. She is scheduled for an additional aspiration next week. She feels her breast is back to baseline in terms of its post treatment appearance, color, temperature. She is now used to having an edematous, mildly warm and erythematous breast. The flare that recently occurred and led to aspiration procedures and antibiotics is resolved, although her baseline exam remains abnormal.     We discussed my concern for likely an underlying breast lymphedema contributing to the recurrent cellulitis. Her physical exam is consistent with this. I am not completely convinced that the seroma is infected or remains the culprit of her recurrent soft tissue infections. We will proceed with the one additional aspiration procedure as scheduled and ask the team to send off cultures again as she is now off of antibiotics. We will hold off on IR evaluation for drain placement for now, this can be revisited.  We will also arrange an evaluation with PT/OT lymphedema therapy to initiate massage and compression and formal treatment for lymphedema, hoping to reduce the episodes of cellulitis and improve her baseline breast exam.     Next mammogram and surveillance visit with me is set up for December 2023. We will see her for a clinical visit again after she has had her next aspiration and established with PT/OT lymphedema therapy to check in on progress. Subjective       Chief Complaint   Patient presents with   • Follow-up      Feels brief relief after seroma aspiration and then feels the fluid reaccumulates. Cellulitis flare has resolved, she is now off of antibiotics, cultures have been negative. It is notable that she feels she is now at her post treatment baseline. Her breast is slightly warm and edematous with peau d'orange, the breast is heavy, mildly red. She has been used to this since radiation ended. The cellulitis recently was a flare on top of this and she feels it is now under control. She is willing to try PT/OT lymphedema therapy to see if that can help improve things. We will proceed with one additional aspiration of the seroma as well. Review of Systems   Constitutional: Negative. Skin: Positive for color change. Hematological: Negative. All other systems reviewed and are negative. The following portions of the patient's history were reviewed and updated as appropriate: allergies, current medications, past family history, past medical history, past social history, past surgical history and problem list.    Objective      Blood pressure 118/72, pulse 96, temperature 97.7 °F (36.5 °C), temperature source Temporal, resp. rate 16, height 5' 3" (1.6 m), weight 102 kg (225 lb 12.8 oz), SpO2 97 %. Physical Exam  Vitals reviewed. Constitutional:       General: She is not in acute distress. Appearance: She is obese. She is not ill-appearing or toxic-appearing. HENT:      Head: Normocephalic. Mouth/Throat:      Mouth: Mucous membranes are moist.   Eyes:      Pupils: Pupils are equal, round, and reactive to light. Cardiovascular:      Rate and Rhythm: Normal rate.    Pulmonary: Effort: Pulmonary effort is normal. No respiratory distress. Abdominal:      Palpations: Abdomen is soft. Musculoskeletal:         General: Normal range of motion. Cervical back: Normal range of motion. Skin:     General: Skin is warm and dry. Capillary Refill: Capillary refill takes less than 2 seconds. Findings: Erythema present. Neurological:      General: No focal deficit present. Mental Status: She is alert. Mental status is at baseline. Psychiatric:         Mood and Affect: Mood normal.     Left breast with well healed incision, chronic edema, erythema, warmth, peau d'orange, appears consistent with lymphedema, recent more severe cellulitis changes have improved but the baseline exam resembles a cellulitis as well at this point    Signature:   Sonya Reyes MD  Date: 9/29/2023 Time: 10:51 AM

## 2023-10-02 ENCOUNTER — TELEPHONE (OUTPATIENT)
Dept: SURGERY | Facility: CLINIC | Age: 58
End: 2023-10-02

## 2023-10-02 ENCOUNTER — TELEMEDICINE (OUTPATIENT)
Dept: BEHAVIORAL/MENTAL HEALTH CLINIC | Facility: CLINIC | Age: 58
End: 2023-10-02
Payer: COMMERCIAL

## 2023-10-02 DIAGNOSIS — F43.10 POST-TRAUMATIC STRESS: ICD-10-CM

## 2023-10-02 DIAGNOSIS — F33.1 MODERATE EPISODE OF RECURRENT MAJOR DEPRESSIVE DISORDER (HCC): Primary | ICD-10-CM

## 2023-10-02 PROCEDURE — 90834 PSYTX W PT 45 MINUTES: CPT | Performed by: SOCIAL WORKER

## 2023-10-02 NOTE — PSYCH
Behavioral Health Psychotherapy Progress Note    Psychotherapy Provided: Individual Psychotherapy     1. Moderate episode of recurrent major depressive disorder ([address])        2. Post-traumatic stress            Goals addressed in session: Goal 1     DATA: The client reported that her surgeon has sent her to PT due to lymphadenia in her breast. She reported that this health issue has increased her depression and anxiety. "I am numb for everything" During the session we explored and processed the Client  emotions. She was able to identify that her emotions come from her chaotic childhood, and her lack of feelings and emotions. During this session, this clinician used the following therapeutic modalities: Cognitive Behavioral Therapy, Mindfulness-based Strategies, Solution-Focused Therapy and Supportive Psychotherapy    Substance Abuse was not addressed during this session. If the client is diagnosed with a co-occurring substance use disorder, please indicate any changes in the frequency or amount of use: n/a. Stage of change for addressing substance use diagnoses: No substance use/Not applicable    ASSESSMENT:  Doyle Bearden presents with a Anxious and Depressed mood. her affect is Normal range and intensity, which is congruent, with her mood and the content of the session. The client has not made progress on their goals. The Client  Doyle Bearden presents with a none risk of suicide, none risk of self-harm, and none risk of harm to others. For any risk assessment that surpasses a "low" rating, a safety plan must be developed. [facility] MENTAL STATUS PAIN 7 as reported by the Client due to her physical health  PHYSICAL PAIN SCALE NUMBERS: 9 as reported by the Client due pain her breast     A safety plan was indicated: no  If yes, describe in detail n/a    PLAN: Between sessions, Doyle Bearden will  utilize her coping skills when presented with anxiety and or depression.  the next session, the therapist will use Cognitive Behavioral Therapy, Cognitive Processing Therapy, Mindfulness-based Strategies, Solution-Focused Therapy and Supportive Psychotherapy to address the Client's MH issues that are effecting her different relationships and environments. Behavioral Health Treatment Plan and Discharge Planning: Triston Paz is aware of and agrees to continue to work on their treatment plan. They have identified and are working toward their discharge goals. yes    Visit start and stop times:    10/02/23  Start Time: 1155  Stop Time: 1240  Total Visit Time: 45 minutes  Virtual Brief Visit    This Visit is being completed by telephone. The Patient is located at Home and in the following state in which I hold an active license [place]    The patient was identified by name and date of birth. Alli Stockton was informed that this is a telemedicine visit and that the visit is being conducted through Telephone. My office door was closed. No one else was in the room. She acknowledged consent and understanding of privacy and security of the video platform. The patient has agreed to participate and understands they can discontinue the visit at any time. Patient is aware this is a billable service. Assessment/Plan:    Problem List Items Addressed This Visit        Other    Moderate episode of recurrent major depressive disorder ([address]) - Primary    Post-traumatic stress       Recent Visits  No visits were found meeting these conditions. Showing recent visits within past 7 days and meeting all other requirements  Today's Visits  Date Type Provider Dept   10/02/23 Telemedicine TY Alvarez  Cln Psych   Showing today's visits and meeting all other requirements  Future Appointments  No visits were found meeting these conditions.   Showing future appointments within next 150 days and meeting all other requirements         Visit Time  Total Visit Duration: 45

## 2023-10-02 NOTE — TELEPHONE ENCOUNTER
Placed call to patient and left a message to return our call. Need to advise her I was able to get her scheduled with [facility] this Thursday for the lymphedema therapy. If she is unable to do that time / day let me know.

## 2023-10-03 ENCOUNTER — HOSPITAL ENCOUNTER (OUTPATIENT)
Dept: ULTRASOUND IMAGING | Facility: HOSPITAL | Age: 58
Discharge: HOME | End: 2023-10-03
Attending: SURGERY
Payer: COMMERCIAL

## 2023-10-03 VITALS — DIASTOLIC BLOOD PRESSURE: 86 MMHG | HEART RATE: 92 BPM | SYSTOLIC BLOOD PRESSURE: 138 MMHG

## 2023-10-03 DIAGNOSIS — N61.1 ABSCESS OF LEFT BREAST: ICD-10-CM

## 2023-10-03 PROCEDURE — 19000 PUNCTURE ASPIR CYST BREAST: CPT

## 2023-10-03 PROCEDURE — 87205 SMEAR GRAM STAIN: CPT | Performed by: SURGERY

## 2023-10-03 PROCEDURE — 87070 CULTURE OTHR SPECIMN AEROBIC: CPT | Performed by: SURGERY

## 2023-10-03 PROCEDURE — 76942 ECHO GUIDE FOR BIOPSY: CPT

## 2023-10-03 PROCEDURE — 76642 ULTRASOUND BREAST LIMITED: CPT

## 2023-10-03 RX ORDER — LIDOCAINE HYDROCHLORIDE 10 MG/ML
5 INJECTION, SOLUTION EPIDURAL; INFILTRATION; INTRACAUDAL; PERINEURAL ONCE
Status: COMPLETED | OUTPATIENT
Start: 2023-10-03 | End: 2023-10-03

## 2023-10-03 RX ADMIN — LIDOCAINE HYDROCHLORIDE 5 ML: 10 INJECTION, SOLUTION EPIDURAL; INFILTRATION; INTRACAUDAL; PERINEURAL at 14:33

## 2023-10-03 NOTE — PROGRESS NOTES
Procedure type:    ___X__ultrasound guided CYST ASPIRATION   _____stereotactic    Breast:    __X___Left _____Right    Location: 2 OCLOCK, 10CMFN    Needle: 18G SPINAL NEEDLE    # of passes: 1    Clip:  SAMEER    Performed by:DR GARVIN CORDS    Pressure held for 5 minutes by: SAMEER Neff Strips:    ____yes __X___no    Band aid:    __X___yes_____no    Tolerated procedure:    __X___yes _____no

## 2023-10-03 NOTE — PROGRESS NOTES
PT Evaluation     Today's date: 10/5/23  Patient name: Jori Aguirre  : 1965  MRN: [id number]  Referring provider: Chantel Lam MD  Dx:   Encounter Diagnosis     ICD-10-CM    1. Ductal carcinoma of left breast ([address])  C50.912       2. Lymphedema of breast  I89.0       3. Seroma of breast  N64.89                      Assessment  Assessment details: Patient presents to OPPT with s/s consistent with left breast/UE lymphedema. .  PT interventions to include CDT (complete decongestive therapy) including MLD (manual lymphatic drainage) and compression using short stretch bandages as able. PT to further provide extensive patient education on self bandaging, self MLD techniques, and skin care. Patient will transition  to long term maintenance with compression plan for both morning and evening wear once optimal decongestive and volume reduction of limb has been achieved. Thank you for this referral and the opportunity to participate in the care of this patient. Impairments: abnormal or restricted ROM, abnormal movement, activity intolerance, impaired physical strength, lacks appropriate home exercise program and pain with function  Other impairment: LUE edema  Understanding of Dx/Px/POC: good   Prognosis: good  Prognosis details: Positive prognostic indicators include positive attitude toward recovery and good understanding of diagnosis and treatment plan options. Negative prognostic indicators include financial concerns due to copay. .        Goals  STGs to be achieved in 2 - 4 weeks  Demonstrate at least 75% compliance with self MLD  Demonstrate at least 75% compliance with self compression  Demonstrate at least 75% compliance with self skin and nail inspection  Free from s/s of infection  Demonstrate understanding of importance of compliance with POC    LTGs to be achieved in 4 - 6 weeks  Demonstrate at least 100% compliance with self MLD  Demonstrate at least 100% compliance with self compression  Demonstrate at least 100% compliance with self skin and nail inspection  Free from s/s of infection  Demonstrate understanding of day/night compression wearing schedule  Obtain alternative compression to ensure independence with self management      Plan  Patient would benefit from: skilled physical therapy  Other planned modality interventions: Modalities prn for symptom management  Planned therapy interventions: manual therapy, neuromuscular re-education, therapeutic activities, therapeutic exercise, home exercise program and gait training  Frequency: 2x week  Duration in visits: 8  Duration in weeks: 12  Plan of Care beginning date: 10/5/2023  Plan of Care expiration date: 2024  Treatment plan discussed with: patient and PTA        Subjective Evaluation    History of Present Illness  Mechanism of injury: Pt as dx'er with L breast CA in 2023. And underwent lumpectomy in 2023. Had 3 weeks of radiation and developed breast seroma. Now has lymphedema dx of L breast. Also had cellulitis of L breast. Notes she is a belly sleeper and is having trouble laying on the L breast. States it feels like sleeping on a rock.           Not a recurrent problem   Patient Goals  Patient goals for therapy: decreased edema, increased motion, increased strength and independence with ADLs/IADLs    Pain  Current pain ratin  At best pain ratin  At worst pain rating: 10  Location: L breast  Quality: dull ache, sharp, pressure and tight  Progression: worsening    Social Support  Lives with: spouse and adult children    Treatments  Current treatment: physical therapy        Objective  Lymphedema Evaluation    Medical/MLD Precautions/Contraindications:  __Y_  Cardiac/CHF/Cardiac Edema/Cardiac arrhythmia-- MI 2021, A-fib  __Y_  Pulmonary- asthma  _N__  Kidney  _N__  Liver  _N__  Current Fever/Infection  _N__  Current/Recent Wounds  _N__  Current/Recent Treatments/Interventions/Port Access/PICC Line/Trell filter  _N__  Current/Recent/History of DVT or Clotting issues/Pelvic DVT  _N__  Age > 62 y/o  _Y__  HTN  __Y_  Malignancy  _N__  Hyper/hypothyroidism  _N__  AAA  _N__  GI disorder (Crohn's Diverticulitis, IBD)  __N_  Liver disease  _N__  Recent abdominal surgery  _N__  Pregnancy  _N__  Abdominal pain    Bandaging Precautions/Contraindications:  _Y__  Diabetes--IDDM  _Y__  Neuropathy  _Y__  Vascular problems/insufficiency-LEs  _N__  Arterial Disease    Other:  _N__  Latex allergies  _N__  Pacemaker    CA treatment:  _N__  Chemo  __Y_  XRT- completed      ROM Considerations: limited L IR      Strength Considerations:  Weakness noted L IR      Gait Considerations: no gait issues  Skin Considerations/Scars:  No skin issues at this time.   Patient presents with skin inspection as follows:  Hemosiderin staining/skin discoloration  Finger/Toe Nails  Open Wounds/Size/Color  S/S infection  Firm/Sponge/Hard  Peau D' Orange/Papillomas  Lymphorrhea/Weeping  Skin mobility  Skin temperature  Fungal infection  Lymph fistula  Lipodermatosclerosis    Girth:  See volumetrics               Precautions: see extensive PMH    Re-eval Date: 11/16/23    Date 10/5       Visit Count 1       FOTO        Pain In See IE       Pain Out same               Manuals L UE   volumetrics       CDT Pt provided handouts with further educ/review upcoming  - What is CDT/ benefits with compliancy  - What is lymphedema  - Prevention  - Skin care, signs and sx's infection/cellulitis  - Self lymphedema massage/MLD with sequence  - Self wrap/merline/doffing compression, care management, replace every 6 months    15 min  CV/CLT-PTA                                 Neuro Re-Ed                                                                Ther Ex                                                                        Ther Activity                        Gait Training                        Modalities

## 2023-10-03 NOTE — PROGRESS NOTES
Ice pack given:    ____yes __X___no    Discharge instructions reviewed and given to patient:    __X___yes _____no    Discharged via:    __X___amulatory    _____wheelchair    _____stretcher    Stable on discharge:    __X___yes ____no

## 2023-10-04 NOTE — PROGRESS NOTES
Post procedure call completed    Bleeding: _____yes __X___no (pt denies)    Pain: _____yes ___X___no (pt states soreness)    Redness/Swelling: ______yes ___X___no (pt still feels a lump)    Band aid removed: __X___yes _____no    Pt with no questions at this time, adv Dr. Wetzel Favre office will call when results available, adv to call with any questions or concerns, has name/# for contact    Pt states she is starting therapy tomorrow, states she was told she has lymphedema

## 2023-10-05 ENCOUNTER — EVALUATION (OUTPATIENT)
Dept: PHYSICAL THERAPY | Facility: CLINIC | Age: 58
End: 2023-10-05
Payer: COMMERCIAL

## 2023-10-05 DIAGNOSIS — C50.912 INVASIVE DUCTAL CARCINOMA OF BREAST, FEMALE, LEFT (HCC): ICD-10-CM

## 2023-10-05 DIAGNOSIS — N64.89 SEROMA OF BREAST: ICD-10-CM

## 2023-10-05 DIAGNOSIS — C50.912 DUCTAL CARCINOMA OF LEFT BREAST (HCC): Primary | ICD-10-CM

## 2023-10-05 DIAGNOSIS — I89.0 LYMPHEDEMA OF BREAST: ICD-10-CM

## 2023-10-05 DIAGNOSIS — L03.313 CELLULITIS OF CHEST WALL: ICD-10-CM

## 2023-10-05 PROCEDURE — 97110 THERAPEUTIC EXERCISES: CPT

## 2023-10-05 PROCEDURE — 97163 PT EVAL HIGH COMPLEX 45 MIN: CPT

## 2023-10-06 LAB
BACTERIA WND AEROBE CULT: NO GROWTH
GRAM STN SPEC: NORMAL
GRAM STN SPEC: NORMAL

## 2023-10-09 ENCOUNTER — OFFICE VISIT (OUTPATIENT)
Dept: PHYSICAL THERAPY | Facility: CLINIC | Age: 58
End: 2023-10-09
Payer: COMMERCIAL

## 2023-10-09 DIAGNOSIS — N64.89 SEROMA OF BREAST: ICD-10-CM

## 2023-10-09 DIAGNOSIS — C50.912 INVASIVE DUCTAL CARCINOMA OF BREAST, FEMALE, LEFT (HCC): ICD-10-CM

## 2023-10-09 DIAGNOSIS — I89.0 LYMPHEDEMA OF BREAST: ICD-10-CM

## 2023-10-09 DIAGNOSIS — C50.912 DUCTAL CARCINOMA OF LEFT BREAST (HCC): Primary | ICD-10-CM

## 2023-10-09 DIAGNOSIS — L03.313 CELLULITIS OF CHEST WALL: ICD-10-CM

## 2023-10-09 PROCEDURE — 97140 MANUAL THERAPY 1/> REGIONS: CPT

## 2023-10-09 PROCEDURE — 97110 THERAPEUTIC EXERCISES: CPT

## 2023-10-09 NOTE — PROGRESS NOTES
Daily Note     Today's date: 10/9/2023  Patient name: Cesilia Jacobs  : 1965  MRN: [id number]  Referring provider: Lisa Bartholomew MD  Dx:   Encounter Diagnosis     ICD-10-CM    1. Ductal carcinoma of left breast ([address])  C50.912       2. Lymphedema of breast  I89.0       3. Seroma of breast  N64.89       4. Invasive ductal carcinoma of breast, female, left ([address])  C50.912       5. Cellulitis of chest wall  L03.313                      Subjective: I am not able to sleep, usually a belly sleeper  Currently too painful to sleep on belly  Have pain raising my left arm      Objective: See treatment diary below      Assessment: Tolerated MLD treatment well. Pt presents with peau d’orange L breast  Pt to benefit with compression bra/swell spot-pad, L UE sleeve  Pt provided information to obtain compression but pt indicated limited currently financially  Pt demon < firmness/decongestion L breast end rx session  Pt encourage to f/u with MD if sx's of infection/cellulitis return   Patient would benefit from continued PT      Plan: Continue per plan of care.       Precautions: see extensive PMH    Re-eval Date: 23  No Cocoa Butter    Date 10/5 10/9      Visit Count 1 2      FOTO        Pain In See IE       Pain Out same               Manuals L UE   volumetrics       CDT Pt provided handouts with further educ/review upcoming  - What is CDT/ benefits with compliancy  - What is lymphedema  - Prevention  - Skin care, signs and sx's infection/cellulitis  - Self lymphedema massage/MLD with sequence  - Self wrap/merline/doffing compression, care management, replace every 6 months    15 min  CV/CLT-PTA   MLD  - Short neck  - Diag breath  - L UE  - L AIA  - L anterior thorax    Pt educ    - What is lymphedema  - Prevention  - Skin care, signs and sx's infection/cellulitis  - Self lymphedema massage/MLD with sequence    Discussed various compression options    60 min - Self wrap/merline/doffing compression, care management, replace every 6 months HEP:          Lymphedema exercises  Handouts issued  Cues prn for proper form/technique                                  Neuro Re-Ed                                                                Ther Ex                                                                        Ther Activity                        Gait Training                        Modalities

## 2023-10-10 ENCOUNTER — TELEPHONE (OUTPATIENT)
Dept: SURGERY | Facility: CLINIC | Age: 58
End: 2023-10-10

## 2023-10-10 NOTE — TELEPHONE ENCOUNTER
Patient called and is going to [facility] Friday at 9 she does need a script and also the last office visit notes prior to appt

## 2023-10-10 NOTE — PROGRESS NOTES
Daily Note     Today's date: 10/11/2023  Patient name: Paco Soriano  : 1965  MRN: [id number]  Referring provider: Blanca Mirza MD  Dx:   Encounter Diagnosis     ICD-10-CM    1. Lymphedema of breast  I89.0       2. Seroma of breast  N64.89                      Subjective: I fell asleep for 2 hours after last PT session  I go for my apt for compression Friday and ordered swell spot/pad      Objective: See treatment diary below      Assessment: Tolerated MLD treatment well. Demon overall < redness L lat breast region  Cont's with peau d’orange sx's L breast  Pt demon improved lymph mobility this date with MLD  Noted < congestion/firmness L breast end rx session  Noted 4 red "dots" lat thorax region pt to monitor  Patient would benefit from continued PT      Plan: Continue per plan of care.       Precautions: see extensive PMH    Re-eval Date: 23  No Cocoa Butter    Date 10/5 10/9 10/11     Visit Count 1 2 3     FOTO        Pain In See IE       Pain Out same               Manuals L UE   volumetrics       CDT Pt provided handouts with further educ/review upcoming  - What is CDT/ benefits with compliancy  - What is lymphedema  - Prevention  - Skin care, signs and sx's infection/cellulitis  - Self lymphedema massage/MLD with sequence  - Self wrap/merline/doffing compression, care management, replace every 6 months    15 min  CV/CLT-PTA   MLD  - Short neck  - Diag breath  - L UE  - L AIA  - L anterior thorax    Pt educ    - What is lymphedema  - Prevention  - Skin care, signs and sx's infection/cellulitis  - Self lymphedema massage/MLD with sequence    Discussed various compression options    60 min MLD  - Short neck  - Diag breath  - L UE  - L AIA  - L anterior thorax    Pt educ/review  - Self lymphedema massage/MLD with sequence  With pt trial self massage tech    55 min HEP:          Lymphedema exercises  Handouts issued  Cues prn for proper form/technique    - Self wrap/merline/doffing compression, care management, replace every 6 months                              Neuro Re-Ed                                                                Ther Ex                                                                        Ther Activity                        Gait Training                        Modalities

## 2023-10-11 ENCOUNTER — OFFICE VISIT (OUTPATIENT)
Dept: PHYSICAL THERAPY | Facility: CLINIC | Age: 58
End: 2023-10-11
Payer: COMMERCIAL

## 2023-10-11 DIAGNOSIS — I89.0 LYMPHEDEMA OF BREAST: Primary | ICD-10-CM

## 2023-10-11 DIAGNOSIS — N64.89 SEROMA OF BREAST: ICD-10-CM

## 2023-10-11 PROCEDURE — 97140 MANUAL THERAPY 1/> REGIONS: CPT

## 2023-10-11 PROCEDURE — 97110 THERAPEUTIC EXERCISES: CPT

## 2023-10-12 DIAGNOSIS — E11.65 TYPE 2 DIABETES MELLITUS WITH HYPERGLYCEMIA, WITH LONG-TERM CURRENT USE OF INSULIN (HCC): Primary | ICD-10-CM

## 2023-10-12 DIAGNOSIS — Z79.4 TYPE 2 DIABETES MELLITUS WITH HYPERGLYCEMIA, WITH LONG-TERM CURRENT USE OF INSULIN (HCC): Primary | ICD-10-CM

## 2023-10-12 RX ORDER — INSULIN ASPART INJECTION 100 [IU]/ML
INJECTION, SOLUTION SUBCUTANEOUS
Qty: 40 ML | Refills: 11 | Status: SHIPPED | OUTPATIENT
Start: 2023-10-12

## 2023-10-16 ENCOUNTER — SOCIAL WORK (OUTPATIENT)
Dept: BEHAVIORAL/MENTAL HEALTH CLINIC | Facility: CLINIC | Age: 58
End: 2023-10-16
Payer: COMMERCIAL

## 2023-10-16 ENCOUNTER — APPOINTMENT (OUTPATIENT)
Dept: PHYSICAL THERAPY | Facility: CLINIC | Age: 58
End: 2023-10-16
Payer: COMMERCIAL

## 2023-10-16 DIAGNOSIS — F33.1 MODERATE EPISODE OF RECURRENT MAJOR DEPRESSIVE DISORDER (HCC): Primary | ICD-10-CM

## 2023-10-16 DIAGNOSIS — F43.10 POST-TRAUMATIC STRESS: ICD-10-CM

## 2023-10-16 PROCEDURE — 90837 PSYTX W PT 60 MINUTES: CPT | Performed by: SOCIAL WORKER

## 2023-10-16 RX ORDER — BUPROPION HYDROCHLORIDE 150 MG/1
TABLET, EXTENDED RELEASE ORAL
Qty: 42 TABLET | Refills: 0 | Status: SHIPPED | OUTPATIENT
Start: 2023-10-16

## 2023-10-16 NOTE — PSYCH
Behavioral Health Psychotherapy Progress Note    Psychotherapy Provided: Individual Psychotherapy     1. Moderate episode of recurrent major depressive disorder ([address])        2. Post-traumatic stress            Goals addressed in session: Goal 1     DATA: The Client reported increased depression and anxiety. " My depression is so bad it is hard" During the session we explored the ways to address her increased depression with the distraction techniques of ACCEPT. The Client reported that she does some of these already to address her depression. As team we explored her triggers and ways to utilize her coping skills in addressing her weaknesses. During this session, this clinician used the following therapeutic modalities: Cognitive Behavioral Therapy, Dialectical Behavior Therapy, Motivational Interviewing, Solution-Focused Therapy, and Supportive Psychotherapy    Substance Abuse was not addressed during this session. If the client is diagnosed with a co-occurring substance use disorder, please indicate any changes in the frequency or amount of use: n/a. Stage of change for addressing substance use diagnoses: No substance use/Not applicable    ASSESSMENT:  Octavio Sahu presents with a Anxious and Depressed mood. congruent     her affect is Normal range and intensity, which is congruent, with her mood and the content of the session. The client has made progress on their goals. The Client  Octavio Sahu presents with a none risk of suicide, none risk of self-harm, and none risk of harm to others. PSYCH MENTAL STATUS PAIN 9 as reported by the client due to " I don't know why it is so High I feel numb"   PHYSICAL PAIN SCALE NUMBERS: 7 as reported by the client due to "back, neck, and boobs"      For any risk assessment that surpasses a "low" rating, a safety plan must be developed.     A safety plan was indicated: no  If yes, describe in detail N/A    PLAN: Between sessions, Octavio Sahu will set better boundaries with her Partner and time together. At the next session, the therapist will use Cognitive Behavioral Therapy, Motivational Interviewing, Solution-Focused Therapy, and Supportive Psychotherapy to address the client's MH issues and the effects on her different relationships and enviroments. .    Behavioral Health Treatment Plan and Discharge Planning: Julianne Ko is aware of and agrees to continue to work on their treatment plan. They have identified and are working toward their discharge goals.  yes    Visit start and stop times:    10/16/23  Start Time: 1133  Stop Time: 1243  Total Visit Time: 70 minutes Patient

## 2023-10-16 NOTE — PROGRESS NOTES
Daily Note     Today's date: 10/16/2023  Patient name: Rossy Morales  : 1965  MRN: [id number]  Referring provider: Lizzie Arredondo MD  Dx: No diagnosis found. Subjective: ***      Objective: See treatment diary below      Assessment: Tolerated treatment {Tolerated treatment :8293725340}.  Patient {assessment:[id number]}      Plan: {PLAN:[id number]}     Precautions: see extensive PMH    Re-eval Date: 23  No Cocoa Butter    Date 10/5 10/9 10/11     Visit Count 1 2 3     FOTO        Pain In See IE       Pain Out same               Manuals L UE   volumetrics       CDT Pt provided handouts with further educ/review upcoming  - What is CDT/ benefits with compliancy  - What is lymphedema  - Prevention  - Skin care, signs and sx's infection/cellulitis  - Self lymphedema massage/MLD with sequence  - Self wrap/merline/doffing compression, care management, replace every 6 months    15 min  CV/CLT-PTA   MLD  - Short neck  - Diag breath  - L UE  - L AIA  - L anterior thorax    Pt educ    - What is lymphedema  - Prevention  - Skin care, signs and sx's infection/cellulitis  - Self lymphedema massage/MLD with sequence    Discussed various compression options    60 min MLD  - Short neck  - Diag breath  - L UE  - L AIA  - L anterior thorax    Pt educ/review  - Self lymphedema massage/MLD with sequence  With pt trial self massage tech    55 min HEP:          Lymphedema exercises  Handouts issued  Cues prn for proper form/technique    - Self wrap/merline/doffing compression, care management, replace every 6 months                              Neuro Re-Ed                                                                Ther Ex                                                                        Ther Activity                        Gait Training                        Modalities

## 2023-10-17 NOTE — PROGRESS NOTES
Pt called stating I am not able to cont' with Pt 2* co-pay / out of pocket expense right now.    Pt self DC at this time

## 2023-10-18 ENCOUNTER — APPOINTMENT (OUTPATIENT)
Dept: PHYSICAL THERAPY | Facility: CLINIC | Age: 58
End: 2023-10-18
Payer: COMMERCIAL

## 2023-10-26 RX ORDER — GLIMEPIRIDE 4 MG/1
TABLET ORAL
COMMUNITY
Start: 2023-10-05

## 2023-10-27 ENCOUNTER — OFFICE VISIT (OUTPATIENT)
Dept: FAMILY MEDICINE CLINIC | Facility: CLINIC | Age: 58
End: 2023-10-27
Payer: COMMERCIAL

## 2023-10-27 ENCOUNTER — SOCIAL WORK (OUTPATIENT)
Dept: BEHAVIORAL/MENTAL HEALTH CLINIC | Facility: CLINIC | Age: 58
End: 2023-10-27

## 2023-10-27 VITALS
HEART RATE: 89 BPM | DIASTOLIC BLOOD PRESSURE: 70 MMHG | SYSTOLIC BLOOD PRESSURE: 117 MMHG | BODY MASS INDEX: 40 KG/M2 | OXYGEN SATURATION: 97 % | HEIGHT: 63 IN

## 2023-10-27 DIAGNOSIS — F43.10 POST-TRAUMATIC STRESS: ICD-10-CM

## 2023-10-27 DIAGNOSIS — F06.4 ANXIETY DISORDER DUE TO GENERAL MEDICAL CONDITION WITH PANIC ATTACK: ICD-10-CM

## 2023-10-27 DIAGNOSIS — F33.1 MODERATE EPISODE OF RECURRENT MAJOR DEPRESSIVE DISORDER (HCC): Primary | ICD-10-CM

## 2023-10-27 DIAGNOSIS — F41.0 ANXIETY DISORDER DUE TO GENERAL MEDICAL CONDITION WITH PANIC ATTACK: ICD-10-CM

## 2023-10-27 DIAGNOSIS — Z79.4 TYPE 2 DIABETES MELLITUS WITH DIABETIC PERIPHERAL ANGIOPATHY WITHOUT GANGRENE, WITH LONG-TERM CURRENT USE OF INSULIN (HCC): ICD-10-CM

## 2023-10-27 DIAGNOSIS — E11.51 TYPE 2 DIABETES MELLITUS WITH DIABETIC PERIPHERAL ANGIOPATHY WITHOUT GANGRENE, WITH LONG-TERM CURRENT USE OF INSULIN (HCC): ICD-10-CM

## 2023-10-27 DIAGNOSIS — F33.2 SEVERE EPISODE OF RECURRENT MAJOR DEPRESSIVE DISORDER, WITHOUT PSYCHOTIC FEATURES (HCC): Primary | ICD-10-CM

## 2023-10-27 PROCEDURE — 99214 OFFICE O/P EST MOD 30 MIN: CPT | Performed by: PHYSICIAN ASSISTANT

## 2023-10-27 PROCEDURE — 99213 OFFICE O/P EST LOW 20 MIN: CPT | Performed by: FAMILY MEDICINE

## 2023-10-27 RX ORDER — FLUOXETINE HYDROCHLORIDE 40 MG/1
40 CAPSULE ORAL DAILY
Qty: 90 CAPSULE | Refills: 3 | Status: SHIPPED | OUTPATIENT
Start: 2023-10-27

## 2023-10-27 RX ORDER — ALPRAZOLAM 0.5 MG/1
0.5 TABLET ORAL 3 TIMES DAILY PRN
Qty: 90 TABLET | Refills: 0 | Status: SHIPPED | OUTPATIENT
Start: 2023-10-27

## 2023-10-27 NOTE — PSYCH
Behavioral Health Psychotherapy Progress Note    Psychotherapy Provided: Individual Psychotherapy     1. Moderate episode of recurrent major depressive disorder ([address])        2. Post-traumatic stress            Goals addressed in session: Goal 1     DATA: The Client reported continued issues with her physical health and continued lymphadenia. As team we explored and processed how the Client's physical health issues effect her mental health. The Client was able to verbalize the  major triggers to her increased MH being the changes in her physical appearance. During the session we also reviewed ways for her to grow her self-esteem. Coping skills were also explored during the session  During this session, this clinician used the following therapeutic modalities: Client-centered Therapy, Cognitive Behavioral Therapy, Mindfulness-based Strategies, Solution-Focused Therapy, and Supportive Psychotherapy    Substance Abuse was not addressed during this session. If the client is diagnosed with a co-occurring substance use disorder, please indicate any changes in the frequency or amount of use: n/a. Stage of change for addressing substance use diagnoses: No substance use/Not applicable    ASSESSMENT:  Julianne Ko presents with a Anxious and Depressed mood. her affect is Normal range and intensity, which is congruent, with her mood and the content of the session. The client has made progress on their goals. The client  Julianne Ko presents with a none risk of suicide, none risk of self-harm, and none risk of harm to others. For any risk assessment that surpasses a "low" rating, a safety plan must be developed. A safety plan was indicated: no  If yes, describe in detail n/a    PLAN: Between sessions, Julianne Ko will practice coping skills when presented with anxiety and or depression.  At the next session, the therapist will use Client-centered Therapy, Cognitive Behavioral Therapy, Mindfulness-based Strategies, Solution-Focused Therapy, and Supportive Psychotherapy to address the Client's MH issues that are effecting her different relationships and environments. .    Behavioral Health Treatment Plan and Discharge Planning: Henry Simmons is aware of and agrees to continue to work on their treatment plan. They have identified and are working toward their discharge goals.  yes    Visit start and stop times:    10/27/23  Start Time: 0830  Stop Time: 3202  Total Visit Time: 55 minutes

## 2023-10-27 NOTE — PROGRESS NOTES
Assessment/Plan:    No problem-specific Assessment & Plan notes found for this encounter. Diagnoses and all orders for this visit:    Severe episode of recurrent major depressive disorder, without psychotic features ([address])  -     FLUoxetine (PROzac) 40 MG capsule; Take 1 capsule (40 mg total) by mouth daily  -     Vortioxetine HBr (TRINTELLIX) 5 MG tablet; Take 1 tablet (5 mg total) by mouth daily    Type 2 diabetes mellitus with diabetic peripheral angiopathy without gangrene, with long-term current use of insulin ([address])  Comments:  re-establish w/ Fredrick Ran as scheduled 10/30    Anxiety disorder due to general medical condition with panic attack  -     FLUoxetine (PROzac) 40 MG capsule; Take 1 capsule (40 mg total) by mouth daily  -     ALPRAZolam (XANAX) 0.5 mg tablet; Take 1 tablet (0.5 mg total) by mouth 3 (three) times a day as needed for anxiety or sleep    Other orders  -     glimepiride (AMARYL) 4 mg tablet; TAKE 1 TABLET BY MOUTH TWICE DAILY (BREAKFAST AND SUPPER)          Subjective:      Patient ID: Alli Stockton is a 56 y.o. female presents for follow up of depression. She could not tolerate the wellbutrin secondary to side effects of insomnia, nightmares. She is still taking prozac and xanax PRN. She follows monthly with LCSW. She notes little interest in doing things and self isolation. Denies SI/HI. Her BP is at goal.    She is re-establishing with endocrinology-  Fredrick Nice. Has apt 10/30    HPI    The following portions of the patient's history were reviewed and updated as appropriate: allergies, current medications, past family history, past medical history, past social history, past surgical history, and problem list.    Review of Systems   Constitutional: Negative. HENT: Negative. Respiratory: Negative. Cardiovascular: Negative. Gastrointestinal: Negative. Psychiatric/Behavioral:  Positive for behavioral problems, dysphoric mood and sleep disturbance. Negative for hallucinations, self-injury and suicidal ideas. The patient is nervous/anxious. The patient is not hyperactive. Objective:      /70 (BP Location: Right arm)   Pulse 89   Ht 5' 3" (1.6 m)   LMP  (LMP Unknown)   SpO2 97%   BMI 40.00 kg/m²          Physical Exam  Constitutional:       General: She is not in acute distress. Appearance: Normal appearance. She is well-developed. She is obese. HENT:      Head: Normocephalic and atraumatic. Eyes:      Pupils: Pupils are equal, round, and reactive to light. Cardiovascular:      Rate and Rhythm: Normal rate and regular rhythm. Heart sounds: Normal heart sounds. No murmur heard. Pulmonary:      Effort: Pulmonary effort is normal. No respiratory distress. Breath sounds: Normal breath sounds. No wheezing. Abdominal:      General: Bowel sounds are normal. There is no distension. Palpations: Abdomen is soft. Tenderness: There is no abdominal tenderness. Skin:     General: Skin is warm and dry. Capillary Refill: Capillary refill takes less than 2 seconds. Neurological:      Mental Status: She is alert and oriented to person, place, and time. Psychiatric:         Mood and Affect: Mood normal.         Behavior: Behavior normal.         Thought Content:  Thought content normal.         Judgment: Judgment normal.

## 2023-11-16 ENCOUNTER — OFFICE VISIT (OUTPATIENT)
Dept: SURGERY | Facility: CLINIC | Age: 58
End: 2023-11-16
Payer: COMMERCIAL

## 2023-11-16 ENCOUNTER — PATIENT MESSAGE (OUTPATIENT)
Dept: SURGERY | Facility: CLINIC | Age: 58
End: 2023-11-16

## 2023-11-16 VITALS
HEIGHT: 63 IN | OXYGEN SATURATION: 95 % | SYSTOLIC BLOOD PRESSURE: 102 MMHG | HEART RATE: 100 BPM | RESPIRATION RATE: 16 BRPM | WEIGHT: 232.2 LBS | BODY MASS INDEX: 41.14 KG/M2 | DIASTOLIC BLOOD PRESSURE: 68 MMHG | TEMPERATURE: 98 F

## 2023-11-16 DIAGNOSIS — C50.112 MALIGNANT NEOPLASM OF CENTRAL PORTION OF LEFT BREAST IN FEMALE, ESTROGEN RECEPTOR POSITIVE: ICD-10-CM

## 2023-11-16 DIAGNOSIS — C50.912 INVASIVE DUCTAL CARCINOMA OF BREAST, FEMALE, LEFT (HCC): ICD-10-CM

## 2023-11-16 DIAGNOSIS — N64.89 SEROMA OF BREAST: Primary | ICD-10-CM

## 2023-11-16 DIAGNOSIS — Z17.0 MALIGNANT NEOPLASM OF CENTRAL PORTION OF LEFT BREAST IN FEMALE, ESTROGEN RECEPTOR POSITIVE: ICD-10-CM

## 2023-11-16 DIAGNOSIS — N64.52 DISCHARGE FROM RIGHT NIPPLE: ICD-10-CM

## 2023-11-16 DIAGNOSIS — I89.0 LYMPHEDEMA OF BREAST: ICD-10-CM

## 2023-11-16 PROCEDURE — 99213 OFFICE O/P EST LOW 20 MIN: CPT | Performed by: SURGERY

## 2023-11-16 RX ORDER — DULAGLUTIDE 1.5 MG/.5ML
INJECTION, SOLUTION SUBCUTANEOUS
COMMUNITY
Start: 2023-11-02

## 2023-11-16 NOTE — PATIENT COMMUNICATION
Pt left voicemail message that she is sitting here in pain and wanted to advise she sent Mychart message.

## 2023-11-21 ENCOUNTER — LAB (OUTPATIENT)
Dept: LAB | Facility: CLINIC | Age: 58
End: 2023-11-21
Payer: COMMERCIAL

## 2023-11-21 DIAGNOSIS — E78.5 HYPERLIPIDEMIA, UNSPECIFIED HYPERLIPIDEMIA TYPE: ICD-10-CM

## 2023-11-21 DIAGNOSIS — Z79.4 TYPE 2 DIABETES MELLITUS WITH DIABETIC PERIPHERAL ANGIOPATHY WITHOUT GANGRENE, WITH LONG-TERM CURRENT USE OF INSULIN (HCC): ICD-10-CM

## 2023-11-21 DIAGNOSIS — E11.51 TYPE 2 DIABETES MELLITUS WITH DIABETIC PERIPHERAL ANGIOPATHY WITHOUT GANGRENE, WITH LONG-TERM CURRENT USE OF INSULIN (HCC): ICD-10-CM

## 2023-11-21 DIAGNOSIS — D64.9 ANEMIA, UNSPECIFIED TYPE: ICD-10-CM

## 2023-11-21 DIAGNOSIS — E11.9 TYPE 2 DIABETES MELLITUS WITHOUT COMPLICATION, UNSPECIFIED WHETHER LONG TERM INSULIN USE (HCC): ICD-10-CM

## 2023-11-21 DIAGNOSIS — Z13.9 SCREENING FOR UNSPECIFIED CONDITION: ICD-10-CM

## 2023-11-21 PROCEDURE — 82746 ASSAY OF FOLIC ACID SERUM: CPT

## 2023-11-21 PROCEDURE — 83036 HEMOGLOBIN GLYCOSYLATED A1C: CPT

## 2023-11-21 PROCEDURE — 36415 COLL VENOUS BLD VENIPUNCTURE: CPT

## 2023-11-21 PROCEDURE — 80048 BASIC METABOLIC PNL TOTAL CA: CPT

## 2023-11-21 PROCEDURE — 82607 VITAMIN B-12: CPT

## 2023-11-21 PROCEDURE — 84436 ASSAY OF TOTAL THYROXINE: CPT

## 2023-11-21 PROCEDURE — 84443 ASSAY THYROID STIM HORMONE: CPT

## 2023-11-21 PROCEDURE — 84481 FREE ASSAY (FT-3): CPT

## 2023-11-21 PROCEDURE — 80061 LIPID PANEL: CPT

## 2023-11-22 LAB
ANION GAP SERPL CALCULATED.3IONS-SCNC: 10 MMOL/L
BUN SERPL-MCNC: 16 MG/DL (ref 5–25)
CALCIUM SERPL-MCNC: 9.3 MG/DL (ref 8.4–10.2)
CHLORIDE SERPL-SCNC: 97 MMOL/L (ref 96–108)
CHOLEST SERPL-MCNC: 151 MG/DL
CO2 SERPL-SCNC: 29 MMOL/L (ref 21–32)
CREAT SERPL-MCNC: 0.93 MG/DL (ref 0.6–1.3)
EST. AVERAGE GLUCOSE BLD GHB EST-MCNC: 160 MG/DL
FOLATE SERPL-MCNC: 6.6 NG/ML
GFR SERPL CREATININE-BSD FRML MDRD: 69 ML/MIN/1.73SQ M
GLUCOSE P FAST SERPL-MCNC: 213 MG/DL (ref 65–99)
HBA1C MFR BLD: 7.2 %
HDLC SERPL-MCNC: 61 MG/DL
LDLC SERPL CALC-MCNC: 66 MG/DL (ref 0–100)
POTASSIUM SERPL-SCNC: 4.3 MMOL/L (ref 3.5–5.3)
SODIUM SERPL-SCNC: 136 MMOL/L (ref 135–147)
T3FREE SERPL-MCNC: 2.98 PG/ML (ref 2.5–3.9)
T4 SERPL-MCNC: 9.76 UG/DL (ref 6.09–12.23)
TRIGL SERPL-MCNC: 122 MG/DL
TSH SERPL DL<=0.05 MIU/L-ACNC: 1.7 UIU/ML (ref 0.45–4.5)
VIT B12 SERPL-MCNC: 340 PG/ML (ref 180–914)

## 2023-11-24 ENCOUNTER — SOCIAL WORK (OUTPATIENT)
Dept: BEHAVIORAL/MENTAL HEALTH CLINIC | Facility: CLINIC | Age: 58
End: 2023-11-24

## 2023-11-24 ENCOUNTER — OFFICE VISIT (OUTPATIENT)
Dept: FAMILY MEDICINE CLINIC | Facility: CLINIC | Age: 58
End: 2023-11-24
Payer: COMMERCIAL

## 2023-11-24 VITALS
OXYGEN SATURATION: 98 % | HEART RATE: 98 BPM | BODY MASS INDEX: 41.11 KG/M2 | HEIGHT: 63 IN | TEMPERATURE: 98.7 F | SYSTOLIC BLOOD PRESSURE: 118 MMHG | DIASTOLIC BLOOD PRESSURE: 80 MMHG | WEIGHT: 232 LBS

## 2023-11-24 DIAGNOSIS — F33.2 SEVERE EPISODE OF RECURRENT MAJOR DEPRESSIVE DISORDER, WITHOUT PSYCHOTIC FEATURES (HCC): ICD-10-CM

## 2023-11-24 DIAGNOSIS — F33.1 MODERATE EPISODE OF RECURRENT MAJOR DEPRESSIVE DISORDER (HCC): ICD-10-CM

## 2023-11-24 DIAGNOSIS — F43.10 POST-TRAUMATIC STRESS: Primary | ICD-10-CM

## 2023-11-24 DIAGNOSIS — E11.51 TYPE 2 DIABETES MELLITUS WITH DIABETIC PERIPHERAL ANGIOPATHY WITHOUT GANGRENE, WITH LONG-TERM CURRENT USE OF INSULIN (HCC): Primary | ICD-10-CM

## 2023-11-24 DIAGNOSIS — F41.0 ANXIETY DISORDER DUE TO GENERAL MEDICAL CONDITION WITH PANIC ATTACK: ICD-10-CM

## 2023-11-24 DIAGNOSIS — Z79.4 TYPE 2 DIABETES MELLITUS WITH DIABETIC PERIPHERAL ANGIOPATHY WITHOUT GANGRENE, WITH LONG-TERM CURRENT USE OF INSULIN (HCC): Primary | ICD-10-CM

## 2023-11-24 DIAGNOSIS — Z28.21 INFLUENZA VACCINATION DECLINED: ICD-10-CM

## 2023-11-24 DIAGNOSIS — F06.4 ANXIETY DISORDER DUE TO GENERAL MEDICAL CONDITION WITH PANIC ATTACK: ICD-10-CM

## 2023-11-24 PROCEDURE — 99213 OFFICE O/P EST LOW 20 MIN: CPT | Performed by: PHYSICIAN ASSISTANT

## 2023-11-24 RX ORDER — ALPRAZOLAM 0.5 MG/1
0.5 TABLET ORAL 3 TIMES DAILY PRN
Qty: 90 TABLET | Refills: 2 | Status: SHIPPED | OUTPATIENT
Start: 2023-11-24

## 2023-11-24 NOTE — PROGRESS NOTES
Assessment/Plan:    No problem-specific Assessment & Plan notes found for this encounter. Diagnoses and all orders for this visit:    Type 2 diabetes mellitus with diabetic peripheral angiopathy without gangrene, with long-term current use of insulin (HCC)    Severe episode of recurrent major depressive disorder, without psychotic features ([address])  -     Vortioxetine HBr (TRINTELLIX) 10 MG tablet; Take 1 tablet (10 mg total) by mouth daily    Influenza vaccination declined    Anxiety disorder due to general medical condition with panic attack  -     ALPRAZolam (XANAX) 0.5 mg tablet; Take 1 tablet (0.5 mg total) by mouth 3 (three) times a day as needed for anxiety or sleep        RTC 3 months    Subjective:      Patient ID: Efrem Starr is a 56 y.o. female presents for follow up. She has not noticed a difference since starting the trintellix. She notes being in a "funk", not getting better or worse. She is hesitant to keep trialing different medications. She does follow with DW. BP at goal.    Following with Jesse Zabala again for DM. Recent A1C at 7.2%        HPI    The following portions of the patient's history were reviewed and updated as appropriate: allergies, current medications, past family history, past medical history, past social history, past surgical history, and problem list.    Review of Systems   Constitutional: Negative. HENT: Negative. Respiratory: Negative. Cardiovascular: Negative. Gastrointestinal: Negative. Psychiatric/Behavioral:  Positive for behavioral problems, dysphoric mood and sleep disturbance. Negative for self-injury and suicidal ideas. The patient is nervous/anxious.           Objective:      /80 (BP Location: Right arm, Patient Position: Sitting)   Pulse 98   Temp 98.7 °F (37.1 °C) (Temporal)   Ht 5' 3" (1.6 m)   Wt 105 kg (232 lb)   LMP  (LMP Unknown)   SpO2 98%   BMI 41.10 kg/m²          Physical Exam  Constitutional:       General: She is not in acute distress. Appearance: Normal appearance. She is well-developed. HENT:      Head: Normocephalic and atraumatic. Eyes:      Pupils: Pupils are equal, round, and reactive to light. Cardiovascular:      Rate and Rhythm: Normal rate and regular rhythm. Heart sounds: Normal heart sounds. No murmur heard. Pulmonary:      Effort: Pulmonary effort is normal. No respiratory distress. Breath sounds: Normal breath sounds. No wheezing. Abdominal:      General: Bowel sounds are normal. There is no distension. Palpations: Abdomen is soft. Tenderness: There is no abdominal tenderness. Skin:     General: Skin is warm and dry. Capillary Refill: Capillary refill takes less than 2 seconds. Neurological:      Mental Status: She is alert and oriented to person, place, and time.    Psychiatric:         Behavior: Behavior normal.

## 2023-11-24 NOTE — PSYCH
[facility] Psychotherapy Progress Note    Psychotherapy Provided: Individual Psychotherapy     1. Post-traumatic stress        2. Moderate episode of recurrent major depressive disorder ([address])            Goals addressed in session: Goal 1     DATA: The Client reported continued issues with her physical health, which has caused continued anxiety and depression. During the session we explored her environmental and family relationships and the connection with her stress level. The Client was able to verbalize increased stress over all of her life issues. As team we explored the cycle of stress, as well as her coping skills of grounding and deep breathing. During this session, this clinician used the following therapeutic modalities: Cognitive Behavioral Therapy, Mindfulness-based Strategies, Solution-Focused Therapy, and Supportive Psychotherapy    Substance Abuse was not addressed during this session. If the client is diagnosed with a co-occurring substance use disorder, please indicate any changes in the frequency or amount of use: n/a. Stage of change for addressing substance use diagnoses: No substance use/Not applicable    ASSESSMENT:  Melia Hong presents with a Anxious and Depressed mood. her affect is Normal range and intensity, which is congruent, with her mood and the content of the session. The client has made progress on their goals. The Client  Melia Hong presents with a none risk of suicide, none risk of self-harm, and none risk of harm to others. For any risk assessment that surpasses a "low" rating, a safety plan must be developed. A safety plan was indicated: no  If yes, describe in detail n/a    PLAN: Between sessions, Melia Hong will practice skills when presented with anxiety and or depression. . At the next session, the therapist will use Cognitive Behavioral Therapy, Mindfulness-based Strategies, Solution-Focused Therapy, and Supportive Psychotherapy to address the Client's MH issues that are effecting his different relationships and enviorments. Behavioral Health Treatment Plan and Discharge Planning: Gopal Lora is aware of and agrees to continue to work on their treatment plan. They have identified and are working toward their discharge goals.  yes    Visit start and stop times:    11/24/23  Start Time: 1105  Stop Time: 1159  Total Visit Time: 54 minutes

## 2023-11-28 DIAGNOSIS — C50.912 INVASIVE DUCTAL CARCINOMA OF BREAST, FEMALE, LEFT (HCC): ICD-10-CM

## 2023-11-28 RX ORDER — ANASTROZOLE 1 MG/1
1 TABLET ORAL DAILY
Qty: 30 TABLET | Refills: 0 | Status: SHIPPED | OUTPATIENT
Start: 2023-11-28

## 2023-12-06 ENCOUNTER — TELEMEDICINE (OUTPATIENT)
Dept: BEHAVIORAL/MENTAL HEALTH CLINIC | Facility: CLINIC | Age: 58
End: 2023-12-06
Payer: COMMERCIAL

## 2023-12-06 DIAGNOSIS — F50.81 BINGE-EATING DISORDER, MODERATE: ICD-10-CM

## 2023-12-06 DIAGNOSIS — F33.1 MODERATE EPISODE OF RECURRENT MAJOR DEPRESSIVE DISORDER (HCC): ICD-10-CM

## 2023-12-06 DIAGNOSIS — F43.10 POST-TRAUMATIC STRESS: Primary | ICD-10-CM

## 2023-12-06 PROCEDURE — 90832 PSYTX W PT 30 MINUTES: CPT | Performed by: SOCIAL WORKER

## 2023-12-06 NOTE — PSYCH
Telemedicine consent    Patient: Samantha Denton  Provider: TY Dyer  Provider located at [address]   [address] [address]    The patient was identified by name and date of birth. Samantha Denton was informed that this is a telemedicine visit and that the visit is being conducted through Telephone. My office door was closed. No one else was in the room. She acknowledged consent and understanding of privacy and security of the video platform. The patient has agreed to participate and understands they can discontinue the visit at any time. Patient is aware this is a billable service. I spent 30 minutes with the patient during this visit. Behavioral Health Psychotherapy Progress Note    Psychotherapy Provided: Individual Psychotherapy     No diagnosis found. Goals addressed in session: Goal 1     DATA: The client reported continued issues within her relationship with her  she stopped her new anti depressant medication due to increased agitation. Appointment was made with the client for her PCP on 12/08/23 to discuss her medication issue. As team we explored the Client's ways to increase her relationships and potential, exploring the areas of self-care, improving the present and linking her goals to her values. During this session, this clinician used the following therapeutic modalities: Cognitive Behavioral Therapy, Mindfulness-based Strategies, Prolonged Exposure, and Solution-Focused Therapy    Substance Abuse was not addressed during this session. If the client is diagnosed with a co-occurring substance use disorder, please indicate any changes in the frequency or amount of use: n/a. Stage of change for addressing substance use diagnoses: No substance use/Not applicable    ASSESSMENT:  Jennifer Covington presents with a Euthymic/ normal mood.      her affect is Normal range and intensity, which is congruent, with her mood and the content of the session. The client has made progress on their goals. The Client  Trent Gaines presents with a none risk of suicide, none risk of self-harm, and none risk of harm to others. For any risk assessment that surpasses a "low" rating, a safety plan must be developed. A safety plan was indicated: no  If yes, describe in detail n/a    PLAN: Between sessions, Trent Gaines will the client will explore her goals to her values. At the next session, the therapist will use Cognitive Behavioral Therapy, Mindfulness-based Strategies, Solution-Focused Therapy, and Supportive Psychotherapy to address the client's MH issues affecting her different relationships and environments    [address] [facility] and Discharge Planning: Trent Gaiens is aware of and agrees to continue to work on their treatment plan. They have identified and are working toward their discharge goals.  yes    Visit start and stop times:    12/06/23  Start Time: 1000  Stop Time: 1030  Total Visit Time: 30 minutes

## 2023-12-07 RX ORDER — GABAPENTIN 600 MG/1
TABLET ORAL
COMMUNITY
Start: 2023-11-22

## 2023-12-08 ENCOUNTER — OFFICE VISIT (OUTPATIENT)
Dept: FAMILY MEDICINE CLINIC | Facility: CLINIC | Age: 58
End: 2023-12-08
Payer: COMMERCIAL

## 2023-12-08 ENCOUNTER — SOCIAL WORK (OUTPATIENT)
Dept: BEHAVIORAL/MENTAL HEALTH CLINIC | Facility: CLINIC | Age: 58
End: 2023-12-08
Payer: COMMERCIAL

## 2023-12-08 VITALS
WEIGHT: 232 LBS | BODY MASS INDEX: 41.11 KG/M2 | DIASTOLIC BLOOD PRESSURE: 74 MMHG | HEART RATE: 89 BPM | HEIGHT: 63 IN | OXYGEN SATURATION: 98 % | SYSTOLIC BLOOD PRESSURE: 116 MMHG

## 2023-12-08 DIAGNOSIS — F33.1 MODERATE EPISODE OF RECURRENT MAJOR DEPRESSIVE DISORDER (HCC): Primary | ICD-10-CM

## 2023-12-08 DIAGNOSIS — F50.81 BINGE-EATING DISORDER, MODERATE: ICD-10-CM

## 2023-12-08 DIAGNOSIS — F33.1 MODERATE EPISODE OF RECURRENT MAJOR DEPRESSIVE DISORDER (HCC): ICD-10-CM

## 2023-12-08 DIAGNOSIS — F43.10 POST-TRAUMATIC STRESS: Primary | ICD-10-CM

## 2023-12-08 DIAGNOSIS — F41.9 ANXIETY: ICD-10-CM

## 2023-12-08 PROCEDURE — 99213 OFFICE O/P EST LOW 20 MIN: CPT | Performed by: PHYSICIAN ASSISTANT

## 2023-12-08 PROCEDURE — 90837 PSYTX W PT 60 MINUTES: CPT | Performed by: SOCIAL WORKER

## 2023-12-08 NOTE — PSYCH
Behavioral Health Psychotherapy Progress Note    Psychotherapy Provided: Individual Psychotherapy     1. Post-traumatic stress        2. Moderate episode of recurrent major depressive disorder ([address])        3. Binge-eating disorder, moderate            Goals addressed in session: Goal 1     DATA: The client reported continued anxiety and depression continuing within her relationship with her . During the session we explored and processed the weaknesses and strengths in her relationships. As team we explored the cleint's historical realtoinsihps and the strenghts and weaknesses in these those relationships. Coping skills were reviewed during the session. During this session, this clinician used the following therapeutic modalities: Cognitive Behavioral Therapy, Mindfulness-based Strategies, Solution-Focused Therapy, and Supportive Psychotherapy    Substance Abuse was not addressed during this session. If the client is diagnosed with a co-occurring substance use disorder, please indicate any changes in the frequency or amount of use: n/a. Stage of change for addressing substance use diagnoses: No substance use/Not applicable    ASSESSMENT:  Santiago Méndez presents with a Anxious and Depressed mood. her affect is Normal range and intensity, which is congruent, with her mood and the content of the session. The client has made progress on their goals. The Client  Santiago Méndez presents with a none risk of suicide, none risk of self-harm, and none risk of harm to others. For any risk assessment that surpasses a "low" rating, a safety plan must be developed. A safety plan was indicated: no  If yes, describe in detail n/a    PLAN: Between sessions, Santiago Méndez will practice her coping skills when presented with anxiety and or depression.  At the next session, the therapist will use Cognitive Behavioral Therapy, Mindfulness-based Strategies, Solution-Focused Therapy, and Supportive Psychotherapy to address The client's MH issues that are effecting her different relationships and environments . Behavioral Health Treatment Plan and Discharge Planning: Amita Penny is aware of and agrees to continue to work on their treatment plan. They have identified and are working toward their discharge goals.  yes    Visit start and stop times:    12/08/23  Start Time: 1005  Stop Time: 1100  Total Visit Time: 55 minutes

## 2023-12-08 NOTE — PROGRESS NOTES
Assessment/Plan:    No problem-specific Assessment & Plan notes found for this encounter. Diagnoses and all orders for this visit:    Moderate episode of recurrent major depressive disorder ([address])    Anxiety    Other orders  -     gabapentin (NEURONTIN) 600 MG tablet          D/c trintellix. Added to allergy list given significant S/E  Continue current regiment and FUP with LCSW as scheduled  RTC as scheduled Feb 2024 or sooner if concerns arise    Subjective:      Patient ID: Cory Navarro is a 56 y.o. female presents for medication concern. She cannot tolerate the trintellix. She states it made her very angry and irritable. She states the only thing she wanted to do while taking it was die. No SI/HI. She does not want to make any medication adjustmetns for the time being. She is managable on her prozac and xanax. She continues to follow with LCSW. HPI    The following portions of the patient's history were reviewed and updated as appropriate: allergies, current medications, past family history, past medical history, past social history, past surgical history, and problem list.    Review of Systems   Psychiatric/Behavioral:  Positive for behavioral problems, dysphoric mood and sleep disturbance. Negative for self-injury and suicidal ideas. Objective:      /74   Pulse 89   Ht 5' 3" (1.6 m)   Wt 105 kg (232 lb) Comment: from previous visit  LMP  (LMP Unknown)   SpO2 98%   BMI 41.10 kg/m²          Physical Exam  Constitutional:       General: She is not in acute distress. Appearance: Normal appearance. She is well-developed. She is obese. HENT:      Head: Normocephalic and atraumatic. Eyes:      Pupils: Pupils are equal, round, and reactive to light. Cardiovascular:      Rate and Rhythm: Normal rate and regular rhythm. Heart sounds: Normal heart sounds. No murmur heard. Pulmonary:      Effort: Pulmonary effort is normal. No respiratory distress.       Breath sounds: Normal breath sounds. No wheezing. Abdominal:      General: Bowel sounds are normal. There is no distension. Palpations: Abdomen is soft. Tenderness: There is no abdominal tenderness. Skin:     General: Skin is warm and dry. Capillary Refill: Capillary refill takes less than 2 seconds. Neurological:      Mental Status: She is alert and oriented to person, place, and time. Psychiatric:         Mood and Affect: Mood normal.         Behavior: Behavior normal.         Thought Content:  Thought content normal.         Judgment: Judgment normal.

## 2023-12-11 ENCOUNTER — OFFICE VISIT (OUTPATIENT)
Dept: GASTROENTEROLOGY | Facility: CLINIC | Age: 58
End: 2023-12-11
Payer: COMMERCIAL

## 2023-12-11 VITALS
HEART RATE: 104 BPM | TEMPERATURE: 98.5 F | SYSTOLIC BLOOD PRESSURE: 123 MMHG | HEIGHT: 63 IN | BODY MASS INDEX: 41.29 KG/M2 | DIASTOLIC BLOOD PRESSURE: 81 MMHG | WEIGHT: 233 LBS | OXYGEN SATURATION: 97 %

## 2023-12-11 DIAGNOSIS — R13.12 OROPHARYNGEAL DYSPHAGIA: ICD-10-CM

## 2023-12-11 DIAGNOSIS — R11.0 NAUSEA: ICD-10-CM

## 2023-12-11 DIAGNOSIS — R13.19 OTHER DYSPHAGIA: Primary | ICD-10-CM

## 2023-12-11 DIAGNOSIS — K21.9 GASTROESOPHAGEAL REFLUX DISEASE, UNSPECIFIED WHETHER ESOPHAGITIS PRESENT: ICD-10-CM

## 2023-12-11 DIAGNOSIS — Z12.11 COLON CANCER SCREENING: ICD-10-CM

## 2023-12-11 DIAGNOSIS — Z86.19 HISTORY OF HELICOBACTER INFECTION: ICD-10-CM

## 2023-12-11 PROCEDURE — 99204 OFFICE O/P NEW MOD 45 MIN: CPT | Performed by: STUDENT IN AN ORGANIZED HEALTH CARE EDUCATION/TRAINING PROGRAM

## 2023-12-11 RX ORDER — OMEPRAZOLE 20 MG/1
20 CAPSULE, DELAYED RELEASE ORAL DAILY
Qty: 30 CAPSULE | Refills: 2 | Status: SHIPPED | OUTPATIENT
Start: 2023-12-11

## 2023-12-11 RX ORDER — SODIUM, POTASSIUM,MAG SULFATES 17.5-3.13G
177 SOLUTION, RECONSTITUTED, ORAL ORAL ONCE
Qty: 354 ML | Refills: 0 | Status: SHIPPED | OUTPATIENT
Start: 2023-12-11 | End: 2023-12-11

## 2023-12-11 NOTE — PATIENT INSTRUCTIONS
We will schedule you for upper endoscopy and colonoscopy  Please follow the instructions for the bowel prep  We will also start you on omeprazole  Please take this 30 minutes before supper

## 2023-12-11 NOTE — PROGRESS NOTES
[facility]  Outpatient Consultation  Encounter: [id number]     PATIENT INFO     Name: Rom Hi  YOB: 1965   Age: 56 y.o. Sex: female   MRN: [id number]    [address]     Rom Hi is a 56 y.o. female with complaints of dysphagia, reflux, nausea, history of H. pylori infection, need for colon cancer screening. She certainly could have underlying hiatal hernia or other causes for reflux which may be contributing and causing peptic stricture versus esophagitis which could explain her dysphagia symptoms. She does have a history of neck fracture with hardware repair which could be contributing and causing a component of oropharyngeal dysphagia as well. She has not currently regularly taking any acid reflux medication. Reports that she had dysphagia for many years and previously underwent upper endoscopy with empiric dilation which did seem to improve her symptoms. Her nausea may be indicative of an underlying functional disorder. Her symptoms appear to predate starting Trulicity which would also be expected to be causing some degree of delayed gastric emptying. Her last colonoscopy was 5 years ago and she does not recall having any polyps removed or any significant abnormalities. She is due for repeat screening at this time. No alarming lower GI symptoms. Not on any antiplatelets or anticoagulants. No family history of colon cancer.     We will schedule for bidirectional endoscopy with plans for possible empiric dilation for her dysphagia symptoms as well as evaluation for other structural causes that may be contributing to her symptoms and colon cancer screening  Suprep bowel prep  We will also start omeprazole 20 mg daily  Instructed patient to take this 30 minutes before supper which is her largest meal of the day  If endoscopic evaluation is unremarkable and she continues to have symptoms, may warrant further evaluation with gastric emptying study and possible high-resolution esophageal manometry  Could also consider modified video barium swallow and speech therapy evaluation to rule out potential oropharyngeal causes as well but will hold off for now    1. Other dysphagia    2. Oropharyngeal dysphagia    3. Gastroesophageal reflux disease, unspecified whether esophagitis present    4. Nausea    5. History of Helicobacter infection    6. Colon cancer screening      Orders Placed This Encounter   Procedures    EGD    Colonoscopy       FOLLOW-UP: 3 months    HISTORY OF PRESENT ILLNESS       Chano Fierro is a 56 y.o. female who presents to GI office for complaints of dysphagia and reflux as well as need for colonoscopy for colon cancer screening. She reports a long history of reflux and nausea as well as dysphagia symptoms. She reports having dysphagia for many years. She last underwent upper endoscopy in 2018 with empiric dilation. She reports that her symptoms improved following the dilation but eventually did recur. Typically complains of sensation of food getting stuck before eventually passing. She was previously on PPI. Currently she only takes OTC omeprazole as needed. She does have a history of neck fracture 2 years ago which was repaired with hardware present and her neck although appears that her symptoms did predate this injury. Biopsies at that time was also positive for H. pylori infection which she was treated for. Her last colonoscopy was also in 2018. That report does not appear to be available but she does not recall any significant abnormalities. She denies any family history of colon cancer. Her father did have cholangiocarcinoma. Denies any alarming lower GI symptoms. She is not on antiplatelets or anticoagulants.      ENDOSCOPIC HISTORY     UPPER ENDOSCOPY: 2018 with empiric dilation, biopsies positive for H.pylori    COLONOSCOPY: 2018 but report not available for review    REVIEW OF SYSTEMS CONSTITUTIONAL: Denies any fever, chills, rigors, and weight loss  HEENT: No earache or tinnitus, denies hearing loss or visual disturbances  CARDIOVASCULAR: No chest pain or palpitations  RESPIRATORY: Denies any cough, hemoptysis, shortness of breath or dyspnea on exertion  GASTROINTESTINAL: As noted in the History of Present Illness  GENITOURINARY: No problems with urination, denies any hematuria or dysuria  NEUROLOGIC: No dizziness or vertigo, denies headaches   MUSCULOSKELETAL: Denies any muscle or joint pain   SKIN: Denies skin rashes or itching  ENDOCRINE: Denies excessive thirst, denies intolerance to heat or cold  PSYCHOSOCIAL: Denies any recent memory loss     Historical Information   Past Medical History:   Diagnosis Date    Anemia     iron deficiency anemia    Anxiety     Breast cancer ([address])     Depression     Diabetes mellitus ([address])     Fibroid     LT   today 12/8/2021    History of transfusion     Hydrocephalus (HCC)     Hyperlipidemia     Hypertension     Migraine     Myocardial infarction ([address]) 01/19/2022    PONV (postoperative nausea and vomiting)     Sciatica     Seasonal allergies     Vertigo     Wears glasses      Past Surgical History:   Procedure Laterality Date    BACK SURGERY      C1-3 fusion    COLONOSCOPY      EPIDURAL BLOCK INJECTION Right 07/02/2020    Procedure: Right L4-5 and L5-S1 transforaminal epidural steroid injection ([id number]);   Surgeon: Avery Hussein MD;  Location: [facility];  Service: Pain Management     EPIDURAL BLOCK INJECTION N/A 09/02/2021    Procedure: BLOCK / INJECTION EPIDURAL STEROID LUMBAR  L5-S1 IESI;  Surgeon: Avery Hussein MD;  Location: [facility];  Service: Pain Management     FL GUIDED NEEDLE PLAC BX/ASP/INJ  07/02/2020    FL GUIDED NEEDLE PLAC BX/ASP/INJ  09/02/2021    HYSTERECTOMY      WY ESOPHAGOGASTRODUODENOSCOPY TRANSORAL DIAGNOSTIC N/A 10/23/2018    Procedure: EGD with Savary dilation AND COLONOSCOPY;  Surgeon: Connie Marin MD; Location: [facility];  Service: Gastroenterology    MO LAPAROSCOPY W TOTAL HYSTERECTOMY UTERUS 250 GM/< N/A 12/08/2021    Procedure: Milton Belding; BILAT SALPINGECTOMY; EXCISION PERITONEAL CYST;  Surgeon: Luisa Amin MD;  Location: [facility];  Service: Gynecology    MO MASTECTOMY PARTIAL Left 4/10/2023    Procedure: LUMPECTOMY BREAST WITH BEAU  LOCALIZATION,  BIOPSY LYMPH NODE SENTINEL (INJECT AT 1130 BY DR Sintia Manning IN THE OR);   Surgeon: Colletta Brier, MD;  Location: [facility];  Service: General    TUBAL LIGATION      US BREAST CYST ASPIRATION LEFT INITIAL Left 9/12/2023    US BREAST CYST ASPIRATION LEFT INITIAL Left 9/21/2023    US BREAST CYST ASPIRATION LEFT INITIAL Left 10/3/2023    US GUIDED BREAST BIOPSY LEFT COMPLETE Left 03/07/2023     Social History   Social History     Substance and Sexual Activity   Alcohol Use Not Currently    Comment: none     Social History     Substance and Sexual Activity   Drug Use No    Comment: none     Social History     Tobacco Use   Smoking Status Never   Smokeless Tobacco Never   Tobacco Comments    none     Family History   Problem Relation Age of Onset    Varicose Veins Mother     Drug abuse Mother     Diabetes Father     Liver cancer Father         bile duct    Cancer Father     Breast cancer Sister         unknown age    No Known Problems Daughter     No Known Problems Daughter     No Known Problems Maternal Aunt     No Known Problems Maternal Aunt     No Known Problems Maternal Aunt     No Known Problems Maternal Aunt     No Known Problems Maternal Aunt     No Known Problems Paternal Aunt     No Known Problems Maternal Grandmother     No Known Problems Maternal Grandfather     No Known Problems Paternal Grandmother     No Known Problems Paternal Grandfather        MEDICATIONS & ALLERGIES     Current Outpatient Medications   Medication Instructions    ALPRAZolam (XANAX) 0.5 mg, Oral, 3 times daily PRN    anastrozole (ARIMIDEX) 1 mg, Oral, Daily    atorvastatin (LIPITOR) 40 mg, Oral, Every evening    Fiasp 100 UNIT/ML SOLN INJECT 120 UNITS ONCE DAILY VIA INSULIN PUMP    FLUoxetine (PROZAC) 40 mg, Oral, Daily    gabapentin (NEURONTIN) 600 MG tablet     gabapentin (NEURONTIN) 600 mg, Oral, 3 times daily    losartan (COZAAR) 100 mg, Oral, Daily    Na Sulfate-K Sulfate-Mg Sulf (Suprep Bowel Prep Kit) 17.5-3.13-1.6 GM/177ML SOLN 177 mL, Oral, Once, Take 177 mL by mouth once for 1 dose    omeprazole (PRILOSEC) 20 mg, Oral, Daily, Take 30 minutes before supper    Trulicity 1.5 UF/4.1YR injection INJECT 0.5 ML (1.5 MG TOTAL) UNDER THE SKIN ONCE EVERY 7 DAYS     Allergies   Allergen Reactions    Nuts - Food Allergy Anaphylaxis and Throat Swelling    Trintellix [Vortioxetine] Other (See Comments)     Suicidal ideation    Chocolate - Food Allergy Cough    Eggs Or Egg-Derived Products - Food Allergy Other (See Comments)     Cough    Other Cough     LETTUCE    Shellfish Allergy - Food Allergy Cough    Tomato - Food Allergy Cough       PHYSICAL EXAM      Objective   Blood pressure 123/81, pulse 104, temperature 98.5 °F (36.9 °C), temperature source Tympanic, height 5' 3" (1.6 m), weight 106 kg (233 lb), SpO2 97 %. Body mass index is 41.27 kg/m². General Appearance:   Alert, cooperative, no distress   HEENT:   Normocephalic, atraumatic, anicteric     Neck:   Supple, symmetrical, trachea midline   Lungs:   Equal chest rise, respirations unlabored    Heart:   Regular rhythm, tachycardic   Abdomen:   Soft, non-tender, non-distended; normal bowel sounds; no masses, no organomegaly    Rectal:   Deferred    Extremities:   No cyanosis, clubbing or edema    Neuro: Moves all 4 extremities    Skin:   No jaundice, rashes, or lesions       LABORATORY RESULTS     No visits with results within 1 Day(s) from this visit.    Latest known visit with results is:   Lab on 11/21/2023   Component Date Value    Vitamin B-12 11/21/2023 340     Folate 11/21/2023 6.6     Sodium 11/21/2023 136     Potassium 11/21/2023 4.3 Chloride 11/21/2023 97     CO2 11/21/2023 29     ANION GAP 11/21/2023 10     BUN 11/21/2023 16     Creatinine 11/21/2023 0.93     Glucose, Fasting 11/21/2023 213 (H)     Calcium 11/21/2023 9.3     eGFR 11/21/2023 69     Hemoglobin A1C 11/21/2023 7.2 (H)     EAG 11/21/2023 160     Cholesterol 11/21/2023 151     Triglycerides 11/21/2023 122     HDL, Direct 11/21/2023 61     LDL Calculated 11/21/2023 66     TSH 3RD GENERATON 11/21/2023 1.695     T3, Free 11/21/2023 2.98     T4 TOTAL 11/21/2023 9.76         IMAGING RESULTS     No results found. I have personally reviewed pertinent imaging study reports. Juliane Danielson D.O. [address] [facility]  Division of Gastroenterology & Hepatology  Available on [email or web address]    ** Please Note: This note is constructed using a voice recognition dictation system.  **

## 2023-12-14 ENCOUNTER — HOSPITAL ENCOUNTER (OUTPATIENT)
Dept: ULTRASOUND IMAGING | Facility: HOSPITAL | Age: 58
Discharge: HOME | End: 2023-12-14
Attending: SURGERY
Payer: COMMERCIAL

## 2023-12-14 ENCOUNTER — HOSPITAL ENCOUNTER (OUTPATIENT)
Dept: MAMMOGRAPHY | Facility: HOSPITAL | Age: 58
Discharge: HOME | End: 2023-12-14
Attending: SURGERY
Payer: COMMERCIAL

## 2023-12-14 VITALS — WEIGHT: 233 LBS | BODY MASS INDEX: 41.29 KG/M2 | HEIGHT: 63 IN

## 2023-12-14 DIAGNOSIS — C50.912 INVASIVE DUCTAL CARCINOMA OF BREAST, FEMALE, LEFT (HCC): ICD-10-CM

## 2023-12-14 DIAGNOSIS — Z17.0 MALIGNANT NEOPLASM OF CENTRAL PORTION OF LEFT BREAST IN FEMALE, ESTROGEN RECEPTOR POSITIVE: ICD-10-CM

## 2023-12-14 DIAGNOSIS — N64.52 DISCHARGE FROM RIGHT NIPPLE: ICD-10-CM

## 2023-12-14 DIAGNOSIS — C50.112 MALIGNANT NEOPLASM OF CENTRAL PORTION OF LEFT BREAST IN FEMALE, ESTROGEN RECEPTOR POSITIVE: ICD-10-CM

## 2023-12-14 DIAGNOSIS — I89.0 LYMPHEDEMA OF BREAST: ICD-10-CM

## 2023-12-14 DIAGNOSIS — N64.89 SEROMA OF BREAST: ICD-10-CM

## 2023-12-14 PROCEDURE — G0279 TOMOSYNTHESIS, MAMMO: HCPCS

## 2023-12-14 PROCEDURE — 76642 ULTRASOUND BREAST LIMITED: CPT

## 2023-12-14 PROCEDURE — 77066 DX MAMMO INCL CAD BI: CPT

## 2023-12-15 ENCOUNTER — SOCIAL WORK (OUTPATIENT)
Dept: BEHAVIORAL/MENTAL HEALTH CLINIC | Facility: CLINIC | Age: 58
End: 2023-12-15
Payer: COMMERCIAL

## 2023-12-15 DIAGNOSIS — F50.81 BINGE-EATING DISORDER, MODERATE: ICD-10-CM

## 2023-12-15 DIAGNOSIS — F33.1 MODERATE EPISODE OF RECURRENT MAJOR DEPRESSIVE DISORDER (HCC): ICD-10-CM

## 2023-12-15 DIAGNOSIS — F43.10 POST-TRAUMATIC STRESS: Primary | ICD-10-CM

## 2023-12-15 PROCEDURE — 90837 PSYTX W PT 60 MINUTES: CPT | Performed by: SOCIAL WORKER

## 2023-12-15 NOTE — PSYCH
Behavioral Health Psychotherapy Progress Note    Psychotherapy Provided: Individual Psychotherapy     1. Post-traumatic stress        2. Moderate episode of recurrent major depressive disorder ([address])        3. Binge-eating disorder, moderate            Goals addressed in session: Goal 1     DATA: The Client reported that her  and her are still having conflict issues. " My life is so bad with my health and him arguing over financial issues' During the session we explored and processed the client's weaknesses with her health the Client was able to verbalize the loss of what she was able to to do in the past. As team we explored community supports to help with her financial issues. Coping skills were reviewed during the session. During this session, this clinician used the following therapeutic modalities: Cognitive Behavioral Therapy, Mindfulness-based Strategies, Solution-Focused Therapy, and Supportive Psychotherapy    Substance Abuse was not addressed during this session. If the client is diagnosed with a co-occurring substance use disorder, please indicate any changes in the frequency or amount of use: n/a. Stage of change for addressing substance use diagnoses: No substance use/Not applicable    ASSESSMENT:  Ruth Muller presents with a Anxious mood. her affect is Normal range and intensity, which is congruent, with her mood and the content of the session. The client has made progress on their goals. Ruth Muller presents with a none risk of suicide, none risk of self-harm, and none risk of harm to others. For any risk assessment that surpasses a "low" rating, a safety plan must be developed. A safety plan was indicated: no  If yes, describe in detail n/a    PLAN: Between sessions, Ruth Muller will review relationship check in with her Partner.  . At the next session, the therapist will use Cognitive Behavioral Therapy, Mindfulness-based Strategies, Solution-Focused Therapy, and Supportive Psychotherapy to address the Client's MH issues affecting her different relationships and enviormetns. Behavioral Health Treatment Plan and Discharge Planning: Julianne Ko is aware of and agrees to continue to work on their treatment plan. They have identified and are working toward their discharge goals.  yes    Visit start and stop times:    12/15/23  Start Time: 1350  Stop Time: 1448  Total Visit Time: 58 minutes

## 2023-12-19 ENCOUNTER — TELEPHONE (OUTPATIENT)
Dept: SURGERY | Facility: CLINIC | Age: 58
End: 2023-12-19

## 2023-12-19 NOTE — TELEPHONE ENCOUNTER
----- Message from Lianne Esparza MD sent at 12/15/2023 10:37 AM EST -----  Please reach out to patient. As she knows, the seroma continues to recur. The breasts look good bilaterally, no concerns otherwise and her next mammogram is a bilateral diagnostic in 1 year. We will see her next week.     Would she like us to reach out to IR on her behalf at this point regarding drain placement/sclerotherapy options to try to address the seroma more aggressively? We can do that today or we can discuss at our visit next week and then decide, whatever her preference, thanks.

## 2023-12-20 ENCOUNTER — OFFICE VISIT (OUTPATIENT)
Dept: SURGERY | Facility: CLINIC | Age: 58
End: 2023-12-20
Payer: COMMERCIAL

## 2023-12-20 ENCOUNTER — TELEPHONE (OUTPATIENT)
Dept: HEMATOLOGY ONCOLOGY | Facility: CLINIC | Age: 58
End: 2023-12-20

## 2023-12-20 ENCOUNTER — PREP FOR PROCEDURE (OUTPATIENT)
Dept: INTERVENTIONAL RADIOLOGY/VASCULAR | Facility: HOSPITAL | Age: 58
End: 2023-12-20

## 2023-12-20 ENCOUNTER — TELEPHONE (OUTPATIENT)
Dept: SURGERY | Facility: CLINIC | Age: 58
End: 2023-12-20

## 2023-12-20 VITALS
RESPIRATION RATE: 16 BRPM | TEMPERATURE: 98 F | BODY MASS INDEX: 41.89 KG/M2 | HEART RATE: 98 BPM | WEIGHT: 236.4 LBS | DIASTOLIC BLOOD PRESSURE: 80 MMHG | SYSTOLIC BLOOD PRESSURE: 130 MMHG | OXYGEN SATURATION: 100 % | HEIGHT: 63 IN

## 2023-12-20 DIAGNOSIS — I89.0 LYMPHEDEMA OF BREAST: ICD-10-CM

## 2023-12-20 DIAGNOSIS — C50.912 INVASIVE DUCTAL CARCINOMA OF BREAST, FEMALE, LEFT (HCC): ICD-10-CM

## 2023-12-20 DIAGNOSIS — N64.89 RECURRENT SEROMA OF BREAST: Primary | ICD-10-CM

## 2023-12-20 DIAGNOSIS — N64.89 SEROMA OF BREAST: Primary | ICD-10-CM

## 2023-12-20 PROCEDURE — 99214 OFFICE O/P EST MOD 30 MIN: CPT | Performed by: SURGERY

## 2023-12-20 RX ORDER — ANASTROZOLE 1 MG/1
1 TABLET ORAL DAILY
Qty: 90 TABLET | Refills: 1 | Status: SHIPPED | OUTPATIENT
Start: 2023-12-20

## 2023-12-20 NOTE — TELEPHONE ENCOUNTER
Please update the patient about my discussion with Dr. Johnson. He would like to place an IR drain in the cavity and then use that drain to do sclerotherapy treatments over about a month, potentially 4-6 weeks. She will need appointments 1-2 times a week to get sclerotherapy treatments to try to scar down and collapse the cavity and eventually the drain will be removed. I do recommend that she trials this therapy. She has been scheduled for the first visit which will be a drain placement under sedation. After that, she will keep a drain log and go for visits to get the treatments into the drain. Please confirm her appointment with them, thanks.

## 2023-12-20 NOTE — TELEPHONE ENCOUNTER
Appointment Change  Cancel, Reschedule, Change to Virtual      Who are you speaking with? Patient   If it is not the patient, is the caller listed on the communication consent form? Yes   Which provider is the appointment scheduled with? Dr. Cain   When was the original appointment scheduled?    Please list date and time 1/2   At which location is the appointment scheduled to take place? [facility]   Was the appointment rescheduled?     Was the appointment changed from an in person visit to a virtual visit?    If so, please list the details of the change. 1/23 2pm   What is the reason for the appointment change? procedure       Was STAR transport scheduled? No   Does STAR transport need to be scheduled for the new visit (if applicable) No   Does the patient need an infusion appointment rescheduled? No   Does the patient have an upcoming infusion appointment scheduled? If so, when? No   Is the patient undergoing chemotherapy? No   For appointments cancelled with less than 24 hours:  Was the no-show policy reviewed? Yes

## 2023-12-20 NOTE — TELEPHONE ENCOUNTER
Medication Refill Request   Who are you speaking with? Patient   If it is not the patient, are they listed on an active communication consent form?     Yes   Which medication is being requested for refill?  Please list medication name and dosage anastrozole (ARIMIDEX) 1 mg tablet    How many pills does the patient have left? 5   Preferred Pharmacy / Address [address] [facility] - [address] [place] [address], [address]  [address] [place] [address], [address] [facility] [address]  Phone: [phone]  Fax: [phone]    Who is the prescribing provider? Dr. Cain   Call back number [phone]    Relevant Information refill

## 2023-12-24 NOTE — PROGRESS NOTES
Progress Note - Surgical Oncology  Stacy Ferrell 58 y.o. 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 Oncotype score was low, she underwent adjuvant radiation and continues on anastrozole.      She has had multiple episodes of left breast infection postoperatively requiring antibiotic therapy. She has had recurrent symptomatic seromas that respond only briefly to aspirations and then recur. She has lymphedema that is being treated with compression and massage. Her recent surveillance imaging is negative for any concerns for malignancy but unfortunately highlights a recurrent enlarged seroma and the patient is quite uncomfortable from it.    Discussed with radiology, recommendation for IR evaluation for drain placement and sclerotherapy to try to address the recurrent seroma. Discussed with Dr. Johnson who is in agreement and will set the patient up for her first appointment with him. She will be scheduled for a bilateral diagnostic mammogram and appointment to follow in 1 year for her surveillance. She will stay in touch with us during her IR interventions and any other needs that she has.     Subjective       Chief Complaint   Patient presents with    Follow-up      Recurrent symptomatic seroma in the left breast lumpectomy cavity. Ongoing lymphedema management with arm and breast compression and massage. Taking endocrine therapy. Updated diagnostic imaging reviewed and notable or the seroma, otherwise reassuring. Patient willing to undergo IR evaluation for sclerotherapy to try to address this recurrent seroma as it is very uncomfortable for her.       Review of Systems   Constitutional:  "Negative.    Skin:         Left breast swelling pain and tenderness with symptomatic seroma    Left breast and arm lymphedema   All other systems reviewed and are negative.      The following portions of the patient's history were reviewed and updated as appropriate: allergies, current medications, past family history, past medical history, past social history, past surgical history, and problem list.    Objective      Blood pressure 130/80, pulse 98, temperature 98 °F (36.7 °C), temperature source Temporal, resp. rate 16, height 5' 3\" (1.6 m), weight 107 kg (236 lb 6.4 oz), SpO2 100%.   Physical Exam  Vitals reviewed.   Constitutional:       General: She is not in acute distress.  HENT:      Head: Normocephalic.      Mouth/Throat:      Mouth: Mucous membranes are moist.   Eyes:      Pupils: Pupils are equal, round, and reactive to light.   Cardiovascular:      Rate and Rhythm: Normal rate.   Pulmonary:      Effort: Pulmonary effort is normal.   Abdominal:      Palpations: Abdomen is soft.   Musculoskeletal:         General: Normal range of motion.      Cervical back: Neck supple.   Lymphadenopathy:      Cervical: No cervical adenopathy.   Skin:     General: Skin is warm.      Capillary Refill: Capillary refill takes less than 2 seconds.   Neurological:      Mental Status: She is alert. Mental status is at baseline.   Psychiatric:         Mood and Affect: Mood normal.     The patient has no palpable cervical, supraclavicular, or axillary lymphadenopathy bilaterally.  There are no dominant lumps, masses, skin changes or nipple retraction on the right. The left breast has changes secondary to lymphedema. No acute cellulitis. Tender, firm, symptomatic left breast seroma deep to the lumpectomy incision.    Signature:  Lianne Esparza MD  Date: 12/24/2023 Time: 12:48 PM   "

## 2023-12-26 ENCOUNTER — TELEPHONE (OUTPATIENT)
Dept: INTERVENTIONAL RADIOLOGY/VASCULAR | Facility: HOSPITAL | Age: 58
End: 2023-12-26

## 2023-12-26 PROBLEM — N64.52 DISCHARGE FROM RIGHT NIPPLE: Status: ACTIVE | Noted: 2023-12-26

## 2023-12-26 NOTE — PROGRESS NOTES
Progress Note - Surgical Oncology  Stacy Ferrell 58 y.o. 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 Oncotype score was low, she underwent adjuvant radiation and continues on anastrozole. Her course has been complicated by left breast and arm lymphedema and repeat episodes of a symptomatic left breast seroma within the lumpectomy bed. The seroma has recurred multiple times despite aspiration procedures and antibiotics.      She returns today for ongoing pain and firmness in the left breast at site of lumpectomy bed as well as new episode right sided white nipple discharge. We will add a bilateral diagnostic ultrasound to her upcoming diagnostic mammogram in December for further evaluation. We will see her back after her imaging has resulted.    Subjective       Chief Complaint   Patient presents with    Follow-up      Recent episode of white nipple discharge from the right side. Continues with compression therapy to the left breast and left arm for lymphedema. Recurrent left breast seroma within the lumpectomy cavity. Feels uncomfortable from the left breast seroma. Upcoming mammogram in December.       Review of Systems   Constitutional: Negative.    Skin:         Left breast lymphedema with post lumpectomy persistent seroma    Reported episode of right breast white nipple discharge   Hematological: Negative.    All other systems reviewed and are negative.      The following portions of the patient's history were reviewed and updated as appropriate: allergies, current medications, past family history, past medical history, past social history, past surgical history, and problem  "list.    Objective      Blood pressure 102/68, pulse 100, temperature 98 °F (36.7 °C), temperature source Temporal, resp. rate 16, height 5' 3\" (1.6 m), weight 105 kg (232 lb 3.2 oz), SpO2 95%.   Physical Exam  Vitals reviewed.   Constitutional:       General: She is not in acute distress.     Appearance: She is not toxic-appearing.   HENT:      Head: Normocephalic.      Mouth/Throat:      Mouth: Mucous membranes are moist.   Eyes:      Pupils: Pupils are equal, round, and reactive to light.   Cardiovascular:      Rate and Rhythm: Tachycardia present.   Pulmonary:      Effort: Pulmonary effort is normal.   Abdominal:      Palpations: Abdomen is soft.   Musculoskeletal:         General: Normal range of motion.      Cervical back: Normal range of motion and neck supple.   Lymphadenopathy:      Cervical: No cervical adenopathy.   Skin:     General: Skin is warm.      Capillary Refill: Capillary refill takes less than 2 seconds.   Neurological:      General: No focal deficit present.      Mental Status: She is alert.   Psychiatric:         Mood and Affect: Mood normal.     The patient has no palpable cervical, supraclavicular, or axillary lymphadenopathy bilaterally.  The left breast has post treatment changes and evidence of lymphedema. The lumpectomy bed is firm and tender and appears to have a recurrent seroma present. There are no dominant lumps, masses, skin changes or nipple retraction otherwise.    Signature:  Lianne Esparza MD  Date: 12/26/2023 Time: 11:11 AM   "

## 2023-12-26 NOTE — PROGRESS NOTES
Left message for Heydi with information on upcoming appointment. Instructions left to arrive at the [facility] on 1/2 at 0845, have nothing to eat or drink after midnight have a ride to and from and to hold trulicity per protocol. Call back number left for questions.

## 2023-12-27 ENCOUNTER — SOCIAL WORK (OUTPATIENT)
Dept: BEHAVIORAL/MENTAL HEALTH CLINIC | Facility: CLINIC | Age: 58
End: 2023-12-27
Payer: COMMERCIAL

## 2023-12-27 DIAGNOSIS — F43.10 POST-TRAUMATIC STRESS: Primary | ICD-10-CM

## 2023-12-27 DIAGNOSIS — F33.1 MODERATE EPISODE OF RECURRENT MAJOR DEPRESSIVE DISORDER (HCC): ICD-10-CM

## 2023-12-27 PROCEDURE — 90837 PSYTX W PT 60 MINUTES: CPT | Performed by: SOCIAL WORKER

## 2023-12-27 NOTE — PSYCH
"Behavioral Health Psychotherapy Progress Note    Psychotherapy Provided: Individual Psychotherapy     1. Post-traumatic stress        2. Moderate episode of recurrent major depressive disorder (HCC)            Goals addressed in session: Goal 1     DATA: The Client reported continued issues within her marriage which has caused an increase in her anxiety and depression. As team explored and processed her relationship weaknesses with the client identifying her communication patterns within this relationship. During the session we explored healthy communication and using the  I statement as well as sandwich expression of needs. Coping skills were reviewed during the session.      During this session, this clinician used the following therapeutic modalities: Engagement Strategies, Cognitive Behavioral Therapy, Mindfulness-based Strategies, Solution-Focused Therapy, and Supportive Psychotherapy    Substance Abuse was not addressed during this session. If the client is diagnosed with a co-occurring substance use disorder, please indicate any changes in the frequency or amount of use: n/a. Stage of change for addressing substance use diagnoses: No substance use/Not applicable    ASSESSMENT:  Heydi Ferrell presents with a Anxious and Depressed mood.     her affect is Normal range and intensity, which is congruent, with her mood and the content of the session. The client has made progress on their goals.    The client  Heydi Ferrell presents with a none risk of suicide, none risk of self-harm, and none risk of harm to others.    For any risk assessment that surpasses a \"low\" rating, a safety plan must be developed.    A safety plan was indicated: no  If yes, describe in detail n/a    PLAN: Between sessions, Heydi Ferrell will utilize her coping skills when presented with anxiety and or depression. At the next session, the therapist will use Cognitive Behavioral Therapy, Mindfulness-based Strategies, Solution-Focused Therapy, " and Supportive Psychotherapy to address the client's MH issues that are affecting her different relationships and enviorments.    Behavioral Health Treatment Plan and Discharge Planning: Heydi Ghassan is aware of and agrees to continue to work on their treatment plan. They have identified and are working toward their discharge goals. yes    Visit start and stop times:    12/27/23  Start Time: 1150  Stop Time: 1250  Total Visit Time: 60 minutes

## 2024-01-02 ENCOUNTER — HOSPITAL ENCOUNTER (OUTPATIENT)
Dept: INTERVENTIONAL RADIOLOGY/VASCULAR | Facility: HOSPITAL | Age: 59
Discharge: HOME | End: 2024-01-02
Attending: RADIOLOGY
Payer: COMMERCIAL

## 2024-01-02 VITALS
HEIGHT: 63 IN | TEMPERATURE: 97.8 F | WEIGHT: 226 LBS | OXYGEN SATURATION: 95 % | DIASTOLIC BLOOD PRESSURE: 74 MMHG | RESPIRATION RATE: 18 BRPM | SYSTOLIC BLOOD PRESSURE: 130 MMHG | BODY MASS INDEX: 40.04 KG/M2 | HEART RATE: 75 BPM

## 2024-01-02 DIAGNOSIS — N64.89 RECURRENT SEROMA OF BREAST: Primary | ICD-10-CM

## 2024-01-02 LAB — GLUCOSE SERPL-MCNC: 236 MG/DL (ref 65–140)

## 2024-01-02 PROCEDURE — C1769 GUIDE WIRE: HCPCS

## 2024-01-02 PROCEDURE — 99152 MOD SED SAME PHYS/QHP 5/>YRS: CPT | Performed by: RADIOLOGY

## 2024-01-02 PROCEDURE — 82948 REAGENT STRIP/BLOOD GLUCOSE: CPT

## 2024-01-02 PROCEDURE — 10030 IMG GID FLU COLL DRG SFT TIS: CPT | Performed by: RADIOLOGY

## 2024-01-02 PROCEDURE — C1729 CATH, DRAINAGE: HCPCS

## 2024-01-02 PROCEDURE — 99152 MOD SED SAME PHYS/QHP 5/>YRS: CPT

## 2024-01-02 PROCEDURE — 10030 IMG GID FLU COLL DRG SFT TIS: CPT

## 2024-01-02 PROCEDURE — 49185 SCLEROTX FLUID COLLECTION: CPT

## 2024-01-02 PROCEDURE — 49185 SCLEROTX FLUID COLLECTION: CPT | Performed by: RADIOLOGY

## 2024-01-02 RX ORDER — LIDOCAINE WITH 8.4% SOD BICARB 0.9%(10ML)
SYRINGE (ML) INJECTION AS NEEDED
Status: COMPLETED | OUTPATIENT
Start: 2024-01-02 | End: 2024-01-02

## 2024-01-02 RX ORDER — SODIUM CHLORIDE 9 MG/ML
100 INJECTION, SOLUTION INTRAVENOUS CONTINUOUS
Status: DISCONTINUED | OUTPATIENT
Start: 2024-01-02 | End: 2024-01-06 | Stop reason: HOSPADM

## 2024-01-02 RX ORDER — FENTANYL CITRATE 50 UG/ML
INJECTION, SOLUTION INTRAMUSCULAR; INTRAVENOUS AS NEEDED
Status: COMPLETED | OUTPATIENT
Start: 2024-01-02 | End: 2024-01-02

## 2024-01-02 RX ORDER — SODIUM CHLORIDE 9 MG/ML
10 INJECTION INTRAVENOUS DAILY
Qty: 900 ML | Refills: 0 | Status: SHIPPED | OUTPATIENT
Start: 2024-01-02 | End: 2024-04-01

## 2024-01-02 RX ORDER — MIDAZOLAM HYDROCHLORIDE 2 MG/2ML
INJECTION, SOLUTION INTRAMUSCULAR; INTRAVENOUS AS NEEDED
Status: COMPLETED | OUTPATIENT
Start: 2024-01-02 | End: 2024-01-02

## 2024-01-02 RX ADMIN — SODIUM CHLORIDE 100 ML/HR: 0.9 INJECTION, SOLUTION INTRAVENOUS at 09:16

## 2024-01-02 RX ADMIN — MIDAZOLAM HYDROCHLORIDE 1 MG: 1 INJECTION, SOLUTION INTRAMUSCULAR; INTRAVENOUS at 09:41

## 2024-01-02 RX ADMIN — FENTANYL CITRATE 50 MCG: 50 INJECTION, SOLUTION INTRAMUSCULAR; INTRAVENOUS at 09:34

## 2024-01-02 RX ADMIN — FENTANYL CITRATE 50 MCG: 50 INJECTION, SOLUTION INTRAMUSCULAR; INTRAVENOUS at 09:41

## 2024-01-02 RX ADMIN — DOXYCYCLINE 200 MG: 100 INJECTION, POWDER, LYOPHILIZED, FOR SOLUTION INTRAVENOUS at 09:54

## 2024-01-02 RX ADMIN — MIDAZOLAM HYDROCHLORIDE 1 MG: 1 INJECTION, SOLUTION INTRAMUSCULAR; INTRAVENOUS at 09:34

## 2024-01-02 RX ADMIN — Medication 10 ML: at 09:42

## 2024-01-02 NOTE — H&P
Interventional Radiology  History and Physical 1/2/2024     Stacy Ferrell   1965   [id number]    Assessment/Plan:  Left breast seroma  Plan is drain placement and sclerotherapy    Problem List Items Addressed This Visit    None  Visit Diagnoses       Recurrent seroma of breast        Relevant Orders    IR drainage tube placement               Subjective:     Patient ID: Stacy Ferrell is a 58 y.o. female.    History of Present Illness  Surgery for left breast cancer.  Recurrent seroma.    Review of Systems      Past Medical History:   Diagnosis Date    Anemia     iron deficiency anemia    Anxiety     Breast cancer (HCC)     Depression     Diabetes mellitus (HCC)     Fibroid     LTH   today 12/8/2021    History of transfusion     Hydrocephalus (HCC)     Hyperlipidemia     Hypertension     Migraine     Myocardial infarction (HCC) 01/19/2022    PONV (postoperative nausea and vomiting)     Sciatica     Seasonal allergies     Vertigo     Wears glasses         Past Surgical History:   Procedure Laterality Date    BACK SURGERY      C1-3 fusion    COLONOSCOPY      EPIDURAL BLOCK INJECTION Right 07/02/2020    Procedure: Right L4-5 and L5-S1 transforaminal epidural steroid injection ([id number]);  Surgeon: Clarence Lima MD;  Location: [facility];  Service: Pain Management     EPIDURAL BLOCK INJECTION N/A 09/02/2021    Procedure: BLOCK / INJECTION EPIDURAL STEROID LUMBAR  L5-S1 IESI;  Surgeon: Clarence Lima MD;  Location: [facility];  Service: Pain Management     FL GUIDED NEEDLE PLAC BX/ASP/INJ  07/02/2020    FL GUIDED NEEDLE PLAC BX/ASP/INJ  09/02/2021    HYSTERECTOMY      NJ ESOPHAGOGASTRODUODENOSCOPY TRANSORAL DIAGNOSTIC N/A 10/23/2018    Procedure: EGD with Savary dilation AND COLONOSCOPY;  Surgeon: Billy Schneider MD;  Location: [facility];  Service: Gastroenterology    NJ LAPAROSCOPY W TOTAL HYSTERECTOMY UTERUS 250 GM/< N/A 12/08/2021    Procedure: LTH; BILAT SALPINGECTOMY; EXCISION PERITONEAL  CYST;  Surgeon: Sonia Tamez MD;  Location: [facility];  Service: Gynecology    NV MASTECTOMY PARTIAL Left 4/10/2023    Procedure: LUMPECTOMY BREAST WITH BEAU  LOCALIZATION,  BIOPSY LYMPH NODE SENTINEL (INJECT AT 1130 BY DR BROTHERS IN THE OR);  Surgeon: Lianne Brothers MD;  Location: [facility];  Service: General    TUBAL LIGATION      US BREAST CYST ASPIRATION LEFT INITIAL Left 9/12/2023    US BREAST CYST ASPIRATION LEFT INITIAL Left 9/21/2023    US BREAST CYST ASPIRATION LEFT INITIAL Left 10/3/2023    US GUIDED BREAST BIOPSY LEFT COMPLETE Left 03/07/2023        Social History     Tobacco Use   Smoking Status Never   Smokeless Tobacco Never   Tobacco Comments    none        Social History     Substance and Sexual Activity   Alcohol Use Not Currently    Comment: none        Social History     Substance and Sexual Activity   Drug Use No    Comment: none        Allergies   Allergen Reactions    Nuts - Food Allergy Anaphylaxis and Throat Swelling    Trintellix [Vortioxetine] Other (See Comments)     Suicidal ideation    Chocolate - Food Allergy Cough    Eggs Or Egg-Derived Products - Food Allergy Other (See Comments)     Cough    Other Cough     LETTUCE    Shellfish Allergy - Food Allergy Cough    Tomato - Food Allergy Cough       Current Outpatient Medications   Medication Sig Dispense Refill    ALPRAZolam (XANAX) 0.5 mg tablet Take 1 tablet (0.5 mg total) by mouth 3 (three) times a day as needed for anxiety or sleep 90 tablet 2    anastrozole (ARIMIDEX) 1 mg tablet Take 1 tablet (1 mg total) by mouth daily 90 tablet 1    atorvastatin (LIPITOR) 40 mg tablet Take 40 mg by mouth every evening      FLUoxetine (PROzac) 40 MG capsule Take 1 capsule (40 mg total) by mouth daily 90 capsule 3    gabapentin (Neurontin) 600 MG tablet Take 1 tablet (600 mg total) by mouth 3 (three) times a day 270 tablet 2    losartan (COZAAR) 100 MG tablet Take 100 mg by mouth daily      omeprazole (PriLOSEC) 20 mg delayed release  capsule Take 1 capsule (20 mg total) by mouth daily Take 30 minutes before supper 30 capsule 2    Fiasp 100 UNIT/ML SOLN INJECT 120 UNITS ONCE DAILY VIA INSULIN PUMP 40 mL 11    gabapentin (NEURONTIN) 600 MG tablet       Na Sulfate-K Sulfate-Mg Sulf (Suprep Bowel Prep Kit) 17.5-3.13-1.6 GM/177ML SOLN Take 177 mL by mouth once for 1 dose Take 177 mL by mouth once for 1 dose 354 mL 0    Trulicity 1.5 MG/0.5ML injection INJECT 0.5 ML (1.5 MG TOTAL) UNDER THE SKIN ONCE EVERY 7 DAYS       Current Facility-Administered Medications   Medication Dose Route Frequency Provider Last Rate Last Admin    sodium chloride 0.9 % infusion  100 mL/hr Intravenous Continuous Soren Johnson MD   Stopped at 01/02/24 1200          Objective:    Vitals:    01/02/24 1050 01/02/24 1105 01/02/24 1135 01/02/24 1205   BP: 105/63 111/62 128/59 130/74   Pulse: 78 73 74 75   Resp: 18 18 18 18   Temp:       TempSrc:       SpO2: 95% 96% 95% 95%   Weight:       Height:            Physical Exam      Palpable abnormality left lateral breast, 4:00.  Regular rate and rhythm  Normal respiratory excursion  The skin over the left breast is clean, dry, and intact.  Lab Results   Component Value Date    BNP 24 06/23/2023      Lab Results   Component Value Date    WBC 8.08 09/11/2023    HGB 12.9 09/11/2023    HCT 40.0 09/11/2023    MCV 90 09/11/2023     09/11/2023     Lab Results   Component Value Date    INR 0.99 09/11/2023    INR 0.93 07/04/2023    INR 0.95 06/23/2023    PROTIME 13.0 09/11/2023    PROTIME 12.7 07/04/2023    PROTIME 12.9 06/23/2023     Lab Results   Component Value Date    PTT 27 09/11/2023         I have personally reviewed pertinent imaging and laboratory results.     Code Status: Prior  Advance Directive and Living Will:      Power of :    POLST:      This text is generated with voice recognition software. There may be translation, syntax,  or grammatical errors. If you have any questions, please contact the dictating  provider.

## 2024-01-02 NOTE — PROCEDURES
Interventional Radiology Procedure Note    PATIENT NAME: Stacy Ferrell  : 1965  MRN: [id number]     Pre-op Diagnosis:   1. Recurrent seroma of breast      2.    Refractory to aspiration    Post-op Diagnosis:   1. Recurrent seroma of breast      2.   Same    Procedure: Drain placement and sclerotherapy    Surgeon:   Soren Johnson MD  Assistants:     No qualified resident was available, Resident is only observing    Estimated Blood Loss: Minimal     Findings: About 85 cc of black coffee removed.  She is already had cytology sent x 2.  Sclerotherapy was performed with doxycycline.  Will see her back this week    Specimens: None     Complications:  None     Anesthesia: conscious sedation and local    Soren Johnson MD     Date: 2024  Time: 3:40 PM

## 2024-01-02 NOTE — DISCHARGE INSTRUCTIONS
"[facility]  Interventional Radiology  [phone]         TUBE CARE INSTRUCTIONS    Care after your procedure:    Resume your normal diet. Small sips of flat soda will help with nausea.    1. The properly functioning catheter should be forward flushed once (1x) daily with 10ml of normal saline using clean technique. You will be given a prescription for flushes.   To flush the tube, clean both connections with alcohol swab.Twist off the drainage bag/ bulb  tubing and twist the saline syringe into the drainage tube and flush. Remove the syringe and twist the drainage bag / bulb tubing tubing back on.    2. The drainage bag/bulb may be emptied as necessary. Keep a record of the amount of fluid you drain from your tube. This should be done with clean technique as well.     3. A fresh dressing should be applied daily over the tube insertion site.     4. As the tube is secured to the skin with only a suture,try not to pull on your tube. Tub baths are not permitted. Showers are permitted if the patient's skin entry site is prevented from getting wet. Similarly, washcloth \"baths\" are acceptable.     Contact Interventional Radiology at [phone] ([place] PATIENTS: Contact Interventional Radiology at [phone]) ([place] PATIENTS: Contact Interventional Radiology at [phone]) if:    1. Leakage or large amounts of liquid around the catheter.    2. Fever of 101 degrees lasting several hours without other obvious cause (such as sore throat, flu, etc).    3. Persistent nausea or vomiting.    4. Diminished drainage, which may be associated with pressure or pain. Or when the     drainage from your tube is less than 10mls for 48 hours.    5. Catheter pulled back or falls out.      The following pharmacies carry the flush syringes.       [facility]                     [facility] [address]                     [address] [place] [address]        "                  [phone]  [place]                       [place]  Phone [phone]            Phone [phone]                                                                                                       [facility]   [facility]             [facility]                                [phone]  [address] [place] [address]  [facility]  Phone [phone]            Phone [phone]                      [facility]                                                                                                          [phone]  [facility]  [address]  Mary FANG                                                                               [facility]  Phone [phone]

## 2024-01-05 ENCOUNTER — HOSPITAL ENCOUNTER (OUTPATIENT)
Dept: INTERVENTIONAL RADIOLOGY/VASCULAR | Facility: HOSPITAL | Age: 59
End: 2024-01-05
Attending: RADIOLOGY
Payer: COMMERCIAL

## 2024-01-05 DIAGNOSIS — N64.89 RECURRENT SEROMA OF BREAST: Primary | ICD-10-CM

## 2024-01-05 PROCEDURE — 49424 ASSESS CYST CONTRAST INJECT: CPT

## 2024-01-05 PROCEDURE — 49185 SCLEROTX FLUID COLLECTION: CPT | Performed by: RADIOLOGY

## 2024-01-05 PROCEDURE — 49185 SCLEROTX FLUID COLLECTION: CPT

## 2024-01-05 RX ADMIN — IOHEXOL 10 ML: 350 INJECTION, SOLUTION INTRAVENOUS at 13:10

## 2024-01-05 RX ADMIN — DOXYCYCLINE 200 MG: 100 INJECTION, POWDER, LYOPHILIZED, FOR SOLUTION INTRAVENOUS at 13:09

## 2024-01-05 NOTE — DISCHARGE INSTRUCTIONS
"[facility]  Interventional Radiology  [phone]         TUBE CARE INSTRUCTIONS    Care after your procedure:    Resume your normal diet. Small sips of flat soda will help with nausea.    1. The properly functioning catheter should be forward flushed once (1x) daily with 10ml of normal saline using clean technique. You will be given a prescription for flushes.   To flush the tube, clean both connections with alcohol swab.Twist off the drainage bag/ bulb  tubing and twist the saline syringe into the drainage tube and flush. Remove the syringe and twist the drainage bag / bulb tubing tubing back on.    2. The drainage bag/bulb may be emptied as necessary. Keep a record of the amount of fluid you drain from your tube. This should be done with clean technique as well.     3. A fresh dressing should be applied daily over the tube insertion site.     4. As the tube is secured to the skin with only a suture,try not to pull on your tube. Tub baths are not permitted. Showers are permitted if the patient's skin entry site is prevented from getting wet. Similarly, washcloth \"baths\" are acceptable.     Contact Interventional Radiology at [phone] ([place] PATIENTS: Contact Interventional Radiology at [phone]) ([place] PATIENTS: Contact Interventional Radiology at [phone]) if:    1. Leakage or large amounts of liquid around the catheter.    2. Fever of 101 degrees lasting several hours without other obvious cause (such as sore throat, flu, etc).    3. Persistent nausea or vomiting.    4. Diminished drainage, which may be associated with pressure or pain. Or when the     drainage from your tube is less than 10mls for 48 hours.    5. Catheter pulled back or falls out.      The following pharmacies carry the flush syringes.       [facility]                     [facility] [address].                     [address] [place] [address]        "                  [phone]  [place]                       [place]  Phone [phone]            Phone [phone]                                                                                                       [facility]   [facility]             [facility]                                [phone]  [address] [place] [address]  [facility]  Phone [phone]            Phone [phone]                      [facility]                                                                                                          [phone]  [facility]  [address]  Mary FANG                                                                               [facility]  Phone [phone]

## 2024-01-05 NOTE — PROCEDURES
Interventional Radiology Procedure Note    PATIENT NAME: Stacy Ferrell  : 1965  MRN: [id number]     Pre-op Diagnosis:   1. Recurrent seroma of breast      2.    Tube check and sclerosis    Post-op Diagnosis:   1. Recurrent seroma of breast      2.   Same    Procedure: Tube check and lymphocele sclerosis, second session    Surgeon:   Soren Johnson MD  Assistants:     No qualified resident was available, Resident is only observing    Estimated Blood Loss: Minimal     Findings: Irregular cavity collapsed.  Volume at least 10 cc.    400 mg of doxycycline dissolved in 10 cc and injected.  Will allow to dwell for 1 hour.  She will then return to drainage.    Specimens: None     Complications:  None     Anesthesia: none    Soren Johnson MD     Date: 2024  Time: 2:31 PM

## 2024-01-07 NOTE — PSYCH
"Telemedicine consent    Patient: Stacy Ferrell  Provider: TY Sorensen  Provider located at [address] [id number]    The patient was identified by name and date of birth. Stacy Ferrell was informed that this is a telemedicine visit and that the visit is being conducted through Telephone.  My office door was closed. No one else was in the room.  She acknowledged consent and understanding of privacy and security of the video platform. The patient has agreed to participate and understands they can discontinue the visit at any time.    Patient is aware this is a billable service.     I spent 20 minutes with the patient during this visit.  Behavioral Health Psychotherapy Progress Note    Psychotherapy Provided: Individual Psychotherapy     1. Post-traumatic stress        2. Binge-eating disorder, moderate        3. Moderate episode of recurrent major depressive disorder (HCC)            Goals addressed in session: Goal 1     DATA: The Client reported continued issues with her physical health resulting from her breast cancer surgery. \" My drain is causing so much pain all of this from my damn breast cancer\" During the session we explored and processed her emotions and feeling surrounding her continued health issues. The client was able to verbalize increase conflict in her primary relationships due to the continued stress. Coping skills were reviewed to decrease her MH symptoms  During this session, this clinician used the following therapeutic modalities: Cognitive Behavioral Therapy, Mindfulness-based Strategies, Solution-Focused Therapy, and Supportive Psychotherapy    Substance Abuse was not addressed during this session. If the client is diagnosed with a co-occurring substance use disorder, please indicate any changes in the frequency or amount of use: n/a. Stage of change for addressing substance use diagnoses: No " "substance use/Not applicable    ASSESSMENT:  Heydi Ferrell presents with a Anxious and Depressed mood.     her affect is Normal range and intensity, which is congruent, with her mood and the content of the session. The client has made progress on their goals.    The client  Heydi Ferrell presents with a none risk of suicide, none risk of self-harm, and none risk of harm to others.    For any risk assessment that surpasses a \"low\" rating, a safety plan must be developed.    A safety plan was indicated: no  If yes, describe in detail n/a    PLAN: Between sessions, Heydi Ferrell will practice her coping skills when presented with anxiety and or depression. At the next session, the therapist will use Cognitive Behavioral Therapy, Mindfulness-based Strategies, Solution-Focused Therapy, and Supportive Psychotherapy to address the MH issues facing the Client  in her different relationships and enviorments.    Behavioral Health Treatment Plan and Discharge Planning: Heydi Ferrell is aware of and agrees to continue to work on their treatment plan. They have identified and are working toward their discharge goals. yes    Visit start and stop times:    01/08/24  Start Time: 0810  Stop Time: 0830  Total Visit Time: 20 minutes  "

## 2024-01-08 ENCOUNTER — TELEMEDICINE (OUTPATIENT)
Dept: BEHAVIORAL/MENTAL HEALTH CLINIC | Facility: CLINIC | Age: 59
End: 2024-01-08
Payer: COMMERCIAL

## 2024-01-08 DIAGNOSIS — F33.1 MODERATE EPISODE OF RECURRENT MAJOR DEPRESSIVE DISORDER (HCC): ICD-10-CM

## 2024-01-08 DIAGNOSIS — F43.10 POST-TRAUMATIC STRESS: Primary | ICD-10-CM

## 2024-01-08 DIAGNOSIS — F50.81 BINGE-EATING DISORDER, MODERATE: ICD-10-CM

## 2024-01-08 PROCEDURE — 90832 PSYTX W PT 30 MINUTES: CPT | Performed by: SOCIAL WORKER

## 2024-01-12 ENCOUNTER — HOSPITAL ENCOUNTER (OUTPATIENT)
Dept: INTERVENTIONAL RADIOLOGY/VASCULAR | Facility: HOSPITAL | Age: 59
End: 2024-01-12
Attending: RADIOLOGY
Payer: COMMERCIAL

## 2024-01-12 DIAGNOSIS — N64.89 RECURRENT SEROMA OF BREAST: Primary | ICD-10-CM

## 2024-01-12 PROCEDURE — 76080 X-RAY EXAM OF FISTULA: CPT

## 2024-01-12 PROCEDURE — 49424 ASSESS CYST CONTRAST INJECT: CPT

## 2024-01-12 PROCEDURE — 49185 SCLEROTX FLUID COLLECTION: CPT | Performed by: RADIOLOGY

## 2024-01-12 PROCEDURE — 49185 SCLEROTX FLUID COLLECTION: CPT

## 2024-01-12 RX ADMIN — DOXYCYCLINE 200 MG: 100 INJECTION, POWDER, LYOPHILIZED, FOR SOLUTION INTRAVENOUS at 10:44

## 2024-01-12 RX ADMIN — IOHEXOL 2 ML: 350 INJECTION, SOLUTION INTRAVENOUS at 10:51

## 2024-01-12 NOTE — PROCEDURES
Alternate tylenol with motrin, rest, push fluids   Interventional Radiology Procedure Note    PATIENT NAME: Stacy Ferrell  : 1965  MRN: [id number]     Pre-op Diagnosis:   1. Recurrent seroma of breast      2.    Output down to about 2 cc a day.  However, I discovered that the bulb was occluded.  So the output could have been slightly higher.    Post-op Diagnosis:   1. Recurrent seroma of breast      2.   Same    Procedure: Lymphocele sclerotherapy    Surgeon:   Soren Johnson MD  Assistants:     No qualified resident was available, Resident is only observing    Estimated Blood Loss: Minimal     Findings: Initial intention was to inject sclerosing agent and remove the tube.  When I hooked up to the tube became apparent that the bulb was occluded.  At that point I reversed my position on removing the tube.  So we did a sclerotherapy session, with the plan of removing the tube on Tuesday    100 of doxycycline was administered and then aspirated.  Will put a new suction bulb on.  Will see her back on Tuesday with the plan to cut and pull.    Specimens: None     Complications:  None     Anesthesia: none    Soren Johnson MD     Date: 2024  Time: 4:17 PM

## 2024-01-12 NOTE — DISCHARGE INSTRUCTIONS
"[facility]  Interventional Radiology  [phone]               TUBE CARE INSTRUCTIONS    Care after your procedure:    Resume your normal diet. Small sips of flat soda will help with nausea.    1. The properly functioning catheter should be forward flushed once (1x) daily with 10ml of normal saline using clean technique. You will be given a prescription for flushes.   To flush the tube, clean both connections with alcohol swab.Twist off the drainage bag/ bulb  tubing and twist the saline syringe into the drainage tube and flush. Remove the syringe and twist the drainage bag / bulb tubing tubing back on.    2. The drainage bag/bulb may be emptied as necessary. Keep a record of the amount of fluid you drain from your tube. This should be done with clean technique as well.     3. A fresh dressing should be applied daily over the tube insertion site.     4. As the tube is secured to the skin with only a suture,try not to pull on your tube. Tub baths are not permitted. Showers are permitted if the patient's skin entry site is prevented from getting wet. Similarly, washcloth \"baths\" are acceptable.     Contact Interventional Radiology at [phone] ([place] PATIENTS: Contact Interventional Radiology at [phone]) ([place] PATIENTS: Contact Interventional Radiology at [phone]) if:    1. Leakage or large amounts of liquid around the catheter.    2. Fever of 101 degrees lasting several hours without other obvious cause (such as sore throat, flu, etc).    3. Persistent nausea or vomiting.    4. Diminished drainage, which may be associated with pressure or pain. Or when the     drainage from your tube is less than 10mls for 48 hours.    5. Catheter pulled back or falls out.      The following pharmacies carry the flush syringes.       [facility]                     [facility] [address]                     [address] [place] [address]  "                        [phone]  [place]                       [place]  Phone [phone]            Phone [phone]                                                                                                       [facility]   [facility]             [facility]                                [phone]  [address] [place] [address]  [facility]  Phone [phone]            Phone [phone]                      [facility]                                                                                                          [phone]  [facility]  [address]  Mary FANG                                                                               [facility]  Phone [phone]

## 2024-01-17 NOTE — PSYCH
"Behavioral Health Psychotherapy Progress Note    Psychotherapy Provided: Individual Psychotherapy     1. Post-traumatic stress        2. Moderate episode of recurrent major depressive disorder (HCC)        3. Binge-eating disorder, moderate            Goals addressed in session: Creation of 4th recovery Treatment Plan/Crisis plan     DATA: The Client reported continued physical pain due to her swelling in her breast, appointment was scheduled for same day with her PCP. During the session we explored the connection with her physical health and mental health and the effects on her different relationships and environments. As team we created her recovery treatment and safety plan. Coping skills were reivewed  During this session, this clinician used the following therapeutic modalities: Cognitive Behavioral Therapy, Mindfulness-based Strategies, Solution-Focused Therapy, and Supportive Psychotherapy    Substance Abuse was not addressed during this session. If the client is diagnosed with a co-occurring substance use disorder, please indicate any changes in the frequency or amount of use: n/a. Stage of change for addressing substance use diagnoses: No substance use/Not applicable    ASSESSMENT:  eHydi Ferrell presents with a Anxious and Depressed mood.     her affect is Normal range and intensity, which is congruent, with her mood and the content of the session. The client has made progress on their goals.    The client  Heydi Ferrell presents with a none risk of suicide, none risk of self-harm, and none risk of harm to others.    For any risk assessment that surpasses a \"low\" rating, a safety plan must be developed.    A safety plan was indicated: no  If yes, describe in detail n/a    PLAN: Between sessions, Heydi Ferrell will practice one pleasurable activity daily . At the next session, the therapist will use Cognitive Behavioral Therapy, Mindfulness-based Strategies, Solution-Focused Therapy, and Supportive " Psychotherapy to address the client's MH issues that are affecting her different relationships and enviroments.    Behavioral Health Treatment Plan and Discharge Planning: Heydi Ferrell is aware of and agrees to continue to work on their treatment plan. They have identified and are working toward their discharge goals. yes    Visit start and stop times:    01/18/24  Start Time: 0850  Stop Time: 0953  Total Visit Time: 63 minutes

## 2024-01-17 NOTE — BH TREATMENT PLAN
"Outpatient Behavioral Health Psychotherapy Treatment Plan     Heydi Ferrell  1965      Date of Initial Psychotherapy Assessment: 7/22/22  Date of Current Treatment Plan: 01/18/24  Treatment Plan Target Date: 03/08/24  Treatment Plan Expiration Date: 7/18/24     Diagnosis:   1. Moderate episode of recurrent major depressive disorder (HCC)          2. Post-traumatic stress                Area(s) of Need:The client reported that she would like to address her depression, and trauma history that is effecting her different relationships and environments. It is hoped that by addressing her weaknesses the Client  will achieve or maintain maximum functional capacity in performing daily activizes, taking into account both the functional capacity of the individual and those functional capacities appropriate for the individuals of the same age. Reduce or ameliorate the physical mental, behavioral, or developmental effects of an illness, condition, injury or disability. Present treatment plan will cover 6 months or completion of recovery treatment plan goals whichever comes first      Long Term Goal 1 (in the client's own words): \" My depression is bad\"      Stage of Change: Action     Target Date for completion: 03/07/2024             Anticipated therapeutic modalities: Supportive Therapy, Strengths Therapy,  and Cognitive Behavioral Therapy will be the  treatment modalities that will be utilized during the session.             People identified to complete this goal: The client her support team and this therapist                     Objective 1: (identify the means of measuring success in meeting the objective):The Client's depression score on the PHQ-9 will decrease from 18 to 9 or less ( Client's score on the PHQ-9 increased over previous screening due to increased physical health issues \" I can't walk and my life is falling apart\" 1/18/24)                       Objective 2: (identify the means of measuring success in " "meeting the objective): The Client will utilize her coping skills in addressing her depression when presented 4  out of 7 times. (emerging Reviewed on 1/18/24)      Long Term Goal 2 (in the client's own words): \" My trauma is not good\"      Stage of Change: Action     Target Date for completion: 03/07/2024             Anticipated therapeutic modalities: Supportive Therapy, Strengths Therapy, seeking Safety, CPT  and Cognitive Behavioral Therapy will be the  treatment modalities that will be utilized during the session.             People identified to complete this goal: The client her support team and this therapist                     Objective 1: (identify the means of measuring success in meeting the objective): The Client's trauma score on her PCL-C will decrease from 69 to 25 or less (emerging)                     Objective 2: (identify the means of measuring success in meeting the objective): The Client will utilize her coping skills when presented to address her triggers 4 out 7 times when presented. (emerging Reviewed on 1/18/24)      Long Term Goal 3 (in the client's own words): \" let's work on my medical appointment\"      Stage of Change: Action     Target Date for completion:  03/07/2024             Anticipated therapeutic modalities: Supportive Therapy, Strengths Therapy, and CBTwill be the  treatment modalities that will be utilized during the session.                People identified to complete this goal: The client her support team and this therapist                     Objective 1: (identify the means of measuring success in meeting the objective): The Client all of her medical appointments 100% of  time (emerging 1/18/24)                   I am currently under the care of a [facility] psychiatric provider: yes     My [facility] psychiatric provider is:   Kerri Cuellar MSW LCSW at [facility] for MH therapy and for Psychiatric medication with PCP Madison Boyd     I am " "currently taking psychiatric medications: Yes, as prescribed     I feel that I will be ready for discharge from mental health care when I reach the following (measurable goal/objective): \" who knows\"      For children and adults who have a legal guardian:          Has there been any change to custody orders and/or guardianship status? NA. If yes, attach updated documentation.     I have created my Crisis Plan and have been offered a copy of this plan     Behavioral Health Treatment Plan [facility]: Diagnosis and Treatment Plan explained to Heydi Ferrell acknowledges an understanding of their diagnosis. Heydi Ferrell agrees to this treatment plan.     I have been offered a copy of this Treatment Plan. Yes        "

## 2024-01-17 NOTE — BH CRISIS PLAN
"Client Name: Heydi Ferrell       Client YOB: 1965    [facility] Safety Plan      Creation Date: 1/18/24 Update Date: 1/18/24   Created By: TY Sorensen Last Updated By: TY Sorensen      Step 1: Warning Signs:   Warning Signs   isolate   increased anger   increse in depression   \"nit picky\"   increase anxiety            Step 2: Internal Coping Strategies:   Internal Coping Strategies   Play on phone   do knitting   watch Tv            Step 3: People and social settings that provide distraction:   Name Contact Information   Waldo In phone   Laurie In phone   Velasquez In phone    Places   watching tv   house           Step 4: People whom I can ask for help during a crisis:      Name Contact Information    Laurie in phone      Step 5: Professionals or agencies I can contact during a crisis:      Clinican/Agency Name Phone Emergency Contact    [facility] [phone] Lisa      [facility] Emergency Department Emergency Department Phone Emergency Department Address    [facility] [phone] [address] [place] [address]        Crisis Phone Numbers:   Suicide Prevention Lifeline: Call or Text  591 Crisis Text Line: Text HOME to 403-935   Please note: Some [facility] do not have a separate number for Child/Adolescent specific crisis. If your county is not listed under Child/Adolescent, please call the adult number for your county      Adult Crisis Numbers: Child/Adolescent Crisis Numbers   [facility]: [phone] [facility]: [phone]   [facility]: [phone] [facility]: [phone]   [facility]: [phone] [place]: [phone]   [facility]: [phone] [place] [facility]: [phone]   [place] [facility]: [phone]   [facility]: [phone]   [facility]: [phone]   [facility]: [phone] (daytime) [phone] (after hours, weekends, holidays)    " "  Step 6: Making the environment safer (plan for lethal means safety):   Plan: All lethal means are locked up     Optional: What is most important to me and worth living for?   \"Waldo\"      [place] Safety Plan. Lucila Warren and Aly Ann. Used with permission of the authors.           "

## 2024-01-18 ENCOUNTER — OFFICE VISIT (OUTPATIENT)
Dept: FAMILY MEDICINE CLINIC | Facility: CLINIC | Age: 59
End: 2024-01-18
Payer: COMMERCIAL

## 2024-01-18 ENCOUNTER — SOCIAL WORK (OUTPATIENT)
Dept: BEHAVIORAL/MENTAL HEALTH CLINIC | Facility: CLINIC | Age: 59
End: 2024-01-18
Payer: COMMERCIAL

## 2024-01-18 VITALS
HEART RATE: 81 BPM | OXYGEN SATURATION: 97 % | DIASTOLIC BLOOD PRESSURE: 76 MMHG | BODY MASS INDEX: 40.04 KG/M2 | SYSTOLIC BLOOD PRESSURE: 130 MMHG | WEIGHT: 226 LBS | HEIGHT: 63 IN

## 2024-01-18 DIAGNOSIS — L03.313 CELLULITIS OF CHEST WALL: ICD-10-CM

## 2024-01-18 DIAGNOSIS — F43.10 POST-TRAUMATIC STRESS: Primary | ICD-10-CM

## 2024-01-18 DIAGNOSIS — F50.81 BINGE-EATING DISORDER, MODERATE: ICD-10-CM

## 2024-01-18 DIAGNOSIS — F33.1 MODERATE EPISODE OF RECURRENT MAJOR DEPRESSIVE DISORDER (HCC): ICD-10-CM

## 2024-01-18 DIAGNOSIS — R05.1 ACUTE COUGH: ICD-10-CM

## 2024-01-18 DIAGNOSIS — N64.89 SEROMA OF BREAST: Primary | ICD-10-CM

## 2024-01-18 PROCEDURE — 99214 OFFICE O/P EST MOD 30 MIN: CPT | Performed by: PHYSICIAN ASSISTANT

## 2024-01-18 PROCEDURE — 90837 PSYTX W PT 60 MINUTES: CPT | Performed by: SOCIAL WORKER

## 2024-01-18 RX ORDER — INSULIN ASPART 100 [IU]/ML
INJECTION, SOLUTION INTRAVENOUS; SUBCUTANEOUS
COMMUNITY
Start: 2023-12-21

## 2024-01-18 RX ORDER — DOXYCYCLINE HYCLATE 100 MG/1
100 CAPSULE ORAL EVERY 12 HOURS SCHEDULED
Qty: 14 CAPSULE | Refills: 0 | Status: SHIPPED | OUTPATIENT
Start: 2024-01-18 | End: 2024-01-25

## 2024-01-18 RX ORDER — NAPROXEN 500 MG/1
500 TABLET ORAL 2 TIMES DAILY WITH MEALS
Qty: 30 TABLET | Refills: 0 | Status: SHIPPED | OUTPATIENT
Start: 2024-01-18

## 2024-01-18 RX ORDER — BENZONATATE 200 MG/1
200 CAPSULE ORAL
Qty: 20 CAPSULE | Refills: 0 | Status: SHIPPED | OUTPATIENT
Start: 2024-01-18

## 2024-01-18 NOTE — PROGRESS NOTES
Assessment/Plan:    No problem-specific Assessment & Plan notes found for this encounter.       Diagnoses and all orders for this visit:    Seroma of breast  -     doxycycline hyclate (VIBRAMYCIN) 100 mg capsule; Take 1 capsule (100 mg total) by mouth every 12 (twelve) hours for 7 days  -     naproxen (Naprosyn) 500 mg tablet; Take 1 tablet (500 mg total) by mouth 2 (two) times a day with meals    Cellulitis of chest wall  -     doxycycline hyclate (VIBRAMYCIN) 100 mg capsule; Take 1 capsule (100 mg total) by mouth every 12 (twelve) hours for 7 days  -     naproxen (Naprosyn) 500 mg tablet; Take 1 tablet (500 mg total) by mouth 2 (two) times a day with meals    Acute cough  -     benzonatate (TESSALON) 200 MG capsule; Take 1 capsule (200 mg total) by mouth daily at bedtime as needed for cough    Other orders  -     NovoLOG ReliOn 100 UNIT/ML injection;  UNITS DAILY VIA PUMP          Subjective:      Patient ID: Stacy Ferrell is a 58 y.o. female presents as same day for breast pain. Known seroma of breast with drain in place, following with IR. Had procedure 1/12/24 with follow up initially scheduled for 1/16 however it was rescheduled to 1/19 secondary to inclement weather. Over the past 2 days she has had increased pain, swelling, redness at the site. She can hardly touch or lay on it. No fever or chills.     She does report a cough ongoing for about 1 week. Only at night when lying down. Known hx of allergies on OTC antihistamine. Recently got new cats.     HPI    The following portions of the patient's history were reviewed and updated as appropriate: allergies, current medications, past family history, past medical history, past social history, past surgical history, and problem list.    Review of Systems   Constitutional: Negative.    HENT: Negative.     Respiratory:  Positive for cough. Negative for chest tightness and shortness of breath.    Cardiovascular: Negative.    Gastrointestinal: Negative.   "  Genitourinary: Negative.         Breast pain and swelling   Musculoskeletal: Negative.    Psychiatric/Behavioral: Negative.           Objective:      /76   Pulse 81   Ht 5' 3\" (1.6 m)   Wt 103 kg (226 lb) Comment: per previous visit  LMP  (LMP Unknown)   SpO2 97%   BMI 40.03 kg/m²          Physical Exam  Constitutional:       General: She is not in acute distress.     Appearance: Normal appearance. She is well-developed. She is obese.   HENT:      Head: Normocephalic and atraumatic.   Eyes:      Pupils: Pupils are equal, round, and reactive to light.   Cardiovascular:      Rate and Rhythm: Normal rate and regular rhythm.      Heart sounds: Normal heart sounds. No murmur heard.  Pulmonary:      Effort: Pulmonary effort is normal. No respiratory distress.      Breath sounds: Normal breath sounds. No wheezing.   Chest:       Abdominal:      General: Bowel sounds are normal. There is no distension.      Palpations: Abdomen is soft.      Tenderness: There is no abdominal tenderness.   Skin:     General: Skin is warm and dry.      Capillary Refill: Capillary refill takes less than 2 seconds.   Neurological:      Mental Status: She is alert and oriented to person, place, and time.   Psychiatric:         Mood and Affect: Mood normal.         Behavior: Behavior normal.         Thought Content: Thought content normal.         Judgment: Judgment normal.           "

## 2024-01-19 ENCOUNTER — HOSPITAL ENCOUNTER (OUTPATIENT)
Dept: INTERVENTIONAL RADIOLOGY/VASCULAR | Facility: HOSPITAL | Age: 59
End: 2024-01-19
Attending: RADIOLOGY
Payer: COMMERCIAL

## 2024-01-19 ENCOUNTER — TELEPHONE (OUTPATIENT)
Dept: SURGERY | Facility: CLINIC | Age: 59
End: 2024-01-19

## 2024-01-19 DIAGNOSIS — L08.9 INFECTION OF SKIN: ICD-10-CM

## 2024-01-19 DIAGNOSIS — N64.89 RECURRENT SEROMA OF BREAST: ICD-10-CM

## 2024-01-19 DIAGNOSIS — L08.9 INFECTION OF SKIN: Primary | ICD-10-CM

## 2024-01-19 PROCEDURE — 99214 OFFICE O/P EST MOD 30 MIN: CPT | Performed by: RADIOLOGY

## 2024-01-19 PROCEDURE — 49424 ASSESS CYST CONTRAST INJECT: CPT

## 2024-01-19 PROCEDURE — 76080 X-RAY EXAM OF FISTULA: CPT

## 2024-01-19 RX ORDER — CEPHALEXIN 500 MG/1
CAPSULE ORAL
Qty: 28 CAPSULE | Refills: 1 | Status: SHIPPED | OUTPATIENT
Start: 2024-01-19 | End: 2024-01-26

## 2024-01-19 RX ORDER — CEPHALEXIN 500 MG/1
500 CAPSULE ORAL EVERY 6 HOURS SCHEDULED
Qty: 28 CAPSULE | Refills: 1 | Status: SHIPPED | OUTPATIENT
Start: 2024-01-19 | End: 2024-01-19

## 2024-01-19 NOTE — PROCEDURES
Interventional Radiology Procedure Note    PATIENT NAME: Stacy Ferrell  : 1965  MRN: [id number]     Pre-op Diagnosis:   1. Recurrent seroma of breast      2.    Tube output less than 1/day.  No evidence at the bulb was occluded.  Redness around the tube consistent with cellulitis.    Post-op Diagnosis:   1. Recurrent seroma of breast      2.   Same    Procedure: Tube cut and pull    Surgeon:   Soren Johnson MD  Assistants:     No qualified resident was available, Resident is only observing    Estimated Blood Loss: Minimal     Findings: Less than 1 cc in the bulb for the last 3 days.  Tube transected and removed.    Cellulitis in the skin.  Will call in prescription for Keflex.    Specimens: None     Complications:  None     Anesthesia: none    Soren Johnson MD     Date: 2024  Time: 10:23 AM

## 2024-01-19 NOTE — TELEPHONE ENCOUNTER
Please check in with the patient mid to late next week. Let her know I have been following along with her IR drain placement and sclerotherapy treatments. I am aware that she was put on antibiotics today and that the drain was removed. I would like to see how she is doing next week after she is well into the antibiotic course. Would like to see if she needs a visit in the coming weeks with us as an interval assessment of the left breast after all of this intervention. If things are going well, no need for a visit, just want to get an update on how she is doing. She does need a surveillance visit after her mammogram in late 2024. Thanks.

## 2024-01-22 ENCOUNTER — TELEPHONE (OUTPATIENT)
Dept: HEMATOLOGY ONCOLOGY | Facility: CLINIC | Age: 59
End: 2024-01-22

## 2024-01-22 NOTE — TELEPHONE ENCOUNTER
Appointment Change  Cancel, Reschedule, Change to Virtual      Who are you speaking with? Patient   If it is not the patient, is the caller listed on the communication consent form? N/A   Which provider is the appointment scheduled with? ERNIE Bravo   When was the original appointment scheduled?    Please list date and time 1/23/24 @ 2   At which location is the appointment scheduled to take place? [place]   Was the appointment rescheduled?     Was the appointment changed from an in person visit to a virtual visit?    If so, please list the details of the change. 2/23/24 @ 120   What is the reason for the appointment change? Due to weather       Was STAR transport scheduled? No   Does STAR transport need to be scheduled for the new visit (if applicable) No   Does the patient need an infusion appointment rescheduled? No   Does the patient have an upcoming infusion appointment scheduled? If so, when? No   Is the patient undergoing chemotherapy? No   For appointments cancelled with less than 24 hours:  Was the no-show policy reviewed? N/A

## 2024-01-24 NOTE — TELEPHONE ENCOUNTER
Placed call to patient, she stated the area is still warm to touch but swelling has improved. She stated she gets shooting pain in the breast area but she is uncertain if it is stemming from her back problems. She just got an x-ray of her chest and back today through [facility]. She wants to hold off on scheduling a visit with us and will call if something arises. She is already scheduled for imaging and follow up in 12/2024.

## 2024-01-29 ENCOUNTER — ANESTHESIA EVENT (OUTPATIENT)
Dept: ANESTHESIOLOGY | Facility: HOSPITAL | Age: 59
End: 2024-01-29

## 2024-01-29 ENCOUNTER — ANESTHESIA (OUTPATIENT)
Dept: ANESTHESIOLOGY | Facility: HOSPITAL | Age: 59
End: 2024-01-29

## 2024-01-29 NOTE — ANESTHESIA PREPROCEDURE EVALUATION
Procedure:  PRE-OP ONLY    Relevant Problems   ANESTHESIA   (+) PONV (postoperative nausea and vomiting)      CARDIO   (+) Hypertension   (+) Myocardial infarct, old   (+) Other hyperlipidemia      ENDO   (+) Type 2 diabetes mellitus with hyperglycemia (HCC)      GI/HEPATIC   (+) Acid reflux   (+) Dysphagia   (+) Gastroesophageal reflux disease   (+) Hepatic steatosis   (+) Oropharyngeal dysphagia      GYN   (+) Invasive ductal carcinoma of breast, female, left (HCC)   (+) Malignant neoplasm of central portion of left breast in female, estrogen receptor positive       HEMATOLOGY   (+) Anemia   (+) Iron deficiency anemia due to chronic blood loss      MUSCULOSKELETAL   (+) Chronic midline low back pain without sciatica   (+) Lumbar spondylosis   (+) Myofascial pain syndrome   (+) Sacroiliitis (HCC)      NEURO/PSYCH   (+) Agoraphobia with panic attacks   (+) Anxiety   (+) Chronic midline low back pain without sciatica   (+) Chronic pain syndrome   (+) Generalized anxiety disorder   (+) Moderate episode of recurrent major depressive disorder (HCC)   (+) Myofascial pain syndrome   (+) Occipital neuralgia of right side   (+) Post-traumatic stress   (+) Severe episode of recurrent major depressive disorder, without psychotic features (HCC)        Physical Exam    Airway    Mallampati score: II  TM Distance: >3 FB  Neck ROM: full     Dental       Cardiovascular      Pulmonary      Other Findings  post-pubertal.      Anesthesia Plan  ASA Score- 3     Anesthesia Type- IV sedation with anesthesia with ASA Monitors.         Additional Monitors:     Airway Plan:            Plan Factors-    Chart reviewed.                      Induction- intravenous.    Postoperative Plan-     Informed Consent- Anesthetic plan and risks discussed with patient.  I personally reviewed this patient with the CRNA. Discussed and agreed on the Anesthesia Plan with the CRNA..

## 2024-01-30 ENCOUNTER — ANESTHESIA (OUTPATIENT)
Dept: PERIOP | Facility: HOSPITAL | Age: 59
End: 2024-01-30

## 2024-01-30 ENCOUNTER — HOSPITAL ENCOUNTER (OUTPATIENT)
Dept: PERIOP | Facility: HOSPITAL | Age: 59
Setting detail: OUTPATIENT SURGERY
Discharge: HOME | End: 2024-01-30
Attending: STUDENT IN AN ORGANIZED HEALTH CARE EDUCATION/TRAINING PROGRAM
Payer: COMMERCIAL

## 2024-01-30 ENCOUNTER — TELEPHONE (OUTPATIENT)
Dept: SURGERY | Facility: CLINIC | Age: 59
End: 2024-01-30

## 2024-01-30 ENCOUNTER — TELEPHONE (OUTPATIENT)
Age: 59
End: 2024-01-30

## 2024-01-30 ENCOUNTER — ANESTHESIA EVENT (OUTPATIENT)
Dept: PERIOP | Facility: HOSPITAL | Age: 59
End: 2024-01-30

## 2024-01-30 VITALS
TEMPERATURE: 97 F | RESPIRATION RATE: 20 BRPM | BODY MASS INDEX: 40.04 KG/M2 | SYSTOLIC BLOOD PRESSURE: 118 MMHG | HEART RATE: 75 BPM | HEIGHT: 63 IN | OXYGEN SATURATION: 99 % | WEIGHT: 226 LBS | DIASTOLIC BLOOD PRESSURE: 60 MMHG

## 2024-01-30 DIAGNOSIS — K21.9 GASTROESOPHAGEAL REFLUX DISEASE, UNSPECIFIED WHETHER ESOPHAGITIS PRESENT: ICD-10-CM

## 2024-01-30 DIAGNOSIS — C50.912 INVASIVE DUCTAL CARCINOMA OF BREAST, FEMALE, LEFT (HCC): ICD-10-CM

## 2024-01-30 DIAGNOSIS — Z12.11 COLON CANCER SCREENING: ICD-10-CM

## 2024-01-30 DIAGNOSIS — Z86.19 HISTORY OF HELICOBACTER INFECTION: ICD-10-CM

## 2024-01-30 DIAGNOSIS — N64.89 SEROMA OF BREAST: Primary | ICD-10-CM

## 2024-01-30 DIAGNOSIS — L03.313 CELLULITIS OF CHEST WALL: ICD-10-CM

## 2024-01-30 DIAGNOSIS — R13.12 OROPHARYNGEAL DYSPHAGIA: ICD-10-CM

## 2024-01-30 DIAGNOSIS — R13.19 OTHER DYSPHAGIA: ICD-10-CM

## 2024-01-30 LAB
GLUCOSE SERPL-MCNC: 246 MG/DL (ref 65–140)
GLUCOSE SERPL-MCNC: 264 MG/DL (ref 65–140)

## 2024-01-30 PROCEDURE — 45380 COLONOSCOPY AND BIOPSY: CPT | Performed by: STUDENT IN AN ORGANIZED HEALTH CARE EDUCATION/TRAINING PROGRAM

## 2024-01-30 PROCEDURE — 88305 TISSUE EXAM BY PATHOLOGIST: CPT | Performed by: PATHOLOGY

## 2024-01-30 PROCEDURE — 82948 REAGENT STRIP/BLOOD GLUCOSE: CPT

## 2024-01-30 PROCEDURE — 43239 EGD BIOPSY SINGLE/MULTIPLE: CPT | Performed by: STUDENT IN AN ORGANIZED HEALTH CARE EDUCATION/TRAINING PROGRAM

## 2024-01-30 PROCEDURE — C1726 CATH, BAL DIL, NON-VASCULAR: HCPCS

## 2024-01-30 PROCEDURE — 43249 ESOPH EGD DILATION <30 MM: CPT | Performed by: STUDENT IN AN ORGANIZED HEALTH CARE EDUCATION/TRAINING PROGRAM

## 2024-01-30 RX ORDER — LIDOCAINE HYDROCHLORIDE 20 MG/ML
INJECTION, SOLUTION EPIDURAL; INFILTRATION; INTRACAUDAL; PERINEURAL AS NEEDED
Status: DISCONTINUED | OUTPATIENT
Start: 2024-01-30 | End: 2024-01-30

## 2024-01-30 RX ORDER — SODIUM CHLORIDE, SODIUM LACTATE, POTASSIUM CHLORIDE, CALCIUM CHLORIDE 600; 310; 30; 20 MG/100ML; MG/100ML; MG/100ML; MG/100ML
INJECTION, SOLUTION INTRAVENOUS CONTINUOUS PRN
Status: DISCONTINUED | OUTPATIENT
Start: 2024-01-30 | End: 2024-01-30

## 2024-01-30 RX ORDER — PROPOFOL 10 MG/ML
INJECTION, EMULSION INTRAVENOUS AS NEEDED
Status: DISCONTINUED | OUTPATIENT
Start: 2024-01-30 | End: 2024-01-30

## 2024-01-30 RX ORDER — ONDANSETRON 2 MG/ML
4 INJECTION INTRAMUSCULAR; INTRAVENOUS ONCE AS NEEDED
Status: DISCONTINUED | OUTPATIENT
Start: 2024-01-30 | End: 2024-01-30 | Stop reason: HOSPADM

## 2024-01-30 RX ORDER — SULFAMETHOXAZOLE AND TRIMETHOPRIM 800; 160 MG/1; MG/1
1 TABLET ORAL EVERY 12 HOURS SCHEDULED
Qty: 20 TABLET | Refills: 0 | Status: SHIPPED | OUTPATIENT
Start: 2024-01-30 | End: 2024-02-09

## 2024-01-30 RX ORDER — PROPOFOL 10 MG/ML
INJECTION, EMULSION INTRAVENOUS CONTINUOUS PRN
Status: DISCONTINUED | OUTPATIENT
Start: 2024-01-30 | End: 2024-01-30

## 2024-01-30 RX ADMIN — PROPOFOL 100 MCG/KG/MIN: 10 INJECTION, EMULSION INTRAVENOUS at 07:42

## 2024-01-30 RX ADMIN — SODIUM CHLORIDE, SODIUM LACTATE, POTASSIUM CHLORIDE, AND CALCIUM CHLORIDE: .6; .31; .03; .02 INJECTION, SOLUTION INTRAVENOUS at 07:11

## 2024-01-30 RX ADMIN — PROPOFOL 50 MG: 10 INJECTION, EMULSION INTRAVENOUS at 07:31

## 2024-01-30 RX ADMIN — PROPOFOL 150 MG: 10 INJECTION, EMULSION INTRAVENOUS at 07:26

## 2024-01-30 RX ADMIN — LIDOCAINE HYDROCHLORIDE 100 MG: 20 INJECTION, SOLUTION EPIDURAL; INFILTRATION; INTRACAUDAL; PERINEURAL at 07:26

## 2024-01-30 NOTE — H&P
[facility]  History & Physical     PATIENT INFO     Name: Stacy Ferrell  YOB: 1965   Age: 58 y.o.   Sex: female   MRN: [id number]     HISTORY OF PRESENT ILLNESS     Stacy Ferrell is a 58 y.o. year old female who presents for EGD and colonoscopy for dysphagia, history of H.pylori infection, colon cancer screening.  Last colonoscopy in 2018.  Not on antithrombotics or anticoagulation.     REVIEW OF SYSTEMS     Per the HPI, and otherwise unremarkable.    Historical Information   Past Medical History:   Diagnosis Date    Anemia     iron deficiency anemia    Anxiety     Breast cancer (HCC)     Depression     Diabetes mellitus (HCC)     Fibroid     LT   today 12/8/2021    History of transfusion     Hydrocephalus (HCC)     Hyperlipidemia     Hypertension     Migraine     Myocardial infarction (HCC) 01/19/2022    PONV (postoperative nausea and vomiting)     Sciatica     Seasonal allergies     Vertigo     Wears glasses      Past Surgical History:   Procedure Laterality Date    BACK SURGERY      C1-3 fusion    COLONOSCOPY      EPIDURAL BLOCK INJECTION Right 07/02/2020    Procedure: Right L4-5 and L5-S1 transforaminal epidural steroid injection ([id number]);  Surgeon: Clarence Lima MD;  Location: [facility];  Service: Pain Management     EPIDURAL BLOCK INJECTION N/A 09/02/2021    Procedure: BLOCK / INJECTION EPIDURAL STEROID LUMBAR  L5-S1 IESI;  Surgeon: Clarence Lima MD;  Location: [facility];  Service: Pain Management     FL GUIDED NEEDLE PLAC BX/ASP/INJ  07/02/2020    FL GUIDED NEEDLE PLAC BX/ASP/INJ  09/02/2021    HYSTERECTOMY      IR DRAINAGE TUBE CHECK AND/OR REMOVAL  1/19/2024    IR DRAINAGE TUBE CHECK WITH SCLEROSIS  1/5/2024    IR DRAINAGE TUBE CHECK WITH SCLEROSIS  1/12/2024    IR DRAINAGE TUBE PLACEMENT  1/2/2024    CO ESOPHAGOGASTRODUODENOSCOPY TRANSORAL DIAGNOSTIC N/A 10/23/2018    Procedure: EGD with Savary dilation AND COLONOSCOPY;  Surgeon:  Billy Schneider MD;  Location: [facility];  Service: Gastroenterology    HI LAPAROSCOPY W TOTAL HYSTERECTOMY UTERUS 250 GM/< N/A 12/08/2021    Procedure: LTH; BILAT SALPINGECTOMY; EXCISION PERITONEAL CYST;  Surgeon: Sonia Tamez MD;  Location: [facility];  Service: Gynecology    HI MASTECTOMY PARTIAL Left 4/10/2023    Procedure: LUMPECTOMY BREAST WITH BEAU  LOCALIZATION,  BIOPSY LYMPH NODE SENTINEL (INJECT AT 1130 BY DR BROTHERS IN THE OR);  Surgeon: Lianne Brothers MD;  Location: [facility];  Service: General    TUBAL LIGATION      US BREAST CYST ASPIRATION LEFT INITIAL Left 9/12/2023    US BREAST CYST ASPIRATION LEFT INITIAL Left 9/21/2023    US BREAST CYST ASPIRATION LEFT INITIAL Left 10/3/2023    US GUIDED BREAST BIOPSY LEFT COMPLETE Left 03/07/2023     Social History   Social History     Substance and Sexual Activity   Alcohol Use Not Currently    Comment: none     Social History     Substance and Sexual Activity   Drug Use No    Comment: none     Social History     Tobacco Use   Smoking Status Never   Smokeless Tobacco Never   Tobacco Comments    none     Family History   Problem Relation Age of Onset    Varicose Veins Mother     Drug abuse Mother     Diabetes Father     Liver cancer Father         bile duct    Cancer Father     Breast cancer Sister         unknown age    No Known Problems Daughter     No Known Problems Daughter     No Known Problems Maternal Aunt     No Known Problems Maternal Aunt     No Known Problems Maternal Aunt     No Known Problems Maternal Aunt     No Known Problems Maternal Aunt     No Known Problems Paternal Aunt     No Known Problems Maternal Grandmother     No Known Problems Maternal Grandfather     No Known Problems Paternal Grandmother     No Known Problems Paternal Grandfather         MEDICATIONS & ALLERGIES     Current Outpatient Medications   Medication Instructions    ALPRAZolam (XANAX) 0.5 mg, Oral, 3 times daily PRN    anastrozole (ARIMIDEX) 1 mg, Oral, Daily     "atorvastatin (LIPITOR) 40 mg, Oral, Every evening    benzonatate (TESSALON) 200 mg, Oral, Daily at bedtime PRN    FLUoxetine (PROZAC) 40 mg, Oral, Daily    gabapentin (NEURONTIN) 600 MG tablet     gabapentin (NEURONTIN) 600 mg, Oral, 3 times daily    losartan (COZAAR) 100 mg, Oral, Daily    Na Sulfate-K Sulfate-Mg Sulf (Suprep Bowel Prep Kit) 17.5-3.13-1.6 GM/177ML SOLN 177 mL, Oral, Once, Take 177 mL by mouth once for 1 dose    naproxen (NAPROSYN) 500 mg, Oral, 2 times daily with meals    NovoLOG ReliOn 100 UNIT/ML injection  UNITS DAILY VIA PUMP    omeprazole (PRILOSEC) 20 mg, Oral, Daily, Take 30 minutes before supper    sodium chloride, PF, 0.9 % 10 mL, Intracatheter, Daily, Intracatheter flushing daily. May substitute prefilled syringe with normal saline 10 mL vials, 10 mL syringes, and 18 g blunt needles    Trulicity 1.5 MG/0.5ML injection INJECT 0.5 ML (1.5 MG TOTAL) UNDER THE SKIN ONCE EVERY 7 DAYS     Allergies   Allergen Reactions    Nuts - Food Allergy Anaphylaxis and Throat Swelling    Trintellix [Vortioxetine] Other (See Comments)     Suicidal ideation    Chocolate - Food Allergy Cough    Eggs Or Egg-Derived Products - Food Allergy Other (See Comments)     Cough    Other Cough     LETTUCE    Shellfish Allergy - Food Allergy Cough    Tomato - Food Allergy Cough        PHYSICAL EXAM      Objective   Blood pressure 131/65, pulse 74, temperature 97.8 °F (36.6 °C), temperature source Tympanic, resp. rate 18, height 5' 3\" (1.6 m), weight 103 kg (226 lb), SpO2 97%. Body mass index is 40.03 kg/m².    General Appearance:   Alert, cooperative, no distress   Lungs:   Equal chest rise, respirations unlabored    Heart:   Regular rate and rhythm   Abdomen:   Soft, non-tender, non-distended; normal bowel sounds; no masses, no organomegaly    Extremities:   No edema       ASSESSMENT & PLAN     This is a 58 y.o. year old female here for EGD and colonoscopy, and she is stable and optimized for her " procedure.      Keven Hopson D.O.  [facility]  Division of Gastroenterology & Hepatology  Available on TigerText  [email or web address]    ** Please Note: This note is constructed using a voice recognition dictation system. **

## 2024-01-30 NOTE — TELEPHONE ENCOUNTER
"Patient called in stating her breast is filling up again, red, warm to touch, denied any fevers. Is not currently on any antibiotics. She has not done anything for the area, as she just noticed it because she just came out of having her colonoscopy / EGD done. Tiger text sent to Dr. Esparza who responded:    \"Elizabeth off this week but I will update him ASAP to see if he can reevaluate her, we will get back to her. For now can you send her 10 days of bactrim DS BID.Remind her we would like her to aggressively manage the lymphedema as well with compression massage consideration for going in to see PT, having the lymphedema under really good control is a big help.\"    Placed call back to patient and reviewed message, also confirmed pharmacy for medication. She verbalized understanding and had no questions at this time. Verbally called in medication to her requested pharmacy.    "

## 2024-01-30 NOTE — ANESTHESIA PREPROCEDURE EVALUATION
Procedure:  EGD  COLONOSCOPY    Relevant Problems   ANESTHESIA   (+) PONV (postoperative nausea and vomiting)      CARDIO   (+) Hypertension   (+) Myocardial infarct, old   (+) Other hyperlipidemia      ENDO   (+) Type 2 diabetes mellitus with hyperglycemia (HCC)      GI/HEPATIC   (+) Acid reflux   (+) Dysphagia   (+) Gastroesophageal reflux disease   (+) Hepatic steatosis   (+) Oropharyngeal dysphagia      GYN   (+) Invasive ductal carcinoma of breast, female, left (HCC)   (+) Malignant neoplasm of central portion of left breast in female, estrogen receptor positive       HEMATOLOGY   (+) Anemia   (+) Iron deficiency anemia due to chronic blood loss      MUSCULOSKELETAL   (+) Chronic midline low back pain without sciatica   (+) Lumbar spondylosis   (+) Myofascial pain syndrome   (+) Sacroiliitis (HCC)      NEURO/PSYCH   (+) Agoraphobia with panic attacks   (+) Anxiety   (+) Chronic midline low back pain without sciatica   (+) Chronic pain syndrome   (+) Generalized anxiety disorder   (+) Moderate episode of recurrent major depressive disorder (HCC)   (+) Myofascial pain syndrome   (+) Occipital neuralgia of right side   (+) Post-traumatic stress   (+) Severe episode of recurrent major depressive disorder, without psychotic features (HCC)        Physical Exam    Airway    Mallampati score: II  TM Distance: >3 FB  Neck ROM: full     Dental       Cardiovascular      Pulmonary      Other Findings  post-pubertal.      Anesthesia Plan  ASA Score- 3     Anesthesia Type- IV sedation with anesthesia with ASA Monitors.         Additional Monitors:     Airway Plan:            Plan Factors-    Chart reviewed.                      Induction- intravenous.    Postoperative Plan-     Informed Consent- Anesthetic plan and risks discussed with patient.  I personally reviewed this patient with the CRNA. Discussed and agreed on the Anesthesia Plan with the CRNA..

## 2024-01-30 NOTE — DISCHARGE INSTRUCTIONS
Upper Endoscopy and Colonoscopy   WHAT YOU NEED TO KNOW:   An upper endoscopy is also called an upper gastrointestinal (GI) endoscopy, or an esophagogastroduodenoscopy (EGD). It is a procedure to examine the inside of your esophagus, stomach, and duodenum (first part of the small intestine) with a scope. You may feel bloated, gassy, or have some abdominal discomfort after your procedure. Your throat may be sore for 24 to 36 hours. You may burp or pass gas from air that is still inside your body.                A colonoscopy is a procedure to examine the inside of your colon (intestine) with a scope. Polyps or tissue growths may have been removed during your colonoscopy. It is normal to feel bloated and to have some abdominal discomfort. You should be passing gas. If you have hemorrhoids or you had polyps removed, you may have a small amount of bleeding.          DISCHARGE INSTRUCTIONS:   Seek care immediately if:   You have sudden, severe abdominal pain.     You have problems swallowing.     You have a large amount of black, sticky bowel movements or blood in your bowel movements.     You have sudden trouble breathing.     You feel weak, lightheaded, or faint or your heart beats faster than normal for you.     Contact your healthcare provider if:   You have a fever and chills.      You have nausea or are vomiting.      Your abdomen is bloated or feels full and hard.     You have abdominal pain.    You have a large amount of black, sticky bowel movements or blood in your bowel movements.    You have not had a bowel movement for 3 days after your procedure.    You have rash or hives.    You have questions or concerns about your procedure.     Activity:   ·       Do not lift, strain, or run for 24 hours after your procedure.     ·       Rest after your procedure. You have been given medicine to relax you. Do not drive or make important decisions until the day after your procedure. Return to your normal activity as  directed.     ·       Relieve gas and discomfort from bloating by lying on your right side with a heating pad on your abdomen. You may need to take short walks to help the gas move out. Eat small meals until bloating is relieved.  Follow up with your healthcare provider as directed: Write down your questions so you remember to ask them during your visits.      If you take a “blood thinner”, please review the specific instructions from your endoscopist about when you should resume it. These can be found in the “Recommendation” and “Your Medication list” sections of this After Visit Summary.

## 2024-01-30 NOTE — ANESTHESIA POSTPROCEDURE EVALUATION
Post-Op Assessment Note    CV Status:  Stable  Pain Score: 0    Pain management: adequate       Mental Status:  Sleepy   Hydration Status:  Euvolemic   PONV Controlled:  Controlled   Airway Patency:  Patent     Post Op Vitals Reviewed: Yes    No anethesia notable event occurred.    Staff: CRNA               BP   109/55   Temp      Pulse  76   Resp   12   SpO2   100

## 2024-02-01 ENCOUNTER — SOCIAL WORK (OUTPATIENT)
Dept: BEHAVIORAL/MENTAL HEALTH CLINIC | Facility: CLINIC | Age: 59
End: 2024-02-01
Payer: COMMERCIAL

## 2024-02-01 DIAGNOSIS — F43.10 POST-TRAUMATIC STRESS: Primary | ICD-10-CM

## 2024-02-01 DIAGNOSIS — F33.1 MODERATE EPISODE OF RECURRENT MAJOR DEPRESSIVE DISORDER (HCC): ICD-10-CM

## 2024-02-01 DIAGNOSIS — F50.81 BINGE-EATING DISORDER, MODERATE: ICD-10-CM

## 2024-02-01 PROCEDURE — 88305 TISSUE EXAM BY PATHOLOGIST: CPT | Performed by: PATHOLOGY

## 2024-02-01 PROCEDURE — 90837 PSYTX W PT 60 MINUTES: CPT | Performed by: SOCIAL WORKER

## 2024-02-01 NOTE — PSYCH
"Behavioral Health Psychotherapy Progress Note    Psychotherapy Provided: Individual Psychotherapy     No diagnosis found.    Goals addressed in session: Goal 1 and Goal 2     DATA: The Client reported that her daughter has been having problems in her relationships which has caused and increase in her depression and anxiety. During the session we explored and processed her depression and explored healthy boundaries and coping skills   During this session, this clinician used the following therapeutic modalities: Cognitive Behavioral Therapy, Mindfulness-based Strategies, Solution-Focused Therapy, and Supportive Psychotherapy    Substance Abuse was not addressed during this session. If the client is diagnosed with a co-occurring substance use disorder, please indicate any changes in the frequency or amount of use: n/a. Stage of change for addressing substance use diagnoses: No substance use/Not applicable    ASSESSMENT:  Heydi Ferrell presents with a Anxious and Depressed mood.     her affect is Normal range and intensity, which is congruent, with her mood and the content of the session. The client has made progress on their goals.     Heydi Ferrell presents with a none risk of suicide, none risk of self-harm, and none risk of harm to others.    For any risk assessment that surpasses a \"low\" rating, a safety plan must be developed.    A safety plan was indicated: no  If yes, describe in detail n/a    PLAN: Between sessions, Heydi Ferrell will practice her coping skills when presented with anxiety and depression. At the next session, the therapist will use Cognitive Behavioral Therapy, Mindfulness-based Strategies, Solution-Focused Therapy, and Supportive Psychotherapy to address the Clients MH issues affecting her different relationships and environments. .    Behavioral Health Treatment Plan and Discharge Planning: Heydi Ferrell is aware of and agrees to continue to work on their treatment plan. They have identified " and are working toward their discharge goals. yes    Visit start and stop times:    02/01/24  Start Time: 1155  Stop Time: 1258  Total Visit Time: 63 minutes

## 2024-02-05 NOTE — TELEPHONE ENCOUNTER
Dr. Johnson agreed, needs an US as the next step. Will order L breast diagnostic US, stat. If we can help her to schedule. Then when we have this, Dr. WASHINGTON will review it. How are her symptoms after taking antibiotics? Any improvement? Please help to coordinate US, thanks.

## 2024-02-19 ENCOUNTER — TELEPHONE (OUTPATIENT)
Age: 59
End: 2024-02-19

## 2024-02-19 ENCOUNTER — NURSE TRIAGE (OUTPATIENT)
Age: 59
End: 2024-02-19

## 2024-02-19 NOTE — TELEPHONE ENCOUNTER
Patient called she is having pain in her shoulder and wanted to know if it has anything to do with her lumpectomy.  I transferred her to Alexia in triage to help her

## 2024-02-19 NOTE — TELEPHONE ENCOUNTER
Placed call to patient, she is doing the home exercises like massages that PT recommended for lymphedema. She has no concerns of breast or infection. She stated she just notices now that she is swollen in her shoulder area and thinks it has moved up, wants to know if there is anything for compression she can put on her shoulder or what the next step should be.

## 2024-02-19 NOTE — TELEPHONE ENCOUNTER
"Patien of . Had partial left mastectomy done May 2023. Is now having left shoulder pain and is concerned that the lymphadenopathy is spreading into her shoulder. Pain is a 8/10 ans is constant. Shoulder feels swollen and when she removes her bra strap she noted \"indentation\" in her skin. Please advise.    Answer Assessment - Initial Assessment Questions  1. ONSET: \"When did the pain start?\"      Started in May  2. LOCATION: \"Where is the pain located?\"      Left shoulder  3. PAIN: \"How bad is the pain?\" (Scale 1-10; or mild, moderate, severe)    - MILD (1-3): doesn't interfere with normal activities    - MODERATE (4-7): interferes with normal activities (e.g., work or school) or awakens from sleep    - SEVERE (8-10): excruciating pain, unable to do any normal activities, unable to move arm at all due to pain      Patient states pain is a 8/10  4. WORK OR EXERCISE: \"Has there been any recent work or exercise that involved this part of the body?\"      NO  5. CAUSE: \"What do you think is causing the shoulder pain?\"      Patient feels she may have lymphadenopathy in her shoulder   6. OTHER SYMPTOMS: \"Do you have any other symptoms?\" (e.g., neck pain, swelling, rash, fever, numbness, weakness)    Protocols used: Shoulder Pain-ADULT-[place]    "

## 2024-02-20 NOTE — TELEPHONE ENCOUNTER
Advise scheduling L breast US, see other phone note and update patient on Elizabeth’s review and that we would like to see an US. Please ask how her breast symptoms changed at all since our last round of abx treatment and phone note.    Advise having her seen by PT OT lymphedema therapy again ASAP if you can help her coordinate, new referral may be needed. Ok if it’s only 1-2 visits and they guide the compression therapy script and plan. This is important for her to get their professional guidance on this. She may also want to visit with the local boutique for compression wear options in addition.

## 2024-02-21 ENCOUNTER — TELEMEDICINE (OUTPATIENT)
Dept: PSYCHIATRY | Facility: CLINIC | Age: 59
End: 2024-02-21
Payer: COMMERCIAL

## 2024-02-21 ENCOUNTER — TELEMEDICINE (OUTPATIENT)
Dept: PSYCHOLOGY | Facility: CLINIC | Age: 59
End: 2024-02-21
Payer: COMMERCIAL

## 2024-02-21 DIAGNOSIS — F06.4 ANXIETY DISORDER DUE TO GENERAL MEDICAL CONDITION WITH PANIC ATTACK: ICD-10-CM

## 2024-02-21 DIAGNOSIS — I89.0 LYMPHEDEMA OF BREAST: Primary | ICD-10-CM

## 2024-02-21 DIAGNOSIS — F43.10 PTSD (POST-TRAUMATIC STRESS DISORDER): ICD-10-CM

## 2024-02-21 DIAGNOSIS — F41.0 ANXIETY DISORDER DUE TO GENERAL MEDICAL CONDITION WITH PANIC ATTACK: ICD-10-CM

## 2024-02-21 DIAGNOSIS — F43.10 POST TRAUMATIC STRESS DISORDER (PTSD): ICD-10-CM

## 2024-02-21 DIAGNOSIS — F33.2 SEVERE EPISODE OF RECURRENT MAJOR DEPRESSIVE DISORDER, WITHOUT PSYCHOTIC FEATURES (HCC): Primary | ICD-10-CM

## 2024-02-21 PROBLEM — Z12.11 COLON CANCER SCREENING: Status: RESOLVED | Noted: 2018-10-10 | Resolved: 2024-02-21

## 2024-02-21 PROBLEM — Z12.11 SCREENING FOR COLON CANCER: Status: RESOLVED | Noted: 2018-10-10 | Resolved: 2024-02-21

## 2024-02-21 PROCEDURE — 90791 PSYCH DIAGNOSTIC EVALUATION: CPT

## 2024-02-21 PROCEDURE — 90792 PSYCH DIAG EVAL W/MED SRVCS: CPT | Performed by: STUDENT IN AN ORGANIZED HEALTH CARE EDUCATION/TRAINING PROGRAM

## 2024-02-21 PROCEDURE — S0201 PARTIAL HOSPITALIZATION SERV: HCPCS

## 2024-02-21 PROCEDURE — G0410 GRP PSYCH PARTIAL HOSP 45-50: HCPCS

## 2024-02-21 PROCEDURE — 90834 PSYTX W PT 45 MINUTES: CPT

## 2024-02-21 PROCEDURE — G0176 OPPS/PHP;ACTIVITY THERAPY: HCPCS

## 2024-02-21 RX ORDER — FLUOXETINE HYDROCHLORIDE 40 MG/1
40 CAPSULE ORAL DAILY
Qty: 30 CAPSULE | Refills: 1 | Status: SHIPPED | OUTPATIENT
Start: 2024-02-21

## 2024-02-21 RX ORDER — HYDROXYZINE HYDROCHLORIDE 25 MG/1
25 TABLET, FILM COATED ORAL DAILY PRN
Qty: 30 TABLET | Refills: 1 | Status: SHIPPED | OUTPATIENT
Start: 2024-02-21

## 2024-02-21 RX ORDER — FLUOXETINE HYDROCHLORIDE 20 MG/1
20 CAPSULE ORAL DAILY
Qty: 30 CAPSULE | Refills: 1 | Status: SHIPPED | OUTPATIENT
Start: 2024-02-21

## 2024-02-21 NOTE — PSYCH
Subjective:     Patient ID: Stacy Ferrell is a 58 y.o. female.    [facility] Clinical Progress Notes      Specialized Services Documentation  Therapist must complete separate progress note for each specific clinical activity in which the individual participated during the day.       Group Psychotherapy  This group was facilitated virtually in a private office using HIPAA Compliant and Approved Microsoft Teams.   ([id number]) Stacy Ferrell did not attend group on Wellness Recovery Action Plan due to intake assessment.   Treatment Plan Objectives: 1.1, 1.2  Therapist: Carmen RYAN RN         Education Therapy   [id number] Stacy Ferrell was not present in morning assessment due to intake assessment.    [id number] Stacy Ferrell engaged throughout the treatment day. Was engaged in learning related to Illness, Medication, Aftercare, and Wellness Tools. Staff utilized Verbal, Written, A/V, and Demonstration teaching methods.  Stacy Ferrell shared area of learning and set a goal for outside of program to stay focused.      Tx Plan Objective: 1.1,1.2,1.4 Therapist:  Carmen RYAN RN

## 2024-02-21 NOTE — PSYCH
Virtual Regular Visit    Verification of patient location:    Patient is located at Home in the following state in which I hold an active license [place]      Assessment/Plan:    Problem List Items Addressed This Visit    None      Goals addressed in session: PHP VIRTUAL GROUP DUE TO virtual preference of care           Reason for visit is No chief complaint on file.       Encounter provider [facility]    Provider located at [facility]  [address] [place] [id number]      Recent Visits  No visits were found meeting these conditions.  Showing recent visits within past 7 days and meeting all other requirements  Today's Visits  Date Type Provider Dept   02/21/24 Telemedicine [facility]ERAPY [facility]   Showing today's visits and meeting all other requirements  Future Appointments  No visits were found meeting these conditions.  Showing future appointments within next 150 days and meeting all other requirements       The patient was identified by name and date of birth. Stacy Ferrell was informed that this is a telemedicine visit and that the visit is being conducted throughthe Microsoft Teams platform. She agrees to proceed..  My office door was closed. No one else was in the room.  She acknowledged consent and understanding of privacy and security of the video platform. The patient has agreed to participate and understands they can discontinue the visit at any time.    Patient is aware this is a billable service.     Subjective  Stacy Ferrell is a 58 y.o. female I spent FOUR GROUP HOURS PLUS CASE MANAGEMENT minutes with patient today in which greater than 50% of time was spent in counseling/coordination of care regarding PHP - SEE NOTES  .      HPI     Past Medical History:   Diagnosis Date    Anemia     iron deficiency anemia    Anxiety     Breast cancer (HCC)     Depression      Diabetes mellitus (HCC)     Fibroid     LTH   today 12/8/2021    History of transfusion     Hydrocephalus (HCC)     Hyperlipidemia     Hypertension     Migraine     Myocardial infarction (HCC) 01/19/2022    PONV (postoperative nausea and vomiting)     Sciatica     Seasonal allergies     Vertigo     Wears glasses        Past Surgical History:   Procedure Laterality Date    BACK SURGERY      C1-3 fusion    COLONOSCOPY      EPIDURAL BLOCK INJECTION Right 07/02/2020    Procedure: Right L4-5 and L5-S1 transforaminal epidural steroid injection ([id number]);  Surgeon: Clarence Lima MD;  Location: [facility];  Service: Pain Management     EPIDURAL BLOCK INJECTION N/A 09/02/2021    Procedure: BLOCK / INJECTION EPIDURAL STEROID LUMBAR  L5-S1 IESI;  Surgeon: Clarence Lima MD;  Location: [facility];  Service: Pain Management     FL GUIDED NEEDLE PLAC BX/ASP/INJ  07/02/2020    FL GUIDED NEEDLE PLAC BX/ASP/INJ  09/02/2021    HYSTERECTOMY      IR DRAINAGE TUBE CHECK AND/OR REMOVAL  1/19/2024    IR DRAINAGE TUBE CHECK WITH SCLEROSIS  1/5/2024    IR DRAINAGE TUBE CHECK WITH SCLEROSIS  1/12/2024    IR DRAINAGE TUBE PLACEMENT  1/2/2024    ME ESOPHAGOGASTRODUODENOSCOPY TRANSORAL DIAGNOSTIC N/A 10/23/2018    Procedure: EGD with Savary dilation AND COLONOSCOPY;  Surgeon: Billy Schneider MD;  Location: [facility];  Service: Gastroenterology    ME LAPAROSCOPY W TOTAL HYSTERECTOMY UTERUS 250 GM/< N/A 12/08/2021    Procedure: LTH; BILAT SALPINGECTOMY; EXCISION PERITONEAL CYST;  Surgeon: Sonia Tamez MD;  Location: [facility];  Service: Gynecology    ME MASTECTOMY PARTIAL Left 4/10/2023    Procedure: LUMPECTOMY BREAST WITH BEAU  LOCALIZATION,  BIOPSY LYMPH NODE SENTINEL (INJECT AT 1130 BY DR BROTHERS IN THE OR);  Surgeon: Lianne Brothers MD;  Location: [facility];  Service: General    TUBAL LIGATION      US BREAST CYST ASPIRATION LEFT INITIAL Left 9/12/2023    US BREAST CYST ASPIRATION LEFT INITIAL Left 9/21/2023      BREAST CYST ASPIRATION LEFT INITIAL Left 10/3/2023    US GUIDED BREAST BIOPSY LEFT COMPLETE Left 03/07/2023       Current Outpatient Medications   Medication Sig Dispense Refill    anastrozole (ARIMIDEX) 1 mg tablet Take 1 tablet (1 mg total) by mouth daily 90 tablet 1    atorvastatin (LIPITOR) 40 mg tablet Take 40 mg by mouth every evening      FLUoxetine (PROzac) 20 mg capsule Take 1 capsule (20 mg total) by mouth daily Take with 40mg for total of 60mg. 30 capsule 1    FLUoxetine (PROzac) 40 MG capsule Take 1 capsule (40 mg total) by mouth daily Take with 20mg capsule for total of 60mg daily. 30 capsule 1    gabapentin (Neurontin) 600 MG tablet Take 1 tablet (600 mg total) by mouth 3 (three) times a day 270 tablet 2    hydrOXYzine HCL (ATARAX) 25 mg tablet Take 1 tablet (25 mg total) by mouth daily as needed for anxiety 30 tablet 1    losartan (COZAAR) 100 MG tablet Take 100 mg by mouth daily      NovoLOG ReliOn 100 UNIT/ML injection  UNITS DAILY VIA PUMP      omeprazole (PriLOSEC) 20 mg delayed release capsule Take 1 capsule (20 mg total) by mouth daily Take 30 minutes before supper 30 capsule 2    Trulicity 1.5 MG/0.5ML injection INJECT 0.5 ML (1.5 MG TOTAL) UNDER THE SKIN ONCE EVERY 7 DAYS       No current facility-administered medications for this visit.        Allergies   Allergen Reactions    Nuts - Food Allergy Anaphylaxis and Throat Swelling    Trintellix [Vortioxetine] Other (See Comments)     Suicidal ideation    Chocolate - Food Allergy Cough    Eggs Or Egg-Derived Products - Food Allergy Other (See Comments)     Cough    Other Cough     LETTUCE    Shellfish Allergy - Food Allergy Cough    Tomato - Food Allergy Cough       Review of Systems    Video Exam    There were no vitals filed for this visit.    Physical Exam

## 2024-02-21 NOTE — PSYCH
Subjective:     Patient ID: Stacy Ferrell is a 58 y.o. female.    [facility] Clinical Progress Notes      Specialized Services Documentation  Therapist must complete separate progress note for each specific clinical activity in which the individual participated during the day.     Other According to the benefits information sheet no auth is needed.      Case Management Note    Edna Shah, ANGELIKA, LSW    Current suicide risk : Low       0957 Met with Stacy Ferrell via TEAMS.  Reviewed program, initial paperwork reviewed: Consent for Treatment, PHP handbook, HIPPA, General Consent, Client Bill of Rights, and Smoking/Drug and Alcohol Policy. Release of Information obtained for emergency contact - Asher Ferrell() [phone] and PCP and Health Care Coordination Form. Stacy Ferrell has hard copies of all paperwork and verbally gave consent. Reviewed and given on call number. PCP notified of admission and health care coordination form sent. Completed initial psycho-social evaluation and initial treatment goals discussed.Program guidelines reviewed.     I,Stacy Ferrell,am physically unable to provide a signature; however, I understand the nature of and am in agreement with the documentation presented to me via TEAMS.  I have received a copy through My Chart and/or the qunb Service.  I freely give verbal consent.  Name of Document (s):Consent for Treatment, PHP handbook, HIPPA, General Consent, Client Bill of Rights, and Smoking/Drug and Alcohol Policy,Health Care Coordination Form, Release of Information   Witness to verbal consent: Edna Shah  Witness to verbal consent: Leland Dodson        Medications changes/added/denied? Yes  - See Dr. Marin Note    Treatment session number: Assessment and day 1    Individual Case Management Visit provided today? yes    [facility] follow up physician's orders: Admit to PHP - See Dr. Marin Note

## 2024-02-21 NOTE — PSYCH
"Subjective:     Patient ID: Stacy Ferrell is a 58 y.o. female.    [facility] Clinical Progress Notes      Specialized Services Documentation  Therapist must complete separate progress note for each specific clinical activity in which the individual participated during the day.     Allied Therapy [id number] This group was facilitated virtually in a private office using HIPAA Compliant and Approved D[employer] Teams. Stacy Ferrell was in and out of music therapy group regarding supports. The group participated in a discussion around supports in their lives and areas where they need support. The group participated in a fatoumata discussion on the song \"[place]\". The group read and discussed a handout on supports. The group was then asked to share what they learned through the worksheet.  Stacy was observed to be in a grocery store during group and was informed that she would be removed until she could be in a private location. She rejoined the call and was shortly after observed entering another store and was once again removed from group. She later re-entered group from her car and remained for the rest of group. Minimal effort noted towards treatment goal. Continue AT to encourage the development and proactive use of supports.  Tx Plan Objective: 1.1,1.2,1.4, Therapist:  Leland Dodson, [place], [place]    "
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
## 2024-02-21 NOTE — PSYCH
"Group Psychotherapy      Group Therapy    Subjective:     Patient ID: Stacy Ferrell 58 y.o.     This group was facilitated virtually in a private office using HIPAA Compliant and Approved [facility] [employer] [email or web address] consented to the use of tele-video modality of treatment.  ([id number])Stacy Ferrell actively  engaged in psychoeducational group about radical acceptance. The skill helps an individual to learn to accept situations that are out of ones control. Reducing denial of situations and reducing distress..Group discovered strategies that help them to manage emotional well being on a short term and long term basis. The introduction of the concept of \"wise mid\" is used in this process.The group talked about understanding the purpose and meaning radical acceptance with  \"wise mind\" ,regulation , recognition, and how it affects themselves and others..Teaching on the emphasis of radical acceptance, who the group can go to for help was brought up as well. Group was encouraged to ask questions in an open forum at the end of group. Positive progress displayed through engagement in topic. Stacy Ferrell will continue to engage in psychotherapy to encourage positive self realization.  Treatment Plan Objective 1.1, 1.2, 1.3, 1.4 Therapist: Chano SINCLAIR Ed.   "

## 2024-02-21 NOTE — BH TREATMENT PLAN
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 female.    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  Date Initiated: 02/21/24    Strengths: \"loves her kids and grandson\"     LONG TERM GOAL:   Date Initiated: 02/21/24  1.0 I will identify 3 signs that I am feeling less depressed and anxious and more productive in my day to day life.  Target Date: 3/20/24  Completion Date:       SHORT TERM OBJECTIVES:     Date Initiated: 02/21/24  1.1 I will create a list identifying coping skills that I have learned and discuss how I plan to build them into my schedule. I will choose at least 1 different coping skill daily to practice   Revision Date:   Target Date: 3/1/24  Completion Date:     Date Initiated: 02/21/24  1.2 I will schedule and participate in a pleasant and healthy activity daily to improve my mood.  Revision Date:   Target Date: 3/1/24  Completion Date:    Date Initiated: 02/21/24  1.3 I will take medications as prescribed and share questions and concerns if arise.    Revision Date:  Target Date: 3/1/24  Completion Date:     Date Initiated: 02/21/24  1.4 I will identify 3 ways my supports can assist in my recovery and agree to staff/support contact as indicated.    Revision Date:  Target Date: " 3/1/24  Completion Date:          7 DAY REVISION:    Date Initiated:  Revision Date:   Target Date:   Completion Date:      PSYCHIATRY:  Date Initiated:  02/21/24  Medication Management and Education       Revision Date:       The person(s) responsible for carrying out the plan is Rocky Marin DO and Alexia Bustos PA-C    NURSING/SYMPTOM EDUCATION:   Date Initiated: 02/21/24  1.1, 1.2. 1.3, 1.4 This RN/Or Therapist will provide wellness/symptoms and skill education groups three to five days weekly to educate Stacy Ferrell on signs and symptoms of diagnoses, skills to manage, and medication questions that will be addressed by the treatment team.    Revision date:  The person(s) responsible for carrying out the plan is Sandra Duron; Carmen Hong RN, BSN    PSYCHOLOGY:   Date Initiated: 02/21/24       1.1, 1.2, 1.4 Provide psychotherapy group 5 times per week to allow opportunity for Stacy Ferrell  to explore stressors and ways of coping.   Revision Date:   The person(s) responsible for carrying out the plan is ANGELIKA Barron,JACKLYN; ESNAIT Coburn    ALLIED THERAPY:   Date Initiated: 02/21/24  1.1,1.2 Engage Stacy Ferrell in AT group 5 times weekly to encourage development and use of wellness tools to decrease symptoms and promote recovery through meaningful activity.  Revision Date:       The person(s) responsible for carrying out the plan is Felicia Tomas [facility] ; FLORENTINO Palomino [facility]    CASE MANAGEMENT:   Date Initiated: 02/21/24      1.0 This  will meet with Stacy Ghassan  3-4 times weekly to assess treatment progress, discharge planning, connection to community supports and UR as indicated.  Revision Date:   The person(s) responsible for carrying out the plan is ANGELIKA Barron LSW    TREATMENT REVIEW/COMMENTS:     DISCHARGE CRITERIA: Identify 3 signs of progress and complete a safety plan.    DISCHARGE PLAN: Connect with identified outpatient providers.    Estimated Length of Stay: 10 treatment days             Diagnosis and Treatment Plan explained to Stacy, Stacy relates understanding diagnosis and is agreeable to Treatment Plan.        CLIENT COMMENTS / Please share your thoughts, feelings, need and/or experiences regarding your treatment plan with Staff.  Please see follow up note with comments.    Signatures can be found on [facility] Treatment plan consent form.

## 2024-02-22 ENCOUNTER — TELEMEDICINE (OUTPATIENT)
Dept: PSYCHOLOGY | Facility: CLINIC | Age: 59
End: 2024-02-22
Payer: COMMERCIAL

## 2024-02-22 DIAGNOSIS — F43.10 POST TRAUMATIC STRESS DISORDER (PTSD): ICD-10-CM

## 2024-02-22 DIAGNOSIS — F33.2 SEVERE EPISODE OF RECURRENT MAJOR DEPRESSIVE DISORDER, WITHOUT PSYCHOTIC FEATURES (HCC): Primary | ICD-10-CM

## 2024-02-22 DIAGNOSIS — F41.0 ANXIETY DISORDER DUE TO GENERAL MEDICAL CONDITION WITH PANIC ATTACK: ICD-10-CM

## 2024-02-22 DIAGNOSIS — F06.4 ANXIETY DISORDER DUE TO GENERAL MEDICAL CONDITION WITH PANIC ATTACK: ICD-10-CM

## 2024-02-22 PROCEDURE — G0410 GRP PSYCH PARTIAL HOSP 45-50: HCPCS

## 2024-02-22 PROCEDURE — G0176 OPPS/PHP;ACTIVITY THERAPY: HCPCS

## 2024-02-22 PROCEDURE — S0201 PARTIAL HOSPITALIZATION SERV: HCPCS

## 2024-02-22 PROCEDURE — G0177 OPPS/PHP; TRAIN & EDUC SERV: HCPCS

## 2024-02-22 NOTE — PSYCH
Subjective:     Patient ID: Heydi Ferrell is a 58 y.o. female.     [facility] Clinical Progress Notes      Specialized Services Documentation  Therapist must complete separate progress note for each specific clinical activity in which the individual participated during the day.      Group Psychotherapy - Coping Skills  [id number] Heydi Ferrell participated actively in a psychotherapy group focused on coping skills.  This group was facilitated virtually in a private office using HIPAA compliant and approved [employer] teams. Stacy Ferrell consented to the use of tele-video modality of treatment.  Members reviewed and discussed coping styles and techniques. As well as healthy versus unhealthy coping mechanisms. This writer facilitated a discussion on various coping mechanisms. Members evaluated their coping skills usage and reflected on which unhealthy and healthy methods they depend on. Members were provided a coping skills inventory worksheet to document their coping skills usage. This writer encouraged members to participate and integrate techniques when using coping skills. Heydi Ferrell made good efforts towards progress goals which were displayed through participation, notetaking, and engagement in topic. Heydi Ferrell identified they use venting. Continue to run daily group psychotherapy to meet treatment needs and encourage Heydi Ferrell to practice skills outside of program.      Tx Plan Objective:1.1,1.2,1.4  Therapist: SENAIT Coburn    Education Therapy   [id number] Heydi Ferrell actively shared in morning assessment and goal review. Presented as Receptive related to readiness to learn.  Heydi Ferrell did complete goal from last treatment day identifying a gain of responsibility. did not present with any barriers to learning.     [id number] Heydi Ferrell engaged throughout the treatment day. Was engaged in learning related to Illness, Medication, Aftercare, and Wellness Tools. Staff utilized Verbal,  Written, A/V, and Demonstration teaching methods.  Heydi Ferrell shared area of learning and set a goal for outside of program to not binge watch tv and clean.      Tx Plan Objective: 1.1,1.2,1.4, Therapist: SENAIT Coburn

## 2024-02-22 NOTE — PSYCH
Subjective:     Patient ID: Stacy Ferrell is a 58 y.o. female.    [facility] Clinical Progress Notes      Specialized Services Documentation  Therapist must complete separate progress note for each specific clinical activity in which the individual participated during the day.       Group Psychotherapy Life Skills (3510-2634)  Stacy Ferrell actively engaged in group focused on core beliefs which was facilitated virtually in a private office using HIPAA Compliant and Approved [employer] Teams. Stacy consented to the use of tele-video modality of treatment and was virtually present for group psychotherapy today. Group members watched a short video, Healing Your Negative Core Beliefs. Group members learned and shared about their core beliefs. The group discussed how core beliefs influence their thoughts and behaviors. During the activity the group learned about cognitive reframing and explored ways to reshape their negative core beliefs into positive beliefs. Group members wrote one of their negative core beliefs and had to provide three pieces of evidence that prove the negative core belief untrue. The group learned how to re-frame their irrational thoughts and turn them into positive thoughts.Stacy Ferrell did share how they turned a negative thought into a positive thought. Discussed thought records and how to use them. Group members were asked to challenge one negative thought per day. Stacy continues to make progress towards goals through verbal participation in group; to accomplish long term goals continue to utilize skills learned in programming. Continue with psychotherapy to educate and encourage use of wellness tools. Tx Plan Objective: 1.1,1.2 Therapist: ANGELIKA Barron , SILVESTREW    Case Management Note    ANGELIKA Barron    Current suicide risk : Low     [id number]- Attempted Case management waited on teams for Heydi   1219- Attempted case management voicemail left for Heydi  [phone]-  "Heydi shared that coming into program in a learning curb for her.She states \" I need to focus and listen better.\" Discussed with Heydi the need to be in a private place and informed her that she could not be shopping in the stores while in program. Heydi acknowledged that she knows she needs to be in a private space. Heydi shared that she was moving back and forth from her house into the garage due to her  being in the home. Heydi was observed to have her headphones in her ears. Discussed with Heydi that she can not use different filters and background due to not being able to see her on the screen. This writer encouraged Heydi to blur her background instead of using other backgrounds. Reviewed tx plan. Informed of case management schedule.    I,Stacy Ferrell,am physically unable to provide a signature; however, I understand the nature of and am in agreement with the documentation presented to me via TEAMS.  I have received a copy through My Chart and/or the [employer].  I freely give verbal consent.  Name of Document (s):tx plan  Witness to verbal consent: Edna Shah  Witness to verbal consent: Leland Dodson      Medications changes/added/denied? No    Treatment session number: 2    Individual Case Management Visit provided today? Yes    [facility] follow up physician's orders: None at this time       "

## 2024-02-22 NOTE — TELEPHONE ENCOUNTER
Sent message to PT yesterday and they scheduled patient for 3/4/24, patient was made aware of message also. I did send a message to get ultrasound scheduled too, awaiting date / time for this.

## 2024-02-22 NOTE — PSYCH
"    Subjective:     Patient ID: Stacy Ferrell is a 58 y.o. female.    [facility] Clinical Progress Notes      Specialized Services Documentation  Therapist must complete separate progress note for each specific clinical activity in which the individual participated during the day.     Group Psychotherapy  This group was facilitated virtually in a private office using HIPAA Compliant and Approved [employer] Teams.  Stacy Ferrell  consented to the use of tele-video modality of treatment.  ([id number]) Stacy Ferrell actively shared in psychoeducational group which focused on nutrition to improve physical and mental wellbeing. Topics included:  How our diet affects our mental health  Mental health benefits of healthy eating  Barriers to eating healthy  Ways to overcome barriers  Macro and Micro nutrients   Appropriate serving sizes for all food groups as well as benefits to eating for health       The group then viewed a HERMELINDA talk video titled \"The Brain Intervention at the End of our [place]\" by Dr Lon Wolf. They then discussed ideas on how to improve on their nutrition.  Good progress toward goal noted.  Continue psychoeducation to educate on the importance of making nutrition a priority.  Tx Plan Objectives: 1.1,1.2    Therapist: Carmen RYAN    "

## 2024-02-22 NOTE — PSYCH
Virtual Regular Visit    Verification of patient location:    Patient is located at Home in the following state in which I hold an active license [place]      Assessment/Plan:    Problem List Items Addressed This Visit       Severe episode of recurrent major depressive disorder, without psychotic features (HCC) - Primary    Anxiety disorder due to general medical condition with panic attack    Post traumatic stress disorder (PTSD)       Goals addressed in session:           Reason for visit is PHP VIRTUAL GROUP DUE TO virtual preference of care      Encounter provider  PARTIAL PSYCH PROGRAM    Provider located at [facility]IAL HOSPITAL[facility]ATION PROGRAM  [address] [place] [id number]      Recent Visits  Date Type Provider Dept   02/21/24 Telemedicine [facility] PARTIAL PSYCH GROUP THERAPY [facility]   Showing recent visits within past 7 days and meeting all other requirements  Future Appointments  No visits were found meeting these conditions.  Showing future appointments within next 150 days and meeting all other requirements       The patient was identified by name and date of birth. Stacy Ferrell was informed that this is a telemedicine visit and that the visit is being conducted throughthe [employer] Teams platform. She agrees to proceed..  My office door was closed. No one else was in the room.  She acknowledged consent and understanding of privacy and security of the video platform. The patient has agreed to participate and understands they can discontinue the visit at any time.    Patient is aware this is a billable service.     Subjective  Stacy Ferrell is a 58 y.o. female  .      HPI     Past Medical History:   Diagnosis Date    Anemia     iron deficiency anemia    Anxiety     Breast cancer (HCC)     Depression     Diabetes mellitus (HCC)     Fibroid     [facility]   today 12/8/2021    History of transfusion     Hydrocephalus (HCC)      Hyperlipidemia     Hypertension     Migraine     Myocardial infarction (HCC) 01/19/2022    PONV (postoperative nausea and vomiting)     Sciatica     Seasonal allergies     Vertigo     Wears glasses        Past Surgical History:   Procedure Laterality Date    BACK SURGERY      C1-3 fusion    COLONOSCOPY      EPIDURAL BLOCK INJECTION Right 07/02/2020    Procedure: Right L4-5 and L5-S1 transforaminal epidural steroid injection ([id number]);  Surgeon: Clarence Lima MD;  Location: [facility];  Service: Pain Management     EPIDURAL BLOCK INJECTION N/A 09/02/2021    Procedure: BLOCK / INJECTION EPIDURAL STEROID LUMBAR  L5-S1 IESI;  Surgeon: Clarence Lima MD;  Location: [facility];  Service: Pain Management     FL GUIDED NEEDLE PLAC BX/ASP/INJ  07/02/2020    FL GUIDED NEEDLE PLAC BX/ASP/INJ  09/02/2021    HYSTERECTOMY      IR DRAINAGE TUBE CHECK AND/OR REMOVAL  1/19/2024    IR DRAINAGE TUBE CHECK WITH SCLEROSIS  1/5/2024    IR DRAINAGE TUBE CHECK WITH SCLEROSIS  1/12/2024    IR DRAINAGE TUBE PLACEMENT  1/2/2024    VA ESOPHAGOGASTRODUODENOSCOPY TRANSORAL DIAGNOSTIC N/A 10/23/2018    Procedure: EGD with Savary dilation AND COLONOSCOPY;  Surgeon: Billy Schneider MD;  Location: [facility];  Service: Gastroenterology    VA LAPAROSCOPY W TOTAL HYSTERECTOMY UTERUS 250 GM/< N/A 12/08/2021    Procedure: LTH; BILAT SALPINGECTOMY; EXCISION PERITONEAL CYST;  Surgeon: Sonia Tamez MD;  Location: [facility];  Service: Gynecology    VA MASTECTOMY PARTIAL Left 4/10/2023    Procedure: LUMPECTOMY BREAST WITH BEAU  LOCALIZATION,  BIOPSY LYMPH NODE SENTINEL (INJECT AT 1130 BY DR BROTHERS IN THE OR);  Surgeon: Lianne Brothers MD;  Location: [facility];  Service: General    TUBAL LIGATION      US BREAST CYST ASPIRATION LEFT INITIAL Left 9/12/2023    US BREAST CYST ASPIRATION LEFT INITIAL Left 9/21/2023    US BREAST CYST ASPIRATION LEFT INITIAL Left 10/3/2023    US GUIDED BREAST BIOPSY LEFT COMPLETE Left 03/07/2023       Current  Outpatient Medications   Medication Sig Dispense Refill    anastrozole (ARIMIDEX) 1 mg tablet Take 1 tablet (1 mg total) by mouth daily 90 tablet 1    atorvastatin (LIPITOR) 40 mg tablet Take 40 mg by mouth every evening      FLUoxetine (PROzac) 20 mg capsule Take 1 capsule (20 mg total) by mouth daily Take with 40mg for total of 60mg. 30 capsule 1    FLUoxetine (PROzac) 40 MG capsule Take 1 capsule (40 mg total) by mouth daily Take with 20mg capsule for total of 60mg daily. 30 capsule 1    gabapentin (Neurontin) 600 MG tablet Take 1 tablet (600 mg total) by mouth 3 (three) times a day 270 tablet 2    hydrOXYzine HCL (ATARAX) 25 mg tablet Take 1 tablet (25 mg total) by mouth daily as needed for anxiety 30 tablet 1    losartan (COZAAR) 100 MG tablet Take 100 mg by mouth daily      NovoLOG ReliOn 100 UNIT/ML injection  UNITS DAILY VIA PUMP      omeprazole (PriLOSEC) 20 mg delayed release capsule Take 1 capsule (20 mg total) by mouth daily Take 30 minutes before supper 30 capsule 2    Trulicity 1.5 MG/0.5ML injection INJECT 0.5 ML (1.5 MG TOTAL) UNDER THE SKIN ONCE EVERY 7 DAYS       No current facility-administered medications for this visit.        Allergies   Allergen Reactions    Nuts - Food Allergy Anaphylaxis and Throat Swelling    Trintellix [Vortioxetine] Other (See Comments)     Suicidal ideation    Chocolate - Food Allergy Cough    Eggs Or Egg-Derived Products - Food Allergy Other (See Comments)     Cough    Other Cough     LETTUCE    Shellfish Allergy - Food Allergy Cough    Tomato - Food Allergy Cough       Review of Systems    Video Exam    There were no vitals filed for this visit.    Physical Exam     I spent FOUR GROUP HOURS PLUS CASE MANAGEMENT minutes with patient today in which greater than 50% of time was spent in counseling/coordination of care regarding PHP - SEE NOTES

## 2024-02-23 ENCOUNTER — OFFICE VISIT (OUTPATIENT)
Dept: HEMATOLOGY ONCOLOGY | Facility: CLINIC | Age: 59
End: 2024-02-23
Payer: COMMERCIAL

## 2024-02-23 ENCOUNTER — TELEMEDICINE (OUTPATIENT)
Dept: PSYCHOLOGY | Facility: CLINIC | Age: 59
End: 2024-02-23
Payer: COMMERCIAL

## 2024-02-23 VITALS
DIASTOLIC BLOOD PRESSURE: 85 MMHG | TEMPERATURE: 98.3 F | HEIGHT: 63 IN | OXYGEN SATURATION: 95 % | HEART RATE: 99 BPM | BODY MASS INDEX: 42.7 KG/M2 | WEIGHT: 241 LBS | SYSTOLIC BLOOD PRESSURE: 133 MMHG

## 2024-02-23 DIAGNOSIS — F41.0 ANXIETY DISORDER DUE TO GENERAL MEDICAL CONDITION WITH PANIC ATTACK: Primary | ICD-10-CM

## 2024-02-23 DIAGNOSIS — C50.912 INVASIVE DUCTAL CARCINOMA OF BREAST, FEMALE, LEFT (HCC): ICD-10-CM

## 2024-02-23 DIAGNOSIS — F43.10 POST TRAUMATIC STRESS DISORDER (PTSD): ICD-10-CM

## 2024-02-23 DIAGNOSIS — F33.2 SEVERE EPISODE OF RECURRENT MAJOR DEPRESSIVE DISORDER, WITHOUT PSYCHOTIC FEATURES (HCC): ICD-10-CM

## 2024-02-23 DIAGNOSIS — F06.4 ANXIETY DISORDER DUE TO GENERAL MEDICAL CONDITION WITH PANIC ATTACK: Primary | ICD-10-CM

## 2024-02-23 PROCEDURE — 99214 OFFICE O/P EST MOD 30 MIN: CPT | Performed by: INTERNAL MEDICINE

## 2024-02-23 PROCEDURE — G0410 GRP PSYCH PARTIAL HOSP 45-50: HCPCS

## 2024-02-23 PROCEDURE — S0201 PARTIAL HOSPITALIZATION SERV: HCPCS

## 2024-02-23 RX ORDER — ANASTROZOLE 1 MG/1
1 TABLET ORAL DAILY
Qty: 90 TABLET | Refills: 3 | Status: SHIPPED | OUTPATIENT
Start: 2024-02-23

## 2024-02-23 NOTE — PSYCH
Subjective:     Patient ID: Heydi Ferrell is a 58 y.o. female.     [facility] Clinical Progress Notes      Specialized Services Documentation  Therapist must complete separate progress note for each specific clinical activity in which the individual participated during the day.      Group Psychotherapy - Wellness Assessment    [id number] Heydi Ferrell was not present during a psychotherapy group focused on  the Wellness Assessment. Heydi said she had an appointment during group and left the link. Continue to run daily group psychotherapy to meet treatment needs and encourage Heydi Ferrell to practice skills outside of program.      Tx Plan Objective: 1.1,1.2,1.4  Therapist: SENAIT Coburn      Education Therapy   [id number] Heydi Ferrell actively shared in morning assessment and goal review. Presented as Receptive related to readiness to learn.  Heydi Ferrell did complete goal from last treatment day identifying a gain of education. did not present with any barriers to learning.  Heydi arrived to morning assessment at 0913.    [id number] Heydi Ferrell was not present during, cm notified.    Tx Plan Objective: 1.1,1.2,1.4, Therapist: SENAIT Coburn

## 2024-02-23 NOTE — PSYCH
Allied Group Therapy    Subjective:     Patient ID: Stacy Ferrell 58 y.o.       This group was facilitated virtually in a private office using HIPAA Compliant and Approved [employer] Teams.  ([id number])Stacy Ferrell actively engaged in psychoeducational group about positive self soothing activities. These activities help to self-soothe an individual and decrease decrease feelings of loneliness,anxiety, and boredom. Positive behavior change is focused toward a specified goal. Group explored the identifying factors that create loneliness,anxiety, and boredom. This process can help them to take care of emotional and intellectual moments of anxiety on a short term and long term basis. The group talked about understanding the meaning of self-soothing in regards to physical, intellectual, and emotional expression, regulation , and recognition, and how it affects themselves and others. Teaching on the emphasis of self monitoring and relapse, the group explored who they can go to for help was brought up as well. Group was encouraged to ask questions in an open forum at the end of group. Positive progress displayed through engagement in topic.Stacy Ferrell will continue to engage in psychotherapy to encourage positive self realization.  Treatment Plan Objective 1.1, 1.2, 1.3, 1.4 Therapist: Chano SINCLAIR Ed.

## 2024-02-23 NOTE — PSYCH
Subjective:     Patient ID: Stacy Ferrell is a 58 y.o. female.    [facility] Clinical Progress Notes      Specialized Services Documentation  Therapist must complete separate progress note for each specific clinical activity in which the individual participated during the day.       Group Psychotherapy Life Skills (2828-1505) Stacy Ferrell actively engaged in an open processing group which was facilitated virtually in a private office using HIPAA Compliant and Approved [employer] Teams. Stacy consented to the use of tele-video modality of treatment and was virtually present for group psychotherapy today. The group discussed grief, isolation and how to cope. Stcay did participate in the conversations related to the topics. Stacy continues to make progress towards goals through verbal participation in group; to accomplish long term goals continue to utilize skills learned in programming. Continue with psychotherapy to educate and encourage use of wellness tools. Tx Plan Objective: 1.1,1.2 Therapist: ANGELIKA Barron,SILVESTREW    Case Management Note    ANGELIKA Barron    Current suicide risk : Low     A case management session is not scheduled today with Stacy Ferrell ; additionally, they did not request a CM meeting.  Next scheduled session is 2/26/24.Informed of med check/    Medications changes/added/denied? No    Treatment session number: 3    Individual Case Management Visit provided today? No    [facility] follow up physician's orders: None at this time

## 2024-02-23 NOTE — PROGRESS NOTES
[facility] HEMATOLOGY ONCOLOGY SPECIALISTS [place]  [address] [facility] [address]  [phone]  [phone]    Stacy Ferrell,1965, [id number]  02/23/24    Discussion:   In summary, this is a 58-year-old female with a history of left breast T1b, N0, ER/KY positive, HER2 negative breast cancer, Oncotype score 13.  Status post resection.  Started Arimidex in June 2023.  Clinically, she is doing well.  She has hot flashes every day, though mild.  She also reports right nipple discharge over the past 6 months or so.  I would say this is not reported with anastrozole with any frequency.  Clear fluid, scant.  I will confer with surgical oncology regarding any investigation or treatment indicated.  DEXA scan in January 2023.  Repeat in 1 year from now.  She continues on calcium and vitamin D supplementation.  She has joint stiffness but no more than she did before starting anastrozole.  She has had intermittent problems with seroma.  She continues to work with surgical oncology and interventional radiology on this.  If all is well I will see her back in 1 year for review.  I discussed the above with the patient.  The patient  voiced understanding and agreement.  ______________________________________________________________________    No chief complaint on file.      HPI:  Oncology History Overview Note   with diagnosed invasive ductal carcinoma of left breast. Completed screening mammogram with results revealing focal asymmetry in left breast.  Diagnostic mammogram and US revealed irregularly shaped, non-parallel, hypoechoic mass with angular margins seen in the left breast. Ultimately, US guided biopsy completed with results revealing ER/KY+, HER2-, invasive ductal carcinoma, histologic grade 1, 6 mm in greatest extent, involving 3 fragmented cores. Left breast lumpectomy done on 4/10/23 by Dr. Esparza. She completed Radiation to left breast on 6/22/23. This is her EOT phone call.       6/23/23-6/25/23  hospital admit at [facility] for COVID and cellulitis of left breast.     Upcomin23 Dr. Esparza  24 Dr. Cain          Invasive ductal carcinoma of breast, female, left (HCC)   3/15/2023 Initial Diagnosis    Invasive ductal carcinoma of breast, female, left (HCC)     4/10/2023 Biopsy    INVASIVE CARCINOMA OF THE BREAST: Resection  8th Edition - Protocol posted: 2022  INVASIVE CARCINOMA OF THE BREAST: COMPLETE EXCISION - All Specimens  SPECIMEN   Procedure  Excision (less than total mastectomy)    Specimen Laterality  Left    TUMOR   Tumor Site  Clock position      2 o'clock    Histologic Type  Invasive carcinoma of no special type (ductal)    Histologic Grade (Nate Histologic Score)     Glandular (Acinar) / Tubular Differentiation  Score 2    Nuclear Pleomorphism  Score 2    Mitotic Rate  Score 1    Overall Grade  Grade 1 (scores of 3, 4 or 5)    Tumor Size  Greatest dimension of largest invasive focus (Millimeters): 10mm mm   Tumor Focality  Single focus of invasive carcinoma    Ductal Carcinoma In Situ (DCIS)  Present      Negative for extensive intraductal component (EIC)    Size (Extent) of DCIS  Estimated size (extent) of DCIS is at least (Millimeters): 0.40mm mm   Number of Blocks with DCIS  1    Number of Blocks Examined  9    Architectural Patterns  Cribriform    Nuclear Grade  Grade II (intermediate)    Necrosis  Not identified    Lobular Carcinoma In Situ (LCIS)  Not identified    Lymphovascular Invasion  Not identified    Dermal Lymphovascular Invasion  No skin present    Microcalcifications  Not identified    Treatment Effect in the Breast  No known presurgical therapy    MARGINS   Margin Status for Invasive Carcinoma  All margins negative for invasive carcinoma    Distance from Invasive Carcinoma to Closest Margin  Cannot be determined: separate margin submitted     Margin Status for DCIS  Cannot be determined: Separate margin submitted     REGIONAL LYMPH NODES    Regional Lymph Node Status  All regional lymph nodes negative for tumor    Total Number of Lymph Nodes Examined (sentinel and non-sentinel)  3    Number of [place] Nodes Examined  3    PATHOLOGIC STAGE CLASSIFICATION (pTNM, AJCC 8th Edition)   Reporting of pT, pN, and (when applicable) pM categories is based on information available to the pathologist at the time the report is issued. As per the AJCC (Chapter 1, 8th Ed.) it is the managing physician's responsibility to establish the final pathologic stage based upon all pertinent information, including but potentially not limited to this pathology report.   pT Category  pT1b    Regional Lymph Nodes Modifier  (sn): [place] node(s) evaluated.    pN Category  pN0         Estrogen Receptor (ER) Status  Positive (greater than 10% of cells demonstrate nuclear positivity)    Percentage of Cells with Nuclear Positivity  90-95 %        Progesterone Receptor (PgR) Status  Positive    Percentage of Cells with Nuclear Positivity  90-95 %        HER2 (by immunohistochemistry)  Negative (Score 1+)    Testing Performed on Case Number  [id number]    .        5/31/2023 - 6/22/2023 Radiation      Plan ID Energy Fractions Dose per Fraction (cGy) Dose Correction (cGy) Total Dose Delivered (cGy) Elapsed Days    CORINA Edouard # 10X 11 / 16 266 0 2,926 15   Rev  CORINA CASTRO # 10X 5 / 5 266 0 1,330 6      Treatment dates:  C1: 5/31/2023 - 6/22/2023         Malignant neoplasm of central portion of left breast in female, estrogen receptor positive    5/3/2023 Initial Diagnosis    Malignant neoplasm of central portion of left breast in female, estrogen receptor positive (HCC)     5/3/2023 -  Cancer Staged    Staging form: Breast, AJCC 8th Edition  - Pathologic stage from 5/3/2023: Stage IA (pT1b, pN0, cM0, G1, ER+, HI+, HER2-, Oncotype DX score: 13) - Signed by Fracisco Gold MD on 5/11/2023  Stage prefix: Initial diagnosis  Nuclear grade: G2  Multigene prognostic tests performed:  Oncotype DX  Recurrence score range: Greater than or equal to 11  Histologic grading system: 3 grade system       5/31/2023 - 6/22/2023 Radiation      Plan ID Energy Fractions Dose per Fraction (cGy) Dose Correction (cGy) Total Dose Delivered (cGy) Elapsed Days   AALIYAH Edouard # 10X 11 / 16 266 0 2,926 15   Rev AALIYAH CASTRO # 10X 5 / 5 266 0 1,330 6      Treatment dates:  C1: 5/31/2023 - 6/22/2023             Interval History: Clinically stable.  ECOG-  1 - Symptomatic but completely ambulatory    Review of Systems   Constitutional:  Negative for appetite change, diaphoresis, fatigue and fever.   HENT:  Negative for sinus pain.    Eyes:  Negative for discharge.   Respiratory:  Negative for cough and shortness of breath.    Cardiovascular:  Negative for chest pain.   Gastrointestinal:  Negative for abdominal pain, constipation and diarrhea.   Endocrine: Negative for cold intolerance.        Right nipple discharge.  Clear.   Genitourinary:  Negative for difficulty urinating and hematuria.   Musculoskeletal:  Negative for joint swelling.   Skin:  Negative for rash.   Allergic/Immunologic: Negative for environmental allergies.   Neurological:  Negative for dizziness and headaches.   Hematological:  Negative for adenopathy.   Psychiatric/Behavioral:  Negative for agitation.        Past Medical History:   Diagnosis Date    Anemia     iron deficiency anemia    Anxiety     Breast cancer (HCC)     Depression     Diabetes mellitus (HCC)     Fibroid     [facility]   today 12/8/2021    History of transfusion     Hydrocephalus (HCC)     Hyperlipidemia     Hypertension     Migraine     Myocardial infarction (HCC) 01/19/2022    PONV (postoperative nausea and vomiting)     Sciatica     Seasonal allergies     Vertigo     Wears glasses      Patient Active Problem List   Diagnosis    Acid reflux    Allergic cough    Anxiety    Moderate episode of recurrent major depressive disorder (HCC)    Diabetes mellitus (HCC)    Hydrocephalus (HCC)     Hypertension    Iron deficiency    Pica in adults    Herniation of lumbar intervertebral disc with radiculopathy    Other hyperlipidemia    Oropharyngeal dysphagia    Hepatic steatosis    Elevated LFTs    Dysphagia    Gastroesophageal reflux disease    Post-traumatic stress    Agoraphobia with panic attacks    Generalized anxiety disorder    Chronic midline low back pain without sciatica    Allergic dermatitis    Vitamin D deficiency    Chronic pain syndrome    Lumbar spondylosis    Lumbar radiculopathy    Myofascial pain syndrome    Uterine leiomyoma    Radial collateral ligament sprain of right elbow    Iron deficiency anemia due to chronic blood loss    B12 deficiency    Severe episode of recurrent major depressive disorder, without psychotic features (HCC)    Sacroiliitis (HCC)    PONV (postoperative nausea and vomiting)    Morbid obesity due to excess calories (HCC)    S/P laparoscopic hysterectomy    Status post bilateral salpingectomy    Anemia    C2 cervical fracture (HCC)    Fall from height of greater than 3 feet    Myocardial infarct, old    Tachycardia    T3 vertebral fracture (HCC)    Occipital neuralgia of right side    Dilated cardiomyopathy (HCC)    RLS (restless legs syndrome)    Type 2 diabetes mellitus with hyperglycemia (HCC)    Invasive ductal carcinoma of breast, female, left (HCC)    Malignant neoplasm of central portion of left breast in female, estrogen receptor positive     Cellulitis of chest wall    COVID    Left breast abscess    Seroma of breast    Lymphedema of breast    History of Helicobacter infection    Nausea    Discharge from right nipple    Anxiety disorder due to general medical condition with panic attack    Post traumatic stress disorder (PTSD)       Current Outpatient Medications:     anastrozole (ARIMIDEX) 1 mg tablet, Take 1 tablet (1 mg total) by mouth daily, Disp: 90 tablet, Rfl: 1    atorvastatin (LIPITOR) 40 mg tablet, Take 40 mg by mouth every evening, Disp: , Rfl:      FLUoxetine (PROzac) 20 mg capsule, Take 1 capsule (20 mg total) by mouth daily Take with 40mg for total of 60mg., Disp: 30 capsule, Rfl: 1    FLUoxetine (PROzac) 40 MG capsule, Take 1 capsule (40 mg total) by mouth daily Take with 20mg capsule for total of 60mg daily., Disp: 30 capsule, Rfl: 1    hydrOXYzine HCL (ATARAX) 25 mg tablet, Take 1 tablet (25 mg total) by mouth daily as needed for anxiety, Disp: 30 tablet, Rfl: 1    losartan (COZAAR) 100 MG tablet, Take 100 mg by mouth daily, Disp: , Rfl:     omeprazole (PriLOSEC) 20 mg delayed release capsule, Take 1 capsule (20 mg total) by mouth daily Take 30 minutes before supper, Disp: 30 capsule, Rfl: 2    Trulicity 1.5 MG/0.5ML injection, INJECT 0.5 ML (1.5 MG TOTAL) UNDER THE SKIN ONCE EVERY 7 DAYS, Disp: , Rfl:     gabapentin (Neurontin) 600 MG tablet, Take 1 tablet (600 mg total) by mouth 3 (three) times a day, Disp: 270 tablet, Rfl: 2    NovoLOG ReliOn 100 UNIT/ML injection,  UNITS DAILY VIA PUMP (Patient not taking: Reported on 2/23/2024), Disp: , Rfl:   Allergies   Allergen Reactions    Nuts - Food Allergy Anaphylaxis and Throat Swelling    Trintellix [Vortioxetine] Other (See Comments)     Suicidal ideation    Chocolate - Food Allergy Cough    Eggs Or Egg-Derived Products - Food Allergy Other (See Comments)     Cough    Other Cough     LETTUCE    Shellfish Allergy - Food Allergy Cough    Tomato - Food Allergy Cough     Past Surgical History:   Procedure Laterality Date    BACK SURGERY      C1-3 fusion    COLONOSCOPY      EPIDURAL BLOCK INJECTION Right 07/02/2020    Procedure: Right L4-5 and L5-S1 transforaminal epidural steroid injection ([id number]);  Surgeon: Clarence Lima MD;  [facility]: [facility];  Service: Pain Management     EPIDURAL BLOCK INJECTION N/A 09/02/2021    Procedure: BLOCK / INJECTION EPIDURAL STEROID LUMBAR  L5-S1 IESI;  Surgeon: Clarence Lima MD;  [facility]: [facility];  Service: Pain Management     FL GUIDED NEEDLE  "PLAC BX/ASP/INJ  07/02/2020    FL GUIDED NEEDLE PLAC BX/ASP/INJ  09/02/2021    HYSTERECTOMY      IR DRAINAGE TUBE CHECK AND/OR REMOVAL  1/19/2024    IR DRAINAGE TUBE CHECK WITH SCLEROSIS  1/5/2024    IR DRAINAGE TUBE CHECK WITH SCLEROSIS  1/12/2024    IR DRAINAGE TUBE PLACEMENT  1/2/2024    OK ESOPHAGOGASTRODUODENOSCOPY TRANSORAL DIAGNOSTIC N/A 10/23/2018    Procedure: EGD with Savary dilation AND COLONOSCOPY;  Surgeon: Billy Schneider MD;  Location: [facility];  Service: Gastroenterology    OK LAPAROSCOPY W TOTAL HYSTERECTOMY UTERUS 250 GM/< N/A 12/08/2021    Procedure: LTH; BILAT SALPINGECTOMY; EXCISION PERITONEAL CYST;  Surgeon: Sonia Tamez MD;  Location: [facility];  Service: Gynecology    OK MASTECTOMY PARTIAL Left 4/10/2023    Procedure: LUMPECTOMY BREAST WITH BEAU  LOCALIZATION,  BIOPSY LYMPH NODE SENTINEL (INJECT AT 1130 BY DR BROTHERS IN THE OR);  Surgeon: Lianne Brothers MD;  Location: [facility];  Service: General    TUBAL LIGATION      US BREAST CYST ASPIRATION LEFT INITIAL Left 9/12/2023    US BREAST CYST ASPIRATION LEFT INITIAL Left 9/21/2023    US BREAST CYST ASPIRATION LEFT INITIAL Left 10/3/2023    US GUIDED BREAST BIOPSY LEFT COMPLETE Left 03/07/2023     Social History     Objective:  Vitals:    02/23/24 1323   BP: 133/85   Pulse: 99   Temp: 98.3 °F (36.8 °C)   TempSrc: Tympanic   SpO2: 95%   Weight: 109 kg (241 lb)   Height: 5' 3\" (1.6 m)     Physical Exam  Constitutional:       Appearance: She is well-developed.   HENT:      Head: Normocephalic and atraumatic.   Eyes:      Pupils: Pupils are equal, round, and reactive to light.   Cardiovascular:      Rate and Rhythm: Normal rate.      Heart sounds: No murmur heard.  Pulmonary:      Effort: No respiratory distress.      Breath sounds: No wheezing or rales.   Abdominal:      General: There is no distension.      Palpations: Abdomen is soft.      Tenderness: There is no abdominal tenderness. There is no rebound.   Musculoskeletal:         " General: No tenderness.      Cervical back: Neck supple.   Lymphadenopathy:      Cervical: No cervical adenopathy.   Skin:     General: Skin is warm.      Findings: No rash.   Neurological:      Mental Status: She is alert and oriented to person, place, and time.      Deep Tendon Reflexes: Reflexes normal.   Psychiatric:         Thought Content: Thought content normal.           Labs:  I personally reviewed the labs and imaging pertinent to this patient care.

## 2024-02-23 NOTE — PSYCH
Virtual Regular Visit    Verification of patient location:    Patient is located at Home in the following state in which I hold an active license [place]      Assessment/Plan:    Problem List Items Addressed This Visit       Severe episode of recurrent major depressive disorder, without psychotic features (HCC)    Anxiety disorder due to general medical condition with panic attack - Primary    Post traumatic stress disorder (PTSD)       Goals addressed in session:           Reason for visit is PHP VIRTUAL GROUP DUE TO virtual preference of care      Encounter provider  PARTIAL PSYCH PROGRAM    Provider located at [facility] PARTIAL HOSPITALIZATION PROGRAM  [address] [place] [id number]      Recent Visits  Date Type Provider Dept   02/22/24 Telemedicine [facility] PARTIAL PSYCH GROUP THERAPY [facility]   02/21/24 Telemedicine [facility] PARTIAL PSYCH GROUP THERAPY [facility]   Showing recent visits within past 7 days and meeting all other requirements  Future Appointments  No visits were found meeting these conditions.  Showing future appointments within next 150 days and meeting all other requirements       The patient was identified by name and date of birth. Stacy Ferrell was informed that this is a telemedicine visit and that the visit is being conducted throughthe [employer] Teams platform. She agrees to proceed..  My office door was closed. No one else was in the room.  She acknowledged consent and understanding of privacy and security of the video platform. The patient has agreed to participate and understands they can discontinue the visit at any time.    Patient is aware this is a billable service.     Subjective  Stacy Ferrell is a 58 y.o. female  .      HPI     Past Medical History:   Diagnosis Date    Anemia     iron deficiency anemia    Anxiety     Breast cancer (HCC)     Depression     Diabetes mellitus (HCC)     Fibroid     [facility]    today 12/8/2021    History of transfusion     Hydrocephalus (HCC)     Hyperlipidemia     Hypertension     Migraine     Myocardial infarction (HCC) 01/19/2022    PONV (postoperative nausea and vomiting)     Sciatica     Seasonal allergies     Vertigo     Wears glasses        Past Surgical History:   Procedure Laterality Date    BACK SURGERY      C1-3 fusion    COLONOSCOPY      EPIDURAL BLOCK INJECTION Right 07/02/2020    Procedure: Right L4-5 and L5-S1 transforaminal epidural steroid injection ([id number]);  Surgeon: Clarence Lima MD;  [facility];  Service: Pain Management     EPIDURAL BLOCK INJECTION N/A 09/02/2021    Procedure: BLOCK / INJECTION EPIDURAL STEROID LUMBAR  L5-S1 IESI;  Surgeon: Clarence Lima MD;  [facility];  Service: Pain Management     FL GUIDED NEEDLE PLAC BX/ASP/INJ  07/02/2020    FL GUIDED NEEDLE PLAC BX/ASP/INJ  09/02/2021    HYSTERECTOMY      IR DRAINAGE TUBE CHECK AND/OR REMOVAL  1/19/2024    IR DRAINAGE TUBE CHECK WITH SCLEROSIS  1/5/2024    IR DRAINAGE TUBE CHECK WITH SCLEROSIS  1/12/2024    IR DRAINAGE TUBE PLACEMENT  1/2/2024    WV ESOPHAGOGASTRODUODENOSCOPY TRANSORAL DIAGNOSTIC N/A 10/23/2018    Procedure: EGD with Savary dilation AND COLONOSCOPY;  Surgeon: Billy Schneider MD;  [facility];  Service: Gastroenterology    WV LAPAROSCOPY W TOTAL HYSTERECTOMY UTERUS 250 GM/< N/A 12/08/2021    Procedure: LTH; BILAT SALPINGECTOMY; EXCISION PERITONEAL CYST;  Surgeon: Sonia Tamez MD;  [facility];  Service: Gynecology    WV MASTECTOMY PARTIAL Left 4/10/2023    Procedure: LUMPECTOMY BREAST WITH BEAU  LOCALIZATION,  BIOPSY LYMPH NODE SENTINEL (INJECT AT 1130 BY DR BROTHERS IN THE OR);  Surgeon: Lianne Brothers MD;  [facility];  Service: General    TUBAL LIGATION      US BREAST CYST ASPIRATION LEFT INITIAL Left 9/12/2023    US BREAST CYST ASPIRATION LEFT INITIAL Left 9/21/2023    US BREAST CYST ASPIRATION LEFT INITIAL Left 10/3/2023     US GUIDED BREAST BIOPSY LEFT COMPLETE Left 03/07/2023       Current Outpatient Medications   Medication Sig Dispense Refill    anastrozole (ARIMIDEX) 1 mg tablet Take 1 tablet (1 mg total) by mouth daily 90 tablet 1    atorvastatin (LIPITOR) 40 mg tablet Take 40 mg by mouth every evening      FLUoxetine (PROzac) 20 mg capsule Take 1 capsule (20 mg total) by mouth daily Take with 40mg for total of 60mg. 30 capsule 1    FLUoxetine (PROzac) 40 MG capsule Take 1 capsule (40 mg total) by mouth daily Take with 20mg capsule for total of 60mg daily. 30 capsule 1    gabapentin (Neurontin) 600 MG tablet Take 1 tablet (600 mg total) by mouth 3 (three) times a day 270 tablet 2    hydrOXYzine HCL (ATARAX) 25 mg tablet Take 1 tablet (25 mg total) by mouth daily as needed for anxiety 30 tablet 1    losartan (COZAAR) 100 MG tablet Take 100 mg by mouth daily      NovoLOG ReliOn 100 UNIT/ML injection  UNITS DAILY VIA PUMP      omeprazole (PriLOSEC) 20 mg delayed release capsule Take 1 capsule (20 mg total) by mouth daily Take 30 minutes before supper 30 capsule 2    Trulicity 1.5 MG/0.5ML injection INJECT 0.5 ML (1.5 MG TOTAL) UNDER THE SKIN ONCE EVERY 7 DAYS       No current facility-administered medications for this visit.        Allergies   Allergen Reactions    Nuts - Food Allergy Anaphylaxis and Throat Swelling    Trintellix [Vortioxetine] Other (See Comments)     Suicidal ideation    Chocolate - Food Allergy Cough    Eggs Or Egg-Derived Products - Food Allergy Other (See Comments)     Cough    Other Cough     LETTUCE    Shellfish Allergy - Food Allergy Cough    Tomato - Food Allergy Cough       Review of Systems    Video Exam    There were no vitals filed for this visit.    Physical Exam     I spent FOUR GROUP HOURS PLUS CASE MANAGEMENT minutes with patient today in which greater than 50% of time was spent in counseling/coordination of care regarding PHP - SEE NOTES

## 2024-02-26 ENCOUNTER — TELEMEDICINE (OUTPATIENT)
Dept: PSYCHIATRY | Facility: CLINIC | Age: 59
End: 2024-02-26
Payer: COMMERCIAL

## 2024-02-26 ENCOUNTER — TELEMEDICINE (OUTPATIENT)
Dept: PSYCHOLOGY | Facility: CLINIC | Age: 59
End: 2024-02-26
Payer: COMMERCIAL

## 2024-02-26 DIAGNOSIS — F06.4 ANXIETY DISORDER DUE TO GENERAL MEDICAL CONDITION WITH PANIC ATTACK: ICD-10-CM

## 2024-02-26 DIAGNOSIS — F43.10 POST TRAUMATIC STRESS DISORDER (PTSD): ICD-10-CM

## 2024-02-26 DIAGNOSIS — F33.2 SEVERE EPISODE OF RECURRENT MAJOR DEPRESSIVE DISORDER, WITHOUT PSYCHOTIC FEATURES (HCC): ICD-10-CM

## 2024-02-26 DIAGNOSIS — F41.0 ANXIETY DISORDER DUE TO GENERAL MEDICAL CONDITION WITH PANIC ATTACK: ICD-10-CM

## 2024-02-26 DIAGNOSIS — F33.2 SEVERE EPISODE OF RECURRENT MAJOR DEPRESSIVE DISORDER, WITHOUT PSYCHOTIC FEATURES (HCC): Primary | ICD-10-CM

## 2024-02-26 DIAGNOSIS — F41.1 GENERALIZED ANXIETY DISORDER: Primary | ICD-10-CM

## 2024-02-26 PROCEDURE — G0176 OPPS/PHP;ACTIVITY THERAPY: HCPCS

## 2024-02-26 PROCEDURE — 99213 OFFICE O/P EST LOW 20 MIN: CPT

## 2024-02-26 PROCEDURE — G0410 GRP PSYCH PARTIAL HOSP 45-50: HCPCS

## 2024-02-26 PROCEDURE — S0201 PARTIAL HOSPITALIZATION SERV: HCPCS

## 2024-02-26 NOTE — PSYCH
Progress Note - [facility] [place] [facility]  Stacy Ferrell 58 y.o. female MRN: [id number]         Video Exam    There were no vitals filed for this visit.    Physical Exam     Visit Time    Visit Start Time: 0758  Visit Stop Time: 0813  Total Visit Duration: 15 minutes        Virtual Regular Visit    Verification of patient location:    Patient is located at Home in the following state in which I hold an active license [place]  Stacy Ferrell is a 58 y.o. female who was last seen by Dr Marin on 2/21/2024 at which time her Prozac was increased to 60mg PO daily.  Heydi states she has been tolerating the increase in Prozac with no side effects.  She has been attending groups virtually and is learning some new coping skills for her depression.  She continues to report depression and anxiety.  She notes hypersomnia.  She states she sleeps though the night and also sometimes takes a 4 hour nap.  She notes a decline in activity after a neck injury requiring surgery last year.  She reports not longer being able to work and she would sometimes walk up to 7 miles when she worked.  We discussed talking with her medical doctor to address any physical activity that may be safe for her to do and she was agreeable.  She reports her 3 year old grandson is the high light of her life right now and she sees him every week.  She denies any thoughts of self harm or SI.  She denies HI and denies AH/VH.  She does not require any refills today.      Current Medication:     Current Outpatient Medications:     anastrozole (ARIMIDEX) 1 mg tablet, Take 1 tablet (1 mg total) by mouth daily, Disp: 90 tablet, Rfl: 3    atorvastatin (LIPITOR) 40 mg tablet, Take 40 mg by mouth every evening, Disp: , Rfl:     FLUoxetine (PROzac) 20 mg capsule, Take 1 capsule (20 mg total) by mouth daily Take with 40mg for total of 60mg., Disp: 30 capsule, Rfl: 1    FLUoxetine (PROzac) 40 MG capsule, Take 1 capsule (40 mg total) by  mouth daily Take with 20mg capsule for total of 60mg daily., Disp: 30 capsule, Rfl: 1    gabapentin (Neurontin) 600 MG tablet, Take 1 tablet (600 mg total) by mouth 3 (three) times a day, Disp: 270 tablet, Rfl: 2    hydrOXYzine HCL (ATARAX) 25 mg tablet, Take 1 tablet (25 mg total) by mouth daily as needed for anxiety, Disp: 30 tablet, Rfl: 1    losartan (COZAAR) 100 MG tablet, Take 100 mg by mouth daily, Disp: , Rfl:     NovoLOG ReliOn 100 UNIT/ML injection,  UNITS DAILY VIA PUMP (Patient not taking: Reported on 2/23/2024), Disp: , Rfl:     omeprazole (PriLOSEC) 20 mg delayed release capsule, Take 1 capsule (20 mg total) by mouth daily Take 30 minutes before supper, Disp: 30 capsule, Rfl: 2    Trulicity 1.5 MG/0.5ML injection, INJECT 0.5 ML (1.5 MG TOTAL) UNDER THE SKIN ONCE EVERY 7 DAYS, Disp: , Rfl:   Medication side effects: denies at this time    ROS:   As above otherwise negative    Active Ambulatory Problems     Diagnosis Date Noted    Acid reflux 03/02/2016    Allergic cough 06/29/2016    Anxiety 08/10/2017    Moderate episode of recurrent major depressive disorder (HCC) 03/02/2016    Diabetes mellitus (HCC) 03/02/2016    Hydrocephalus (HCC) 03/02/2016    Hypertension 03/02/2016    Iron deficiency 02/01/2017    Pica in adults 10/03/2016    Herniation of lumbar intervertebral disc with radiculopathy 06/07/2018    Other hyperlipidemia 08/09/2018    Oropharyngeal dysphagia 10/10/2018    Hepatic steatosis 10/10/2018    Elevated LFTs 10/10/2018    Dysphagia 10/10/2018    Gastroesophageal reflux disease 10/10/2018    Post-traumatic stress 01/08/2019    Agoraphobia with panic attacks 01/12/2019    Generalized anxiety disorder 01/22/2019    Chronic midline low back pain without sciatica 07/29/2019    Allergic dermatitis 07/29/2019    Vitamin D deficiency 07/07/2020    Chronic pain syndrome 07/30/2020    Lumbar spondylosis 07/30/2020    Lumbar radiculopathy 07/30/2020    Myofascial pain syndrome 07/30/2020     Uterine leiomyoma 08/11/2020    Radial collateral ligament sprain of right elbow 10/08/2020    Iron deficiency anemia due to chronic blood loss 05/06/2021    B12 deficiency 05/06/2021    Severe episode of recurrent major depressive disorder, without psychotic features ([facility]) 06/08/2021    Sacroiliitis ([facility]) 09/28/2021    PONV (postoperative nausea and vomiting)     Morbid obesity due to excess calories ([facility]) 12/08/2021    S/P laparoscopic hysterectomy 12/08/2021    Status post bilateral salpingectomy 12/08/2021    Anemia 12/10/2021    C2 cervical fracture ([facility]) 01/19/2022    Fall from height of greater than 3 feet 01/20/2022    Myocardial infarct, old 01/23/2022    Tachycardia 01/22/2022    T3 vertebral fracture ([facility]) 04/11/2022    Occipital neuralgia of right side 10/17/2022    Dilated cardiomyopathy ([facility]) 12/09/2022    RLS (restless legs syndrome) 12/09/2022    Type 2 diabetes mellitus with hyperglycemia ([facility]) 03/15/2023    Invasive ductal carcinoma of breast, female, left ([facility]) 03/15/2023    Malignant neoplasm of central portion of left breast in female, estrogen receptor positive  05/03/2023    Cellulitis of chest wall 05/10/2023    COVID 06/23/2023    Left breast abscess 09/17/2023    Seroma of breast 09/17/2023    Lymphedema of breast 09/29/2023    History of Helicobacter infection 12/11/2023    Nausea 12/11/2023    Discharge from right nipple 12/26/2023    Anxiety disorder due to general medical condition with panic attack 02/21/2024    Post traumatic stress disorder (PTSD) 02/21/2024     Resolved Ambulatory Problems     Diagnosis Date Noted    Pain, foot, left, chronic 05/11/2016    Colon cancer screening 10/10/2018    Screening for colon cancer 10/10/2018     Past Medical History:   Diagnosis Date    Breast cancer ([facility])     Depression     Fibroid     History of transfusion     Hyperlipidemia     Migraine     Myocardial infarction ([facility]) 01/19/2022    Sciatica     Seasonal allergies     Vertigo     Wears  glasses      Family History   Problem Relation Age of Onset    Varicose Veins Mother     Drug abuse Mother     Diabetes Father     Liver cancer Father         bile duct    Cancer Father     Breast cancer Sister         unknown age    No Known Problems Daughter     No Known Problems Daughter     No Known Problems Maternal Aunt     No Known Problems Maternal Aunt     No Known Problems Maternal Aunt     No Known Problems Maternal Aunt     No Known Problems Maternal Aunt     No Known Problems Paternal Aunt     No Known Problems Maternal Grandmother     No Known Problems Maternal Grandfather     No Known Problems Paternal Grandmother     No Known Problems Paternal Grandfather      Social History     Socioeconomic History    Marital status: /Civil Union     Spouse name: Not on file    Number of children: Not on file    Years of education: Not on file    Highest education level: Not on file   Occupational History    Not on file   Tobacco Use    Smoking status: Never    Smokeless tobacco: Never    Tobacco comments:     none   Vaping Use    Vaping status: Never Used   Substance and Sexual Activity    Alcohol use: Not Currently     Comment: none    Drug use: No     Comment: none    Sexual activity: Not Currently     Partners: Male     Birth control/protection: Post-menopausal     Comment:    Other Topics Concern    Not on file   Social History Narrative    Not on file     Social Determinants of Health     Financial Resource Strain: Not on file   Food Insecurity: No Food Insecurity (6/24/2023)    Hunger Vital Sign     Worried About Running Out of Food in the Last Year: Never true     Ran Out of Food in the Last Year: Never true   Transportation Needs: No Transportation Needs (6/24/2023)    PRAPARE - Transportation     Lack of Transportation (Medical): No     Lack of Transportation (Non-Medical): No   Physical Activity: Not on file   Stress: Not on file   Social Connections: Not on file   Intimate Partner  Violence: Not on file   Housing Stability: Low Risk  (6/24/2023)    Housing Stability Vital Sign     Unable to Pay for Housing in the Last Year: No     Number of Places Lived in the Last Year: 1     Unstable Housing in the Last Year: No     Allergies   Allergen Reactions    Nuts - Food Allergy Anaphylaxis and Throat Swelling    Trintellix [Vortioxetine] Other (See Comments)     Suicidal ideation    Chocolate - Food Allergy Cough    Eggs Or Egg-Derived Products - Food Allergy Other (See Comments)     Cough    Other Cough     LETTUCE    Shellfish Allergy - Food Allergy Cough    Tomato - Food Allergy Cough          Mental Status Evaluation:    Appearance:  casually dressed, adequate grooming   Behavior:  pleasant, cooperative, calm   Speech:  normal rate and volume, fluent, clear   Mood:  mildly depressed   Affect:  normal range and intensity   Thought Process:  organized, logical, goal directed, linear   Associations: intact associations   Thought Content:  no overt delusions   Perceptual Disturbances: denies auditory or visual hallucinations when asked, does not appear responding to internal stimuli   Risk Potential: Suicidal ideation - None  Homicidal ideation - None   Sensorium:  oriented to person, place, time/date, and situation   Memory:  recent memory intact   Consciousness:  alert and awake   Attention: attention span and concentration are age appropriate   Insight:  partial   Judgment: partial   Gait/Station: Unable to assess, video visit   Motor Activity: no abnormal movements       Laboratory results: I have personally reviewed all pertinent laboratory/tests results.  Most Recent Labs:   Lab Results   Component Value Date    WBC 8.08 09/11/2023    RBC 4.47 09/11/2023    HGB 12.9 09/11/2023    HCT 40.0 09/11/2023     09/11/2023    RDW 13.3 09/11/2023    NEUTROABS 6.18 09/11/2023    SODIUM 136 11/21/2023    K 4.3 11/21/2023    CL 97 11/21/2023    CO2 29 11/21/2023    BUN 16 11/21/2023    CREATININE  0.93 11/21/2023    GLUC 218 (H) 09/11/2023    GLUF 213 (H) 11/21/2023    CALCIUM 9.3 11/21/2023    AST 14 09/11/2023    ALT 22 09/11/2023    ALKPHOS 111 (H) 09/11/2023    TP 7.0 09/11/2023    ALB 4.0 09/11/2023    TBILI 1.12 (H) 09/11/2023    CHOLESTEROL 151 11/21/2023    HDL 61 11/21/2023    TRIG 122 11/21/2023    LDLCALC 66 11/21/2023    NONHDLC 91 12/02/2022    SFX9QWXJJKBC 1.695 11/21/2023    T3FREE 2.98 11/21/2023    PREGUR negative 06/12/2020    PREGSERUM Negative 12/08/2021    HCGQUANT <2 11/12/2021    HGBA1C 7.2 (H) 11/21/2023     11/21/2023         Assessment   Diagnoses and all orders for this visit:    Generalized anxiety disorder    Severe episode of recurrent major depressive disorder, without psychotic features (HCC)         Plan    Planned medication and treatment changes:    Continue current medications  Continue PHP program  All current active medications have been reviewed.      Risks / Benefits of Treatment:    Risks, benefits, and possible side effects of medications explained to patient and patient verbalizes understanding and agrees to medications.     Counseling / Coordination of Care:    Patient's progress discussed with staff in treatment team meeting.  Medications, treatment progress and treatment plan reviewed with patient.    ERNIE Eli 02/26/24

## 2024-02-26 NOTE — PSYCH
Visit Time    Visit Start Time: 1045  Visit Stop Time: 1145  Total Visit Duration: 1 hour    Subjective:     Patient ID: Stacy Ferrell is a 58 y.o. y.o. female.    [facility] Clinical Progress Notes      Specialized Services Documentation  Therapist must complete separate progress note for each specific clinical activity in which the individual participated during the day.       This group was facilitated virtually in a private office using HIPAA Compliant and Approved Microsoft Teams.  Stacy Ferrell consented to the use of tele-video modality of treatment.      Psychotherapy Group   Stacy Ferrell actively shared in Psychotherapy Group exploring DBT distress tolerance “crisis survival strategies”.  Group explored crisis survival strategies “ACCEPTS, TIP, and STOP) reinforcing actions one could take to learn to tolerate stressful experiences, thoughts and urges.  Heydi participated in STOP examples, PMR, and distracting with others thoughts to explore several strategies.  She felt she could put effort into practicing ACCEPTS skills.  Some progress toward goal noted.  Continue psychotherapy to encourage learning, practice, and home practice of skills to manage distress.   Tx Plan Objective: 1.1, Therapist:  Felicia MELENDEZ

## 2024-02-26 NOTE — PSYCH
"Subjective:     Patient ID: Stacy Ferrell is a 58 y.o. female.    [facility] Clinical Progress Notes      Specialized Services Documentation  Therapist must complete separate progress note for each specific clinical activity in which the individual participated during the day.     Allied Therapy [id number] This group was facilitated virtually in a private office using HIPAA Compliant and Approved [facility] Teams. Stacy Ferrell actively participated in an open process music therapy group regarding music auto-biographies. The group was asked to identify songs that carried meaning for different phases of their lives. The group was then asked to share a song from when they first became aware of their mental health struggles, their present self, and a future vision of themself. The group participated in an open processing of the songs shared. Stacy shared the song \"Landslide\" which represented her difficulty accepting and dealing with change and identified her need to learn to say \"no\" more often. Some effort noted towards treatment goal. Continue AT to encourage the development of self understanding and a hopeful vision of the future. Tx Plan Objective: 1.1,1.2,1.4, Therapist:  Leland Dodson, [facility], [place]-BC  "

## 2024-02-26 NOTE — PSYCH
Subjective:     Patient ID: Heydi Ferrell is a 58 y.o. female.     [facility] Clinical Progress Notes      Specialized Services Documentation  Therapist must complete separate progress note for each specific clinical activity in which the individual participated during the day.      Group Psychotherapy - Boundaries/Efraín    0930-1030 Heydi Ferrell participated actively in a psychotherapy group focused on efraín.  This group was facilitated virtually in a private office using HIPAA compliant and approved [employer] teams. Stacy Ferrell consented to the use of tele-video modality of treatment.  The members reflected on their ineffective methods and barriers to expressing needs. This writer facilitated a discussion on boundaries, relationships, and communication. Members were encouraged to practice setting boundaries through the completion of the Efraín outline both in group and outside of programming. Heydi Ferrell made good efforts towards progress goals which were displayed through participation, notetaking, and engagement in topic. Heydi Ferrell participated in the group but was observed to have others walking around her while in group.Continue to run daily group psychotherapy to meet treatment needs and encourage Heydi Ferrell to practice skills outside of program.      Tx Plan Objective: 1.1,1.2,1.4 Therapist: SENAIT Coburn    Education Therapy   [id number] Heydi Ferrell was not present during, CM notified.    [id number] Heydi Ferrell engaged throughout the treatment day. Was engaged in learning related to Illness, Medication, Aftercare, and Wellness Tools. Staff utilized Verbal, Written, A/V, and Demonstration teaching methods.  Heydi Ferrell shared area of learning and set a goal for outside of program to listen to different music.      Tx Plan Objective: 1.1,1.2,1.4, Therapist: SENAIT Coburn

## 2024-02-26 NOTE — PSYCH
Virtual Regular Visit    Verification of patient location:    Patient is located at Home in the following state in which I hold an active license [place]      Assessment/Plan:    Problem List Items Addressed This Visit       Severe episode of recurrent major depressive disorder, without psychotic features (HCC) - Primary    Anxiety disorder due to general medical condition with panic attack    Post traumatic stress disorder (PTSD)       Goals addressed in session:           Reason for visit is PHP VIRTUAL GROUP DUE TO virtual preference of care      Encounter provider  PARTIAL PSYCH PROGRAM    Provider located at [facility] PARTIAL HOSPITALIZATION PROGRAM  [address] [place] [id number]      Recent Visits  Date Type Provider Dept   02/23/24 Telemedicine [facility] PARTIAL PSYCH GROUP THERAPY [facility]   02/22/24 Telemedicine [facility] PARTIAL PSYCH GROUP THERAPY [facility]   Showing recent visits within past 7 days and meeting all other requirements  Future Appointments  No visits were found meeting these conditions.  Showing future appointments within next 150 days and meeting all other requirements       The patient was identified by name and date of birth. Stacy Ferrell was informed that this is a telemedicine visit and that the visit is being conducted throughthe Microsoft Teams platform. She agrees to proceed..  My office door was closed. No one else was in the room.  She acknowledged consent and understanding of privacy and security of the video platform. The patient has agreed to participate and understands they can discontinue the visit at any time.    Patient is aware this is a billable service.     Subjective  Stacy Ferrell is a 58 y.o. female  .      HPI     Past Medical History:   Diagnosis Date    Anemia     iron deficiency anemia    Anxiety     Breast cancer (HCC)     Depression     Diabetes mellitus (HCC)     Fibroid     [facility]    today 12/8/2021    History of transfusion     Hydrocephalus (HCC)     Hyperlipidemia     Hypertension     Migraine     Myocardial infarction (HCC) 01/19/2022    PONV (postoperative nausea and vomiting)     Sciatica     Seasonal allergies     Vertigo     Wears glasses        Past Surgical History:   Procedure Laterality Date    BACK SURGERY      C1-3 fusion    COLONOSCOPY      EPIDURAL BLOCK INJECTION Right 07/02/2020    Procedure: Right L4-5 and L5-S1 transforaminal epidural steroid injection ([id number]);  Surgeon: Clarence Lima MD;  Location: [facility];  Service: Pain Management     EPIDURAL BLOCK INJECTION N/A 09/02/2021    Procedure: BLOCK / INJECTION EPIDURAL STEROID LUMBAR  L5-S1 IESI;  Surgeon: Clarence Lima MD;  Location: [facility];  Service: Pain Management     FL GUIDED NEEDLE PLAC BX/ASP/INJ  07/02/2020    FL GUIDED NEEDLE PLAC BX/ASP/INJ  09/02/2021    HYSTERECTOMY      IR DRAINAGE TUBE CHECK AND/OR REMOVAL  1/19/2024    IR DRAINAGE TUBE CHECK WITH SCLEROSIS  1/5/2024    IR DRAINAGE TUBE CHECK WITH SCLEROSIS  1/12/2024    IR DRAINAGE TUBE PLACEMENT  1/2/2024    HI ESOPHAGOGASTRODUODENOSCOPY TRANSORAL DIAGNOSTIC N/A 10/23/2018    Procedure: EGD with Savary dilation AND COLONOSCOPY;  Surgeon: Billy Schneider MD;  Location: [facility];  Service: Gastroenterology    HI LAPAROSCOPY W TOTAL HYSTERECTOMY UTERUS 250 GM/< N/A 12/08/2021    Procedure: LTH; BILAT SALPINGECTOMY; EXCISION PERITONEAL CYST;  Surgeon: Sonia Tamez MD;  Location: [facility];  Service: Gynecology    HI MASTECTOMY PARTIAL Left 4/10/2023    Procedure: LUMPECTOMY BREAST WITH BEAU  LOCALIZATION,  BIOPSY LYMPH NODE SENTINEL (INJECT AT 1130 BY DR BROTHERS IN THE OR);  Surgeon: Lianne Brothers MD;  Location: [facility];  Service: General    TUBAL LIGATION      US BREAST CYST ASPIRATION LEFT INITIAL Left 9/12/2023    US BREAST CYST ASPIRATION LEFT INITIAL Left 9/21/2023    US BREAST CYST ASPIRATION LEFT INITIAL Left 10/3/2023     US GUIDED BREAST BIOPSY LEFT COMPLETE Left 03/07/2023       Current Outpatient Medications   Medication Sig Dispense Refill    anastrozole (ARIMIDEX) 1 mg tablet Take 1 tablet (1 mg total) by mouth daily 90 tablet 3    atorvastatin (LIPITOR) 40 mg tablet Take 40 mg by mouth every evening      FLUoxetine (PROzac) 20 mg capsule Take 1 capsule (20 mg total) by mouth daily Take with 40mg for total of 60mg. 30 capsule 1    FLUoxetine (PROzac) 40 MG capsule Take 1 capsule (40 mg total) by mouth daily Take with 20mg capsule for total of 60mg daily. 30 capsule 1    gabapentin (Neurontin) 600 MG tablet Take 1 tablet (600 mg total) by mouth 3 (three) times a day 270 tablet 2    hydrOXYzine HCL (ATARAX) 25 mg tablet Take 1 tablet (25 mg total) by mouth daily as needed for anxiety 30 tablet 1    losartan (COZAAR) 100 MG tablet Take 100 mg by mouth daily      NovoLOG ReliOn 100 UNIT/ML injection  UNITS DAILY VIA PUMP (Patient not taking: Reported on 2/23/2024)      omeprazole (PriLOSEC) 20 mg delayed release capsule Take 1 capsule (20 mg total) by mouth daily Take 30 minutes before supper 30 capsule 2    Trulicity 1.5 MG/0.5ML injection INJECT 0.5 ML (1.5 MG TOTAL) UNDER THE SKIN ONCE EVERY 7 DAYS       No current facility-administered medications for this visit.        Allergies   Allergen Reactions    Nuts - Food Allergy Anaphylaxis and Throat Swelling    Trintellix [Vortioxetine] Other (See Comments)     Suicidal ideation    Chocolate - Food Allergy Cough    Eggs Or Egg-Derived Products - Food Allergy Other (See Comments)     Cough    Other Cough     LETTUCE    Shellfish Allergy - Food Allergy Cough    Tomato - Food Allergy Cough       Review of Systems    Video Exam    There were no vitals filed for this visit.    Physical Exam     I spent FOUR GROUP HOURS PLUS CASE MANAGEMENT minutes with patient today in which greater than 50% of time was spent in counseling/coordination of care regarding PHP - SEE NOTES

## 2024-02-26 NOTE — PSYCH
Subjective:     Patient ID: Stacy Ferrell is a 58 y.o. female.    [facility] Clinical Progress Notes      Specialized Services Documentation  Therapist must complete separate progress note for each specific clinical activity in which the individual participated during the day.   Case Management Note    JACKLYN Barron    Current suicide risk : Low     [id number]- Heydi reports feeling overwhelmed by the information presented in group but expressed gratitude for the material being sent to her. Encouraged Heydi to take notes. Heydi reports that these subjects are helpful to her and enjoyed the group about arguments/communication/ and coping skills. Discussed anxiousness about going outside and discussed a step by step plan. Informed of CM .    Medications changes/added/denied? No- See Xin Lee's note    Treatment session number: 4    Individual Case Management Visit provided today? Yes     [facility] follow up physician's orders: none at this time

## 2024-02-27 ENCOUNTER — TELEMEDICINE (OUTPATIENT)
Dept: PSYCHOLOGY | Facility: CLINIC | Age: 59
End: 2024-02-27
Payer: COMMERCIAL

## 2024-02-27 DIAGNOSIS — F41.0 ANXIETY DISORDER DUE TO GENERAL MEDICAL CONDITION WITH PANIC ATTACK: ICD-10-CM

## 2024-02-27 DIAGNOSIS — F06.4 ANXIETY DISORDER DUE TO GENERAL MEDICAL CONDITION WITH PANIC ATTACK: ICD-10-CM

## 2024-02-27 DIAGNOSIS — F43.10 POST TRAUMATIC STRESS DISORDER (PTSD): ICD-10-CM

## 2024-02-27 DIAGNOSIS — F33.2 SEVERE EPISODE OF RECURRENT MAJOR DEPRESSIVE DISORDER, WITHOUT PSYCHOTIC FEATURES (HCC): Primary | ICD-10-CM

## 2024-02-27 PROCEDURE — G0410 GRP PSYCH PARTIAL HOSP 45-50: HCPCS

## 2024-02-27 PROCEDURE — S0201 PARTIAL HOSPITALIZATION SERV: HCPCS

## 2024-02-27 PROCEDURE — G0176 OPPS/PHP;ACTIVITY THERAPY: HCPCS

## 2024-02-27 NOTE — PSYCH
Subjective:     Patient ID: Stacy Ferrell is a 58 y.o. female.    [facility] Clinical Progress Notes      Specialized Services Documentation  Therapist must complete separate progress note for each specific clinical activity in which the individual participated during the day.     Allied Therapy [id number] This group was facilitated virtually in a private office using HIPAA Compliant and Approved [employer] Teams. Stacy Ferrell actively participated in music therapy group regarding progressive muscle relaxation and making art to music. The group participated in a progressive muscle relaxation. The group then participated in art making to music. The group then shared and discussed their art with the group relating it to their experience and the music. Stacy shared her positive reaction to most of the songs and her connect to music broadly. Some effort noted towards treatment goal. Continue AT to encourage the development and proactive use of relaxation and coping skills. Tx Plan Objective: 1.1,1.2,1.4, Therapist:  FLORENTINO Palomino, MT-BC

## 2024-02-27 NOTE — PSYCH
Subjective:     Patient ID: Stacy Ferrell is a 58 y.o. female.    [facility] Clinical Progress Notes      Specialized Services Documentation  Therapist must complete separate progress note for each specific clinical activity in which the individual participated during the day.     Case Management Note    ANGELIKA Barron    Current suicide risk : Low     A case management session is not scheduled today with Stacy Ferrell ; additionally, they did not request a CM meeting.  Next scheduled session is 2/28/24.    Medications changes/added/denied? No    Treatment session number: 5    Individual Case Management Visit provided today? No    [facility] follow up physician's orders: None at this time

## 2024-02-27 NOTE — PSYCH
Visit Time    Visit Start Time: 0930  Visit Stop Time: 1030  Total Visit Duration: 60 minutes    Subjective:     Patient ID: Stacy Ferrell is a 58 y.o. y.o. female.    [facility] Clinical Progress Notes      Specialized Services Documentation  Therapist must complete separate progress note for each specific clinical activity in which the individual participated during the day.       This group was facilitated virtually in a private office using HIPAA Compliant and Approved [employer] Teams.  Stacy Ferrell consented to the use of tele-video modality of treatment.    Group Psychotherapy  Stacy Ferrell actively shared in psychotherapy group focused on radical acceptance and the concept of letting go.  During group discussion, Heydi participated related to her inconsistent support.  Group reinforced importance of consistently caring for one’s self.  Group reviewed aspects of acceptance - what it is, and isn't, and ways to walk toward acceptance of challenges. Some progress toward goal.  Continue psychotherapy to increase self awareness and use of skills consistently.   Tx Plan Objective: 1.1, Therapist:  Felicia MELENDEZ

## 2024-02-27 NOTE — PSYCH
Virtual Regular Visit    Verification of patient location:    Patient is located at Home in the following state in which I hold an active license [place]      Assessment/Plan:    Problem List Items Addressed This Visit       Severe episode of recurrent major depressive disorder, without psychotic features (HCC) - Primary    Anxiety disorder due to general medical condition with panic attack    Post traumatic stress disorder (PTSD)       Goals addressed in session:           Reason for visit is PHP VIRTUAL GROUP DUE TO virtual preference of care       Encounter provider [facility] PARTIAL PSYCH PROGRAM    Provider located at [facility] PARTIAL HOSPITALIZATION PROGRAM  [address] [place] [id number]      Recent Visits  Date Type Provider Dept   02/26/24 Telemedicine [facility] PARTIAL PSYCH GROUP THERAPY [facility]   02/23/24 Telemedicine [facility]   02/22/24 Telemedicine [facility]   Showing recent visits within past 7 days and meeting all other requirements  Today's Visits  Date Type Provider Dept   02/27/24 Telemedicine [facility]YCH GROUP TH[facility]AP[facility]   Showing today's visits and meeting all other requirements  Future Appointments  No visits were found meeting these conditions.  Showing future appointments within next 150 days and meeting all other requirements       The patient was identified by name and date of birth. Stacy Ferrell was informed that this is a telemedicine visit and that the visit is being conducted throughthe Microsoft Teams platform. She agrees to proceed..  My office door was closed. No one else was in the room.  She acknowledged consent and understanding of privacy and security of the video platform. The patient has agreed to participate and understands they can discontinue the visit at any time.    Patient is aware this is a billable  service.     Subjective  Stacy Ferrell is a 58 y.o. female  .      HPI     Past Medical History:   Diagnosis Date    Anemia     iron deficiency anemia    Anxiety     Breast cancer (HCC)     Depression     Diabetes mellitus (HCC)     Fibroid     LTH   today 12/8/2021    History of transfusion     Hydrocephalus (HCC)     Hyperlipidemia     Hypertension     Migraine     Myocardial infarction (HCC) 01/19/2022    PONV (postoperative nausea and vomiting)     Sciatica     Seasonal allergies     Vertigo     Wears glasses        Past Surgical History:   Procedure Laterality Date    BACK SURGERY      C1-3 fusion    COLONOSCOPY      EPIDURAL BLOCK INJECTION Right 07/02/2020    Procedure: Right L4-5 and L5-S1 transforaminal epidural steroid injection ([id number]);  Surgeon: Clarence Lima MD;  [facility];  Service: Pain Management     EPIDURAL BLOCK INJECTION N/A 09/02/2021    Procedure: BLOCK / INJECTION EPIDURAL STEROID LUMBAR  L5-S1 IESI;  Surgeon: Clarence Lima MD;  [facility];  Service: Pain Management     FL GUIDED NEEDLE PLAC BX/ASP/INJ  07/02/2020    FL GUIDED NEEDLE PLAC BX/ASP/INJ  09/02/2021    HYSTERECTOMY      IR DRAINAGE TUBE CHECK AND/OR REMOVAL  1/19/2024    IR DRAINAGE TUBE CHECK WITH SCLEROSIS  1/5/2024    IR DRAINAGE TUBE CHECK WITH SCLEROSIS  1/12/2024    IR DRAINAGE TUBE PLACEMENT  1/2/2024    VT ESOPHAGOGASTRODUODENOSCOPY TRANSORAL DIAGNOSTIC N/A 10/23/2018    Procedure: EGD with Savary dilation AND COLONOSCOPY;  Surgeon: Billy Schneider MD;  [facility]: [facility];  Service: Gastroenterology    VT LAPAROSCOPY W TOTAL HYSTERECTOMY UTERUS 250 GM/< N/A 12/08/2021    Procedure: LTH; BILAT SALPINGECTOMY; EXCISION PERITONEAL CYST;  Surgeon: Sonia Tamez MD;  [facility];  Service: Gynecology    VT MASTECTOMY PARTIAL Left 4/10/2023    Procedure: LUMPECTOMY BREAST WITH BEAU  LOCALIZATION,  BIOPSY LYMPH NODE SENTINEL (INJECT AT 1130 BY DR BROTHERS IN THE OR);  Surgeon:  Lianne Esparza MD;  [facility];  Service: General    TUBAL LIGATION      US BREAST CYST ASPIRATION LEFT INITIAL Left 9/12/2023    US BREAST CYST ASPIRATION LEFT INITIAL Left 9/21/2023    US BREAST CYST ASPIRATION LEFT INITIAL Left 10/3/2023    US GUIDED BREAST BIOPSY LEFT COMPLETE Left 03/07/2023       Current Outpatient Medications   Medication Sig Dispense Refill    anastrozole (ARIMIDEX) 1 mg tablet Take 1 tablet (1 mg total) by mouth daily 90 tablet 3    atorvastatin (LIPITOR) 40 mg tablet Take 40 mg by mouth every evening      FLUoxetine (PROzac) 20 mg capsule Take 1 capsule (20 mg total) by mouth daily Take with 40mg for total of 60mg. 30 capsule 1    FLUoxetine (PROzac) 40 MG capsule Take 1 capsule (40 mg total) by mouth daily Take with 20mg capsule for total of 60mg daily. 30 capsule 1    gabapentin (Neurontin) 600 MG tablet Take 1 tablet (600 mg total) by mouth 3 (three) times a day 270 tablet 2    hydrOXYzine HCL (ATARAX) 25 mg tablet Take 1 tablet (25 mg total) by mouth daily as needed for anxiety 30 tablet 1    losartan (COZAAR) 100 MG tablet Take 100 mg by mouth daily      NovoLOG ReliOn 100 UNIT/ML injection  UNITS DAILY VIA PUMP (Patient not taking: Reported on 2/23/2024)      omeprazole (PriLOSEC) 20 mg delayed release capsule Take 1 capsule (20 mg total) by mouth daily Take 30 minutes before supper 30 capsule 2    Trulicity 1.5 MG/0.5ML injection INJECT 0.5 ML (1.5 MG TOTAL) UNDER THE SKIN ONCE EVERY 7 DAYS       No current facility-administered medications for this visit.        Allergies   Allergen Reactions    Nuts - Food Allergy Anaphylaxis and Throat Swelling    Trintellix [Vortioxetine] Other (See Comments)     Suicidal ideation    Chocolate - Food Allergy Cough    Eggs Or Egg-Derived Products - Food Allergy Other (See Comments)     Cough    Other Cough     LETTUCE    Shellfish Allergy - Food Allergy Cough    Tomato - Food Allergy Cough       Review of Systems    Video  Exam    There were no vitals filed for this visit.    Physical Exam     I spent FOUR GROUP HOURS PLUS CASE MANAGEMENT minutes with patient today in which greater than 50% of time was spent in counseling/coordination of care regarding PHP - SEE NOTES

## 2024-02-27 NOTE — PSYCH
Group Psychotherapy  Group Therapy    Subjective:     Patient ID: Stacy Ferrell 58 y.o.       This group was facilitated virtually in a private office using HIPAA Compliant and Approved [employer] Teams.  ([phone])Stacy Ferrell actively engaged in psychoeducational group about coping with loneliness. The skill helps to identify way to decrease feeling of loneliness and behavior change focused toward a specified goal. Group explored the identifying factors that create loneliness, this process can help them to take care of emotional and intellectual moments of loneliness on a short term and long term basis. The group talked about understanding the meaning of loneliness in regards to physical, intellectual, and emotional expression, regulation , and recognition, and how it affects themselves and others. Teaching on the emphasis of self monitoring and relapse, the group explored who they can go to for help was brought up as well. Group was encouraged to ask questions in an open forum at the end of group. Positive progress displayed through engagement in topic.Stacy Ferrell will continue to engage in psychotherapy to encourage positive self realization.  Treatment Plan Objective 1.1, 1.2, 1.3, 1.4 Therapist: Chano SINCLAIR Ed.

## 2024-02-27 NOTE — PSYCH
Subjective:     Patient ID: Stacy Ferrell is a 58 y.o. female.    [facility] Clinical Progress Notes      Specialized Services Documentation  Therapist must complete separate progress note for each specific clinical activity in which the individual participated during the day.     Group Psychotherapy  This group was facilitated virtually in a private office using HIPAA Compliant and Approved [employer] Teams.  Stacy Ferrell consented to the use of tele-video modality of treatment.  ([phone]) Stacy Ferrell actively shared in psychotherapy group exploring the benefits of exercise to support mental wellness.  Group explored healthy exercises, barriers to exercise, strategies to overcome barriers, and Qigong exercise for beginners.   Good progress toward goal noted.  Continue psychotherapy to encourage self-awareness and home practice of skills to support wellness.   Treatment Plan Objective: 1.1, 1.2   Therapist: Carmen RYAN RN        Education Therapy   [id number] Stacy Ferrell actively shared in morning assessment and goal review. Presented as Receptive related to readiness to learn.  Stacy Ferrell did not complete goal from last treatment day identifying wanting to gain motivation. did not present with any barriers to learning.     [id number] Stacy Ferrell engaged throughout the treatment day. Was engaged in learning related to Illness, Medication, Aftercare, and Wellness Tools. Staff utilized Verbal, Written, A/V, and Demonstration teaching methods.  Stacy Ferrell shared area of learning and set a goal for outside of program to go for a walk.      Tx Plan Objective: 1.1,1.2,1.4 Therapist:  Carmen RYAN RN

## 2024-02-28 ENCOUNTER — TELEMEDICINE (OUTPATIENT)
Dept: PSYCHOLOGY | Facility: CLINIC | Age: 59
End: 2024-02-28
Payer: COMMERCIAL

## 2024-02-28 DIAGNOSIS — F06.4 ANXIETY DISORDER DUE TO GENERAL MEDICAL CONDITION WITH PANIC ATTACK: Primary | ICD-10-CM

## 2024-02-28 DIAGNOSIS — F43.10 POST TRAUMATIC STRESS DISORDER (PTSD): ICD-10-CM

## 2024-02-28 DIAGNOSIS — F33.2 SEVERE EPISODE OF RECURRENT MAJOR DEPRESSIVE DISORDER, WITHOUT PSYCHOTIC FEATURES (HCC): ICD-10-CM

## 2024-02-28 DIAGNOSIS — F41.0 ANXIETY DISORDER DUE TO GENERAL MEDICAL CONDITION WITH PANIC ATTACK: Primary | ICD-10-CM

## 2024-02-28 PROCEDURE — S0201 PARTIAL HOSPITALIZATION SERV: HCPCS

## 2024-02-28 PROCEDURE — G0177 OPPS/PHP; TRAIN & EDUC SERV: HCPCS

## 2024-02-28 PROCEDURE — G0410 GRP PSYCH PARTIAL HOSP 45-50: HCPCS

## 2024-02-28 PROCEDURE — G0176 OPPS/PHP;ACTIVITY THERAPY: HCPCS

## 2024-02-28 NOTE — PSYCH
"Subjective:     Patient ID: Stacy Ferrell is a 58 y.o. female.    [facility] Clinical Progress Notes      Specialized Services Documentation  Therapist must complete separate progress note for each specific clinical activity in which the individual participated during the day.     Group Psychotherapy Life Skills ([id number]) Stacy Ferrell actively engaged in group focused on problems which was facilitated virtually in a private office using HIPAA Compliant and Approved [employer] Teams. Stacy consented to the use of tele-video modality of treatment and was virtually present for group psychotherapy today. During group, clients were asked to describe a problem they would like us to consider and if the problem was a tv show what the name of the show was. They had to describe the tv show. Stacy did state the name for their tv show which was  \" What is outside\". Group members shared details about their tv show and received feedback from group members. Stacy continues to make progress towards goals through verbal participation in group; to accomplish long term goals continue to utilize skills learned in programming. Continue with psychotherapy to educate and encourage use of wellness tools.  Tx Plan Objective: 1.1,1.2, 1.4 Therapist: ANGELIKA Barron       Case Management Note    JACKLYN Barron    Current suicide risk : Low     [id number]- Discussed Heydi's participation and distractibility in groups. Heydi stated, \" I get more out of some group facilitators and some of the information presented conflicts with what I know.\" Heydi shared that she still tries to listen even if she doesn't agree with what is being presented. We discussed her talking to other people in her home during group time which Heydi stated \" my  thinks I am always talking to him when I am in group.\" Heydi reports that her  is partially death and is aware that she is in program but it still continues to be an " "issue. Discussed setting stronger boundaries . Heydi reports being more focused on other versus herself. Discussed her creating a list of things that she could do to help herself to discuss during case management on Friday. This writer also discussed Heydi's late arrival to groups on a daily basis. Heydi reports \"not knowing I had to be here by 9am.\" This writer reminded Heydi of the attendance policy and encouraged that she refer back to the paperwork that was sent to her at the start of program that goes over the attendance and schedule. Heydi reports improvements of not taking naps in the afternoon and going to bed at a reasonable time. Informed of CM .    Medications changes/added/denied? No    Treatment session number: 6    Individual Case Management Visit provided today? Yes     [facility] follow up physician's orders: none at this time    "

## 2024-02-28 NOTE — PSYCH
Subjective:     Patient ID: Heydi Ferrell is a 58 y.o. female.     [facility] Clinical Progress Notes      Specialized Services Documentation  Therapist must complete separate progress note for each specific clinical activity in which the individual participated during the day.       Group Psychotherapy - Cognitive Defusion    0930-1030 Heydi Ferrell participated actively in a psychotherapy group focused on Cognitive Defusion. This group was facilitated virtually in a private office using HIPAA compliant and approved [employer] teams. Stacy Ferrell consented to the use of tele-video modality of treatment.  Today, members focused on learning about defusion and how to integrate its techniques within their daily lives. Members practiced specific techniques and discussed:  Fusion vs. Defusion   Defusion strategies and techniques  Defusion exercise  Benefits of defusion   Supplemental materials/worksheets  Group members shared pieces of information from these sections and identified ways they could practice/integrate these skills in their life. This writer encouraged the group members to use the supplemental materials inside and outside of the program. Heydi Ferrell made some efforts towards progress goals which were displayed through participation and engagement in topic. Heydi Ferrell did not participate during the meditation or listen to prompts to refocus. Continue to run daily group psychotherapy to meet treatment needs and encourage Heydi Ferrell to practice skills outside of program.      Tx Plan Objective: 1.1,1.2,1.4 Therapist: SENAIT Coburn     Education Therapy   [id number] Heydi Ferrell actively shared in morning assessment and goal review. Presented as Receptive related to readiness to learn.  Heydi Ferrell did complete goal from last treatment day identifying a gain of responsibility. did not present with any barriers to learning. Heydi arrived late to morning assessment.    [id number] Heydi  Ghassan engaged throughout the treatment day. Was engaged in learning related to Illness, Medication, Aftercare, and Wellness Tools. Staff utilized Verbal, Written, A/V, and Demonstration teaching methods.  Heydi Ferrell shared area of learning and set a goal for outside of program to go with her daughter to work.      Tx Plan Objective: 1.1,1.2,1.4, Therapist: SENAIT Coburn

## 2024-02-28 NOTE — PSYCH
Virtual Regular Visit    Verification of patient location:    Patient is located at Home in the following state in which I hold an active license [place]      Assessment/Plan:    Problem List Items Addressed This Visit       Severe episode of recurrent major depressive disorder, without psychotic features (HCC)    Anxiety disorder due to general medical condition with panic attack - Primary    Post traumatic stress disorder (PTSD)       Goals addressed in session:           Reason for visit is PHP VIRTUAL GROUP DUE TO virtual preference of care      Encounter provider  PARTIAL PSYCH PROGRAM    Provider located at [facility] PARTIAL HOSPITALIZATION PROGRAM  [address] [place] [phone]      Recent Visits  Date Type Provider Dept   02/27/24 Telemedicine [facility] PARTIAL PSYCH GROUP THERAPY [facility]   02/26/24 Telemedicine [facility]CH [facility]AP[facility]   02/23/24 Telemedicine [facility]CH [facility]AP[facility]   02/22/24 Telemedicine [facility] PSYCH GROUP THERAPY [facility]   Showing recent visits within past 7 days and meeting all other requirements  Future Appointments  No visits were found meeting these conditions.  Showing future appointments within next 150 days and meeting all other requirements       The patient was identified by name and date of birth. Stacy Ferrell was informed that this is a telemedicine visit and that the visit is being conducted throughthe Microsoft Teams platform. She agrees to proceed..  My office door was closed. No one else was in the room.  She acknowledged consent and understanding of privacy and security of the video platform. The patient has agreed to participate and understands they can discontinue the visit at any time.    Patient is aware this is a billable service.     Subjective  Stacy Ferrell is a 58 y.o. female  .      HPI     Past Medical History:    Diagnosis Date    Anemia     iron deficiency anemia    Anxiety     Breast cancer (HCC)     Depression     Diabetes mellitus (HCC)     Fibroid     LTH   today 12/8/2021    History of transfusion     Hydrocephalus (HCC)     Hyperlipidemia     Hypertension     Migraine     Myocardial infarction (HCC) 01/19/2022    PONV (postoperative nausea and vomiting)     Sciatica     Seasonal allergies     Vertigo     Wears glasses        Past Surgical History:   Procedure Laterality Date    BACK SURGERY      C1-3 fusion    COLONOSCOPY      EPIDURAL BLOCK INJECTION Right 07/02/2020    Procedure: Right L4-5 and L5-S1 transforaminal epidural steroid injection ([id number]);  Surgeon: Clarence Lima MD;  [facility];  Service: Pain Management     EPIDURAL BLOCK INJECTION N/A 09/02/2021    Procedure: BLOCK / INJECTION EPIDURAL STEROID LUMBAR  L5-S1 IESI;  Surgeon: Clarence Lima MD;  [facility];  Service: Pain Management     FL GUIDED NEEDLE PLAC BX/ASP/INJ  07/02/2020    FL GUIDED NEEDLE PLAC BX/ASP/INJ  09/02/2021    HYSTERECTOMY      IR DRAINAGE TUBE CHECK AND/OR REMOVAL  1/19/2024    IR DRAINAGE TUBE CHECK WITH SCLEROSIS  1/5/2024    IR DRAINAGE TUBE CHECK WITH SCLEROSIS  1/12/2024    IR DRAINAGE TUBE PLACEMENT  1/2/2024    MA ESOPHAGOGASTRODUODENOSCOPY TRANSORAL DIAGNOSTIC N/A 10/23/2018    Procedure: EGD with Savary dilation AND COLONOSCOPY;  Surgeon: Billy Schneider MD;  [facility];  Service: Gastroenterology    MA LAPAROSCOPY W TOTAL HYSTERECTOMY UTERUS 250 GM/< N/A 12/08/2021    Procedure: LTH; BILAT SALPINGECTOMY; EXCISION PERITONEAL CYST;  Surgeon: Sonia Tamez MD;  [facility];  Service: Gynecology    MA MASTECTOMY PARTIAL Left 4/10/2023    Procedure: LUMPECTOMY BREAST WITH BEAU  LOCALIZATION,  BIOPSY LYMPH NODE SENTINEL (INJECT AT 1130 BY DR BROTHERS IN THE OR);  Surgeon: Lianne Brothers MD;  [facility];  Service: General    TUBAL LIGATION      US BREAST CYST  ASPIRATION LEFT INITIAL Left 9/12/2023    US BREAST CYST ASPIRATION LEFT INITIAL Left 9/21/2023    US BREAST CYST ASPIRATION LEFT INITIAL Left 10/3/2023    US GUIDED BREAST BIOPSY LEFT COMPLETE Left 03/07/2023       Current Outpatient Medications   Medication Sig Dispense Refill    anastrozole (ARIMIDEX) 1 mg tablet Take 1 tablet (1 mg total) by mouth daily 90 tablet 3    atorvastatin (LIPITOR) 40 mg tablet Take 40 mg by mouth every evening      FLUoxetine (PROzac) 20 mg capsule Take 1 capsule (20 mg total) by mouth daily Take with 40mg for total of 60mg. 30 capsule 1    FLUoxetine (PROzac) 40 MG capsule Take 1 capsule (40 mg total) by mouth daily Take with 20mg capsule for total of 60mg daily. 30 capsule 1    gabapentin (Neurontin) 600 MG tablet Take 1 tablet (600 mg total) by mouth 3 (three) times a day 270 tablet 2    hydrOXYzine HCL (ATARAX) 25 mg tablet Take 1 tablet (25 mg total) by mouth daily as needed for anxiety 30 tablet 1    losartan (COZAAR) 100 MG tablet Take 100 mg by mouth daily      NovoLOG ReliOn 100 UNIT/ML injection  UNITS DAILY VIA PUMP (Patient not taking: Reported on 2/23/2024)      omeprazole (PriLOSEC) 20 mg delayed release capsule Take 1 capsule (20 mg total) by mouth daily Take 30 minutes before supper 30 capsule 2    Trulicity 1.5 MG/0.5ML injection INJECT 0.5 ML (1.5 MG TOTAL) UNDER THE SKIN ONCE EVERY 7 DAYS       No current facility-administered medications for this visit.        Allergies   Allergen Reactions    Nuts - Food Allergy Anaphylaxis and Throat Swelling    Trintellix [Vortioxetine] Other (See Comments)     Suicidal ideation    Chocolate - Food Allergy Cough    Eggs Or Egg-Derived Products - Food Allergy Other (See Comments)     Cough    Other Cough     LETTUCE    Shellfish Allergy - Food Allergy Cough    Tomato - Food Allergy Cough       Review of Systems    Video Exam    There were no vitals filed for this visit.    Physical Exam     I spent FOUR GROUP HOURS PLUS  CASE MANAGEMENT minutes with patient today in which greater than 50% of time was spent in counseling/coordination of care regarding PHP - SEE NOTES

## 2024-02-29 ENCOUNTER — HOSPITAL ENCOUNTER (OUTPATIENT)
Dept: MAMMOGRAPHY | Facility: HOSPITAL | Age: 59
Discharge: HOME | End: 2024-02-29
Payer: COMMERCIAL

## 2024-02-29 ENCOUNTER — HOSPITAL ENCOUNTER (OUTPATIENT)
Dept: ULTRASOUND IMAGING | Facility: HOSPITAL | Age: 59
Discharge: HOME | End: 2024-02-29
Attending: SURGERY
Payer: COMMERCIAL

## 2024-02-29 ENCOUNTER — APPOINTMENT (OUTPATIENT)
Dept: PSYCHOLOGY | Facility: CLINIC | Age: 59
End: 2024-02-29
Payer: COMMERCIAL

## 2024-02-29 ENCOUNTER — DOCUMENTATION (OUTPATIENT)
Dept: PSYCHOLOGY | Facility: CLINIC | Age: 59
End: 2024-02-29

## 2024-02-29 VITALS — BODY MASS INDEX: 42.7 KG/M2 | WEIGHT: 241 LBS | HEIGHT: 63 IN

## 2024-02-29 DIAGNOSIS — C50.912 INVASIVE DUCTAL CARCINOMA OF BREAST, FEMALE, LEFT (HCC): ICD-10-CM

## 2024-02-29 DIAGNOSIS — L03.313 CELLULITIS OF CHEST WALL: ICD-10-CM

## 2024-02-29 DIAGNOSIS — N64.89 SEROMA OF BREAST: ICD-10-CM

## 2024-02-29 DIAGNOSIS — L98.9 SKIN ABNORMALITIES: ICD-10-CM

## 2024-02-29 PROCEDURE — 77065 DX MAMMO INCL CAD UNI: CPT

## 2024-02-29 PROCEDURE — G0279 TOMOSYNTHESIS, MAMMO: HCPCS

## 2024-02-29 PROCEDURE — 76642 ULTRASOUND BREAST LIMITED: CPT

## 2024-02-29 NOTE — PROGRESS NOTES
Subjective:     Patient ID: Stacy Ferrell is a 58 y.o. female.    [facility] Clinical Progress Notes      Specialized Services Documentation  Therapist must complete separate progress note for each specific clinical activity in which the individual participated during the day.   Case Management Note    ANGELIKA Barron    Current suicide risk : Unable to assess due to absence.    Stacy Ferrell was excused from  program today 02/29/24 due to a previously scheduled appointment. CM contacted Stacy Ferrell at 0910.    Medications changes/added/denied? No    Treatment session number: N/A    Individual Case Management Visit provided today? No    [facility] follow up physician's orders: None at this time.

## 2024-02-29 NOTE — PSYCH
Subjective:     Patient ID: Stacy Ferrell is a 58 y.o. female.    [facility] Clinical Progress Notes      Specialized Services Documentation  Therapist must complete separate progress note for each specific clinical activity in which the individual participated during the day.     Allied Therapy [id number] This group was facilitated virtually in a private office using HIPAA Compliant and Approved [employer] Teams. Stacy Ferrell actively participated in music therapy group regarding mindfulness. The group participated in a name that tune exercise as an example of mindfulness. The group then read and discussed materials on mindfulness as well as areas they could use mindfulness in their current lives. The group then read and discussed a handout on a music mindfulness exercise. When prompted, Stacy reported *** as a mindfulness tool they could use outside of program. *** effort noted towards treatment goal. Continue AT to encourage the development and proactive use of mindfulness coping tools. Tx Plan Objective: 1.1,1.2,1.4, Therapist:  FLORENTINO [place], [place]

## 2024-03-01 ENCOUNTER — TELEMEDICINE (OUTPATIENT)
Dept: PSYCHOLOGY | Facility: CLINIC | Age: 59
End: 2024-03-01
Payer: COMMERCIAL

## 2024-03-01 DIAGNOSIS — F33.2 SEVERE EPISODE OF RECURRENT MAJOR DEPRESSIVE DISORDER, WITHOUT PSYCHOTIC FEATURES (HCC): Primary | ICD-10-CM

## 2024-03-01 DIAGNOSIS — F41.9 ANXIETY: ICD-10-CM

## 2024-03-01 DIAGNOSIS — F43.10 POST TRAUMATIC STRESS DISORDER (PTSD): ICD-10-CM

## 2024-03-01 PROCEDURE — S0201 PARTIAL HOSPITALIZATION SERV: HCPCS

## 2024-03-01 PROCEDURE — G0177 OPPS/PHP; TRAIN & EDUC SERV: HCPCS

## 2024-03-01 PROCEDURE — 90832 PSYTX W PT 30 MINUTES: CPT

## 2024-03-01 PROCEDURE — G0176 OPPS/PHP;ACTIVITY THERAPY: HCPCS

## 2024-03-01 PROCEDURE — G0410 GRP PSYCH PARTIAL HOSP 45-50: HCPCS

## 2024-03-01 NOTE — BH TREATMENT PLAN
"Assessment/Plan:       Diagnoses and all orders for this visit:     Severe episode of recurrent major depressive disorder, without psychotic features (HCC)     Post traumatic stress disorder (PTSD)     Anxiety disorder due to general medical condition with panic attack            Subjective:      Patient ID: Stacy Ferrell is a 58 y.o. female.     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  Date Initiated: 02/21/24     Strengths: \"loves her kids and grandson\"          LONG TERM GOAL:   Date Initiated: 02/21/24  1.0 I will identify 3 signs that I am feeling less depressed and anxious and more productive in my day to day life.  Target Date: 3/20/24  Completion Date:         SHORT TERM OBJECTIVES:      Date Initiated: 02/21/24  1.1 I will create a list identifying coping skills that I have learned and discuss how I plan to build them into my schedule. I will choose at least 1 different coping skill daily to practice   Revision Date: 3/1/24  Target Date: 3/1/24  Completion Date:      Date Initiated: 02/21/24  1.2 I will schedule and participate in a pleasant and healthy activity daily to improve my mood.  Revision Date: 3/1/24  Target Date: 3/1/24  Completion Date:     Date Initiated: 02/21/24  1.3 I will take medications as prescribed and share questions and concerns if arise.    Revision Date:3/1/24  Target Date: 3/1/24  Completion Date:      Date Initiated: 02/21/24  1.4 I will identify 3 ways my supports can assist in my recovery and agree to staff/support contact as indicated.  "   Revision Date:3/1/24  Target Date: 3/1/24  Completion Date:            7 DAY REVISION:  1.5 I will create a daily routine and will identify one household task that I can complete in the afternoon to help structure my day.  Date Initiated:3/1/24  Revision Date:   Target Date: 3/12/24  Completion Date:        PSYCHIATRY:  Date Initiated:  02/21/24  Medication Management and Education       Revision Date: 03/01/24        1.3 Continue medication management        The person(s) responsible for carrying out the plan is Rocky Marin DO and Alexia Bustos PA-C     NURSING/SYMPTOM EDUCATION:   Date Initiated: 02/21/24  1.1, 1.2. 1.3, 1.4 This RN/Or Therapist will provide wellness/symptoms and skill education groups three to five days weekly to educate Stacy Ferrell on signs and symptoms of diagnoses, skills to manage, and medication questions that will be addressed by the treatment team.    Revision date:03/01/24   1.1,1.2,1.3,1.4,1.5 Continue to encourage Stacy Ferrell to participate in wellness/symptoms and skills education groups daily to learn about symptoms, coping strategies and warning signs to promote relapse prevention.     The person(s) responsible for carrying out the plan is Sandra Duron; Carmen Hong RN, BSN     PSYCHOLOGY:   Date Initiated: 02/21/24       1.1, 1.2, 1.4 Provide psychotherapy group 5 times per week to allow opportunity for Stacy Ferrell  to explore stressors and ways of coping.   Revision Date: 03/01/24   1.1,1.2,1.4,1.5  Continue to provide psychotherapy group daily to Stacy Ferrell and encourage sharing of stressors, skills and positive change.    The person(s) responsible for carrying out the plan is Edna Shah MSW,LSW; SENAIT Coburn     ALLIED THERAPY:   Date Initiated: 02/21/24  1.1,1.2 Engage Stacy Ferrell in AT group 5 times weekly to encourage development and use of wellness tools to decrease symptoms and promote recovery through meaningful  activity.  Revision Date: 03/01/24   1.1,1.2,1.5 Continue to engage Stacy Ferrell to participate in AT group to practice wellness tools within program and transfer to home sharing successes and barriers through healthy task involvement.        The person(s) responsible for carrying out the plan is Felicia Tomas [facility] ; FLORENTINO Palomino, [facility]     CASE MANAGEMENT:   Date Initiated: 02/21/24      1.0 This  will meet with Stacy Ferrell  3-4 times weekly to assess treatment progress, discharge planning, connection to community supports and UR as indicated.  Revision Date: 03/01/24   1.0 Continue to meet with Stacy Ferrell 3-4 times weekly to assess growth, work toward goals, continued treatment needs, dc planning and use of supports.    The person(s) responsible for carrying out the plan is ANGELIKA Barron,LSW     TREATMENT REVIEW/COMMENTS:      DISCHARGE CRITERIA: Identify 3 signs of progress and complete a safety plan.    DISCHARGE PLAN: Connect with identified outpatient providers.   Estimated Length of Stay: 10 treatment days                Diagnosis and Treatment Plan explained to Stacy Kumar relates understanding diagnosis and is agreeable to Treatment Plan.         CLIENT COMMENTS / Please share your thoughts, feelings, need and/or experiences regarding your treatment plan with Staff.  Please see follow up note with comments.     Signatures can be found on [facility] Treatment plan consent form.

## 2024-03-01 NOTE — PSYCH
Virtual Regular Visit    Verification of patient location:    Patient is located at Home in the following state in which I hold an active license [place]      Assessment/Plan:    Problem List Items Addressed This Visit       Anxiety    Severe episode of recurrent major depressive disorder, without psychotic features (HCC) - Primary    Post traumatic stress disorder (PTSD)       Goals addressed in session:           Reason for visit is PHP VIRTUAL GROUP DUE TO virtual preference of care      Encounter provider [facility]    Provider located at [facility]  [address] [place] [id number]      Recent Visits  Date Type Provider Dept   02/28/24 Telemedicine [facility]   02/27/24 Telemedicine [facility]   02/26/24 Telemedicine [facility]   02/23/24 Telemedicine [facility]   Showing recent visits within past 7 days and meeting all other requirements  Today's Visits  Date Type Provider Dept   03/01/24 Telemedicine [facility]   Showing today's visits and meeting all other requirements  Future Appointments  No visits were found meeting these conditions.  Showing future appointments within next 150 days and meeting all other requirements       The patient was identified by name and date of birth. Stacy Ferrell was informed that this is a telemedicine visit and that the visit is being conducted throughthe [employer] Teams platform. She agrees to proceed..  My office door was closed. No one else was in the room.  She acknowledged consent and understanding of privacy and security of the video platform. The patient has agreed to participate and understands they can discontinue the visit at any time.    Patient is aware this  is a billable service.     Dar Ferrell is a 58 y.o. female  .      HPI     Past Medical History:   Diagnosis Date    Anemia     iron deficiency anemia    Anxiety     Breast cancer (HCC)     Depression     Diabetes mellitus (HCC)     Fibroid     LTH   today 12/8/2021    History of transfusion     Hydrocephalus (HCC)     Hyperlipidemia     Hypertension     Migraine     Myocardial infarction (HCC) 01/19/2022    PONV (postoperative nausea and vomiting)     Sciatica     Seasonal allergies     Vertigo     Wears glasses        Past Surgical History:   Procedure Laterality Date    BACK SURGERY      C1-3 fusion    COLONOSCOPY      EPIDURAL BLOCK INJECTION Right 07/02/2020    Procedure: Right L4-5 and L5-S1 transforaminal epidural steroid injection ([id number]);  Surgeon: Clarence Lima MD;  Location: [facility];  Service: Pain Management     EPIDURAL BLOCK INJECTION N/A 09/02/2021    Procedure: BLOCK / INJECTION EPIDURAL STEROID LUMBAR  L5-S1 IESI;  Surgeon: Clarence Lima MD;  Location: [facility];  Service: Pain Management     FL GUIDED NEEDLE PLAC BX/ASP/INJ  07/02/2020    FL GUIDED NEEDLE PLAC BX/ASP/INJ  09/02/2021    HYSTERECTOMY      IR DRAINAGE TUBE CHECK AND/OR REMOVAL  1/19/2024    IR DRAINAGE TUBE CHECK WITH SCLEROSIS  1/5/2024    IR DRAINAGE TUBE CHECK WITH SCLEROSIS  1/12/2024    IR DRAINAGE TUBE PLACEMENT  1/2/2024    IA ESOPHAGOGASTRODUODENOSCOPY TRANSORAL DIAGNOSTIC N/A 10/23/2018    Procedure: EGD with Savary dilation AND COLONOSCOPY;  Surgeon: Billy Schneider MD;  Location: [facility];  Service: Gastroenterology    IA LAPAROSCOPY W TOTAL HYSTERECTOMY UTERUS 250 GM/< N/A 12/08/2021    Procedure: LTH; BILAT SALPINGECTOMY; EXCISION PERITONEAL CYST;  Surgeon: Sonia Tamez MD;  Location: [facility];  Service: Gynecology    IA MASTECTOMY PARTIAL Left 4/10/2023    Procedure: LUMPECTOMY BREAST WITH BEAU  LOCALIZATION,  BIOPSY LYMPH NODE SENTINEL (INJECT AT 1130 BY DR BROTHERS IN THE  OR);  Surgeon: Lianne Esparza MD;  Location: [facility];  Service: General    TUBAL LIGATION      US BREAST CYST ASPIRATION LEFT INITIAL Left 9/12/2023    US BREAST CYST ASPIRATION LEFT INITIAL Left 9/21/2023    US BREAST CYST ASPIRATION LEFT INITIAL Left 10/3/2023    US GUIDED BREAST BIOPSY LEFT COMPLETE Left 03/07/2023       Current Outpatient Medications   Medication Sig Dispense Refill    anastrozole (ARIMIDEX) 1 mg tablet Take 1 tablet (1 mg total) by mouth daily 90 tablet 3    atorvastatin (LIPITOR) 40 mg tablet Take 40 mg by mouth every evening      FLUoxetine (PROzac) 20 mg capsule Take 1 capsule (20 mg total) by mouth daily Take with 40mg for total of 60mg. 30 capsule 1    FLUoxetine (PROzac) 40 MG capsule Take 1 capsule (40 mg total) by mouth daily Take with 20mg capsule for total of 60mg daily. 30 capsule 1    gabapentin (Neurontin) 600 MG tablet Take 1 tablet (600 mg total) by mouth 3 (three) times a day 270 tablet 2    hydrOXYzine HCL (ATARAX) 25 mg tablet Take 1 tablet (25 mg total) by mouth daily as needed for anxiety 30 tablet 1    losartan (COZAAR) 100 MG tablet Take 100 mg by mouth daily      NovoLOG ReliOn 100 UNIT/ML injection  UNITS DAILY VIA PUMP (Patient not taking: Reported on 2/23/2024)      omeprazole (PriLOSEC) 20 mg delayed release capsule Take 1 capsule (20 mg total) by mouth daily Take 30 minutes before supper 30 capsule 2    Trulicity 1.5 MG/0.5ML injection INJECT 0.5 ML (1.5 MG TOTAL) UNDER THE SKIN ONCE EVERY 7 DAYS       No current facility-administered medications for this visit.        Allergies   Allergen Reactions    Nuts - Food Allergy Anaphylaxis and Throat Swelling    Trintellix [Vortioxetine] Other (See Comments)     Suicidal ideation    Chocolate - Food Allergy Cough    Eggs Or Egg-Derived Products - Food Allergy Other (See Comments)     Cough    Other Cough     LETTUCE    Shellfish Allergy - Food Allergy Cough    Tomato - Food Allergy Cough       Review of  Systems    Video Exam    There were no vitals filed for this visit.    Physical Exam     I spent FOUR GROUP HOURS PLUS CASE MANAGEMENT minutes with patient today in which greater than 50% of time was spent in counseling/coordination of care regarding PHP - SEE NOTES

## 2024-03-01 NOTE — PSYCH
"  Subjective:     Patient ID: Stacy Ferrell is a 58 y.o. female.    [facility] Clinical Progress Notes      Specialized Services Documentation  Therapist must complete separate progress note for each specific clinical activity in which the individual participated during the day.       GROUP PSYCHOTHERAPY  ([id number]) Group was facilitated virtually in a private office using HIPAA Compliant and Approved [employer] Teams. Stacy Ferrell consented to the use of tele-video modality of treatment and was virtually present for group psychotherapy today.  she attentively listened to CPS Sabrina share her life story as she co-led this session.  Group encouraged power of learning about self, accepting illness and personal responsibility in recovery.  Community resources reviewed in addition to personal resources like the affirmations.  Progress toward goals noted.  Continue psychotherapy to encourage self -awareness and healthy engagement of supports.    TX Plan Objectives: 1.1, 1.2, 1.4   Therapist: ANGELIKA Barron LSW    Case Management Note    JACKLYN Barron    Current suicide risk : Low     [id number]- Heydi reports that she would like to get outside this weekend. Heydi shared that she has several appointments coming up next week. She will be off on Monday and will be leaving at 1030 on Tuesday and will be back for the 1230 group.Reviewed treatment plan goals and updated treatment plan. Heydi reported that she \"knows all the coping skills.\" This writer inquired about what coping skills Heydi knows and is implementing on a daily basis. Heydi struggled to identify coping skills she knows and repeatedly stated \" You Know, you know.\" Heydi then proceeded to change the subject stating her does better in open processing groups instead of groups where something is being shown on the screen. Heydi shared that she has an appointment set up with her PCP for Wednesday at 1100 for medication management. Heydi shared " that she reached out to her therapist on the weekend and she is currently on vacation. This writer encouraged her to reach out to her therapist on Monday to set up follow up.      Medications changes/added/denied? No    Treatment session number: 7    Individual Case Management Visit provided today? Yes     [facility] follow up physician's orders: none at this time

## 2024-03-01 NOTE — PSYCH
Subjective:     Patient ID: Stacy Ferrell is a 58 y.o. female.    [facility] Clinical Progress Notes      Specialized Services Documentation  Therapist must complete separate progress note for each specific clinical activity in which the individual participated during the day.       Group Psychotherapy  This group was facilitated virtually in a private office using HIPAA Compliant and Approved [employer] Teams. Stacy Ferrell consented to the use of tele-video modality of treatment  ([id number]) Stacy Ferrell actively engaged in psychoeducational group about medication management, types of antidepressants/side effects, and medication tips. Group came up with strategies that helped them remember to take their medications and developed ideas to make it easier in the future. The group talked about understanding the purpose, dose, type, timing and side effects of their medications. Teaching on emergency situations and who to go to for help was brought up as well. Group was encouraged to ask questions in an open forum at the end of group. Some progress displayed through engagement in topic.  Treatment Plan Objective 1.1, 1.2, 1.3, 1.4 Therapist: Carmen RYAN RN

## 2024-03-01 NOTE — PSYCH
Subjective:     Patient ID: Heydi Ferrell is a 58 y.o. female.     [facility] Clinical Progress Notes      Specialized Services Documentation  Therapist must complete separate progress note for each specific clinical activity in which the individual participated during the day.      Group Psychotherapy - Wellness Assessment    [id number] Heydi Ferrell participated actively in a psychotherapy group focused on  the Wellness Assessment. This group was facilitated virtually in a private office using HIPAA compliant and approved [employer] teams. Stacy Ferrell consented to the use of tele-video modality of treatment.  The group engaged in the wellness assessment, which evaluates progress on several different areas of wellness/wellbeing: physical, emotional, cognitive, vocational, social and spiritual. Clients rated their progress and discussed areas that need work. By completing and discussing areas of progress, goals and challenges, members are connected and reminded that, in their mental health struggle, they are not alone. Topics of discussion revolved around setting goals, coping with change, methods to achieve goals. Members also engaged in a mindfulness nature exercise. Heydi Ferrell continues to make progress towards goals through participation in group activity and personal disclosures.  Heydi Ferrell set goal to walk more. Continue to run daily group psychotherapy to meet treatment needs and encourage Heydi Ferrell to practice skills outside of program.      Tx Plan Objective: 1.1,1.2,1.4  Therapist: SENAIT Coburn      Education Therapy   [id number] Heydi Ferrell actively shared in morning assessment and goal review. Presented as Receptive related to readiness to learn.  Heydi Ferrell did complete goal from last treatment day identifying a gain of support. did not present with any barriers to learning.     [id number] Heydi Ferrell engaged throughout the treatment day. Was engaged in learning related to  Illness, Medication, Aftercare, and Wellness Tools. Staff utilized Verbal, Written, A/V, and Demonstration teaching methods.  Heydi Ferrell shared area of learning and set a goal for outside of program to walk.      Tx Plan Objective: 1.1,1.2,1.4, Therapist: SENAIT Coburn

## 2024-03-03 NOTE — PROGRESS NOTES
PT Evaluation     Today's date: 3/4/24  Patient name: Stacy Ferrell  : 1965  MRN: [id number]  Referring provider: Lianne Esparza MD  Dx:   Encounter Diagnosis     ICD-10-CM    1. Lymphedema of breast  I89.0 Ambulatory Referral to PT/OT Lymphedema Therapy                       Assessment  Assessment details: Patient presents to OPPT with s/s consistent with left breast/UE lymphedema..  PT interventions to include CDT (complete decongestive therapy) including MLD (manual lymphatic drainage) and compression using short stretch bandages as able.  PT to further provide extensive patient education on self bandaging, self MLD techniques, and skin care.  Patient will transition  to long term maintenance with compression plan for both morning and evening wear once optimal decongestive and volume reduction of limb has been achieved.   Pt would benefit from a pneumatic pump at home. Pt provided with HEP for L shld and will be addressing Pt's limited cerv AROM as well. Thank you for this referral and the opportunity to participate in the care of this patient.    Impairments: abnormal or restricted ROM, abnormal movement, activity intolerance, impaired physical strength, lacks appropriate home exercise program and pain with function  Other impairment: LUE edema  Understanding of Dx/Px/POC: good   Prognosis: good  Prognosis details: Positive prognostic indicators include positive attitude toward recovery and good understanding of diagnosis and treatment plan options.  Negative prognostic indicators include financial concerns due to copay..        Goals  STGs to be achieved in 4-6 weeks  Demonstrate at least 75% compliance with self MLD  Demonstrate at least 75% compliance with self compression  Demonstrate at least 75% compliance with self skin and nail inspection  Free from s/s of infection  Demonstrate understanding of importance of compliance with POC  Increase L shld AROM and cerv AROM by at least 10*    LTGs  to be achieved in  6-10 weeks  Demonstrate at least 100% compliance with self MLD  Demonstrate at least 100% compliance with self compression  Demonstrate at least 100% compliance with self skin and nail inspection  Free from s/s of infection  Demonstrate understanding of day/night compression wearing schedule  Obtain alternative compression to ensure independence with self management      Plan  Patient would benefit from: skilled physical therapy  Other planned modality interventions: Modalities prn for symptom management  Planned therapy interventions: manual therapy, neuromuscular re-education, therapeutic activities, therapeutic exercise, home exercise program and gait training  Frequency: 2x week  Duration in visits: 8  Duration in weeks: 8  Plan of Care beginning date: 3/4/2024  Plan of Care expiration date: 2024  Treatment plan discussed with: patient and PTA      Subjective Evaluation    History of Present Illness  Mechanism of injury: Pt as dx'er with L breast CA in 2023. And underwent lumpectomy in 2023. Had 3 weeks of radiation and developed breast seroma. Now has lymphedema dx of L breast. Also had cellulitis of L breast. Notes she is a belly sleeper and is having trouble laying on the L breast. States it feels like sleeping on a rock.  3/4/24  Pt notes she is having diff with cerv ROM and pt notes increased swelling LUE. Pt does not have a pneumatic pump. Pt wears compression sleeve daily and notes she gets marks from constricted spots when it slides down.  Notes the L breast feels hard. Pt notes she gets pain in breast when she belly sleeps. Had an US of L breast last week due to same. No issues noted by study.          Not a recurrent problem   Patient Goals  Patient goals for therapy: decreased edema, increased motion, increased strength, independence with ADLs/IADLs and decreased pain    Pain  Current pain ratin (breast 4/10, neck 9/10)  At best pain ratin (breast 0/10, neck  2/10)  At worst pain rating: 10 (10/10 breast and neck)  Location: L breast  Quality: dull ache, sharp, pressure, tight and throbbing  Relieving factors: heat (nothing)  Aggravating factors: lifting and overhead activity (reaching)  Progression: worsening (neck)    Social Support  Lives with: spouse and adult children    Hand dominance: ambidextrous      Diagnostic Tests  Ultrasound findings: normalTreatments  Previous treatment: physical therapy  Current treatment: physical therapy      Objective     Active Range of Motion   Cervical/Thoracic Spine       Cervical    Flexion: 30 degrees  Restriction level: moderate  Extension: 15 degrees     Restriction level: moderate  Left lateral flexion: 23 degrees     Restriction level: moderate  Right lateral flexion: 15 degrees     Restriction level moderate  Left rotation: 20 degrees Restriction level: moderate  Right rotation: 23 degrees    Restriction level: moderate  Left Shoulder   Flexion: 125 degrees   Extension: 20 degrees   Abduction: 98 degrees   External rotation BTH: Active external rotation behind the head: reaches posterior top of head.   Internal rotation BTB: Active internal rotation behind the back: to L buttock.     Lymphedema Evaluation    Medical/MLD Precautions/Contraindications:  __Y_  Cardiac/CHF/Cardiac Edema/Cardiac arrhythmia-- MI Jan 2021, A-fib  __Y_  Pulmonary- asthma  _N__  Kidney  _N__  Liver  _N__  Current Fever/Infection  _N__  Current/Recent Wounds  _N__  Current/Recent Treatments/Interventions/Port Access/PICC Line/Trell filter  _N__  Current/Recent/History of DVT or Clotting issues/Pelvic DVT  _N__  Age > 59 y/o  _Y__  HTN-- med controlled  __Y_  Malignancy  _N__  Hyper/hypothyroidism  _N__  AAA  _N__  GI disorder (Crohn's Diverticulitis, IBD)--IBS  __N_  Liver disease  _N__  Recent abdominal surgery  _N__  Pregnancy  _N__  Abdominal pain    Bandaging Precautions/Contraindications:  _Y__  Diabetes-- IDDM  _Y__  Neuropathy- hands and  "feet  _Y__  Vascular problems/insufficiency-LEs  _N__  Arterial Disease    Other:  _N__  Latex allergies  _N__  Pacemaker    CA treatment:  _N__  Chemo  __Y_  XRT- completed      ROM Considerations: limited L Shld all planes      Strength Considerations  Strength is 4+-5/5 through available range      Gait Considerations: no gait issues  Skin Considerations/Scars:  No skin issues at this time.  Patient presents with skin inspection as follows:  Hemosiderin staining/skin discoloration  Finger/Toe Nails  Open Wounds/Size/Color  S/S infection  Firm/Sponge/Hard  Peau D' Orange/Papillomas  Lymphorrhea/Weeping  Skin mobility  Skin temperature  Fungal infection  Lymph fistula  Lipodermatosclerosis    Girth:  See  upcoming volumetrics  + pitting L breast             Precautions: see extensive PMH    Re-eval Date: 11/16/23    Date 3/4       Visit Count 1       FOTO        Pain In See IE       Pain Out same               Manuals  L UE   volumetrics      CDT Pt provided handouts with further educ/review upcoming  - What is CDT/ benefits with compliancy  - What is lymphedema  - Prevention  - Skin care, signs and sx's infection/cellulitis  - Self lymphedema massage/MLD with sequence  - Self wrap/merline/doffing compression, care management, replace every 6 months    - Discussed possible pneumatic pump to include thoracic region    15 min  CV/CLT-PTA                                 Neuro Re-Ed                                                                Ther Ex                                                                        Ther Activity                        Gait Training                        Modalities                        HEP  Shoulder AAROM flex, scap, ext  3x30\" ea   "

## 2024-03-04 ENCOUNTER — TELEMEDICINE (OUTPATIENT)
Dept: PSYCHIATRY | Facility: CLINIC | Age: 59
End: 2024-03-04

## 2024-03-04 ENCOUNTER — DOCUMENTATION (OUTPATIENT)
Dept: PSYCHOLOGY | Facility: CLINIC | Age: 59
End: 2024-03-04

## 2024-03-04 ENCOUNTER — EVALUATION (OUTPATIENT)
Dept: PHYSICAL THERAPY | Facility: CLINIC | Age: 59
End: 2024-03-04
Payer: COMMERCIAL

## 2024-03-04 ENCOUNTER — APPOINTMENT (OUTPATIENT)
Dept: PSYCHOLOGY | Facility: CLINIC | Age: 59
End: 2024-03-04
Payer: COMMERCIAL

## 2024-03-04 DIAGNOSIS — F43.10 POST TRAUMATIC STRESS DISORDER (PTSD): ICD-10-CM

## 2024-03-04 DIAGNOSIS — F33.2 SEVERE EPISODE OF RECURRENT MAJOR DEPRESSIVE DISORDER, WITHOUT PSYCHOTIC FEATURES (HCC): Primary | ICD-10-CM

## 2024-03-04 DIAGNOSIS — I89.0 LYMPHEDEMA OF BREAST: Chronic | ICD-10-CM

## 2024-03-04 DIAGNOSIS — F41.0 ANXIETY DISORDER DUE TO GENERAL MEDICAL CONDITION WITH PANIC ATTACK: ICD-10-CM

## 2024-03-04 DIAGNOSIS — F06.4 ANXIETY DISORDER DUE TO GENERAL MEDICAL CONDITION WITH PANIC ATTACK: ICD-10-CM

## 2024-03-04 PROCEDURE — NOSHOW: Performed by: PHYSICIAN ASSISTANT

## 2024-03-04 PROCEDURE — 97110 THERAPEUTIC EXERCISES: CPT

## 2024-03-04 PROCEDURE — 97163 PT EVAL HIGH COMPLEX 45 MIN: CPT

## 2024-03-04 NOTE — PROGRESS NOTES
Subjective:     Patient ID: Stacy Ferrell is a 58 y.o. female.    [facility] Clinical Progress Notes      Specialized Services Documentation  Therapist must complete separate progress note for each specific clinical activity in which the individual participated during the day.     Case Management Note    ANGELIKA Barron    Current suicide risk : Unable to assess due to absence.    Stacy Ferrell was excused from  program today 03/04/24 due to previously scheduled appointment.     Medications changes/added/denied? Missed med check - See Alexia Bustos's note    Treatment session number: N/A    Individual Case Management Visit provided today? No    [facility] follow up physician's orders: None at this time.

## 2024-03-04 NOTE — PSYCH
Heydi did not come to virtual med management visit with Shantel .  Shantel called her and Heydi said she was on link but no one there.  Asked for another link to be sent which it was but we never connected in time allowed for appt.

## 2024-03-04 NOTE — PROGRESS NOTES
[facility] Discharge Instructions:   Disposition: Home  Address: [address] [place] [id number] .   Diagnosis:  1. Severe episode of recurrent major depressive disorder, without psychotic features (HCC)        2. Post traumatic stress disorder (PTSD)        3. Anxiety disorder due to general medical condition with panic attack          .   Allergies (Drug/Food):   Allergies   Allergen Reactions    Nuts - Food Allergy Anaphylaxis and Throat Swelling    Trintellix [Vortioxetine] Other (See Comments)     Suicidal ideation    Chocolate - Food Allergy Cough    Eggs Or Egg-Derived Products - Food Allergy Other (See Comments)     Cough    Other Cough     LETTUCE    Shellfish Allergy - Food Allergy Cough    Tomato - Food Allergy Cough       Activity: No recommendations   Diet:no recommendations  Smoking Cessation:not a smoker   Diagnostic/Laboratory Orders: none    Vaccines: If you received a vaccine, please notify your family physician on your next visit. For more information, please call [phone].     Follow-up appointments/Referrals:   Current Psychiatrist/Therapist:   Medication Management:   PCP manages medication     Outpatient Therapy:   Kerri Cuellar   [address] [address] [phone]   Follow up: 3/8/24 at 2:00pm     Primary Care [place]ician:     [place] [address]  [phone] (P)  [phone] (F)  Follow up: 3/6/24 at 10:30am     Outpatient and/or Partial and Other Community Resources Used (CTT, ICM, VNA):  [facility]       [facility]: [place] [phone] and [place] [phone].    Intake/Referral/Evaluation (Non-Emergency) *NON INSURED FOR FUNDING: [facility]: [phone], [facility]: [phone], [facility]: [phone] and [facility]: [phone]. Crisis Intervention (Emergency) [facility]: [place]  [facility]: [phone], [place]: [phone], [place]: [phone], [place] ([place]): [phone], [place]: [phone] and [facility]: [phone]. _________________________________  National Crisis Intervention Hotline: 1-391.186.3888.  National Suicide Crisis Hotline: 1-744.402.3033.     I, the undersigned, have received and understand the above instructions.        Patient/Rep Signature: __________________________________       Date/Time: ______________       Physician Signature: ____________________________________      Date/Time: ______________               Signature: ________________________________       Date/Time: ______________

## 2024-03-05 ENCOUNTER — DOCUMENTATION (OUTPATIENT)
Dept: PSYCHOLOGY | Facility: CLINIC | Age: 59
End: 2024-03-05

## 2024-03-05 ENCOUNTER — APPOINTMENT (OUTPATIENT)
Dept: PSYCHOLOGY | Facility: CLINIC | Age: 59
End: 2024-03-05
Payer: COMMERCIAL

## 2024-03-05 NOTE — PROGRESS NOTES
Subjective:     Patient ID: Stacy Ferrell is a 58 y.o. female.    [facility] Clinical Progress Notes      Specialized Services Documentation  Therapist must complete separate progress note for each specific clinical activity in which the individual participated during the day.   Case Management Note    ANGELIKA Barron    Current suicide risk : Unable to assess due to absence.    Stacy Ferrell was a no call/no show for program today 03/05/24.  CM contacted Stacy Ferrell at [phone].    [id number]- Heydi did not arrive to discharge meeting. This writer contacted Heydi via phone . Heydi stated that there was electrical issues at her house and her and her  went to [place] to fix the electrical issue. This writer asked Heydi to log into teams meeting link.       ([id number]) CM completed and reviewed safety Plan and PHQ-9. safety plan and PHQ-9 was added to 3/1/24 encounter due to Heydi not being present the past two days. This writer reviewed  discharge instructions, medication list and crisis information with Stacy Ferrell.  See discharge summary for treatment details. Aftercare providers to receive discharge summary.      I,Stacy Ferrell,am physically unable to provide a signature; however, I understand the nature of and am in agreement with the documentation presented to me via TEAMS.  I have received a copy through My Chart and/or the [employer] [facility].  I freely give verbal consent.  Name of Document (s):Safety Plan, discharge instructions, medication list   Witness to verbal consent: Edna Shah  Witness to verbal consent: Leland Dodson      Medications changes/added/denied? No- Missed med check yesterday    Treatment session number: NA    Individual Case Management Visit provided today? Yes     [facility] follow up physician's orders: Discharge - See Dr. Jean's Note

## 2024-03-05 NOTE — PROGRESS NOTES
"Assessment/Plan:       Diagnoses and all orders for this visit:     Severe episode of recurrent major depressive disorder, without psychotic features (HCC)     Post traumatic stress disorder (PTSD)     Anxiety disorder due to general medical condition with panic attack            Subjective:      Patient ID: Stacy Ferrell is a 58 y.o. female.     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  Date Initiated: 02/21/24     Strengths: \"loves her kids and grandson\"          LONG TERM GOAL:   Date Initiated: 02/21/24  1.0 I will identify 3 signs that I am feeling less depressed and anxious and more productive in my day to day life.  Target Date: 3/20/24  Completion Date: 3/5/24-discharge        SHORT TERM OBJECTIVES:      Date Initiated: 02/21/24  1.1 I will create a list identifying coping skills that I have learned and discuss how I plan to build them into my schedule. I will choose at least 1 different coping skill daily to practice   Revision Date: 3/1/24; 3/5/24-discharge  Target Date: 3/1/24  Completion Date: 3/5/24-discharge     Date Initiated: 02/21/24  1.2 I will schedule and participate in a pleasant and healthy activity daily to improve my mood.  Revision Date: 3/1/24; 3/5/24-discharge  Target Date: 3/1/24  Completion Date:3/5/24-discharge     Date Initiated: 02/21/24  1.3 I will take medications as prescribed and share questions and concerns if arise.    Revision Date:3/1/24; 3/5/24-discharge  Target Date: 3/1/24  Completion Date: 3/5/24-discharge     Date Initiated: " Pt calls stating that Dialysis was having issues with his access on Saturday and cannulated him 3 times. Called pts Dialysis and spoke to RN Leah she states that patient did infiltrate on Saturday but it was not related to cannulation issues. He infiltrated half way through his treatment. Clotted his access and had declot at [facility] on 10/23 other then that they have not had any issues with patients access. 02/21/24  1.4 I will identify 3 ways my supports can assist in my recovery and agree to staff/support contact as indicated.    Revision Date:3/1/24; 3/5/24-discharge  Target Date: 3/1/24  Completion Date:3/5/24-discharge            7 DAY REVISION:  1.5 I will create a daily routine and will identify one household task that I can complete in the afternoon to help structure my day.  Date Initiated:3/1/24  Revision Date: 3/5/24-discharge  Target Date: 3/12/24  Completion Date:3/5/24-discharge        PSYCHIATRY:  Date Initiated:  02/21/24  Medication Management and Education       Revision Date: 03/01/24; 3/5/24-discharge ;        1.3 Continue medication management         The person(s) responsible for carrying out the plan is Rocky Marin DO and Alexia Bustos PA-C     NURSING/SYMPTOM EDUCATION:   Date Initiated: 02/21/24  1.1, 1.2. 1.3, 1.4 This RN/Or Therapist will provide wellness/symptoms and skill education groups three to five days weekly to educate Stacy Ferrell on signs and symptoms of diagnoses, skills to manage, and medication questions that will be addressed by the treatment team.    Revision date:03/01/24 ; 3/5/24-discharge  1.1,1.2,1.3,1.4,1.5 Continue to encourage Stacy Ferrell to participate in wellness/symptoms and skills education groups daily to learn about symptoms, coping strategies and warning signs to promote relapse prevention.      The person(s) responsible for carrying out the plan is Sandra uDron; Carmen Hong RN, BSN     PSYCHOLOGY:   Date Initiated: 02/21/24       1.1, 1.2, 1.4 Provide psychotherapy group 5 times per week to allow opportunity for Stacy Ferrell  to explore stressors and ways of coping.   Revision Date: 03/01/24 ; 3/5/24-discharge  1.1,1.2,1.4,1.5  Continue to provide psychotherapy group daily to Stacy Ferrell and encourage sharing of stressors, skills and positive change.     The person(s) responsible for carrying out the plan is Edna Shah, MSW,LSW;  SENAIT Coburn     ALLIED THERAPY:   Date Initiated: 02/21/24  1.1,1.2 Engage Stacy Ferrell in AT group 5 times weekly to encourage development and use of wellness tools to decrease symptoms and promote recovery through meaningful activity.  Revision Date: 03/01/24; 3/5/24-discharge  1.1,1.2,1.5 Continue to engage Stacy Ferrell to participate in AT group to practice wellness tools within program and transfer to home sharing successes and barriers through healthy task involvement.         The person(s) responsible for carrying out the plan is Felicia Tomas [facility] ; FLORENTINO Palomino, [facility]     CASE MANAGEMENT:   Date Initiated: 02/21/24      1.0 This  will meet with Stacy Ferrell  3-4 times weekly to assess treatment progress, discharge planning, connection to community supports and UR as indicated.  Revision Date: 03/01/24 ; 3/5/24-discharge  1.0 Continue to meet with Stacy Ferrell 3-4 times weekly to assess growth, work toward goals, continued treatment needs, dc planning and use of supports.     The person(s) responsible for carrying out the plan is ANGELIKA Barron,SILVESTREW     TREATMENT REVIEW/COMMENTS:      DISCHARGE CRITERIA: Identify 3 signs of progress and complete a safety plan.    DISCHARGE PLAN: Connect with identified outpatient providers.   Estimated Length of Stay: 10 treatment days                Diagnosis and Treatment Plan explained to Stacy Kumar relates understanding diagnosis and is agreeable to Treatment Plan.         CLIENT COMMENTS / Please share your thoughts, feelings, need and/or experiences regarding your treatment plan with Staff.  Please see follow up note with comments.     Signatures can be found on [facility] Treatment plan consent form.

## 2024-03-05 NOTE — BH CRISIS PLAN
"Client Name: Heydi Ferrell       Client YOB: 1965    [employer] Safety Plan      Creation Date: 3/5/24 Update Date: 1/3/25   Created By: Edna Shah Last Updated By: Edna Shah      Step 1: Warning Signs:   Warning Signs   isolate   increased anger   increse in depression   \"nit picky\"   increase anxiety            Step 2: Internal Coping Strategies:   Internal Coping Strategies   Play on phone   do knitting   watch Tv   reading            Step 3: People and social settings that provide distraction:   Name Contact Information   Waldo doesn't have a phone he is 5 years old will contact his mother   Zina In phone   [place] [phone]    Places   house   [place] a [place]           Step 4: People whom I can ask for help during a crisis:      Name Contact Information    Laurie in phone      Step 5: Professionals or agencies I can contact during a crisis:      Clinican/Agency Name Phone Emergency Contact    [facility] [phone] Kerri Torres [facility] [phone] \" I will contact Melissa either through facebook, texting, or calling\"      Local Emergency Department Emergency Department Phone Emergency Department Address    [facility] [phone] [address] [place] [address]        Crisis Phone Numbers:   Suicide Prevention Lifeline: Call or Text  338 Crisis Text Line: Text HOME to 781-706   Please note: Some [facility] do not have a separate number for Child/Adolescent specific crisis. If your county is not listed under Child/Adolescent, please call the adult number for your county      Adult Crisis Numbers: Child/Adolescent Crisis Numbers   [facility]: [phone] [facility]: [phone]   [facility]: [phone] [facility]: [phone]   [facility]: [phone] [place]: [phone]   [facility]: [phone] [place]: [phone]   [place]: [phone] " "  [facility]: [phone]   [facility]: [phone]   [facility]: [phone] (daytime) [phone] (after hours, weekends, holidays)      Step 6: Making the environment safer (plan for lethal means safety):   Plan: Guns are locked up     Optional: What is most important to me and worth living for?   \"My kids and my grandson\"      [place] Safety Plan. Lucila Warren and Aly Ann. Used with permission of the authors.           "

## 2024-03-06 ENCOUNTER — APPOINTMENT (OUTPATIENT)
Dept: PSYCHOLOGY | Facility: CLINIC | Age: 59
End: 2024-03-06
Payer: COMMERCIAL

## 2024-03-07 ENCOUNTER — DOCUMENTATION (OUTPATIENT)
Dept: PSYCHOLOGY | Facility: CLINIC | Age: 59
End: 2024-03-07

## 2024-03-07 ENCOUNTER — APPOINTMENT (OUTPATIENT)
Dept: PSYCHOLOGY | Facility: CLINIC | Age: 59
End: 2024-03-07
Payer: COMMERCIAL

## 2024-03-07 DIAGNOSIS — F33.2 SEVERE EPISODE OF RECURRENT MAJOR DEPRESSIVE DISORDER, WITHOUT PSYCHOTIC FEATURES (HCC): Primary | ICD-10-CM

## 2024-03-07 DIAGNOSIS — F43.10 POST TRAUMATIC STRESS DISORDER (PTSD): ICD-10-CM

## 2024-03-07 DIAGNOSIS — F41.0 ANXIETY DISORDER DUE TO GENERAL MEDICAL CONDITION WITH PANIC ATTACK: ICD-10-CM

## 2024-03-07 DIAGNOSIS — F06.4 ANXIETY DISORDER DUE TO GENERAL MEDICAL CONDITION WITH PANIC ATTACK: ICD-10-CM
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
## 2024-03-08 ENCOUNTER — APPOINTMENT (OUTPATIENT)
Dept: PSYCHOLOGY | Facility: CLINIC | Age: 59
End: 2024-03-08
Payer: COMMERCIAL

## 2024-03-12 ENCOUNTER — OFFICE VISIT (OUTPATIENT)
Dept: PHYSICAL THERAPY | Facility: CLINIC | Age: 59
End: 2024-03-12
Payer: COMMERCIAL

## 2024-03-12 ENCOUNTER — DOCUMENTATION (OUTPATIENT)
Dept: PSYCHOLOGY | Facility: CLINIC | Age: 59
End: 2024-03-12

## 2024-03-12 DIAGNOSIS — I89.0 LYMPHEDEMA OF BREAST: Primary | ICD-10-CM

## 2024-03-12 PROCEDURE — 97140 MANUAL THERAPY 1/> REGIONS: CPT

## 2024-03-12 PROCEDURE — 97110 THERAPEUTIC EXERCISES: CPT

## 2024-03-12 NOTE — PROGRESS NOTES
[facility] Follow Up Action    This writer spoke with Stacy Ferrell today - 7 days post discharge from [facility].   Reviewed crisis information.  Stacy Ferrell reports taking medication.   Stacy Ferrell reports awareness of aftercare appointments.    Rosmery Dykes

## 2024-03-12 NOTE — PROGRESS NOTES
Daily Note     Today's date: 3/12/2024  Patient name: Stacy Ferrell  : 1965  MRN: [id number]  Referring provider: Lianne Esparza MD  Dx:   Encounter Diagnosis     ICD-10-CM    1. Lymphedema of breast  I89.0                      Subjective: I feel I am gaining wt all over my body ~ 10 lbs last 2-3 months  May be part of my depression  Want to f/u with my PCP  I am having mustafa time with rings on my left hand  Wearing daily compression        Objective: See treatment diary below      Assessment: Tolerated MLD treatment well.  Pt demon min changes of volumetric L UE   Pt demo lymphedema  L breast with slight redness/warmth with palpation, fibrosis noted distal nipple region L breast and noted slight redness left upper abdominal region  Noted cording L lat breast  Pt issued HEP / encourage gentle approach/submax juan  Pt to benefit with home pneumatic pump for trunk drainage to ipso lateral region  Pt demon drain BL nipples with pt address concerns with MD  pt limited with PT apts 2* insur co-pay out of pocket expense  Patient would benefit from continued PT      Plan: Continue per plan of care.      Insurance: [employer] - 20 visits    NO COCOA BUTTER    Precautions: see extensive PMH    Re-eval Date: 23    Date 3/4 3/12      Visit Count 1       FOTO        Pain In See IE       Pain Out same               Manuals  L UE   volumetrics        CDT Pt provided handouts with further educ/review upcoming  - What is CDT/ benefits with compliancy  - What is lymphedema  - Prevention  - Skin care, signs and sx's infection/cellulitis  - Self lymphedema massage/MLD with sequence  - Self wrap/merline/doffing compression, care management, replace every 6 months    - Discussed possible pneumatic pump to include thoracic region    15 min  CV/CLT-PTA   MLD  - short neck  - abd breath  - L UE  - L AIA/IAA  -L ant thorax  - L shoulder ROM check        Pt education w/ handouts provided  - What is CDT/ benefits with  "compliancy  - What is lymphedema  - Prevention  - Skin care, signs and sx's infection/cellulitis  - Self lymphedema massage/MLD with sequence  - Pt replace every 6 months - daytime compression sleeve, bra/april compression and night time swell patch    Review AIA/IAA    Pt educ  HEP :   Pec stretch, shoulder flex/scap/ext with cane self stretching    Pt I with merline/doffing compression    60 min                              Neuro Re-Ed                                                                Ther Ex                                                                        Ther Activity                        Gait Training                          Modalities                        HEP  Shoulder AAROM flex, scap, ext  3x30\" ea     "

## 2024-03-13 ENCOUNTER — TELEPHONE (OUTPATIENT)
Age: 59
End: 2024-03-13

## 2024-03-14 ENCOUNTER — SOCIAL WORK (OUTPATIENT)
Dept: BEHAVIORAL/MENTAL HEALTH CLINIC | Facility: CLINIC | Age: 59
End: 2024-03-14
Payer: COMMERCIAL

## 2024-03-14 DIAGNOSIS — F33.1 MODERATE EPISODE OF RECURRENT MAJOR DEPRESSIVE DISORDER (HCC): ICD-10-CM

## 2024-03-14 DIAGNOSIS — F43.10 POST-TRAUMATIC STRESS: Primary | ICD-10-CM

## 2024-03-14 PROCEDURE — 90837 PSYTX W PT 60 MINUTES: CPT | Performed by: SOCIAL WORKER

## 2024-03-14 NOTE — PSYCH
"Behavioral Health Psychotherapy Progress Note    Psychotherapy Provided: Individual Psychotherapy     1. Post-traumatic stress        2. Moderate episode of recurrent major depressive disorder (HCC)            Goals addressed in session: Goal 1 and Goal 2     DATA: the client reported that she is doing well since her discharged from her partial program, which went well. Information was given to the client on the Cancer Survivors group.  During the session we explored her cognitive distortion and the effects on her mental health.  The client was a able to verbalize that her major irration thoughts are surrounding catastropihizing, mind reading, and using should statements. Coping skills were reviewed during the session.   During this session, this clinician used the following therapeutic modalities: Client-centered Therapy, Cognitive Behavioral Therapy, Mindfulness-based Strategies, Solution-Focused Therapy, and Supportive Psychotherapy    Substance Abuse was not addressed during this session. If the client is diagnosed with a co-occurring substance use disorder, please indicate any changes in the frequency or amount of use: n/a. Stage of change for addressing substance use diagnoses: No substance use/Not applicable    ASSESSMENT:  Heydi Ferrell presents with a Anxious and Depressed mood.     her affect is Normal range and intensity, which is congruent, with her mood and the content of the session. The client has made progress on their goals.    The client  Heydi Ferrell presents with a none risk of suicide, none risk of self-harm, and none risk of harm to others.    For any risk assessment that surpasses a \"low\" rating, a safety plan must be developed.    A safety plan was indicated: no  If yes, describe in detail n/a    PLAN: Between sessions, Heydi Ferrell will practice her coping skills when presented with anxiety and or depression. At the next session, the therapist will use Cognitive Behavioral Therapy, " Mindfulness-based Strategies, Solution-Focused Therapy, and Supportive Psychotherapy to address the client's MH issues when presented with anxiety and or depression.    Behavioral Health Treatment Plan and Discharge Planning: Heydi Ghassan is aware of and agrees to continue to work on their treatment plan. They have identified and are working toward their discharge goals. yes    Visit start and stop times:    03/14/24  Start Time: 1550  Stop Time: 1645  Total Visit Time: 55 minutes

## 2024-03-18 ENCOUNTER — APPOINTMENT (OUTPATIENT)
Dept: RADIOLOGY | Facility: CLINIC | Age: 59
End: 2024-03-18
Payer: COMMERCIAL

## 2024-03-18 DIAGNOSIS — R07.81 RIB PAIN ON LEFT SIDE: ICD-10-CM

## 2024-03-18 PROCEDURE — 71101 X-RAY EXAM UNILAT RIBS/CHEST: CPT

## 2024-03-21 ENCOUNTER — TELEPHONE (OUTPATIENT)
Age: 59
End: 2024-03-21

## 2024-03-21 NOTE — TELEPHONE ENCOUNTER
Placed call to [facility] and left a message to return our call. We did get forms but Dr. Esparza is out of office this week, can have a another provider sign if she wants or wait until next week.

## 2024-03-22 ENCOUNTER — TELEPHONE (OUTPATIENT)
Dept: BEHAVIORAL/MENTAL HEALTH CLINIC | Facility: CLINIC | Age: 59
End: 2024-03-22

## 2024-03-22 NOTE — TELEPHONE ENCOUNTER
Contacted Heydi regarding Cancer Survivor Therapy group referred to by her OP therapist.   Heydi still interested in group, scheduled for group on 4/6 at 5PM.

## 2024-03-26 ENCOUNTER — NURSE TRIAGE (OUTPATIENT)
Dept: SURGERY | Facility: CLINIC | Age: 59
End: 2024-03-26

## 2024-03-26 ENCOUNTER — APPOINTMENT (OUTPATIENT)
Dept: PHYSICAL THERAPY | Facility: CLINIC | Age: 59
End: 2024-03-26
Payer: COMMERCIAL

## 2024-03-26 ENCOUNTER — HOSPITAL ENCOUNTER (EMERGENCY)
Facility: HOSPITAL | Age: 59
Discharge: HOME | End: 2024-03-26
Attending: EMERGENCY MEDICINE
Payer: COMMERCIAL

## 2024-03-26 ENCOUNTER — APPOINTMENT (EMERGENCY)
Dept: CT IMAGING | Facility: HOSPITAL | Age: 59
End: 2024-03-26
Payer: COMMERCIAL

## 2024-03-26 VITALS
DIASTOLIC BLOOD PRESSURE: 75 MMHG | HEART RATE: 86 BPM | BODY MASS INDEX: 41.67 KG/M2 | SYSTOLIC BLOOD PRESSURE: 138 MMHG | WEIGHT: 235.23 LBS | TEMPERATURE: 96.8 F | RESPIRATION RATE: 16 BRPM | OXYGEN SATURATION: 96 %

## 2024-03-26 DIAGNOSIS — R07.81 RIB PAIN ON LEFT SIDE: Primary | ICD-10-CM

## 2024-03-26 LAB
2HR DELTA HS TROPONIN: 0 NG/L
ALBUMIN SERPL BCP-MCNC: 4.1 G/DL (ref 3.5–5)
ALP SERPL-CCNC: 113 U/L (ref 34–104)
ALT SERPL W P-5'-P-CCNC: 23 U/L (ref 7–52)
ANION GAP SERPL CALCULATED.3IONS-SCNC: 10 MMOL/L (ref 4–13)
AST SERPL W P-5'-P-CCNC: 15 U/L (ref 13–39)
ATRIAL RATE: 82 BPM
BASOPHILS # BLD AUTO: 0.06 THOUSANDS/ÂΜL (ref 0–0.1)
BASOPHILS NFR BLD AUTO: 1 % (ref 0–1)
BILIRUB SERPL-MCNC: 0.75 MG/DL (ref 0.2–1)
BUN SERPL-MCNC: 19 MG/DL (ref 5–25)
CALCIUM SERPL-MCNC: 9.5 MG/DL (ref 8.4–10.2)
CARDIAC TROPONIN I PNL SERPL HS: 2 NG/L
CARDIAC TROPONIN I PNL SERPL HS: 2 NG/L
CHLORIDE SERPL-SCNC: 99 MMOL/L (ref 96–108)
CO2 SERPL-SCNC: 28 MMOL/L (ref 21–32)
CREAT SERPL-MCNC: 0.84 MG/DL (ref 0.6–1.3)
D DIMER PPP FEU-MCNC: 0.53 UG/ML FEU
EOSINOPHIL # BLD AUTO: 0.1 THOUSAND/ÂΜL (ref 0–0.61)
EOSINOPHIL NFR BLD AUTO: 2 % (ref 0–6)
ERYTHROCYTE [DISTWIDTH] IN BLOOD BY AUTOMATED COUNT: 13.4 % (ref 11.6–15.1)
FLUAV RNA RESP QL NAA+PROBE: NEGATIVE
FLUBV RNA RESP QL NAA+PROBE: NEGATIVE
GFR SERPL CREATININE-BSD FRML MDRD: 77 ML/MIN/1.73SQ M
GLUCOSE SERPL-MCNC: 145 MG/DL (ref 65–140)
HCT VFR BLD AUTO: 42.4 % (ref 34.8–46.1)
HGB BLD-MCNC: 13.2 G/DL (ref 11.5–15.4)
IMM GRANULOCYTES # BLD AUTO: 0.03 THOUSAND/UL (ref 0–0.2)
IMM GRANULOCYTES NFR BLD AUTO: 1 % (ref 0–2)
LACTATE SERPL-SCNC: 2 MMOL/L (ref 0.5–2)
LYMPHOCYTES # BLD AUTO: 1.21 THOUSANDS/ÂΜL (ref 0.6–4.47)
LYMPHOCYTES NFR BLD AUTO: 18 % (ref 14–44)
MCH RBC QN AUTO: 26.4 PG (ref 26.8–34.3)
MCHC RBC AUTO-ENTMCNC: 31.1 G/DL (ref 31.4–37.4)
MCV RBC AUTO: 85 FL (ref 82–98)
MONOCYTES # BLD AUTO: 0.48 THOUSAND/ÂΜL (ref 0.17–1.22)
MONOCYTES NFR BLD AUTO: 7 % (ref 4–12)
NEUTROPHILS # BLD AUTO: 4.71 THOUSANDS/ÂΜL (ref 1.85–7.62)
NEUTS SEG NFR BLD AUTO: 71 % (ref 43–75)
NRBC BLD AUTO-RTO: 0 /100 WBCS
P AXIS: 54 DEGREES
PLATELET # BLD AUTO: 304 THOUSANDS/UL (ref 149–390)
PMV BLD AUTO: 8.7 FL (ref 8.9–12.7)
POTASSIUM SERPL-SCNC: 4.1 MMOL/L (ref 3.5–5.3)
PR INTERVAL: 174 MS
PROT SERPL-MCNC: 6.8 G/DL (ref 6.4–8.4)
QRS AXIS: 76 DEGREES
QRSD INTERVAL: 84 MS
QT INTERVAL: 372 MS
QTC INTERVAL: 434 MS
RBC # BLD AUTO: 5 MILLION/UL (ref 3.81–5.12)
RSV RNA RESP QL NAA+PROBE: NEGATIVE
SARS-COV-2 RNA RESP QL NAA+PROBE: NEGATIVE
SODIUM SERPL-SCNC: 137 MMOL/L (ref 135–147)
T WAVE AXIS: 38 DEGREES
VENTRICULAR RATE: 82 BPM
WBC # BLD AUTO: 6.59 THOUSAND/UL (ref 4.31–10.16)

## 2024-03-26 PROCEDURE — 93010 ELECTROCARDIOGRAM REPORT: CPT | Performed by: INTERNAL MEDICINE

## 2024-03-26 PROCEDURE — 84484 ASSAY OF TROPONIN QUANT: CPT | Performed by: PHYSICIAN ASSISTANT

## 2024-03-26 PROCEDURE — 87040 BLOOD CULTURE FOR BACTERIA: CPT | Performed by: PHYSICIAN ASSISTANT

## 2024-03-26 PROCEDURE — 85025 COMPLETE CBC W/AUTO DIFF WBC: CPT | Performed by: PHYSICIAN ASSISTANT

## 2024-03-26 PROCEDURE — 99284 EMERGENCY DEPT VISIT MOD MDM: CPT

## 2024-03-26 PROCEDURE — 36415 COLL VENOUS BLD VENIPUNCTURE: CPT | Performed by: PHYSICIAN ASSISTANT

## 2024-03-26 PROCEDURE — 71275 CT ANGIOGRAPHY CHEST: CPT

## 2024-03-26 PROCEDURE — 83605 ASSAY OF LACTIC ACID: CPT | Performed by: PHYSICIAN ASSISTANT

## 2024-03-26 PROCEDURE — 80053 COMPREHEN METABOLIC PANEL: CPT | Performed by: PHYSICIAN ASSISTANT

## 2024-03-26 PROCEDURE — 85379 FIBRIN DEGRADATION QUANT: CPT | Performed by: PHYSICIAN ASSISTANT

## 2024-03-26 PROCEDURE — 93005 ELECTROCARDIOGRAM TRACING: CPT

## 2024-03-26 PROCEDURE — 99285 EMERGENCY DEPT VISIT HI MDM: CPT | Performed by: PHYSICIAN ASSISTANT

## 2024-03-26 PROCEDURE — 0241U HB NFCT DS VIR RESP RNA 4 TRGT: CPT | Performed by: PHYSICIAN ASSISTANT

## 2024-03-26 RX ORDER — NAPROXEN 500 MG/1
500 TABLET ORAL 2 TIMES DAILY WITH MEALS
Qty: 20 TABLET | Refills: 0 | Status: SHIPPED | OUTPATIENT
Start: 2024-03-26

## 2024-03-26 RX ADMIN — IOHEXOL 100 ML: 350 INJECTION, SOLUTION INTRAVENOUS at 11:05

## 2024-03-26 NOTE — TELEPHONE ENCOUNTER
"Pt of Dr. Esparza, left breast lumpectomy.   Pt also sent advice request,, stating left breast is draining.      Pt states she has dark bloody drainage of  left breast that started on Sun 3/24/24.  Pt denies surrounding erythema or warmth of breast and is afebrile.  Pt states that on 3/24/24 the drainage went through her compression bra and t-shirt.  Pt also C/O \"stabbing\" pain in the left rib area, rates it a 10/10.  Pt states she took Tylenol, but it does not relieve the pain.  RN offered to schedule an appointment.  Pt would prefer to check with provider first for recommendations.     [phone]         Answer Assessment - Initial Assessment Questions  1. SYMPTOM: \"What's the main symptom you're concerned about?\"  (e.g., lump, pain, rash, nipple discharge)      Left breast drainage  2. LOCATION: \"Where is the drainage located?\"      Left breast  3. ONSET: \"When did drainage  start?\"      3/24/24  4. PRIOR HISTORY: \"Do you have any history of prior problems with your breasts?\" (e.g., lumps, cancer, fibrocystic breast disease)      Hx of left breast lumpectomy, ductal carcinoma  5. CAUSE: \"What do you think is causing this symptom?\"      unsure  6. OTHER SYMPTOMS: \"Do you have any other symptoms?\" (e.g., fever, breast pain, redness or rash, nipple discharge)      Left rib pain    Protocols used: Breast Symptoms-ADULT-[place]    "

## 2024-03-26 NOTE — ED PROVIDER NOTES
"History  Chief Complaint   Patient presents with    Rib Pain     Pt having left side/rib pain for 1 week having dry cough and \"trouble catching my breath last night\"     This is a 58-year-old female who presents to the emergency department today with her .  Chief complaint is left-sided rib pain that has been ongoing for about 1 week intermittent left nipple discharge.  History of breast cancer is established with breast surgery.    Patient denies any traumatic event at the onset of the pain which was a week ago she points to the left lateral rib cage area of rib 8 approximately.  She states that she has had a cough she does have some shortness of breath with coughing as well no hemoptysis.  She has no active breast discharge on exam today however states it has been intermittent over the last few days.  Or sweats.    She had x-rays which were negative about a week ago ordered by primary care this was a left-sided rib series.        Prior to Admission Medications   Prescriptions Last Dose Informant Patient Reported? Taking?   FLUoxetine (PROzac) 20 mg capsule   No No   Sig: Take 1 capsule (20 mg total) by mouth daily Take with 40mg for total of 60mg.   FLUoxetine (PROzac) 40 MG capsule   No No   Sig: Take 1 capsule (40 mg total) by mouth daily Take with 20mg capsule for total of 60mg daily.   NovoLOG ReliOn 100 UNIT/ML injection   Yes No   Sig:  UNITS DAILY VIA PUMP   Patient not taking: Reported on 2/23/2024   Trulicity 1.5 MG/0.5ML injection  Self Yes No   Sig: INJECT 0.5 ML (1.5 MG TOTAL) UNDER THE SKIN ONCE EVERY 7 DAYS   anastrozole (ARIMIDEX) 1 mg tablet   No No   Sig: Take 1 tablet (1 mg total) by mouth daily   atorvastatin (LIPITOR) 40 mg tablet  Self Yes No   Sig: Take 40 mg by mouth every evening   gabapentin (Neurontin) 600 MG tablet  Self No No   Sig: Take 1 tablet (600 mg total) by mouth 3 (three) times a day   hydrOXYzine HCL (ATARAX) 25 mg tablet   No No   Sig: Take 1 tablet (25 mg total) " by mouth daily as needed for anxiety   losartan (COZAAR) 100 MG tablet  Self Yes No   Sig: Take 100 mg by mouth daily   omeprazole (PriLOSEC) 20 mg delayed release capsule  Self No No   Sig: Take 1 capsule (20 mg total) by mouth daily Take 30 minutes before supper      Facility-Administered Medications: None       Past Medical History:   Diagnosis Date    Anemia     iron deficiency anemia    Anxiety     Breast cancer (HCC)     Depression     Diabetes mellitus (HCC)     Fibroid     LTH   today 12/8/2021    History of transfusion     Hydrocephalus (HCC)     Hyperlipidemia     Hypertension     Migraine     Myocardial infarction (HCC) 01/19/2022    PONV (postoperative nausea and vomiting)     Sciatica     Seasonal allergies     Vertigo     Wears glasses        Past Surgical History:   Procedure Laterality Date    BACK SURGERY      C1-3 fusion    COLONOSCOPY      EPIDURAL BLOCK INJECTION Right 07/02/2020    Procedure: Right L4-5 and L5-S1 transforaminal epidural steroid injection ([id number]);  Surgeon: Clarence Lima MD;  Location: [facility];  Service: Pain Management     EPIDURAL BLOCK INJECTION N/A 09/02/2021    Procedure: BLOCK / INJECTION EPIDURAL STEROID LUMBAR  L5-S1 IESI;  Surgeon: Clarence Lima MD;  Location: [facility];  Service: Pain Management     FL GUIDED NEEDLE PLAC BX/ASP/INJ  07/02/2020    FL GUIDED NEEDLE PLAC BX/ASP/INJ  09/02/2021    HYSTERECTOMY      IR DRAINAGE TUBE CHECK AND/OR REMOVAL  1/19/2024    IR DRAINAGE TUBE CHECK WITH SCLEROSIS  1/5/2024    IR DRAINAGE TUBE CHECK WITH SCLEROSIS  1/12/2024    IR DRAINAGE TUBE PLACEMENT  1/2/2024    MA ESOPHAGOGASTRODUODENOSCOPY TRANSORAL DIAGNOSTIC N/A 10/23/2018    Procedure: EGD with Savary dilation AND COLONOSCOPY;  Surgeon: Billy Schneider MD;  Location: [facility];  Service: Gastroenterology    MA LAPAROSCOPY W TOTAL HYSTERECTOMY UTERUS 250 GM/< N/A 12/08/2021    Procedure: LTH; BILAT SALPINGECTOMY; EXCISION PERITONEAL CYST;  Surgeon: Sonia  MD Dashawn;  Location: [facility];  Service: Gynecology    VA MASTECTOMY PARTIAL Left 4/10/2023    Procedure: LUMPECTOMY BREAST WITH BEAU  LOCALIZATION,  BIOPSY LYMPH NODE SENTINEL (INJECT AT 1130 BY DR BROTHERS IN THE OR);  Surgeon: Lianne Brothers MD;  Location: [facility];  Service: General    TUBAL LIGATION      US BREAST CYST ASPIRATION LEFT INITIAL Left 9/12/2023    US BREAST CYST ASPIRATION LEFT INITIAL Left 9/21/2023    US BREAST CYST ASPIRATION LEFT INITIAL Left 10/3/2023    US GUIDED BREAST BIOPSY LEFT COMPLETE Left 03/07/2023       Family History   Problem Relation Age of Onset    Varicose Veins Mother     Drug abuse Mother     Diabetes Father     Liver cancer Father         bile duct    Cancer Father     Breast cancer Sister         unknown age    No Known Problems Daughter     No Known Problems Daughter     No Known Problems Maternal Aunt     No Known Problems Maternal Aunt     No Known Problems Maternal Aunt     No Known Problems Maternal Aunt     No Known Problems Maternal Aunt     No Known Problems Paternal Aunt     No Known Problems Maternal Grandmother     No Known Problems Maternal Grandfather     No Known Problems Paternal Grandmother     No Known Problems Paternal Grandfather      I have reviewed and agree with the history as documented.    E-Cigarette/Vaping    E-Cigarette Use Never User     Cartridges/Day 0     Comments none      E-Cigarette/Vaping Substances    Nicotine No     THC No     CBD No     Flavoring No     Other No     Unknown No      Social History     Tobacco Use    Smoking status: Never    Smokeless tobacco: Never    Tobacco comments:     none   Vaping Use    Vaping status: Never Used   Substance Use Topics    Alcohol use: Not Currently     Comment: none    Drug use: No     Comment: none       Review of Systems   Constitutional:  Negative for chills and fever.   HENT:  Negative for ear pain and sore throat.    Eyes:  Negative for pain and visual disturbance.   Respiratory:   Positive for cough and shortness of breath.    Cardiovascular:  Positive for chest pain. Negative for palpitations.   Gastrointestinal:  Negative for abdominal pain and vomiting.   Genitourinary:  Negative for dysuria and hematuria.   Musculoskeletal:  Negative for arthralgias and back pain.   Skin:  Negative for color change and rash.   Neurological:  Negative for seizures and syncope.   All other systems reviewed and are negative.      Physical Exam  Physical Exam  Constitutional:       Appearance: She is well-developed. She is not ill-appearing.   HENT:      Right Ear: External ear normal. No swelling. Tympanic membrane is not bulging.      Left Ear: External ear normal. No swelling. Tympanic membrane is not bulging.      Nose: Nose normal.      Mouth/Throat:      Pharynx: No oropharyngeal exudate.   Eyes:      General: Lids are normal.      Conjunctiva/sclera: Conjunctivae normal.      Pupils: Pupils are equal, round, and reactive to light.   Neck:      Thyroid: No thyromegaly.      Vascular: No JVD.      Trachea: No tracheal deviation.   Cardiovascular:      Rate and Rhythm: Normal rate and regular rhythm.      Pulses: Normal pulses.      Heart sounds: Normal heart sounds. No murmur heard.     No friction rub. No gallop.   Pulmonary:      Effort: Pulmonary effort is normal. No respiratory distress.      Breath sounds: Normal breath sounds. No stridor. No wheezing, rhonchi or rales.   Chest:      Chest wall: Tenderness present.   Abdominal:      General: Bowel sounds are normal. There is no distension.      Palpations: Abdomen is soft. There is no mass.      Tenderness: There is no abdominal tenderness. There is no guarding or rebound.      Hernia: No hernia is present.   Musculoskeletal:         General: Normal range of motion.      Cervical back: Normal range of motion and neck supple. No edema. Normal range of motion.      Comments: There is reproducible tenderness over the lateral aspect of the left seventh  through eighth rib region.  No overlying skin changes in this region.   Lymphadenopathy:      Cervical: No cervical adenopathy.   Skin:     General: Skin is warm and dry.      Coloration: Skin is not pale.      Findings: No erythema or rash.      Comments: Registered nurse Faith was present for breast examination, no active nipple discharge noted no erythema of the breast, there is mild tenderness, see photos in chart   Neurological:      Mental Status: She is alert and oriented to person, place, and time.      GCS: GCS eye subscore is 4. GCS verbal subscore is 5. GCS motor subscore is 6.      Cranial Nerves: No cranial nerve deficit.      Sensory: No sensory deficit.      Deep Tendon Reflexes: Reflexes are normal and symmetric.   Psychiatric:         Speech: Speech normal.         Behavior: Behavior normal.               Vital Signs  ED Triage Vitals [03/26/24 0942]   Temperature Pulse Respirations Blood Pressure SpO2   (!) 96.8 °F (36 °C) 91 20 167/73 96 %      Temp Source Heart Rate Source Patient Position - Orthostatic VS BP Location FiO2 (%)   Temporal Monitor Sitting Right arm --      Pain Score       10 - Worst Possible Pain           Vitals:    03/26/24 0942   BP: 167/73   Pulse: 91   Patient Position - Orthostatic VS: Sitting         Visual Acuity      ED Medications  Medications - No data to display    Diagnostic Studies  Results Reviewed       Procedure Component Value Units Date/Time    CBC and differential [[id number]]     Lab Status: No result Specimen: Blood     Comprehensive metabolic panel [[id number]]     Lab Status: No result Specimen: Blood     HS Troponin 0hr (reflex protocol) [[id number]]     Lab Status: No result Specimen: Blood     D-Dimer [[id number]]     Lab Status: No result Specimen: Blood     FLU/RSV/COVID - if FLU/RSV clinically relevant [[id number]]     Lab Status: No result Specimen: Nares from Nose     Blood culture #1 [[id number]]     Lab Status: No result Specimen: Blood     Blood  culture #2 [[id number]]     Lab Status: No result Specimen: Blood     Lactic acid, plasma (w/reflex if result > 2.0) [[id number]]     Lab Status: No result Specimen: Blood                    No orders to display              Procedures  Procedures         ED Course  ED Course as of 03/26/24 1359   Tue Mar 26, 2024   0945 SpO2: 96 %   0945 Respirations: 20   0945 Pulse: 91   0945 Temperature(!): 96.8 °F (36 °C)   0945 Blood Pressure: 167/73  Vital signs reviewed within reasonable limits.   1056 I did speak with the patient personally at bedside confirming she has never had allergy to CT contrast she actually had dye August of last year no reaction she has never required premedication.  This was conveyed to CT team   1114 RSV PCR: Negative   1114 INFLU B PCR: Negative   1114 INFLU A PCR: Negative   1114 SARS-COV-2: Negative   1320 IMPRESSION:     No pulmonary embolus.     No acute intrathoracic process.     6 x 5.5 x 6.5 cm left breast loculated fluid collection previously attributed to a seroma, smaller since September 2023.     Additional chronic findings and negatives as above.     1320 TT sent to pts breast surgeon.                                SBIRT 22yo+      Flowsheet Row Most Recent Value   Initial Alcohol Screen: US AUDIT-C     1. How often do you have a drink containing alcohol? 0 Filed at: 03/26/2024 0945   2. How many drinks containing alcohol do you have on a typical day you are drinking?  0 Filed at: 03/26/2024 0945   3b. FEMALE Any Age, or MALE 65+: How often do you have 4 or more drinks on one occassion? 0 Filed at: 03/26/2024 0945   Audit-C Score 0 Filed at: 03/26/2024 0945   DANA: How many times in the past year have you...    Used an illegal drug or used a prescription medication for non-medical reasons? Never Filed at: 03/26/2024 0945                      Medical Decision Making  58-year-old female here for evaluation of left rib pain, left nipple discharge see noted history in HPI.  Is established  with breast surgery.  Vital signs reviewed which are within normal limits.  Differential diagnosis includes pulmonary embolism, rib fracture, pneumonia, breast abscess, sepsis.    Workup is grossly unremarkable CT scan does show a seroma which is smaller in size when compared to September outside of there is no other acute findings there is some scarring of the left lung from radiation unsure if this could be an etiology for her pain versus musculoskeletal.  Troponin x 2 was negative no PE no pneumonia no COVID influenza RSV electrolytes grossly unremarkable no evidence of skin changes that would suggest cellulitis or shingles.  I did give a curbside follow-up to patient's breast surgeon she will call for follow-up appointment.    Amount and/or Complexity of Data Reviewed  Labs: ordered. Decision-making details documented in ED Course.  Radiology: ordered.    Risk  Prescription drug management.             Disposition  Final diagnoses:   None     ED Disposition       None          Follow-up Information    None         Patient's Medications   Discharge Prescriptions    No medications on file       No discharge procedures on file.    PDMP Review         Value Time User    PDMP Reviewed  Yes 11/24/2023 11:04 AM Madison Boyd PA-C            ED Provider  Electronically Signed by             Chaitanya Cook PA-C  03/26/24 1400

## 2024-03-26 NOTE — TELEPHONE ENCOUNTER
Called patient, as she was seen in ED. She is on the schedule for tomorrow morning. Having bloody discharge from the nipple, not the incision site itself. Dr. Esparza did review ED note from today, seeing patient tomorrow morning.

## 2024-03-27 ENCOUNTER — OFFICE VISIT (OUTPATIENT)
Dept: SURGERY | Facility: CLINIC | Age: 59
End: 2024-03-27
Payer: COMMERCIAL

## 2024-03-27 ENCOUNTER — SOCIAL WORK (OUTPATIENT)
Dept: BEHAVIORAL/MENTAL HEALTH CLINIC | Facility: CLINIC | Age: 59
End: 2024-03-27
Payer: COMMERCIAL

## 2024-03-27 VITALS
BODY MASS INDEX: 41.6 KG/M2 | HEIGHT: 63 IN | DIASTOLIC BLOOD PRESSURE: 72 MMHG | HEART RATE: 88 BPM | SYSTOLIC BLOOD PRESSURE: 128 MMHG | OXYGEN SATURATION: 95 % | TEMPERATURE: 98 F | WEIGHT: 234.8 LBS

## 2024-03-27 DIAGNOSIS — N64.89 SEROMA OF BREAST: ICD-10-CM

## 2024-03-27 DIAGNOSIS — C50.112 MALIGNANT NEOPLASM OF CENTRAL PORTION OF LEFT BREAST IN FEMALE, ESTROGEN RECEPTOR POSITIVE (HCC): Primary | ICD-10-CM

## 2024-03-27 DIAGNOSIS — F43.10 POST-TRAUMATIC STRESS: Primary | ICD-10-CM

## 2024-03-27 DIAGNOSIS — I89.0 LYMPHEDEMA OF BREAST: ICD-10-CM

## 2024-03-27 DIAGNOSIS — Z17.0 MALIGNANT NEOPLASM OF CENTRAL PORTION OF LEFT BREAST IN FEMALE, ESTROGEN RECEPTOR POSITIVE (HCC): Primary | ICD-10-CM

## 2024-03-27 DIAGNOSIS — F33.1 MODERATE EPISODE OF RECURRENT MAJOR DEPRESSIVE DISORDER (HCC): ICD-10-CM

## 2024-03-27 DIAGNOSIS — R07.81 RIB PAIN ON LEFT SIDE: ICD-10-CM

## 2024-03-27 PROCEDURE — 90837 PSYTX W PT 60 MINUTES: CPT | Performed by: SOCIAL WORKER

## 2024-03-27 PROCEDURE — 99213 OFFICE O/P EST LOW 20 MIN: CPT | Performed by: SURGERY

## 2024-03-27 RX ORDER — METOPROLOL SUCCINATE 100 MG/1
TABLET, EXTENDED RELEASE ORAL
COMMUNITY
Start: 2024-03-18

## 2024-03-27 NOTE — PSYCH
"Behavioral Health Psychotherapy Progress Note    Psychotherapy Provided: Individual Psychotherapy     1. Post-traumatic stress        2. Moderate episode of recurrent major depressive disorder (HCC)            Goals addressed in session: Goal 1 and Goal 2     DATA: The client reported that she ended up in the ED due to leakage of her breast from her nipple. She reported that they have not found the cause at the present time. During the session we explored and processed her physical health issues and the stress this has caused in her different relationships and environments. Coping skills were reviewed as well as healthy communication patterns to address her wants and needs.   During this session, this clinician used the following therapeutic modalities: Client-centered Therapy, Cognitive Behavioral Therapy, Mindfulness-based Strategies, Solution-Focused Therapy, and Supportive Psychotherapy    Substance Abuse was not addressed during this session. If the client is diagnosed with a co-occurring substance use disorder, please indicate any changes in the frequency or amount of use: n/a. Stage of change for addressing substance use diagnoses: No substance use/Not applicable    ASSESSMENT:  Heydi Ferrell presents with a Anxious and Depressed mood.     her affect is Normal range and intensity, which is congruent, with her mood and the content of the session. The client has made progress on their goals.    The Client  Heydi Ferrell presents with a none risk of suicide, none risk of self-harm, and none risk of harm to others.    For any risk assessment that surpasses a \"low\" rating, a safety plan must be developed.    A safety plan was indicated: no  If yes, describe in detail n/a    PLAN: Between sessions, Heydi Ferrell will practice her coping skills when presented with axniety and or depression. At the next session, the therapist will use Cognitive Behavioral Therapy, Mindfulness-based Strategies, Solution-Focused " Therapy, and Supportive Psychotherapy to address t he client's MH issues affecting her different relationships and environment.    Behavioral Health Treatment Plan and Discharge Planning: Heydi Ghassan is aware of and agrees to continue to work on their treatment plan. They have identified and are working toward their discharge goals. yes    Visit start and stop times:    03/27/24

## 2024-03-31 LAB
BACTERIA BLD CULT: NORMAL
BACTERIA BLD CULT: NORMAL

## 2024-04-08 ENCOUNTER — TELEPHONE (OUTPATIENT)
Age: 59
End: 2024-04-08

## 2024-04-08 NOTE — TELEPHONE ENCOUNTER
Pt of Dr. Esparza calling in with continued left sided rib pain and left breast pain 10/10 x 2 weeks. Pt states she was in ER 3/26 and had negative rib xrays done recently for this same pain.     Pt states she has left nipple discharge that is bright red in color and is seemingly more constant with having to change gauze pad 2x day. Pt describes left breast skin as reddened.   +SOB, +cough, +pain with deep breath, position, palpation, and lifting. Pt states she has had a fever 2 days ago.    Advised pt to go back to ER for further evaluation. Pt agreeable, unsure which ER she will go to- states she will go today after talking with her .

## 2024-04-09 NOTE — TELEPHONE ENCOUNTER
Placed call to patient and left a message to return our call, want to check in on how she is doing.

## 2024-04-09 NOTE — TELEPHONE ENCOUNTER
Pt calling back and asked to speak with Cindy at [facility] office.   RN reached out to warm transfer pt, but pt hung up prior to answer from office.      [phone]

## 2024-04-09 NOTE — TELEPHONE ENCOUNTER
Placed call to patient, she stated the ER gave her a shot of morphine yesterday which only lasted 10 minutes. She is having the rib pain again constantly and some left breast pain. She denied any fevers, sob, chest pain today. She stated she does have some redness still on the skin of the left breast and is still getting the discharge from the nipple as we have seen her for in the past. She stated sometimes she does saturate the pad she keeps in her bra. They did not prescribe any outpatient medications for her. She did recently get a lymphedema coat and has started using that but for half the time recommended due to rib pain increasing when using it. She does have follow up with you on 5/8 and I did review ED precautions with her.

## 2024-04-10 ENCOUNTER — SOCIAL WORK (OUTPATIENT)
Dept: BEHAVIORAL/MENTAL HEALTH CLINIC | Facility: CLINIC | Age: 59
End: 2024-04-10
Payer: COMMERCIAL

## 2024-04-10 DIAGNOSIS — F50.81 BINGE-EATING DISORDER, MODERATE: ICD-10-CM

## 2024-04-10 DIAGNOSIS — F43.10 POST-TRAUMATIC STRESS: Primary | ICD-10-CM

## 2024-04-10 DIAGNOSIS — F33.1 MODERATE EPISODE OF RECURRENT MAJOR DEPRESSIVE DISORDER (HCC): ICD-10-CM

## 2024-04-10 PROCEDURE — 90837 PSYTX W PT 60 MINUTES: CPT | Performed by: SOCIAL WORKER

## 2024-04-10 NOTE — TELEPHONE ENCOUNTER
Notes reviewed. We will see her again in 1 month for a repeat exam as scheduled. Please offer her an evaluation with pain management, it may be helpful for them to see her and consider other causes of rib and chest wall pain after breast cancer treatment and see if they have any ideas for management with injections or medications so that her symptoms are under better control. We can order and coordinate the appointment for her prior to her follow up with me if she is interested.

## 2024-04-10 NOTE — PSYCH
"Behavioral Health Psychotherapy Progress Note    Psychotherapy Provided: Individual Psychotherapy     1. Post-traumatic stress        2. Moderate episode of recurrent major depressive disorder (HCC)        3. Binge-eating disorder, moderate            Goals addressed in session: Goal 1 and Goal 2     DATA: The client reported that she was sent to the ED department at [facility] in [place] due to pain in her breast. She will be seeing Dr Morocho through [facility] to address her pain. During the session we explored and processed her increased physical health issues and the effects on her  personal relationships. The client was able to verbalize problems with her communication. Healthy communication pattens was reviewed as well as her coping skills   During this session, this clinician used the following therapeutic modalities: Client-centered Therapy, Cognitive Behavioral Therapy, Mindfulness-based Strategies, Solution-Focused Therapy, and Supportive Psychotherapy    Substance Abuse was not addressed during this session. If the client is diagnosed with a co-occurring substance use disorder, please indicate any changes in the frequency or amount of use: n/a. Stage of change for addressing substance use diagnoses: No substance use/Not applicable    ASSESSMENT:  Heydi Ferrell presents with a Anxious and Depressed mood.     her affect is Normal range and intensity, which is congruent, with her mood and the content of the session. The client has made progress on their goals.    The client  Heydi Ferrell presents with a none risk of suicide, none risk of self-harm, and none risk of harm to others.    For any risk assessment that surpasses a \"low\" rating, a safety plan must be developed.    A safety plan was indicated: no  If yes, describe in detail n/as  PHYSICAL PAIN SCALE NUMBERS:  10 as reported by the client due to pain in left upper quadrant    Mental Pain Scale Numbers 7 as reported by the client due to depression over her " physical health issues    PLAN: Between sessions, Heydi Ferrell will go to her appointment with her breast surgeon to address her breast and health issues, as well as read article on her communication patterns . At the next session, the therapist will use Cognitive Behavioral Therapy, Mindfulness-based Strategies, Solution-Focused Therapy, and Supportive Psychotherapy to address the client's MH issues affecting her different relationships and environments.    Behavioral Health Treatment Plan and Discharge Planning: Heydi Ferrell is aware of and agrees to continue to work on their treatment plan. They have identified and are working toward their discharge goals. yes    Visit start and stop times:    04/10/24  Start Time: 1000  Stop Time: 1100  Total Visit Time: 60 minutes

## 2024-04-12 ENCOUNTER — TELEPHONE (OUTPATIENT)
Dept: BEHAVIORAL/MENTAL HEALTH CLINIC | Facility: CLINIC | Age: 59
End: 2024-04-12

## 2024-04-12 NOTE — TELEPHONE ENCOUNTER
Left message for Heydi informing her the Cancer [facility] Group is cancelled for Tuesday, April 16th due to a lack of referrals.       Plan to have group next month--May 21st with hope that more referrals will be received.    Heydi's OP therapy Kerri Cuellar was also notified.

## 2024-04-19 ENCOUNTER — SOCIAL WORK (OUTPATIENT)
Dept: BEHAVIORAL/MENTAL HEALTH CLINIC | Facility: CLINIC | Age: 59
End: 2024-04-19

## 2024-04-19 DIAGNOSIS — F43.10 POST-TRAUMATIC STRESS: Primary | ICD-10-CM

## 2024-04-19 DIAGNOSIS — F33.1 MODERATE EPISODE OF RECURRENT MAJOR DEPRESSIVE DISORDER (HCC): ICD-10-CM

## 2024-04-19 NOTE — PSYCH
"Behavioral Health Psychotherapy Progress Note    Psychotherapy Provided: Individual Psychotherapy     1. Post-traumatic stress        2. Moderate episode of recurrent major depressive disorder (HCC)            Goals addressed in session: Goal 1 and Goal 2     DATA: The client reported continued issues within her relationship with her . During the session we explored and processed relationship love language, and healthy communication patterns in relationships. Coping skills were also reviewed during the session.   During this session, this clinician used the following therapeutic modalities: Cognitive Behavioral Therapy, Mindfulness-based Strategies, Solution-Focused Therapy, and Supportive Psychotherapy    Substance Abuse was not addressed during this session. If the client is diagnosed with a co-occurring substance use disorder, please indicate any changes in the frequency or amount of use: n/a. Stage of change for addressing substance use diagnoses: No substance use/Not applicable    ASSESSMENT:  Heydi Ferrell presents with a Depressed and tearful  mood.     her affect is Normal range and intensity, which is congruent, with her mood and the content of the session. The client has made progress on their goals.    The client  Heydi Ferrell presents with a none risk of suicide, none risk of self-harm, and none risk of harm to others.    For any risk assessment that surpasses a \"low\" rating, a safety plan must be developed.    A safety plan was indicated: no  If yes, describe in detail n/a    PLAN: Between sessions, Heydi Ferrell will read worksheets on couples relationships. At the next session, the therapist will use Cognitive Behavioral Therapy, Mindfulness-based Strategies, Solution-Focused Therapy, and Supportive Psychotherapy to address the client's MH issues affecting her different relationships and environments.    Behavioral Health Treatment Plan and Discharge Planning: Heydi Ferrell is aware of and " agrees to continue to work on their treatment plan. They have identified and are working toward their discharge goals. yes    Visit start and stop times:    04/19/24  Start Time: 0830  Stop Time: 0930  Total Visit Time: 60 minutes

## 2024-04-20 PROBLEM — N61.1 LEFT BREAST ABSCESS: Status: RESOLVED | Noted: 2023-09-17 | Resolved: 2024-04-20

## 2024-04-20 PROBLEM — L03.313 CELLULITIS OF CHEST WALL: Status: RESOLVED | Noted: 2023-05-10 | Resolved: 2024-04-20

## 2024-04-20 PROBLEM — R07.81 RIB PAIN ON LEFT SIDE: Status: ACTIVE | Noted: 2024-04-20

## 2024-04-20 NOTE — PROGRESS NOTES
Progress Note - Surgical Oncology  Stacy Ferrell 58 y.o. female MRN: [id number]  Encounter: [id number]    Assessment/Plan     58F with obesity and diabetes now status post left breast lumpectomy with sharyn  localization and left axillary sentinel lymph node biopsy, drain placement for pT1bN0 ER/MS+ HER2- grade 1 invasive ductal carcinoma of the left breast with grade 2 DCIS, negative margins, negative nodes, on 4/10/23. Oncotype score was low, she underwent adjuvant radiation and continues on anastrozole.      She had multiple episodes of left breast infection postoperatively requiring antibiotic therapy. She has had recurrent symptomatic seromas that respond only briefly to aspirations and then recur. She underwent percutaneous drainage of the seroma with weekly sclerotherapy and there was some brief resolution of her symptoms, although now evidence of persistent seroma. She has lymphedema that is being treated with compression and massage.     She has bilateral nipple discharge, white from the right, clear from the left. Updated diagnostic imaging without malignant etiology. The drainage from the left may be related to her persistent seroma.     She has developed significant pain along the left chest wall/costal margin that does not appear to be secondary to her breast seroma or her breast cancer care but is significantly impacting her quality of life and has led to ED visits and CT scans.      She is following with pain management at [facility], last seen in February. I am recommending that she return to see them again with consideration for possible injections at the sites of discomfort, potentially intercostal nerve blocks. We will see her back in May 2025 for an interval evaluation. I discussed her case with endocrinology who reviewed her medications and did not feel any of them could lead to nipple discharge. They did recommend sending a prolactin level if the discharge is persistent. She is scheduled for a  "bilateral diagnostic mammogram and appointment to follow in 1/2025.      Subjective       Chief Complaint   Patient presents with    Follow-up      Patient having severe left sided chest wall pain, has been seen in ED this week, notes and imaging reviewed. Imaging shows persistent seroma of the left breast. Patient has intermittent serous drainage from the left nipple, the lumpectomy incision itself is well healed and without drainage from the site. Lymphedema under much better control lately. No cellulitis, intermittent milky discharge from right nipple updated breast imaging without high risk findings. Patient's main concern is here severe chest wall pain preventing her from sleeping. I do not feel that pain is related to her breast cancer treatment.       Review of Systems   Skin:  Negative for color change and wound.        Intermittent bilateral nipple discharge, right white, left serous    Left sided chest wall pain    Left sided breast/upper extremity lymphedema, continuing with compression and massage and PT/OT with good effect   Hematological: Negative.    Psychiatric/Behavioral:  The patient is nervous/anxious.    All other systems reviewed and are negative.      The following portions of the patient's history were reviewed and updated as appropriate: allergies, current medications, past family history, past medical history, past social history, past surgical history, and problem list.    Objective      Blood pressure 128/72, pulse 88, temperature 98 °F (36.7 °C), temperature source Temporal, height 5' 3\" (1.6 m), weight 107 kg (234 lb 12.8 oz), SpO2 95%.   Physical Exam  Vitals reviewed.   Constitutional:       General: She is not in acute distress.     Appearance: She is not toxic-appearing.   HENT:      Head: Normocephalic.      Mouth/Throat:      Mouth: Mucous membranes are moist.   Eyes:      Pupils: Pupils are equal, round, and reactive to light.   Cardiovascular:      Rate and Rhythm: Normal rate. "   Pulmonary:      Effort: Pulmonary effort is normal.   Abdominal:      Palpations: Abdomen is soft.   Musculoskeletal:         General: Normal range of motion.      Cervical back: Normal range of motion.   Skin:     General: Skin is warm.      Capillary Refill: Capillary refill takes less than 2 seconds.   Neurological:      General: No focal deficit present.      Mental Status: She is alert.   Psychiatric:         Mood and Affect: Mood normal.     The patient has no palpable cervical, supraclavicular, or axillary lymphadenopathy bilaterally.  The left breast has a well healed incision. There is no expressible fluid from either nipple today. Her fluid retention and signs of lymphedema in the left breast are much improved today in comparison to her last exam. There is no cellulitis on exam. There are no dominant lumps, masses, skin changes or nipple retraction bilaterally. She is not tender in the breast on exam. She is tender along the chest wall inferior to breast and along inframammary fold and into left flank along ribs.     Signature:  Lianne Esparza MD  Date: 4/20/2024 Time: 11:02 AM

## 2024-05-03 ENCOUNTER — SOCIAL WORK (OUTPATIENT)
Dept: BEHAVIORAL/MENTAL HEALTH CLINIC | Facility: CLINIC | Age: 59
End: 2024-05-03
Payer: COMMERCIAL

## 2024-05-03 DIAGNOSIS — F43.10 POST-TRAUMATIC STRESS: Primary | ICD-10-CM

## 2024-05-03 DIAGNOSIS — F33.1 MODERATE EPISODE OF RECURRENT MAJOR DEPRESSIVE DISORDER (HCC): ICD-10-CM

## 2024-05-03 DIAGNOSIS — F50.81 BINGE-EATING DISORDER, MODERATE: ICD-10-CM

## 2024-05-03 PROCEDURE — 90837 PSYTX W PT 60 MINUTES: CPT | Performed by: SOCIAL WORKER

## 2024-05-03 NOTE — PSYCH
"Behavioral Health Psychotherapy Progress Note    Psychotherapy Provided: Individual Psychotherapy     1. Post-traumatic stress        2. Moderate episode of recurrent major depressive disorder (HCC)        3. Binge-eating disorder, moderate            Goals addressed in session: Goal 1 and Goal 2     DATA: The client reported that she had her surgery on her breast to stop the leakage, and continued pain in her stomach. She did report an increase in problems with short and long term memory. During the session we explored coping skills to address this weaknesses as well as coping skills to address her MH weaknesses    During this session, this clinician used the following therapeutic modalities: Client-centered Therapy, Mindfulness-based Strategies, Solution-Focused Therapy, and Supportive Psychotherapy    Substance Abuse was not addressed during this session. If the client is diagnosed with a co-occurring substance use disorder, please indicate any changes in the frequency or amount of use: n/a. Stage of change for addressing substance use diagnoses: No substance use/Not applicable    ASSESSMENT:  Heydi Ferrell presents with a Anxious and Depressed mood.     her affect is Normal range and intensity, which is congruent, with her mood and the content of the session. The client has made progress on their goals.    The Client Heydi Ferrell presents with a none risk of suicide, none risk of self-harm, and none risk of harm to others.    For any risk assessment that surpasses a \"low\" rating, a safety plan must be developed.    A safety plan was indicated: no  If yes, describe in detail n/a    PLAN: Between sessions, Heydi Ferrell will set up meeting with her  and plan with a calendar appointments for the next year. At the next session, the therapist will use Cognitive Behavioral Therapy, Mindfulness-based Strategies, Solution-Focused Therapy, and Supportive Psychotherapy to address the client's MH issues affecting " her different relationships and environments.    Behavioral Health Treatment Plan and Discharge Planning: Heydi Ferrell is aware of and agrees to continue to work on their treatment plan. They have identified and are working toward their discharge goals. yes    Visit start and stop times:    05/03/24  Start Time: 0836  Stop Time: 0936  Total Visit Time: 60 minutes

## 2024-05-13 ENCOUNTER — OFFICE VISIT (OUTPATIENT)
Dept: GASTROENTEROLOGY | Facility: CLINIC | Age: 59
End: 2024-05-13
Payer: COMMERCIAL

## 2024-05-13 VITALS
BODY MASS INDEX: 41.64 KG/M2 | DIASTOLIC BLOOD PRESSURE: 84 MMHG | HEART RATE: 96 BPM | OXYGEN SATURATION: 96 % | SYSTOLIC BLOOD PRESSURE: 137 MMHG | TEMPERATURE: 98.4 F | WEIGHT: 235 LBS | HEIGHT: 63 IN

## 2024-05-13 DIAGNOSIS — K76.0 HEPATIC STEATOSIS: ICD-10-CM

## 2024-05-13 DIAGNOSIS — R07.81 PLEURODYNIA: Primary | ICD-10-CM

## 2024-05-13 DIAGNOSIS — E66.01 MORBID OBESITY DUE TO EXCESS CALORIES (HCC): ICD-10-CM

## 2024-05-13 DIAGNOSIS — K21.9 GASTROESOPHAGEAL REFLUX DISEASE WITHOUT ESOPHAGITIS: ICD-10-CM

## 2024-05-13 DIAGNOSIS — R13.19 OTHER DYSPHAGIA: ICD-10-CM

## 2024-05-13 PROCEDURE — 99214 OFFICE O/P EST MOD 30 MIN: CPT | Performed by: STUDENT IN AN ORGANIZED HEALTH CARE EDUCATION/TRAINING PROGRAM

## 2024-05-13 RX ORDER — KETOROLAC TROMETHAMINE 10 MG/1
10 TABLET, FILM COATED ORAL EVERY 6 HOURS PRN
Qty: 20 TABLET | Refills: 0 | Status: SHIPPED | OUTPATIENT
Start: 2024-05-13 | End: 2024-05-18

## 2024-05-13 RX ORDER — LIDOCAINE 50 MG/G
1 PATCH TOPICAL DAILY
Qty: 30 PATCH | Refills: 1 | Status: SHIPPED | OUTPATIENT
Start: 2024-05-13 | End: 2024-05-15

## 2024-05-13 NOTE — PROGRESS NOTES
[facility]  Outpatient Follow-up  Encounter: [id number]    PATIENT INFO     Name: Stacy Ferrell  YOB: 1965   Age: 58 y.o.   Sex: female   MRN: [id number]    ASSESSMENT & PLAN     Stacy Ferrell is a 58 y.o. female with history of breast cancer status post mastectomy, hormonal therapy, history of diabetes, hypertension, dyslipidemia, and depression presents to GI office for follow-up of reflux and dysphagia.    Problem List Items Addressed This Visit       Hepatic steatosis     History of fatty liver.  Risk factors for MAFLD include dyslipidemia, obesity, hypertension, and diabetes.  No clinical or laboratory stigmata of advanced liver disease or portal hypertension.    NAFLD Fibrosis Score is: -.213    NAFLD Score Correlated Fibrosis Severity   <-1.455 F0-F2   -1.455-0.676 Indeterminate Score   >0.676 F3-F4   **Fibrosis Severity Scale: F0 = no fibrosis; F1= mild fibrosis; F2 = moderate fibrosis; F3 = severe fibrosis; F4 = cirrhosis    NAFLD Score Component Values:  Component Value Date   Age: 58 y.o.     BMI: 41.63 kg/m²    IFG or DM: Yes    AST: 22 U/L 4/8/2024   ALT: 27 U/L 4/8/2024   Platelet: 304 Thousands/uL 3/26/2024   Albumin: 3.8 g/dL 4/8/2024     Check elastography  Counseled patient on importance of weight loss  Avoid alcohol  Optimize metabolic risk factors per PCP         Relevant Orders    US elastography/UGAP    Dysphagia     Still with dysphagia symptoms which is chronic.  Recent EGD with small sliding hiatal hernia.  Biopsies negative for EOE, H.pylori, and celiac sprue.  Could be due to hiatal hernia versus possible dysmotility.  Could certainly have a functional component as well.  Recent CT chest at [facility] reviewed as well and no evidence of extrinsic compression.    Discussed possible manometry to rule out dysmotility  However, patient does not wish to do manometry  At this time, recommend continued symptomatic management  Continue omeprazole 20  mg daily  Diet as tolerated  Can discuss manometry again at subsequent follow-up visits depending on clinical progress         Gastroesophageal reflux disease     Status post EGD with finding of small sliding hiatal hernia.  Findings reviewed.  Symptoms may be exacerbated by functional component and possible association with this left-sided chest wall pain/pleurodynia.    Continue omeprazole 20 mg daily         Morbid obesity due to excess calories (HCC)    Pleurodynia - Primary     The cause for the severe left-sided chest wall pain is unclear.  She had CT chest performed at [facility] which was unremarkable.  She denies any preceding rash making shingles or post-herpetic neuralgia unlikely.  Location of pain is supradiaphragmatic essentially ruling out GI cause.  She could have costochondritis or referred pain from the back or possible pain from scarring from radiation although this seems unusual as she completed therapy in June and symptoms presented only 2 months ago.    Given the severity of her pain, I will send prescriptions for Lidoderm patch 5% (12 hours on/off) and also Toradol 10 mg which she may take every 6 hours as needed (#20 pills)  She may use Tylenol for additional symptom relief  I have advised her to follow-up with her PCP and also thoracic surgeons who she has been referred to         Relevant Medications    lidocaine (Lidoderm) 5 %    ketorolac (TORADOL) 10 mg tablet     Orders Placed This Encounter   Procedures    US elastography/UGAP       FOLLOW-UP: 6 months    HISTORY OF PRESENT ILLNESS       Stacy Ferrell is a 58 y.o. female who presents to GI office for follow-up.  Previously seen in GI office for complaints of dysphagia, reflux, nausea, and history of H.pylori infection.  She was also due for screening colonoscopy.  Her endoscopy and colonoscopy findings along with biopsies results were reviewed.  Her main complaint today is left-sided chest wall pain that has been ongoing for 2 months.  The  pain is severe and she reports that it radiates to her central substernal area and to her back at the same level.  The pain has been constant and unrelenting.  Due to the severity of her pain, she went to the ED at [facility].  She underwent CT chest there which was unremarkable.  That report was reviewed.  She was given dose of morphine in the ED which did not help with the pain.  She was discharged and told to schedule appointment to see thoracic surgeon.  She has not yet scheduled that appointment but intends to.  She saw a pain specialist who believed this was referred pain from her back and recommended injections.  However, she states she was unable to afford the injections.  She has been taking Tylenol at home without relief.  The pain is very severe and she feels that it has effected her swallowing more.  She states that her dysphagia symptoms seem worse due to the pain.  Reflux appears to be controlled.  Offers no other acute GI complaints.     ENDOSCOPIC HISTORY     UPPER ENDOSCOPY: 1/30/2024 with Hill grade II sliding hiatal hernia, mild gastritis, otherwise unremarkable EGD (biopsies negative for EOE, H.pylori, and celiac sprue)    COLONOSCOPY: 1/30/2024 with single subcentimeter polyp removed from descending colon (tubular adenoma), internal hemorrhoids, otherwise unremarkable; recommended repeat in 7 years    REVIEW OF SYSTEMS     CONSTITUTIONAL: Denies any fever, chills, rigors, and weight loss  HEENT: No earache or tinnitus, denies hearing loss or visual disturbances  CARDIOVASCULAR: No chest pain or palpitations  RESPIRATORY: Denies any cough, hemoptysis, shortness of breath or dyspnea on exertion  GASTROINTESTINAL: As noted in the History of Present Illness  GENITOURINARY: No problems with urination, denies any hematuria or dysuria  NEUROLOGIC: No dizziness or vertigo, denies headaches   MUSCULOSKELETAL: Denies any muscle or joint pain   SKIN: Denies jaundice or itching  PSYCHOSOCIAL: Denies depression or  anxiety, denies any recent memory loss    Answers submitted by the patient for this visit:  Abdominal Pain Questionnaire (Submitted on 5/6/2024)  Chief Complaint: Abdominal pain  Chronicity: new  Onset: 1 to 4 weeks ago  Onset quality: undetermined  Frequency: constantly  Episode duration: 24 Hours  Progression since onset: rapidly worsening  Pain location: LUQ  Pain - numeric: 10/10  Pain quality: aching, cramping, sharp  Radiates to: LUQ  arthralgias: Yes  constipation: No  diarrhea: Yes  dysuria: No  fever: No  flatus: Yes  frequency: Yes  myalgias: Yes  nausea: Yes  weight loss: No  vomiting: No  Aggravated by: deep breathing, eating, movement  Relieved by: nothing  Diagnostic workup: lower endoscopy     Historical Information   Past Medical History:   Diagnosis Date    Anemia     iron deficiency anemia    Anxiety     Breast cancer (HCC)     Depression     Diabetes mellitus (HCC)     Fibroid     LTH   today 12/8/2021    History of transfusion     Hydrocephalus (HCC)     Hyperlipidemia     Hypertension     Migraine     Myocardial infarction (HCC) 01/19/2022    PONV (postoperative nausea and vomiting)     Sciatica     Seasonal allergies     Vertigo     Wears glasses      Past Surgical History:   Procedure Laterality Date    BACK SURGERY      C1-3 fusion    COLONOSCOPY      EPIDURAL BLOCK INJECTION Right 07/02/2020    Procedure: Right L4-5 and L5-S1 transforaminal epidural steroid injection ([id number]);  Surgeon: Clarence Lima MD;  Location: [facility];  Service: Pain Management     EPIDURAL BLOCK INJECTION N/A 09/02/2021    Procedure: BLOCK / INJECTION EPIDURAL STEROID LUMBAR  L5-S1 IESI;  Surgeon: Clarence Lima MD;  Location: [facility];  Service: Pain Management     FL GUIDED NEEDLE PLAC BX/ASP/INJ  07/02/2020    FL GUIDED NEEDLE PLAC BX/ASP/INJ  09/02/2021    HYSTERECTOMY      IR DRAINAGE TUBE CHECK AND/OR REMOVAL  1/19/2024    IR DRAINAGE TUBE CHECK WITH SCLEROSIS  1/5/2024    IR DRAINAGE TUBE  CHECK WITH SCLEROSIS  1/12/2024    IR DRAINAGE TUBE PLACEMENT  1/2/2024    TX ESOPHAGOGASTRODUODENOSCOPY TRANSORAL DIAGNOSTIC N/A 10/23/2018    Procedure: EGD with Savary dilation AND COLONOSCOPY;  Surgeon: Billy Schneider MD;  Location: [facility];  Service: Gastroenterology    TX LAPAROSCOPY W TOTAL HYSTERECTOMY UTERUS 250 GM/< N/A 12/08/2021    Procedure: LTH; BILAT SALPINGECTOMY; EXCISION PERITONEAL CYST;  Surgeon: Sonia Tamez MD;  Location: [facility];  Service: Gynecology    TX MASTECTOMY PARTIAL Left 4/10/2023    Procedure: LUMPECTOMY BREAST WITH BEAU  LOCALIZATION,  BIOPSY LYMPH NODE SENTINEL (INJECT AT 1130 BY DR BROTHERS IN THE OR);  Surgeon: Lianne Brothers MD;  Location: [facility];  Service: General    TUBAL LIGATION      US BREAST CYST ASPIRATION LEFT INITIAL Left 9/12/2023    US BREAST CYST ASPIRATION LEFT INITIAL Left 9/21/2023    US BREAST CYST ASPIRATION LEFT INITIAL Left 10/3/2023    US GUIDED BREAST BIOPSY LEFT COMPLETE Left 03/07/2023     Social History   Social History     Substance and Sexual Activity   Alcohol Use Not Currently    Comment: none     Social History     Substance and Sexual Activity   Drug Use No    Comment: none     Social History     Tobacco Use   Smoking Status Never   Smokeless Tobacco Never   Tobacco Comments    none     Family History   Problem Relation Age of Onset    Varicose Veins Mother     Drug abuse Mother     Diabetes Father     Liver cancer Father         bile duct    Cancer Father     Breast cancer Sister         unknown age    No Known Problems Daughter     No Known Problems Daughter     No Known Problems Maternal Aunt     No Known Problems Maternal Aunt     No Known Problems Maternal Aunt     No Known Problems Maternal Aunt     No Known Problems Maternal Aunt     No Known Problems Paternal Aunt     No Known Problems Maternal Grandmother     No Known Problems Maternal Grandfather     No Known Problems Paternal Grandmother     No Known Problems Paternal  "Grandfather        MEDICATIONS & ALLERGIES     Current Outpatient Medications   Medication Instructions    anastrozole (ARIMIDEX) 1 mg, Oral, Daily    atorvastatin (LIPITOR) 40 mg, Oral, Every evening    FLUoxetine (PROZAC) 40 mg, Oral, Daily, Take with 20mg capsule for total of 60mg daily.    gabapentin (NEURONTIN) 600 mg, Oral, 3 times daily    ketorolac (TORADOL) 10 mg, Oral, Every 6 hours PRN    lidocaine (Lidoderm) 5 % 1 patch, Topical, Daily, Remove & Discard patch within 12 hours or as directed by MD    metFORMIN (GLUCOPHAGE) 1000 MG tablet     metoprolol succinate (TOPROL-XL) 100 mg 24 hr tablet     omeprazole (PRILOSEC) 20 mg, Oral, Daily, Take 30 minutes before supper     Allergies   Allergen Reactions    Nuts - Food Allergy Anaphylaxis and Throat Swelling    Trintellix [Vortioxetine] Other (See Comments)     Suicidal ideation    Chocolate - Food Allergy Cough    Eggs Or Egg-Derived Products - Food Allergy Other (See Comments)     Cough    Other Cough     LETTUCE    Shellfish Allergy - Food Allergy Cough    Tomato - Food Allergy Cough       PHYSICAL EXAM      Objective   Blood pressure 137/84, pulse 96, temperature 98.4 °F (36.9 °C), temperature source Tympanic, height 5' 3\" (1.6 m), weight 107 kg (235 lb), SpO2 96%. Body mass index is 41.63 kg/m².    General Appearance:   Alert, cooperative, tearful   HEENT:   Normocephalic, atraumatic, anicteric     Neck:   Supple, symmetrical, trachea midline   Lungs:   Equal chest rise, respirations unlabored, left-sided TTP beneath left breast    Heart:   Tachycardic   Abdomen:   Soft, non-tender, non-distended; normal bowel sounds; no masses, no organomegaly    Rectal:   Deferred    Extremities:   No cyanosis or edema    Neuro:   Moves all 4 extremities    Skin:   No jaundice, rashes, or lesions      LABORATORY RESULTS     No visits with results within 1 Day(s) from this visit.   Latest known visit with results is:   Admission on 03/26/2024, Discharged on 03/26/2024 "   Component Date Value    WBC 03/26/2024 6.59     RBC 03/26/2024 5.00     Hemoglobin 03/26/2024 13.2     Hematocrit 03/26/2024 42.4     MCV 03/26/2024 85     MCH 03/26/2024 26.4 (L)     MCHC 03/26/2024 31.1 (L)     RDW 03/26/2024 13.4     MPV 03/26/2024 8.7 (L)     Platelets 03/26/2024 304     nRBC 03/26/2024 0     Segmented % 03/26/2024 71     Immature Grans % 03/26/2024 1     Lymphocytes % 03/26/2024 18     Monocytes % 03/26/2024 7     Eosinophils Relative 03/26/2024 2     Basophils Relative 03/26/2024 1     Absolute Neutrophils 03/26/2024 4.71     Absolute Immature Grans 03/26/2024 0.03     Absolute Lymphocytes 03/26/2024 1.21     Absolute Monocytes 03/26/2024 0.48     Eosinophils Absolute 03/26/2024 0.10     Basophils Absolute 03/26/2024 0.06     Sodium 03/26/2024 137     Potassium 03/26/2024 4.1     Chloride 03/26/2024 99     CO2 03/26/2024 28     ANION GAP 03/26/2024 10     BUN 03/26/2024 19     Creatinine 03/26/2024 0.84     Glucose 03/26/2024 145 (H)     Calcium 03/26/2024 9.5     AST 03/26/2024 15     ALT 03/26/2024 23     Alkaline Phosphatase 03/26/2024 113 (H)     Total Protein 03/26/2024 6.8     Albumin 03/26/2024 4.1     Total Bilirubin 03/26/2024 0.75     eGFR 03/26/2024 77     hs TnI 0hr 03/26/2024 2     D-Dimer, Quant 03/26/2024 0.53 (H)     SARS-CoV-2 03/26/2024 Negative     INFLUENZA A PCR 03/26/2024 Negative     INFLUENZA B PCR 03/26/2024 Negative     RSV PCR 03/26/2024 Negative     Blood Culture 03/26/2024 No Growth After 5 Days.     Blood Culture 03/26/2024 No Growth After 5 Days.     LACTIC ACID 03/26/2024 2.0     hs TnI 2hr 03/26/2024 2     Delta 2hr hsTnI 03/26/2024 0     Ventricular Rate 03/26/2024 82     Atrial Rate 03/26/2024 82     GA Interval 03/26/2024 174     QRSD Interval 03/26/2024 84     QT Interval 03/26/2024 372     QTC Interval 03/26/2024 434     P Axis 03/26/2024 54     QRS Axis 03/26/2024 76     T Wave [place] 03/26/2024 38         IMAGING RESULTS     US GUIDED NEEDLE  "PLACEMENT    Result Date: 4/26/2024  Narrative: Sonographic guidance was provided by the Department of radiology during drainage of a seroma in the left breast in the operating room by Dr. Duque. 3 images were retained in the PACS system. The images labeled \"left breast reveal a large complex fluid collection. The most superficial portion of the collection extends to approximately 6 mm of the skin surface. The collection is grossly estimated at greater than 5 cm in maximal oblique length. The images are available for review. Workstation:[id number]    I have personally reviewed any available and pertinent imaging study reports.      Keven Hopson D.O.  [facility]  Division of Gastroenterology & Hepatology  Available on Tiger[employer]  [email or web address]    ** Please Note: This note is constructed using a voice recognition dictation system. **  "

## 2024-05-13 NOTE — PATIENT INSTRUCTIONS
We will prescribe Lidoderm patch 5%  Apply this over the left-sided pain  Keep patch on for 12 hours and 12 hours off  You may continue Tylenol  We will also try Toradol for the pain  You may take this every 6 hours as needed  There is a slight increased risk of bleeding on patients who also take Prozac when taking Toradol  Make sure to see the thoracic surgeons  We will also order an elastography for your liver  Please get this done at your earliest convenience

## 2024-05-13 NOTE — ASSESSMENT & PLAN NOTE
History of fatty liver.  Risk factors for MAFLD include dyslipidemia, obesity, hypertension, and diabetes.  No clinical or laboratory stigmata of advanced liver disease or portal hypertension.    NAFLD Fibrosis Score is: -.213    NAFLD Score Correlated Fibrosis Severity   <-1.455 F0-F2   -1.455-0.676 Indeterminate Score   >0.676 F3-F4   **Fibrosis Severity Scale: F0 = no fibrosis; F1= mild fibrosis; F2 = moderate fibrosis; F3 = severe fibrosis; F4 = cirrhosis    NAFLD Score Component Values:  Component Value Date   Age: 58 y.o.     BMI: 41.63 kg/m²    IFG or DM: Yes    AST: 22 U/L 4/8/2024   ALT: 27 U/L 4/8/2024   Platelet: 304 Thousands/uL 3/26/2024   Albumin: 3.8 g/dL 4/8/2024     Check elastography  Counseled patient on importance of weight loss  Avoid alcohol  Optimize metabolic risk factors per PCP

## 2024-05-13 NOTE — ASSESSMENT & PLAN NOTE
Status post EGD with finding of small sliding hiatal hernia.  Findings reviewed.  Symptoms may be exacerbated by functional component and possible association with this left-sided chest wall pain/pleurodynia.    Continue omeprazole 20 mg daily

## 2024-05-13 NOTE — ASSESSMENT & PLAN NOTE
Still with dysphagia symptoms which is chronic.  Recent EGD with small sliding hiatal hernia.  Biopsies negative for EOE, H.pylori, and celiac sprue.  Could be due to hiatal hernia versus possible dysmotility.  Could certainly have a functional component as well.  Recent CT chest at [facility] reviewed as well and no evidence of extrinsic compression.    Discussed possible manometry to rule out dysmotility  However, patient does not wish to do manometry  At this time, recommend continued symptomatic management  Continue omeprazole 20 mg daily  Diet as tolerated  Can discuss manometry again at subsequent follow-up visits depending on clinical progress

## 2024-05-13 NOTE — ASSESSMENT & PLAN NOTE
The cause for the severe left-sided chest wall pain is unclear.  She had CT chest performed at [facility] which was unremarkable.  She denies any preceding rash making shingles or post-herpetic neuralgia unlikely.  Location of pain is supradiaphragmatic essentially ruling out GI cause.  She could have costochondritis or referred pain from the back or possible pain from scarring from radiation although this seems unusual as she completed therapy in June and symptoms presented only 2 months ago.    Given the severity of her pain, I will send prescriptions for Lidoderm patch 5% (12 hours on/off) and also Toradol 10 mg which she may take every 6 hours as needed (#20 pills)  She may use Tylenol for additional symptom relief  I have advised her to follow-up with her PCP and also thoracic surgeons who she has been referred to

## 2024-05-14 DIAGNOSIS — R07.81 PLEURODYNIA: ICD-10-CM

## 2024-05-15 ENCOUNTER — HOSPITAL ENCOUNTER (OUTPATIENT)
Dept: ULTRASOUND IMAGING | Facility: HOSPITAL | Age: 59
Discharge: HOME | End: 2024-05-15
Attending: STUDENT IN AN ORGANIZED HEALTH CARE EDUCATION/TRAINING PROGRAM
Payer: COMMERCIAL

## 2024-05-15 ENCOUNTER — APPOINTMENT (OUTPATIENT)
Dept: LAB | Facility: HOSPITAL | Age: 59
End: 2024-05-15
Payer: COMMERCIAL

## 2024-05-15 DIAGNOSIS — Z13.9 SCREENING FOR UNSPECIFIED CONDITION: ICD-10-CM

## 2024-05-15 DIAGNOSIS — K76.0 HEPATIC STEATOSIS: ICD-10-CM

## 2024-05-15 DIAGNOSIS — E11.9 DIABETES MELLITUS WITHOUT COMPLICATION (HCC): ICD-10-CM

## 2024-05-15 LAB
25(OH)D3 SERPL-MCNC: 25.2 NG/ML (ref 30–100)
ALBUMIN SERPL BCP-MCNC: 4.4 G/DL (ref 3.5–5)
ALP SERPL-CCNC: 101 U/L (ref 34–104)
ALT SERPL W P-5'-P-CCNC: 21 U/L (ref 7–52)
ANION GAP SERPL CALCULATED.3IONS-SCNC: 10 MMOL/L (ref 4–13)
AST SERPL W P-5'-P-CCNC: 19 U/L (ref 13–39)
BASOPHILS # BLD AUTO: 0.04 THOUSANDS/ÂΜL (ref 0–0.1)
BASOPHILS NFR BLD AUTO: 1 % (ref 0–1)
BILIRUB SERPL-MCNC: 0.61 MG/DL (ref 0.2–1)
BUN SERPL-MCNC: 14 MG/DL (ref 5–25)
CALCIUM SERPL-MCNC: 10 MG/DL (ref 8.4–10.2)
CHLORIDE SERPL-SCNC: 97 MMOL/L (ref 96–108)
CHOLEST SERPL-MCNC: 232 MG/DL
CO2 SERPL-SCNC: 30 MMOL/L (ref 21–32)
CREAT SERPL-MCNC: 0.79 MG/DL (ref 0.6–1.3)
EOSINOPHIL # BLD AUTO: 0.13 THOUSAND/ÂΜL (ref 0–0.61)
EOSINOPHIL NFR BLD AUTO: 2 % (ref 0–6)
ERYTHROCYTE [DISTWIDTH] IN BLOOD BY AUTOMATED COUNT: 13.8 % (ref 11.6–15.1)
EST. AVERAGE GLUCOSE BLD GHB EST-MCNC: 197 MG/DL
GFR SERPL CREATININE-BSD FRML MDRD: 83 ML/MIN/1.73SQ M
GLUCOSE P FAST SERPL-MCNC: 264 MG/DL (ref 65–99)
HBA1C MFR BLD: 8.5 %
HCT VFR BLD AUTO: 39.7 % (ref 34.8–46.1)
HDLC SERPL-MCNC: 64 MG/DL
HGB BLD-MCNC: 12.3 G/DL (ref 11.5–15.4)
IMM GRANULOCYTES # BLD AUTO: 0.02 THOUSAND/UL (ref 0–0.2)
IMM GRANULOCYTES NFR BLD AUTO: 0 % (ref 0–2)
IRON SATN MFR SERPL: 9 % (ref 15–50)
IRON SERPL-MCNC: 36 UG/DL (ref 50–212)
LDLC SERPL CALC-MCNC: 134 MG/DL (ref 0–100)
LYMPHOCYTES # BLD AUTO: 0.68 THOUSANDS/ÂΜL (ref 0.6–4.47)
LYMPHOCYTES NFR BLD AUTO: 11 % (ref 14–44)
MCH RBC QN AUTO: 25.8 PG (ref 26.8–34.3)
MCHC RBC AUTO-ENTMCNC: 31 G/DL (ref 31.4–37.4)
MCV RBC AUTO: 83 FL (ref 82–98)
MONOCYTES # BLD AUTO: 0.41 THOUSAND/ÂΜL (ref 0.17–1.22)
MONOCYTES NFR BLD AUTO: 7 % (ref 4–12)
NEUTROPHILS # BLD AUTO: 4.72 THOUSANDS/ÂΜL (ref 1.85–7.62)
NEUTS SEG NFR BLD AUTO: 79 % (ref 43–75)
NONHDLC SERPL-MCNC: 168 MG/DL
NRBC BLD AUTO-RTO: 0 /100 WBCS
PLATELET # BLD AUTO: 283 THOUSANDS/UL (ref 149–390)
PMV BLD AUTO: 9.2 FL (ref 8.9–12.7)
POTASSIUM SERPL-SCNC: 3.9 MMOL/L (ref 3.5–5.3)
PROT SERPL-MCNC: 7.5 G/DL (ref 6.4–8.4)
RBC # BLD AUTO: 4.76 MILLION/UL (ref 3.81–5.12)
SODIUM SERPL-SCNC: 137 MMOL/L (ref 135–147)
T3 SERPL-MCNC: 1.7 NG/ML
T4 FREE SERPL-MCNC: 0.61 NG/DL (ref 0.61–1.12)
TIBC SERPL-MCNC: 422 UG/DL (ref 250–450)
TRIGL SERPL-MCNC: 170 MG/DL
TSH SERPL DL<=0.05 MIU/L-ACNC: 2.18 UIU/ML (ref 0.45–4.5)
UIBC SERPL-MCNC: 386 UG/DL (ref 155–355)
WBC # BLD AUTO: 6 THOUSAND/UL (ref 4.31–10.16)

## 2024-05-15 PROCEDURE — 80061 LIPID PANEL: CPT

## 2024-05-15 PROCEDURE — 83036 HEMOGLOBIN GLYCOSYLATED A1C: CPT

## 2024-05-15 PROCEDURE — 82306 VITAMIN D 25 HYDROXY: CPT

## 2024-05-15 PROCEDURE — 85025 COMPLETE CBC W/AUTO DIFF WBC: CPT

## 2024-05-15 PROCEDURE — 84439 ASSAY OF FREE THYROXINE: CPT

## 2024-05-15 PROCEDURE — 84480 ASSAY TRIIODOTHYRONINE (T3): CPT

## 2024-05-15 PROCEDURE — 84443 ASSAY THYROID STIM HORMONE: CPT

## 2024-05-15 PROCEDURE — 83540 ASSAY OF IRON: CPT

## 2024-05-15 PROCEDURE — 36415 COLL VENOUS BLD VENIPUNCTURE: CPT

## 2024-05-15 PROCEDURE — 80053 COMPREHEN METABOLIC PANEL: CPT

## 2024-05-15 PROCEDURE — 83550 IRON BINDING TEST: CPT

## 2024-05-15 PROCEDURE — 76981 USE PARENCHYMA: CPT

## 2024-05-15 RX ORDER — LIDOCAINE 50 MG/G
PATCH TOPICAL
Qty: 90 PATCH | Refills: 1 | Status: SHIPPED | OUTPATIENT
Start: 2024-05-15

## 2024-05-15 NOTE — PSYCH
"Behavioral Health Psychotherapy Progress Note  Behavioral Health Psychotherapy Progress Note    Psychotherapy Provided: Individual Psychotherapy     1. Post-traumatic stress        2. Moderate episode of recurrent major depressive disorder (HCC)        3. Binge-eating disorder, moderate            Goals addressed in session: Goal 1 and Goal 2     DATA: The Client reported that her Daughter had a miscarriage over the weekend, which caused an increase in depression and anxiety . During the session we explored and processed her grief with the client being able to identify that she is in the depressive phase over grief over the loss. During the session we explored and processed coping skills.   During this session, this clinician used the following therapeutic modalities: Cognitive Behavioral Therapy, Mindfulness-based Strategies, Solution-Focused Therapy, and Supportive Psychotherapy    Substance Abuse was not addressed during this session. If the client is diagnosed with a co-occurring substance use disorder, please indicate any changes in the frequency or amount of use: n/a. Stage of change for addressing substance use diagnoses: No substance use/Not applicable    ASSESSMENT:  Heydi Ferrell presents with a Anxious and Depressed mood.     her affect is Normal range and intensity, which is congruent, with her mood and the content of the session. The client has made progress on their goals.    The client  Heydi Ferrell presents with a none risk of suicide, none risk of self-harm, and none risk of harm to others.    For any risk assessment that surpasses a \"low\" rating, a safety plan must be developed.    A safety plan was indicated: no  If yes, describe in detail n/a    PLAN: Between sessions, Heydi Ferrell will practice his coping skills when presented with anxiety and or depression. At the next session, the therapist will use Cognitive Behavioral Therapy, Mindfulness-based Strategies, Solution-Focused Therapy, and " Supportive Psychotherapy to address the client's MH issues affecting her different relationships and environments.    Behavioral Health Treatment Plan and Discharge Planning: Heyid Ferrell is aware of and agrees to continue to work on their treatment plan. They have identified and are working toward their discharge goals. yes    Visit start and stop times:    05/17/24  Start Time: 0903  Stop Time: 1015  Total Visit Time: 72 minutes

## 2024-05-16 ENCOUNTER — TELEPHONE (OUTPATIENT)
Age: 59
End: 2024-05-16

## 2024-05-16 NOTE — TELEPHONE ENCOUNTER
[facility] CARE ORDER  # [phone]   REF # [id number]    Pharmacy needs the directions from the Dr on how they want the patient to use the: lidocaine (LIDODERM) 5 %     Please call: [phone]  Ref # [id number]

## 2024-05-16 NOTE — TELEPHONE ENCOUNTER
Patient called to verify that he doctor clarified the directions on the lidocaine patches. Advised patient directions were confirmed with the pharmacy.

## 2024-05-17 ENCOUNTER — SOCIAL WORK (OUTPATIENT)
Dept: BEHAVIORAL/MENTAL HEALTH CLINIC | Facility: CLINIC | Age: 59
End: 2024-05-17
Payer: COMMERCIAL

## 2024-05-17 DIAGNOSIS — F50.81 BINGE-EATING DISORDER, MODERATE: ICD-10-CM

## 2024-05-17 DIAGNOSIS — F33.1 MODERATE EPISODE OF RECURRENT MAJOR DEPRESSIVE DISORDER (HCC): ICD-10-CM

## 2024-05-17 DIAGNOSIS — F43.10 POST-TRAUMATIC STRESS: Primary | ICD-10-CM

## 2024-05-17 PROCEDURE — 90837 PSYTX W PT 60 MINUTES: CPT | Performed by: SOCIAL WORKER

## 2024-06-03 NOTE — PSYCH
"Behavioral Health Psychotherapy Progress Note    Psychotherapy Provided: Individual Psychotherapy     1. Post-traumatic stress        2. Moderate episode of recurrent major depressive disorder (HCC)            Goals addressed in session: Goal 1 and Goal 2     DATA: The Client reported problems in her relationship with her Daughter's over their lack of economic stability. During the session we explored and processed the weaknesses in her relationships. The client was able to verbalize that she is intertwined within her relationship with her daughters which has caused increase in her MH issues. As team we explored boundaries in her relationships as well as her coping skills.   During this session, this clinician used the following therapeutic modalities: Cognitive Behavioral Therapy, Mindfulness-based Strategies, Solution-Focused Therapy, and Supportive Psychotherapy    Substance Abuse was not addressed during this session. If the client is diagnosed with a co-occurring substance use disorder, please indicate any changes in the frequency or amount of use: none. Stage of change for addressing substance use diagnoses: No substance use/Not applicable    ASSESSMENT:  Heydi Ferrell presents with a Anxious and Depressed mood.     her affect is Normal range and intensity, which is congruent, with her mood and the content of the session. The client has made progress on their goals.    The client  Heydi Ferrell presents with a none risk of suicide, none risk of self-harm, and none risk of harm to others.    For any risk assessment that surpasses a \"low\" rating, a safety plan must be developed.    A safety plan was indicated: no  If yes, describe in detail n/a    PLAN: Between sessions, Heydi Ferrell will utilize her coping skills when presented with anxiety and or depression. At the next session, the therapist will use Cognitive Behavioral Therapy, Mindfulness-based Strategies, Solution-Focused Therapy, and Supportive " Psychotherapy to address the client's MH issues affecting her different relationships and environments.    Behavioral Health Treatment Plan and Discharge Planning: Heydi Ferrell is aware of and agrees to continue to work on their treatment plan. They have identified and are working toward their discharge goals. yes    Visit start and stop times:    06/04/24  Start Time: 0830  Stop Time: 0930  Total Visit Time: 60 minutes

## 2024-06-04 ENCOUNTER — SOCIAL WORK (OUTPATIENT)
Dept: BEHAVIORAL/MENTAL HEALTH CLINIC | Facility: CLINIC | Age: 59
End: 2024-06-04

## 2024-06-04 DIAGNOSIS — F43.10 POST-TRAUMATIC STRESS: Primary | ICD-10-CM

## 2024-06-04 DIAGNOSIS — F33.1 MODERATE EPISODE OF RECURRENT MAJOR DEPRESSIVE DISORDER (HCC): ICD-10-CM

## 2024-06-06 NOTE — TELEPHONE ENCOUNTER
S/W PT AND MOVED HER PROCEDURE WITH DR Jarett Zuluaga UP TO 9/2 SINCE I HAD AN OPENING  PT'S F/U HAS ALSO BEEN CHANGED TO 9/30  Statement Selected

## 2024-06-09 NOTE — PSYCH
"Behavioral Health Psychotherapy Progress Note    Psychotherapy Provided: Individual Psychotherapy     1. Post-traumatic stress        2. Binge-eating disorder, moderate        3. Moderate episode of recurrent major depressive disorder (HCC)            Goals addressed in session: Goal 1 and Goal 2     DATA: The client reported that she fell in the shower, and is having extreme pain. She was seen by her PCP  and was found not to have any broken bones but is having muscular spasms, and will be reassessed in the future. As team we explored the client setting healthy boundaries in her relationships as team we explored the concept of compassion fatigue and the causes of burnout. Coping skills were reviewed during the session.  During this session, this clinician used the following therapeutic modalities: Client-centered Therapy, Cognitive Behavioral Therapy, Mindfulness-based Strategies, Solution-Focused Therapy, and Supportive Psychotherapy    Substance Abuse was not addressed during this session. If the client is diagnosed with a co-occurring substance use disorder, please indicate any changes in the frequency or amount of use: n/a. Stage of change for addressing substance use diagnoses: No substance use/Not applicable    ASSESSMENT:  Heydi Ferrell presents with a Anxious and Depressed mood.     her affect is Normal range and intensity, which is congruent, with her mood and the content of the session. The client has made progress on their goals.    The client  Heydi Ferrell presents with a none risk of suicide, none risk of self-harm, and none risk of harm to others.    For any risk assessment that surpasses a \"low\" rating, a safety plan must be developed.    A safety plan was indicated: no  If yes, describe in detail n/a  Phone number for National Suicide Prevention Life Line was given to the Client/ [phone] /988  PSYCH MENTAL STATUS PAIN 9 as reported by the client due to her recent fall \" I feel so stupid due " "to the fall\"   PHYSICAL PAIN SCALE NUMBERS: 10 as reported by the client due to her fall   PLAN: Between sessions, Heydi Ferrell will Read articles on compassion fatigue and the coping skill of yogi breathing.   . At the next session, the therapist will use Cognitive Behavioral Therapy, Mindfulness-based Strategies, Solution-Focused Therapy, and Supportive Psychotherapy to address the client's MH Issues affecting the client's different relationships and environments .    Behavioral Health Treatment Plan and Discharge Planning: Heydi Ferrell is aware of and agrees to continue to work on their treatment plan. They have identified and are working toward their discharge goals. yes    Visit start and stop times:    06/11/24  Start Time: 1001  Stop Time: 1054  Total Visit Time: 53 minutes  "

## 2024-06-10 NOTE — ASSESSMENT & PLAN NOTE
HR=80 bpm, ITUD=557/57 mmhg, SpO2=99.0 %, Resp=11 B/min, EtCO2=26 mmHg, Apnea=2 Seconds, Pain=0, Kacy=2, Comment=NSR 2Lo2NC Lab Results   Component Value Date    HGBA1C 11 3 (H) 05/11/2022   Continue home insulin  Sliding scale insulin

## 2024-06-11 ENCOUNTER — SOCIAL WORK (OUTPATIENT)
Dept: BEHAVIORAL/MENTAL HEALTH CLINIC | Facility: CLINIC | Age: 59
End: 2024-06-11
Payer: COMMERCIAL

## 2024-06-11 DIAGNOSIS — F50.81 BINGE-EATING DISORDER, MODERATE: ICD-10-CM

## 2024-06-11 DIAGNOSIS — F43.10 POST-TRAUMATIC STRESS: Primary | ICD-10-CM

## 2024-06-11 DIAGNOSIS — F33.1 MODERATE EPISODE OF RECURRENT MAJOR DEPRESSIVE DISORDER (HCC): ICD-10-CM

## 2024-06-11 PROCEDURE — 90837 PSYTX W PT 60 MINUTES: CPT | Performed by: SOCIAL WORKER

## 2024-06-17 NOTE — PSYCH
"Behavioral Health Psychotherapy Progress Note    Psychotherapy Provided: Individual Psychotherapy     1. Post-traumatic stress        2. Moderate episode of recurrent major depressive disorder (HCC)        3. Binge-eating disorder, moderate            Goals addressed in session: Goal 1 and Goal 2     DATA: The client reported that her left breast is leaking, which has caused an increase in her anxiety and frustration. She reported that this has led to an increase in her isolation and anger towards her family. \" I am angry towards everyone\" During the session we explored and processed the client's triggers to her anger. She was able to verbalize continued problems with her physical health as being her major triggers.  Boundaries were reviewed during the session within her personal relationships to prevent an increase in her negative moods. Coping skills were reviewed during the session.   During this session, this clinician used the following therapeutic modalities: Cognitive Behavioral Therapy, Mindfulness-based Strategies, Solution-Focused Therapy, and Supportive Psychotherapy    Substance Abuse was not addressed during this session. If the client is diagnosed with a co-occurring substance use disorder, please indicate any changes in the frequency or amount of use: n/a. Stage of change for addressing substance use diagnoses: No substance use/Not applicable    ASSESSMENT:  Heydi Ferrell presents with a Anxious and Depressed mood.     her affect is Normal range and intensity, which is congruent, with her mood and the content of the session. The client has made progress on their goals.    The client  Heydi Ferrell presents with a none risk of suicide, none risk of self-harm, and none risk of harm to others.    For any risk assessment that surpasses a \"low\" rating, a safety plan must be developed.    A safety plan was indicated: no  If yes, describe in detail n/a    PLAN: Between sessions, Heydi Ferrell will utilize " her coping skills to prevent an increase conflict and negative moods in her different relationships and environments. . At the next session, the therapist will use Cognitive Behavioral Therapy, Mindfulness-based Strategies, Solution-Focused Therapy, and Supportive Psychotherapy to address the client's MH issues and the effects on her different relationships and environments .    Behavioral Health Treatment Plan and Discharge Planning: Heydi Ferrell is aware of and agrees to continue to work on their treatment plan. They have identified and are working toward their discharge goals. yes    Visit start and stop times:    06/18/24  Start Time: 0955  Stop Time: 1052  Total Visit Time: 57 minutes

## 2024-06-18 ENCOUNTER — SOCIAL WORK (OUTPATIENT)
Dept: BEHAVIORAL/MENTAL HEALTH CLINIC | Facility: CLINIC | Age: 59
End: 2024-06-18
Payer: COMMERCIAL

## 2024-06-18 DIAGNOSIS — F43.10 POST-TRAUMATIC STRESS: Primary | ICD-10-CM

## 2024-06-18 DIAGNOSIS — F33.1 MODERATE EPISODE OF RECURRENT MAJOR DEPRESSIVE DISORDER (HCC): ICD-10-CM

## 2024-06-18 DIAGNOSIS — F50.81 BINGE-EATING DISORDER, MODERATE: ICD-10-CM

## 2024-06-18 PROCEDURE — 90837 PSYTX W PT 60 MINUTES: CPT | Performed by: SOCIAL WORKER

## 2024-07-02 ENCOUNTER — SOCIAL WORK (OUTPATIENT)
Dept: BEHAVIORAL/MENTAL HEALTH CLINIC | Facility: CLINIC | Age: 59
End: 2024-07-02
Payer: COMMERCIAL

## 2024-07-02 DIAGNOSIS — F33.1 MODERATE EPISODE OF RECURRENT MAJOR DEPRESSIVE DISORDER (HCC): ICD-10-CM

## 2024-07-02 DIAGNOSIS — F43.10 POST-TRAUMATIC STRESS: Primary | ICD-10-CM

## 2024-07-02 PROCEDURE — 90837 PSYTX W PT 60 MINUTES: CPT | Performed by: SOCIAL WORKER

## 2024-07-02 NOTE — PSYCH
"Behavioral Health Psychotherapy Progress Note    Psychotherapy Provided: Individual Psychotherapy     1. Post-traumatic stress        2. Moderate episode of recurrent major depressive disorder (HCC)            Goals addressed in session: Goal 1 and Goal 2     DATA: The client reported that she was awarded her SSA due to her physical and mental disabilities, which will help with the family economic situation. She reported continued problems within her relationship with her  which as caused continued anxiety and depression. \"Bill is still arguing with me over everything... He was off and it is all about money\" As team the client and this therapist  we explored healthy boundaries through the development of a growth mindset exploring her personal challenges and opportunities.   During this session, this clinician used the following therapeutic modalities: Cognitive Behavioral Therapy, Mindfulness-based Strategies, Solution-Focused Therapy, and Supportive Psychotherapy    Substance Abuse was addressed during this session. If the client is diagnosed with a co-occurring substance use disorder, please indicate any changes in the frequency or amount of use: none. Stage of change for addressing substance use diagnoses: No substance use/Not applicable    ASSESSMENT:  Heydi Ferrell presents with a Anxious and Depressed mood.     her affect is Normal range and intensity, which is congruent, with her mood and the content of the session. The client has made progress on their goals.    The Client  Heydi Ferrell presents with a none risk of suicide, none risk of self-harm, and none risk of harm to others.    For any risk assessment that surpasses a \"low\" rating, a safety plan must be developed.    A safety plan was indicated: no  If yes, describe in detail n/a    PLAN: Between sessions, Heydi Ferrell will practice her coping skills when presented with anxiety and or depression. At the next session, the therapist will use " Cognitive Behavioral Therapy, Mindfulness-based Strategies, Solution-Focused Therapy, and Supportive Psychotherapy to address the client's MH issues affecting her different relationships and environments.    Behavioral Health Treatment Plan and Discharge Planning: Heydi Ghassan is aware of and agrees to continue to work on their treatment plan. They have identified and are working toward their discharge goals. yes    Visit start and stop times:    07/02/24  Start Time: 1035  Stop Time: 1130  Total Visit Time: 55 minutes

## 2024-07-11 ENCOUNTER — APPOINTMENT (EMERGENCY)
Dept: RADIOLOGY | Facility: HOSPITAL | Age: 59
End: 2024-07-11
Payer: COMMERCIAL

## 2024-07-11 ENCOUNTER — HOSPITAL ENCOUNTER (EMERGENCY)
Facility: HOSPITAL | Age: 59
Discharge: HOME | End: 2024-07-11
Attending: EMERGENCY MEDICINE
Payer: COMMERCIAL

## 2024-07-11 VITALS
SYSTOLIC BLOOD PRESSURE: 180 MMHG | TEMPERATURE: 98.2 F | OXYGEN SATURATION: 97 % | DIASTOLIC BLOOD PRESSURE: 79 MMHG | RESPIRATION RATE: 19 BRPM | HEART RATE: 89 BPM
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
## 2024-07-11 DIAGNOSIS — M25.562 ACUTE PAIN OF LEFT KNEE: Primary | ICD-10-CM

## 2024-07-11 PROCEDURE — 99284 EMERGENCY DEPT VISIT MOD MDM: CPT

## 2024-07-11 PROCEDURE — 73564 X-RAY EXAM KNEE 4 OR MORE: CPT

## 2024-07-11 PROCEDURE — 99284 EMERGENCY DEPT VISIT MOD MDM: CPT | Performed by: EMERGENCY MEDICINE

## 2024-07-11 RX ORDER — NAPROXEN 375 MG/1
375 TABLET ORAL 2 TIMES DAILY WITH MEALS
Qty: 20 TABLET | Refills: 0 | Status: SHIPPED | OUTPATIENT
Start: 2024-07-11

## 2024-07-11 RX ORDER — IBUPROFEN 600 MG/1
600 TABLET ORAL ONCE
Status: COMPLETED | OUTPATIENT
Start: 2024-07-11 | End: 2024-07-11

## 2024-07-11 RX ADMIN — IBUPROFEN 600 MG: 600 TABLET, FILM COATED ORAL at 03:36

## 2024-07-11 NOTE — DISCHARGE INSTRUCTIONS
Thank you for visiting the Emergency Department today.    No acute abnormality on X-ray.   No exam signs of an obvious internal knee injury.   Symptoms should improve with time    Plan:  1) ACE wrap as needed while active, remove at night or when resting  2) ICE to front and back of knee 20min at a time, every 1-2 hours  3) Tylenol 500-1000mg for pain  4) Ibuprofen 400mg or Naproxen for pain   5) See orthopedics if symptoms worsen or do not improve (referral placed, call for appointment)
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
## 2024-07-16 NOTE — PSYCH
"Behavioral Health Psychotherapy Progress Note    Psychotherapy Provided: Individual Psychotherapy     1. Post-traumatic stress        2. Binge-eating disorder, moderate        3. Moderate episode of recurrent major depressive disorder (HCC)            Goals addressed in session: Goal 1 and Goal 2     DATA: The client reported that she feel and hurt her right knee, and was seen in the ED and by her PCP. She reported and increase in her conflict with her  which has led to an increase in her anxiety and depression. During the session we explored and processed the concept of love language and how the client  and her  have different types of love languages, which is one of the core reasons for their conflict in their relationship. As team we explored communication and boundaries in her relationship with her  as well as her coping skills.   During this session, this clinician used the following therapeutic modalities: Cognitive Behavioral Therapy, Mindfulness-based Strategies, Solution-Focused Therapy, and Supportive Psychotherapy    Substance Abuse was addressed during this session. If the client is diagnosed with a co-occurring substance use disorder, please indicate any changes in the frequency or amount of use: none . Stage of change for addressing substance use diagnoses: No substance use/Not applicable    ASSESSMENT:  Heydi Ferrell presents with a Anxious and Depressed mood.     her affect is Normal range and intensity, which is congruent, with her mood and the content of the session. The client has made progress on their goals.    The client  Heydi Ferrell presents with a none risk of suicide, none risk of self-harm, and none risk of harm to others.    For any risk assessment that surpasses a \"low\" rating, a safety plan must be developed.    A safety plan was indicated: no  If yes, describe in detail n/a    PLAN: Between sessions, Heydi Ferrell will review with her partner worksheet on life " languages. At the next session, the therapist will use Cognitive Behavioral Therapy, Mindfulness-based Strategies, Solution-Focused Therapy, and Supportive Psychotherapy to address the client's MH issues affecting her different relationships and environments.    Behavioral Health Treatment Plan and Discharge Planning: Heydi Ferrell is aware of and agrees to continue to work on their treatment plan. They have identified and are working toward their discharge goals. yes    Visit start and stop times:    07/17/24  Start Time: 0930  Stop Time: 1035  Total Visit Time: 65 minutes

## 2024-07-17 ENCOUNTER — SOCIAL WORK (OUTPATIENT)
Dept: BEHAVIORAL/MENTAL HEALTH CLINIC | Facility: CLINIC | Age: 59
End: 2024-07-17

## 2024-07-17 DIAGNOSIS — F43.10 POST-TRAUMATIC STRESS: Primary | ICD-10-CM

## 2024-07-17 DIAGNOSIS — F33.1 MODERATE EPISODE OF RECURRENT MAJOR DEPRESSIVE DISORDER (HCC): ICD-10-CM

## 2024-07-17 DIAGNOSIS — F50.81 BINGE-EATING DISORDER, MODERATE: ICD-10-CM

## 2024-08-06 ENCOUNTER — SOCIAL WORK (OUTPATIENT)
Dept: BEHAVIORAL/MENTAL HEALTH CLINIC | Facility: CLINIC | Age: 59
End: 2024-08-06
Payer: COMMERCIAL

## 2024-08-06 DIAGNOSIS — F33.1 MODERATE EPISODE OF RECURRENT MAJOR DEPRESSIVE DISORDER (HCC): ICD-10-CM

## 2024-08-06 DIAGNOSIS — F43.10 POST-TRAUMATIC STRESS: Primary | ICD-10-CM

## 2024-08-06 DIAGNOSIS — F50.81 BINGE-EATING DISORDER, MODERATE: ICD-10-CM

## 2024-08-06 PROCEDURE — 90837 PSYTX W PT 60 MINUTES: CPT | Performed by: SOCIAL WORKER

## 2024-08-06 NOTE — PSYCH
"Behavioral Health Psychotherapy Progress Note    Psychotherapy Provided: Individual Psychotherapy     1. Post-traumatic stress        2. Binge-eating disorder, moderate        3. Moderate episode of recurrent major depressive disorder (HCC)            Goals addressed in session: Creation of Recovery Treatment Plan    DATA: The client reported that she received her back payment from [employer] and will be saving the money for future house needs. As team we explored and processed The client's treatment plan goals set in her treatment plan goal. Over the last treatment plan ,the client has made significant growth in her treatment plan goals. During the session we created her new treatment plan and conducted screenings on depression and trauma. Coping skills were reviewed during the session.   During this session, this clinician used the following therapeutic modalities: Cognitive Behavioral Therapy, Mindfulness-based Strategies, Solution-Focused Therapy, and Supportive Psychotherapy    Substance Abuse was addressed during this session. If the client is diagnosed with a co-occurring substance use disorder, please indicate any changes in the frequency or amount of use: none reported. Stage of change for addressing substance use diagnoses: No substance use/Not applicable    ASSESSMENT:  Heydi Ferrell presents with a Anxious and Depressed mood.     her affect is Normal range and intensity, which is congruent, with her mood and the content of the session. The client has made progress on their goals.    The client  Heydi Ferrell presents with a none risk of suicide, none risk of self-harm, and none risk of harm to others.    For any risk assessment that surpasses a \"low\" rating, a safety plan must be developed.    A safety plan was indicated: no  If yes, describe in detail n/a    PLAN: Between sessions, Heydi Ferrell will Practice coping skills when presented with anxiety and or depression. At the next session, the therapist will " use Cognitive Behavioral Therapy, Mindfulness-based Strategies, Solution-Focused Therapy, and Supportive Psychotherapy to address the client's MH issues affecting different relationships and environments.    Behavioral Health Treatment Plan and Discharge Planning: Heydi Ferrell is aware of and agrees to continue to work on their treatment plan. They have identified and are working toward their discharge goals. yes    Visit start and stop times:    08/06/24  Start Time: 1005  Stop Time: 1100  Total Visit Time: 55 minutes  Treatment Plan Tracking    # 5 Treatment Plan not completed within required time limits due to:  Appointment had to be cancelled by therapist due to family issues .

## 2024-08-06 NOTE — BH TREATMENT PLAN
"Outpatient Behavioral Health Psychotherapy Treatment Plan     Heydi Ferrell  1965      Date of Initial Psychotherapy Assessment: 7/22/22  Date of Current Treatment Plan: 08/06/24  Treatment Plan Target Date: 03/08/24  Treatment Plan Expiration Date: 02/06/25     Diagnosis:   1. Moderate episode of recurrent major depressive disorder (HCC)          2. Post-traumatic stress                Area(s) of Need:The client reported that she would like to address her depression, and trauma history that is effecting her different relationships and environments. It is hoped that by addressing her weaknesses the Client  will achieve or maintain maximum functional capacity in performing daily activizes, taking into account both the functional capacity of the individual and those functional capacities appropriate for the individuals of the same age. Reduce or ameliorate the physical mental, behavioral, or developmental effects of an illness, condition, injury or disability. Present treatment plan will cover 6 months or completion of recovery treatment plan goals whichever comes first      Long Term Goal 1 (in the client's own words): \" My depression is bad\"      Stage of Change: Action     Target Date for completion: 03/07/2024             Anticipated therapeutic modalities: Supportive Therapy, Strengths Therapy,  and Cognitive Behavioral Therapy will be the  treatment modalities that will be utilized during the session.             People identified to complete this goal: The client her support team and this therapist                     Objective 1: (identify the means of measuring success in meeting the objective):The Client's depression score on the PHQ-9 will decrease from 18 to 9 or less ( Client's score on the PHQ-9 client's decreased by 2 points over her original screening and marked decrease from her previous screening. 08/06/24)                       Objective 2: (identify the means of measuring success in meeting " "the objective): The Client will utilize her coping skills in addressing her depression when presented 4  out of 7 times. (emerging Reviewed on 08/09/24)      Long Term Goal 2 (in the client's own words): \" My trauma is not good\"      Stage of Change: Action     Target Date for completion: 03/07/2024             Anticipated therapeutic modalities: Supportive Therapy, Strengths Therapy, seeking Safety, CPT  and Cognitive Behavioral Therapy will be the  treatment modalities that will be utilized during the session.             People identified to complete this goal: The client her support team and this therapist                     Objective 1: (identify the means of measuring success in meeting the objective): The Client's trauma score on her PCL-C will decrease from 69 to 25 or less (emerging/decreased by  21 points over original screening)                     Objective 2: (identify the means of measuring success in meeting the objective): The Client will utilize her coping skills when presented to address her triggers 4 out 7 times when presented. (emerging Reviewed on 08/06/24)      Long Term Goal 3 (in the client's own words): \" let's work on my medical appointment\"      Stage of Change: Action     Target Date for completion:  03/07/2024             Anticipated therapeutic modalities: Supportive Therapy, Strengths Therapy, and CBTwill be the  treatment modalities that will be utilized during the session.                People identified to complete this goal: The client her support team and this therapist                     Objective 1: (identify the means of measuring success in meeting the objective): The Client all of her medical appointments 100% of  time (emerging 08/06/24)                   I am currently under the care of a [facility] psychiatric provider: yes     My [facility] psychiatric provider is:   Kerri CARNEY LCSW at [facility] for MH therapy and for " "Psychiatric medication with PCP Madison Boyd     I am currently taking psychiatric medications: Yes, as prescribed     I feel that I will be ready for discharge from mental health care when I reach the following (measurable goal/objective): \" who knows\"      For children and adults who have a legal guardian:          Has there been any change to custody orders and/or guardianship status? NA. If yes, attach updated documentation.     I have created my Crisis Plan and have been offered a copy of this plan     Behavioral Health Treatment Plan [facility]: Diagnosis and Treatment Plan explained to Heydi Ferrell acknowledges an understanding of their diagnosis. Heydi Ferrell agrees to this treatment plan.     I have been offered a copy of this Treatment Plan. Yes        "

## 2024-08-28 ENCOUNTER — SOCIAL WORK (OUTPATIENT)
Dept: BEHAVIORAL/MENTAL HEALTH CLINIC | Facility: CLINIC | Age: 59
End: 2024-08-28
Payer: COMMERCIAL

## 2024-08-28 DIAGNOSIS — F50.81 BINGE-EATING DISORDER, MODERATE: ICD-10-CM

## 2024-08-28 DIAGNOSIS — F33.1 MODERATE EPISODE OF RECURRENT MAJOR DEPRESSIVE DISORDER (HCC): ICD-10-CM

## 2024-08-28 DIAGNOSIS — F43.10 POST-TRAUMATIC STRESS: Primary | ICD-10-CM

## 2024-08-28 PROCEDURE — 90837 PSYTX W PT 60 MINUTES: CPT | Performed by: SOCIAL WORKER

## 2024-08-28 NOTE — PSYCH
"Behavioral Health Psychotherapy Progress Note    Psychotherapy Provided: Individual Psychotherapy     No diagnosis found.    Goals addressed in session: Goal 1 and Goal 2     DATA: The client reported that her and her  are still having issues within their relationships. As team we explored ways to increase the client's relationship strengths as a way to prevent her mental health symptoms. Coping skills were reviewed during the session to address her mental health symptoms that are affecting her different relationships and environments  During this session, this clinician used the following therapeutic modalities: Cognitive Behavioral Therapy, Mindfulness-based Strategies, Solution-Focused Therapy, and Supportive Psychotherapy    Substance Abuse was addressed during this session. If the client is diagnosed with a co-occurring substance use disorder, please indicate any changes in the frequency or amount of use: none. Stage of change for addressing substance use diagnoses: No substance use/Not applicable    ASSESSMENT:  Heydi Ferrell presents with a Anxious and Depressed mood.     her affect is Normal range and intensity, which is congruent, with her mood and the content of the session. The client has made progress on their goals.     Heydi Ferrell presents with a none risk of suicide, none risk of self-harm, and none risk of harm to others.    For any risk assessment that surpasses a \"low\" rating, a safety plan must be developed.    A safety plan was indicated: no  If yes, describe in detail n/a    PLAN: Between sessions, Heydi Ferrell will set up activities for her family in the community. At the next session, the therapist will use Cognitive Behavioral Therapy, Mindfulness-based Strategies, Solution-Focused Therapy, and Supportive Psychotherapy to address the Client's MH issues affecting different relationships and environments.    Behavioral Health Treatment Plan and Discharge Planning: Heydi Ferrell is " aware of and agrees to continue to work on their treatment plan. They have identified and are working toward their discharge goals. yes    Visit start and stop times:    08/28/24

## 2024-09-13 ENCOUNTER — SOCIAL WORK (OUTPATIENT)
Dept: BEHAVIORAL/MENTAL HEALTH CLINIC | Facility: CLINIC | Age: 59
End: 2024-09-13
Payer: COMMERCIAL

## 2024-09-13 DIAGNOSIS — F50.81 BINGE-EATING DISORDER, MODERATE: ICD-10-CM

## 2024-09-13 DIAGNOSIS — F33.1 MODERATE EPISODE OF RECURRENT MAJOR DEPRESSIVE DISORDER (HCC): ICD-10-CM

## 2024-09-13 DIAGNOSIS — F43.10 POST-TRAUMATIC STRESS: Primary | ICD-10-CM

## 2024-09-13 PROCEDURE — 90837 PSYTX W PT 60 MINUTES: CPT | Performed by: SOCIAL WORKER

## 2024-09-13 NOTE — PSYCH
"Behavioral Health Psychotherapy Progress Note    Psychotherapy Provided: Individual Psychotherapy     1. Post-traumatic stress        2. Binge-eating disorder, moderate        3. Moderate episode of recurrent major depressive disorder (HCC)            Goals addressed in session: Goal 1 and Goal 2     DATA: The client reported continued issues within her relationship with her Partner, which has caused continued anxiety, depression, and anger situations. During the sessio we explored better decision making surrounding her over-thinking, exploring confronting difficult decisions rather than avoiding them. Coping skills were reviewed during the session  During this session, this clinician used the following therapeutic modalities: Client-centered Therapy, Cognitive Behavioral Therapy, Mindfulness-based Strategies, Solution-Focused Therapy, and Supportive Psychotherapy    Substance Abuse was addressed during this session. If the client is diagnosed with a co-occurring substance use disorder, please indicate any changes in the frequency or amount of use: none. Stage of change for addressing substance use diagnoses: No substance use/Not applicable    ASSESSMENT:  Heydi Ferrell presents with a Anxious and Depressed mood.     her affect is Normal range and intensity, which is congruent, with her mood and the content of the session. The client has made progress on their goals.    The client  Heydi Ferrell presents with a none risk of suicide, none risk of self-harm, and none risk of harm to others.    For any risk assessment that surpasses a \"low\" rating, a safety plan must be developed.    A safety plan was indicated: no  If yes, describe in detail n/a    PLAN: Between sessions, Heydi Ferrell will practice coping skills when presented with anxiety and or depression. At the next session, the therapist will use Cognitive Behavioral Therapy, Mindfulness-based Strategies, Solution-Focused Therapy, and Supportive Psychotherapy to " address the client's MH issues affecting different relationships and environments.    Behavioral Health Treatment Plan and Discharge Planning: Heydi Ferrell is aware of and agrees to continue to work on their treatment plan. They have identified and are working toward their discharge goals. yes    Visit start and stop times:    09/13/24  Start Time: 0902  Stop Time: 0955  Total Visit Time: 53 minutes

## 2024-09-21 DIAGNOSIS — Z00.6 ENCOUNTER FOR EXAMINATION FOR NORMAL COMPARISON OR CONTROL IN CLINICAL RESEARCH PROGRAM: ICD-10-CM

## 2024-09-25 ENCOUNTER — SOCIAL WORK (OUTPATIENT)
Dept: BEHAVIORAL/MENTAL HEALTH CLINIC | Facility: CLINIC | Age: 59
End: 2024-09-25
Payer: COMMERCIAL

## 2024-09-25 DIAGNOSIS — F50.811 BINGE-EATING DISORDER, MODERATE: ICD-10-CM

## 2024-09-25 DIAGNOSIS — F33.1 MODERATE EPISODE OF RECURRENT MAJOR DEPRESSIVE DISORDER (HCC): ICD-10-CM

## 2024-09-25 DIAGNOSIS — F50.81 BINGE-EATING DISORDER, MODERATE: ICD-10-CM

## 2024-09-25 DIAGNOSIS — F43.10 POST-TRAUMATIC STRESS: Primary | ICD-10-CM

## 2024-09-25 PROCEDURE — 90837 PSYTX W PT 60 MINUTES: CPT | Performed by: SOCIAL WORKER

## 2024-09-25 NOTE — PSYCH
"Behavioral Health Psychotherapy Progress Note    Psychotherapy Provided: Individual Psychotherapy     1. Post-traumatic stress        2. Binge-eating disorder, moderate        3. Moderate episode of recurrent major depressive disorder (HCC)            Goals addressed in session: Goal 1 and Goal 2     DATA: The client reported that she met with [facility] oncology department, and will have her left breast removed due to infection of the breast. During the session we explored the client loss and her increased depression. Stages of grief was explored during the session with the client being able to verbalize that she has seen an increase in her depression. Coping and self care skills were reviewed during the session.  During this session, this clinician used the following therapeutic modalities: Cognitive Behavioral Therapy, Mindfulness-based Strategies, Solution-Focused Therapy, and Supportive Psychotherapy    Substance Abuse was addressed during this session. If the client is diagnosed with a co-occurring substance use disorder, please indicate any changes in the frequency or amount of use: none. Stage of change for addressing substance use diagnoses: No substance use/Not applicable    ASSESSMENT:  Heydi Ferrell presents with a Anxious and Depressed mood.     her affect is Normal range and intensity, which is congruent, with her mood and the content of the session. The client has made progress on their goals.    The client  Heydi Ferrell presents with a none risk of suicide, none risk of self-harm, and none risk of harm to others.    For any risk assessment that surpasses a \"low\" rating, a safety plan must be developed.    A safety plan was indicated: no  If yes, describe in detail n/a    PLAN: Between sessions, Heydi Ferrell will practice coping skills when presented with anxiety and or depression. At the next session, the therapist will use Cognitive Behavioral Therapy, Mindfulness-based Strategies, Solution-Focused " Therapy, and Supportive Psychotherapy to address the client's MH issues affecting different relationships and environments.    Behavioral Health Treatment Plan and Discharge Planning: Heydi Ferrell is aware of and agrees to continue to work on their treatment plan. They have identified and are working toward their discharge goals. yes    Visit start and stop times:    09/25/24

## 2024-10-03 ENCOUNTER — SOCIAL WORK (OUTPATIENT)
Dept: BEHAVIORAL/MENTAL HEALTH CLINIC | Facility: CLINIC | Age: 59
End: 2024-10-03
Payer: COMMERCIAL

## 2024-10-03 DIAGNOSIS — F43.10 POST-TRAUMATIC STRESS: Primary | ICD-10-CM

## 2024-10-03 DIAGNOSIS — F33.1 MODERATE EPISODE OF RECURRENT MAJOR DEPRESSIVE DISORDER (HCC): ICD-10-CM

## 2024-10-03 DIAGNOSIS — F50.811 BINGE-EATING DISORDER, MODERATE: ICD-10-CM

## 2024-10-03 PROCEDURE — 90837 PSYTX W PT 60 MINUTES: CPT | Performed by: SOCIAL WORKER

## 2024-10-03 NOTE — PSYCH
"Behavioral Health Psychotherapy Progress Note    Psychotherapy Provided: Individual Psychotherapy     1. Post-traumatic stress        2. Binge-eating disorder, moderate        3. Moderate episode of recurrent major depressive disorder (HCC)            Goals addressed in session: Goal 1 and Goal 2     DATA: The client reported that she is feeling \"defeated\" due to her continued physical health issues. During the session we explored and processed the client's increased physical and mental health, to help with the weaknesses she has been encountered. In her different relationships and environments.  As team we explored and processed the client's cognitive distortions and ways to address these weaknesses to increase personal strengths  During this session, this clinician used the following therapeutic modalities: Cognitive Behavioral Therapy, Mindfulness-based Strategies, Solution-Focused Therapy, and Supportive Psychotherapy    Substance Abuse was addressed during this session. If the client is diagnosed with a co-occurring substance use disorder, please indicate any changes in the frequency or amount of use: none . Stage of change for addressing substance use diagnoses: No substance use/Not applicable    ASSESSMENT:  Heydi Ferrell presents with a Anxious and Depressed mood.     her affect is Normal range and intensity, which is congruent, with her mood and the content of the session. The client has made progress on their goals.     Heydi Ferrell presents with a none risk of suicide, none risk of self-harm, and none risk of harm to others.    For any risk assessment that surpasses a \"low\" rating, a safety plan must be developed.    A safety plan was indicated: no  If yes, describe in detail n/a  PSYCH MENTAL STATUS PAIN 8 as reported by the client due to increase stress over her personal health and relationships  PHYSICAL PAIN SCALE NUMBERS: 7 as reported by the client due to her the leakage of her breast  PLAN: " Between sessions, Heydi Ferrell will Practice her coping skills when presented with anxiety and or depression. At the next session, the therapist will use Cognitive Behavioral Therapy, Mindfulness-based Strategies, Solution-Focused Therapy, and Supportive Psychotherapy to address the client's MH issues affecting different relationships and environments.    Behavioral Health Treatment Plan and Discharge Planning: Heydi Ferrell is aware of and agrees to continue to work on their treatment plan. They have identified and are working toward their discharge goals. yes    Visit start and stop times:    10/03/24

## 2024-10-04 NOTE — PSYCH
"Behavioral Health Psychotherapy Progress Note    Psychotherapy Provided: Individual Psychotherapy     1. Post-traumatic stress        2. Binge-eating disorder, moderate        3. Moderate episode of recurrent major depressive disorder (HCC)            Goals addressed in session: Goal 1 and Goal 2     DATA: The client reported continued problems with her physical health, and obtaining an appointment for assessment for her reconstitution. \" I'm crying all the time due to my boob leaking and they moved my appointment\" As team we explored and processed the loss of her physical health and the effects on her personal relationships and environments. As team we explored and processed the client's cognitive distortions and reviewed her coping and coping skills.    During this session, this clinician used the following therapeutic modalities: Cognitive Behavioral Therapy, Mindfulness-based Strategies, Solution-Focused Therapy, and Supportive Psychotherapy    Substance Abuse was addressed during this session. If the client is diagnosed with a co-occurring substance use disorder, please indicate any changes in the frequency or amount of use: none . Stage of change for addressing substance use diagnoses: No substance use/Not applicable    ASSESSMENT:  Heydi Ferrell presents with a Anxious and Depressed mood.     her affect is Normal range and intensity, which is congruent, with her mood and the content of the session. The client has made progress on their goals.    The client  Heydi Ferrell presents with a none risk of suicide, none risk of self-harm, and none risk of harm to others.    For any risk assessment that surpasses a \"low\" rating, a safety plan must be developed.    A safety plan was indicated: no  If yes, describe in detail n/a    PLAN: Between sessions, Heydi Ferrell will work on [employer] daily addressing her stress that is affecting her different relationships and environment. At the next session, the therapist " will use Cognitive Behavioral Therapy, Mindfulness-based Strategies, Solution-Focused Therapy, and Supportive Psychotherapy to address the client's MH issues affecting different relationships and environments.    Behavioral Health Treatment Plan and Discharge Planning: Heydi Ferrell is aware of and agrees to continue to work on their treatment plan. They have identified and are working toward their discharge goals. yes    Visit start and stop times:    10/07/24  Start Time: 1520  Stop Time: 1620  Total Visit Time: 60 minutes

## 2024-10-07 ENCOUNTER — SOCIAL WORK (OUTPATIENT)
Dept: BEHAVIORAL/MENTAL HEALTH CLINIC | Facility: CLINIC | Age: 59
End: 2024-10-07
Payer: COMMERCIAL

## 2024-10-07 DIAGNOSIS — F43.10 POST-TRAUMATIC STRESS: Primary | ICD-10-CM

## 2024-10-07 DIAGNOSIS — F50.811 BINGE-EATING DISORDER, MODERATE: ICD-10-CM

## 2024-10-07 DIAGNOSIS — F33.1 MODERATE EPISODE OF RECURRENT MAJOR DEPRESSIVE DISORDER (HCC): ICD-10-CM

## 2024-10-07 PROCEDURE — 90837 PSYTX W PT 60 MINUTES: CPT | Performed by: SOCIAL WORKER

## 2024-10-23 NOTE — PSYCH
"Behavioral Health Psychotherapy Progress Note    Psychotherapy Provided: Individual Psychotherapy     1. Post-traumatic stress        2. Binge-eating disorder, moderate        3. Moderate episode of recurrent major depressive disorder (HCC)            Goals addressed in session: Goal 1 and Goal 2     DATA: The client reported that she will be having her mastectomy on 11/1/24, at [facility]. She was able to verbalize the loss of her physical health  and changes in her body by this upcoming surgery. During the session we explored and processed the client's grief, with the client identifying her primary emotion as being anger over this health issues. Coping skills were reviewed during the session to address the client's weakness in her different relationships and environments.   During this session, this clinician used the following therapeutic modalities: Cognitive Behavioral Therapy, Mindfulness-based Strategies, Solution-Focused Therapy, and Supportive Psychotherapy    Substance Abuse was not addressed during this session. If the client is diagnosed with a co-occurring substance use disorder, please indicate any changes in the frequency or amount of use: none. Stage of change for addressing substance use diagnoses: No substance use/Not applicable    ASSESSMENT:  Heydi Ferrell presents with a Anxious and Depressed mood.     her affect is Normal range and intensity, which is congruent, with her mood and the content of the session. The client has made progress on their goals.     Heydi Ferrell presents with a none risk of suicide, none risk of self-harm, and none risk of harm to others.    For any risk assessment that surpasses a \"low\" rating, a safety plan must be developed.    A safety plan was indicated: no  If yes, describe in detail n/a    PLAN: Between sessions, Heydi Ferrell will practice her coping skills when presented with anxiety and or depression. At the next session, the therapist will use Cognitive " Behavioral Therapy, Mindfulness-based Strategies, Solution-Focused Therapy, and Supportive Psychotherapy to address the client's MH Issues affecting her different relationships and environments.    Behavioral Health Treatment Plan and Discharge Planning: Heydi Ghassan is aware of and agrees to continue to work on their treatment plan. They have identified and are working toward their discharge goals. yes    Visit start and stop times:    10/24/24  Start Time: 1300  Stop Time: 1353  Total Visit Time: 53 minutes

## 2024-10-24 ENCOUNTER — SOCIAL WORK (OUTPATIENT)
Dept: BEHAVIORAL/MENTAL HEALTH CLINIC | Facility: CLINIC | Age: 59
End: 2024-10-24
Payer: COMMERCIAL

## 2024-10-24 DIAGNOSIS — F33.1 MODERATE EPISODE OF RECURRENT MAJOR DEPRESSIVE DISORDER (HCC): ICD-10-CM

## 2024-10-24 DIAGNOSIS — F43.10 POST-TRAUMATIC STRESS: Primary | ICD-10-CM

## 2024-10-24 DIAGNOSIS — F50.811 BINGE-EATING DISORDER, MODERATE: ICD-10-CM

## 2024-10-24 PROCEDURE — 90837 PSYTX W PT 60 MINUTES: CPT | Performed by: SOCIAL WORKER

## 2024-11-04 ENCOUNTER — TELEPHONE (OUTPATIENT)
Dept: BEHAVIORAL/MENTAL HEALTH CLINIC | Facility: CLINIC | Age: 59
End: 2024-11-04

## 2024-11-04 NOTE — TELEPHONE ENCOUNTER
Contacted the client she reported that her surgery went well and she is with her family in the community spending time.

## 2024-11-07 ENCOUNTER — SOCIAL WORK (OUTPATIENT)
Dept: BEHAVIORAL/MENTAL HEALTH CLINIC | Facility: CLINIC | Age: 59
End: 2024-11-07
Payer: COMMERCIAL

## 2024-11-07 DIAGNOSIS — F50.811 BINGE-EATING DISORDER, MODERATE: ICD-10-CM

## 2024-11-07 DIAGNOSIS — F43.10 POST-TRAUMATIC STRESS: Primary | ICD-10-CM

## 2024-11-07 DIAGNOSIS — F33.1 MODERATE EPISODE OF RECURRENT MAJOR DEPRESSIVE DISORDER (HCC): ICD-10-CM

## 2024-11-07 PROCEDURE — 90837 PSYTX W PT 60 MINUTES: CPT | Performed by: SOCIAL WORKER

## 2024-11-07 NOTE — PSYCH
"Behavioral Health Psychotherapy Progress Note    Psychotherapy Provided: Individual Psychotherapy     1. Post-traumatic stress        2. Moderate episode of recurrent major depressive disorder (HCC)        3. Binge-eating disorder, moderate            Goals addressed in session: Goal 1 and Goal 2     DATA: The client reported that her Father-in-law will be entering hospice due to his stage 4 cancer dx. She reported continued problems within her marriage due to problems within their communication patterns, and boundaries. As team we explored the client's bounary and communicaiton issues to strength their relationships. As team we explored the Cognative triangle and the effects on her relationships, coping skills were reviewed during the session  During this session, this clinician used the following therapeutic modalities: Cognitive Behavioral Therapy, Mindfulness-based Strategies, Solution-Focused Therapy, and Supportive Psychotherapy    Substance Abuse was addressed during this session. If the client is diagnosed with a co-occurring substance use disorder, please indicate any changes in the frequency or amount of use: none. Stage of change for addressing substance use diagnoses: No substance use/Not applicable    ASSESSMENT:  Heydi Ferrell presents with a Anxious and Depressed mood.     her affect is Normal range and intensity, which is congruent, with her mood and the content of the session. The client has made progress on their goals.     Heydi Ferrell presents with a none risk of suicide, none risk of self-harm, and none risk of harm to others.    For any risk assessment that surpasses a \"low\" rating, a safety plan must be developed.    A safety plan was indicated: no  If yes, describe in detail n/a    PLAN: Between sessions, Heydi Ferrell will practice coping skills when presented with anxiety and or depression. At the next session, the therapist will use Cognitive Behavioral Therapy, Mindfulness-based " Strategies, Solution-Focused Therapy, and Supportive Psychotherapy to address the client's MH issues affecting her different relationships and environments.    Behavioral Health Treatment Plan and Discharge Planning: Heydi Ferrell is aware of and agrees to continue to work on their treatment plan. They have identified and are working toward their discharge goals. yes    Visit start and stop times:    11/07/24  Start Time: 1241  Stop Time: 1341  Total Visit Time: 60 minutes

## 2024-11-24 NOTE — PSYCH
"Behavioral Health Psychotherapy Progress Note    Psychotherapy Provided: Individual Psychotherapy     1. Post-traumatic stress        2. Moderate episode of recurrent major depressive disorder (HCC)        3. Binge-eating disorder, moderate            Goals addressed in session: Goal 1 and Goal 2     DATA: The client reported that she is still having marital issues, as well as physical health issues as a result of her recent mastectomy due to breast cancer. As team we explored and processed the client's relationship weaknesses with her  due to lack of healthy communication  The client had minimal insight into her relationship communication problems with her  and lack of willingness to address this weakness. Coping skills were reviewed during the session  During this session, this clinician used the following therapeutic modalities: Cognitive Behavioral Therapy, Mindfulness-based Strategies, Solution-Focused Therapy, and Supportive Psychotherapy    Substance Abuse was addressed during this session. If the client is diagnosed with a co-occurring substance use disorder, please indicate any changes in the frequency or amount of use: none . Stage of change for addressing substance use diagnoses: No substance use/Not applicable    ASSESSMENT:  Heydi Ferrell presents with a Anxious and Depressed mood.     her affect is Normal range and intensity, which is congruent, with her mood and the content of the session. The client has made progress on their goals.    The client  Heydi Ferrell presents with a none risk of suicide, none risk of self-harm, and none risk of harm to others.    For any risk assessment that surpasses a \"low\" rating, a safety plan must be developed.    A safety plan was indicated: no  If yes, describe in detail n/a    PLAN: Between sessions, Heydi Ferrell will work on her communication problems with her Partner by setting up weekly meeting. At the next session, the therapist will use " Cognitive Behavioral Therapy, Mindfulness-based Strategies, Solution-Focused Therapy, and Supportive Psychotherapy to address the Client's MH issues affecting her different relationships and environments.    Behavioral Health Treatment Plan and Discharge Planning: Heydi Ferrell is aware of and agrees to continue to work on their treatment plan. They have identified and are working toward their discharge goals. yes    Visit start and stop times:    11/25/24  Start Time: 0955  Stop Time: 1114  Total Visit Time: 79 minutes

## 2024-11-25 ENCOUNTER — SOCIAL WORK (OUTPATIENT)
Dept: BEHAVIORAL/MENTAL HEALTH CLINIC | Facility: CLINIC | Age: 59
End: 2024-11-25
Payer: COMMERCIAL

## 2024-11-25 DIAGNOSIS — F33.1 MODERATE EPISODE OF RECURRENT MAJOR DEPRESSIVE DISORDER (HCC): ICD-10-CM

## 2024-11-25 DIAGNOSIS — F43.10 POST-TRAUMATIC STRESS: Primary | ICD-10-CM

## 2024-11-25 DIAGNOSIS — F50.811 BINGE-EATING DISORDER, MODERATE: ICD-10-CM

## 2024-11-25 PROCEDURE — 90837 PSYTX W PT 60 MINUTES: CPT | Performed by: SOCIAL WORKER

## 2024-12-16 NOTE — PSYCH
"Behavioral Health Psychotherapy Progress Note    Psychotherapy Provided: Individual Psychotherapy     1. Post-traumatic stress        2. Binge-eating disorder, moderate        3. Moderate episode of recurrent major depressive disorder (HCC)            Goals addressed in session: Goal 1 and Goal 2     DATA: The client reported continued isolation due to weather and physical health issues. As team we explored and processed ways to increase her activity in addressing her depression through activity. The client was able to verbalize winter as a major trigger. Exploring ways to address her environmental weakness through processing this environmental fear. Coping skills were reviewed during the session.  During this session, this clinician used the following therapeutic modalities: Cognitive Behavioral Therapy, Mindfulness-based Strategies, Solution-Focused Therapy, and Supportive Psychotherapy    Substance Abuse was addressed during this session. If the client is diagnosed with a co-occurring substance use disorder, please indicate any changes in the frequency or amount of use: none. Stage of change for addressing substance use diagnoses: No substance use/Not applicable    ASSESSMENT:  Heydi Ferrell presents with a Anxious and Depressed mood.     her affect is Normal range and intensity, which is congruent, with her mood and the content of the session. The client has made progress on their goals.     Heydi Ferrell presents with a none risk of suicide, none risk of self-harm, and none risk of harm to others.    For any risk assessment that surpasses a \"low\" rating, a safety plan must be developed.    A safety plan was indicated: no  If yes, describe in detail n/a    PLAN: Between sessions, Heydi Ferrell will work on silver cloud. At the next session, the therapist will use Cognitive Behavioral Therapy, Mindfulness-based Strategies, Solution-Focused Therapy, and Supportive Psychotherapy to address the client's MH issues " affecting different relationships and environments.    Behavioral Health Treatment Plan and Discharge Planning: Heydi Ferrell is aware of and agrees to continue to work on their treatment plan. They have identified and are working toward their discharge goals. yes    Depression Follow-up Plan Completed: Not applicable    Visit start and stop times:    12/17/24  Start Time: 1150  Stop Time: 1305  Total Visit Time: 75 minutes

## 2024-12-17 ENCOUNTER — SOCIAL WORK (OUTPATIENT)
Dept: BEHAVIORAL/MENTAL HEALTH CLINIC | Facility: CLINIC | Age: 59
End: 2024-12-17
Payer: COMMERCIAL

## 2024-12-17 DIAGNOSIS — F33.1 MODERATE EPISODE OF RECURRENT MAJOR DEPRESSIVE DISORDER (HCC): ICD-10-CM

## 2024-12-17 DIAGNOSIS — F50.811 BINGE-EATING DISORDER, MODERATE: ICD-10-CM

## 2024-12-17 DIAGNOSIS — F43.10 POST-TRAUMATIC STRESS: Primary | ICD-10-CM

## 2024-12-17 PROCEDURE — 90837 PSYTX W PT 60 MINUTES: CPT | Performed by: SOCIAL WORKER

## 2025-01-08 NOTE — BH TREATMENT PLAN
"Outpatient Behavioral Health Psychotherapy Treatment Plan     Heydi Ferrell  1965      Date of Initial Psychotherapy Assessment: 7/22/22  Date of Current Treatment Plan: 01/09/2025  Treatment Plan Target Date: 03/08/24  Treatment Plan Expiration Date: 07/09/2025     Diagnosis:   1. Moderate episode of recurrent major depressive disorder (HCC)          2. Post-traumatic stress                Area(s) of Need:The client reported that she would like to address her depression, and trauma history that is effecting her different relationships and environments. It is hoped that by addressing her weaknesses the Client  will achieve or maintain maximum functional capacity in performing daily activizes, taking into account both the functional capacity of the individual and those functional capacities appropriate for the individuals of the same age. Reduce or ameliorate the physical mental, behavioral, or developmental effects of an illness, condition, injury or disability. Present treatment plan will cover 6 months or completion of recovery treatment plan goals whichever comes first      Long Term Goal 1 (in the client's own words): \" My depression is bad\"      Stage of Change: Action     Target Date for completion: 03/07/2024             Anticipated therapeutic modalities: Supportive Therapy, Strengths Therapy,  and Cognitive Behavioral Therapy will be the  treatment modalities that will be utilized during the session.             People identified to complete this goal: The client her support team and this therapist                     Objective 1: (identify the means of measuring success in meeting the objective):The Client's depression score on the PHQ-9 will decrease from 18 to 9 or less ( Client's score on the PHQ-9 client's score increased by 1 point over her original screening due to 01/09/25. Due to family death and problems with her breast cancer reconstruction)                       Objective 2: (identify the " "means of measuring success in meeting the objective): The Client will utilize her coping skills in addressing her depression when presented 4  out of 7 times. (emerging Reviewed on 01/09/25)      Long Term Goal 2 (in the client's own words): \" My trauma is not good\"      Stage of Change: Action     Target Date for completion: 03/07/2024             Anticipated therapeutic modalities: Supportive Therapy, Strengths Therapy, seeking Safety, CPT  and Cognitive Behavioral Therapy will be the  treatment modalities that will be utilized during the session.             People identified to complete this goal: The client her support team and this therapist                     Objective 1: (identify the means of measuring success in meeting the objective): The Client's trauma score on her PCL-C will decrease from 69 to 25 or less (emerging/decreased by  18 points over original screening 01/09/25)                     Objective 2: (identify the means of measuring success in meeting the objective): The Client will utilize her coping skills when presented to address her triggers 4 out 7 times when presented. (emerging Reviewed on 01/09/25)      Long Term Goal 3 (in the client's own words): \" let's work on my medical appointment\"      Stage of Change: Action     Target Date for completion:  03/07/2024             Anticipated therapeutic modalities: Supportive Therapy, Strengths Therapy, and CBTwill be the  treatment modalities that will be utilized during the session.                People identified to complete this goal: The client her support team and this therapist                     Objective 1: (identify the means of measuring success in meeting the objective): The Client all of her medical appointments 100% of  time (emerging 01/09/245)                   I am currently under the care of a [facility] psychiatric provider: yes     My [facility] psychiatric provider is:   Kerri CARNEY LCSW at [facility] " "[facility] for MH therapy and for Psychiatric medication with PCP Madison Boyd     I am currently taking psychiatric medications: Yes, as prescribed     I feel that I will be ready for discharge from mental health care when I reach the following (measurable goal/objective): \" who knows\"      For children and adults who have a legal guardian:          Has there been any change to custody orders and/or guardianship status? NA. If yes, attach updated documentation.     I have review and created my Crisis Plan and have been offered a copy of this plan     Behavioral Health Treatment Plan [facility]: Diagnosis and Treatment Plan explained to Heydi Ferrell acknowledges an understanding of their diagnosis. Heydi Ferrell agrees to this treatment plan.     I have been offered a copy of this Treatment Plan. Yes        "

## 2025-01-08 NOTE — PSYCH
"Behavioral Health Psychotherapy Progress Note    Psychotherapy Provided: Individual Psychotherapy     1. Post-traumatic stress        2. Binge-eating disorder, moderate        3. Moderate episode of recurrent major depressive disorder (HCC)            Goals addressed in session: Creation of Recovery treatment plan and crisis plan    DATA: The client reported that her Father in law passed away due to long term health issues. \" He had cancer and it was a blessing that he passed away\" She reported additional hospitalization due to bacterial infection from her surgery. As team we explored and processed her therapeutical goals during the session as well as created her recovery treatment plan and crisis plan.  During this session, this clinician used the following therapeutic modalities: Cognitive Behavioral Therapy, Mindfulness-based Strategies, Solution-Focused Therapy, and Supportive Psychotherapy    Substance Abuse was addressed during this session. If the client is diagnosed with a co-occurring substance use disorder, please indicate any changes in the frequency or amount of use: none. Stage of change for addressing substance use diagnoses: No substance use/Not applicable    ASSESSMENT:  Heydi Ferrell presents with a Anxious and Depressed mood.     her affect is Normal range and intensity, which is congruent, with her mood and the content of the session. The client has made progress on their goals.    The client Heydi Ferrell presents with a none risk of suicide, none risk of self-harm, and none risk of harm to others.    For any risk assessment that surpasses a \"low\" rating, a safety plan must be developed.    A safety plan was indicated: no  If yes, describe in detail n/a    PLAN: Between sessions, Heydi Ferrell will practice her coping skills when presented with anxiety and or depression. At the next session, the therapist will use Cognitive Behavioral Therapy, Mindfulness-based Strategies, Solution-Focused " Therapy, and Supportive Psychotherapy to address the client's MH issues affect on her different relationships and environments.    Behavioral Health Treatment Plan and Discharge Planning: Heydi Ferrell is aware of and agrees to continue to work on their treatment plan. They have identified and are working toward their discharge goals. yes    Depression Follow-up Plan Completed: Yes    Visit start and stop times:    01/09/25  Start Time: 1122  Stop Time: 1230  Total Visit Time: 68 minutes

## 2025-01-09 ENCOUNTER — SOCIAL WORK (OUTPATIENT)
Dept: BEHAVIORAL/MENTAL HEALTH CLINIC | Facility: CLINIC | Age: 60
End: 2025-01-09
Payer: COMMERCIAL

## 2025-01-09 DIAGNOSIS — F43.10 POST-TRAUMATIC STRESS: Primary | ICD-10-CM

## 2025-01-09 DIAGNOSIS — F33.1 MODERATE EPISODE OF RECURRENT MAJOR DEPRESSIVE DISORDER (HCC): ICD-10-CM

## 2025-01-09 DIAGNOSIS — F50.811 BINGE-EATING DISORDER, MODERATE: ICD-10-CM

## 2025-01-09 PROCEDURE — 90837 PSYTX W PT 60 MINUTES: CPT | Performed by: SOCIAL WORKER

## 2025-01-09 NOTE — BH CRISIS PLAN
"Client Name: Heydi Ferrell       Client YOB: 1965    [facility] Safety Plan      Creation Date: 3/5/24 Update Date: 1/9/25   Created By: Edna Shah Last Updated By: Kerri Cuellar LCSW      Step 1: Warning Signs:   Warning Signs   isolate   increased anger   increse in depression   \"nit picky\"   increase anxiety            Step 2: Internal Coping Strategies:   Internal Coping Strategies   Play on phone   do knitting   watch Tv   reading            Step 3: People and social settings that provide distraction:   Name Contact Information   Waldo doesn't have a phone he is 5 years old will contact his mother   Zina In phone   [place] [phone]    Places   house   [place] a [place]           Step 4: People whom I can ask for help during a crisis:      Name Contact Information    Laurie in phone      Step 5: Professionals or agencies I can contact during a crisis:      Clinican/Agency Name Phone Emergency Contact    [facility] [phone] Kerri Torres [facility] [phone] \" I will contact Melissa either through facebook, texting, or calling\"      Local Emergency Department Emergency Department Phone Emergency Department Address    [facility] [phone] [address] [place] [address]        Crisis Phone Numbers:   Suicide Prevention Lifeline: Call or Text  484 Crisis Text Line: Text HOME to 776-232   Please note: Some [facility] do not have a separate number for Child/Adolescent specific crisis. If your county is not listed under Child/Adolescent, please call the adult number for your county      Adult Crisis Numbers: Child/Adolescent Crisis Numbers   [facility]: [phone] [facility]: [phone]   [facility]: [phone] [facility]: [phone]   [facility]: [phone] [place]: [phone]   [facility]: [phone] [place]: [phone]   [facility]: " "[phone]   [facility]: [phone]   [facility]: [phone]   [facility]: [phone] (daytime) [phone] (after hours, weekends, holidays)      Step 6: Making the environment safer (plan for lethal means safety):   Plan: Guns are locked up     Optional: What is most important to me and worth living for?   \"My kids and my grandson\"      [place] Safety Plan. Lucila Warren and Aly Ann. Used with permission of the authors.           "

## 2025-01-16 NOTE — PSYCH
Behavioral Health Psychotherapy Progress Note    Psychotherapy Provided: Individual Psychotherapy     1. Moderate episode of recurrent major depressive disorder ([address])        2. Post-traumatic stress            Goals addressed in session: Goal 1     DATA: The Client reported continued problems within her relationship with her , which has caused anxiety and depression. She reported utilizing healthy communication which has been met with barriers. As team we explored the Client becoming much more independent in her relationships and becoming much more independent. Healthy communication patterns were reviewed during the session to help within her family relationships. During this session, this clinician used the following therapeutic modalities: Cognitive Behavioral Therapy, Mindfulness-based Strategies, Solution-Focused Therapy and Supportive Psychotherapy    Substance Abuse was not addressed during this session. If the client is diagnosed with a co-occurring substance use disorder, please indicate any changes in the frequency or amount of use: The CLient. Stage of change for addressing substance use diagnoses: No substance use/Not applicable    ASSESSMENT:  Sagar Barnett presents with a Anxious and Depressed mood. her affect is Normal range and intensity, which is congruent, with her mood and the content of the session. The client has made progress on their goals. The client Sagar Barnett presents with a none risk of suicide, none risk of self-harm, and none risk of harm to others. For any risk assessment that surpasses a "low" rating, a safety plan must be developed.     A safety plan was indicated: no  If yes, describe in detail n/a    PLAN: Between sessions, Sagar Barnett will develop a family calender to set times/dates for couples night for her and her  to increase strengths in their relationships At the next session, the therapist will use Cognitive Behavioral Therapy, Mindfulness-based Palliative Care Progress Note    Reason for Palliative Care: Complex Decision Making and Goals of Care     Subjective      Follow up       Symptom Assessment/Review of Systems  Palliative Performance Scale at Time of Visit  30 (bedbound, unable to do any activity, extensive disease. Needs total assistance. Reduced intake. Fully conscious, drowsy, or confused.)    Symptom Screening  Pain: 0 - none  Shortness of breath: 0 - none  Nausea: 0 - none  Last recorded BM: 1 (01/16/25 0500) .  Constipation: No.    Fatigue: 3 - mild    Heart Failure: Stage C: Structural heart disease with prior or current symptoms of heart failure    Review of Systems: Please defer to HPI    Medications:  Medications Reviewed: Yes          Objective        Vital Signs:   Vital Last Value (24 Hour) 24 Hour Range   Temperature 97.7 °F (36.5 °C) (01/16/25 0747) Temp  Min: 97.2 °F (36.2 °C)  Max: 98.1 °F (36.7 °C)   Pulse (!) 60 (01/16/25 0747) Pulse  Min: 60  Max: 67   Non-Invasive  Blood Pressure (!) 140/53 (01/16/25 0747) BP  Min: 124/68  Max: 140/53   Respiratory 18 (01/16/25 0341) Resp  Min: 18  Max: 18   SpO2 100 % (01/16/25 0747) SpO2  Min: 100 %  Max: 100 %     Physical Exam  HENT:      Head: Normocephalic and atraumatic.      Mouth/Throat:      Mouth: Mucous membranes are dry.   Eyes:      Pupils: Pupils are equal, round, and reactive to light.   Cardiovascular:      Rate and Rhythm: Normal rate.      Heart sounds: Normal heart sounds.   Pulmonary:      Effort: No respiratory distress.      Breath sounds: No wheezing or rales.      Comments: crackles  Abdominal:      General: Bowel sounds are normal. There is no distension.      Tenderness: There is no abdominal tenderness. There is no guarding.   Musculoskeletal:         General: No tenderness.      Right lower leg: Edema present.      Left lower leg: Edema present.   Skin:     General: Skin is warm and dry.      Findings: Bruising present.   Neurological:      Mental Status: She is  Strategies, Solution-Focused Therapy and Supportive Psychotherapy to address the Client's MH issues and the effects on his different relationships and environments. Behavioral Health Treatment Plan and Discharge Planning: Lewis Vick is aware of and agrees to continue to work on their treatment plan. They have identified and are working toward their discharge goals.  yes    Visit start and stop times:    08/08/23  Start Time: 1000  Stop Time: 1053  Total Visit Time: 53 minutes alert. She is disoriented.   Psychiatric:         Mood and Affect: Mood normal.         Behavior: Behavior normal.         Labs & Imaging  Recent Labs   Lab 01/16/25  0615 01/14/25  0650 01/13/25  0732   SODIUM 140 139 141   POTASSIUM 3.4 3.6 3.8   CHLORIDE 100 100 102   CO2 36* 33* 36*   BUN 47* 50* 53*   CREATININE 1.49* 1.53* 1.66*   GFRESTIMATE 32* 31* 28*   CALCIUM 9.2 9.5 9.2   GLUCOSE 95 81 112*      Recent Labs   Lab 01/16/25  0615 01/14/25  0650 01/13/25  0732   WBC 8.8 8.1 6.5   HGB 8.0* 8.5* 8.5*   HCT 26.2* 27.9* 27.7*    300 282       Imaging Reports Reviewed: Yes        Advance Care Planning/Goals of Care   Discussion:   Palliative APN met at bedside with patient   . Patient unable to participate in goals of care conversation related to underlying  dementia.         Patient was accepted by multiple facilities with a private bed.   Son requested that Doretha and Vladimir liaison reach out  to him prior to him accepting a private bed which is available for patient.  Son agreeing to make his decision today and notifying .         Pending clinical course to discharge.                    Assessment      Encounter For Palliative Care  Consulted to establish goals of care   Heart Failure preserved  ADAN on CKD  Syncope        Plan        Symptom Management   No acute distress             Patient without complaints    Follow up    Palliative NP will continue to follow     Case discussed with  who will arrange  palliative        Total Time Spent today on this visit EXCLUDES time spent with Advance Care Planning  Total time spent today on this visit is    50 minutes which includes reviewing tests in preparation to see patient, obtaining and reviewing separately obtained history, performing a medically appropriate exam and evaluation, ordering medications, tests or procedures, communicating with other healthcare professionals, and independently interpreting test results that are not  separately billed.     Discussed with: Attending MD, RN, care mgmt team, SLP , DR. Liao     Thank you for involving Palliative Care.  Please contact the covering provider via Pager, Answering Service, or Perfect Serve.    Marleen Varela CNP      Progress Note For Department Use Only        #1 Post-Visit: No [facility] ([place] ONLY)  #2 Post-Visit: Yes  #3 Post-Visit: Yes

## 2025-01-24 ENCOUNTER — SOCIAL WORK (OUTPATIENT)
Dept: BEHAVIORAL/MENTAL HEALTH CLINIC | Facility: CLINIC | Age: 60
End: 2025-01-24
Payer: COMMERCIAL

## 2025-01-24 DIAGNOSIS — F50.811 BINGE-EATING DISORDER, MODERATE: ICD-10-CM

## 2025-01-24 DIAGNOSIS — F43.10 POST-TRAUMATIC STRESS: Primary | ICD-10-CM

## 2025-01-24 DIAGNOSIS — F33.1 MODERATE EPISODE OF RECURRENT MAJOR DEPRESSIVE DISORDER (HCC): ICD-10-CM

## 2025-01-24 PROCEDURE — 90837 PSYTX W PT 60 MINUTES: CPT | Performed by: SOCIAL WORKER

## 2025-01-24 NOTE — PSYCH
"Behavioral Health Psychotherapy Progress Note    Psychotherapy Provided: Individual Psychotherapy     1. Post-traumatic stress        2. Binge-eating disorder, moderate        3. Moderate episode of recurrent major depressive disorder (HCC)              Goals addressed in session: Goal 1 and Goal 2     DATA: The client reported that her wound care physician has ordered her to start hypo-bariatric  therapy 4 times week for 120 min per session. The Client was able to verbalize that her anxiety might increase due to the confinement of the procedure. Coping skills were reviewed during the session for the possibility of higher level of anxiety. The client reported that her youngest child was \"fired again\" from her latest employment. During the session we explored and processed the client's feelings and she was given information on community supports and services.   During this session, this clinician used the following therapeutic modalities: Cognitive Behavioral Therapy, Mindfulness-based Strategies, Solution-Focused Therapy, and Supportive Psychotherapy    Substance Abuse was addressed during this session. If the client is diagnosed with a co-occurring substance use disorder, please indicate any changes in the frequency or amount of use: none. Stage of change for addressing substance use diagnoses: No substance use/Not applicable    ASSESSMENT:  eHydi Ferrell presents with a Anxious and Depressed mood.     her affect is Normal range and intensity, which is congruent, with her mood and the content of the session. The client has made progress on their goals.    The client  Heydi Ferrell presents with a none risk of suicide, none risk of self-harm, and none risk of harm to others.    For any risk assessment that surpasses a \"low\" rating, a safety plan must be developed.    A safety plan was indicated: no  If yes, describe in detail n/a    PLAN: Between sessions, Heydi Ferrell will practice coping skills when presented " with anxiety and or depression. At the next session, the therapist will use Cognitive Behavioral Therapy, Mindfulness-based Strategies, Solution-Focused Therapy, and Supportive Psychotherapy to address the client's MH issues affecting different relationships and environments.    Behavioral Health Treatment Plan and Discharge Planning: Heydi Ferrell is aware of and agrees to continue to work on their treatment plan. They have identified and are working toward their discharge goals. yes    Depression Follow-up Plan Completed: Not applicable    Visit start and stop times:    01/24/25  Start Time: 0855  Stop Time: 0955  Total Visit Time: 60 minutes

## 2025-02-06 NOTE — PSYCH
"Behavioral Health Psychotherapy Progress Note    Psychotherapy Provided: Individual Psychotherapy     1. Post-traumatic stress        2. Binge-eating disorder, moderate        3. Moderate episode of recurrent major depressive disorder (HCC)              Goals addressed in session: Goal 1 and Goal 2     DATA: The client reported that she received her DNA results and found out that her Father was not her biological Father, which has caused an increase in her depression and anxiety. \" I found out that my Dad is not my Dad I am so lost.\" As team we explored and processed the Client's grief over the loss of her personal history  and the effects on her personal relationships Coping skills were reviewed during the session to address her depression and anxiety. During this session, this clinician used the following therapeutic modalities: Cognitive Behavioral Therapy, Mindfulness-based Strategies, Solution-Focused Therapy, and Supportive Psychotherapy    Substance Abuse was addressed during this session. If the client is diagnosed with a co-occurring substance use disorder, please indicate any changes in the frequency or amount of use: none. Stage of change for addressing substance use diagnoses: No substance use/Not applicable    ASSESSMENT:  Heydi Ferrell presents with a Anxious and Depressed mood.     her affect is Tearful, which is congruent, with her mood and the content of the session. The client has made progress on their goals.    The client Heydi Ferrell presents with a none risk of suicide, none risk of self-harm, and none risk of harm to others.    For any risk assessment that surpasses a \"low\" rating, a safety plan must be developed.    A safety plan was indicated: no  If yes, describe in detail n/a    PLAN: Between sessions, Heydi Ferrell will practice coping skills when presented with anxiety and or depression . At the next session, the therapist will use Cognitive Behavioral Therapy, Mindfulness-based " Strategies, Solution-Focused Therapy, and Supportive Psychotherapy to address the client's MH issues affecting different relationships and environments.    Behavioral Health Treatment Plan and Discharge Planning: Heydi Ghassan is aware of and agrees to continue to work on their treatment plan. They have identified and are working toward their discharge goals. yes    Depression Follow-up Plan Completed: Not applicable    Visit start and stop times:    02/07/25  Start Time: 1345  Stop Time: 1450  Total Visit Time: 65 minutes

## 2025-02-07 ENCOUNTER — SOCIAL WORK (OUTPATIENT)
Dept: BEHAVIORAL/MENTAL HEALTH CLINIC | Facility: CLINIC | Age: 60
End: 2025-02-07
Payer: COMMERCIAL

## 2025-02-07 DIAGNOSIS — F43.10 POST-TRAUMATIC STRESS: Primary | ICD-10-CM

## 2025-02-07 DIAGNOSIS — F50.811 BINGE-EATING DISORDER, MODERATE: ICD-10-CM

## 2025-02-07 DIAGNOSIS — F33.1 MODERATE EPISODE OF RECURRENT MAJOR DEPRESSIVE DISORDER (HCC): ICD-10-CM

## 2025-02-07 PROCEDURE — 90837 PSYTX W PT 60 MINUTES: CPT | Performed by: SOCIAL WORKER

## 2025-02-11 DIAGNOSIS — R13.19 OTHER DYSPHAGIA: ICD-10-CM

## 2025-02-11 DIAGNOSIS — Z86.19 HISTORY OF HELICOBACTER INFECTION: ICD-10-CM

## 2025-02-11 DIAGNOSIS — K21.9 GASTROESOPHAGEAL REFLUX DISEASE, UNSPECIFIED WHETHER ESOPHAGITIS PRESENT: ICD-10-CM

## 2025-02-11 NOTE — TELEPHONE ENCOUNTER
Received fax from [facility] for refill on Omeprezole 20mg for 90 day supply.    Last OV 05/13/2024 with Ania Blank

## 2025-03-11 ENCOUNTER — SOCIAL WORK (OUTPATIENT)
Dept: BEHAVIORAL/MENTAL HEALTH CLINIC | Facility: CLINIC | Age: 60
End: 2025-03-11
Payer: COMMERCIAL

## 2025-03-11 DIAGNOSIS — F33.1 MODERATE EPISODE OF RECURRENT MAJOR DEPRESSIVE DISORDER (HCC): ICD-10-CM

## 2025-03-11 DIAGNOSIS — F43.10 POST-TRAUMATIC STRESS: Primary | ICD-10-CM

## 2025-03-11 DIAGNOSIS — F50.811 BINGE-EATING DISORDER, MODERATE: ICD-10-CM

## 2025-03-11 PROCEDURE — 90837 PSYTX W PT 60 MINUTES: CPT | Performed by: SOCIAL WORKER

## 2025-03-11 NOTE — PSYCH
"Behavioral Health Psychotherapy Progress Note    Psychotherapy Provided: Individual Psychotherapy     1. Post-traumatic stress        2. Binge-eating disorder, moderate        3. Moderate episode of recurrent major depressive disorder (HCC)            Goals addressed in session: Goal 1 and Goal 2     DATA: The client reported that her wound care is closing from her mastectomy, but does have 17 more treatments in her hypobaric chamber to finish the therapeutic process. As team we explored and processed the client's personal relationship with her Partner and his \"controlling ways\" Healthy boundaries and communication skills were reviewed during the session to address this relationship weaknesses  During this session, this clinician used the following therapeutic modalities: Cognitive Behavioral Therapy, Mindfulness-based Strategies, Solution-Focused Therapy, and Supportive Psychotherapy    Substance Abuse was addressed during this session. If the client is diagnosed with a co-occurring substance use disorder, please indicate any changes in the frequency or amount of use: n/a. Stage of change for addressing substance use diagnoses: No substance use/Not applicable    ASSESSMENT:  Heydi Ferrell presents with a Anxious and Depressed mood.     her affect is Normal range and intensity, which is congruent, with her mood and the content of the session. The client has made progress on their goals.     Heydi Ferrell presents with a none risk of suicide, none risk of self-harm, and none risk of harm to others.    For any risk assessment that surpasses a \"low\" rating, a safety plan must be developed.    A safety plan was indicated: no  If yes, describe in detail n/a    PLAN: Between sessions, Heydi Ferrell will practice coping skills when presented with anxiety and or depression. At the next session, the therapist will use Cognitive Behavioral Therapy, Mindfulness-based Strategies, Solution-Focused Therapy, and Supportive " Psychotherapy to address the client's MH Issues affecting different relationships and environments.    Behavioral Health Treatment Plan and Discharge Planning: Heydi Ferrell is aware of and agrees to continue to work on their treatment plan. They have identified and are working toward their discharge goals. yes    Depression Follow-up Plan Completed: Not applicable    Visit start and stop times:    03/11/25  Start Time: 1250  Stop Time: 1355  Total Visit Time: 65 minutes

## 2025-04-01 ENCOUNTER — SOCIAL WORK (OUTPATIENT)
Dept: BEHAVIORAL/MENTAL HEALTH CLINIC | Facility: CLINIC | Age: 60
End: 2025-04-01
Payer: COMMERCIAL

## 2025-04-01 DIAGNOSIS — F50.811 BINGE-EATING DISORDER, MODERATE: ICD-10-CM

## 2025-04-01 DIAGNOSIS — F33.1 MODERATE EPISODE OF RECURRENT MAJOR DEPRESSIVE DISORDER (HCC): ICD-10-CM

## 2025-04-01 DIAGNOSIS — F43.10 POST-TRAUMATIC STRESS: Primary | ICD-10-CM

## 2025-04-01 PROCEDURE — 90837 PSYTX W PT 60 MINUTES: CPT | Performed by: SOCIAL WORKER

## 2025-04-01 NOTE — PSYCH
"Behavioral Health Psychotherapy Progress Note    Psychotherapy Provided: Individual Psychotherapy     1. Post-traumatic stress        2. Binge-eating disorder, moderate        3. Moderate episode of recurrent major depressive disorder (HCC)            Goals addressed in session: Goal 1 and Goal 2     DATA: The client reported that she has set boundaries within her relationship with her , which has led to an increase in there activities. As team we explored and processed changes in the client's life, and the effects on her MH symptoms. The client was able to verbalize an increase in her activities has led to a decrease in her MH symptoms. Coping skills were reviewed during the session  During this session, this clinician used the following therapeutic modalities: Cognitive Behavioral Therapy, Mindfulness-based Strategies, Solution-Focused Therapy, and Supportive Psychotherapy    Substance Abuse was addressed during this session. If the client is diagnosed with a co-occurring substance use disorder, please indicate any changes in the frequency or amount of use: none. Stage of change for addressing substance use diagnoses: No substance use/Not applicable    ASSESSMENT:  Heydi Ferrell presents with a Anxious and Depressed mood.     her affect is Normal range and intensity, which is congruent, with her mood and the content of the session. The client has made progress on their goals.    The client  Heydi Ferrell presents with a none risk of suicide, none risk of self-harm, and none risk of harm to others.    For any risk assessment that surpasses a \"low\" rating, a safety plan must be developed.    A safety plan was indicated: no  If yes, describe in detail n/a    PLAN: Between sessions, Heydi Ferrell will practice coping skills when presented with anxiety and or depression. At the next session, the therapist will use Cognitive Behavioral Therapy, Mindfulness-based Strategies, Solution-Focused Therapy, and " Supportive Psychotherapy to address the client's MH issues affecting her different relationships and environments.    Behavioral Health Treatment Plan and Discharge Planning: Heydi Ferrell is aware of and agrees to continue to work on their treatment plan. They have identified and are working toward their discharge goals. yes    Depression Follow-up Plan Completed: Not applicable    Visit start and stop times:    04/01/25  Start Time: 1400  Stop Time: 1455  Total Visit Time: 55 minutes

## 2025-04-22 ENCOUNTER — SOCIAL WORK (OUTPATIENT)
Dept: BEHAVIORAL/MENTAL HEALTH CLINIC | Facility: CLINIC | Age: 60
End: 2025-04-22
Payer: COMMERCIAL

## 2025-04-22 DIAGNOSIS — F33.1 MODERATE EPISODE OF RECURRENT MAJOR DEPRESSIVE DISORDER (HCC): ICD-10-CM

## 2025-04-22 DIAGNOSIS — F43.10 POST-TRAUMATIC STRESS: Primary | ICD-10-CM

## 2025-04-22 DIAGNOSIS — F50.811 BINGE-EATING DISORDER, MODERATE: ICD-10-CM

## 2025-04-22 PROCEDURE — 90837 PSYTX W PT 60 MINUTES: CPT | Performed by: SOCIAL WORKER

## 2025-04-22 NOTE — PSYCH
"Behavioral Health Psychotherapy Progress Note    Psychotherapy Provided: Individual Psychotherapy     1. Post-traumatic stress        2. Binge-eating disorder, moderate        3. Moderate episode of recurrent major depressive disorder (HCC)            Goals addressed in session: Goal 1 and Goal 2     DATA: The client reported that she is still having issues with her breast after her mastectomy, which has caused continued \"stress and anxiety\" As team we explored the client's emotional literacy using the  [place]'s Wheel of Emotions and the 8 core emotions. The client was able to verbalize her weaknesses and strengths in how her emotions affects her different relationships and environments.   During this session, this clinician used the following therapeutic modalities: Cognitive Behavioral Therapy, Mindfulness-based Strategies, Solution-Focused Therapy, and Supportive Psychotherapy    Substance Abuse was addressed during this session. If the client is diagnosed with a co-occurring substance use disorder, please indicate any changes in the frequency or amount of use: none. Stage of change for addressing substance use diagnoses: No substance use/Not applicable    ASSESSMENT:  Heydi Ferrell presents with a Anxious and Depressed mood.     her affect is Normal range and intensity, which is congruent, with her mood and the content of the session. The client has made progress on their goals.    The client  Heydi Ferrell presents with a none risk of suicide, none risk of self-harm, and none risk of harm to others.    For any risk assessment that surpasses a \"low\" rating, a safety plan must be developed.    A safety plan was indicated: no  If yes, describe in detail n/a    PLAN: Between sessions, Heydi Ferrell will read article on emotions. At the next session, the therapist will use Cognitive Behavioral Therapy, Mindfulness-based Strategies, Solution-Focused Therapy, and Supportive Psychotherapy to address the client's MH " issues affecting different relationships and environments.    Behavioral Health Treatment Plan and Discharge Planning: Heydi Ferrell is aware of and agrees to continue to work on their treatment plan. They have identified and are working toward their discharge goals. yes    Depression Follow-up Plan Completed: Not applicable    Visit start and stop times:    04/22/25  Start Time: 1256  Stop Time: 1350  Total Visit Time: 54 minutes

## 2025-05-04 NOTE — PSYCH
"Behavioral Health Psychotherapy Progress Note    Psychotherapy Provided: Individual Psychotherapy     1. Post-traumatic stress        2. Binge-eating disorder, moderate        3. Moderate episode of recurrent major depressive disorder (HCC)            Goals addressed in session: Goal 1 and Goal 2     DATA: The client reported that she has started the weight loss process through [facility], which is going well. As team we explored and processed the changes she will be experiencing with her body and  family members. During the session we explored the client's self-care skills and set personal boundaries as well as reviewed her coping skills.   During this session, this clinician used the following therapeutic modalities: Cognitive Behavioral Therapy, Mindfulness-based Strategies, Solution-Focused Therapy, and Supportive Psychotherapy    Substance Abuse was addressed during this session. If the client is diagnosed with a co-occurring substance use disorder, please indicate any changes in the frequency or amount of use: none. Stage of change for addressing substance use diagnoses: No substance use/Not applicable    ASSESSMENT:  Heydi Ferrell presents with a Anxious mood.     her affect is Normal range and intensity, which is congruent, with her mood and the content of the session. The client has made progress on their goals.    The client  Heydi Ferrell presents with a none risk of suicide, none risk of self-harm, and none risk of harm to others.    For any risk assessment that surpasses a \"low\" rating, a safety plan must be developed.    A safety plan was indicated: no  If yes, describe in detail n/a    PLAN: Between sessions, Heydi Ferrell will continue to practice coping skills when presented with anxiety and or depression. At the next session, the therapist will use Cognitive Behavioral Therapy, Mindfulness-based Strategies, Solution-Focused Therapy, and Supportive Psychotherapy to address the client's MH issues affecting " different relationships and environments.    Behavioral Health Treatment Plan and Discharge Planning: Heydi Ferrell is aware of and agrees to continue to work on their treatment plan. They have identified and are working toward their discharge goals. yes    Depression Follow-up Plan Completed: Not applicable    Visit start and stop times:    05/05/25  Start Time: 1300  Stop Time: 1353  Total Visit Time: 53 minutes

## 2025-05-05 ENCOUNTER — SOCIAL WORK (OUTPATIENT)
Dept: BEHAVIORAL/MENTAL HEALTH CLINIC | Facility: CLINIC | Age: 60
End: 2025-05-05
Payer: COMMERCIAL

## 2025-05-05 DIAGNOSIS — F33.1 MODERATE EPISODE OF RECURRENT MAJOR DEPRESSIVE DISORDER (HCC): ICD-10-CM

## 2025-05-05 DIAGNOSIS — F50.811 BINGE-EATING DISORDER, MODERATE: ICD-10-CM

## 2025-05-05 DIAGNOSIS — F43.10 POST-TRAUMATIC STRESS: Primary | ICD-10-CM

## 2025-05-05 PROCEDURE — 90837 PSYTX W PT 60 MINUTES: CPT | Performed by: SOCIAL WORKER
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
## 2025-05-15 ENCOUNTER — SOCIAL WORK (OUTPATIENT)
Dept: BEHAVIORAL/MENTAL HEALTH CLINIC | Facility: CLINIC | Age: 60
End: 2025-05-15
Payer: COMMERCIAL

## 2025-05-15 DIAGNOSIS — F43.10 POST-TRAUMATIC STRESS: Primary | ICD-10-CM

## 2025-05-15 DIAGNOSIS — F50.811 BINGE-EATING DISORDER, MODERATE: ICD-10-CM

## 2025-05-15 DIAGNOSIS — F33.1 MODERATE EPISODE OF RECURRENT MAJOR DEPRESSIVE DISORDER (HCC): ICD-10-CM

## 2025-05-15 PROCEDURE — 90837 PSYTX W PT 60 MINUTES: CPT | Performed by: SOCIAL WORKER
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
## 2025-06-05 ENCOUNTER — SOCIAL WORK (OUTPATIENT)
Dept: BEHAVIORAL/MENTAL HEALTH CLINIC | Facility: CLINIC | Age: 60
End: 2025-06-05
Payer: MEDICARE

## 2025-06-05 DIAGNOSIS — F33.1 MODERATE EPISODE OF RECURRENT MAJOR DEPRESSIVE DISORDER (HCC): ICD-10-CM

## 2025-06-05 DIAGNOSIS — F43.10 POST-TRAUMATIC STRESS: Primary | ICD-10-CM

## 2025-06-05 DIAGNOSIS — F50.811 BINGE-EATING DISORDER, MODERATE: ICD-10-CM

## 2025-06-05 PROCEDURE — 90837 PSYTX W PT 60 MINUTES: CPT | Performed by: SOCIAL WORKER

## 2025-06-19 ENCOUNTER — SOCIAL WORK (OUTPATIENT)
Dept: BEHAVIORAL/MENTAL HEALTH CLINIC | Facility: CLINIC | Age: 60
End: 2025-06-19
Payer: MEDICARE

## 2025-06-19 DIAGNOSIS — F50.811 BINGE-EATING DISORDER, MODERATE: ICD-10-CM

## 2025-06-19 DIAGNOSIS — F43.10 POST-TRAUMATIC STRESS: Primary | ICD-10-CM

## 2025-06-19 DIAGNOSIS — F33.1 MODERATE EPISODE OF RECURRENT MAJOR DEPRESSIVE DISORDER (HCC): ICD-10-CM

## 2025-06-19 PROCEDURE — 90837 PSYTX W PT 60 MINUTES: CPT | Performed by: SOCIAL WORKER

## 2025-06-19 NOTE — PSYCH
"Behavioral Health Psychotherapy Progress Note    Psychotherapy Provided: Individual Psychotherapy     1. Post-traumatic stress        2. Binge-eating disorder, moderate        3. Moderate episode of recurrent major depressive disorder (HCC)            Goals addressed in session: Goal 1 and Goal 2     DATA: The client reported continued argument and conflict with her  over the past 2 weeks \" I am going to get a divorce my  is so mean and crazy\" She reported that the core issue for this conflict being the renovation of their kitchen, and the speed of the work. During the session we explored and processed the core issue for their relationship conflict with the client being able to verbalize the 's need for \"control of money\" Healthy communication patterns were reviewed during the session.    During this session, this clinician used the following therapeutic modalities: Cognitive Behavioral Therapy, Mindfulness-based Strategies, Solution-Focused Therapy, and Supportive Psychotherapy    Substance Abuse was addressed during this session. If the client is diagnosed with a co-occurring substance use disorder, please indicate any changes in the frequency or amount of use: none. Stage of change for addressing substance use diagnoses: No substance use/Not applicable    ASSESSMENT:  Heydi Ferrell presents with a Angry, Anxious, and Depressed mood.     her affect is Normal range and intensity, which is congruent, with her mood and the content of the session. The client has made progress on their goals.    The client  Heydi Ferrell presents with a none risk of suicide, none risk of self-harm, and none risk of harm to others.    For any risk assessment that surpasses a \"low\" rating, a safety plan must be developed.    A safety plan was indicated: no  If yes, describe in detail n/a    PLAN: Between sessions, Heydi Ferrell will practice coping skills when presented with anxiety and or depression. As well as " practice communication patterns. . At the next session, the therapist will use Cognitive Behavioral Therapy, Mindfulness-based Strategies, Solution-Focused Therapy, and Supportive Psychotherapy to address the client's MH issues affecting different relationships and environments.    Behavioral Health Treatment Plan and Discharge Planning: Heydi Ferrell is aware of and agrees to continue to work on their treatment plan. They have identified and are working toward their discharge goals. yes    Depression Follow-up Plan Completed: Not applicable    Visit start and stop times:    06/19/25  Start Time: 1159  Stop Time: 1253  Total Visit Time: 54 minutes

## 2025-06-25 ENCOUNTER — SOCIAL WORK (OUTPATIENT)
Dept: BEHAVIORAL/MENTAL HEALTH CLINIC | Facility: CLINIC | Age: 60
End: 2025-06-25
Payer: MEDICARE

## 2025-06-25 DIAGNOSIS — F43.10 POST-TRAUMATIC STRESS: Primary | ICD-10-CM

## 2025-06-25 DIAGNOSIS — F50.811 BINGE-EATING DISORDER, MODERATE: ICD-10-CM

## 2025-06-25 DIAGNOSIS — F33.1 MODERATE EPISODE OF RECURRENT MAJOR DEPRESSIVE DISORDER (HCC): ICD-10-CM

## 2025-06-25 PROCEDURE — 90837 PSYTX W PT 60 MINUTES: CPT | Performed by: SOCIAL WORKER

## 2025-06-25 NOTE — BH TREATMENT PLAN
"Outpatient Behavioral Health Psychotherapy Treatment Plan     Heydi Ferrell  1965      Date of Initial Psychotherapy Assessment: 7/22/22  Date of Current Treatment Plan: 06/23/25  Treatment Plan Target Date: 03/08/24  Treatment Plan Expiration Date: 12/23/25     Diagnosis:   1. Moderate episode of recurrent major depressive disorder (HCC)          2. Post-traumatic stress                Area(s) of Need:The client reported that she would like to address her depression, and trauma history that is effecting her different relationships and environments. It is hoped that by addressing her weaknesses the Client  will achieve or maintain maximum functional capacity in performing daily activizes, taking into account both the functional capacity of the individual and those functional capacities appropriate for the individuals of the same age. Reduce or ameliorate the physical mental, behavioral, or developmental effects of an illness, condition, injury or disability. Present treatment plan will cover 6 months or completion of recovery treatment plan goals whichever comes first      Long Term Goal 1 (in the client's own words): \" My depression is bad\"      Stage of Change: Action     Target Date for completion: 03/07/2024             Anticipated therapeutic modalities: Supportive Therapy, Strengths Therapy,  and Cognitive Behavioral Therapy will be the  treatment modalities that will be utilized during the session.             People identified to complete this goal: The client her support team and this therapist                     Objective 1: (identify the means of measuring success in meeting the objective):The Client's depression score on the PHQ-9 will decrease from 18 to 9 or less ( Client's score on the PHQ-9 client's score decreased by 3 points over last screening point over her original screening due to 06/25/25.)                     Objective 2: (identify the means of measuring success in meeting the " "objective): The Client will utilize her coping skills in addressing her depression when presented 4  out of 7 times. (emerging Reviewed on 06/25/25)      Long Term Goal 2 (in the client's own words): \" My trauma is not good\"      Stage of Change: Action     Target Date for completion: 03/07/2024             Anticipated therapeutic modalities: Supportive Therapy, Strengths Therapy, seeking Safety, CPT  and Cognitive Behavioral Therapy will be the  treatment modalities that will be utilized during the session.             People identified to complete this goal: The client her support team and this therapist                     Objective 1: (identify the means of measuring success in meeting the objective): The Client's trauma score on her PCL-C will decrease from 69 to 25 or less (emerging/decreased by 22 points over original screening 06/25/25)                     Objective 2: (identify the means of measuring success in meeting the objective): The Client will utilize her coping skills when presented to address her triggers 4 out 7 times when presented. (emerging Reviewed on 06/25/25)      Long Term Goal 3 (in the client's own words): \" let's work on my medical appointment\"      Stage of Change: Action     Target Date for completion:  03/07/2024             Anticipated therapeutic modalities: Supportive Therapy, Strengths Therapy, and CBTwill be the  treatment modalities that will be utilized during the session.                People identified to complete this goal: The client her support team and this therapist                     Objective 1: (identify the means of measuring success in meeting the objective): The Client all of her medical appointments 100% of  time (emerging 06/25/25)                   I am currently under the care of a [facility] psychiatric provider: yes     My [facility] psychiatric provider is:   Kerri CARNEY LCSW at [facility] for MH therapy and for " "Psychiatric medication with PCP Madison Boyd     I am currently taking psychiatric medications: Yes, as prescribed     I feel that I will be ready for discharge from mental health care when I reach the following (measurable goal/objective): \" who knows\"      For children and adults who have a legal guardian:          Has there been any change to custody orders and/or guardianship status? NA. If yes, attach updated documentation.     I have review and created my Crisis Plan and have been offered a copy of this plan     Behavioral Health Treatment Plan [facility]: Diagnosis and Treatment Plan explained to Heydi Ferrell acknowledges an understanding of their diagnosis. Heydi Ferrell agrees to this treatment plan.     I have been offered a copy of this Treatment Plan. Yes        "

## 2025-06-25 NOTE — BH CRISIS PLAN
"Client Name: Heydi Ferrell       Client YOB: 1965    KelvinSeth Safety Plan      Creation Date: 3/5/24 Update Date: 6/25/25   Created By: Edna Shah Last Updated By: Kerri Cuellar LCSW      Step 1: Warning Signs:   Warning Signs   isolate   increased anger   increse in depression   \"nit picky\"   increase anxiety            Step 2: Internal Coping Strategies:   Internal Coping Strategies   Play on phone   do knitting   watch Tv   reading            Step 3: People and social settings that provide distraction:   Name Contact Information   Waldo doesn't have a phone he is 5 years old will contact his mother   Zina In phone   Velasquez [phone]    Places   house   [place]           Step 4: People whom I can ask for help during a crisis:      Name Contact Information    Laurie in phone      Step 5: Professionals or agencies I can contact during a crisis:      Clinican/Agency Name Phone Emergency Contact    [facility] [phone] Kerri Torres [facility] [phone] \" I will contact Melissa either through facebook, texting, or calling\"      Local Emergency Department Emergency Department Phone Emergency Department Address    [facility] [phone] [address] [place] [address]        Crisis Phone Numbers:   Suicide Prevention Lifeline: Call or Text  495 Crisis Text Line: Text HOME to 246-547   Please note: Some [facility] do not have a separate number for Child/Adolescent specific crisis. If your county is not listed under Child/Adolescent, please call the adult number for your county      Adult Crisis Numbers: Child/Adolescent Crisis Numbers   [facility]: [phone] [facility]: [phone]   [facility]: [phone] [facility]: [phone]   [facility]: [phone] [place]: [phone]   [facility]: [phone] [place]: [phone]   [facility]: " "[phone]   [facility]: [phone]   [facility]: [phone]   [facility]: [phone] (daytime) [phone] (after hours, weekends, holidays)      Step 6: Making the environment safer (plan for lethal means safety):   Plan: Guns are locked up     Optional: What is most important to me and worth living for?   \"My kids and my grandson\"      [employer] Safety Plan. Lucila Warren and Aly Ann. Used with permission of the authors.         Client Name: Heydi Ferrell       Client YOB: 1965    Kelvin-Brown Safety Plan      Creation Date: 3/5/24 Update Date: 6/25/25   Created By: Edna Shah Last Updated By: Kerri Cuellar LCSW      Step 1: Warning Signs:   Warning Signs   isolate   increased anger   increse in depression   \"nit picky\"   increase anxiety            Step 2: Internal Coping Strategies:   Internal Coping Strategies   Play on phone   do knitting   watch Tv   reading            Step 3: People and social settings that provide distraction:   Name Contact Information   Waldo doesn't have a phone he is 5 years old will contact his mother   Zina In phone   Velasquez [phone]    [place]aces   house   [place]           Step 4: People whom I can ask for help during a crisis:      Name Contact Information    Laurie in phone      Step 5: Professionals or agencies I can contact during a crisis:      Clinican/Agency Name Phone Emergency Contact    [facility] [phone] Kerri Torres [facility] [phone] \" I will contact Melissa either through facebook, texting, or calling\"      Local Emergency Department Emergency Department Phone Emergency Department Address    [facility] [phone] [address], [place] [address]        Crisis Phone Numbers:   Suicide Prevention Lifeline: Call or Text  138 Crisis Text Line: Text HOME to 780-827   Please note: Some [facility] do not have a separate " "number for Child/Adolescent specific crisis. If your county is not listed under Child/Adolescent, please call the adult number for your county      Adult Crisis Numbers: Child/Adolescent Crisis Numbers   [facility]: [phone] [facility]: [phone]   [facility]: [phone] [facility]: [phone]   [facility]: [phone] [place]: [phone]   [facility]: [phone] [place]: [phone]   [place] [facility]: [phone]   [facility]: [phone]   [facility]: [phone]   [facility]: [phone] (daytime) [phone] (after hours, weekends, holidays)      Step 6: Making the environment safer (plan for lethal means safety):   Plan: Guns are locked up     Optional: What is most important to me and worth living for?   \"My kids and my grandson\"      Kelvin-Seth Safety Plan. Lucila Warren and Aly Ann. Used with permission of the authors.         Client Name: Heydi Ferrell       Client YOB: 1965    Kelvin-Brown Safety Plan      Creation Date: 3/5/24 Update Date: 1/9/25   Created By: Edna Shah Last Updated By: Kerri Cuellar, [facility]      Step 1: Warning Signs:   Warning Signs   isolate   increased anger   increse in depression   \"nit picky\"   increase anxiety            Step 2: Internal Coping Strategies:   Internal Coping Strategies   Play on phone   do knitting   watch Tv   reading            Step 3: People and social settings that provide distraction:   Name Contact Information   Waldo doesn't have a phone he is 5 years old will contact his mother   Zina In phone   [place] [phone]    Places   house   [place]           Step 4: People whom I can ask for help during a crisis:      Name Contact Information    Laurie in phone      Step 5: Professionals or agencies I can contact during a crisis:      Clinican/Agency Name Phone Emergency Contact    [facility] " "[facility] [phone] Kerri Torres [facility] [phone] \" I will contact Melissa either through facebook, texting, or calling\"      Local Emergency Department Emergency Department Phone Emergency Department Address    [facility] [phone] [address] [place] [address]        Crisis Phone Numbers:   Suicide Prevention Lifeline: Call or Text  965 Crisis Text Line: Text HOME to 053-539   Please note: Some [facility] do not have a separate number for Child/Adolescent specific crisis. If your county is not listed under Child/Adolescent, please call the adult number for your county      Adult Crisis Numbers: Child/Adolescent Crisis Numbers   [facility]: [phone] [facility]: [phone]   [facility]: [phone] [facility]: [phone]   [facility]: [phone] [place]: [phone]   [facility]: [phone] [place]: [phone]   [facility]: [phone]   [facility]: [phone]   [facility]: [phone]   [facility]: [phone] (daytime) [phone] (after hours, weekends, holidays)      Step 6: Making the environment safer (plan for lethal means safety):   Plan: Guns are locked up     Optional: What is most important to me and worth living for?   \"My kids and my grandson\"      [employer] Safety Plan. Lucila Warren and Aly Ann. Used with permission of the authors.           "

## 2025-06-25 NOTE — PSYCH
"Behavioral Health Psychotherapy Progress Note    Psychotherapy Provided: Individual Psychotherapy     1. Post-traumatic stress        2. Binge-eating disorder, moderate        3. Moderate episode of recurrent major depressive disorder (HCC)            Goals addressed in session: Goal 1 and Goal 2creation of Recovery treatment/crisis plans    DATA: The client reported that her relationship with her  has improved which has caused a decrease  in her anxiety and depression. As team we created her recovery treatment and crisis plans during the session. Over the tenure of her treatment plan the client's scores surrounding her depression and trauma have decreased. Coping skills were reviewed during the session  During this session, this clinician used the following therapeutic modalities: Cognitive Behavioral Therapy, Mindfulness-based Strategies, Solution-Focused Therapy, and Supportive Psychotherapy    Substance Abuse was addressed during this session. If the client is diagnosed with a co-occurring substance use disorder, please indicate any changes in the frequency or amount of use: none . Stage of change for addressing substance use diagnoses: No substance use/Not applicable    ASSESSMENT:  Heydi Ferrell presents with a Anxious and Depressed mood.     her affect is Normal range and intensity, which is congruent, with her mood and the content of the session. The client has made progress on their goals.    The client  Heydi Ferrell presents with a none risk of suicide, none risk of self-harm, and none risk of harm to others.    For any risk assessment that surpasses a \"low\" rating, a safety plan must be developed.    A safety plan was indicated: no  If yes, describe in detail n/a    PLAN: Between sessions, Heydi Ferrell will use her coping skills when presented with anxiety and or depression. At the next session, the therapist will use Cognitive Behavioral Therapy, Mindfulness-based Strategies, Solution-Focused " Therapy, and Supportive Psychotherapy to address the client's MH issues affecting different relationships and environments.    Behavioral Health Treatment Plan and Discharge Planning: Heydi Ferrell is aware of and agrees to continue to work on their treatment plan. They have identified and are working toward their discharge goals. yes    Depression Follow-up Plan Completed: Not applicable    Visit start and stop times:    06/25/25  Start Time: 1203  Stop Time: 1259  Total Visit Time: 56 minutes

## 2025-07-08 NOTE — PSYCH
"Behavioral Health Psychotherapy Progress Note    Psychotherapy Provided: Individual Psychotherapy     1. Post-traumatic stress        2. Binge-eating disorder, moderate        3. Moderate episode of recurrent major depressive disorder (HCC)            Goals addressed in session: Goal 1 and Goal 2     DATA: The client reported that she met with her bariatric surgeon and her appointment for her bariatric surgery will be on 07/24/25 at [facility]. She also reported that her oncologist dx her with osteoporosis,and the client reported that this information was shared with her PCP. As team we explored  and processed how the client's life will and won't  change with this surgery. She was able to verbalize  awareness of limitations of this surgery, of her and her family self-sabotage behaviors. Coping skills were reviewed during the session.   During this session, this clinician used the following therapeutic modalities: Cognitive Behavioral Therapy, Cognitive Processing Therapy, Mindfulness-based Strategies, Solution-Focused Therapy, and Supportive Psychotherapy    Substance Abuse was not addressed during this session. If the client is diagnosed with a co-occurring substance use disorder, please indicate any changes in the frequency or amount of use: none. Stage of change for addressing substance use diagnoses: No substance use/Not applicable    ASSESSMENT:  Heydi Ferrell presents with a Anxious mood.     her affect is Normal range and intensity, which is congruent, with her mood and the content of the session. The client has made progress on their goals.     Heydi Ferrell presents with a none risk of suicide, none risk of self-harm, and none risk of harm to others.    For any risk assessment that surpasses a \"low\" rating, a safety plan must be developed.    A safety plan was indicated: no  If yes, describe in detail n/a    PLAN: Between sessions, Heydi Ferrell will do activities on emotions with her grandson. " At the next session, the therapist will use Cognitive Behavioral Therapy, Mindfulness-based Strategies, Solution-Focused Therapy, and Supportive Psychotherapy to address the client's MH issues affecting her different relationships and environments.    Behavioral Health Treatment Plan and Discharge Planning: Heydi Ferrell is aware of and agrees to continue to work on their treatment plan. They have identified and are working toward their discharge goals. yes    Depression Follow-up Plan Completed: Not applicable    Visit start and stop times:    07/09/25  Start Time: 1155  Stop Time: 1250  Total Visit Time: 55 minutes

## 2025-07-09 ENCOUNTER — SOCIAL WORK (OUTPATIENT)
Dept: BEHAVIORAL/MENTAL HEALTH CLINIC | Facility: CLINIC | Age: 60
End: 2025-07-09
Payer: MEDICARE

## 2025-07-09 DIAGNOSIS — F43.10 POST-TRAUMATIC STRESS: Primary | ICD-10-CM

## 2025-07-09 DIAGNOSIS — F50.811 BINGE-EATING DISORDER, MODERATE: ICD-10-CM

## 2025-07-09 DIAGNOSIS — F33.1 MODERATE EPISODE OF RECURRENT MAJOR DEPRESSIVE DISORDER (HCC): ICD-10-CM

## 2025-07-09 PROCEDURE — 90837 PSYTX W PT 60 MINUTES: CPT | Performed by: SOCIAL WORKER

## 2025-07-20 NOTE — PSYCH
"Behavioral Health Psychotherapy Progress Note    Psychotherapy Provided: Individual Psychotherapy     1. Post-traumatic stress        2. Binge-eating disorder, moderate        3. Moderate episode of recurrent major depressive disorder (HCC)            Goals addressed in session: Goal 1 and Goal 2     DATA: The client reported that she will be having her gastric bypass surgery this week, and will not be able to drive for one week. As team we explored and processed the client's upcoming changes in her life by addressing her health issues. She was able to verbalize this is first time she has done \"something\" in addressing her health issues. The client's self-esteem was explored as well her increased personal strengths. Coping skills were reviewed during the session.   During this session, this clinician used the following therapeutic modalities: Cognitive Behavioral Therapy, Mindfulness-based Strategies, Solution-Focused Therapy, and Supportive Psychotherapy    Substance Abuse was addressed during this session. If the client is diagnosed with a co-occurring substance use disorder, please indicate any changes in the frequency or amount of use: none. Stage of change for addressing substance use diagnoses: No substance use/Not applicable    ASSESSMENT:  Heydi Ferrell presents with a Anxious and Depressed mood.     her affect is Normal range and intensity, which is congruent, with her mood and the content of the session. The client has made progress on their goals.     Heydi Ferrell presents with a none risk of suicide, none risk of self-harm, and none risk of harm to others.    For any risk assessment that surpasses a \"low\" rating, a safety plan must be developed.  Phone number for National Suicide Prevention Life Line was given to the Client/ [phone] /988  [facility] for [facility] was given  to the Client [phone]   A safety plan was indicated: no  If yes, describe in detail n/a    PLAN: " Between sessions, Heydi Ferrell will utilize her coping skills when presented with anxiety and or depression. At the next session, the therapist will use Cognitive Behavioral Therapy, Mindfulness-based Strategies, Solution-Focused Therapy, and Supportive Psychotherapy to address the client's MH Issues affecting different relationships and environments.    Behavioral Health Treatment Plan and Discharge Planning: Heydi Ferrell is aware of and agrees to continue to work on their treatment plan. They have identified and are working toward their discharge goals. yes    Depression Follow-up Plan Completed: Not applicable    Visit start and stop times:    07/21/25  Start Time: 1200  Stop Time: 1300  Total Visit Time: 60 minutes

## 2025-07-21 ENCOUNTER — SOCIAL WORK (OUTPATIENT)
Dept: BEHAVIORAL/MENTAL HEALTH CLINIC | Facility: CLINIC | Age: 60
End: 2025-07-21
Payer: MEDICARE

## 2025-07-21 DIAGNOSIS — F50.811 BINGE-EATING DISORDER, MODERATE: ICD-10-CM

## 2025-07-21 DIAGNOSIS — F43.10 POST-TRAUMATIC STRESS: Primary | ICD-10-CM

## 2025-07-21 DIAGNOSIS — F33.1 MODERATE EPISODE OF RECURRENT MAJOR DEPRESSIVE DISORDER (HCC): ICD-10-CM

## 2025-07-21 PROCEDURE — 90837 PSYTX W PT 60 MINUTES: CPT | Performed by: SOCIAL WORKER

## 2025-08-01 DIAGNOSIS — Z86.19 HISTORY OF HELICOBACTER INFECTION: ICD-10-CM

## 2025-08-01 DIAGNOSIS — K21.9 GASTROESOPHAGEAL REFLUX DISEASE, UNSPECIFIED WHETHER ESOPHAGITIS PRESENT: ICD-10-CM

## 2025-08-01 DIAGNOSIS — R13.19 OTHER DYSPHAGIA: ICD-10-CM

## 2025-08-01 RX ORDER — OMEPRAZOLE 20 MG/1
CAPSULE, DELAYED RELEASE ORAL
Qty: 90 CAPSULE | Refills: 1 | Status: SHIPPED | OUTPATIENT
Start: 2025-08-01

## 2025-08-04 ENCOUNTER — SOCIAL WORK (OUTPATIENT)
Age: 60
End: 2025-08-04

## 2025-08-04 DIAGNOSIS — F43.10 POST TRAUMATIC STRESS DISORDER (PTSD): ICD-10-CM

## 2025-08-04 DIAGNOSIS — F31.81 BIPOLAR II DISORDER (HCC): Primary | ICD-10-CM

## 2025-08-04 PROCEDURE — 90837 PSYTX W PT 60 MINUTES: CPT | Performed by: SOCIAL WORKER

## (undated) DEVICE — 3000CC GUARDIAN II: Brand: GUARDIAN

## (undated) DEVICE — SYRINGE 50ML LL

## (undated) DEVICE — PENCIL ELECTROSURG E-Z CLEAN -0035H

## (undated) DEVICE — INTENDED FOR TISSUE SEPARATION, AND OTHER PROCEDURES THAT REQUIRE A SHARP SURGICAL BLADE TO PUNCTURE OR CUT.: Brand: BARD-PARKER SAFETY BLADES SIZE 10, STERILE

## (undated) DEVICE — 2000CC GUARDIAN II: Brand: GUARDIAN

## (undated) DEVICE — GLOVE SRG BIOGEL 8

## (undated) DEVICE — SUT STRATAFIX SPIRAL 2-0 CT-1 30 CM SXPP1B410

## (undated) DEVICE — INTENDED FOR TISSUE SEPARATION, AND OTHER PROCEDURES THAT REQUIRE A SHARP SURGICAL BLADE TO PUNCTURE OR CUT.: Brand: BARD-PARKER SAFETY BLADES SIZE 15, STERILE

## (undated) DEVICE — TUBING SUCTION 5MM X 12 FT

## (undated) DEVICE — BETHLEHEM UNIVERSAL GYN LAP PK: Brand: CARDINAL HEALTH

## (undated) DEVICE — LAPAROSCOPIC SMOKE EVAC TUBING

## (undated) DEVICE — THE EXACTO COLD SNARE IS INTENDED TO BE USED WITHOUT DIATHERMIC ENERGY FOR THE ENDOSCOPIC RESECTION OF POLYP TISSUE IN THE GASTROINTESTINAL TRACT.: Brand: EXACTO

## (undated) DEVICE — SUT MONOCRYL 4-0 PS-2 27 IN Y426H

## (undated) DEVICE — Device: Brand: TISSUE RETRIEVAL SYSTEM

## (undated) DEVICE — IV EXTENSION TUBING 33 IN

## (undated) DEVICE — INTENDED FOR TISSUE SEPARATION, AND OTHER PROCEDURES THAT REQUIRE A SHARP SURGICAL BLADE TO PUNCTURE OR CUT.: Brand: BARD-PARKER SAFETY BLADES SIZE 11, STERILE

## (undated) DEVICE — SYRINGE 5ML LL

## (undated) DEVICE — OCCLUDER COLPO-PNEUMO

## (undated) DEVICE — Device

## (undated) DEVICE — TRAY EPIDURAL PERIFIX 20GA X 3.5IN TUOHY 8ML

## (undated) DEVICE — [HIGH FLOW INSUFFLATOR,  DO NOT USE IF PACKAGE IS DAMAGED,  KEEP DRY,  KEEP AWAY FROM SUNLIGHT,  PROTECT FROM HEAT AND RADIOACTIVE SOURCES.]: Brand: PNEUMOSURE

## (undated) DEVICE — MAYO STAND COVER: Brand: CONVERTORS

## (undated) DEVICE — GLOVE INDICATOR PI UNDERGLOVE SZ 7.5 BLUE

## (undated) DEVICE — LUBRICANT SURGILUBE TUBE 4 OZ  FLIP TOP

## (undated) DEVICE — PROXIMATE SKIN STAPLERS (35 WIDE) CONTAINS 35 STAINLESS STEEL STAPLES (FIXED HEAD): Brand: PROXIMATE

## (undated) DEVICE — SPECIMEN CONTAINER STERILE PEEL PACK

## (undated) DEVICE — SUT MONOCRYL 3-0 PS-2 27 IN Y427H

## (undated) DEVICE — LAPAROSCOPIC TROCAR SLEEVE/SINGLE USE: Brand: KII® OPTICAL ACCESS SYSTEM

## (undated) DEVICE — TROCAR: Brand: KII FIOS FIRST ENTRY

## (undated) DEVICE — BETHLEHEM UNIVERSAL LAPAROTOMY: Brand: CARDINAL HEALTH

## (undated) DEVICE — CHLORAPREP HI-LITE 26ML ORANGE

## (undated) DEVICE — FLEXIBLE ADHESIVE BANDAGE,X-LARGE: Brand: CURITY

## (undated) DEVICE — TISSUE RETRIEVAL SYSTEM: Brand: INZII RETRIEVAL SYSTEM

## (undated) DEVICE — SCD SEQUENTIAL COMPRESSION COMFORT SLEEVE MEDIUM KNEE LENGTH: Brand: KENDALL SCD

## (undated) DEVICE — STRL COTTON TIP APPLCTR 6IN PK: Brand: CARDINAL HEALTH

## (undated) DEVICE — TROCAR: Brand: KII SLEEVE

## (undated) DEVICE — CHLORAPREP HI-LITE 10.5ML ORANGE

## (undated) DEVICE — ELECTRODE LAP SPATULA CRV E-Z CLEAN 33CM -0018C

## (undated) DEVICE — PREMIUM DRY TRAY LF: Brand: MEDLINE INDUSTRIES, INC.

## (undated) DEVICE — GLOVE PI ULTRA TOUCH SZ.7.0

## (undated) DEVICE — MEDI-VAC YANKAUER SUCTION HANDLE W/BULBOUS AND CONTROL VENT: Brand: CARDINAL HEALTH

## (undated) DEVICE — TIBURON TRANSVERSE LAPAROTOMY SHEET: Brand: CONVERTORS

## (undated) DEVICE — CONMED SCOPE SAVER BITE BLOCK, 20X27 MM: Brand: SCOPE SAVER

## (undated) DEVICE — SUT VICRYL 0 CT-1 18 IN J740D

## (undated) DEVICE — ENSEAL 20 CM SHAFT, LARGE JAW: Brand: ENSEAL X1

## (undated) DEVICE — SUT VICRYL 0 UR-6 27 IN J603H

## (undated) DEVICE — BLUE HEAT SCOPE WARMER

## (undated) DEVICE — SPONGE LAP 18 X 18 IN STRL RFD

## (undated) DEVICE — DRAPE EQUIPMENT RF WAND

## (undated) DEVICE — ADHESIVE SKIN HIGH VISCOSITY EXOFIN 1ML

## (undated) DEVICE — NEEDLE SPINAL 22G X 3.5IN  QUINCKE

## (undated) DEVICE — UTERINE MANIPULATOR RUMI DISP TIP 6.7X12CM ORANGE

## (undated) DEVICE — SUT VICRYL 0 CT-1 CR/8 27 IN JJ41G

## (undated) DEVICE — IRRIG ENDO FLO TUBING

## (undated) DEVICE — ASTOUND STANDARD SURGICAL GOWN, XL: Brand: CONVERTORS

## (undated) DEVICE — MORCELLATOR LAP LINA XCISE 15 MM OBTURATOR CRDLS

## (undated) DEVICE — PMI DISPOSABLE PUNCTURE CLOSURE DEVICE / SUTURE GRASPER: Brand: PMI

## (undated) DEVICE — TRAY FOLEY 16FR URIMETER SILICONE SURESTEP